# Patient Record
Sex: FEMALE | Race: WHITE | NOT HISPANIC OR LATINO | Employment: OTHER
[De-identification: names, ages, dates, MRNs, and addresses within clinical notes are randomized per-mention and may not be internally consistent; named-entity substitution may affect disease eponyms.]

---

## 2017-04-26 ENCOUNTER — HISTORIC RESULTS (OUTPATIENT)
Dept: ADMINISTRATIVE | Age: 62
End: 2017-04-26

## 2017-05-11 ENCOUNTER — HISTORIC RESULTS (OUTPATIENT)
Dept: ADMINISTRATIVE | Age: 62
End: 2017-05-11

## 2017-07-17 ENCOUNTER — HISTORIC RESULTS (OUTPATIENT)
Dept: ADMINISTRATIVE | Age: 62
End: 2017-07-17

## 2017-08-01 ENCOUNTER — COMMUNICATION - GICH (OUTPATIENT)
Dept: SURGERY | Facility: OTHER | Age: 62
End: 2017-08-01

## 2017-08-01 LAB — HEMOCCULT STL QL IA: NORMAL

## 2018-01-16 PROBLEM — L30.1 DYSHIDROTIC ECZEMA: Status: ACTIVE | Noted: 2018-01-16

## 2018-01-16 PROBLEM — I47.10 PAROXYSMAL SUPRAVENTRICULAR TACHYCARDIA (H): Status: ACTIVE | Noted: 2018-01-16

## 2018-01-16 PROBLEM — N85.00 ENDOMETRIAL HYPERPLASIA: Status: ACTIVE | Noted: 2018-01-16

## 2018-01-16 PROBLEM — R06.02 SHORTNESS OF BREATH: Status: ACTIVE | Noted: 2018-01-16

## 2018-01-16 PROBLEM — Z79.01 ANTICOAGULATION MONITORING, INR RANGE 2-3: Status: ACTIVE | Noted: 2017-12-19

## 2018-01-16 PROBLEM — E78.00 HYPERCHOLESTEROLEMIA: Status: ACTIVE | Noted: 2018-01-16

## 2018-01-16 PROBLEM — Z86.79 HISTORY OF CARDIOVASCULAR DISORDER: Status: ACTIVE | Noted: 2018-01-16

## 2018-01-16 PROBLEM — D12.6 BENIGN NEOPLASM OF COLON: Status: ACTIVE | Noted: 2018-01-16

## 2018-01-16 PROBLEM — I66.9 CEREBRAL THROMBOSIS: Status: ACTIVE | Noted: 2018-01-16

## 2018-01-16 PROBLEM — I82.409 ACUTE THROMBOEMBOLISM OF DEEP VEINS OF LOWER EXTREMITY (H): Status: ACTIVE | Noted: 2018-01-16

## 2018-01-16 PROBLEM — D68.2 FACTOR V DEFICIENCY (H): Status: ACTIVE | Noted: 2018-01-16

## 2018-01-16 PROBLEM — I10 ESSENTIAL HYPERTENSION: Status: ACTIVE | Noted: 2018-01-16

## 2018-01-16 PROBLEM — R07.9 CHEST PAIN: Status: ACTIVE | Noted: 2018-01-16

## 2018-01-16 RX ORDER — NITROGLYCERIN 80 MG/1
1 PATCH TRANSDERMAL
COMMUNITY
Start: 2017-04-18 | End: 2020-02-11

## 2018-01-16 RX ORDER — ZOLPIDEM TARTRATE 10 MG/1
TABLET ORAL
COMMUNITY
Start: 2017-12-13 | End: 2019-09-27

## 2018-01-16 RX ORDER — CARVEDILOL 3.12 MG/1
3.12 TABLET ORAL
COMMUNITY
Start: 2017-04-18 | End: 2019-09-27

## 2018-01-16 RX ORDER — NITROGLYCERIN 0.4 MG/1
0.4 TABLET SUBLINGUAL
COMMUNITY
Start: 2017-04-18 | End: 2020-02-11

## 2018-01-16 RX ORDER — ASPIRIN 81 MG/1
81 TABLET ORAL
COMMUNITY
Start: 2013-08-07 | End: 2021-04-09

## 2018-01-16 RX ORDER — WARFARIN SODIUM 6 MG/1
TABLET ORAL
COMMUNITY
Start: 2017-12-18 | End: 2019-09-28

## 2018-01-16 RX ORDER — LISINOPRIL 5 MG/1
5 TABLET ORAL
COMMUNITY
Start: 2017-04-18 | End: 2020-03-09

## 2018-01-16 RX ORDER — CLOPIDOGREL BISULFATE 75 MG/1
75 TABLET ORAL
COMMUNITY
Start: 2017-07-16 | End: 2019-09-27

## 2018-01-16 RX ORDER — WARFARIN SODIUM 2 MG/1
TABLET ORAL
COMMUNITY
Start: 2017-12-06 | End: 2019-09-28

## 2018-01-16 RX ORDER — ATORVASTATIN CALCIUM 80 MG/1
80 TABLET, FILM COATED ORAL
COMMUNITY
Start: 2018-01-04 | End: 2020-03-04

## 2018-03-01 ENCOUNTER — TRANSFERRED RECORDS (OUTPATIENT)
Dept: HEALTH INFORMATION MANAGEMENT | Facility: OTHER | Age: 63
End: 2018-03-01

## 2018-07-23 NOTE — PROGRESS NOTES
Patient Information     Patient Name  Lyudmila Fitzgerald MRN  [id number] Sex  Female   1955      Letter by Mena Green at      Author:  Mena Green Service:  (none) Author Type:  (none)    Filed:   Encounter Date:  2017 Status:  (Other)           Lyudmila Fitzgerald   [address]   [place] [address]          2017    Dear Ms. Fitzgerald:    It has come to our attention that you are due for a colonoscopy.  According to our records, your last colonoscopy was done on 8/15/2007.  It  is time for your repeat colonoscopy.  Please contact the Surgery department at [phone] and we will be happy to set up this colonoscopy for you.  If you have had a colonoscopy since your last one with us, please let us know so we can update our records.      Sincerely,       Mena Green  General Surgery      Reviewed and electronically signed by provider.

## 2018-08-30 ENCOUNTER — TRANSFERRED RECORDS (OUTPATIENT)
Dept: MULTI SPECIALTY CLINIC | Facility: CLINIC | Age: 63
End: 2018-08-30

## 2018-08-30 LAB — HEMOCCULT STL QL IA: NEGATIVE

## 2019-09-05 ENCOUNTER — TELEPHONE (OUTPATIENT)
Dept: FAMILY MEDICINE | Facility: OTHER | Age: 64
End: 2019-09-05

## 2019-09-05 DIAGNOSIS — I20.1 CORONARY ARTERY SPASM (H): Primary | ICD-10-CM

## 2019-09-05 DIAGNOSIS — I47.10 PAROXYSMAL SUPRAVENTRICULAR TACHYCARDIA (H): ICD-10-CM

## 2019-09-05 NOTE — TELEPHONE ENCOUNTER
Patient has moved back into the area after about 3 years and would like you to be her PCP and to schedule an appointment with you, she had been with you for several years prior and through her heart issues and strokes    Please call to advise/schedule    Thank you

## 2019-09-06 NOTE — TELEPHONE ENCOUNTER
Ok to schedule.  Please update medication list if needed.  Let her know that I think she should also schedule and establish with Dr Anaya/cardiology.  Erika Gupta MD

## 2019-09-06 NOTE — TELEPHONE ENCOUNTER
Patient called wanting to schedule with Dr. Islas.  Referral required.  Would PCJ please place.  Thank you!  Mary Brar on 9/6/2019 at 8:50 AM

## 2019-09-23 LAB — INR PPP: 3.8 (ref 0.9–1.1)

## 2019-09-27 ENCOUNTER — OFFICE VISIT (OUTPATIENT)
Dept: FAMILY MEDICINE | Facility: OTHER | Age: 64
End: 2019-09-27
Attending: FAMILY MEDICINE
Payer: MEDICARE

## 2019-09-27 VITALS
BODY MASS INDEX: 24.66 KG/M2 | TEMPERATURE: 98.1 F | WEIGHT: 148 LBS | OXYGEN SATURATION: 97 % | HEART RATE: 75 BPM | RESPIRATION RATE: 18 BRPM | HEIGHT: 65 IN | DIASTOLIC BLOOD PRESSURE: 84 MMHG | SYSTOLIC BLOOD PRESSURE: 120 MMHG

## 2019-09-27 DIAGNOSIS — Z95.0 CARDIAC PACEMAKER IN SITU: ICD-10-CM

## 2019-09-27 DIAGNOSIS — I20.1 CORONARY ARTERY SPASM (H): ICD-10-CM

## 2019-09-27 DIAGNOSIS — Z86.73 HISTORY OF TIA (TRANSIENT ISCHEMIC ATTACK) AND STROKE: ICD-10-CM

## 2019-09-27 DIAGNOSIS — I47.10 PAROXYSMAL SUPRAVENTRICULAR TACHYCARDIA (H): ICD-10-CM

## 2019-09-27 DIAGNOSIS — Z12.4 SCREENING FOR CERVICAL CANCER: ICD-10-CM

## 2019-09-27 DIAGNOSIS — Z00.00 PHYSICAL EXAM, ANNUAL: Primary | ICD-10-CM

## 2019-09-27 DIAGNOSIS — E11.9 TYPE 2 DIABETES, HBA1C GOAL < 8% (H): ICD-10-CM

## 2019-09-27 DIAGNOSIS — F51.04 CHRONIC INSOMNIA: ICD-10-CM

## 2019-09-27 LAB
ALBUMIN SERPL-MCNC: 4.6 G/DL (ref 3.5–5.7)
ALP SERPL-CCNC: 76 U/L (ref 34–104)
ALT SERPL W P-5'-P-CCNC: 22 U/L (ref 7–52)
ANION GAP SERPL CALCULATED.3IONS-SCNC: 11 MMOL/L (ref 3–14)
AST SERPL W P-5'-P-CCNC: 25 U/L (ref 13–39)
BILIRUB SERPL-MCNC: 0.6 MG/DL (ref 0.3–1)
BUN SERPL-MCNC: 12 MG/DL (ref 7–25)
CALCIUM SERPL-MCNC: 9.4 MG/DL (ref 8.6–10.3)
CHLORIDE SERPL-SCNC: 107 MMOL/L (ref 98–107)
CHOLEST SERPL-MCNC: 191 MG/DL
CO2 SERPL-SCNC: 21 MMOL/L (ref 21–31)
CREAT SERPL-MCNC: 0.77 MG/DL (ref 0.6–1.2)
ERYTHROCYTE [DISTWIDTH] IN BLOOD BY AUTOMATED COUNT: 12.7 % (ref 10–15)
GFR SERPL CREATININE-BSD FRML MDRD: 75 ML/MIN/{1.73_M2}
GLUCOSE SERPL-MCNC: 114 MG/DL (ref 70–105)
HBA1C MFR BLD: 6.1 % (ref 4–6)
HCT VFR BLD AUTO: 43.2 % (ref 35–47)
HDLC SERPL-MCNC: 69 MG/DL (ref 23–92)
HGB BLD-MCNC: 14.9 G/DL (ref 11.7–15.7)
LDLC SERPL CALC-MCNC: 97 MG/DL
MCH RBC QN AUTO: 31.8 PG (ref 26.5–33)
MCHC RBC AUTO-ENTMCNC: 34.5 G/DL (ref 31.5–36.5)
MCV RBC AUTO: 92 FL (ref 78–100)
NONHDLC SERPL-MCNC: 122 MG/DL
PLATELET # BLD AUTO: 250 10E9/L (ref 150–450)
POTASSIUM SERPL-SCNC: 4 MMOL/L (ref 3.5–5.1)
PROT SERPL-MCNC: 7.5 G/DL (ref 6.4–8.9)
RBC # BLD AUTO: 4.69 10E12/L (ref 3.8–5.2)
SODIUM SERPL-SCNC: 139 MMOL/L (ref 134–144)
TRIGL SERPL-MCNC: 125 MG/DL
TSH SERPL DL<=0.05 MIU/L-ACNC: 2.28 IU/ML (ref 0.34–5.6)
WBC # BLD AUTO: 8.3 10E9/L (ref 4–11)

## 2019-09-27 PROCEDURE — 90471 IMMUNIZATION ADMIN: CPT

## 2019-09-27 PROCEDURE — 90715 TDAP VACCINE 7 YRS/> IM: CPT

## 2019-09-27 PROCEDURE — 87624 HPV HI-RISK TYP POOLED RSLT: CPT | Mod: ZL | Performed by: FAMILY MEDICINE

## 2019-09-27 PROCEDURE — G0463 HOSPITAL OUTPT CLINIC VISIT: HCPCS | Mod: 25

## 2019-09-27 PROCEDURE — 90472 IMMUNIZATION ADMIN EACH ADD: CPT

## 2019-09-27 PROCEDURE — 36415 COLL VENOUS BLD VENIPUNCTURE: CPT | Mod: ZL | Performed by: FAMILY MEDICINE

## 2019-09-27 PROCEDURE — 80053 COMPREHEN METABOLIC PANEL: CPT | Mod: ZL | Performed by: FAMILY MEDICINE

## 2019-09-27 PROCEDURE — 84443 ASSAY THYROID STIM HORMONE: CPT | Mod: ZL | Performed by: FAMILY MEDICINE

## 2019-09-27 PROCEDURE — G0009 ADMIN PNEUMOCOCCAL VACCINE: HCPCS

## 2019-09-27 PROCEDURE — 40001026 ZZHCL STATISTICAL PAP TEST QC: Performed by: FAMILY MEDICINE

## 2019-09-27 PROCEDURE — G0008 ADMIN INFLUENZA VIRUS VAC: HCPCS

## 2019-09-27 PROCEDURE — 99213 OFFICE O/P EST LOW 20 MIN: CPT | Mod: 25 | Performed by: FAMILY MEDICINE

## 2019-09-27 PROCEDURE — 99396 PREV VISIT EST AGE 40-64: CPT | Mod: GY | Performed by: FAMILY MEDICINE

## 2019-09-27 PROCEDURE — 90686 IIV4 VACC NO PRSV 0.5 ML IM: CPT

## 2019-09-27 PROCEDURE — G0463 HOSPITAL OUTPT CLINIC VISIT: HCPCS

## 2019-09-27 PROCEDURE — 80061 LIPID PANEL: CPT | Mod: ZL | Performed by: FAMILY MEDICINE

## 2019-09-27 PROCEDURE — 83036 HEMOGLOBIN GLYCOSYLATED A1C: CPT | Mod: ZL | Performed by: FAMILY MEDICINE

## 2019-09-27 PROCEDURE — 85027 COMPLETE CBC AUTOMATED: CPT | Mod: ZL | Performed by: FAMILY MEDICINE

## 2019-09-27 PROCEDURE — G0123 SCREEN CERV/VAG THIN LAYER: HCPCS | Performed by: FAMILY MEDICINE

## 2019-09-27 RX ORDER — ZOLPIDEM TARTRATE 5 MG/1
5 TABLET ORAL
Qty: 30 TABLET | Refills: 5 | Status: SHIPPED | OUTPATIENT
Start: 2019-09-27 | End: 2020-01-22

## 2019-09-27 RX ORDER — NITROGLYCERIN 0.4 MG/1
TABLET SUBLINGUAL
Qty: 25 TABLET | Refills: 3 | Status: SHIPPED | OUTPATIENT
Start: 2019-09-27 | End: 2020-03-09

## 2019-09-27 RX ORDER — AMLODIPINE BESYLATE 2.5 MG/1
2.5 TABLET ORAL DAILY
COMMUNITY
Start: 2019-01-17 | End: 2020-03-09

## 2019-09-27 RX ORDER — DILTIAZEM HYDROCHLORIDE 180 MG/1
180 CAPSULE, EXTENDED RELEASE ORAL DAILY
Qty: 90 CAPSULE | Refills: 3 | COMMUNITY
Start: 2019-09-27 | End: 2020-03-09

## 2019-09-27 RX ORDER — ISOSORBIDE MONONITRATE 60 MG/1
60 TABLET, EXTENDED RELEASE ORAL DAILY
COMMUNITY
Start: 2019-09-27 | End: 2020-03-09

## 2019-09-27 RX ORDER — NITROGLYCERIN 0.4 MG/1
0.4 TABLET SUBLINGUAL
Status: CANCELLED | OUTPATIENT
Start: 2019-09-27

## 2019-09-27 ASSESSMENT — MIFFLIN-ST. JEOR: SCORE: 1222.2

## 2019-09-27 ASSESSMENT — PAIN SCALES - GENERAL: PAINLEVEL: MILD PAIN (3)

## 2019-09-27 NOTE — PROGRESS NOTES
SUBJECTIVE:  Nursing Notes:   Britta Bunn LPN  9/27/2019  2:27 PM  Signed  Patient presents to the clinic today for a px. Med rec complete.     Britta Oni TEE.................. 9/27/2019 2:15 PM      Lyudmila Fitzgerald is a 64 year old female who presents for her annual exam.    HPI: Lyudmila Fitzgerald is a 64 year old female presents for her annual exam.    She recent moved back to [place] after having moved to [place], then [facility].    She has a history of complex CV disease including vasospasm disease, a fib, SVT, systolic HF, coronary disease.  She has had several stents, EP studies, ablation, pacemaker placement, and bypass surgery.  In 2016 she underwent single vessel CABG.  She had severe systolic heart failure, EF down to 15%. Post-procedure, this improved to 45%.  She sees cardiology every 3-6 months for follow up.  She has had difficulty with chronic, recurrent angina.      Type 2 diabetes - has not required medication for this.      History of CVA.    History of DVT/VTE.  Is on long term anticoagulation.      Insomnia - chronic.  Has taken ambien long term for sleep -  confirms past prescriptions.    Last pap: 2013  Immunizations:  Flu and tetanus updates are due.  Mammogram due in the spring  Colon cancer screening FIT testing done 2-3 years ago    No results found for: CHOL  Lab Results   Component Value Date    HDL 96 11/06/2013     Lab Results   Component Value Date    LDL 90 11/06/2013     Lab Results   Component Value Date    TRIG 87 11/06/2013         PROBLEM LIST:  Patient Active Problem List   Diagnosis     ACP (advance care planning)     Acute thromboembolism of deep veins of lower extremity (H)     Anticoagulation monitoring, INR range 2-3     Cardiac pacemaker in situ     Cerebral thrombosis     Chest pain     Benign neoplasm of colon     Constipation, chronic     Hemiplegia, late effect of cerebrovascular disease (H)     Coronary artery spasm (H)     Diabetes mellitus, type  II (H)     Dyshidrotic eczema     Endometrial hyperplasia     Factor V deficiency (H)     Focal neurological deficit     Foot drop, left     Hypercholesterolemia     Left-sided weakness     Disorder of bone and cartilage     Paroxysmal supraventricular tachycardia (H)     Shortness of breath     History of cardiovascular disorder     Essential hypertension     Urinary incontinence, mixed     PAST MEDICAL HISTORY:  Past Medical History:   Diagnosis Date     Acute thromboembolism of deep veins of lower extremity (H)     2006,Hx of multiple episodes of DVT: 3 lower extremity; 5 upper extremity (right arm)     Atrial fibrillation (H)     No Comments Provided     Cardiac pacemaker in situ     Dual chamber     Cerebral thrombosis     2006,'95 w/ no residual     Coronary atherosclerosis     2006     Deep phlebothrombosis, antepartum, with delivery (H)     No Comments Provided     Endometrial hyperplasia     2006,s/p endometrial ablation      Essential hypertension     No Comments Provided     History of other genital system and obstetric disorders      8, Para 3-0-5-2     Other and unspecified angina pectoris     2006     Other and unspecified hyperlipidemia     No Comments Provided     Other postprocedural states      and ,Followed by Dr. Usman Duran, [facility],.     Paroxysmal supraventricular tachycardia (H)     2006,s/p multiple ablations w last resulting in PPM     Personal history of disease     2009,Hospitalized CVA with worsening left hemiplegia, post hospitalization ARU stay.     Personal history of transient ischemic attack (TIA) and cerebral infarction without residual deficit     with left hemiplegia     Primary hypercoagulable state (H)     No Comments Provided     SURGICAL HISTORY:  Past Surgical History:   Procedure Laterality Date     ARTHROSCOPY KNEE      ,Arthroscopy for chondromalacia patella     AS CABG, ARTERY-VEIN,  "SINGLE  11/15/2016    Single vessel; done in [place]     ATTEMPTED ARTHROSCOPY      02/04,Right arthroscopy     BIOPSY BREAST      03/08,Breast cyst aspiration     COLONOSCOPY      8/2007,follow up recommended in 10 years.     ENDOSCOPIC SINUS SURGERY      03/04,Sinus node ablation.     EP STUDY  12/1994    EP STUDY /ABLATION,AV re-entry tachycardia, tessie ablation     HEART CATH, ANGIOPLASTY      01/06,Stenting placed LAD after ergonovine provocation confirmed     HEART CATH, ANGIOPLASTY      03/06,90% lesion, normal left ventricular function     IMPLANT PACEMAKER      03/03,Guidant pacemaker placement, AV tessie ablation     LAPAROSCOPIC ABLATION ENDOMETRIOSIS      06/06     OTHER SURGICAL HISTORY      3/24/06,770749,([place]) HC STENT ANGIO     OTHER SURGICAL HISTORY      08/02,747474,EP STUDY /ABLATION     OTHER SURGICAL HISTORY      11/04,94531.0,CT CORONARY ANGIOGRAM ([place]),Coronary angiogram, failed ablation     OTHER SURGICAL HISTORY      12/04,316326,[facility] RELOCATION OF SKIN POCKET PACEMAKER,[facility] 5/24/05 reconfiguration of pacemaker \"pocket\"     OTHER SURGICAL HISTORY      207882,IP CONSULT TO ELECTROPHYSIOLOGY,study and lead change     OTHER SURGICAL HISTORY      12/06,727076,NEUROSTIMULATOR,Nerve stimulator implanted for chest pain control     OTHER SURGICAL HISTORY      12/2008,51115.0,CT CORONARY ANGIOGRAM ([place]),with increased left-sided weakness and vertigo, negative CT angiogram.     REPLACE PACEMAKER GENERATOR      12/29/04,Replacement of left ventricular pacemaker lead.     STENT  02/2017    LAD        SOCIAL HISTORY:  Social History     Socioeconomic History     Marital status:      Spouse name: Antonio     Number of children: 2     Years of education: 16+     Highest education level: Not on file   Occupational History     Not on file   Social Needs     Financial resource strain: Not hard at all     Food insecurity:     Worry: Never true     Inability: Never true     Transportation needs: "     Medical: No     Non-medical: No   Tobacco Use     Smoking status: Never Smoker     Smokeless tobacco: Never Used     Tobacco comment: Quit smoking: spouse does not smoke in the house. Exposed in cars.   Substance and Sexual Activity     Alcohol use: Yes     Comment: Alcoholic Drinks/day: Alcoholic Drinks/day: 1-2 drinks twice a week     Drug use: Never     Comment: Drug use: No     Sexual activity: Not Currently     Partners: Male   Lifestyle     Physical activity:     Days per week: 0 days     Minutes per session: 0 min     Stress: To some extent   Relationships     Social connections:     Talks on phone: Not on file     Gets together: Not on file     Attends Orthodoxy service: Not on file     Active member of club or organization: Not on file     Attends meetings of clubs or organizations: Not on file     Relationship status: Not on file     Intimate partner violence:     Fear of current or ex partner: Not on file     Emotionally abused: Not on file     Physically abused: Not on file     Forced sexual activity: Not on file   Other Topics Concern     Not on file   Social History Narrative     2nd Marriage x 20 years to  Anthony they live in [place], [place]       in [place]. Completed her Master's Degree 2003. Taught Special Education.  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      Total 5 children blended family-3 dgts. And 2 sons    Daughter:  Philip    Son:  Anthony - lives in [facility]    6 Grand-children 5 boys and 1 girl    Leigh parents' and siblings live in [facility].     FAMILY HISTORY:  Family History   Problem Relation Age of Onset     Dementia Mother      Skin Cancer Father         squamous     Dementia Father      Deep Vein Thrombosis Daughter         FVL +     Family History Negative Son         Good Health,lives in [facility] [place] [facility]     Breast Cancer Paternal Grandmother         Cancer-breast     Breast Cancer Paternal Aunt         Cancer-breast     CURRENT MEDICATIONS:   Current  "Outpatient Medications   Medication Sig Dispense Refill     amLODIPine (NORVASC) 2.5 MG tablet Take 2.5 mg by mouth daily       aspirin EC 81 MG EC tablet Take 81 mg by mouth       atorvastatin (LIPITOR) 80 MG tablet Take 80 mg by mouth       calcium carb 1250 mg, 500 mg [place],/vitamin D 200 unit (CALCIUM 500/D) 500-200 MG-UNIT per tablet Take 1 tablet by mouth       diltiazem ER (DILT-XR) 180 MG 24 hr capsule Take 1 capsule (180 mg) by mouth daily 90 capsule 3     isosorbide mononitrate (IMDUR) 60 MG 24 hr tablet Take 1 tablet (60 mg) by mouth daily       lisinopril (PRINIVIL/ZESTRIL) 5 MG tablet Take 5 mg by mouth       nitroGLYcerin (NITRODUR) 0.4 MG/HR 24 hr patch 1 patch       nitroGLYcerin (NITROSTAT) 0.4 MG sublingual tablet For chest pain place 1 tablet under the tongue every 5 minutes for 3 doses. If symptoms persist 5 minutes after 1st dose call 911. 25 tablet 3     zolpidem (AMBIEN) 5 MG tablet Take 1 tablet (5 mg) by mouth nightly as needed for sleep 30 tablet 5     nitroGLYcerin (NITROSTAT) 0.4 MG sublingual tablet Place 0.4 mg under the tongue       ALLERGIES:  Morphine; Prednisone; and Sotalol     REVIEW OF SYSTEMS:  General: denies any general problems.  Eyes: eye injury - over a year ago  Ears/Nose/Throat: last dentist visit  2017  Cardiovascular - see above   Respiratory: denies problems  Gastrointestinal: denies problems; colon cancer screening is due  Genitourinary: overactive bladder   Musculoskeletal: chronic shoulder pain  Skin: denies problems  Neurologic: denies problems  Psychiatric: chronic insomnia  Endocrine: past diagnosis of type 2 diabetes   Heme/Lymphatic: hypercoagulability   Allergic/Immunologic: denies problems    PHQ-2 Score:     PHQ-2 ( 1999 Pfizer) 9/27/2019   Q1: Little interest or pleasure in doing things 0   Q2: Feeling down, depressed or hopeless 0   PHQ-2 Score 0       OBJECTIVE:  /84   Pulse 75   Temp 98.1  F (36.7  C) (Tympanic)   Resp 18   Ht 1.651 m (5' 5\")   " Wt 67.1 kg (148 lb)   SpO2 97%   Breastfeeding? No   BMI 24.63 kg/m     EXAM:   General Appearance: Pleasant, alert, appropriate appearance for age. No acute distress  Head Exam: Normal. Normocephalic, atraumatic.  Eye Exam:  Normal external eye, conjunctiva,lids, cornea. ALESSIO.  Ear Exam: Normal TM's bilaterally. Normal auditory canals and external ears. Non-tender.  Nose Exam: Normal external nose, mucus membranes, and septum.  OroPharynx Exam:  Dental hygieneadequate. Normal buccal mucose. Normal pharynx.  Neck Exam:  Supple, no masses or nodes.  Thyroid Exam: No nodules or enlargement.  Chest/Respiratory Exam: Normal chest wall and respirations. Clear to auscultation.  Breast Exam: No dimpling, nipple retraction or discharge. No masses or nodes.  Cardiovascular Exam: Regular rate and rhythm. Left chest wall pacemaker  Gastrointestinal Exam: Soft, non-tender, nomasses or organomegaly.  Genitourinary Exam Female: External genitalia, vulva and vagina appear dry; marked vaginal dryness. Pap smear obtained.  Lymphatic Exam: Non-palpable nodes in neck, clavicular, axillary, or inguinal regions.  Musculoskeletal Exam: back - scoliosis with asymmetry of scapula, decreased shoulder ROM.  Foot Exam: Left and right foot: good pedal pulses  Skin: many SKs   Neurologic Exam: Nonfocal, symmetric DTRs, normal gross motor, tone coordination and no tremor.  Psychiatric Exam: Alert and oriented - appropriate affect.    ASSESSMENT/PLAN    ICD-10-CM    1. Physical exam, annual Z00.00 Pap Screen Thin Prep     HPV High Risk Types DNA Cervical   2. Coronary artery spasm (H) I20.1 Lipid Panel     Comprehensive Metabolic Panel     CBC W PLT No Diff     nitroGLYcerin (NITROSTAT) 0.4 MG sublingual tablet     Lipid Panel     Comprehensive Metabolic Panel     CBC W PLT No Diff   3. Paroxysmal supraventricular tachycardia (H) I47.1    4. Cardiac pacemaker in situ Z95.0    5. History of TIA (transient ischemic attack) and stroke Z86.73     6. Chronic insomnia F51.04 zolpidem (AMBIEN) 5 MG tablet   7. Type 2 diabetes, HbA1C goal < 8% (H) E11.9 Hemoglobin A1c     TSH     Hemoglobin A1c     TSH   8. Screening for cervical cancer Z12.4 Pap Screen Thin Prep     HPV High Risk Types DNA Cervical       PLAN:  1.  Health history of 3+ years updated.  2.  Pap smear and HPV obtained  3.  Colon cancer screening discussed - will proceed with Cologard  4.  Labs due today:  A1C, comp, cbc, lipids, TSH  5.  Discussed local cardiology services and recommendation to establish care  6.  Flu and boostrix vaccines updated  7.  Discussed risks of long term use of sleep aids vs CBT and sleep retraining.    Follow up every 6 months with PCP.  MARYURI SANDOVAL MD on 9/27/2019 at 2:29 PM

## 2019-09-27 NOTE — LETTER
September 27, 2019      Lyudmila Fitzgerald  [address] [address]        Dear Lyudmila,     Here is a copy of your recent labs:    Results for orders placed or performed in visit on 09/27/19   Lipid Panel   Result Value Ref Range    Cholesterol 191 <200 mg/dL    Triglycerides 125 <150 mg/dL    HDL Cholesterol 69 23 - 92 mg/dL    LDL Cholesterol Calculated 97 <100 mg/dL    Non HDL Cholesterol 122 <130 mg/dL   Hemoglobin A1c   Result Value Ref Range    Hemoglobin A1C 6.1 (H) 4.0 - 6.0 %   Comprehensive Metabolic Panel   Result Value Ref Range    Sodium 139 134 - 144 mmol/L    Potassium 4.0 3.5 - 5.1 mmol/L    Chloride 107 98 - 107 mmol/L    Carbon Dioxide 21 21 - 31 mmol/L    Anion Gap 11 3 - 14 mmol/L    Glucose 114 (H) 70 - 105 mg/dL    Urea Nitrogen 12 7 - 25 mg/dL    Creatinine 0.77 0.60 - 1.20 mg/dL    GFR Estimate 75 >60 mL/min/[1.73_m2]    GFR Estimate If Black >90 >60 mL/min/[1.73_m2]    Calcium 9.4 8.6 - 10.3 mg/dL    Bilirubin Total 0.6 0.3 - 1.0 mg/dL    Albumin 4.6 3.5 - 5.7 g/dL    Protein Total 7.5 6.4 - 8.9 g/dL    Alkaline Phosphatase 76 34 - 104 U/L    ALT 22 7 - 52 U/L    AST 25 13 - 39 U/L   TSH   Result Value Ref Range    Thyrotropin 2.28 0.34 - 5.60 IU/mL   CBC W PLT No Diff   Result Value Ref Range    WBC 8.3 4.0 - 11.0 10e9/L    RBC Count 4.69 3.8 - 5.2 10e12/L    Hemoglobin 14.9 11.7 - 15.7 g/dL    Hematocrit 43.2 35.0 - 47.0 %    MCV 92 78 - 100 fl    MCH 31.8 26.5 - 33.0 pg    MCHC 34.5 31.5 - 36.5 g/dL    RDW 12.7 10.0 - 15.0 %    Platelet Count 250 150 - 450 10e9/L         Your hemoglobin A1c, check of how your blood sugars have been for the past 3 months, is within good range being less than 7%.    Your hemoglobin and white count are both normal.    Your thyroid level is normal.    Your kidney function, liver function, and blood electrolytes are stable.    Your cholesterol levels are at goal.      Sincerely,    MARYURI SANDOVAL MD

## 2019-09-27 NOTE — NURSING NOTE
Patient presents to the clinic today for a px. Med rec complete.     Britta Bunn LPN.................. 9/27/2019 2:15 PM

## 2019-09-27 NOTE — LETTER
October 8, 2019      Lyudmila Fitzgerald  [address]  [facility] [address]        Dear Lyudmila,     Your pap smear and HPV results are normal.  Your next pap smear is due in 5years.        Sincerely,        MARYURI SANDOVAL MD

## 2019-09-28 SDOH — HEALTH STABILITY: PHYSICAL HEALTH: ON AVERAGE, HOW MANY MINUTES DO YOU ENGAGE IN EXERCISE AT THIS LEVEL?: 0 MIN

## 2019-09-28 SDOH — HEALTH STABILITY: PHYSICAL HEALTH: ON AVERAGE, HOW MANY DAYS PER WEEK DO YOU ENGAGE IN MODERATE TO STRENUOUS EXERCISE (LIKE A BRISK WALK)?: 0 DAYS

## 2019-09-28 SDOH — ECONOMIC STABILITY: FOOD INSECURITY: WITHIN THE PAST 12 MONTHS, YOU WORRIED THAT YOUR FOOD WOULD RUN OUT BEFORE YOU GOT MONEY TO BUY MORE.: NEVER TRUE

## 2019-09-28 SDOH — ECONOMIC STABILITY: TRANSPORTATION INSECURITY
IN THE PAST 12 MONTHS, HAS THE LACK OF TRANSPORTATION KEPT YOU FROM MEDICAL APPOINTMENTS OR FROM GETTING MEDICATIONS?: NO

## 2019-09-28 SDOH — ECONOMIC STABILITY: INCOME INSECURITY: HOW HARD IS IT FOR YOU TO PAY FOR THE VERY BASICS LIKE FOOD, HOUSING, MEDICAL CARE, AND HEATING?: NOT HARD AT ALL

## 2019-09-28 SDOH — ECONOMIC STABILITY: TRANSPORTATION INSECURITY
IN THE PAST 12 MONTHS, HAS LACK OF TRANSPORTATION KEPT YOU FROM MEETINGS, WORK, OR FROM GETTING THINGS NEEDED FOR DAILY LIVING?: NO

## 2019-09-28 SDOH — ECONOMIC STABILITY: FOOD INSECURITY: WITHIN THE PAST 12 MONTHS, THE FOOD YOU BOUGHT JUST DIDN'T LAST AND YOU DIDN'T HAVE MONEY TO GET MORE.: NEVER TRUE

## 2019-09-28 SDOH — HEALTH STABILITY: MENTAL HEALTH
STRESS IS WHEN SOMEONE FEELS TENSE, NERVOUS, ANXIOUS, OR CAN'T SLEEP AT NIGHT BECAUSE THEIR MIND IS TROUBLED. HOW STRESSED ARE YOU?: TO SOME EXTENT

## 2019-09-30 LAB — INR PPP: 2.4 (ref 0.9–1.1)

## 2019-10-07 LAB
COPATH REPORT: NORMAL
PAP: NORMAL

## 2019-10-08 ENCOUNTER — OFFICE VISIT (OUTPATIENT)
Dept: CARDIOLOGY | Facility: OTHER | Age: 64
End: 2019-10-08
Attending: FAMILY MEDICINE
Payer: MEDICARE

## 2019-10-08 VITALS
WEIGHT: 149 LBS | TEMPERATURE: 97.4 F | OXYGEN SATURATION: 97 % | HEART RATE: 72 BPM | DIASTOLIC BLOOD PRESSURE: 64 MMHG | BODY MASS INDEX: 24.83 KG/M2 | RESPIRATION RATE: 20 BRPM | HEIGHT: 65 IN | SYSTOLIC BLOOD PRESSURE: 138 MMHG

## 2019-10-08 DIAGNOSIS — I47.10 PAROXYSMAL SUPRAVENTRICULAR TACHYCARDIA (H): ICD-10-CM

## 2019-10-08 DIAGNOSIS — Z86.79 PERSONAL HISTORY OF SUPRAVENTRICULAR TACHYCARDIA: ICD-10-CM

## 2019-10-08 DIAGNOSIS — Z79.01 ANTICOAGULATED ON COUMADIN: ICD-10-CM

## 2019-10-08 DIAGNOSIS — I45.6 LOWN GANONG LEVINE SYNDROME: ICD-10-CM

## 2019-10-08 DIAGNOSIS — I20.1 CORONARY ARTERY SPASM (H): Primary | ICD-10-CM

## 2019-10-08 DIAGNOSIS — Z98.890 HISTORY OF CARDIAC RADIOFREQUENCY ABLATION: ICD-10-CM

## 2019-10-08 DIAGNOSIS — Z95.0 PACEMAKER: ICD-10-CM

## 2019-10-08 DIAGNOSIS — E11.9 TYPE 2 DIABETES MELLITUS WITHOUT COMPLICATION, WITHOUT LONG-TERM CURRENT USE OF INSULIN (H): ICD-10-CM

## 2019-10-08 DIAGNOSIS — Z86.79 HISTORY OF CORONARY VASOSPASM: ICD-10-CM

## 2019-10-08 DIAGNOSIS — Z86.73 HISTORY OF STROKE: ICD-10-CM

## 2019-10-08 DIAGNOSIS — Z95.5 HISTORY OF CORONARY ARTERY STENT PLACEMENT: ICD-10-CM

## 2019-10-08 DIAGNOSIS — I10 ESSENTIAL HYPERTENSION: ICD-10-CM

## 2019-10-08 DIAGNOSIS — Z95.0 PACEMAKER-DEPENDENT DUE TO NATIVE CARDIAC RHYTHM INSUFFICIENT TO SUPPORT LIFE: ICD-10-CM

## 2019-10-08 DIAGNOSIS — Z95.1 HISTORY OF CORONARY ARTERY BYPASS GRAFT X 1: ICD-10-CM

## 2019-10-08 DIAGNOSIS — D68.2 FACTOR V DEFICIENCY (H): ICD-10-CM

## 2019-10-08 DIAGNOSIS — Q24.5 MYOCARDIAL BRIDGE: ICD-10-CM

## 2019-10-08 DIAGNOSIS — Z86.79 HISTORY OF HEART FAILURE: ICD-10-CM

## 2019-10-08 DIAGNOSIS — Z86.718 HISTORY OF DVT (DEEP VEIN THROMBOSIS): ICD-10-CM

## 2019-10-08 DIAGNOSIS — Z79.01 ANTICOAGULATION MONITORING, INR RANGE 2-3: ICD-10-CM

## 2019-10-08 DIAGNOSIS — Z98.890 S/P AV NODAL ABLATION: ICD-10-CM

## 2019-10-08 DIAGNOSIS — E78.2 MIXED HYPERLIPIDEMIA: ICD-10-CM

## 2019-10-08 DIAGNOSIS — M21.372 LEFT FOOT DROP: ICD-10-CM

## 2019-10-08 DIAGNOSIS — I49.8 PACEMAKER-DEPENDENT DUE TO NATIVE CARDIAC RHYTHM INSUFFICIENT TO SUPPORT LIFE: ICD-10-CM

## 2019-10-08 DIAGNOSIS — Z95.0 CARDIAC PACEMAKER IN SITU: ICD-10-CM

## 2019-10-08 PROBLEM — I82.409 ACUTE THROMBOEMBOLISM OF DEEP VEINS OF LOWER EXTREMITY (H): Status: RESOLVED | Noted: 2018-01-16 | Resolved: 2019-10-08

## 2019-10-08 LAB
FINAL DIAGNOSIS: NORMAL
HPV HR 12 DNA CVX QL NAA+PROBE: NEGATIVE
HPV16 DNA SPEC QL NAA+PROBE: NEGATIVE
HPV18 DNA SPEC QL NAA+PROBE: NEGATIVE
SPECIMEN DESCRIPTION: NORMAL
SPECIMEN SOURCE CVX/VAG CYTO: NORMAL

## 2019-10-08 PROCEDURE — 93005 ELECTROCARDIOGRAM TRACING: CPT | Performed by: INTERNAL MEDICINE

## 2019-10-08 PROCEDURE — G0463 HOSPITAL OUTPT CLINIC VISIT: HCPCS

## 2019-10-08 PROCEDURE — 99203 OFFICE O/P NEW LOW 30 MIN: CPT | Performed by: INTERNAL MEDICINE

## 2019-10-08 PROCEDURE — 93010 ELECTROCARDIOGRAM REPORT: CPT | Performed by: INTERNAL MEDICINE

## 2019-10-08 PROCEDURE — G0463 HOSPITAL OUTPT CLINIC VISIT: HCPCS | Mod: 25

## 2019-10-08 RX ORDER — WARFARIN SODIUM 6 MG/1
TABLET ORAL
COMMUNITY
Start: 2019-09-10 | End: 2019-12-04

## 2019-10-08 ASSESSMENT — MIFFLIN-ST. JEOR: SCORE: 1226.12

## 2019-10-08 ASSESSMENT — PAIN SCALES - GENERAL: PAINLEVEL: MILD PAIN (2)

## 2019-10-08 NOTE — PROGRESS NOTES
[facility] HEART CARE   CARDIOLOGY CONSULT     Lyudmila Fitzgerald   1955  [id number]    Erika Carrasco     Chief Complaint   Patient presents with     Consult For     coronary artery spasm, paroxsymal SVT          HPI:   Mrs. Fitzgerald is a 64-year-old female who is being seen by cardiology for concerns for coronary spasm and PSVT.  She has a very complicated past medical history.    Originally, she was diagnosed with a LAD ostial coronary artery spasm at [facility].  She had a positive methergine spasm study in 2005 at [facility] in the mid LAD, treated with drug-eluting stent to the mid-LAD.  She has a history of LAD myocardial bridging, CABG with LIMA to LAD on 11/15/2016 in [place], [place], history of heart failure with reported EF of 15% now recovered, stenting to the LAD x3, history of radiofrequency ablation for SVT x8, ultimately resulting in AV tessie ablation with pacemaker placement with pacemaker dependence, H/O stroke x7 with left hemiparesis essentially resolved with the exception of intermittent left foot drop, hyperlipidemia, on Coumadin with a history of factor V Leiden deficiency and numerous DVTs involving both the upper and lower extremities, hypertension, DM-2, on anticoagulated on Coumadin.    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  She has had care through [facility] as well as in [place], [place].  She was evaluated at [place] and underwent angiography revealing separate origins of the LAD and circumflex.  She has a history of catheter induced coronary artery spasm originally diagnosed at [place].    Dr. Bill Abreu through [facility] evaluated her in 2005. In spite of pre-treatment with nitroglycerine and calcium channel blockers, she had fairly vigorous mid-LAD spasm with intracoronary methergine. The ostial LAD did not spasm. Dr. Abreu treated this area with a 2.5 x 28 mm Cypher drug-eluting stent. The following year she had  "instent restenosis treated with a second 8 mm x 2.5 mm Cypher drug-eluting stent.     She did fairly well until 2016, when she had evaluation at [facility] in [place]. She was treated with trans-myocardial revascularization with 25 channels in the inferior, lateral and anterior LV walls, and underwent an LIMA-LAD. Her EF was 35% pre-op, and approximately 50-55% post-operatively.     She had recurrent angina, and on 2/8/17 she underwent repeat angiography which showed mild instent restenosis of the mid-LAD stents, followed by 99% stenosis of the mid-LAD at the LIMA-LAD anastomosis, presumably due to surgical clips/manipulation. The TERRENCE was atretic. Thus, the interventionalist placed a 2.25 x 8 mm Promus Premiere drug-eluting stent into the mid-LAD with a good result.     Lately, she has been having an increasingly more frequent and severe anginal chest discomfort, and feels like she is back to her severe coronary \"spasm\" again.     He has ongoing concerns for angina felt to be vasospastic in nature.  She has had a total of x4 cardiac catheterizations on 6/17/2008, 11/21/2012, 1/17/2019, and I believe one in [place] do not have this date.      She has done pretty well since. Her EF was greater than 60% on a couple occasions since.  Most recently, on 5/16/2019 her EF was 60% to 65%.  There was no significant valvular disease detected.  Her echo was essentially unchanged from 4/18/2017.  Diastolic dysfunction was normal.      IMAGING RESULTS:   1. Coronary artery disease.  A. Predominant initial pathophysiology was coronary spasm, documented to be provoked by ergonovine for which she eventually received a stent in the mid LAD which, over the long run, stayed stable and only mildly diseased.   B. Continued frequent episodes of angina, responsive to nitroglycerin that apparently accelerated while in [place] 11/2016.   C. 11/2016: Patient underwent a follow up angiogram for exertional dyspnea which " demonstrated a moderate to severe stenosis at the LIMA-LAD insertion which was stented approximately 5 months ago, i.e. February 2016.   D. At the time of bypass grafting x1, she underwent laser revascularization with multiple penetrating lesions established.   E. Symptoms have been 80-90% improved still.   F. Negative pharmacologic myocardial perfusion study on 5/11/2017 for ischemia and a gated EF of 69%.   2. Factor V Leiden, remains on Coumadin.   3. Status post CVA with minimal residual symptoms, manifests as occasional left foot drop.  4. Normal left ventricular systolic function by echo with an EF of 60-65%.   5. History of PSVTs for which she has undergone AV tessie ablation, pacemaker implantation.   A. Pacemaker function on follow up has remained normal.   6. Postop atrial fibrillation following her bypass surgery, requiring a brief course of amiodarone.  7. Treated hypertension.   8. Exertional dyspnea with minimally abnormal PFTs obtained on 4/26/2017.  A. Suspicious for mild obstructive lung disease - no bronchodilator trial.   B. Significant heart rate acceleration with minimal activity.   C. Consider exercise provoked diastolic dysfunction.   9. Multiple pulmonary nodules by CT which were noted in 2009.       ALLERGIES:   Allergies   Allergen Reactions     Morphine Nausea and Vomiting     OK in small doses     Prednisone Nausea and Vomiting     Per IV     Sotalol Nausea and Vomiting        PAST MEDICAL HISTORY:   Past Medical History:   Diagnosis Date     Acute thromboembolism of deep veins of lower extremity (H)     8/31/2006,Hx of multiple episodes of DVT: 3 lower extremity; 5 upper extremity (right arm)     Acute thromboembolism of deep veins of lower extremity (H) 1/16/2018    Overview:  Hx of multiple episodes of DVT: 3 lower extremity; 5 upper extremity (right arm)     Atrial fibrillation (H)     No Comments Provided     Cardiac pacemaker in situ 2003    Dual chamber     Cerebral thrombosis      2006,'95 w/ no residual     Coronary atherosclerosis     2006     Deep phlebothrombosis, antepartum, with delivery (H)     No Comments Provided     Endometrial hyperplasia     2006,s/p endometrial ablation      Essential hypertension     No Comments Provided     History of other genital system and obstetric disorders      8, Para 3-0-5-2     Other and unspecified angina pectoris     2006     Other and unspecified hyperlipidemia     No Comments Provided     Other postprocedural states      and ,Followed by Dr. Usman Duran, [facility],.     Paroxysmal supraventricular tachycardia (H)     2006,s/p multiple ablations w last resulting in PPM     Personal history of disease     2009,Hospitalized CVA with worsening left hemiplegia, post hospitalization ARU stay.     Personal history of transient ischemic attack (TIA) and cerebral infarction without residual deficit     with left hemiplegia     Primary hypercoagulable state (H)     No Comments Provided        PAST SURGICAL HISTORY:   Past Surgical History:   Procedure Laterality Date     ARTHROSCOPY KNEE      ,Arthroscopy for chondromalacia patella     AS CABG, ARTERY-VEIN, SINGLE  11/15/2016    Single vessel; done in [place]     ATTEMPTED ARTHROSCOPY      ,Right arthroscopy     BIOPSY BREAST      ,Breast cyst aspiration     COLONOSCOPY      2007,follow up recommended in 10 years.     ENDOSCOPIC SINUS SURGERY      ,Sinus node ablation.     EP STUDY  1994    EP STUDY /ABLATION,AV re-entry tachycardia, tessie ablation     HEART CATH, ANGIOPLASTY      ,Stenting placed LAD after ergonovine provocation confirmed     HEART CATH, ANGIOPLASTY      ,90% lesion, normal left ventricular function     IMPLANT PACEMAKER      ,Guidant pacemaker placement, AV tessie ablation     LAPAROSCOPIC ABLATION ENDOMETRIOSIS           OTHER SURGICAL HISTORY      3/24/06,812758,([place]) HG  "STENT ANGIO     OTHER SURGICAL HISTORY      08/02,910750,EP STUDY /ABLATION     OTHER SURGICAL HISTORY      11/04,92466.0,CT CORONARY ANGIOGRAM ([place]),Coronary angiogram, failed ablation     OTHER SURGICAL HISTORY      12/04,611034,[facility] RELOCATION OF SKIN POCKET PACEMAKER,[facility] 5/24/05 reconfiguration of pacemaker \"pocket\"     OTHER SURGICAL HISTORY      394150,IP CONSULT TO ELECTROPHYSIOLOGY,study and lead change     OTHER SURGICAL HISTORY      12/06,729866,NEUROSTIMULATOR,Nerve stimulator implanted for chest pain control     OTHER SURGICAL HISTORY      12/2008,98213.0,CT CORONARY ANGIOGRAM ([place]),with increased left-sided weakness and vertigo, negative CT angiogram.     REPLACE PACEMAKER GENERATOR      12/29/04,Replacement of left ventricular pacemaker lead.     STENT  02/2017    LAD         FAMILY HISTORY:   Family History   Problem Relation Age of Onset     Dementia Mother      Skin Cancer Father         squamous     Dementia Father      Deep Vein Thrombosis Daughter         FVL +     Family History Negative Son         Good Health,lives in [facility]     Breast Cancer Paternal Grandmother         Cancer-breast     Breast Cancer Paternal Aunt         Cancer-breast        SOCIAL HISTORY:   Social History     Socioeconomic History     Marital status:      Spouse name: Antonio     Number of children: 2     Years of education: 16+     Highest education level: Master's degree (e.g., MA, MS, [employer], MEd, MSW, DARIUS)   Occupational History     Not on file   Social Needs     Financial resource strain: Not hard at all     Food insecurity:     Worry: Never true     Inability: Never true     Transportation needs:     Medical: No     Non-medical: No   Tobacco Use     Smoking status: Never Smoker     Smokeless tobacco: Never Used     Tobacco comment: Quit smoking: spouse does not smoke in the house. Exposed in cars.   Substance and Sexual Activity     Alcohol use: Yes     Comment: Alcoholic Drinks/day: Alcoholic Drinks/day: " 1-2 drinks twice a week     Drug use: Never     Comment: Drug use: No     Sexual activity: Not Currently     Partners: Male   Lifestyle     Physical activity:     Days per week: 0 days     Minutes per session: 0 min     Stress: To some extent   Relationships     Social connections:     Talks on phone: Not on file     Gets together: Not on file     Attends Roman [employer] service: Not on file     Active member of club or organization: Not on file     Attends meetings of clubs or organizations: Not on file     Relationship status: Not on file     Intimate partner violence:     Fear of current or ex partner: Not on file     Emotionally abused: Not on file     Physically abused: Not on file     Forced sexual activity: Not on file   Other Topics Concern     Not on file   Social History Narrative     2nd Marriage x 20 years to  Anthony they live in [place], [place]       in [place]. Completed her Master's Degree 2003. Taught Special Education.  Early snf due to health 2007.      Total 5 children blended family-3 dgts. And 2 sons    Daughter:  Philip    Son:  Anthony - [place]    6 Grand-children 5 boys and 1 girl    Leigh parents' and siblings live in [facility].         CURRENT MEDICATIONS:   Prior to Admission medications    Medication Sig Start Date End Date Taking? Authorizing Provider   amLODIPine (NORVASC) 2.5 MG tablet Take 2.5 mg by mouth daily 1/17/19   Reported, Patient   aspirin EC 81 MG EC tablet Take 81 mg by mouth 8/7/13   Reported, Patient   atorvastatin (LIPITOR) 80 MG tablet Take 80 mg by mouth 1/4/18   Reported, Patient   calcium carb 1250 mg, 500 mg [place],/vitamin D 200 unit (CALCIUM 500/D) 500-200 MG-UNIT per tablet Take 1 tablet by mouth 8/7/13   Reported, Patient   diltiazem ER (DILT-XR) 180 MG 24 hr capsule Take 1 capsule (180 mg) by mouth daily 9/27/19   Erika Carrasco MD   isosorbide mononitrate (IMDUR) 60 MG 24 hr tablet Take 1 tablet (60 mg)  by mouth daily 9/27/19   Erika Carrasco MD   lisinopril (PRINIVIL/ZESTRIL) 5 MG tablet Take 5 mg by mouth 4/18/17   Reported, Patient   nitroGLYcerin (NITRODUR) 0.4 MG/HR 24 hr patch 1 patch 4/18/17   Reported, Patient   nitroGLYcerin (NITROSTAT) 0.4 MG sublingual tablet For chest pain place 1 tablet under the tongue every 5 minutes for 3 doses. If symptoms persist 5 minutes after 1st dose call 911. 9/27/19   Erika Carrasco MD   nitroGLYcerin (NITROSTAT) 0.4 MG sublingual tablet Place 0.4 mg under the tongue 4/18/17   Reported, Patient   zolpidem (AMBIEN) 5 MG tablet Take 1 tablet (5 mg) by mouth nightly as needed for sleep 9/27/19   Erika Carrasco MD          ROS:   CONSTITUTIONAL: No weight loss, fever, chills, weakness or fatigue.   HEENT: Eyes: No visual changes. Ears, Nose, Throat: No hearing loss, congestion or difficulty swallowing.   CARDIOVASCULAR: No chest pain, chest pressure or chest discomfort. No palpitations or lower extremity edema.   RESPIRATORY: No shortness of breath, dyspnea upon exertion, cough or sputum production.   GASTROINTESTINAL: No abdominal pain. No anorexia, nausea, vomiting or diarrhea.   NEUROLOGICAL: No headache, lightheadedness, dizziness, syncope, ataxia or weakness.   HEMATOLOGIC: No anemia, bleeding or bruising.   PSYCHIATRIC: No history of depression or anxiety.   ENDOCRINOLOGIC: No reports of sweating, cold or heat intolerance. No polyuria or polydipsia.   SKIN: No abnormal rashes or itching.       PHYSICAL EXAM:   GENERAL: The patient is a well-developed, well-nourished, in no apparent distress. Alert and oriented x3.   HEENT: Head is normocephalic and atraumatic. Eyes are symmetrical with normal visual tracking.  HEART: Regular rate and rhythm, S1S2 present without murmur, rub or gallop.   LUNGS: Respirations regular and unlabored. Clear to auscultation.   GI: Abdomen is soft and nondistended.   EXTREMITIES: No peripheral edema present.    MUSCULOSKELETAL: No joint swelling.   NEUROLOGIC: Alert and oriented X3.    SKIN: No jaundice. No rashes or visible skin lesions present.         LAB RESULTS:   Office Visit on 09/27/2019   Component Date Value Ref Range Status     PAP 09/27/2019 NIL   Final     Copath Report 09/27/2019    Final                    Value:  Patient Name: ADRIÁN TRUJILLO  MR#: [id number]  Specimen #: [id number]  Collected: 9/27/2019  Received: 9/30/2019  Reported: 10/7/2019 09:35  Ordering Phy(s): MARYURI SANDOVAL    For improved result formatting, select 'View Enhanced Report Format' under   Linked Documents section.    SPECIMEN/STAIN PROCESS:  Thin Prep Pap Screen - GICH (ThinPrep)       Pap Stain (GICH) x 1, HPV ordered x 1    SOURCE: Cervical  ----------------------------------------------------------------   Thin Prep Pap Screen - GICH (ThinPrep)  SPECIMEN ADEQUACY:  Satisfactory for evaluation.  -Transitional zone component could not be determined due to atrophy.    CYTOLOGIC INTERPRETATION:    Negative for intraepithelial lesion or malignancy    Electronically signed out by:  FRAN Carlos (ASCP)    Processed and screened at [facility]   [facility]    CLINICAL HISTORY:  LMP: N/A  Post Menopausal, Previous normal pap  Date of Last Pap: 2013,    Papanicolao                          u Test Limitations:  Cervical cytology is a screening test with   limited sensitivity; regular  screening is critical for cancer prevention; Pap tests are primarily   effective for the diagnosis/prevention of  squamous cell carcinoma, not adenocarcinomas or other cancers.    TESTING LAB LOCATION:  [facility]  [address] [place] [address] [phone]    COLLECTION SITE:  Client:  [facility]  Location: [facility] ([facility])         Cholesterol 09/27/2019 191  <200 mg/dL Final     Triglycerides 09/27/2019 125  <150 mg/dL Final     HDL Cholesterol  09/27/2019 69  23 - 92 mg/dL Final     LDL Cholesterol Calculated 09/27/2019 97  <100 mg/dL Final     Non HDL Cholesterol 09/27/2019 122  <130 mg/dL Final     Hemoglobin A1C 09/27/2019 6.1* 4.0 - 6.0 % Final     Sodium 09/27/2019 139  134 - 144 mmol/L Final     Potassium 09/27/2019 4.0  3.5 - 5.1 mmol/L Final     Chloride 09/27/2019 107  98 - 107 mmol/L Final     Carbon Dioxide 09/27/2019 21  21 - 31 mmol/L Final     Anion Gap 09/27/2019 11  3 - 14 mmol/L Final     Glucose 09/27/2019 114* 70 - 105 mg/dL Final     Urea Nitrogen 09/27/2019 12  7 - 25 mg/dL Final     Creatinine 09/27/2019 0.77  0.60 - 1.20 mg/dL Final     GFR Estimate 09/27/2019 75  >60 mL/min/[1.73_m2] Final     GFR Estimate If Black 09/27/2019 >90  >60 mL/min/[1.73_m2] Final     Calcium 09/27/2019 9.4  8.6 - 10.3 mg/dL Final     Bilirubin Total 09/27/2019 0.6  0.3 - 1.0 mg/dL Final     Albumin 09/27/2019 4.6  3.5 - 5.7 g/dL Final     Protein Total 09/27/2019 7.5  6.4 - 8.9 g/dL Final     Alkaline Phosphatase 09/27/2019 76  34 - 104 U/L Final     ALT 09/27/2019 22  7 - 52 U/L Final     AST 09/27/2019 25  13 - 39 U/L Final     Thyrotropin 09/27/2019 2.28  0.34 - 5.60 IU/mL Final     WBC 09/27/2019 8.3  4.0 - 11.0 10e9/L Final     RBC Count 09/27/2019 4.69  3.8 - 5.2 10e12/L Final     Hemoglobin 09/27/2019 14.9  11.7 - 15.7 g/dL Final     Hematocrit 09/27/2019 43.2  35.0 - 47.0 % Final     MCV 09/27/2019 92  78 - 100 fl Final     MCH 09/27/2019 31.8  26.5 - 33.0 pg Final     MCHC 09/27/2019 34.5  31.5 - 36.5 g/dL Final     RDW 09/27/2019 12.7  10.0 - 15.0 % Final     Platelet Count 09/27/2019 250  150 - 450 10e9/L Final            ASSESSMENT:       ICD-10-CM    1. Coronary artery spasm (H) I20.1 EKG 12-lead, tracing only (Same Day)   2. Myocardial bridge-LAD Q24.5    3. Paroxysmal supraventricular tachycardia (H) I47.1    4. History of coronary artery bypass graft x 1 with LIMA to LAD on 11/15/2016 Z95.1    5. History of heart failure with a reported EF  of 15% now recovered Z86.79    6. History of coronary artery stent placement to the LAD x3 Z95.5    7. History of cardiac radiofrequency ablation for SVT x8 Z98.890    8. History of stroke x7 with left hemiparesis-resolved Z86.73    9. Left foot drop M21.372    10. S/P AV tessie ablation secondary to SVT Z98.890 Cardiac Device Check - In Clinic   11. Pacemaker-dependent due to native cardiac rhythm insufficient to support life I49.8 EKG 12-lead, tracing only (Same Day)    Z95.0 INTERR DEVICE EVAL IN PERSON, PACER     Cardiac Device Check - In Clinic   12. Pacemaker Z95.0 EKG 12-lead, tracing only (Same Day)     INTERR DEVICE EVAL IN PERSON, PACER     Cardiac Device Check - In Clinic   13. History of coronary vasospasm Z86.79    14. Mixed hyperlipidemia E78.2    15. Anticoagulation monitoring, INR range 2-3 Z79.01    16. Factor V deficiency (H) D68.2 INR/ANTICOAG REFERRAL   17. History of DVT to both upper and lower extremity Z86.718    18. Personal history of supraventricular tachycardia status post ablation Z86.79    19. Cardiac pacemaker in situ Z95.0 INTERR DEVICE EVAL IN PERSON, PACER   20. Essential hypertension I10 EKG 12-lead, tracing only (Same Day)   21. Diabetes mellitus, type II  E11.9    22. Anticoagulated on Coumadin Z79.01 INR/ANTICOAG REFERRAL   23. 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  She is currently on Coumadin will be referred to [facility] Coumadin clinic.  2.  She will be scheduled for pacemaker evaluation.  3.  No additional changes.  Continue current medications which includes amlodipine 25 5 mg daily, aspirin 81 mg daily, Lipitor 80 mg daily, diltiazem 180 mg daily, Imdur 60 daily, lisinopril 5 mg daily, Coumadin, and sublingual nitro as needed for chest pain.  4.  She will be seen in 6 months follow-up or sooner if problems.      Thank you for allowing me to participate in the care of your patient. Please do not hesitate to contact me if you have any questions.     Moises  CIARA Islas, DO

## 2019-10-08 NOTE — PATIENT INSTRUCTIONS
You were seen by  Moises Islas, DO      1. Referral to [facility].      2. Schedule an appointment for pacemaker check at [facility].     3. No other changes. Continue on current medications as ordered.       You will follow up with [facility] in 6 months, sooner if needed.       Please call the cardiology office with problems, questions, or concerns at [phone].    If you experience chest pain, chest pressure, chest tightness, shortness of breath, fainting, lightheadedness, nausea, vomiting, or other concerning symptoms, please report to the Emergency Department or call 911. These symptoms may be emergent, and best treated in the Emergency Department.     Cardiology Nurses  LILIBETH Murphy, RN  Mirella MCCLAIN, NAY MEDELLIN LPN  [facility] (Unit 3C)  [phone]

## 2019-10-08 NOTE — NURSING NOTE
"Patient comes in for coronary artery spasm and paroxysmal SVT.  Mirella Royal LPN ....................10/8/2019   3:50 PM  Chief Complaint   Patient presents with     Consult For     coronary artery spasm, paroxsymal SVT       Initial /64 (BP Location: Right arm, Patient Position: Sitting, Cuff Size: Adult Regular)   Pulse 72   Temp 97.4  F (36.3  C) (Tympanic)   Resp 20   Ht 1.65 m (5' 4.96\")   Wt 67.6 kg (149 lb)   SpO2 97%   BMI 24.82 kg/m   Estimated body mass index is 24.82 kg/m  as calculated from the following:    Height as of this encounter: 1.65 m (5' 4.96\").    Weight as of this encounter: 67.6 kg (149 lb).  Meds Reconciled: complete  Pt is on Aspirin  Pt is on a Statin  PHQ and/or JUAN CARLOS reviewed. Pt referred to PCP/MH Provider as appropriate.    Mirella Royal LPN      "

## 2019-10-10 ENCOUNTER — ANTICOAGULATION THERAPY VISIT (OUTPATIENT)
Dept: ANTICOAGULATION | Facility: OTHER | Age: 64
End: 2019-10-10
Attending: INTERNAL MEDICINE
Payer: MEDICARE

## 2019-10-10 DIAGNOSIS — Z79.01 ANTICOAGULATION MONITORING, INR RANGE 2-3: ICD-10-CM

## 2019-10-10 LAB — INR POINT OF CARE: 2.3 (ref 0.86–1.14)

## 2019-10-10 PROCEDURE — 85610 PROTHROMBIN TIME: CPT | Mod: QW,ZL

## 2019-10-10 NOTE — PROGRESS NOTES
ANTICOAGULATION FOLLOW-UP CLINIC VISIT    Patient Name:  Lyudmila Fitzgerald  Date:  10/10/2019  Contact Type:  Face to Face    SUBJECTIVE:  Patient Findings         Clinical Outcomes     Negatives:   Major bleeding event, Thromboembolic event, Anticoagulation-related hospital admission, Anticoagulation-related ED visit, Anticoagulation-related fatality           OBJECTIVE    INR Protime   Date Value Ref Range Status   10/10/2019 2.3 (A) 0.86 - 1.14 Final     Chromogenic Factor 10   Date Value Ref Range Status   2013 51 (L) %      Comment:                                             Therapeutic Range 20-40   2013 47 (L) %      Comment:                                             Therapeutic Range 20-40       ASSESSMENT / PLAN  INR assessment THER    Recheck INR In: 2 WEEKS    INR Location Clinic      Anticoagulation Summary  As of 10/10/2019    INR goal:   2.0-3.0   TTR:   --   INR used for dosin.3 (10/10/2019)   Warfarin maintenance plan:   6 mg (6 mg x 1) every day   Full warfarin instructions:   6 mg every day   Weekly warfarin total:   42 mg   Plan last modified:   Nyla Walker RN (10/10/2019)   Next INR check:   10/24/2019   Priority:   INR   Target end date:   Indefinite    Indications    Acute thromboembolism of deep veins of lower extremity (H) (Resolved) [I82.409]  Anticoagulation monitoring  INR range 2-3 [Z79.01]             Anticoagulation Episode Summary     INR check location:       Preferred lab:       Send INR reminders to:    INR    Comments:         Anticoagulation Care Providers     Provider Role Specialty Phone number    Erika Carrasco MD [facility] [phone]            See the Encounter Report to view Anticoagulation Flowsheet and Dosing Calendar (Go to Encounters tab in chart review, and find the Anticoagulation Therapy Visit)    Patient has her own home monitor and currently reports to [employer] and dosing through [facility]. We will switch her  through [facility] to out facility for dosing management.     Nyla Walker RN

## 2019-10-15 ENCOUNTER — HOSPITAL ENCOUNTER (OUTPATIENT)
Dept: CARDIOLOGY | Facility: OTHER | Age: 64
Discharge: HOME | End: 2019-10-15
Attending: INTERNAL MEDICINE | Admitting: INTERNAL MEDICINE
Payer: MEDICARE

## 2019-10-15 DIAGNOSIS — Z98.890 S/P AV NODAL ABLATION: Primary | ICD-10-CM

## 2019-10-15 DIAGNOSIS — Z95.0 PACEMAKER: ICD-10-CM

## 2019-10-15 DIAGNOSIS — I49.8 PACEMAKER-DEPENDENT DUE TO NATIVE CARDIAC RHYTHM INSUFFICIENT TO SUPPORT LIFE: ICD-10-CM

## 2019-10-15 DIAGNOSIS — Z98.890 S/P AV NODAL ABLATION: ICD-10-CM

## 2019-10-15 DIAGNOSIS — Z95.0 PACEMAKER-DEPENDENT DUE TO NATIVE CARDIAC RHYTHM INSUFFICIENT TO SUPPORT LIFE: ICD-10-CM

## 2019-10-15 PROCEDURE — 93280 PM DEVICE PROGR EVAL DUAL: CPT | Performed by: INTERNAL MEDICINE

## 2019-10-15 NOTE — PATIENT INSTRUCTIONS
It was a pleasure to see you in clinic today.  Please do not hesitate to call with any questions or concerns.    We will scheduled you for an automatic remote transmission in approximately 90 days.  We look forward to seeing you in clinic at your next device check in 6 months.    Lynsey Boykin RN, BSN  Electrophysiology Nurse Clinician  [facility]    During Business Hours Please Call:  [phone]  After Hours Please Call:  [phone] - select option #[phone] and ask for job code [phone]

## 2019-10-17 PROBLEM — Z79.01 ANTICOAGULATED ON COUMADIN: Status: ACTIVE | Noted: 2019-10-17

## 2019-10-17 PROBLEM — Z86.79 PERSONAL HISTORY OF SUPRAVENTRICULAR TACHYCARDIA: Status: ACTIVE | Noted: 2019-10-17

## 2019-10-17 PROBLEM — I45.6 LOWN GANONG LEVINE SYNDROME: Status: ACTIVE | Noted: 2019-10-17

## 2019-10-22 LAB
MDC_IDC_MSMT_BATTERY_STATUS: NORMAL
MDC_IDC_MSMT_LEADCHNL_RA_IMPEDANCE_VALUE: 645 OHM
MDC_IDC_MSMT_LEADCHNL_RA_PACING_THRESHOLD_AMPLITUDE: 1 V
MDC_IDC_MSMT_LEADCHNL_RA_PACING_THRESHOLD_PULSEWIDTH: 1 MS
MDC_IDC_MSMT_LEADCHNL_RA_SENSING_INTR_AMPL: 0.7 MV
MDC_IDC_MSMT_LEADCHNL_RV_IMPEDANCE_VALUE: 496 OHM
MDC_IDC_MSMT_LEADCHNL_RV_PACING_THRESHOLD_AMPLITUDE: 0.7 V
MDC_IDC_MSMT_LEADCHNL_RV_PACING_THRESHOLD_PULSEWIDTH: 0.4 MS
MDC_IDC_MSMT_LEADCHNL_RV_SENSING_INTR_AMPL: 10.3 MV
MDC_IDC_PG_IMPLANT_DTM: NORMAL
MDC_IDC_PG_MFG: NORMAL
MDC_IDC_PG_MODEL: NORMAL
MDC_IDC_PG_SERIAL: NORMAL
MDC_IDC_PG_TYPE: NORMAL
MDC_IDC_SESS_CLINIC_NAME: NORMAL
MDC_IDC_SESS_DTM: NORMAL
MDC_IDC_SESS_TYPE: NORMAL
MDC_IDC_SET_BRADY_AT_MODE_SWITCH_MODE: NORMAL
MDC_IDC_SET_BRADY_AT_MODE_SWITCH_RATE: 140 {BEATS}/MIN
MDC_IDC_SET_BRADY_LOWRATE: 60 {BEATS}/MIN
MDC_IDC_SET_BRADY_MAX_SENSOR_RATE: 120 {BEATS}/MIN
MDC_IDC_SET_BRADY_MAX_TRACKING_RATE: 100 {BEATS}/MIN
MDC_IDC_SET_BRADY_MODE: NORMAL
MDC_IDC_SET_BRADY_PAV_DELAY_HIGH: 80 MS
MDC_IDC_SET_BRADY_PAV_DELAY_LOW: 130 MS
MDC_IDC_SET_BRADY_SAV_DELAY_HIGH: 80 MS
MDC_IDC_SET_BRADY_SAV_DELAY_LOW: 130 MS
MDC_IDC_SET_LEADCHNL_RA_PACING_AMPLITUDE: 2.6 V
MDC_IDC_SET_LEADCHNL_RA_PACING_ANODE_ELECTRODE_1: NORMAL
MDC_IDC_SET_LEADCHNL_RA_PACING_ANODE_LOCATION_1: NORMAL
MDC_IDC_SET_LEADCHNL_RA_PACING_CAPTURE_MODE: NORMAL
MDC_IDC_SET_LEADCHNL_RA_PACING_CATHODE_ELECTRODE_1: NORMAL
MDC_IDC_SET_LEADCHNL_RA_PACING_CATHODE_LOCATION_1: NORMAL
MDC_IDC_SET_LEADCHNL_RA_PACING_POLARITY: NORMAL
MDC_IDC_SET_LEADCHNL_RA_PACING_PULSEWIDTH: 1 MS
MDC_IDC_SET_LEADCHNL_RA_SENSING_ADAPTATION_MODE: NORMAL
MDC_IDC_SET_LEADCHNL_RA_SENSING_ANODE_ELECTRODE_1: NORMAL
MDC_IDC_SET_LEADCHNL_RA_SENSING_ANODE_LOCATION_1: NORMAL
MDC_IDC_SET_LEADCHNL_RA_SENSING_CATHODE_ELECTRODE_1: NORMAL
MDC_IDC_SET_LEADCHNL_RA_SENSING_CATHODE_LOCATION_1: NORMAL
MDC_IDC_SET_LEADCHNL_RA_SENSING_POLARITY: NORMAL
MDC_IDC_SET_LEADCHNL_RA_SENSING_SENSITIVITY: 0.15 MV
MDC_IDC_SET_LEADCHNL_RV_PACING_AMPLITUDE: 1.4 V
MDC_IDC_SET_LEADCHNL_RV_PACING_ANODE_ELECTRODE_1: NORMAL
MDC_IDC_SET_LEADCHNL_RV_PACING_ANODE_LOCATION_1: NORMAL
MDC_IDC_SET_LEADCHNL_RV_PACING_CAPTURE_MODE: NORMAL
MDC_IDC_SET_LEADCHNL_RV_PACING_CATHODE_ELECTRODE_1: NORMAL
MDC_IDC_SET_LEADCHNL_RV_PACING_CATHODE_LOCATION_1: NORMAL
MDC_IDC_SET_LEADCHNL_RV_PACING_POLARITY: NORMAL
MDC_IDC_SET_LEADCHNL_RV_PACING_PULSEWIDTH: 0.4 MS
MDC_IDC_SET_LEADCHNL_RV_SENSING_ADAPTATION_MODE: NORMAL
MDC_IDC_SET_LEADCHNL_RV_SENSING_ANODE_ELECTRODE_1: NORMAL
MDC_IDC_SET_LEADCHNL_RV_SENSING_ANODE_LOCATION_1: NORMAL
MDC_IDC_SET_LEADCHNL_RV_SENSING_CATHODE_ELECTRODE_1: NORMAL
MDC_IDC_SET_LEADCHNL_RV_SENSING_CATHODE_LOCATION_1: NORMAL
MDC_IDC_SET_LEADCHNL_RV_SENSING_POLARITY: NORMAL
MDC_IDC_SET_LEADCHNL_RV_SENSING_SENSITIVITY: 2.5 MV
MDC_IDC_SET_ZONE_DETECTION_INTERVAL: 429 MS
MDC_IDC_SET_ZONE_TYPE: NORMAL
MDC_IDC_SET_ZONE_VENDOR_TYPE: NORMAL
MDC_IDC_STAT_BRADY_RA_PERCENT_PACED: 88 %
MDC_IDC_STAT_BRADY_RV_PERCENT_PACED: 100 %
MDC_IDC_STAT_EPISODE_RECENT_COUNT: 1
MDC_IDC_STAT_EPISODE_RECENT_COUNT: 28
MDC_IDC_STAT_EPISODE_RECENT_COUNT_DTM_END: NORMAL
MDC_IDC_STAT_EPISODE_RECENT_COUNT_DTM_END: NORMAL
MDC_IDC_STAT_EPISODE_RECENT_COUNT_DTM_START: NORMAL
MDC_IDC_STAT_EPISODE_RECENT_COUNT_DTM_START: NORMAL
MDC_IDC_STAT_EPISODE_TOTAL_COUNT: 38
MDC_IDC_STAT_EPISODE_TOTAL_COUNT_DTM_END: NORMAL
MDC_IDC_STAT_EPISODE_TYPE: NORMAL
MDC_IDC_STAT_EPISODE_VENDOR_TYPE: NORMAL

## 2019-10-24 ENCOUNTER — ANTICOAGULATION THERAPY VISIT (OUTPATIENT)
Dept: ANTICOAGULATION | Facility: OTHER | Age: 64
End: 2019-10-24
Payer: MEDICARE

## 2019-10-24 DIAGNOSIS — Z79.01 ANTICOAGULATION MONITORING, INR RANGE 2-3: ICD-10-CM

## 2019-10-24 LAB — INR PPP: 2.6

## 2019-10-24 NOTE — PROGRESS NOTES
ANTICOAGULATION FOLLOW-UP CLINIC VISIT    Patient Name:  Lyudmila Fitzgerald  Date:  10/24/2019  Contact Type:  Telephone/ spoke with patient reviewed dosing    SUBJECTIVE:  Patient Findings         Clinical Outcomes     Negatives:   Major bleeding event, Thromboembolic event, Anticoagulation-related hospital admission, Anticoagulation-related ED visit, Anticoagulation-related fatality           OBJECTIVE    INR   Date Value Ref Range Status   10/24/2019 2.6  Final     Chromogenic Factor 10   Date Value Ref Range Status   2013 51 (L) %      Comment:                                             Therapeutic Range 20-40   2013 47 (L) %      Comment:                                             Therapeutic Range 20-40       ASSESSMENT / PLAN  INR assessment THER    Recheck INR In: 2 WEEKS    INR Location Home INR      Anticoagulation Summary  As of 10/24/2019    INR goal:   2.0-3.0   TTR:   100.0 % (4 d)   INR used for dosin.6 (10/24/2019)   Warfarin maintenance plan:   6 mg (6 mg x 1) every day   Full warfarin instructions:   6 mg every day   Weekly warfarin total:   42 mg   No change documented:   Nyla Walker RN   Plan last modified:   Nyla Walker RN (10/10/2019)   Next INR check:   2019   Priority:   INR   Target end date:   Indefinite    Indications    Acute thromboembolism of deep veins of lower extremity (H) (Resolved) [I82.409]  Anticoagulation monitoring  INR range 2-3 [Z79.01]             Anticoagulation Episode Summary     INR check location:       Preferred lab:       Send INR reminders to:    INR    Comments:         Anticoagulation Care Providers     Provider Role Specialty Phone number    Erika Carrasco MD [facility] [phone]            See the Encounter Report to view Anticoagulation Flowsheet and Dosing Calendar (Go to Encounters tab in chart review, and find the Anticoagulation Therapy Visit)    Still waiting for paperwork to be faxed  back to [employer] changing fax and phone information over to [place] Neli Walker RN

## 2019-11-07 ENCOUNTER — TRANSFERRED RECORDS (OUTPATIENT)
Dept: HEALTH INFORMATION MANAGEMENT | Facility: OTHER | Age: 64
End: 2019-11-07

## 2019-11-07 ENCOUNTER — ANTICOAGULATION THERAPY VISIT (OUTPATIENT)
Dept: ANTICOAGULATION | Facility: OTHER | Age: 64
End: 2019-11-07
Payer: MEDICARE

## 2019-11-07 DIAGNOSIS — Z79.01 ANTICOAGULATION MONITORING, INR RANGE 2-3: ICD-10-CM

## 2019-11-07 LAB — INR PPP: 2.6 (ref 0.9–1.1)

## 2019-11-07 NOTE — PROGRESS NOTES
ANTICOAGULATION FOLLOW-UP CLINIC VISIT    Patient Name:  Lyudmila Fitzgerald  Date:  2019  Contact Type:  Telephone/ called patient to let her know we received paperwork from [employer] today    SUBJECTIVE:  Patient Findings         Clinical Outcomes     Negatives:   Major bleeding event, Thromboembolic event, Anticoagulation-related hospital admission, Anticoagulation-related ED visit, Anticoagulation-related fatality           OBJECTIVE    INR   Date Value Ref Range Status   2019 2.6  Final     Chromogenic Factor 10   Date Value Ref Range Status   2013 51 (L) %      Comment:                                             Therapeutic Range 20-40   2013 47 (L) %      Comment:                                             Therapeutic Range 20-40       ASSESSMENT / PLAN  INR assessment THER    Recheck INR In: 3 WEEKS    INR Location Home INR      Anticoagulation Summary  As of 2019    INR goal:   2.0-3.0   TTR:   100.0 % (2.6 wk)   INR used for dosin.6 (2019)   Warfarin maintenance plan:   6 mg (6 mg x 1) every day   Full warfarin instructions:   6 mg every day   Weekly warfarin total:   42 mg   No change documented:   Nyla Walker RN   Plan last modified:   Nyla Walker RN (10/10/2019)   Next INR check:   2019   Priority:   INR   Target end date:   Indefinite    Indications    Acute thromboembolism of deep veins of lower extremity (H) (Resolved) [I82.409]  Anticoagulation monitoring  INR range 2-3 [Z79.01]             Anticoagulation Episode Summary     INR check location:       Preferred lab:       Send INR reminders to:    INR    Comments:   home monitor [employer]      Anticoagulation Care Providers     Provider Role Specialty Phone number    Erika Carrasco MD [facility] [phone]            See the Encounter Report to view Anticoagulation Flowsheet and Dosing Calendar (Go to Encounters tab in chart review, and find the Anticoagulation  Therapy Visit)        Nyla Walker RN

## 2019-12-04 ENCOUNTER — TELEPHONE (OUTPATIENT)
Dept: FAMILY MEDICINE | Facility: OTHER | Age: 64
End: 2019-12-04

## 2019-12-04 DIAGNOSIS — I66.9 CEREBRAL THROMBOSIS: Primary | ICD-10-CM

## 2019-12-04 DIAGNOSIS — Z79.01 ANTICOAGULATED ON COUMADIN: ICD-10-CM

## 2019-12-04 RX ORDER — WARFARIN SODIUM 6 MG/1
6 TABLET ORAL DAILY
Qty: 90 TABLET | Refills: 1 | Status: SHIPPED | OUTPATIENT
Start: 2019-12-04 | End: 2020-02-17

## 2019-12-04 NOTE — TELEPHONE ENCOUNTER
Reason for call: Medication or medication refill    Name of medication requested: Warin    Are you out of the medication? Yes - ran out last night     What pharmacy do you use? [facility]     Preferred method for responding to this message: Telephone Call    Phone number patient can be reached at: Home number on file [phone] (home)    If we cannot reach you directly, may we leave a detailed response at the number you provided? Yes    Daniela Mantilla on 12/4/2019 at 8:58 AM

## 2019-12-05 ENCOUNTER — ANTICOAGULATION THERAPY VISIT (OUTPATIENT)
Dept: ANTICOAGULATION | Facility: OTHER | Age: 64
End: 2019-12-05
Attending: FAMILY MEDICINE
Payer: MEDICARE

## 2019-12-05 DIAGNOSIS — Z79.01 ANTICOAGULATION MONITORING, INR RANGE 2-3: ICD-10-CM

## 2019-12-05 LAB — INR POINT OF CARE: 1.8 (ref 0.86–1.14)

## 2019-12-05 PROCEDURE — 85610 PROTHROMBIN TIME: CPT | Mod: QW,ZL

## 2019-12-05 NOTE — PROGRESS NOTES
ANTICOAGULATION FOLLOW-UP CLINIC VISIT    Patient Name:  Lyudmila Fitzgerald  Date:  2019  Contact Type:  Face to Face    SUBJECTIVE:  Patient Findings         Clinical Outcomes     Negatives:   Major bleeding event, Thromboembolic event, Anticoagulation-related hospital admission, Anticoagulation-related ED visit, Anticoagulation-related fatality           OBJECTIVE    INR Protime   Date Value Ref Range Status   2019 1.8 (A) 0.86 - 1.14 Final     Chromogenic Factor 10   Date Value Ref Range Status   2013 51 (L) %      Comment:                                             Therapeutic Range 20-40   2013 47 (L) %      Comment:                                             Therapeutic Range 20-40       ASSESSMENT / PLAN  INR assessment THER    Recheck INR In: 2 WEEKS    INR Location Clinic      Anticoagulation Summary  As of 2019    INR goal:   2.0-3.0   TTR:   84.8 % (1.5 mo)   INR used for dosin.8! (2019)   Warfarin maintenance plan:   6 mg (6 mg x 1) every day   Full warfarin instructions:   6 mg every day   Weekly warfarin total:   42 mg   No change documented:   Nyla Walker RN   Plan last modified:   Nyla Walker RN (10/10/2019)   Next INR check:   2019   Priority:   Maintenance   Target end date:   Indefinite    Indications    Acute thromboembolism of deep veins of lower extremity (H) (Resolved) [I82.409]  Anticoagulation monitoring  INR range 2-3 [Z79.01]             Anticoagulation Episode Summary     INR check location:       Preferred lab:       Send INR reminders to:    INR    Comments:   home monitor [employer]      Anticoagulation Care Providers     Provider Role Specialty Phone number    Erika Carrasco MD [facility] [phone]            See the Encounter Report to view Anticoagulation Flowsheet and Dosing Calendar (Go to Encounters tab in chart review, and find the Anticoagulation Therapy Visit)        Nyla Walker  RN

## 2019-12-19 ENCOUNTER — TRANSFERRED RECORDS (OUTPATIENT)
Dept: HEALTH INFORMATION MANAGEMENT | Facility: OTHER | Age: 64
End: 2019-12-19

## 2019-12-19 ENCOUNTER — ANTICOAGULATION THERAPY VISIT (OUTPATIENT)
Dept: FAMILY MEDICINE | Facility: OTHER | Age: 64
End: 2019-12-19
Payer: MEDICARE

## 2019-12-19 DIAGNOSIS — Z79.01 ANTICOAGULATION MONITORING, INR RANGE 2-3: ICD-10-CM

## 2019-12-19 LAB — INR PPP: 2.3 (ref 0.9–1.1)

## 2019-12-19 NOTE — PROGRESS NOTES
ANTICOAGULATION FOLLOW-UP CLINIC VISIT    Patient Name:  Lyudmila Fitzgerald  Date:  2019  Contact Type:  fax from [employer]    SUBJECTIVE:  Patient Findings     Comments:   No phone call due to INR being in range.  Patient has been instructed to call with new medications or changes.          Clinical Outcomes     Comments:   No phone call due to INR being in range.  Patient has been instructed to call with new medications or changes.             OBJECTIVE    INR   Date Value Ref Range Status   2019 2.3 (A) 0.90 - 1.10 Final     Chromogenic Factor 10   Date Value Ref Range Status   2013 51 (L) %      Comment:                                             Therapeutic Range 20-40   2013 47 (L) %      Comment:                                             Therapeutic Range 20-40       ASSESSMENT / PLAN  INR assessment THER    Recheck INR In: 2 WEEKS    INR Location Home INR      Anticoagulation Summary  As of 2019    INR goal:   2.0-3.0   TTR:   79.0 % (2 mo)   INR used for dosin.3 (2019)   Warfarin maintenance plan:   6 mg (6 mg x 1) every day   Full warfarin instructions:   6 mg every day   Weekly warfarin total:   42 mg   No change documented:   Arleth Sheth RN   Plan last modified:   Nyla Walker RN (10/10/2019)   Next INR check:   2020   Priority:   Maintenance   Target end date:   Indefinite    Indications    Acute thromboembolism of deep veins of lower extremity (H) (Resolved) [I82.409]  Anticoagulation monitoring  INR range 2-3 [Z79.01]             Anticoagulation Episode Summary     INR check location:       Preferred lab:       Send INR reminders to:    INR    Comments:   home monitor [employer]      Anticoagulation Care Providers     Provider Role Specialty Phone number    Erika Carrasco MD [facility] [phone]            See the Encounter Report to view Anticoagulation Flowsheet and Dosing Calendar (Go to  Encounters tab in chart review, and find the Anticoagulation Therapy Visit)    No encounter, INR received via fax.  No phone call due to INR being in range.  Patient has been instructed to call with new medications or changes.  .      Arleth Sheth RN

## 2020-01-02 ENCOUNTER — TRANSFERRED RECORDS (OUTPATIENT)
Dept: HEALTH INFORMATION MANAGEMENT | Facility: OTHER | Age: 65
End: 2020-01-02

## 2020-01-02 ENCOUNTER — ANTICOAGULATION THERAPY VISIT (OUTPATIENT)
Dept: ANTICOAGULATION | Facility: OTHER | Age: 65
End: 2020-01-02
Payer: MEDICARE

## 2020-01-02 DIAGNOSIS — Z79.01 ANTICOAGULATION MONITORING, INR RANGE 2-3: ICD-10-CM

## 2020-01-02 LAB — INR PPP: 2.8 (ref 0.9–1.1)

## 2020-01-02 NOTE — PROGRESS NOTES
ANTICOAGULATION FOLLOW-UP CLINIC VISIT    Patient Name:  Lyudmila Fitzgerald  Date:  2020  Contact Type:  No call needed, pt to continue same dose and recheck in 4 weeks    SUBJECTIVE:  Patient Findings         Clinical Outcomes     Negatives:   Major bleeding event, Thromboembolic event, Anticoagulation-related hospital admission, Anticoagulation-related ED visit, Anticoagulation-related fatality           OBJECTIVE    INR   Date Value Ref Range Status   2020 2.8 (A) 0.90 - 1.10 Final     Chromogenic Factor 10   Date Value Ref Range Status   2013 51 (L) %      Comment:                                             Therapeutic Range 20-40   2013 47 (L) %      Comment:                                             Therapeutic Range 20-40       ASSESSMENT / PLAN  INR assessment THER    Recheck INR In: 4 WEEKS    INR Location Home INR      Anticoagulation Summary  As of 2020    INR goal:   2.0-3.0   TTR:   83.0 % (2.5 mo)   INR used for dosin.8 (2020)   Warfarin maintenance plan:   6 mg (6 mg x 1) every day   Full warfarin instructions:   6 mg every day   Weekly warfarin total:   42 mg   No change documented:   Jason Weller, RN   Plan last modified:   Nyla Walker RN (10/10/2019)   Next INR check:   2020   Priority:   Maintenance   Target end date:   Indefinite    Indications    Acute thromboembolism of deep veins of lower extremity (H) (Resolved) [I82.409]  Anticoagulation monitoring  INR range 2-3 [Z79.01]             Anticoagulation Episode Summary     INR check location:       Preferred lab:       Send INR reminders to:    INR    Comments:   home monitor [employer]      Anticoagulation Care Providers     Provider Role Specialty Phone number    Erika Carrasco MD [facility] [phone]            See the Encounter Report to view Anticoagulation Flowsheet and Dosing Calendar (Go to Encounters tab in chart review, and find the Anticoagulation  Therapy Visit)      Jason Weller RN

## 2020-01-16 ENCOUNTER — ANTICOAGULATION THERAPY VISIT (OUTPATIENT)
Dept: FAMILY MEDICINE | Facility: OTHER | Age: 65
End: 2020-01-16
Payer: MEDICARE

## 2020-01-16 ENCOUNTER — TRANSFERRED RECORDS (OUTPATIENT)
Dept: HEALTH INFORMATION MANAGEMENT | Facility: OTHER | Age: 65
End: 2020-01-16

## 2020-01-16 DIAGNOSIS — Z79.01 ANTICOAGULATION MONITORING, INR RANGE 2-3: ICD-10-CM

## 2020-01-16 LAB — INR PPP: 2.3 (ref 0.9–1.1)

## 2020-01-16 NOTE — PROGRESS NOTES
ANTICOAGULATION FOLLOW-UP CLINIC VISIT    Patient Name:  Lyudmila Fitzgerald  Date:  2020  Contact Type:  fax from [employer]    SUBJECTIVE:  Patient Findings     Comments:   No phone call due to INR being in range.  Patient has been instructed to call with new medications or changes.          Clinical Outcomes     Negatives:   Major bleeding event, Thromboembolic event, Anticoagulation-related hospital admission, Anticoagulation-related ED visit, Anticoagulation-related fatality    Comments:   No phone call due to INR being in range.  Patient has been instructed to call with new medications or changes.             OBJECTIVE    INR   Date Value Ref Range Status   2020 2.3 (A) 0.90 - 1.10 Final     Chromogenic Factor 10   Date Value Ref Range Status   2013 51 (L) %      Comment:                                             Therapeutic Range 20-40   2013 47 (L) %      Comment:                                             Therapeutic Range 20-40       ASSESSMENT / PLAN  INR assessment THER    Recheck INR In: 4 WEEKS    INR Location Clinic      Anticoagulation Summary  As of 2020    INR goal:   2.0-3.0   TTR:   85.7 % (2.9 mo)   INR used for dosin.3 (2020)   Warfarin maintenance plan:   6 mg (6 mg x 1) every day   Full warfarin instructions:   6 mg every day   Weekly warfarin total:   42 mg   Plan last modified:   Nyla Walker RN (10/10/2019)   Next INR check:   2020   Priority:   Maintenance   Target end date:   Indefinite    Indications    Acute thromboembolism of deep veins of lower extremity (H) (Resolved) [I82.409]  Anticoagulation monitoring  INR range 2-3 [Z79.01]             Anticoagulation Episode Summary     INR check location:       Preferred lab:       Send INR reminders to:    INR    Comments:   home monitor [employer]      Anticoagulation Care Providers     Provider Role Specialty Phone number    Erika Carrasco MD [facility] [phone]             See the Encounter Report to view Anticoagulation Flowsheet and Dosing Calendar (Go to Encounters tab in chart review, and find the Anticoagulation Therapy Visit)    Testing performed at home by patient  No phone call due to INR being in range.  Patient has been instructed to call with new medications or changes.      Arleth Sheth RN

## 2020-01-21 DIAGNOSIS — F51.04 CHRONIC INSOMNIA: ICD-10-CM

## 2020-01-21 NOTE — TELEPHONE ENCOUNTER
Call [facility] - doesn't patient have one refill left?  6 months of refills given in September.  She should be due for new prescription and follow up in March.  Erika Gupta MD

## 2020-01-21 NOTE — TELEPHONE ENCOUNTER
Reason for call: Medication or medication refill    Name of medication requested: Zolpidem     Are you out of the medication? Yes     What pharmacy do you use? [facility] [place]     Preferred method for responding to this message: Telephone Call    Phone number patient can be reached at: Home number on file [phone] (home)    If we cannot reach you directly, may we leave a detailed response at the number you provided? Yes

## 2020-01-22 RX ORDER — ZOLPIDEM TARTRATE 5 MG/1
5 TABLET ORAL
Qty: 30 TABLET | Refills: 1 | Status: SHIPPED | OUTPATIENT
Start: 2020-01-22 | End: 2020-03-09

## 2020-01-22 NOTE — TELEPHONE ENCOUNTER
Spoke with patient. Her insurance will no longer cover prescription's at [facility], and they are not able to transfer any refills on Ambien. Patient is wondering if she could get it sent to [facility].    Per [facility]'s, patient is due for refill on 1/24/20 and has 30 x1 on file. Primary is out, can Ronda White MD fill?    Britta Bunn LPN.................. 1/22/2020 10:35 AM

## 2020-02-01 ENCOUNTER — TRANSFERRED RECORDS (OUTPATIENT)
Dept: HEALTH INFORMATION MANAGEMENT | Facility: OTHER | Age: 65
End: 2020-02-01

## 2020-02-01 LAB — INR PPP: 2 (ref 0.9–1.1)

## 2020-02-03 ENCOUNTER — ANTICOAGULATION THERAPY VISIT (OUTPATIENT)
Dept: ANTICOAGULATION | Facility: OTHER | Age: 65
End: 2020-02-03
Payer: MEDICARE

## 2020-02-03 DIAGNOSIS — Z79.01 ANTICOAGULATION MONITORING, INR RANGE 2-3: ICD-10-CM

## 2020-02-03 NOTE — PROGRESS NOTES
ANTICOAGULATION FOLLOW-UP CLINIC VISIT    Patient Name:  Lyudmila Fitzgerald  Date:  2/3/2020  Contact Type:  no call needed patient to continue same dose    SUBJECTIVE:  Patient Findings         Clinical Outcomes     Negatives:   Major bleeding event, Thromboembolic event, Anticoagulation-related hospital admission, Anticoagulation-related ED visit, Anticoagulation-related fatality           OBJECTIVE    INR   Date Value Ref Range Status   2020 2.0 (A) 0.90 - 1.10 Final     Chromogenic Factor 10   Date Value Ref Range Status   2013 51 (L) %      Comment:                                             Therapeutic Range 20-40   2013 47 (L) %      Comment:                                             Therapeutic Range 20-40       ASSESSMENT / PLAN  INR assessment THER    Recheck INR In: 2 WEEKS    INR Location Home INR      Anticoagulation Summary  As of 2/3/2020    INR goal:   2.0-3.0   TTR:   87.9 % (3.5 mo)   INR used for dosin.0 (2020)   Warfarin maintenance plan:   6 mg (6 mg x 1) every day   Full warfarin instructions:   6 mg every day   Weekly warfarin total:   42 mg   No change documented:   Nyla Walker RN   Plan last modified:   Nyla Walker RN (10/10/2019)   Next INR check:   2020   Priority:   Maintenance   Target end date:   Indefinite    Indications    Acute thromboembolism of deep veins of lower extremity (H) (Resolved) [I82.409]  Anticoagulation monitoring  INR range 2-3 [Z79.01]             Anticoagulation Episode Summary     INR check location:       Preferred lab:       Send INR reminders to:    INR    Comments:   home monitor [employer]      Anticoagulation Care Providers     Provider Role Specialty Phone number    Erika Carrasco MD [facility] [phone]            See the Encounter Report to view Anticoagulation Flowsheet and Dosing Calendar (Go to Encounters tab in chart review, and find the Anticoagulation Therapy  Visit)        Nyla Walker, RN

## 2020-02-11 ENCOUNTER — ANCILLARY PROCEDURE (OUTPATIENT)
Dept: GENERAL RADIOLOGY | Facility: OTHER | Age: 65
End: 2020-02-11
Attending: INTERNAL MEDICINE
Payer: MEDICARE

## 2020-02-11 ENCOUNTER — OFFICE VISIT (OUTPATIENT)
Dept: CARDIOLOGY | Facility: OTHER | Age: 65
End: 2020-02-11
Attending: INTERNAL MEDICINE
Payer: MEDICARE

## 2020-02-11 VITALS
HEART RATE: 63 BPM | SYSTOLIC BLOOD PRESSURE: 135 MMHG | RESPIRATION RATE: 16 BRPM | DIASTOLIC BLOOD PRESSURE: 77 MMHG | HEIGHT: 65 IN | TEMPERATURE: 98.3 F | WEIGHT: 152 LBS | BODY MASS INDEX: 25.33 KG/M2 | OXYGEN SATURATION: 98 %

## 2020-02-11 DIAGNOSIS — I47.10 PAROXYSMAL SUPRAVENTRICULAR TACHYCARDIA (H): ICD-10-CM

## 2020-02-11 DIAGNOSIS — Z98.890 S/P AV NODAL ABLATION: ICD-10-CM

## 2020-02-11 DIAGNOSIS — Z45.010 PACEMAKER BATTERY DEPLETION: Primary | ICD-10-CM

## 2020-02-11 DIAGNOSIS — Z95.0 CARDIAC PACEMAKER IN SITU: ICD-10-CM

## 2020-02-11 DIAGNOSIS — Z95.0 PACEMAKER-DEPENDENT DUE TO NATIVE CARDIAC RHYTHM INSUFFICIENT TO SUPPORT LIFE: ICD-10-CM

## 2020-02-11 DIAGNOSIS — I49.8 PACEMAKER-DEPENDENT DUE TO NATIVE CARDIAC RHYTHM INSUFFICIENT TO SUPPORT LIFE: ICD-10-CM

## 2020-02-11 DIAGNOSIS — Z45.010 PACEMAKER BATTERY DEPLETION: ICD-10-CM

## 2020-02-11 PROCEDURE — 71046 X-RAY EXAM CHEST 2 VIEWS: CPT | Mod: TC

## 2020-02-11 PROCEDURE — 93280 PM DEVICE PROGR EVAL DUAL: CPT

## 2020-02-11 PROCEDURE — 99213 OFFICE O/P EST LOW 20 MIN: CPT | Mod: 25 | Performed by: INTERNAL MEDICINE

## 2020-02-11 PROCEDURE — G0463 HOSPITAL OUTPT CLINIC VISIT: HCPCS | Mod: 25

## 2020-02-11 PROCEDURE — 93280 PM DEVICE PROGR EVAL DUAL: CPT | Mod: 26 | Performed by: INTERNAL MEDICINE

## 2020-02-11 ASSESSMENT — PAIN SCALES - GENERAL: PAINLEVEL: NO PAIN (0)

## 2020-02-11 ASSESSMENT — MIFFLIN-ST. JEOR: SCORE: 1240.35

## 2020-02-11 NOTE — PATIENT INSTRUCTIONS
It was a pleasure to see you in clinic today.  Please do not hesitate to call with any questions or concerns.  You are scheduled for a remote transmission on 3/24/2020.      Amisha Hobbs, RN, MS, CCRN  Electrophysiology Nurse Clinician  [facility]    During Business Hours Please Call:  [phone]  After Hours Please Call:  [phone] - select option #4 and ask for job code [id number]

## 2020-02-11 NOTE — PROGRESS NOTES
"      Clinical Cardiac Electrophysiology    Chief Complaint: s/p AV node ablation, pacer    HPI: Patient seen in Electrophysiology consultation for the above. She has a very complicated cardiac history. This was nicely summarized by Dr. Islas in his Oct 8, 2019 note. Please see that note for details.    With regard to her pacemaker, she reports multiple supraventricular tachycardia ablation and eventually AV node ablation with pacemaker placement.     She reports no problems with her pacemaker. She is not having palpitations. She does recall there being a problem with one of the \"wires\" and a discussion regarding replacing it when it is time to replace the generator.     Current Outpatient Medications   Medication Sig Dispense Refill     amLODIPine (NORVASC) 2.5 MG tablet Take 2.5 mg by mouth daily       aspirin EC 81 MG EC tablet Take 81 mg by mouth       atorvastatin (LIPITOR) 80 MG tablet Take 80 mg by mouth       calcium carb 1250 mg, 500 mg [place],/vitamin D 200 unit (CALCIUM 500/D) 500-200 MG-UNIT per tablet Take 1 tablet by mouth       diltiazem ER (DILT-XR) 180 MG 24 hr capsule Take 1 capsule (180 mg) by mouth daily 90 capsule 3     isosorbide mononitrate (IMDUR) 60 MG 24 hr tablet Take 1 tablet (60 mg) by mouth daily       lisinopril (PRINIVIL/ZESTRIL) 5 MG tablet Take 5 mg by mouth       nitroGLYcerin (NITROSTAT) 0.4 MG sublingual tablet For chest pain place 1 tablet under the tongue every 5 minutes for 3 doses. If symptoms persist 5 minutes after 1st dose call 911. 25 tablet 3     warfarin ANTICOAGULANT (COUMADIN) 6 MG tablet Take 1 tablet (6 mg) by mouth daily 90 tablet 1     zolpidem (AMBIEN) 5 MG tablet Take 1 tablet (5 mg) by mouth nightly as needed for sleep 30 tablet 1       Past Medical History:   Diagnosis Date     Acute thromboembolism of deep veins of lower extremity (H)     8/31/2006,Hx of multiple episodes of DVT: 3 lower extremity; 5 upper extremity (right arm)     Acute thromboembolism of deep " veins of lower extremity (H) 2018    Overview:  Hx of multiple episodes of DVT: 3 lower extremity; 5 upper extremity (right arm)     Atrial fibrillation (H)     No Comments Provided     Cardiac pacemaker in situ     Dual chamber     Cerebral thrombosis     2006,'95 w/ no residual     Coronary atherosclerosis     2006     Deep phlebothrombosis, antepartum, with delivery (H)     No Comments Provided     Endometrial hyperplasia     2006,s/p endometrial ablation      Essential hypertension     No Comments Provided     History of other genital system and obstetric disorders      8, Para 3-0-5-2     Other and unspecified angina pectoris     2006     Other and unspecified hyperlipidemia     No Comments Provided     Other postprocedural states      and ,Followed by Dr. Usman Duran, [facility],.     Paroxysmal supraventricular tachycardia (H)     2006,s/p multiple ablations w last resulting in PPM     Personal history of disease     2009,Hospitalized CVA with worsening left hemiplegia, post hospitalization ARU stay.     Personal history of transient ischemic attack (TIA) and cerebral infarction without residual deficit     with left hemiplegia     Primary hypercoagulable state (H)     No Comments Provided       Past Surgical History:   Procedure Laterality Date     ARTHROSCOPY KNEE      ,Arthroscopy for chondromalacia patella     AS CABG, ARTERY-VEIN, SINGLE  11/15/2016    Single vessel; done in [place]     ATTEMPTED ARTHROSCOPY      ,Right arthroscopy     BIOPSY BREAST      ,Breast cyst aspiration     COLONOSCOPY      2007,follow up recommended in 10 years.     ENDOSCOPIC SINUS SURGERY      ,Sinus node ablation.     EP STUDY  1994    EP STUDY /ABLATION,AV re-entry tachycardia, tessie ablation     HEART CATH, ANGIOPLASTY      ,Stenting placed LAD after ergonovine provocation confirmed     HEART CATH, ANGIOPLASTY       "03/06,90% lesion, normal left ventricular function     IMPLANT PACEMAKER      03/03,Guidant pacemaker placement, AV tessie ablation     LAPAROSCOPIC ABLATION ENDOMETRIOSIS      06/06     OTHER SURGICAL HISTORY      3/24/06,279841,([place]) [facility] STENT ANGIO     OTHER SURGICAL HISTORY      08/02,087527,EP STUDY /ABLATION     OTHER SURGICAL HISTORY      11/04,75506.0,CT CORONARY ANGIOGRAM ([place]),Coronary angiogram, failed ablation     OTHER SURGICAL HISTORY      12/04,226720,[facility] RELOCATION OF SKIN POCKET PACEMAKER,[facility] 5/24/05 reconfiguration of pacemaker \"pocket\"     OTHER SURGICAL HISTORY      207882,IP CONSULT TO ELECTROPHYSIOLOGY,study and lead change     OTHER SURGICAL HISTORY      12/06,432841,NEUROSTIMULATOR,Nerve stimulator implanted for chest pain control     OTHER SURGICAL HISTORY      12/2008,45120.0,CT CORONARY ANGIOGRAM ([place]),with increased left-sided weakness and vertigo, negative CT angiogram.     REPLACE PACEMAKER GENERATOR      12/29/04,Replacement of left ventricular pacemaker lead.     STENT  02/2017    LAD        Family History   Problem Relation Age of Onset     Dementia Mother      Skin Cancer Father         squamous     Dementia Father      Deep Vein Thrombosis Daughter         FVL +     Family History Negative Son         Good Health,lives in [facility]     Breast Cancer Paternal Grandmother         Cancer-breast     Breast Cancer Paternal Aunt         Cancer-breast       Social History     Tobacco Use     Smoking status: Never Smoker     Smokeless tobacco: Never Used     Tobacco comment: Quit smoking: spouse does not smoke in the house. Exposed in cars.   Substance Use Topics     Alcohol use: Yes     Comment: Alcoholic Drinks/day: Alcoholic Drinks/day: 1-2 drinks twice a week       Allergies   Allergen Reactions     Morphine Nausea and Vomiting     OK in small doses     Prednisone Nausea and Vomiting     Per IV     Sotalol Nausea and Vomiting         ROS- the ten system review is negative except as " "noted in the HPI.      Physical Examination:  Vitals: /77   Pulse 63   Temp 98.3  F (36.8  C) (Tympanic)   Resp 16   Ht 1.651 m (5' 5\")   Wt 68.9 kg (152 lb)   SpO2 98%   BMI 25.29 kg/m    BMI= Body mass index is 25.29 kg/m .    GENERAL APPEARANCE: healthy, alert and no distress  HEENT: no icterus, no xanthelasmas, mucosa moist, no cyanosis.  NECK: no adenopathy, no asymmetry, masses, or scars, carotid upstrokes are normal bilaterally, no cervical bruits, JVP is not visible  CHEST: lungs clear to auscultation, respirations are unlabored  CARDIOVASCULAR: regular rhythm, normal S1 S2, no murmur or gallop, normal pacemaker site  ABDOMEN: soft, non tender, bowel sounds normal, no abdominal bruits  EXTREMITIES: no clubbing, cyanosis or edema  NEURO: alert and oriented to person/place/time, normal speech, gait and affect  VASC: warm, well-perfused  SKIN: no ecchymoses, no rashes    Laboratory and diagnostic data reviewed February 21, 2020:    2/11/20  7:02 PM 2/11/20  1:30 PM [id number] [facility]    Narrative & Impression     Patient seen in clinic for evaluation and iterative programming of her [employer] dual lead pacemaker per MD orders. Patient is scheduled to see Dr. Johnson today. Normal pacemaker function. 1 NSVT episode recorded - 7 beats, 150 bpm. recorded. 54 AT/AF episodes recorded. Stored EGMs reveal oversensing of farfiled R waves and not a true atrial arrhythmia. Patient is taking Warfarin. Intrinsic rhythm = NSR with ventricular rate ~ 36 bpm. AP = 86%.  = 100%. Estimated battery longevity to NEHA = 3 months. Patient reports that she is feeling well today. Patient reports that she has had 4 dizzy spells in the past 6 months lasting up to 2 minutes in duration. Patient reports 2 of the episodes occurred while seated and the other 2 episodes occurred with standing. Plan for patient to send a remote transmission in 6 weeks to monitor battery status. Dr. Johnson " notified of interrogation results. JASON Hobbs RN     Pacing threshold on the atrial lead is slightly elevated, 1.0V @ 1.0 msec            Assessment and recommendations:    1) S/P AV node ablation  2) S/P permanent pacemaker   3) Elevated atrial pacing threshold  4) Pacemaker battery depletion     I reviewed with her that although her pacing threshold of the atrial lead is somewhat elevated it is still within acceptable safety margins.  It is true it does increase the drain on the battery somewhat.    However, adding a new lead would not be possible. She already has 4 leads in the left subclavian. Lead extraction would be possible but given the number of leads and the age of the leads, extraction would carry some risk of major complications.     I will ask our lead extraction team to take a look at her CXR. The simplest option, assuming no further increase in pacing threshold, might be to simply replace the generator and utilize the chronic lead.     Portions of this note were dictated using speech recognition software. The note has been proofread but errors in the text may have been overlooked. Please contact me if there are any concerns regarding the accuracy of the dictation.     I appreciate the chance to help with Ms. Fitzgerald's care. Please let me know if you have any questions or concerns.

## 2020-02-11 NOTE — PATIENT INSTRUCTIONS
You were seen by Dr. Johnson, 02/11/20.     1.  Think about what you would like to do with your pacemaker.     2. Dr. Johnson will talk to Dr. Moore about your case and get back to you after they consult.     3. You will have a chest xray today. We will notify you of the results.     You will follow up with Dr. Islas in [place].   And Dr. Johnson in 6 months.       Please call the cardiology office with problems, questions, or concerns at [phone].    If you experience chest pain, chest pressure, chest tightness, shortness of breath, fainting, lightheadedness, nausea, vomiting, or other concerning symptoms, please report to the Emergency Department or call 911. These symptoms may be emergent, and best treated in the Emergency Department.       Alice BOYCE RN  Cardiology   [facility]  [phone]

## 2020-02-11 NOTE — NURSING NOTE
"Chief Complaint   Patient presents with     Heart Problem     Follow Up       Initial /77   Pulse 63   Temp 98.3  F (36.8  C) (Tympanic)   Resp 16   Ht 1.651 m (5' 5\")   Wt 68.9 kg (152 lb)   SpO2 98%   BMI 25.29 kg/m   Estimated body mass index is 25.29 kg/m  as calculated from the following:    Height as of this encounter: 1.651 m (5' 5\").    Weight as of this encounter: 68.9 kg (152 lb).  Medication Reconciliation: complete  Selma Fair LPN    "

## 2020-02-13 LAB
MDC_IDC_EPISODE_DTM: NORMAL
MDC_IDC_EPISODE_ID: NORMAL
MDC_IDC_EPISODE_TYPE: NORMAL
MDC_IDC_LEAD_IMPLANT_DT: NORMAL
MDC_IDC_LEAD_IMPLANT_DT: NORMAL
MDC_IDC_LEAD_LOCATION: NORMAL
MDC_IDC_LEAD_LOCATION: NORMAL
MDC_IDC_LEAD_LOCATION_DETAIL_1: NORMAL
MDC_IDC_LEAD_LOCATION_DETAIL_1: NORMAL
MDC_IDC_LEAD_MFG: NORMAL
MDC_IDC_LEAD_MFG: NORMAL
MDC_IDC_LEAD_MODEL: NORMAL
MDC_IDC_LEAD_MODEL: NORMAL
MDC_IDC_LEAD_POLARITY_TYPE: NORMAL
MDC_IDC_LEAD_POLARITY_TYPE: NORMAL
MDC_IDC_LEAD_SERIAL: NORMAL
MDC_IDC_LEAD_SERIAL: NORMAL
MDC_IDC_LEAD_SPECIAL_FUNCTION: NORMAL
MDC_IDC_LEAD_SPECIAL_FUNCTION: NORMAL
MDC_IDC_MSMT_BATTERY_REMAINING_LONGEVITY: 3 MO
MDC_IDC_MSMT_BATTERY_STATUS: NORMAL
MDC_IDC_MSMT_LEADCHNL_RA_IMPEDANCE_VALUE: 622 OHM
MDC_IDC_MSMT_LEADCHNL_RA_PACING_THRESHOLD_AMPLITUDE: 1 V
MDC_IDC_MSMT_LEADCHNL_RA_PACING_THRESHOLD_PULSEWIDTH: 1 MS
MDC_IDC_MSMT_LEADCHNL_RA_SENSING_INTR_AMPL: 0.7 MV
MDC_IDC_MSMT_LEADCHNL_RV_IMPEDANCE_VALUE: 471 OHM
MDC_IDC_MSMT_LEADCHNL_RV_PACING_THRESHOLD_AMPLITUDE: 0.7 V
MDC_IDC_MSMT_LEADCHNL_RV_PACING_THRESHOLD_PULSEWIDTH: 0.4 MS
MDC_IDC_MSMT_LEADCHNL_RV_SENSING_INTR_AMPL: 8.5 MV
MDC_IDC_PG_IMPLANT_DTM: NORMAL
MDC_IDC_PG_MFG: NORMAL
MDC_IDC_PG_MODEL: NORMAL
MDC_IDC_PG_SERIAL: NORMAL
MDC_IDC_PG_TYPE: NORMAL
MDC_IDC_SESS_CLINIC_NAME: NORMAL
MDC_IDC_SESS_DTM: NORMAL
MDC_IDC_SESS_TYPE: NORMAL
MDC_IDC_SET_BRADY_AT_MODE_SWITCH_MODE: NORMAL
MDC_IDC_SET_BRADY_AT_MODE_SWITCH_RATE: 140 {BEATS}/MIN
MDC_IDC_SET_BRADY_LOWRATE: 60 {BEATS}/MIN
MDC_IDC_SET_BRADY_MAX_SENSOR_RATE: 120 {BEATS}/MIN
MDC_IDC_SET_BRADY_MAX_TRACKING_RATE: 100 {BEATS}/MIN
MDC_IDC_SET_BRADY_MODE: NORMAL
MDC_IDC_SET_BRADY_PAV_DELAY_HIGH: 80 MS
MDC_IDC_SET_BRADY_PAV_DELAY_LOW: 130 MS
MDC_IDC_SET_BRADY_SAV_DELAY_HIGH: 80 MS
MDC_IDC_SET_BRADY_SAV_DELAY_LOW: 130 MS
MDC_IDC_SET_LEADCHNL_RA_PACING_AMPLITUDE: 2.6 V
MDC_IDC_SET_LEADCHNL_RA_PACING_ANODE_ELECTRODE_1: NORMAL
MDC_IDC_SET_LEADCHNL_RA_PACING_ANODE_LOCATION_1: NORMAL
MDC_IDC_SET_LEADCHNL_RA_PACING_CAPTURE_MODE: NORMAL
MDC_IDC_SET_LEADCHNL_RA_PACING_CATHODE_ELECTRODE_1: NORMAL
MDC_IDC_SET_LEADCHNL_RA_PACING_CATHODE_LOCATION_1: NORMAL
MDC_IDC_SET_LEADCHNL_RA_PACING_POLARITY: NORMAL
MDC_IDC_SET_LEADCHNL_RA_PACING_PULSEWIDTH: 1 MS
MDC_IDC_SET_LEADCHNL_RA_SENSING_ADAPTATION_MODE: NORMAL
MDC_IDC_SET_LEADCHNL_RA_SENSING_ANODE_ELECTRODE_1: NORMAL
MDC_IDC_SET_LEADCHNL_RA_SENSING_ANODE_LOCATION_1: NORMAL
MDC_IDC_SET_LEADCHNL_RA_SENSING_CATHODE_ELECTRODE_1: NORMAL
MDC_IDC_SET_LEADCHNL_RA_SENSING_CATHODE_LOCATION_1: NORMAL
MDC_IDC_SET_LEADCHNL_RA_SENSING_POLARITY: NORMAL
MDC_IDC_SET_LEADCHNL_RA_SENSING_SENSITIVITY: 0.15 MV
MDC_IDC_SET_LEADCHNL_RV_PACING_AMPLITUDE: 1.5 V
MDC_IDC_SET_LEADCHNL_RV_PACING_ANODE_ELECTRODE_1: NORMAL
MDC_IDC_SET_LEADCHNL_RV_PACING_ANODE_LOCATION_1: NORMAL
MDC_IDC_SET_LEADCHNL_RV_PACING_CAPTURE_MODE: NORMAL
MDC_IDC_SET_LEADCHNL_RV_PACING_CATHODE_ELECTRODE_1: NORMAL
MDC_IDC_SET_LEADCHNL_RV_PACING_CATHODE_LOCATION_1: NORMAL
MDC_IDC_SET_LEADCHNL_RV_PACING_POLARITY: NORMAL
MDC_IDC_SET_LEADCHNL_RV_PACING_PULSEWIDTH: 0.4 MS
MDC_IDC_SET_LEADCHNL_RV_SENSING_ADAPTATION_MODE: NORMAL
MDC_IDC_SET_LEADCHNL_RV_SENSING_ANODE_ELECTRODE_1: NORMAL
MDC_IDC_SET_LEADCHNL_RV_SENSING_ANODE_LOCATION_1: NORMAL
MDC_IDC_SET_LEADCHNL_RV_SENSING_CATHODE_ELECTRODE_1: NORMAL
MDC_IDC_SET_LEADCHNL_RV_SENSING_CATHODE_LOCATION_1: NORMAL
MDC_IDC_SET_LEADCHNL_RV_SENSING_POLARITY: NORMAL
MDC_IDC_SET_LEADCHNL_RV_SENSING_SENSITIVITY: 2.5 MV
MDC_IDC_SET_ZONE_DETECTION_INTERVAL: 429 MS
MDC_IDC_SET_ZONE_TYPE: NORMAL
MDC_IDC_SET_ZONE_VENDOR_TYPE: NORMAL
MDC_IDC_STAT_AT_BURDEN_PERCENT: 1 %
MDC_IDC_STAT_BRADY_RA_PERCENT_PACED: 86 %
MDC_IDC_STAT_BRADY_RV_PERCENT_PACED: 100 %
MDC_IDC_STAT_EPISODE_RECENT_COUNT: 1
MDC_IDC_STAT_EPISODE_RECENT_COUNT: 54
MDC_IDC_STAT_EPISODE_RECENT_COUNT_DTM_END: NORMAL
MDC_IDC_STAT_EPISODE_RECENT_COUNT_DTM_END: NORMAL
MDC_IDC_STAT_EPISODE_RECENT_COUNT_DTM_START: NORMAL
MDC_IDC_STAT_EPISODE_RECENT_COUNT_DTM_START: NORMAL
MDC_IDC_STAT_EPISODE_TOTAL_COUNT: 39
MDC_IDC_STAT_EPISODE_TOTAL_COUNT_DTM_END: NORMAL
MDC_IDC_STAT_EPISODE_TYPE: NORMAL
MDC_IDC_STAT_EPISODE_VENDOR_TYPE: NORMAL

## 2020-02-15 ENCOUNTER — TRANSFERRED RECORDS (OUTPATIENT)
Dept: HEALTH INFORMATION MANAGEMENT | Facility: OTHER | Age: 65
End: 2020-02-15

## 2020-02-15 LAB — INR PPP: 2 (ref 0.9–1.1)

## 2020-02-17 ENCOUNTER — ANTICOAGULATION THERAPY VISIT (OUTPATIENT)
Dept: FAMILY MEDICINE | Facility: OTHER | Age: 65
End: 2020-02-17
Payer: MEDICARE

## 2020-02-17 DIAGNOSIS — I66.9 CEREBRAL THROMBOSIS: ICD-10-CM

## 2020-02-17 DIAGNOSIS — Z79.01 ANTICOAGULATION MONITORING, INR RANGE 2-3: ICD-10-CM

## 2020-02-17 RX ORDER — WARFARIN SODIUM 6 MG/1
TABLET ORAL
Qty: 90 TABLET | Refills: 1 | COMMUNITY
Start: 2020-02-17 | End: 2020-09-02

## 2020-02-17 NOTE — PROGRESS NOTES
ANTICOAGULATION FOLLOW-UP CLINIC VISIT    Patient Name:  Lyudmila Fitzgerald  Date:  2020  Contact Type:  fax from [employer]/phone call with patient    SUBJECTIVE:  Patient Findings     Comments:   Per phone call with patient.        Clinical Outcomes     Negatives:   Major bleeding event, Thromboembolic event, Anticoagulation-related hospital admission, Anticoagulation-related ED visit, Anticoagulation-related fatality    Comments:   Per phone call with patient.           OBJECTIVE    INR   Date Value Ref Range Status   02/15/2020 2.0 (A) 0.90 - 1.10 Final     Chromogenic Factor 10   Date Value Ref Range Status   2013 51 (L) %      Comment:                                             Therapeutic Range 20-40   2013 47 (L) %      Comment:                                             Therapeutic Range 20-40       ASSESSMENT / PLAN  INR assessment THER    Recheck INR In: 2 WEEKS    INR Location Home INR      Anticoagulation Summary  As of 2020    INR goal:   2.0-3.0   TTR:   89.3 % (3.9 mo)   INR used for dosin.0 (2/15/2020)   Warfarin maintenance plan:   9 mg (6 mg x 1.5) every Mon; 6 mg (6 mg x 1) all other days   Full warfarin instructions:   9 mg every Mon; 6 mg all other days   Weekly warfarin total:   45 mg   Plan last modified:   Arleth Sheth RN (2020)   Next INR check:   3/2/2020   Priority:   Maintenance   Target end date:   Indefinite    Indications    Acute thromboembolism of deep veins of lower extremity (H) (Resolved) [I82.409]  Anticoagulation monitoring  INR range 2-3 [Z79.01]             Anticoagulation Episode Summary     INR check location:       Preferred lab:       Send INR reminders to:    INR    Comments:   home monitor [employer]      Anticoagulation Care Providers     Provider Role Specialty Phone number    Erika Carrasco MD [facility] [phone]            See the Encounter Report to view Anticoagulation Flowsheet and Dosing  Calendar (Go to Encounters tab in chart review, and find the Anticoagulation Therapy Visit)    No encounter, INR received via fax from [employer].  Testing performed at home by patient.  Phone call to patient for assessment and dose adjustment.  Patient verbalized understanding.      Arleth Sheth RN

## 2020-02-28 ENCOUNTER — TRANSFERRED RECORDS (OUTPATIENT)
Dept: HEALTH INFORMATION MANAGEMENT | Facility: OTHER | Age: 65
End: 2020-02-28

## 2020-02-28 ENCOUNTER — ANTICOAGULATION THERAPY VISIT (OUTPATIENT)
Dept: ANTICOAGULATION | Facility: OTHER | Age: 65
End: 2020-02-28
Payer: MEDICARE

## 2020-02-28 DIAGNOSIS — Z79.01 ANTICOAGULATION MONITORING, INR RANGE 2-3: ICD-10-CM

## 2020-02-28 LAB — INR PPP: 2.5 (ref 0.9–1.1)

## 2020-02-28 NOTE — PROGRESS NOTES
ANTICOAGULATION FOLLOW-UP CLINIC VISIT    Patient Name:  Lyudmila Fitzgerald  Date:  2020  Contact Type:  fax from [employer] home monitoring service    SUBJECTIVE:  Patient Findings     Comments:   No phone call due to INR being in range.  Patient has been instructed to call with new medications or changes.          Clinical Outcomes     Negatives:   Major bleeding event, Thromboembolic event, Anticoagulation-related hospital admission, Anticoagulation-related ED visit, Anticoagulation-related fatality    Comments:   No phone call due to INR being in range.  Patient has been instructed to call with new medications or changes.             OBJECTIVE    INR   Date Value Ref Range Status   2020 2.5 (A) 0.90 - 1.10 Final     Chromogenic Factor 10   Date Value Ref Range Status   2013 51 (L) %      Comment:                                             Therapeutic Range 20-40   2013 47 (L) %      Comment:                                             Therapeutic Range 20-40       ASSESSMENT / PLAN  INR assessment THER    Recheck INR In: 2 WEEKS    INR Location Clinic      Anticoagulation Summary  As of 2020    INR goal:   2.0-3.0   TTR:   90.4 % (4.4 mo)   INR used for dosin.5 (2020)   Warfarin maintenance plan:   9 mg (6 mg x 1.5) every Mon; 6 mg (6 mg x 1) all other days   Full warfarin instructions:   9 mg every Mon; 6 mg all other days   Weekly warfarin total:   45 mg   No change documented:   Arleth Sheth RN   Plan last modified:   Arleth Sheth RN (2020)   Next INR check:   3/13/2020   Priority:   Maintenance   Target end date:   Indefinite    Indications    Acute thromboembolism of deep veins of lower extremity (H) (Resolved) [I82.409]  Anticoagulation monitoring  INR range 2-3 [Z79.01]             Anticoagulation Episode Summary     INR check location:       Preferred lab:       Send INR reminders to:    INR    Comments:   home monitor [employer]      Anticoagulation  Care Providers     Provider Role Specialty Phone number    Erika Carrasco MD Responsible Family Practice [phone]            See the Encounter Report to view Anticoagulation Flowsheet and Dosing Calendar (Go to Encounters tab in chart review, and find the Anticoagulation Therapy Visit)    Testing performed at home by patient ([employer] Home Monitoring Service)  No encounter, INR received via fax.  INR in range so no telephone call.  Patient is to call INR clinic if any changes affecting INR.       Arleth Sheth RN

## 2020-03-02 ENCOUNTER — MYC MEDICAL ADVICE (OUTPATIENT)
Dept: FAMILY MEDICINE | Facility: OTHER | Age: 65
End: 2020-03-02

## 2020-03-04 DIAGNOSIS — E78.2 MIXED HYPERLIPIDEMIA: Primary | ICD-10-CM

## 2020-03-06 RX ORDER — ATORVASTATIN CALCIUM 80 MG/1
80 TABLET, FILM COATED ORAL DAILY
Qty: 90 TABLET | Refills: 4 | Status: SHIPPED | OUTPATIENT
Start: 2020-03-06 | End: 2020-04-07

## 2020-03-06 NOTE — PROGRESS NOTES
"Nursing Notes:   Geovanna Cardona LPN  3/9/2020 11:12 AM  Signed  Patient presents to the clinic for multiple concerns.    Previous A1C is at goal of <8  Lab Results   Component Value Date    A1C 6.1 09/27/2019     Urine microalbumin:creatine: 6.1  Foot exam unknown-declines today  Eye exam over a year ago  Tobacco User no  Patient is on a daily aspirin  Patient is on a Statin.  Blood pressure today of:     BP Readings from Last 1 Encounters:   02/11/20 135/77      is at the goal of <139/89 for diabetics.    Chief Complaint   Patient presents with     Clinic Care Coordination - Follow-up       Initial /80 (BP Location: Right arm, Patient Position: Sitting, Cuff Size: Adult Regular)   Pulse 64   Temp 96.3  F (35.7  C) (Tympanic)   Resp 16   Wt 67.2 kg (148 lb 2 oz)   BMI 24.65 kg/m   Estimated body mass index is 24.65 kg/m  as calculated from the following:    Height as of 2/11/20: 1.651 m (5' 5\").    Weight as of this encounter: 67.2 kg (148 lb 2 oz).  Medication Reconciliation: complete    Geovanna Cardona LPN             SUBJECTIVE:   CC:  Lyudmila Fitzgerald is a 64 year old female who presents to clinic today for the following health issues:  Multiple concerns.    HPI  Lyudmila Fitzgerald is a 64 year old female who presents for evaluation of multiple concerns.    The patient has a very long, complicated cardiac history.  She has had coronary artery spasms, coronary artery disease, cardiac dysrhythmias.  She has had numerous angiograms and stenting done.  She has been followed by cardiology for many years.  She also has a history of recurrent TIAs, primarily affecting her left side.  She is anticoagulated, on Coumadin.  She will continue to have times when she is fatigued, of having some left-sided foot drop.  Pacemaker:  Has recheck end of this month.    Has cardiology appointment in a month.  She's had episodes of feeling lightheaded.  She is having more episodes of angina.  Has been using more " nitroglycerin during this time.      Type 2 diabetes:  About every 1-2 months will have symptoms of hypoglycemia.  She is not on diabetes medication.  Last eye exam 2019.      Abnormal chest xray: She had a chest x-ray done in February.  Findings of left sided atelectasis vs scar tissue.  Patient thinks was noted before.  She had PFTs done at that time, about 3 years ago.    She is a non-smoker.    Chronic insomnia: She has taken Ambien 5 mg at bedtime, almost each night, consistently for years.  Sometimes she will try taking only a half dose.  She is aware from previous conversations that at age 65, in particular does recommend that she be off of this.       Allergies   Allergen Reactions     Morphine Nausea and Vomiting     OK in small doses     Prednisone Nausea and Vomiting     Per IV     Sotalol Nausea and Vomiting     Current Outpatient Medications   Medication     amLODIPine (NORVASC) 2.5 MG tablet     diltiazem ER (DILT-XR) 180 MG 24 hr capsule     isosorbide mononitrate (IMDUR) 60 MG 24 hr tablet     lisinopril (ZESTRIL) 5 MG tablet     nitroGLYcerin (NITROSTAT) 0.4 MG sublingual tablet     zolpidem (AMBIEN) 5 MG tablet     aspirin EC 81 MG EC tablet     atorvastatin (LIPITOR) 80 MG tablet     calcium carb 1250 mg, 500 mg [place],/vitamin D 200 unit (CALCIUM 500/D) 500-200 MG-UNIT per tablet     carvedilol (COREG) 3.125 MG tablet     warfarin ANTICOAGULANT (COUMADIN) 6 MG tablet     No current facility-administered medications for this visit.       Past Medical History:   Diagnosis Date     Acute thromboembolism of deep veins of lower extremity (H)     8/31/2006,Hx of multiple episodes of DVT: 3 lower extremity; 5 upper extremity (right arm)     Acute thromboembolism of deep veins of lower extremity (H) 1/16/2018    Overview:  Hx of multiple episodes of DVT: 3 lower extremity; 5 upper extremity (right arm)     Atrial fibrillation (H)     No Comments Provided     Cardiac pacemaker in situ 2003    Dual chamber      Cerebral thrombosis     2006,'95 w/ no residual     Coronary atherosclerosis     2006     Deep phlebothrombosis, antepartum, with delivery (H)     No Comments Provided     Endometrial hyperplasia     2006,s/p endometrial ablation      Essential hypertension     No Comments Provided     History of other genital system and obstetric disorders      8, Para 3-0-5-2     Other and unspecified angina pectoris     2006     Other and unspecified hyperlipidemia     No Comments Provided     Other postprocedural states      and ,Followed by Dr. Usman Duran, [facility],.     Paroxysmal supraventricular tachycardia (H)     2006,s/p multiple ablations w last resulting in PPM     Personal history of disease     2009,Hospitalized CVA with worsening left hemiplegia, post hospitalization ARU stay.     Personal history of transient ischemic attack (TIA) and cerebral infarction without residual deficit     with left hemiplegia     Primary hypercoagulable state (H)     No Comments Provided      Past Surgical History:   Procedure Laterality Date     ARTHROSCOPY KNEE      ,Arthroscopy for chondromalacia patella     AS CABG, ARTERY-VEIN, SINGLE  11/15/2016    Single vessel; done in [place]     ATTEMPTED ARTHROSCOPY      ,Right arthroscopy     BIOPSY BREAST      ,Breast cyst aspiration     COLONOSCOPY      2007,follow up recommended in 10 years.     ENDOSCOPIC SINUS SURGERY      ,Sinus node ablation.     EP STUDY  1994    EP STUDY /ABLATION,AV re-entry tachycardia, tessie ablation     HEART CATH, ANGIOPLASTY      ,Stenting placed LAD after ergonovine provocation confirmed     HEART CATH, ANGIOPLASTY      ,90% lesion, normal left ventricular function     IMPLANT PACEMAKER      ,Guidant pacemaker placement, AV tessie ablation     LAPAROSCOPIC ABLATION ENDOMETRIOSIS           OTHER SURGICAL HISTORY      3/24/06,718562,([place]) HG  "STENT ANGIO     OTHER SURGICAL HISTORY      08/02,739171,EP STUDY /ABLATION     OTHER SURGICAL HISTORY      11/04,03520.0,CT CORONARY ANGIOGRAM ([place]),Coronary angiogram, failed ablation     OTHER SURGICAL HISTORY      12/04,503167,[facility] RELOCATION OF SKIN POCKET PACEMAKER,[facility] 5/24/05 reconfiguration of pacemaker \"pocket\"     OTHER SURGICAL HISTORY      222728,IP CONSULT TO ELECTROPHYSIOLOGY,study and lead change     OTHER SURGICAL HISTORY      12/06,292989,NEUROSTIMULATOR,Nerve stimulator implanted for chest pain control     OTHER SURGICAL HISTORY      12/2008,63548.0,CT CORONARY ANGIOGRAM ([place]),with increased left-sided weakness and vertigo, negative CT angiogram.     REPLACE PACEMAKER GENERATOR      12/29/04,Replacement of left ventricular pacemaker lead.     STENT  02/2017    LAD      Family History   Problem Relation Age of Onset     Dementia Mother      Skin Cancer Father         squamous     Dementia Father      Cerebrovascular Disease Father      No Known Problems Sister      Deep Vein Thrombosis Daughter         FVL +     Family History Negative Son         Good Health,lives in [facility]     Breast Cancer Paternal Grandmother         Cancer-breast     Breast Cancer Paternal Aunt         Cancer-breast     Hyperlipidemia Brother        Review of Systems   Constitutional: Positive for fatigue.   HENT: Negative.    Respiratory: Positive for shortness of breath.    Cardiovascular: Positive for chest pain.   Gastrointestinal: Negative.    Genitourinary: Positive for urgency.   Neurological: Positive for dizziness.   Psychiatric/Behavioral: Positive for sleep disturbance.        PHQ-2 Score:     PHQ-2 ( 1999 [employer]) 10/8/2019 9/27/2019   Q1: Little interest or pleasure in doing things 0 0   Q2: Feeling down, depressed or hopeless 0 0   PHQ-2 Score 0 0         PHQ-9 SCORE 3/9/2020   PHQ-9 Total Score 0         OBJECTIVE:     /80 (BP Location: Right arm, Patient Position: Sitting, Cuff Size: Adult Regular)   " Pulse 64   Temp 96.3  F (35.7  C) (Tympanic)   Resp 16   Wt 67.2 kg (148 lb 2 oz)   SpO2 (P) 98%   BMI 24.65 kg/m    Body mass index is 24.65 kg/m .  Physical Exam  Vitals signs and nursing note reviewed.   Constitutional:       General: She is not in acute distress.     Appearance: Normal appearance. She is normal weight.   HENT:      Head: Normocephalic and atraumatic.   Cardiovascular:      Rate and Rhythm: Normal rate and regular rhythm.      Comments: Split second sound.  pacemaker  Pulmonary:      Effort: Pulmonary effort is normal.      Breath sounds: Normal breath sounds. No wheezing or rhonchi.   Abdominal:      General: Abdomen is flat.      Palpations: Abdomen is soft.   Skin:     Comments: Across her back patient has multiple seborrheic keratoses.  Inferior to her right scapula, there is a 6 mm slightly raised black macule however.  Consent obtained from patient for removal.  Shave procedure: Area cleansed with ChloraPrep.  Anesthetized with 2% lidocaine with epinephrine.  Shave removal performed.  Hemostasis with silver nitrate.  Patient tolerated procedure well.  Specimen sent for pathology.   Neurological:      Mental Status: She is alert.      Comments: Decreased sensation left first toe   Psychiatric:         Mood and Affect: Mood normal.         Behavior: Behavior normal.            Results for orders placed or performed in visit on 03/09/20   HEMOGLOBIN A1C     Status: None   Result Value Ref Range    Hemoglobin A1C 5.8 4.0 - 6.0 %   Lipid Panel     Status: Abnormal   Result Value Ref Range    Cholesterol 288 (H) <200 mg/dL    Triglycerides 158 (H) <150 mg/dL    HDL Cholesterol 78 23 - 92 mg/dL    LDL Cholesterol Calculated 178 (H) <100 mg/dL    Non HDL Cholesterol 210 (H) <130 mg/dL   CBC W PLT No Diff     Status: Abnormal   Result Value Ref Range    WBC 6.3 4.0 - 11.0 10e9/L    RBC Count 4.95 3.8 - 5.2 10e12/L    Hemoglobin 15.8 (H) 11.7 - 15.7 g/dL    Hematocrit 46.3 35.0 - 47.0 %    MCV  94 78 - 100 fl    MCH 31.9 26.5 - 33.0 pg    MCHC 34.1 31.5 - 36.5 g/dL    RDW 13.1 10.0 - 15.0 %    Platelet Count 242 150 - 450 10e9/L   Comprehensive Metabolic Panel     Status: None   Result Value Ref Range    Sodium 138 134 - 144 mmol/L    Potassium 4.3 3.5 - 5.1 mmol/L    Chloride 106 98 - 107 mmol/L    Carbon Dioxide 21 21 - 31 mmol/L    Anion Gap 11 3 - 14 mmol/L    Glucose 99 70 - 105 mg/dL    Urea Nitrogen 17 7 - 25 mg/dL    Creatinine 0.79 0.60 - 1.20 mg/dL    GFR Estimate 73 >60 mL/min/[1.73_m2]    GFR Estimate If Black 89 >60 mL/min/[1.73_m2]    Calcium 9.5 8.6 - 10.3 mg/dL    Bilirubin Total 0.5 0.3 - 1.0 mg/dL    Albumin 4.4 3.5 - 5.7 g/dL    Protein Total 7.3 6.4 - 8.9 g/dL    Alkaline Phosphatase 73 34 - 104 U/L    ALT 19 7 - 52 U/L    AST 22 13 - 39 U/L         ASSESSMENT/PLAN:       ICD-10-CM    1. Diabetes mellitus, type II   E11.9 Albumin Random Urine Quantitative with Creat Ratio     HEMOGLOBIN A1C     Comprehensive Metabolic Panel     CBC W PLT No Diff     Lipid Panel     Hemoglobin A1c     Lipid Panel     CBC W PLT No Diff     Comprehensive Metabolic Panel   2. Chronic insomnia  F51.04 zolpidem (AMBIEN) 5 MG tablet   3. Coronary artery spasm (H)  I20.1 nitroGLYcerin (NITROSTAT) 0.4 MG sublingual tablet     amLODIPine (NORVASC) 2.5 MG tablet     diltiazem ER (DILT-XR) 180 MG 24 hr capsule     lisinopril (ZESTRIL) 5 MG tablet     isosorbide mononitrate (IMDUR) 60 MG 24 hr tablet     Comprehensive Metabolic Panel     CBC W PLT No Diff     Lipid Panel     Hemoglobin A1c     Lipid Panel     CBC W PLT No Diff     Comprehensive Metabolic Panel   4. Special screening for malignant neoplasms, colon  Z12.11 ABSTRACT COLOGUARD-NO CHARGE   5. Abnormal chest x-ray  R93.89 Pulmonary Function Test ()   6. Encounter for screening mammogram for breast cancer  Z12.31 MA SCREENING DIGITAL BILAT - Future  (s+30)   7. Atypical mole  D22.9 Surgical pathology exam     SHAV SKIN LESION TRUNK/ARM/LEG 0.6-1.0 CM    8. Mixed hyperlipidemia  E78.2    9. History of TIA (transient ischemic attack) and stroke  Z86.73             PLAN:  1.  I have personally reviewed the labs listed above.  2.  Message sent to patient regarding elevated lipids to ask about compliance with medication.  3.  With her abnormal chest x-ray and symptoms, have recommended pulmonary function testing.  4.  Patient is due for mammogram, this is ordered.  5.  Patient is due for colon cancer screening.  In discussing options, she wishes to proceed with Cologuard testing.  Order for this is placed.  6.  Follow-up with cardiology as scheduled.  7.  Post mole removal skin care instructions discussed.  She will be contacted with pathology results when they are available.      MARYURI SANDOVAL MD  [facility]    This note was created using voice recognition software and was screened for errors in transcription.

## 2020-03-06 NOTE — TELEPHONE ENCOUNTER
"Requested Prescriptions   Pending Prescriptions Disp Refills     atorvastatin (LIPITOR) 80 MG tablet       Sig: Take 1 tablet (80 mg) by mouth daily       Statins Protocol Passed - 3/4/2020  9:01 AM        Passed - LDL on file in past 12 months     Recent Labs   Lab Test 09/27/19  1530   LDL 97             Passed - No abnormal creatine kinase in past 12 months     No lab results found.             Passed - Recent (12 mo) or future (30 days) visit within the authorizing provider's specialty     Patient has had an office visit with the authorizing provider or a provider within the authorizing providers department within the previous 12 mos or has a future within next 30 days. See \"Patient Info\" tab in inbasket, or \"Choose Columns\" in Meds & Orders section of the refill encounter.              Passed - Medication is active on med list        Passed - Patient is age 18 or older        Passed - No active pregnancy on record        Passed - No positive pregnancy test in past 12 months      This medication was placed as a historical medication on 01/16/2018.  I do not see where this has been filled through GI.  Unable to complete prescription refill per RN medication refill policy. Will route to provider for review and consideration.  Stephanie Ledesma RN on 3/6/2020 at 9:07 AM        "

## 2020-03-09 ENCOUNTER — MYC MEDICAL ADVICE (OUTPATIENT)
Dept: FAMILY MEDICINE | Facility: OTHER | Age: 65
End: 2020-03-09

## 2020-03-09 ENCOUNTER — TELEPHONE (OUTPATIENT)
Dept: FAMILY MEDICINE | Facility: OTHER | Age: 65
End: 2020-03-09

## 2020-03-09 ENCOUNTER — OFFICE VISIT (OUTPATIENT)
Dept: FAMILY MEDICINE | Facility: OTHER | Age: 65
End: 2020-03-09
Attending: FAMILY MEDICINE
Payer: MEDICARE

## 2020-03-09 VITALS
RESPIRATION RATE: 16 BRPM | TEMPERATURE: 96.3 F | HEART RATE: 64 BPM | WEIGHT: 148.13 LBS | SYSTOLIC BLOOD PRESSURE: 134 MMHG | DIASTOLIC BLOOD PRESSURE: 80 MMHG | BODY MASS INDEX: 24.65 KG/M2

## 2020-03-09 DIAGNOSIS — F51.04 CHRONIC INSOMNIA: ICD-10-CM

## 2020-03-09 DIAGNOSIS — I20.1 CORONARY ARTERY SPASM (H): ICD-10-CM

## 2020-03-09 DIAGNOSIS — R93.89 ABNORMAL CHEST X-RAY: ICD-10-CM

## 2020-03-09 DIAGNOSIS — Z12.11 SPECIAL SCREENING FOR MALIGNANT NEOPLASMS, COLON: ICD-10-CM

## 2020-03-09 DIAGNOSIS — D22.9 ATYPICAL MOLE: ICD-10-CM

## 2020-03-09 DIAGNOSIS — E11.9 TYPE 2 DIABETES MELLITUS WITHOUT COMPLICATION, WITHOUT LONG-TERM CURRENT USE OF INSULIN (H): Primary | ICD-10-CM

## 2020-03-09 DIAGNOSIS — Z12.31 ENCOUNTER FOR SCREENING MAMMOGRAM FOR BREAST CANCER: ICD-10-CM

## 2020-03-09 DIAGNOSIS — E78.2 MIXED HYPERLIPIDEMIA: ICD-10-CM

## 2020-03-09 DIAGNOSIS — Z86.73 HISTORY OF TIA (TRANSIENT ISCHEMIC ATTACK) AND STROKE: ICD-10-CM

## 2020-03-09 LAB
ALBUMIN SERPL-MCNC: 4.4 G/DL (ref 3.5–5.7)
ALP SERPL-CCNC: 73 U/L (ref 34–104)
ALT SERPL W P-5'-P-CCNC: 19 U/L (ref 7–52)
ANION GAP SERPL CALCULATED.3IONS-SCNC: 11 MMOL/L (ref 3–14)
AST SERPL W P-5'-P-CCNC: 22 U/L (ref 13–39)
BILIRUB SERPL-MCNC: 0.5 MG/DL (ref 0.3–1)
BUN SERPL-MCNC: 17 MG/DL (ref 7–25)
CALCIUM SERPL-MCNC: 9.5 MG/DL (ref 8.6–10.3)
CHLORIDE SERPL-SCNC: 106 MMOL/L (ref 98–107)
CHOLEST SERPL-MCNC: 288 MG/DL
CO2 SERPL-SCNC: 21 MMOL/L (ref 21–31)
CREAT SERPL-MCNC: 0.79 MG/DL (ref 0.6–1.2)
CREAT UR-MCNC: 86 MG/DL
ERYTHROCYTE [DISTWIDTH] IN BLOOD BY AUTOMATED COUNT: 13.1 % (ref 10–15)
GFR SERPL CREATININE-BSD FRML MDRD: 73 ML/MIN/{1.73_M2}
GLUCOSE SERPL-MCNC: 99 MG/DL (ref 70–105)
HBA1C MFR BLD: 5.8 % (ref 4–6)
HCT VFR BLD AUTO: 46.3 % (ref 35–47)
HDLC SERPL-MCNC: 78 MG/DL (ref 23–92)
HGB BLD-MCNC: 15.8 G/DL (ref 11.7–15.7)
LDLC SERPL CALC-MCNC: 178 MG/DL
MCH RBC QN AUTO: 31.9 PG (ref 26.5–33)
MCHC RBC AUTO-ENTMCNC: 34.1 G/DL (ref 31.5–36.5)
MCV RBC AUTO: 94 FL (ref 78–100)
MICROALBUMIN UR-MCNC: 14 MG/L
MICROALBUMIN/CREAT UR: 16.63 MG/G CR (ref 0–25)
NONHDLC SERPL-MCNC: 210 MG/DL
PLATELET # BLD AUTO: 242 10E9/L (ref 150–450)
POTASSIUM SERPL-SCNC: 4.3 MMOL/L (ref 3.5–5.1)
PROT SERPL-MCNC: 7.3 G/DL (ref 6.4–8.9)
RBC # BLD AUTO: 4.95 10E12/L (ref 3.8–5.2)
SODIUM SERPL-SCNC: 138 MMOL/L (ref 134–144)
TRIGL SERPL-MCNC: 158 MG/DL
WBC # BLD AUTO: 6.3 10E9/L (ref 4–11)

## 2020-03-09 PROCEDURE — 99214 OFFICE O/P EST MOD 30 MIN: CPT | Mod: 25 | Performed by: FAMILY MEDICINE

## 2020-03-09 PROCEDURE — 82043 UR ALBUMIN QUANTITATIVE: CPT | Mod: ZL | Performed by: FAMILY MEDICINE

## 2020-03-09 PROCEDURE — 83036 HEMOGLOBIN GLYCOSYLATED A1C: CPT | Mod: ZL | Performed by: FAMILY MEDICINE

## 2020-03-09 PROCEDURE — 80061 LIPID PANEL: CPT | Mod: ZL | Performed by: FAMILY MEDICINE

## 2020-03-09 PROCEDURE — 80053 COMPREHEN METABOLIC PANEL: CPT | Mod: ZL | Performed by: FAMILY MEDICINE

## 2020-03-09 PROCEDURE — 88305 TISSUE EXAM BY PATHOLOGIST: CPT

## 2020-03-09 PROCEDURE — 85027 COMPLETE CBC AUTOMATED: CPT | Mod: ZL | Performed by: FAMILY MEDICINE

## 2020-03-09 PROCEDURE — 11301 SHAVE SKIN LESION 0.6-1.0 CM: CPT | Performed by: FAMILY MEDICINE

## 2020-03-09 PROCEDURE — 36415 COLL VENOUS BLD VENIPUNCTURE: CPT | Mod: ZL | Performed by: FAMILY MEDICINE

## 2020-03-09 PROCEDURE — G0463 HOSPITAL OUTPT CLINIC VISIT: HCPCS | Mod: 25

## 2020-03-09 RX ORDER — ZOLPIDEM TARTRATE 5 MG/1
5 TABLET ORAL
Qty: 30 TABLET | Refills: 5 | Status: SHIPPED | OUTPATIENT
Start: 2020-03-09 | End: 2020-08-25

## 2020-03-09 RX ORDER — DILTIAZEM HYDROCHLORIDE 180 MG/1
180 CAPSULE, EXTENDED RELEASE ORAL DAILY
Qty: 90 CAPSULE | Refills: 3 | Status: SHIPPED | OUTPATIENT
Start: 2020-03-09 | End: 2020-04-07

## 2020-03-09 RX ORDER — AMLODIPINE BESYLATE 2.5 MG/1
2.5 TABLET ORAL DAILY
Qty: 90 TABLET | Refills: 3 | Status: SHIPPED | OUTPATIENT
Start: 2020-03-09 | End: 2020-04-07

## 2020-03-09 RX ORDER — ISOSORBIDE MONONITRATE 60 MG/1
60 TABLET, EXTENDED RELEASE ORAL DAILY
Qty: 90 TABLET | Refills: 3 | Status: SHIPPED | OUTPATIENT
Start: 2020-03-09 | End: 2020-04-07

## 2020-03-09 RX ORDER — CARVEDILOL 3.12 MG/1
3.12 TABLET ORAL DAILY
COMMUNITY
Start: 2020-01-06 | End: 2020-04-07

## 2020-03-09 RX ORDER — NITROGLYCERIN 0.4 MG/1
TABLET SUBLINGUAL
Qty: 25 TABLET | Refills: 3 | Status: SHIPPED | OUTPATIENT
Start: 2020-03-09 | End: 2020-04-07

## 2020-03-09 RX ORDER — LISINOPRIL 5 MG/1
5 TABLET ORAL DAILY
Qty: 90 TABLET | Refills: 3 | Status: SHIPPED | OUTPATIENT
Start: 2020-03-09 | End: 2020-04-07

## 2020-03-09 ASSESSMENT — PATIENT HEALTH QUESTIONNAIRE - PHQ9
5. POOR APPETITE OR OVEREATING: NOT AT ALL
SUM OF ALL RESPONSES TO PHQ QUESTIONS 1-9: 0

## 2020-03-09 ASSESSMENT — ANXIETY QUESTIONNAIRES
1. FEELING NERVOUS, ANXIOUS, OR ON EDGE: NOT AT ALL
6. BECOMING EASILY ANNOYED OR IRRITABLE: NOT AT ALL
7. FEELING AFRAID AS IF SOMETHING AWFUL MIGHT HAPPEN: NOT AT ALL
3. WORRYING TOO MUCH ABOUT DIFFERENT THINGS: NOT AT ALL
5. BEING SO RESTLESS THAT IT IS HARD TO SIT STILL: NOT AT ALL
IF YOU CHECKED OFF ANY PROBLEMS ON THIS QUESTIONNAIRE, HOW DIFFICULT HAVE THESE PROBLEMS MADE IT FOR YOU TO DO YOUR WORK, TAKE CARE OF THINGS AT HOME, OR GET ALONG WITH OTHER PEOPLE: NOT DIFFICULT AT ALL
GAD7 TOTAL SCORE: 0
2. NOT BEING ABLE TO STOP OR CONTROL WORRYING: NOT AT ALL

## 2020-03-09 ASSESSMENT — ENCOUNTER SYMPTOMS
SLEEP DISTURBANCE: 1
SHORTNESS OF BREATH: 1
GASTROINTESTINAL NEGATIVE: 1
FATIGUE: 1
DIZZINESS: 1

## 2020-03-09 ASSESSMENT — PAIN SCALES - GENERAL: PAINLEVEL: MILD PAIN (3)

## 2020-03-09 NOTE — NURSING NOTE
"Patient presents to the clinic for multiple concerns.    Previous A1C is at goal of <8  Lab Results   Component Value Date    A1C 6.1 09/27/2019     Urine microalbumin:creatine: 6.1  Foot exam unknown-declines today  Eye exam over a year ago  Tobacco User no  Patient is on a daily aspirin  Patient is on a Statin.  Blood pressure today of:     BP Readings from Last 1 Encounters:   02/11/20 135/77      is at the goal of <139/89 for diabetics.    Chief Complaint   Patient presents with     Clinic Care Coordination - Follow-up       Initial /80 (BP Location: Right arm, Patient Position: Sitting, Cuff Size: Adult Regular)   Pulse 64   Temp 96.3  F (35.7  C) (Tympanic)   Resp 16   Wt 67.2 kg (148 lb 2 oz)   BMI 24.65 kg/m   Estimated body mass index is 24.65 kg/m  as calculated from the following:    Height as of 2/11/20: 1.651 m (5' 5\").    Weight as of this encounter: 67.2 kg (148 lb 2 oz).  Medication Reconciliation: complete    Geovanna Cardona LPN          "

## 2020-03-09 NOTE — NURSING NOTE
Prior to the start of the procedure and with procedural staff participation, I verbally confirmed the patient s identity using two indicators, relevant allergies, that the procedure was appropriate and matched the consent or emergent situation, and that the correct equipment/implants were available. Immediately prior to starting the procedure I conducted the Time Out with the procedural staff and re-confirmed the patient s name, procedure, and site/side. (The Joint Commission universal protocol was followed.)  Yes    Sedation (Moderate or Deep): None    Geovanna Cardona LPN 3/9/2020 1:08 PM

## 2020-03-09 NOTE — TELEPHONE ENCOUNTER
Incoming fax from [facility] indicating a drug interaction between Diltiazem and Atorvastatin.  Increased risk of elevated Atorvastatin levels.      Geovanna Cardona LPN 3/9/2020 10:45 AM

## 2020-03-10 ENCOUNTER — OFFICE VISIT (OUTPATIENT)
Dept: FAMILY MEDICINE | Facility: OTHER | Age: 65
End: 2020-03-10
Attending: NURSE PRACTITIONER
Payer: MEDICARE

## 2020-03-10 VITALS
RESPIRATION RATE: 16 BRPM | DIASTOLIC BLOOD PRESSURE: 78 MMHG | WEIGHT: 150.7 LBS | BODY MASS INDEX: 25.11 KG/M2 | TEMPERATURE: 96.8 F | HEIGHT: 65 IN | SYSTOLIC BLOOD PRESSURE: 132 MMHG | OXYGEN SATURATION: 96 % | HEART RATE: 64 BPM

## 2020-03-10 DIAGNOSIS — R39.89 URINARY PROBLEM: Primary | ICD-10-CM

## 2020-03-10 DIAGNOSIS — N30.00 ACUTE CYSTITIS WITHOUT HEMATURIA: ICD-10-CM

## 2020-03-10 LAB
ALBUMIN UR-MCNC: 30 MG/DL
APPEARANCE UR: ABNORMAL
BACTERIA #/AREA URNS HPF: ABNORMAL /HPF
BILIRUB UR QL STRIP: NEGATIVE
COLOR UR AUTO: YELLOW
GLUCOSE UR STRIP-MCNC: NEGATIVE MG/DL
HGB UR QL STRIP: ABNORMAL
KETONES UR STRIP-MCNC: NEGATIVE MG/DL
LEUKOCYTE ESTERASE UR QL STRIP: ABNORMAL
NITRATE UR QL: NEGATIVE
PH UR STRIP: 5.5 PH (ref 5–7)
RBC #/AREA URNS AUTO: 105 /HPF (ref 0–2)
SOURCE: ABNORMAL
SP GR UR STRIP: 1.01 (ref 1–1.03)
UROBILINOGEN UR STRIP-MCNC: NORMAL MG/DL (ref 0–2)
WBC #/AREA URNS AUTO: >182 /HPF (ref 0–5)
WBC CLUMPS #/AREA URNS HPF: PRESENT /HPF

## 2020-03-10 PROCEDURE — 81001 URINALYSIS AUTO W/SCOPE: CPT | Mod: ZL | Performed by: NURSE PRACTITIONER

## 2020-03-10 PROCEDURE — 99214 OFFICE O/P EST MOD 30 MIN: CPT | Performed by: NURSE PRACTITIONER

## 2020-03-10 PROCEDURE — G0463 HOSPITAL OUTPT CLINIC VISIT: HCPCS

## 2020-03-10 RX ORDER — PHENAZOPYRIDINE HYDROCHLORIDE 200 MG/1
200 TABLET, FILM COATED ORAL 3 TIMES DAILY PRN
Qty: 6 TABLET | Refills: 0 | Status: SHIPPED | OUTPATIENT
Start: 2020-03-10 | End: 2020-04-07

## 2020-03-10 RX ORDER — NITROFURANTOIN 25; 75 MG/1; MG/1
100 CAPSULE ORAL 2 TIMES DAILY
Qty: 10 CAPSULE | Refills: 0 | Status: SHIPPED | OUTPATIENT
Start: 2020-03-10 | End: 2020-04-07

## 2020-03-10 ASSESSMENT — PAIN SCALES - GENERAL: PAINLEVEL: EXTREME PAIN (9)

## 2020-03-10 ASSESSMENT — ANXIETY QUESTIONNAIRES: GAD7 TOTAL SCORE: 0

## 2020-03-10 ASSESSMENT — MIFFLIN-ST. JEOR: SCORE: 1234.45

## 2020-03-10 NOTE — PROGRESS NOTES
Subjective     Lyudmila Fitzgerald is a 64 year old female who presents to clinic today for the following health issues:    HPI   URINARY TRACT SYMPTOMS  Onset: yesterday    Description:   Painful urination (Dysuria): YES           Frequency: YES  Blood in urine (Hematuria): no   Delay in urine (Hesitency): YES     Intensity: severe    Progression of Symptoms:  worsening and constant    Accompanying Signs & Symptoms:  Fever/chills: YES- chills now  Flank pain YES- bilateral  Nausea and vomiting: YES- nauseated  Any vaginal symptoms: vaginal odor  Abdominal/Pelvic Pain: no     History:   History of frequent UTI's: no   History of kidney stones: no   Sexually Active: no   Possibility of pregnancy: No    Precipitating factors:   nothing    Therapies Tried and outcome: motrin    Back Pain       Duration: 1 day        Specific cause: none    Description:   Location of pain: middle of back bilateral  Character of pain: sharp, constant and waxing and waning  Pain radiation:across her back  New numbness or weakness in legs, not attributed to pain:  no     Intensity: severe    History:   Pain interferes with job: Not applicable  History of back problems: no prior back problems  Any previous MRI or X-rays: None  Sees a specialist for back pain:  No  Therapies tried without relief: NSAIDs    Alleviating factors:   Improved by: none      Precipitating factors:  Worsened by: Nothing      Accompanying Signs & Symptoms:  Risk of Fracture:  None  Risk of Cauda Equina:  None  Risk of Infection:  None  Risk of Cancer:  None  Risk of Ankylosing Spondylitis:  Onset at age <35, male, AND morning back stiffness. no     Patient Active Problem List   Diagnosis     ACP (advance care planning)     Anticoagulation monitoring, INR range 2-3     Cardiac pacemaker in situ     Cerebral thrombosis     Chest pain     Benign neoplasm of colon     Constipation, chronic     Hemiplegia, late effect of cerebrovascular disease (H)     Coronary artery spasm  (H)     Diabetes mellitus, type II      Dyshidrotic eczema     Endometrial hyperplasia     Factor V deficiency (H)     Focal neurological deficit     Left foot drop     Left-sided weakness     Disorder of bone and cartilage     Paroxysmal supraventricular tachycardia (H)     Shortness of breath     Essential hypertension     Urinary incontinence, mixed     Myocardial bridge-LAD     History of coronary artery bypass graft x 1 with LIMA to LAD on 11/15/2016     History of heart failure with a reported EF of 15% now recovered     History of coronary artery stent placement to the LAD x3     History of stroke x7 with left hemiparesis-resolved     History of cardiac radiofrequency ablation for SVT x8     History of DVT to both upper and lower extremity     Mixed hyperlipidemia     History of coronary vasospasm     Pacemaker     Pacemaker-dependent due to native cardiac rhythm insufficient to support life     S/P AV tessie ablation secondary to SVT     Anticoagulated on Coumadin     Personal history of supraventricular tachycardia status post ablation     Lown Ganong Brandt syndrome     Past Surgical History:   Procedure Laterality Date     ARTHROSCOPY KNEE      01/90,Arthroscopy for chondromalacia patella     AS CABG, ARTERY-VEIN, SINGLE  11/15/2016    Single vessel; done in [place]     ATTEMPTED ARTHROSCOPY      02/04,Right arthroscopy     BIOPSY BREAST      03/08,Breast cyst aspiration     COLONOSCOPY      8/2007,follow up recommended in 10 years.     ENDOSCOPIC SINUS SURGERY      03/04,Sinus node ablation.     EP STUDY  12/1994    EP STUDY /ABLATION,AV re-entry tachycardia, tessie ablation     HEART CATH, ANGIOPLASTY      01/06,Stenting placed LAD after ergonovine provocation confirmed     HEART CATH, ANGIOPLASTY      03/06,90% lesion, normal left ventricular function     IMPLANT PACEMAKER      03/03,Guidant pacemaker placement, AV tessie ablation     LAPAROSCOPIC ABLATION ENDOMETRIOSIS      06/06     OTHER SURGICAL  "HISTORY      3/24/06,931988,([place]) [facility] STENT ANGIO     OTHER SURGICAL HISTORY      08/02,877466,EP STUDY /ABLATION     OTHER SURGICAL HISTORY      11/04,94456.0,CT CORONARY ANGIOGRAM ([place]),Coronary angiogram, failed ablation     OTHER SURGICAL HISTORY      12/04,227464,[facility] RELOCATION OF SKIN POCKET PACEMAKER,[facility] 5/24/05 reconfiguration of pacemaker \"pocket\"     OTHER SURGICAL HISTORY      207882,IP CONSULT TO ELECTROPHYSIOLOGY,study and lead change     OTHER SURGICAL HISTORY      12/06,133223,NEUROSTIMULATOR,Nerve stimulator implanted for chest pain control     OTHER SURGICAL HISTORY      12/2008,71939.0,CT CORONARY ANGIOGRAM ([place]),with increased left-sided weakness and vertigo, negative CT angiogram.     REPLACE PACEMAKER GENERATOR      12/29/04,Replacement of left ventricular pacemaker lead.     STENT  02/2017    LAD        Social History     Tobacco Use     Smoking status: Never Smoker     Smokeless tobacco: Never Used     Tobacco comment: Quit smoking: spouse does not smoke in the house. Exposed in cars.   Substance Use Topics     Alcohol use: Yes     Comment: Alcoholic Drinks/day: Alcoholic Drinks/day: 1-2 drinks twice a week     Family History   Problem Relation Age of Onset     Dementia Mother      Skin Cancer Father         squamous     Dementia Father      Cerebrovascular Disease Father      No Known Problems Sister      Deep Vein Thrombosis Daughter         FVL +     Family History Negative Son         Good Health,lives in [facility]     Breast Cancer Paternal Grandmother         Cancer-breast     Breast Cancer Paternal Aunt         Cancer-breast     Hyperlipidemia Brother          Current Outpatient Medications   Medication Sig Dispense Refill     amLODIPine (NORVASC) 2.5 MG tablet Take 1 tablet (2.5 mg) by mouth daily 90 tablet 3     aspirin EC 81 MG EC tablet Take 81 mg by mouth       atorvastatin (LIPITOR) 80 MG tablet Take 1 tablet (80 mg) by mouth daily 90 tablet 4     calcium carb 1250 mg, 500 mg " "[place],/vitamin D 200 unit (CALCIUM 500/D) 500-200 MG-UNIT per tablet Take 1 tablet by mouth       carvedilol (COREG) 3.125 MG tablet Take 3.125 mg by mouth daily       diltiazem ER (DILT-XR) 180 MG 24 hr capsule Take 1 capsule (180 mg) by mouth daily 90 capsule 3     isosorbide mononitrate (IMDUR) 60 MG 24 hr tablet Take 1 tablet (60 mg) by mouth daily 90 tablet 3     lisinopril (ZESTRIL) 5 MG tablet Take 1 tablet (5 mg) by mouth daily 90 tablet 3     nitroGLYcerin (NITROSTAT) 0.4 MG sublingual tablet For chest pain place 1 tablet under the tongue every 5 minutes for 3 doses. If symptoms persist 5 minutes after 1st dose call 911. 25 tablet 3     warfarin ANTICOAGULANT (COUMADIN) 6 MG tablet Take 9 mg x one day/week (Mondays) and 6 mg x six days/week.  Or as directed by the [facility] clinic. 90 tablet 1     zolpidem (AMBIEN) 5 MG tablet Take 1 tablet (5 mg) by mouth nightly as needed for sleep 30 tablet 5     Allergies   Allergen Reactions     Morphine Nausea and Vomiting     OK in small doses     Prednisone Nausea and Vomiting     Per IV     Sotalol Nausea and Vomiting       Reviewed and updated as needed this visit by Provider  Tobacco  Allergies  Meds  Problems  Med Hx  Surg Hx  Fam Hx  Soc Hx          Review of Systems   ROS COMP: As above      Objective    /78   Pulse 64   Temp 96.8  F (36  C) (Tympanic)   Resp 16   Ht 1.651 m (5' 5\")   Wt 68.4 kg (150 lb 11.2 oz)   SpO2 96%   BMI 25.08 kg/m    Body mass index is 25.08 kg/m .  Physical Exam   GENERAL: healthy, alert and no distress  RESP: lungs clear to auscultation - no rales, rhonchi or wheezes  CV: regular rate and rhythm, normal S1 S2, no S3 or S4, no murmur, click or rub, no peripheral edema and peripheral pulses strong  ABDOMEN: soft, nontender, no hepatosplenomegaly, no masses and bowel sounds normal  Comprehensive back pain exam:  No tenderness, Range of motion not limited by pain, Lower extremity strength functional and equal on both " sides, Lower extremity reflexes within normal limits bilaterally, Lower extremity sensation normal and equal on both sides and Straight leg raise negative bilaterally, no CVA tenderness  PSYCH: mentation appears normal, affect normal/bright    Diagnostic Test Results:  Labs reviewed in Epic  Results for orders placed or performed in visit on 03/10/20   UA reflex to Microscopic     Status: Abnormal   Result Value Ref Range    Color Urine Yellow     Appearance Urine Slightly Cloudy     Glucose Urine Negative NEG^Negative mg/dL    Bilirubin Urine Negative NEG^Negative    Ketones Urine Negative NEG^Negative mg/dL    Specific Gravity Urine 1.008 1.003 - 1.035    Blood Urine Moderate (A) NEG^Negative    pH Urine 5.5 5.0 - 7.0 pH    Protein Albumin Urine 30 (A) NEG^Negative mg/dL    Urobilinogen mg/dL Normal 0.0 - 2.0 mg/dL    Nitrite Urine Negative NEG^Negative    Leukocyte Esterase Urine Large (A) NEG^Negative    Source Midstream Urine     RBC Urine 105 (H) 0 - 2 /HPF    WBC Urine >182 (H) 0 - 5 /HPF    WBC Clumps Present (A) NEG^Negative /HPF    Bacteria Urine Moderate (A) NEG^Negative /HPF           Assessment & Plan       ICD-10-CM    1. Urinary problem  R39.89 UA reflex to Microscopic     nitroFURantoin macrocrystal-monohydrate (MACROBID) 100 MG capsule     phenazopyridine (PYRIDIUM) 200 MG tablet   2. Acute cystitis without hematuria  N30.00      Here with urinary symptoms and back pain, UA as above. No CVA tenderness, not concerned for pyelonephritis at this time. Will treat with macrobid and pyridium for symptoms, increase fluids. Follow up with PCP in 2 weeks due to hematuria. Check in with INR clinic for ideal time to test, check by the end of this week.      Meli Villasenor NP  [facility]

## 2020-03-10 NOTE — NURSING NOTE
"Chief Complaint   Patient presents with     Urinary Problem     Patient is here for for pain in her mid back, burning upon urination, frequency, and urgency that started yesterday.     Initial /78   Pulse 64   Temp 96.8  F (36  C) (Tympanic)   Resp 16   Ht 1.651 m (5' 5\")   Wt 68.4 kg (150 lb 11.2 oz)   SpO2 96%   BMI 25.08 kg/m   Estimated body mass index is 25.08 kg/m  as calculated from the following:    Height as of this encounter: 1.651 m (5' 5\").    Weight as of this encounter: 68.4 kg (150 lb 11.2 oz).  Medication Reconciliation: complete    Lynsey Russell LPN  "

## 2020-03-12 ENCOUNTER — ANTICOAGULATION THERAPY VISIT (OUTPATIENT)
Dept: ANTICOAGULATION | Facility: OTHER | Age: 65
End: 2020-03-12
Payer: MEDICARE

## 2020-03-12 ENCOUNTER — TRANSFERRED RECORDS (OUTPATIENT)
Dept: HEALTH INFORMATION MANAGEMENT | Facility: OTHER | Age: 65
End: 2020-03-12

## 2020-03-12 DIAGNOSIS — Z79.01 ANTICOAGULATION MONITORING, INR RANGE 2-3: ICD-10-CM

## 2020-03-12 LAB — INR PPP: 2.3 (ref 0.9–1.1)

## 2020-03-12 NOTE — PROGRESS NOTES
ANTICOAGULATION FOLLOW-UP CLINIC VISIT    Patient Name:  Lyudmila Fitzgerald  Date:  3/12/2020  Contact Type:  no call needed patient to continue same dose    SUBJECTIVE:  Patient Findings     Positives:   Change in medications (taking macrobid for UTI)        Clinical Outcomes     Negatives:   Major bleeding event, Thromboembolic event, Anticoagulation-related hospital admission, Anticoagulation-related ED visit, Anticoagulation-related fatality           OBJECTIVE    INR   Date Value Ref Range Status   2020 2.3 (A) 0.90 - 1.10 Final     Chromogenic Factor 10   Date Value Ref Range Status   2013 51 (L) %      Comment:                                             Therapeutic Range 20-40   2013 47 (L) %      Comment:                                             Therapeutic Range 20-40       ASSESSMENT / PLAN  INR assessment THER    Recheck INR In: 2 WEEKS    INR Location Home INR      Anticoagulation Summary  As of 3/12/2020    INR goal:   2.0-3.0   TTR:   91.3 % (4.8 mo)   INR used for dosin.3 (3/12/2020)   Warfarin maintenance plan:   9 mg (6 mg x 1.5) every Mon; 6 mg (6 mg x 1) all other days   Full warfarin instructions:   9 mg every Mon; 6 mg all other days   Weekly warfarin total:   45 mg   No change documented:   Nyla Walker RN   Plan last modified:   Arleth Sheth RN (2020)   Next INR check:   3/26/2020   Priority:   Maintenance   Target end date:   Indefinite    Indications    Acute thromboembolism of deep veins of lower extremity (H) (Resolved) [I82.409]  Anticoagulation monitoring  INR range 2-3 [Z79.01]             Anticoagulation Episode Summary     INR check location:       Preferred lab:       Send INR reminders to:    INR    Comments:   home monitor [employer]      Anticoagulation Care Providers     Provider Role Specialty Phone number    Erika Carrasco MD [facility] [phone]            See the Encounter Report to view  Anticoagulation Flowsheet and Dosing Calendar (Go to Encounters tab in chart review, and find the Anticoagulation Therapy Visit)        Nyla Walker RN

## 2020-03-13 LAB — COLOGUARD-ABSTRACT: NEGATIVE

## 2020-03-24 ENCOUNTER — ANCILLARY PROCEDURE (OUTPATIENT)
Dept: CARDIOLOGY | Facility: CLINIC | Age: 65
End: 2020-03-24
Attending: INTERNAL MEDICINE
Payer: MEDICARE

## 2020-03-24 DIAGNOSIS — Z98.890 S/P AV NODAL ABLATION: ICD-10-CM

## 2020-03-24 PROCEDURE — 93294 REM INTERROG EVL PM/LDLS PM: CPT | Mod: ZP | Performed by: INTERNAL MEDICINE

## 2020-03-24 PROCEDURE — 93296 REM INTERROG EVL PM/IDS: CPT | Mod: ZF

## 2020-03-26 ENCOUNTER — TRANSFERRED RECORDS (OUTPATIENT)
Dept: HEALTH INFORMATION MANAGEMENT | Facility: OTHER | Age: 65
End: 2020-03-26

## 2020-03-26 LAB — INR PPP: 1.7 (ref 0.9–1.1)

## 2020-03-27 ENCOUNTER — ANTICOAGULATION THERAPY VISIT (OUTPATIENT)
Dept: ANTICOAGULATION | Facility: OTHER | Age: 65
End: 2020-03-27
Payer: MEDICARE

## 2020-03-27 DIAGNOSIS — Z79.01 ANTICOAGULATION MONITORING, INR RANGE 2-3: ICD-10-CM

## 2020-03-27 NOTE — PROGRESS NOTES
ANTICOAGULATION FOLLOW-UP CLINIC VISIT    Patient Name:  Lyudmila Fitzgerald  Date:  3/27/2020  Contact Type:  Telephone/ spoke with patient reviewed dosing    SUBJECTIVE:  Patient Findings     Positives:   Change in medications (finished macrobid last week)             OBJECTIVE    INR   Date Value Ref Range Status   2020 1.7 (A) 0.90 - 1.10 Final     Chromogenic Factor 10   Date Value Ref Range Status   2013 51 (L) %      Comment:                                             Therapeutic Range 20-40   2013 47 (L) %      Comment:                                             Therapeutic Range 20-40       ASSESSMENT / PLAN  INR assessment SUB    Recheck INR In: 2 WEEKS    INR Location Home INR      Anticoagulation Summary  As of 3/27/2020    INR goal:   2.0-3.0   TTR:   87.6 % (5.3 mo)   INR used for dosin.7! (3/26/2020)   Warfarin maintenance plan:   9 mg (6 mg x 1.5) every Mon; 6 mg (6 mg x 1) all other days   Full warfarin instructions:   9 mg every Mon; 6 mg all other days   Weekly warfarin total:   45 mg   Plan last modified:   Arleth Sheth RN (2020)   Next INR check:   4/10/2020   Priority:   Maintenance   Target end date:   Indefinite    Indications    Acute thromboembolism of deep veins of lower extremity (H) (Resolved) [I82.409]  Anticoagulation monitoring  INR range 2-3 [Z79.01]             Anticoagulation Episode Summary     INR check location:       Preferred lab:       Send INR reminders to:    INR    Comments:   home monitor [employer]      Anticoagulation Care Providers     Provider Role Specialty Phone number    Erika Carrasco MD [facility] [phone]            See the Encounter Report to view Anticoagulation Flowsheet and Dosing Calendar (Go to Encounters tab in chart review, and find the Anticoagulation Therapy Visit)        Nyla Walkre, RN

## 2020-03-30 LAB
MDC_IDC_EPISODE_DTM: NORMAL
MDC_IDC_EPISODE_DURATION: 3 S
MDC_IDC_EPISODE_DURATION: 4 S
MDC_IDC_EPISODE_DURATION: 5 S
MDC_IDC_EPISODE_DURATION: 8 S
MDC_IDC_EPISODE_ID: NORMAL
MDC_IDC_EPISODE_TYPE: NORMAL
MDC_IDC_LEAD_IMPLANT_DT: NORMAL
MDC_IDC_LEAD_IMPLANT_DT: NORMAL
MDC_IDC_LEAD_LOCATION: NORMAL
MDC_IDC_LEAD_LOCATION: NORMAL
MDC_IDC_LEAD_LOCATION_DETAIL_1: NORMAL
MDC_IDC_LEAD_LOCATION_DETAIL_1: NORMAL
MDC_IDC_LEAD_MFG: NORMAL
MDC_IDC_LEAD_MFG: NORMAL
MDC_IDC_LEAD_MODEL: NORMAL
MDC_IDC_LEAD_MODEL: NORMAL
MDC_IDC_LEAD_POLARITY_TYPE: NORMAL
MDC_IDC_LEAD_POLARITY_TYPE: NORMAL
MDC_IDC_LEAD_SERIAL: NORMAL
MDC_IDC_LEAD_SERIAL: NORMAL
MDC_IDC_LEAD_SPECIAL_FUNCTION: NORMAL
MDC_IDC_LEAD_SPECIAL_FUNCTION: NORMAL
MDC_IDC_MSMT_BATTERY_DTM: NORMAL
MDC_IDC_MSMT_BATTERY_REMAINING_LONGEVITY: 3 MO
MDC_IDC_MSMT_BATTERY_REMAINING_PERCENTAGE: 2 %
MDC_IDC_MSMT_BATTERY_STATUS: NORMAL
MDC_IDC_MSMT_LEADCHNL_RA_IMPEDANCE_VALUE: 645 OHM
MDC_IDC_MSMT_LEADCHNL_RV_IMPEDANCE_VALUE: 468 OHM
MDC_IDC_MSMT_LEADCHNL_RV_PACING_THRESHOLD_AMPLITUDE: 0.9 V
MDC_IDC_MSMT_LEADCHNL_RV_PACING_THRESHOLD_PULSEWIDTH: 0.4 MS
MDC_IDC_PG_IMPLANT_DTM: NORMAL
MDC_IDC_PG_MFG: NORMAL
MDC_IDC_PG_MODEL: NORMAL
MDC_IDC_PG_SERIAL: NORMAL
MDC_IDC_PG_TYPE: NORMAL
MDC_IDC_SESS_CLINIC_NAME: NORMAL
MDC_IDC_SESS_DTM: NORMAL
MDC_IDC_SESS_TYPE: NORMAL
MDC_IDC_SET_BRADY_AT_MODE_SWITCH_MODE: NORMAL
MDC_IDC_SET_BRADY_AT_MODE_SWITCH_RATE: 140 {BEATS}/MIN
MDC_IDC_SET_BRADY_LOWRATE: 60 {BEATS}/MIN
MDC_IDC_SET_BRADY_MAX_SENSOR_RATE: 120 {BEATS}/MIN
MDC_IDC_SET_BRADY_MAX_TRACKING_RATE: 100 {BEATS}/MIN
MDC_IDC_SET_BRADY_MODE: NORMAL
MDC_IDC_SET_BRADY_PAV_DELAY_HIGH: 80 MS
MDC_IDC_SET_BRADY_PAV_DELAY_LOW: 130 MS
MDC_IDC_SET_BRADY_SAV_DELAY_HIGH: 80 MS
MDC_IDC_SET_BRADY_SAV_DELAY_LOW: 130 MS
MDC_IDC_SET_LEADCHNL_RA_PACING_AMPLITUDE: 2.6 V
MDC_IDC_SET_LEADCHNL_RA_PACING_POLARITY: NORMAL
MDC_IDC_SET_LEADCHNL_RA_PACING_PULSEWIDTH: 1 MS
MDC_IDC_SET_LEADCHNL_RA_SENSING_ADAPTATION_MODE: NORMAL
MDC_IDC_SET_LEADCHNL_RA_SENSING_POLARITY: NORMAL
MDC_IDC_SET_LEADCHNL_RA_SENSING_SENSITIVITY: 0.15 MV
MDC_IDC_SET_LEADCHNL_RV_PACING_AMPLITUDE: 1.4 V
MDC_IDC_SET_LEADCHNL_RV_PACING_CAPTURE_MODE: NORMAL
MDC_IDC_SET_LEADCHNL_RV_PACING_POLARITY: NORMAL
MDC_IDC_SET_LEADCHNL_RV_PACING_PULSEWIDTH: 0.4 MS
MDC_IDC_SET_LEADCHNL_RV_SENSING_ADAPTATION_MODE: NORMAL
MDC_IDC_SET_LEADCHNL_RV_SENSING_POLARITY: NORMAL
MDC_IDC_SET_LEADCHNL_RV_SENSING_SENSITIVITY: 2.5 MV
MDC_IDC_SET_ZONE_DETECTION_INTERVAL: 429 MS
MDC_IDC_SET_ZONE_TYPE: NORMAL
MDC_IDC_SET_ZONE_VENDOR_TYPE: NORMAL
MDC_IDC_STAT_AT_BURDEN_PERCENT: 1 %
MDC_IDC_STAT_AT_DTM_END: NORMAL
MDC_IDC_STAT_AT_DTM_START: NORMAL
MDC_IDC_STAT_BRADY_DTM_END: NORMAL
MDC_IDC_STAT_BRADY_DTM_START: NORMAL
MDC_IDC_STAT_BRADY_RA_PERCENT_PACED: 91 %
MDC_IDC_STAT_BRADY_RV_PERCENT_PACED: 100 %
MDC_IDC_STAT_EPISODE_RECENT_COUNT: 0
MDC_IDC_STAT_EPISODE_RECENT_COUNT: 0
MDC_IDC_STAT_EPISODE_RECENT_COUNT: 2
MDC_IDC_STAT_EPISODE_RECENT_COUNT: 41
MDC_IDC_STAT_EPISODE_RECENT_COUNT_DTM_END: NORMAL
MDC_IDC_STAT_EPISODE_RECENT_COUNT_DTM_START: NORMAL
MDC_IDC_STAT_EPISODE_TYPE: NORMAL
MDC_IDC_STAT_EPISODE_VENDOR_TYPE: NORMAL

## 2020-04-07 ENCOUNTER — VIRTUAL VISIT (OUTPATIENT)
Dept: CARDIOLOGY | Facility: OTHER | Age: 65
End: 2020-04-07
Attending: INTERNAL MEDICINE
Payer: MEDICARE

## 2020-04-07 VITALS
SYSTOLIC BLOOD PRESSURE: 133 MMHG | DIASTOLIC BLOOD PRESSURE: 97 MMHG | HEART RATE: 60 BPM | BODY MASS INDEX: 24.28 KG/M2 | HEIGHT: 65 IN | WEIGHT: 145.7 LBS

## 2020-04-07 DIAGNOSIS — Z98.890 S/P AV NODAL ABLATION: ICD-10-CM

## 2020-04-07 DIAGNOSIS — Z86.79 PERSONAL HISTORY OF SUPRAVENTRICULAR TACHYCARDIA: ICD-10-CM

## 2020-04-07 DIAGNOSIS — Z95.5 HISTORY OF CORONARY ARTERY STENT PLACEMENT: ICD-10-CM

## 2020-04-07 DIAGNOSIS — E78.2 MIXED HYPERLIPIDEMIA: ICD-10-CM

## 2020-04-07 DIAGNOSIS — I10 ESSENTIAL HYPERTENSION: ICD-10-CM

## 2020-04-07 DIAGNOSIS — I47.10 PAROXYSMAL SUPRAVENTRICULAR TACHYCARDIA (H): ICD-10-CM

## 2020-04-07 DIAGNOSIS — Z86.79 HISTORY OF HEART FAILURE: ICD-10-CM

## 2020-04-07 DIAGNOSIS — Z86.73 HISTORY OF STROKE: ICD-10-CM

## 2020-04-07 DIAGNOSIS — Z95.0 PACEMAKER: ICD-10-CM

## 2020-04-07 DIAGNOSIS — I45.6 LOWN GANONG LEVINE SYNDROME: ICD-10-CM

## 2020-04-07 DIAGNOSIS — Z86.718 HISTORY OF DVT (DEEP VEIN THROMBOSIS): ICD-10-CM

## 2020-04-07 DIAGNOSIS — Z95.0 PACEMAKER-DEPENDENT DUE TO NATIVE CARDIAC RHYTHM INSUFFICIENT TO SUPPORT LIFE: ICD-10-CM

## 2020-04-07 DIAGNOSIS — Z86.79 HISTORY OF CORONARY VASOSPASM: ICD-10-CM

## 2020-04-07 DIAGNOSIS — I49.8 PACEMAKER-DEPENDENT DUE TO NATIVE CARDIAC RHYTHM INSUFFICIENT TO SUPPORT LIFE: ICD-10-CM

## 2020-04-07 DIAGNOSIS — R06.02 SHORTNESS OF BREATH: ICD-10-CM

## 2020-04-07 DIAGNOSIS — Q24.5 MYOCARDIAL BRIDGE: Primary | ICD-10-CM

## 2020-04-07 DIAGNOSIS — I20.1 CORONARY ARTERY SPASM (H): ICD-10-CM

## 2020-04-07 DIAGNOSIS — Z95.0 CARDIAC PACEMAKER IN SITU: ICD-10-CM

## 2020-04-07 DIAGNOSIS — E11.9 TYPE 2 DIABETES MELLITUS WITHOUT COMPLICATION, WITHOUT LONG-TERM CURRENT USE OF INSULIN (H): ICD-10-CM

## 2020-04-07 DIAGNOSIS — Z95.1 HISTORY OF CORONARY ARTERY BYPASS GRAFT X 1: ICD-10-CM

## 2020-04-07 DIAGNOSIS — R07.89 OTHER CHEST PAIN: ICD-10-CM

## 2020-04-07 DIAGNOSIS — Z98.890 HISTORY OF CARDIAC RADIOFREQUENCY ABLATION: ICD-10-CM

## 2020-04-07 DIAGNOSIS — Z79.01 ANTICOAGULATED ON COUMADIN: ICD-10-CM

## 2020-04-07 DIAGNOSIS — Z79.01 ANTICOAGULATION MONITORING, INR RANGE 2-3: ICD-10-CM

## 2020-04-07 DIAGNOSIS — D68.2 FACTOR V DEFICIENCY (H): ICD-10-CM

## 2020-04-07 PROCEDURE — 99442 ZZC PHYSICIAN TELEPHONE EVALUATION 11-20 MIN: CPT | Performed by: INTERNAL MEDICINE

## 2020-04-07 RX ORDER — AMLODIPINE BESYLATE 5 MG/1
5 TABLET ORAL DAILY
Qty: 90 TABLET | Refills: 3 | Status: SHIPPED | OUTPATIENT
Start: 2020-04-07 | End: 2020-11-24 | Stop reason: ALTCHOICE

## 2020-04-07 RX ORDER — ISOSORBIDE MONONITRATE 30 MG/1
30 TABLET, EXTENDED RELEASE ORAL DAILY
Qty: 90 TABLET | Refills: 3 | Status: SHIPPED | OUTPATIENT
Start: 2020-04-07 | End: 2020-11-24

## 2020-04-07 RX ORDER — ATORVASTATIN CALCIUM 80 MG/1
80 TABLET, FILM COATED ORAL DAILY
Qty: 90 TABLET | Refills: 3 | Status: SHIPPED | OUTPATIENT
Start: 2020-04-07 | End: 2021-06-15

## 2020-04-07 RX ORDER — NITROGLYCERIN 0.4 MG/1
TABLET SUBLINGUAL
Qty: 25 TABLET | Refills: 3 | Status: SHIPPED | OUTPATIENT
Start: 2020-04-07 | End: 2021-06-22

## 2020-04-07 RX ORDER — LISINOPRIL 5 MG/1
5 TABLET ORAL DAILY
Qty: 90 TABLET | Refills: 3 | Status: SHIPPED | OUTPATIENT
Start: 2020-04-07 | End: 2021-04-08 | Stop reason: ALTCHOICE

## 2020-04-07 RX ORDER — DILTIAZEM HYDROCHLORIDE 240 MG/1
240 CAPSULE, EXTENDED RELEASE ORAL DAILY
Qty: 90 CAPSULE | Refills: 3 | Status: SHIPPED | OUTPATIENT
Start: 2020-04-07 | End: 2020-11-23 | Stop reason: DRUGHIGH

## 2020-04-07 ASSESSMENT — PAIN SCALES - GENERAL: PAINLEVEL: MILD PAIN (2)

## 2020-04-07 ASSESSMENT — MIFFLIN-ST. JEOR: SCORE: 1211.77

## 2020-04-07 NOTE — PROGRESS NOTES
"Lyudmila Fitzgerald is a 64 year old female who is being evaluated via a billable telephone visit.      The patient has been notified of following:     \"This telephone visit will be conducted via a call between you and your physician/provider. We have found that certain health care needs can be provided without the need for a physical exam.  This service lets us provide the care you need with a short phone conversation.  If a prescription is necessary we can send it directly to your pharmacy.  If lab work is needed we can place an order for that and you can then stop by our lab to have the test done at a later time.    If during the course of the call the physician/provider feels a telephone visit is not appropriate, you will not be charged for this service.\"     Patient has given verbal consent for Telephone visit?  Yes    Lyudmila Fitzgerald complains of    Chief Complaint   Patient presents with     Follow Up       I have reviewed and updated the patient's Past Medical History, Social History, Family History and Medication List.    ALLERGIES  Morphine; Prednisone; and Sotalol    Additional provider notes:     Impression:  1.  Coronary artery spasm involving LAD.  2.  Myocardial bridging involving LAD.  3.  H/O SVT.  4.  CABG x1 with LIMA to LAD on 11/15/2016 in [place].  5.  H/O colic heart failure with an EF of 15% now recovered at 60 to 65% on 5/16/2019 at [facility].  6.  H/O coronary spasm with stent placement to LAD x3.  7.  H/O radiofrequency ablation for SVT x8.  8.  H/O stroke x7 with left hemiparesis.  9.  Left foot drop.  10.  S/P AV tessie lesion secondary to SVT.  11.  Pacemaker dependent.  12.  Hyperlipidemia.  13.  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  14.  H/O DVT to bilateral lower extremities.  15.  Cardiac pacemaker.  16.  Hypertension.  17.  DM-2.  18.  Lown Ganong Brandt syndrome.    Plan:  1.  We will discontinue Coreg 3.125 mg twice a day.  She had been on this previously secondary " to severe heart failure.  Her EF is now 60 to 65% on 5/16/2019.  This may increase her spasms.  2.  Will increase Norvasc 2.5 mg to 5 mg secondary to elevated blood pressures and coronary spasm.  3.  Increase diltiazem 180 mg at 240 mg daily secondary to coronary spasm.  4.  Related to coronary spasm will decrease Imdur from 60 mg daily to 30 mg daily.  5.  Refill lisinopril 5 mg daily, sublingual nitro as needed for chest pain, Ambien 5 mg daily, and Lipitor 80 mg daily.  6.  Follow-up in 6 months or sooner with issues.        Phone call duration: 18 minutes    Moises Islas,

## 2020-04-09 ENCOUNTER — ANTICOAGULATION THERAPY VISIT (OUTPATIENT)
Dept: ANTICOAGULATION | Facility: OTHER | Age: 65
End: 2020-04-09
Attending: FAMILY MEDICINE
Payer: MEDICARE

## 2020-04-09 ENCOUNTER — TRANSFERRED RECORDS (OUTPATIENT)
Dept: HEALTH INFORMATION MANAGEMENT | Facility: OTHER | Age: 65
End: 2020-04-09

## 2020-04-09 DIAGNOSIS — Z79.01 ANTICOAGULATION MONITORING, INR RANGE 2-3: ICD-10-CM

## 2020-04-09 LAB — INR PPP: 3.2 (ref 0.9–1.1)

## 2020-04-09 NOTE — PROGRESS NOTES
ANTICOAGULATION FOLLOW-UP CLINIC VISIT    Patient Name:  Lyudmila Fitzgerald  Date:  4/9/2020  Contact Type:  Telephone/ spoke with patient regarding dosing    SUBJECTIVE:  Patient Findings         Clinical Outcomes     Negatives:   Major bleeding event, Thromboembolic event, Anticoagulation-related hospital admission, Anticoagulation-related ED visit, Anticoagulation-related fatality           OBJECTIVE    INR   Date Value Ref Range Status   04/09/2020 3.2 (A) 0.90 - 1.10 Final     Chromogenic Factor 10   Date Value Ref Range Status   08/12/2013 51 (L) %      Comment:                                             Therapeutic Range 20-40   08/12/2013 47 (L) %      Comment:                                             Therapeutic Range 20-40       ASSESSMENT / PLAN  INR assessment SUPRA    Recheck INR In: 2 WEEKS    INR Location Home INR      Anticoagulation Summary  As of 4/9/2020    INR goal:   2.0-3.0   TTR:   85.9 % (5.7 mo)   INR used for dosing:   3.2! (4/9/2020)   Warfarin maintenance plan:   6 mg (6 mg x 1) every day   Full warfarin instructions:   6 mg every day   Weekly warfarin total:   42 mg   Plan last modified:   Nyla Walker RN (4/9/2020)   Next INR check:   4/23/2020   Priority:   Maintenance   Target end date:   Indefinite    Indications    Acute thromboembolism of deep veins of lower extremity (H) (Resolved) [I82.409]  Anticoagulation monitoring  INR range 2-3 [Z79.01]             Anticoagulation Episode Summary     INR check location:       Preferred lab:       Send INR reminders to:    INR    Comments:   home monitor [employer]      Anticoagulation Care Providers     Provider Role Specialty Phone number    Erika Carrasco MD [facility] [phone]            See the Encounter Report to view Anticoagulation Flowsheet and Dosing Calendar (Go to Encounters tab in chart review, and find the Anticoagulation Therapy Visit)        Nyla Walker, RN

## 2020-04-17 ENCOUNTER — NURSE TRIAGE (OUTPATIENT)
Dept: FAMILY MEDICINE | Facility: OTHER | Age: 65
End: 2020-04-17

## 2020-04-17 ENCOUNTER — OFFICE VISIT (OUTPATIENT)
Dept: FAMILY MEDICINE | Facility: OTHER | Age: 65
End: 2020-04-17
Attending: PHYSICIAN ASSISTANT
Payer: MEDICARE

## 2020-04-17 VITALS
RESPIRATION RATE: 18 BRPM | HEART RATE: 80 BPM | OXYGEN SATURATION: 96 % | HEIGHT: 65 IN | SYSTOLIC BLOOD PRESSURE: 132 MMHG | DIASTOLIC BLOOD PRESSURE: 80 MMHG | BODY MASS INDEX: 24.86 KG/M2 | TEMPERATURE: 98 F | WEIGHT: 149.2 LBS

## 2020-04-17 DIAGNOSIS — J02.9 VIRAL PHARYNGITIS: Primary | ICD-10-CM

## 2020-04-17 DIAGNOSIS — J02.9 SORE THROAT: ICD-10-CM

## 2020-04-17 LAB
SPECIMEN SOURCE: NORMAL
STREP GROUP A PCR: NOT DETECTED

## 2020-04-17 PROCEDURE — 87651 STREP A DNA AMP PROBE: CPT | Mod: ZL | Performed by: PHYSICIAN ASSISTANT

## 2020-04-17 PROCEDURE — 99213 OFFICE O/P EST LOW 20 MIN: CPT | Performed by: PHYSICIAN ASSISTANT

## 2020-04-17 PROCEDURE — G0463 HOSPITAL OUTPT CLINIC VISIT: HCPCS

## 2020-04-17 ASSESSMENT — PAIN SCALES - GENERAL: PAINLEVEL: NO PAIN (0)

## 2020-04-17 ASSESSMENT — MIFFLIN-ST. JEOR: SCORE: 1227.65

## 2020-04-17 NOTE — NURSING NOTE
Patient presents today for sore throat x1 week. Patient complains she woke up today with fever, headache and upset stomach. Patients father recently tested positive for Covid 19, but it has been over 2 weeks since she has seen him.     Medication Reconciliation Complete    Lynn Alberto LPN  4/17/2020 2:31 PM

## 2020-04-17 NOTE — TELEPHONE ENCOUNTER
"S-(situation): patient calling and states that she has had a sore throat for the last 1.5 week. States woke up this morning and head is stuffy and has headache, upset stomach and temp 100.2 tympanic    B-(background): states has been watching grand sons who had strep about one month ago  Patient has had tonsilectomy    A-(assessment): sore throat rates 6-7/10, headache, stuffy head, upset stomach, temp. 100.2    Denies white spots on back of throat, rash , difficulty breathing.    R-(recommendations): will route to Erika Carrasco for consideration of telephone visit or if should come in.    Kellie Ureña RN on 4/17/2020 at 1:24 PM      Additional Information    Negative: Severe difficulty breathing (e.g., struggling for each breath, speaks in single words)    Negative: Sounds like a life-threatening emergency to the triager    Negative: Throat culture results, call about    Negative: Productive cough is the main symptom    Negative: Runny nose is the main symptom    Negative: Drooling or spitting out saliva (because can't swallow)    Negative: Unable to open mouth completely    Negative: Drinking very little and has signs of dehydration (e.g., no urine > 12 hours, very dry mouth, very lightheaded)    Negative: Patient sounds very sick or weak to the triager    Negative: Difficulty breathing (per caller) but not severe    Negative: Fever > 103 F (39.4 C)    Negative: Refuses to drink anything for > 12 hours    Negative: SEVERE sore throat pain    Negative: Pus on tonsils (back of throat) and swollen neck lymph nodes ('glands')    Negative: Earache also present    Negative: Widespread rash (especially chest and abdomen)    Diabetes mellitus or weak immune system (e.g., HIV positive, cancer chemo, splenectomy, organ transplant, chronic steroids)    Answer Assessment - Initial Assessment Questions  1. ONSET: \"When did the throat start hurting?\" (Hours or days ago)       1 1/2 week ago  2. SEVERITY: \"How " "bad is the sore throat?\" (Scale 1-10; mild, moderate or severe)    - MILD (1-3):  doesn't interfere with eating or normal activities    - MODERATE (4-7): interferes with eating some solids and normal activities    - SEVERE (8-10):  excruciating pain, interferes with most normal activities    - SEVERE DYSPHAGIA: can't swallow liquids, drooling      6-7/10  3. STREP EXPOSURE: \"Has there been any exposure to strep within the past week?\" If so, ask: \"What type of contact occurred?\"       Yes grandson  4.  VIRAL SYMPTOMS: \"Are there any symptoms of a cold, such as a runny nose, cough, hoarse voice or red eyes?\"       Little cough, little hoarse voice, a little red eyes  5. FEVER: \"Do you have a fever?\" If so, ask: \"What is your temperature, how was it measured, and when did it start?\"      100.2 ear  6. PUS ON THE TONSILS: \"Is there pus on the tonsils in the back of your throat?\"      denies  7. OTHER SYMPTOMS: \"Do you have any other symptoms?\" (e.g., difficulty breathing, headache, rash)      Headache,   8. PREGNANCY: \"Is there any chance you are pregnant?\" \"When was your last menstrual period?\"      n/a    Protocols used: SORE THROAT-A-OH      "

## 2020-04-17 NOTE — PATIENT INSTRUCTIONS
Tested for strep throat.     May use symptomatic care with tylenol or ibuprofen. Sudafed or mucinex work well for congestion. May use cough syrup or cough drops.  Using a humidifier works well to break up the congestion. You can also sleep propped up on a couple pillows to decrease symptoms at night.    Please take tylenol as needed up to 4 times daily.  Treat symptomatically with warm salt water gargles.  Lozenges, Tylenol, Advil or Aleve as needed. Frequent swallows of cool liquid.  Oatmeal coats the throat and some patients find it soothes the pain. Encouraged warm teas or fluids with honey.     If you have sinus congestion -  Use a Neti Pot/sinus flush (Franc Med Sinus Rinse) 3 times daily to irrigate sinuses/mucosal tissue.     Monitor for any fevers or chills. Return in 7-10 days if not feeling better. Please call clinic with any questions or concerns. Return to clinic with change/worsening of symptoms.   Encouraged fluids and rest.    Call 9-1-1 or go to the emergency room if you:  Have trouble breathing   Are drooling because you cannot swallow your saliva   Have swelling of the neck or tongue   Cannot move your neck or have trouble opening your mouth        Instructions for Patients ([facility] Specific)  Your symptoms could be due to COVID-19, but it also could be due to a number of other respiratory illnesses.  We are not doing testing at this time for COVID-19 unless symptoms are severe enough to require hospitalization.  It is recommended that you stay home to prevent the spread of your illness.  To do this follow these instructions:    Regardless of if you have been tested or not:  Patient who have symptoms (cough, fever, or shortness of breath), need to isolate for 7 days from when symptoms started AND 72 hours after fever resolves (without fever reducing medications) AND improvement of respiratory symptoms (whichever is longer).      Isolate yourself at home (in own room/own bathroom if possible)    Do Not  allow any visitors    Do Not go to work or school    Do Not go to [employer],  centers, shopping, or other public places.    Do Not shake hands.    Avoid close and intimate contact with others (hugging, kissing).    Follow CDC recommendations for household cleaning of frequently touched services.     After the initial 7 days, continue to isolate yourself from household members as much as possible. To continue decrease the risk of community spread and exposure, you and any members of your household should limit activities in public for 14 days after starting home isolation.     You can reference the following CDC link for helpful home isolation/care tips:  https://www.cdc.gov/coronavirus/2019-ncov/downloads/10Things.pdf    Protect Others:    Cover your mouth and nose with a mask, disposable tissue or wash cloth to avoid spreading germs to others.    Wash your hands and face frequently with soap and water.    Fever Medicines:    For fever relief, take acetaminophen or ibuprofen.    Treat fevers above 101  F (38.3  C) to lower fevers and make you more comfortable.     Acetaminophen (e.g., Tylenol): Take 650 mg (two 325 mg pills) by mouth every 4-6 hours as needed of regular strength Tylenol or 1,000 mg (two 500 mg pills) every 8 hours as needed of Extra Strength Tylenol.     Ibuprofen (e.g., Motrin, Advil): Take 400 mg (two 200 mg pills) by mouth every 6 hours as needed.     Acetaminophen is thought to be safer than ibuprofen or naproxen for people over 65 years old. Acetaminophen is in many OTC and prescription medicines. It might be in more than one medicine that you are taking. You need to be careful and not take an overdose. Before taking any medicine, read all the instructions on the package.    Caution - NSAIDs (e.g., ibuprofen, naproxen): Do not take nonsteroidal anti-inflammatory drugs (NSAIDs) if you have stomach problems, kidney disease, heart failure, or other contraindications to using this type of  medicine. Do not take NSAID medicines for over 7 days without consulting your PCP. Do not take NSAID medicines if you are pregnant. Do not take NSAID medicines if you are also taking blood thinners.     Call Back If: Breathing difficulty develops or you become worse.    Thank you for limiting contact with others, wearing a simple mask to cover your cough, practice good hand hygiene habits and accessing our virtual services where possible to limit the spread of this virus.    For more information about COVID19 and options for caring for yourself at home, please visit the CDC website at https://www.cdc.gov/coronavirus/2019-ncov/about/steps-when-sick.html  For more options for care at [facility], please visit our website at [email or web address]    For more information, please use the [place] Department of Health COVID-19 Website: [email or web address]  [place] Department of Health ([facility]) COVID-19 Hotlines (Interpreters available):      Health questions: Phone Number: [phone] or [phone] and Hours: 7 a.m. to 7 p.m.    Schools and  questions: Phone Number: [phone] or [phone] and Hours 7 a.m. to 7 p.m.

## 2020-04-17 NOTE — PROGRESS NOTES
Nursing Notes:   Lynn Alberto LPN  4/17/2020  2:43 PM  Signed  Patient presents today for sore throat x1 week. Patient complains she woke up today with fever, headache and upset stomach. Patients father recently tested positive for Covid 19, but it has been over 2 weeks since she has seen him.     Medication Reconciliation Complete    Lynn Alberto LPN  4/17/2020 2:31 PM    HPI:    Lyudmila Fitzgerald is a 64 year old female who presents for sore throat x1 week. Patient complains she woke up today with fever, headache and upset stomach. Patients father recently tested positive for Covid 19, but it has been over 2 weeks since she has seen him.  They last got together was on 3/29/2020.  She has had several contacts with family members that have had strep throat over the last few weeks.  Fever up to 102 today.  She has had a sore throat for approximately 1 week.  A little cough that started today.  No shortness of breath, wheezing or rattling.  Wondering if she has allergies.  Typically uses Chlortab as needed for allergy symptoms.  Has a headache.  Upset stomach and nausea.  No vomiting or diarrhea.  No belly pain.  Keeping food and fluids down.  Not lightheaded or dizzy.      Past Medical History:   Diagnosis Date     Acute thromboembolism of deep veins of lower extremity (H)     8/31/2006,Hx of multiple episodes of DVT: 3 lower extremity; 5 upper extremity (right arm)     Acute thromboembolism of deep veins of lower extremity (H) 1/16/2018    Overview:  Hx of multiple episodes of DVT: 3 lower extremity; 5 upper extremity (right arm)     Atrial fibrillation (H)     No Comments Provided     Cardiac pacemaker in situ 2003    Dual chamber     Cerebral thrombosis     8/31/2006,'95 w/ no residual     Coronary atherosclerosis     2/6/2006     Deep phlebothrombosis, antepartum, with delivery (H)     No Comments Provided     Endometrial hyperplasia     8/31/2006,s/p endometrial ablation 6/06     Essential  hypertension     No Comments Provided     History of other genital system and obstetric disorders      8, Para 3-0-5-2     Other and unspecified angina pectoris     2006     Other and unspecified hyperlipidemia     No Comments Provided     Other postprocedural states      and ,Followed by Dr. Usman Duran, [facility],.     Paroxysmal supraventricular tachycardia (H)     2006,s/p multiple ablations w last resulting in PPM     Personal history of disease     2009,Hospitalized CVA with worsening left hemiplegia, post hospitalization ARU stay.     Personal history of transient ischemic attack (TIA) and cerebral infarction without residual deficit     with left hemiplegia     Primary hypercoagulable state (H)     No Comments Provided       Past Surgical History:   Procedure Laterality Date     ARTHROSCOPY KNEE      ,Arthroscopy for chondromalacia patella     AS CABG, ARTERY-VEIN, SINGLE  11/15/2016    Single vessel; done in [place]     ATTEMPTED ARTHROSCOPY      ,Right arthroscopy     BIOPSY BREAST      ,Breast cyst aspiration     COLONOSCOPY      2007,follow up recommended in 10 years.     ENDOSCOPIC SINUS SURGERY      ,Sinus node ablation.     EP STUDY  1994    EP STUDY /ABLATION,AV re-entry tachycardia, tessie ablation     HEART CATH, ANGIOPLASTY      ,Stenting placed LAD after ergonovine provocation confirmed     HEART CATH, ANGIOPLASTY      ,90% lesion, normal left ventricular function     IMPLANT PACEMAKER      ,Guidant pacemaker placement, AV tessie ablation     LAPAROSCOPIC ABLATION ENDOMETRIOSIS           OTHER SURGICAL HISTORY      3/24/06,980031,([place]) [facility] STENT ANGIO     OTHER SURGICAL HISTORY      ,896394,EP STUDY /ABLATION     OTHER SURGICAL HISTORY      ,52932.0,CT CORONARY ANGIOGRAM ([place]),Coronary angiogram, failed ablation     OTHER SURGICAL HISTORY      ,038855,[facility] RELOCATION OF SKIN POCKET  "PACEMAKER,[facility] reconfiguration of pacemaker \"pocket\"     OTHER SURGICAL HISTORY      591547,IP CONSULT TO ELECTROPHYSIOLOGY,study and lead change     OTHER SURGICAL HISTORY      12/06,624319,NEUROSTIMULATOR,Nerve stimulator implanted for chest pain control     OTHER SURGICAL HISTORY      12/2008,25238.0,CT CORONARY ANGIOGRAM ([place]),with increased left-sided weakness and vertigo, negative CT angiogram.     REPLACE PACEMAKER GENERATOR      12/29/04,Replacement of left ventricular pacemaker lead.     STENT  02/2017    LAD        Family History   Problem Relation Age of Onset     Dementia Mother      Skin Cancer Father         squamous     Dementia Father      Cerebrovascular Disease Father      No Known Problems Sister      Deep Vein Thrombosis Daughter         FVL +     Family History Negative Son         Good Health,lives in Hospitals in [place]     Breast Cancer Paternal Grandmother         Cancer-breast     Breast Cancer Paternal Aunt         Cancer-breast     Hyperlipidemia Brother        Social History     Tobacco Use     Smoking status: Never Smoker     Smokeless tobacco: Never Used     Tobacco comment: Quit smoking: spouse does not smoke in the house. Exposed in cars.   Substance Use Topics     Alcohol use: Yes     Comment: Alcoholic Drinks/day: Alcoholic Drinks/day: 1-2 drinks twice a week       Current Outpatient Medications   Medication Sig Dispense Refill     amLODIPine (NORVASC) 5 MG tablet Take 1 tablet (5 mg) by mouth daily 90 tablet 3     aspirin EC 81 MG EC tablet Take 81 mg by mouth       atorvastatin (LIPITOR) 80 MG tablet Take 1 tablet (80 mg) by mouth daily 90 tablet 3     calcium carb 1250 mg, 500 mg [place],/vitamin D 200 unit (CALCIUM 500/D) 500-200 MG-UNIT per tablet Take 1 tablet by mouth       diltiazem ER (DILT-XR) 240 MG 24 hr ER beaded capsule Take 1 capsule (240 mg) by mouth daily 90 capsule 3     isosorbide mononitrate (IMDUR) 30 MG 24 hr tablet Take 1 tablet (30 mg) by mouth daily 90 tablet 3 " "    lisinopril (ZESTRIL) 5 MG tablet Take 1 tablet (5 mg) by mouth daily 90 tablet 3     nitroGLYcerin (NITROSTAT) 0.4 MG sublingual tablet For chest pain place 1 tablet under the tongue every 5 minutes for 3 doses. If symptoms persist 5 minutes after 1st dose call 911. 25 tablet 3     warfarin ANTICOAGULANT (COUMADIN) 6 MG tablet Take 6 mg daily.  Or as directed by the [facility] clinic. 90 tablet 1     zolpidem (AMBIEN) 5 MG tablet Take 1 tablet (5 mg) by mouth nightly as needed for sleep 30 tablet 5       Allergies   Allergen Reactions     Morphine Nausea and Vomiting     OK in small doses     Prednisone Nausea and Vomiting     Per IV     Sotalol Nausea and Vomiting       REVIEW OF SYSTEMS:  Refer to HPI.    EXAM:   Vitals:    /80   Pulse 80   Temp 98  F (36.7  C)   Resp 18   Ht 1.651 m (5' 5\")   Wt 67.7 kg (149 lb 3.2 oz)   SpO2 96%   BMI 24.83 kg/m      General Appearance: Pleasant, alert, appropriate appearance for age. No acute distress  Ear Exam: Normal TM's bilaterally, grey, translucent, bony landmarks appreciated.   Left/Right TM: Effusion is not present. TM is not bulging. There is no pus appreciated.    Normal auditory canals and external ears. Non-tender.  OroPharynx Exam: Minimally erythematous posterior pharynx with no exudates. No sinus pain upon palpation of frontal, ethmoid, and maxillary sinuses.  Chest/Respiratory Exam: Normal chest wall and respirations. Clear to auscultation.  No wheezing or crackling appreciated.  No retractions appreciated.  Cardiovascular Exam: Regular rate and rhythm. S1, S2, no murmur, click, gallop, or rubs.  Lymphatic Exam: ACLN.  Skin: no rash or abnormalities  Psychiatric Exam: Alert and oriented - appropriate affect.    PHQ Depression Screen  PHQ-9 SCORE 3/9/2020   PHQ-9 Total Score 0       LABS:    Results for orders placed or performed in visit on 04/17/20   Group A Streptococcus PCR Throat Swab     Status: None    Specimen: Throat   Result Value Ref " Range    Specimen Description Throat     Strep Group A PCR Not Detected NDET^Not Detected         ASSESSMENT AND PLAN:      ICD-10-CM    1. Viral pharyngitis  J02.9    2. Sore throat  J02.9 Group A Streptococcus PCR Throat Swab         Patient was tested for strep throat.  Patient is negative for strep throat.  No antibiotics are warranted at this time.  Gave patient COVID-19 information due to recent exposure.  Gave warning signs and symptoms.  Encourage fluids and rest.  Call or return if symptoms are changing or worsening.      May use symptomatic care with tylenol or ibuprofen. Sudafed or mucinex work well for congestion. May use cough syrup or cough drops.  Using a humidifier works well to break up the congestion. You can also sleep propped up on a couple pillows to decrease symptoms at night.    Please take tylenol as needed up to 4 times daily.  Treat symptomatically with warm salt water gargles.  Lozenges, Tylenol, Advil or Aleve as needed. Frequent swallows of cool liquid.  Oatmeal coats the throat and some patients find it soothes the pain. Encouraged warm teas or fluids with honey.     If you have sinus congestion -  Use a Neti Pot/sinus flush (Franc Med Sinus Rinse) 3 times daily to irrigate sinuses/mucosal tissue.     Monitor for any fevers or chills. Return in 7-10 days if not feeling better. Please call clinic with any questions or concerns. Return to clinic with change/worsening of symptoms.   Encouraged fluids and rest.    Call 9-1-1 or go to the emergency room if you:  Have trouble breathing   Are drooling because you cannot swallow your saliva   Have swelling of the neck or tongue   Cannot move your neck or have trouble opening your mouth        Instructions for Patients ([facility] Specific)  Your symptoms could be due to COVID-19, but it also could be due to a number of other respiratory illnesses.  We are not doing testing at this time for COVID-19 unless symptoms are severe enough to require  hospitalization.  It is recommended that you stay home to prevent the spread of your illness.  To do this follow these instructions:    Regardless of if you have been tested or not:  Patient who have symptoms (cough, fever, or shortness of breath), need to isolate for 7 days from when symptoms started AND 72 hours after fever resolves (without fever reducing medications) AND improvement of respiratory symptoms (whichever is longer).      Isolate yourself at home (in own room/own bathroom if possible)    Do Not allow any visitors    Do Not go to work or school    Do Not go to Religion,  centers, shopping, or other public places.    Do Not shake hands.    Avoid close and intimate contact with others (hugging, kissing).    Follow CDC recommendations for household cleaning of frequently touched services.     After the initial 7 days, continue to isolate yourself from household members as much as possible. To continue decrease the risk of community spread and exposure, you and any members of your household should limit activities in public for 14 days after starting home isolation.     You can reference the following CDC link for helpful home isolation/care tips:  https://www.cdc.gov/coronavirus/2019-ncov/downloads/10Things.pdf    Protect Others:    Cover your mouth and nose with a mask, disposable tissue or wash cloth to avoid spreading germs to others.    Wash your hands and face frequently with soap and water.    Fever Medicines:    For fever relief, take acetaminophen or ibuprofen.    Treat fevers above 101  F (38.3  C) to lower fevers and make you more comfortable.     Acetaminophen (e.g., Tylenol): Take 650 mg (two 325 mg pills) by mouth every 4-6 hours as needed of regular strength Tylenol or 1,000 mg (two 500 mg pills) every 8 hours as needed of Extra Strength Tylenol.     Ibuprofen (e.g., Motrin, Advil): Take 400 mg (two 200 mg pills) by mouth every 6 hours as needed.     Acetaminophen is thought to be  safer than ibuprofen or naproxen for people over 65 years old. Acetaminophen is in many OTC and prescription medicines. It might be in more than one medicine that you are taking. You need to be careful and not take an overdose. Before taking any medicine, read all the instructions on the package.    Caution - NSAIDs (e.g., ibuprofen, naproxen): Do not take nonsteroidal anti-inflammatory drugs (NSAIDs) if you have stomach problems, kidney disease, heart failure, or other contraindications to using this type of medicine. Do not take NSAID medicines for over 7 days without consulting your PCP. Do not take NSAID medicines if you are pregnant. Do not take NSAID medicines if you are also taking blood thinners.     Call Back If: Breathing difficulty develops or you become worse.    Thank you for limiting contact with others, wearing a simple mask to cover your cough, practice good hand hygiene habits and accessing our virtual services where possible to limit the spread of this virus.    For more information about COVID19 and options for caring for yourself at home, please visit the CDC website at https://www.cdc.gov/coronavirus/2019-ncov/about/steps-when-sick.html  For more options for care at [facility], please visit our website at [email or web address]    For more information, please use the [place] Department of Health COVID-19 Website: https://www.health.[facility].[place]/diseases/coronavirus/index.html  [place] Department of Health ([facility]) COVID-19 Hotlines (Interpreters available):      Health questions: Phone Number: [phone] or [phone] and Hours: 7 a.m. to 7 p.m.    Schools and  questions: Phone Number: [phone] or [phone] and Hours 7 a.m. to 7 p.m.         Patient Instructions   Tested for strep throat.     May use symptomatic care with tylenol or ibuprofen. Sudafed or mucinex work well for congestion. May use cough syrup or cough drops.  Using a  humidifier works well to break up the congestion. You can also sleep propped up on a couple pillows to decrease symptoms at night.    Please take tylenol as needed up to 4 times daily.  Treat symptomatically with warm salt water gargles.  Lozenges, Tylenol, Advil or Aleve as needed. Frequent swallows of cool liquid.  Oatmeal coats the throat and some patients find it soothes the pain. Encouraged warm teas or fluids with honey.     If you have sinus congestion -  Use a Neti Pot/sinus flush (Franc Med Sinus Rinse) 3 times daily to irrigate sinuses/mucosal tissue.     Monitor for any fevers or chills. Return in 7-10 days if not feeling better. Please call clinic with any questions or concerns. Return to clinic with change/worsening of symptoms.   Encouraged fluids and rest.    Call 9-1-1 or go to the emergency room if you:  Have trouble breathing   Are drooling because you cannot swallow your saliva   Have swelling of the neck or tongue   Cannot move your neck or have trouble opening your mouth        Instructions for Patients ([facility] Specific)  Your symptoms could be due to COVID-19, but it also could be due to a number of other respiratory illnesses.  We are not doing testing at this time for COVID-19 unless symptoms are severe enough to require hospitalization.  It is recommended that you stay home to prevent the spread of your illness.  To do this follow these instructions:    Regardless of if you have been tested or not:  Patient who have symptoms (cough, fever, or shortness of breath), need to isolate for 7 days from when symptoms started AND 72 hours after fever resolves (without fever reducing medications) AND improvement of respiratory symptoms (whichever is longer).      Isolate yourself at home (in own room/own bathroom if possible)    Do Not allow any visitors    Do Not go to work or school    Do Not go to [employer],  centers, shopping, or other public places.    Do Not shake hands.    Avoid close and  intimate contact with others (hugging, kissing).    Follow CDC recommendations for household cleaning of frequently touched services.     After the initial 7 days, continue to isolate yourself from household members as much as possible. To continue decrease the risk of community spread and exposure, you and any members of your household should limit activities in public for 14 days after starting home isolation.     You can reference the following CDC link for helpful home isolation/care tips:  https://www.cdc.gov/coronavirus/2019-ncov/downloads/10Things.pdf    Protect Others:    Cover your mouth and nose with a mask, disposable tissue or wash cloth to avoid spreading germs to others.    Wash your hands and face frequently with soap and water.    Fever Medicines:    For fever relief, take acetaminophen or ibuprofen.    Treat fevers above 101  F (38.3  C) to lower fevers and make you more comfortable.     Acetaminophen (e.g., Tylenol): Take 650 mg (two 325 mg pills) by mouth every 4-6 hours as needed of regular strength Tylenol or 1,000 mg (two 500 mg pills) every 8 hours as needed of Extra Strength Tylenol.     Ibuprofen (e.g., Motrin, Advil): Take 400 mg (two 200 mg pills) by mouth every 6 hours as needed.     Acetaminophen is thought to be safer than ibuprofen or naproxen for people over 65 years old. Acetaminophen is in many OTC and prescription medicines. It might be in more than one medicine that you are taking. You need to be careful and not take an overdose. Before taking any medicine, read all the instructions on the package.    Caution - NSAIDs (e.g., ibuprofen, naproxen): Do not take nonsteroidal anti-inflammatory drugs (NSAIDs) if you have stomach problems, kidney disease, heart failure, or other contraindications to using this type of medicine. Do not take NSAID medicines for over 7 days without consulting your PCP. Do not take NSAID medicines if you are pregnant. Do not take NSAID medicines if you are  also taking blood thinners.     Call Back If: Breathing difficulty develops or you become worse.    Thank you for limiting contact with others, wearing a simple mask to cover your cough, practice good hand hygiene habits and accessing our virtual services where possible to limit the spread of this virus.    For more information about COVID19 and options for caring for yourself at home, please visit the CDC website at https://www.cdc.gov/coronavirus/2019-ncov/about/steps-when-sick.html  For more options for care at [facility] [place], please visit our website at [email or web address]    For more information, please use the [place] Department of Health COVID-19 Website: https://www.health.[email or web address].[place]./diseases/coronavirus/index.html  [place] Department of [place] ([facility]) COVID-19 Hotlines (Interpreters available):      Health questions: Phone Number: [phone] or 1-565.675.5703 and Hours: 7 a.m. to 7 p.m.    Schools and  questions: Phone Number: [phone] or [phone] and Hours 7 a.m. to 7 p.m.         Selma Damon PA-C PA-C..................4/17/2020 2:34 PM

## 2020-04-20 ENCOUNTER — ANCILLARY PROCEDURE (OUTPATIENT)
Dept: CARDIOLOGY | Facility: CLINIC | Age: 65
End: 2020-04-20
Attending: INTERNAL MEDICINE
Payer: MEDICARE

## 2020-04-20 DIAGNOSIS — Z98.890 S/P AV NODAL ABLATION: ICD-10-CM

## 2020-04-20 PROCEDURE — 93296 REM INTERROG EVL PM/IDS: CPT | Mod: ZF

## 2020-04-20 PROCEDURE — 99207 CARDIAC DEVICE CHECK - REMOTE: CPT | Mod: ZP | Performed by: INTERNAL MEDICINE

## 2020-04-22 LAB
MDC_IDC_EPISODE_DTM: NORMAL
MDC_IDC_EPISODE_DURATION: 3 S
MDC_IDC_EPISODE_DURATION: 4 S
MDC_IDC_EPISODE_DURATION: 4 S
MDC_IDC_EPISODE_DURATION: 5 S
MDC_IDC_EPISODE_DURATION: 6 S
MDC_IDC_EPISODE_ID: NORMAL
MDC_IDC_EPISODE_TYPE: NORMAL
MDC_IDC_LEAD_IMPLANT_DT: NORMAL
MDC_IDC_LEAD_IMPLANT_DT: NORMAL
MDC_IDC_LEAD_LOCATION: NORMAL
MDC_IDC_LEAD_LOCATION: NORMAL
MDC_IDC_LEAD_LOCATION_DETAIL_1: NORMAL
MDC_IDC_LEAD_LOCATION_DETAIL_1: NORMAL
MDC_IDC_LEAD_MFG: NORMAL
MDC_IDC_LEAD_MFG: NORMAL
MDC_IDC_LEAD_MODEL: NORMAL
MDC_IDC_LEAD_MODEL: NORMAL
MDC_IDC_LEAD_POLARITY_TYPE: NORMAL
MDC_IDC_LEAD_POLARITY_TYPE: NORMAL
MDC_IDC_LEAD_SERIAL: NORMAL
MDC_IDC_LEAD_SERIAL: NORMAL
MDC_IDC_LEAD_SPECIAL_FUNCTION: NORMAL
MDC_IDC_LEAD_SPECIAL_FUNCTION: NORMAL
MDC_IDC_MSMT_BATTERY_DTM: NORMAL
MDC_IDC_MSMT_BATTERY_REMAINING_LONGEVITY: 3 MO
MDC_IDC_MSMT_BATTERY_REMAINING_PERCENTAGE: 2 %
MDC_IDC_MSMT_BATTERY_STATUS: NORMAL
MDC_IDC_MSMT_LEADCHNL_RA_IMPEDANCE_VALUE: 686 OHM
MDC_IDC_MSMT_LEADCHNL_RV_IMPEDANCE_VALUE: 497 OHM
MDC_IDC_MSMT_LEADCHNL_RV_PACING_THRESHOLD_AMPLITUDE: 0.8 V
MDC_IDC_MSMT_LEADCHNL_RV_PACING_THRESHOLD_PULSEWIDTH: 0.4 MS
MDC_IDC_PG_IMPLANT_DTM: NORMAL
MDC_IDC_PG_MFG: NORMAL
MDC_IDC_PG_MODEL: NORMAL
MDC_IDC_PG_SERIAL: NORMAL
MDC_IDC_PG_TYPE: NORMAL
MDC_IDC_SESS_CLINIC_NAME: NORMAL
MDC_IDC_SESS_DTM: NORMAL
MDC_IDC_SESS_TYPE: NORMAL
MDC_IDC_SET_BRADY_AT_MODE_SWITCH_MODE: NORMAL
MDC_IDC_SET_BRADY_AT_MODE_SWITCH_RATE: 140 {BEATS}/MIN
MDC_IDC_SET_BRADY_LOWRATE: 60 {BEATS}/MIN
MDC_IDC_SET_BRADY_MAX_SENSOR_RATE: 120 {BEATS}/MIN
MDC_IDC_SET_BRADY_MAX_TRACKING_RATE: 100 {BEATS}/MIN
MDC_IDC_SET_BRADY_MODE: NORMAL
MDC_IDC_SET_BRADY_PAV_DELAY_HIGH: 80 MS
MDC_IDC_SET_BRADY_PAV_DELAY_LOW: 130 MS
MDC_IDC_SET_BRADY_SAV_DELAY_HIGH: 80 MS
MDC_IDC_SET_BRADY_SAV_DELAY_LOW: 130 MS
MDC_IDC_SET_LEADCHNL_RA_PACING_AMPLITUDE: 2.6 V
MDC_IDC_SET_LEADCHNL_RA_PACING_POLARITY: NORMAL
MDC_IDC_SET_LEADCHNL_RA_PACING_PULSEWIDTH: 1 MS
MDC_IDC_SET_LEADCHNL_RA_SENSING_ADAPTATION_MODE: NORMAL
MDC_IDC_SET_LEADCHNL_RA_SENSING_POLARITY: NORMAL
MDC_IDC_SET_LEADCHNL_RA_SENSING_SENSITIVITY: 0.15 MV
MDC_IDC_SET_LEADCHNL_RV_PACING_AMPLITUDE: 1.5 V
MDC_IDC_SET_LEADCHNL_RV_PACING_CAPTURE_MODE: NORMAL
MDC_IDC_SET_LEADCHNL_RV_PACING_POLARITY: NORMAL
MDC_IDC_SET_LEADCHNL_RV_PACING_PULSEWIDTH: 0.4 MS
MDC_IDC_SET_LEADCHNL_RV_SENSING_ADAPTATION_MODE: NORMAL
MDC_IDC_SET_LEADCHNL_RV_SENSING_POLARITY: NORMAL
MDC_IDC_SET_LEADCHNL_RV_SENSING_SENSITIVITY: 2.5 MV
MDC_IDC_SET_ZONE_DETECTION_INTERVAL: 429 MS
MDC_IDC_SET_ZONE_TYPE: NORMAL
MDC_IDC_SET_ZONE_VENDOR_TYPE: NORMAL
MDC_IDC_STAT_AT_BURDEN_PERCENT: 1 %
MDC_IDC_STAT_AT_DTM_END: NORMAL
MDC_IDC_STAT_AT_DTM_START: NORMAL
MDC_IDC_STAT_BRADY_DTM_END: NORMAL
MDC_IDC_STAT_BRADY_DTM_START: NORMAL
MDC_IDC_STAT_BRADY_RA_PERCENT_PACED: 91 %
MDC_IDC_STAT_BRADY_RV_PERCENT_PACED: 100 %
MDC_IDC_STAT_EPISODE_RECENT_COUNT: 0
MDC_IDC_STAT_EPISODE_RECENT_COUNT: 0
MDC_IDC_STAT_EPISODE_RECENT_COUNT: 2
MDC_IDC_STAT_EPISODE_RECENT_COUNT: 66
MDC_IDC_STAT_EPISODE_RECENT_COUNT_DTM_END: NORMAL
MDC_IDC_STAT_EPISODE_RECENT_COUNT_DTM_START: NORMAL
MDC_IDC_STAT_EPISODE_TYPE: NORMAL
MDC_IDC_STAT_EPISODE_VENDOR_TYPE: NORMAL

## 2020-04-24 ENCOUNTER — ANTICOAGULATION THERAPY VISIT (OUTPATIENT)
Dept: ANTICOAGULATION | Facility: OTHER | Age: 65
End: 2020-04-24
Attending: FAMILY MEDICINE
Payer: MEDICARE

## 2020-04-24 ENCOUNTER — TRANSFERRED RECORDS (OUTPATIENT)
Dept: HEALTH INFORMATION MANAGEMENT | Facility: OTHER | Age: 65
End: 2020-04-24

## 2020-04-24 DIAGNOSIS — Z79.01 ANTICOAGULATION MONITORING, INR RANGE 2-3: ICD-10-CM

## 2020-04-24 LAB — INR PPP: 2.7 (ref 0.9–1.1)

## 2020-04-24 NOTE — PROGRESS NOTES
ANTICOAGULATION FOLLOW-UP CLINIC VISIT    Patient Name:  Lyudmila Fitzgerald  Date:  2020  Contact Type:  no call needed patient to continue same dose    SUBJECTIVE:  Patient Findings         Clinical Outcomes     Negatives:   Major bleeding event, Thromboembolic event, Anticoagulation-related hospital admission, Anticoagulation-related ED visit, Anticoagulation-related fatality           OBJECTIVE    INR   Date Value Ref Range Status   2020 2.7 (A) 0.90 - 1.10 Final     Chromogenic Factor 10   Date Value Ref Range Status   2013 51 (L) %      Comment:                                             Therapeutic Range 20-40   2013 47 (L) %      Comment:                                             Therapeutic Range 20-40       ASSESSMENT / PLAN  INR assessment THER    Recheck INR In: 2 WEEKS    INR Location Home INR      Anticoagulation Summary  As of 2020    INR goal:   2.0-3.0   TTR:   83.8 % (6.2 mo)   INR used for dosin.7 (2020)   Warfarin maintenance plan:   6 mg (6 mg x 1) every day   Full warfarin instructions:   6 mg every day   Weekly warfarin total:   42 mg   No change documented:   Nyla Walker RN   Plan last modified:   Nyla Walker RN (2020)   Next INR check:   2020   Priority:   Maintenance   Target end date:   Indefinite    Indications    Acute thromboembolism of deep veins of lower extremity (H) (Resolved) [I82.409]  Anticoagulation monitoring  INR range 2-3 [Z79.01]             Anticoagulation Episode Summary     INR check location:       Preferred lab:       Send INR reminders to:    INR    Comments:   home monitor [employer]      Anticoagulation Care Providers     Provider Role Specialty Phone number    Erika Carrasco MD [facility] [phone]            See the Encounter Report to view Anticoagulation Flowsheet and Dosing Calendar (Go to Encounters tab in chart review, and find the Anticoagulation Therapy  Visit)        Nyla Walker, RN

## 2020-04-29 ENCOUNTER — ANCILLARY PROCEDURE (OUTPATIENT)
Dept: CARDIOLOGY | Facility: CLINIC | Age: 65
End: 2020-04-29
Attending: INTERNAL MEDICINE
Payer: MEDICARE

## 2020-04-29 ENCOUNTER — TELEPHONE (OUTPATIENT)
Dept: CARDIOLOGY | Facility: CLINIC | Age: 65
End: 2020-04-29

## 2020-04-29 DIAGNOSIS — Z98.890 S/P AV NODAL ABLATION: ICD-10-CM

## 2020-04-29 PROCEDURE — 99207 CARDIAC DEVICE CHECK - REMOTE: CPT | Mod: ZP | Performed by: INTERNAL MEDICINE

## 2020-04-29 PROCEDURE — 93296 REM INTERROG EVL PM/IDS: CPT | Mod: ZF

## 2020-04-29 NOTE — TELEPHONE ENCOUNTER
[facility] Call Center    Phone Message    May a detailed message be left on voicemail: yes     Reason for Call: Other: PtLeigh called to state the Battery is in Warning now as of 4/29/2020.  Please get back to Leigh on how to proceed with the lead extraction/battery change.      Comments: Leigh@ [phone]  Priority- since warning on battery    Action Taken: Message routed to:  [facility] ([facility] Cardiology    Travel Screening: Not Applicable

## 2020-04-29 NOTE — TELEPHONE ENCOUNTER
"Spoke with Leigh by phone.  She wanted to let us know that her device was alerting for \"low battery.\"  Discussed with her that we also got a remote transmission from her pacemaker indicating that her device had reached RRT.  Discussed with her that I would forward the remote to Dr. Johnson.  Per his last clinic note, he was going to discuss possible lead extraction with his colleague and determine plan.  The procedure scheduling team will contact her once plan has been determined.  Patient states understanding and agrees to call with any further questions or concerns.  See device remote transmission for further documentation.  "

## 2020-04-30 LAB
MDC_IDC_EPISODE_DTM: NORMAL
MDC_IDC_EPISODE_DURATION: 4 S
MDC_IDC_EPISODE_ID: NORMAL
MDC_IDC_EPISODE_TYPE: NORMAL
MDC_IDC_LEAD_IMPLANT_DT: NORMAL
MDC_IDC_LEAD_IMPLANT_DT: NORMAL
MDC_IDC_LEAD_LOCATION: NORMAL
MDC_IDC_LEAD_LOCATION: NORMAL
MDC_IDC_LEAD_LOCATION_DETAIL_1: NORMAL
MDC_IDC_LEAD_LOCATION_DETAIL_1: NORMAL
MDC_IDC_LEAD_MFG: NORMAL
MDC_IDC_LEAD_MFG: NORMAL
MDC_IDC_LEAD_MODEL: NORMAL
MDC_IDC_LEAD_MODEL: NORMAL
MDC_IDC_LEAD_POLARITY_TYPE: NORMAL
MDC_IDC_LEAD_POLARITY_TYPE: NORMAL
MDC_IDC_LEAD_SERIAL: NORMAL
MDC_IDC_LEAD_SERIAL: NORMAL
MDC_IDC_LEAD_SPECIAL_FUNCTION: NORMAL
MDC_IDC_LEAD_SPECIAL_FUNCTION: NORMAL
MDC_IDC_MSMT_BATTERY_DTM: NORMAL
MDC_IDC_MSMT_BATTERY_STATUS: NORMAL
MDC_IDC_MSMT_LEADCHNL_RA_IMPEDANCE_VALUE: 622 OHM
MDC_IDC_MSMT_LEADCHNL_RV_IMPEDANCE_VALUE: 448 OHM
MDC_IDC_MSMT_LEADCHNL_RV_PACING_THRESHOLD_AMPLITUDE: 0.9 V
MDC_IDC_MSMT_LEADCHNL_RV_PACING_THRESHOLD_PULSEWIDTH: 0.4 MS
MDC_IDC_PG_IMPLANT_DTM: NORMAL
MDC_IDC_PG_MFG: NORMAL
MDC_IDC_PG_MODEL: NORMAL
MDC_IDC_PG_SERIAL: NORMAL
MDC_IDC_PG_TYPE: NORMAL
MDC_IDC_SESS_CLINIC_NAME: NORMAL
MDC_IDC_SESS_DTM: NORMAL
MDC_IDC_SESS_TYPE: NORMAL
MDC_IDC_SET_BRADY_AT_MODE_SWITCH_MODE: NORMAL
MDC_IDC_SET_BRADY_AT_MODE_SWITCH_RATE: 140 {BEATS}/MIN
MDC_IDC_SET_BRADY_LOWRATE: 60 {BEATS}/MIN
MDC_IDC_SET_BRADY_MAX_SENSOR_RATE: 120 {BEATS}/MIN
MDC_IDC_SET_BRADY_MAX_TRACKING_RATE: 100 {BEATS}/MIN
MDC_IDC_SET_BRADY_MODE: NORMAL
MDC_IDC_SET_BRADY_PAV_DELAY_HIGH: 80 MS
MDC_IDC_SET_BRADY_PAV_DELAY_LOW: 130 MS
MDC_IDC_SET_BRADY_SAV_DELAY_HIGH: 80 MS
MDC_IDC_SET_BRADY_SAV_DELAY_LOW: 130 MS
MDC_IDC_SET_LEADCHNL_RA_PACING_AMPLITUDE: 2.6 V
MDC_IDC_SET_LEADCHNL_RA_PACING_POLARITY: NORMAL
MDC_IDC_SET_LEADCHNL_RA_PACING_PULSEWIDTH: 1 MS
MDC_IDC_SET_LEADCHNL_RA_SENSING_ADAPTATION_MODE: NORMAL
MDC_IDC_SET_LEADCHNL_RA_SENSING_POLARITY: NORMAL
MDC_IDC_SET_LEADCHNL_RA_SENSING_SENSITIVITY: 0.15 MV
MDC_IDC_SET_LEADCHNL_RV_PACING_AMPLITUDE: 1.5 V
MDC_IDC_SET_LEADCHNL_RV_PACING_CAPTURE_MODE: NORMAL
MDC_IDC_SET_LEADCHNL_RV_PACING_POLARITY: NORMAL
MDC_IDC_SET_LEADCHNL_RV_PACING_PULSEWIDTH: 0.4 MS
MDC_IDC_SET_LEADCHNL_RV_SENSING_ADAPTATION_MODE: NORMAL
MDC_IDC_SET_LEADCHNL_RV_SENSING_POLARITY: NORMAL
MDC_IDC_SET_LEADCHNL_RV_SENSING_SENSITIVITY: 2.5 MV
MDC_IDC_SET_ZONE_DETECTION_INTERVAL: 429 MS
MDC_IDC_SET_ZONE_TYPE: NORMAL
MDC_IDC_SET_ZONE_VENDOR_TYPE: NORMAL
MDC_IDC_STAT_AT_BURDEN_PERCENT: 1 %
MDC_IDC_STAT_AT_DTM_END: NORMAL
MDC_IDC_STAT_AT_DTM_START: NORMAL
MDC_IDC_STAT_BRADY_DTM_END: NORMAL
MDC_IDC_STAT_BRADY_DTM_START: NORMAL
MDC_IDC_STAT_BRADY_RA_PERCENT_PACED: 90 %
MDC_IDC_STAT_BRADY_RV_PERCENT_PACED: 100 %
MDC_IDC_STAT_EPISODE_RECENT_COUNT: 0
MDC_IDC_STAT_EPISODE_RECENT_COUNT: 0
MDC_IDC_STAT_EPISODE_RECENT_COUNT: 2
MDC_IDC_STAT_EPISODE_RECENT_COUNT: 75
MDC_IDC_STAT_EPISODE_RECENT_COUNT_DTM_END: NORMAL
MDC_IDC_STAT_EPISODE_RECENT_COUNT_DTM_START: NORMAL
MDC_IDC_STAT_EPISODE_TYPE: NORMAL
MDC_IDC_STAT_EPISODE_VENDOR_TYPE: NORMAL

## 2020-05-01 DIAGNOSIS — Z95.0 PACEMAKER: ICD-10-CM

## 2020-05-01 DIAGNOSIS — Z98.890 S/P AV NODAL ABLATION: Primary | ICD-10-CM

## 2020-05-04 ENCOUNTER — TELEPHONE (OUTPATIENT)
Dept: CARDIOLOGY | Facility: OTHER | Age: 65
End: 2020-05-04

## 2020-05-04 NOTE — TELEPHONE ENCOUNTER
Patient called requesting a letter be written so she can get out of just duty.  Her father just  due to COVID-19 and she is going to be scheduled at the end of May early  for lead replacement.  She has a lot going on and would prefer not to attend jury duty.  Would Dr. Islas be willing to write a letter?  She needs to have it and [facility] within 10 days.  Mary Brar on 2020 at 11:32 AM

## 2020-05-04 NOTE — TELEPHONE ENCOUNTER
Moises Islas, DO  Stephanie Ledesma, RN    Caller: Unspecified (Today, 11:30 AM)               Yes, that would be fine.  I could write her a letter tomorrow when I am back in [place].     Dr. Islas      Patient was notified.  Patient is currently in [facility] she will back in town sometime this week.  Stephanie Ledesma, RN on 5/4/2020 at 1:36 PM

## 2020-05-05 NOTE — TELEPHONE ENCOUNTER
Moises Islas, Stephanie Barahona RN    Caller: Unspecified (Yesterday, 11:30 AM)               Letter has been completed.  Thanks!     Dr. Islas      Patient notified letter is ready to be picked up, she states she is in the cities she will have her   letter.  Stephanie Ledesma, RN on 5/5/2020 at 3:07 PM

## 2020-05-06 ENCOUNTER — VIRTUAL VISIT (OUTPATIENT)
Dept: CARDIOLOGY | Facility: CLINIC | Age: 65
End: 2020-05-06
Attending: INTERNAL MEDICINE
Payer: MEDICARE

## 2020-05-06 VITALS
BODY MASS INDEX: 24.32 KG/M2 | HEIGHT: 65 IN | WEIGHT: 146 LBS | SYSTOLIC BLOOD PRESSURE: 136 MMHG | TEMPERATURE: 98.3 F | DIASTOLIC BLOOD PRESSURE: 79 MMHG | HEART RATE: 69 BPM

## 2020-05-06 DIAGNOSIS — Z95.0 PACEMAKER: ICD-10-CM

## 2020-05-06 DIAGNOSIS — Z98.890 S/P AV NODAL ABLATION: ICD-10-CM

## 2020-05-06 PROCEDURE — 99214 OFFICE O/P EST MOD 30 MIN: CPT | Mod: 95 | Performed by: INTERNAL MEDICINE

## 2020-05-06 RX ORDER — SODIUM CHLORIDE 9 MG/ML
INJECTION, SOLUTION INTRAVENOUS CONTINUOUS
Status: CANCELLED | OUTPATIENT
Start: 2020-05-06

## 2020-05-06 RX ORDER — LIDOCAINE 40 MG/G
CREAM TOPICAL
Status: CANCELLED | OUTPATIENT
Start: 2020-05-06

## 2020-05-06 ASSESSMENT — MIFFLIN-ST. JEOR: SCORE: 1213.13

## 2020-05-06 ASSESSMENT — PAIN SCALES - GENERAL: PAINLEVEL: NO PAIN (0)

## 2020-05-06 NOTE — PROGRESS NOTES
"Lyudmila Fitzgerald is a 64 year old female who is being evaluated via a billable video visit.      The patient has been notified of following:     \"This video visit will be conducted via a call between you and your physician/provider. We have found that certain health care needs can be provided without the need for an in-person physical exam.  This service lets us provide the care you need with a video conversation.  If a prescription is necessary we can send it directly to your pharmacy.  If lab work is needed we can place an order for that and you can then stop by our lab to have the test done at a later time.    Video visits are billed at different rates depending on your insurance coverage.  Please reach out to your insurance provider with any questions.    If during the course of the call the physician/provider feels a video visit is not appropriate, you will not be charged for this service.\"    Patient has given verbal consent for Video visit? Yes    How would you like to obtain your AVS? Mail a copy    Patient would like the video invitation sent by: Text to cell phone: [phone]    Will anyone else be joining your video visit? Yes: Mother of patient. How would they like to receive their invitation? Text to cell phone: [phone]       Vitals were given by the patient and medications were reconciled.    Maite Ortez CMA    12:41 PM        "

## 2020-05-06 NOTE — PATIENT INSTRUCTIONS
Plan:    We will call you to arrange a venogram     We will discuss results of venogram with you and then plan further need for new lead vs. Extraction.     Cardiovascular Clinic:   [address] [place] [address]. [place] [address]  Your Care Team:  EP Cardiology   Telephone Number     Lynsey Moore [phone]   Xiao Davis RN [phone]     For scheduling appts or procedures:    Shruthi Webster   [phone]   For the Device Clinic (Pacemakers and ICD's)   RN's :   Char Lion  During business hours: [phone]     After business hours:   [phone]- select option 4 and ask for job code [id number].       As always, Thank you for trusting us with your health care needs!

## 2020-05-06 NOTE — PROGRESS NOTES
"Electrophysiology Clinic Video Virtual Visit    Lyudmila Fitzgerald is a 64 year old female who is being evaluated via a billable video visit.      The patient has been notified of following:     \"This video visit will be conducted via a call between you and your physician/provider. We have found that certain health care needs can be provided without the need for an in-person physical exam.  This service lets us provide the care you need with a video conversation.  If a prescription is necessary we can send it directly to your pharmacy.  If lab work is needed we can place an order for that and you can then stop by our lab to have the test done at a later time.    If during the course of the call the physician/provider feels a video visit is not appropriate, you will not be charged for this service.\"     Physician/provider has received verbal consent for a Video Visit from the patient? Yes      HPI:   Ms. 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    She has a rather complicated past medical history.  She has had care through [facility], [facility], and in [place], [place]. She had an evaluation at the [facility] that showed separate ostia of the LAD and dominant left circumflex from the left coronary cusp and with catheter induced spasm during the procedure. She was seen by Dr. Bill Browne in 2005 despite pre treatment with nitroglycerin and calcium channel blockers she has fairly vigorous mid LAD spasm with intracoronary methergine and she underwent stenting with a 2.5 x " 28 mm Cypher drug eluting stent. She then had instent restenosis and required a second 8 x 2.5 mm Cypher drug eluting stent in the LAD. She continued to have angina despite various combinations of calcium channel blockers, beta blockers and nitrates. She also underwent EZP for refractory angina under the guidance of Dr. Wan Kelly. In 2016 she had evaluation at [facility] in [place] and underwent transmyocardial revascularization with 25 tunnels in the inferolateral and anterior LV walls and she also underwent LIMA to LAD bypass graft. She was told she had an ejectipn fraction of 35% preop that improved to 50-55% post operatively. She then had recurrent angina in February of 2017 and underwent repeat angiography that showed mild instent restenosis of the mid LAD stents with 99% stenosis of the mid LAD at the LIMA to LAD anastomosis. She underwent with a 2.25 x 8 mm Promus Premier drug eluting stent in the mid LAD with a good result. She was last seen in January by Dr. Parson when she was found to have increasing chest discomfort. That is why she underwent a repeat coronary angiography so the patent LAD stents and no other significant stenosis and actually the LIMA to LAD graft was patent. She was treated medically.  Most recently, on 5/16/2019 her EF was 60% to 65%.  There was no significant valvular disease detected.  Her echo was essentially unchanged from 4/18/2017.  Diastolic dysfunction was normal.   In addition to her coronary artery spasm issues, she has also had a long history of SVT. She had numerous ablations for this and then ultimately underwent an AVN ablation with PPM implant in 2004. She developed some lead issues and had 2 new leads placed in 2005 with old leads capped. Recent device interrogation shows she reach NEHA on 4/28/29. She has had some elevated thresholds on active RA lead and was referred for possible extraction.     She reports feeling well. She denies any chest  pain/pressures, dizziness, lightheadedness, worsening shortness of breath, leg/ankle swelling, PND, orthopnea, palpitations, or syncopal symptoms. Current cardiac medications include: Norvasc, Lipitor, Diltiazem, Imdur, Lisinopril, ASA, and Warfarin.     Device lead info:  2005 Chronic Right Atrium Right Appendage [employer] CapSureFix Novus 4076-52 [id number]   2004 Chronic Right Ventricle Septum [employer] CapSureFix Novus 4076-58 [id number]   2005 Capped Right Atrium Right Appendage Guidant ([employer]) FINELINE II Sterox [id number]   2004 Capped Right Ventricle Septum Guidant ([employer]) FINELINE II Sterox E [id number]         PAST MEDICAL HISTORY:  Past Medical History:   Diagnosis Date     Acute thromboembolism of deep veins of lower extremity (H)     2006,Hx of multiple episodes of DVT: 3 lower extremity; 5 upper extremity (right arm)     Acute thromboembolism of deep veins of lower extremity (H) 2018    Overview:  Hx of multiple episodes of DVT: 3 lower extremity; 5 upper extremity (right arm)     Atrial fibrillation (H)     No Comments Provided     Cardiac pacemaker in situ     Dual chamber     Cerebral thrombosis     2006,'95 w/ no residual     Coronary atherosclerosis     2006     Deep phlebothrombosis, antepartum, with delivery (H)     No Comments Provided     Endometrial hyperplasia     2006,s/p endometrial ablation      Essential hypertension     No Comments Provided     History of other genital system and obstetric disorders      8, Para 3-0-5-2     Other and unspecified angina pectoris     2006     Other and unspecified hyperlipidemia     No Comments Provided     Other postprocedural states      and ,Followed by Dr. Usman Duran, [facility],.     Paroxysmal supraventricular tachycardia (H)     2006,s/p multiple ablations w last resulting in PPM     Personal history of  disease     04/2009,Hospitalized CVA with worsening left hemiplegia, post hospitalization ARU stay.     Personal history of transient ischemic attack (TIA) and cerebral infarction without residual deficit     with left hemiplegia     Primary hypercoagulable state (H)     No Comments Provided       CURRENT MEDICATIONS:  Current Outpatient Medications   Medication Sig Dispense Refill     amLODIPine (NORVASC) 5 MG tablet Take 1 tablet (5 mg) by mouth daily 90 tablet 3     aspirin EC 81 MG EC tablet Take 81 mg by mouth       atorvastatin (LIPITOR) 80 MG tablet Take 1 tablet (80 mg) by mouth daily 90 tablet 3     calcium carb 1250 mg, 500 mg [place],/vitamin D 200 unit (CALCIUM 500/D) 500-200 MG-UNIT per tablet Take 1 tablet by mouth       diltiazem ER (DILT-XR) 240 MG 24 hr ER beaded capsule Take 1 capsule (240 mg) by mouth daily 90 capsule 3     isosorbide mononitrate (IMDUR) 30 MG 24 hr tablet Take 1 tablet (30 mg) by mouth daily 90 tablet 3     lisinopril (ZESTRIL) 5 MG tablet Take 1 tablet (5 mg) by mouth daily 90 tablet 3     nitroGLYcerin (NITROSTAT) 0.4 MG sublingual tablet For chest pain place 1 tablet under the tongue every 5 minutes for 3 doses. If symptoms persist 5 minutes after 1st dose call 911. 25 tablet 3     warfarin ANTICOAGULANT (COUMADIN) 6 MG tablet Take 6 mg daily.  Or as directed by the protime clinic. 90 tablet 1     zolpidem (AMBIEN) 5 MG tablet Take 1 tablet (5 mg) by mouth nightly as needed for sleep 30 tablet 5       PAST SURGICAL HISTORY:  Past Surgical History:   Procedure Laterality Date     ARTHROSCOPY KNEE      01/90,Arthroscopy for chondromalacia patella     AS CABG, ARTERY-VEIN, SINGLE  11/15/2016    Single vessel; done in [place]     ATTEMPTED ARTHROSCOPY      02/04,Right arthroscopy     BIOPSY BREAST      03/08,Breast cyst aspiration     COLONOSCOPY      8/2007,follow up recommended in 10 years.     ENDOSCOPIC SINUS SURGERY      03/04,Sinus node ablation.     EP STUDY  12/1994    EP  "STUDY /ABLATION,AV re-entry tachycardia, tessie ablation     HEART CATH, ANGIOPLASTY      01/06,Stenting placed LAD after ergonovine provocation confirmed     HEART CATH, ANGIOPLASTY      03/06,90% lesion, normal left ventricular function     IMPLANT PACEMAKER      03/03,Guidant pacemaker placement, AV tessie ablation     LAPAROSCOPIC ABLATION ENDOMETRIOSIS      06/06     OTHER SURGICAL HISTORY      3/24/06,569102,([place]) HC STENT ANGIO     OTHER SURGICAL HISTORY      08/02,158643,EP STUDY /ABLATION     OTHER SURGICAL HISTORY      11/04,75795.0,CT CORONARY ANGIOGRAM ([place]),Coronary angiogram, failed ablation     OTHER SURGICAL HISTORY      12/04,153777,[facility] RELOCATION OF SKIN POCKET PACEMAKER,[facility] 5/24/05 reconfiguration of pacemaker \"pocket\"     OTHER SURGICAL HISTORY      207882,IP CONSULT TO ELECTROPHYSIOLOGY,study and lead change     OTHER SURGICAL HISTORY      12/06,142814,NEUROSTIMULATOR,Nerve stimulator implanted for chest pain control     OTHER SURGICAL HISTORY      12/2008,04973.0,CT CORONARY ANGIOGRAM ([place]),with increased left-sided weakness and vertigo, negative CT angiogram.     REPLACE PACEMAKER GENERATOR      12/29/04,Replacement of left ventricular pacemaker lead.     STENT  02/2017    LAD        ALLERGIES:     Allergies   Allergen Reactions     Morphine Nausea and Vomiting     OK in small doses     Prednisone Nausea and Vomiting     Per IV     Sotalol Nausea and Vomiting       FAMILY HISTORY:  Family History   Problem Relation Age of Onset     Dementia Mother      Skin Cancer Father         squamous     Dementia Father      Cerebrovascular Disease Father      No Known Problems Sister      Deep Vein Thrombosis Daughter         FVL +     Family History Negative Son         Good Health,lives in [place] [facility]     Breast Cancer Paternal Grandmother         Cancer-breast     Breast Cancer Paternal Aunt         Cancer-breast     Hyperlipidemia Brother        SOCIAL HISTORY:  Social History     Tobacco Use     " Smoking status: Never Smoker     Smokeless tobacco: Never Used     Tobacco comment: Quit smoking: spouse does not smoke in the house. Exposed in cars.   Substance Use Topics     Alcohol use: Yes     Comment: Alcoholic Drinks/day: Alcoholic Drinks/day: 1-2 drinks twice a week     Drug use: Never       ROS:  10 point ROS neg other than the symptoms noted above in the HPI.    Exam:  The rest of a comprehensive physical examination is deferred due to public health emergency video visit restrictions.  CONSITUTIONAL: no acute distress  HEENT: no icterus, no redness or discharge, neck supple  CV: no visible edema of visualized extremities. No JVD.   RESPIRATORY: respirations nonlabored, no cough  NEURO: AA&Ox3, speech fluent/appropriate, motor grossly nonfocal  PSYCH: cooperative, affect appropriate  DERM: no rashes on visualized face/neck/upper extremities\    Labs:  Reviewed.     Testing/Procedures:  1/17/2019 CORONARY ANGIOGRAM ([facility] REPORT):  Summary/Conclusions  PRESENTATION / INDICATIONS  * Chest Pain  * Hx of coronary vasospasm  * Hx of multiple prior PCI's and LIMA to LAD  VASCULAR ACCESS  * Using ultrasound guidance and a percutaneous technique, the right common femoral artery was accessed. Ultrasound was used to confirm vessel patency, localizing needle into the lumen of the vessel. An image was saved for the medical record.  SPECIAL PROCEDURES  * Right femoral arteriotomy  was successfully closed utilizing a closure device (Angioseal)  DIAGNOSTIC SUMMARY   The LMCA is not present per se, there are separate ostia for the LAD and LCx.   The LAD has patent stent(s) from a previous procedure and is free of significant disease.   The Circumflex is dominant and is free of significant disease.  There is a collateral from the PDA to a septal branch which appears to have been occluded at the site of  the distal LAD stent.   The RCA is non-dominant and is free of significant disease.   The LIMA graft from the LIMA to the  Distal LAD is patent with competetive retrograde flow from the LAD.  There is a possible narrowimg at the distal anastamosis which was not further evaluated.  RECOMMENDATIONS & PLAN  * No significant obstructive CAD.  LAD stent sites are patent and LIMA remains patent (althought possibly narrowed at the anastamosis).  * Optimize rx for coronary vasospasm     5/16/19 ECHOCARDIOGRAM ([facility] REPORT):   Final Impressions:   1. Normal LV size, normal wall thickness, normal function with an estimated EF of 60 - 65%.   2. Right ventricular cavity size is normal, global systolic RV function is normal.   3. No significant valve disease detected.      Assessment and Plan:   Ms. 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 She has a rather complicated past medical history with coronary spasms and bridging with multiple interventions involving LAD (PCI and CABG) as detailed above. Most recently, on 5/16/2019 her EF was 60% to 65%.  There was no significant valvular disease detected. In addition to her coronary artery spasm issues, she has also had a long history of SVT. She had numerous ablations for this and then ultimately underwent an AVN ablation with PPM implant in 2004. She developed some lead issues and had 2 new leads placed in 2005 with old leads capped. Recent device interrogation shows she reach NEHA on 4/28/29. She has had some elevated thresholds on active RA lead and was referred for possible extraction.     SVT s/p AVN ablation and PPM with RA lead with stable but elevated threshold:    1. First PPM implanted in 2004 with lead issues (unclear specifics need further records) requiring placement of 2 new leads in 2005 (and old leads were capped).   2. She will require upcoming generator change. Her RA lead has had slightly elevated thresholds at 1@0.7. This appears to be stable over the years.   3. We discussed device and lead management in detail with her including options of generator change only, generator change with lead addition, or generator change with lead extraction and replacement. Given she already has 4 leads in her left subclavian there might be limited space to place further leads. We would want to start with a venogram, if patent and space for an additional lead, then placement of new lead at time of generator change would be option with less risk. If venogram shows occlusion or significant narrowing, then we would have to consider extraction. We discussed extraction in detail including indications, risks, and benefits. We also discussed that given lead trends have been stable for awhile and has not consumed too much battery life, we could also consider generator change alone. We will start with pursing venogram and then discuss results of this with her and make a plan for next steps.     Follow up to be determined based on device/lead management decision.     Video-Visit Details    Type of service:  Video Visit    Video Visit Duration: 25 minutes.     Originating Location (pt. Location): Home    Distant Location (provider location):  [facility]     Mode of Communication:  Video Conference via [place]    I have reviewed the note as documented above.  This accurately captures the substance of my virtual visit with the patient. The patient states understanding and is agreeable with the plan.     Hakeem Moore MD [facility]  Cardiology - Electrophysiology          CC  SIRENA BENITEZ

## 2020-05-07 ENCOUNTER — ANCILLARY PROCEDURE (OUTPATIENT)
Dept: CARDIOLOGY | Facility: CLINIC | Age: 65
End: 2020-05-07
Attending: NURSE PRACTITIONER
Payer: MEDICARE

## 2020-05-07 ENCOUNTER — HOSPITAL ENCOUNTER (OUTPATIENT)
Facility: CLINIC | Age: 65
Discharge: HOME | End: 2020-05-07
Attending: INTERNAL MEDICINE | Admitting: INTERNAL MEDICINE
Payer: MEDICARE

## 2020-05-07 ENCOUNTER — APPOINTMENT (OUTPATIENT)
Dept: MEDSURG UNIT | Facility: CLINIC | Age: 65
End: 2020-05-07
Attending: INTERNAL MEDICINE
Payer: MEDICARE

## 2020-05-07 VITALS
DIASTOLIC BLOOD PRESSURE: 77 MMHG | RESPIRATION RATE: 18 BRPM | TEMPERATURE: 98 F | SYSTOLIC BLOOD PRESSURE: 131 MMHG | OXYGEN SATURATION: 98 %

## 2020-05-07 DIAGNOSIS — Z95.0 PACEMAKER: ICD-10-CM

## 2020-05-07 PROCEDURE — 93280 PM DEVICE PROGR EVAL DUAL: CPT | Mod: 26 | Performed by: INTERNAL MEDICINE

## 2020-05-07 PROCEDURE — 36010 PLACE CATHETER IN VEIN: CPT | Performed by: INTERNAL MEDICINE

## 2020-05-07 PROCEDURE — 93280 PM DEVICE PROGR EVAL DUAL: CPT

## 2020-05-07 PROCEDURE — 40000166 ZZH STATISTIC PP CARE STAGE 1

## 2020-05-07 PROCEDURE — 25000128 H RX IP 250 OP 636: Performed by: INTERNAL MEDICINE

## 2020-05-07 PROCEDURE — 25800030 ZZH RX IP 258 OP 636: Performed by: NURSE PRACTITIONER

## 2020-05-07 PROCEDURE — 40000141 ZZH STATISTIC PERIPHERAL IV START W/O US GUIDANCE

## 2020-05-07 PROCEDURE — 75820 VEIN X-RAY ARM/LEG: CPT

## 2020-05-07 RX ORDER — LIDOCAINE 40 MG/G
CREAM TOPICAL
Status: DISCONTINUED | OUTPATIENT
Start: 2020-05-07 | End: 2020-05-07 | Stop reason: HOSPADM

## 2020-05-07 RX ORDER — SODIUM CHLORIDE 9 MG/ML
INJECTION, SOLUTION INTRAVENOUS CONTINUOUS
Status: DISCONTINUED | OUTPATIENT
Start: 2020-05-07 | End: 2020-05-07 | Stop reason: HOSPADM

## 2020-05-07 RX ORDER — IOPAMIDOL 755 MG/ML
INJECTION, SOLUTION INTRAVASCULAR
Status: DISCONTINUED | OUTPATIENT
Start: 2020-05-07 | End: 2020-05-07 | Stop reason: HOSPADM

## 2020-05-07 RX ADMIN — SODIUM CHLORIDE: 9 INJECTION, SOLUTION INTRAVENOUS at 13:38

## 2020-05-07 NOTE — PROGRESS NOTES
Pt ready for consent.  Waiting for vascular to come put IV in. Pt's son will pick her up post procedure.  1335-PIV placed by vascular access.

## 2020-05-07 NOTE — IP AVS SNAPSHOT
[facility], [facility],  Heart Cath Lab  [address] [address]  Phone:  [phone]                                    After Visit Summary   5/7/2020    Lyudmila Fitzgerald    MRN: [id number]           After Visit Summary Signature Page    I have received my discharge instructions, and my questions have been answered. I have discussed any challenges I see with this plan with the nurse or doctor.    ..........................................................................................................................................  Patient/Patient Representative Signature      ..........................................................................................................................................  Patient Representative Print Name and Relationship to Patient    ..................................................               ................................................  Date                                   Time    ..........................................................................................................................................  Reviewed by Signature/Title    ...................................................              ..............................................  Date                                               Time          22EPIC Rev 08/18

## 2020-05-07 NOTE — PROGRESS NOTES
150  Pt returned to 2A from [facility] per cart accompanied by RN.  S/P Venogram.  IV site on left lower arm has infiltration noted that is 4cm by 5 cm.  States that the site is tender to touch.  IV fluids DC'd & Lynsey Forman RN notified.  She will be here to check on patient.  CTRN  1535  OK to discharge.  Ice to IV site for 15 min.  1550  Site appears less  Swollen now.  Leigh states the IV site is less uncomfortable now,  Discharge instructions reviewed with pt.  Verbally demonstrates understanding of instructions.  1610  No IV sedation was given for this procedure.  Discharged to care of son per ambulation accompanied by 2A staff RN.  CTRN

## 2020-05-07 NOTE — IP AVS SNAPSHOT
MRN:[id number]                      After Visit Summary   5/7/2020    Lyudmila Fitzgerald    MRN: [id number]           Visit Information        Department      5/7/2020 11:59 AM [facility], [place],  Heart Cath Lab          Review of your medicines      UNREVIEWED medicines. Ask your doctor about these medicines       Dose / Directions   amLODIPine 5 MG tablet  Commonly known as:  NORVASC  Used for:  Coronary artery spasm (H), Myocardial bridge LAD, History of coronary artery bypass graft x 1 with LIMA to LAD on 11/15/2016, History of coronary artery stent placement to the LAD x3, History of coronary vasospasm, Essential hypertension      Dose:  5 mg  Take 1 tablet (5 mg) by mouth daily  Quantity:  90 tablet  Refills:  3     aspirin 81 MG EC tablet      Dose:  81 mg  Take 81 mg by mouth  Refills:  0     atorvastatin 80 MG tablet  Commonly known as:  LIPITOR  Used for:  Mixed hyperlipidemia      Dose:  80 mg  Take 1 tablet (80 mg) by mouth daily  Quantity:  90 tablet  Refills:  3     calcium 500/D 500-200 MG-UNIT tablet  Generic drug:  calcium carbonate-vitamin D      Dose:  1 tablet  Take 1 tablet by mouth  Refills:  0     diltiazem  MG 24 hr ER beaded capsule  Commonly known as:  DILT-XR  Used for:  Coronary artery spasm (H), Myocardial bridge LAD, History of coronary artery bypass graft x 1 with LIMA to LAD on 11/15/2016, History of coronary artery stent placement to the LAD x3, History of coronary vasospasm, Personal history of supraventricular tachycardia status post ablation, Paroxysmal supraventricular tachycardia (H), Essential hypertension      Dose:  240 mg  Take 1 capsule (240 mg) by mouth daily  Quantity:  90 capsule  Refills:  3     isosorbide mononitrate 30 MG 24 hr tablet  Commonly known as:  IMDUR  Used for:  Coronary artery spasm (H), Myocardial bridge LAD, History of coronary artery bypass graft x 1 with LIMA to LAD on 11/15/2016, History of coronary artery stent placement to the  LAD x3, History of coronary vasospasm, Essential hypertension      Dose:  30 mg  Take 1 tablet (30 mg) by mouth daily  Quantity:  90 tablet  Refills:  3     lisinopril 5 MG tablet  Commonly known as:  ZESTRIL  Used for:  Coronary artery spasm (H), Essential hypertension, History of heart failure with a reported EF of 15% now recovered      Dose:  5 mg  Take 1 tablet (5 mg) by mouth daily  Quantity:  90 tablet  Refills:  3     nitroGLYcerin 0.4 MG sublingual tablet  Commonly known as:  NITROSTAT  Used for:  Coronary artery spasm (H), Myocardial bridge LAD, History of coronary artery bypass graft x 1 with LIMA to LAD on 11/15/2016, History of coronary artery stent placement to the LAD x3, History of coronary vasospasm      For chest pain place 1 tablet under the tongue every 5 minutes for 3 doses. If symptoms persist 5 minutes after 1st dose call 911.  Quantity:  25 tablet  Refills:  3     warfarin ANTICOAGULANT 6 MG tablet  Commonly known as:  COUMADIN  Used for:  Cerebral thrombosis      Take as directed. If you are unsure how to take this medication, talk to your nurse or doctor.  Original instructions:  Take 6 mg daily.  Or as directed by the [facility] clinic.  Quantity:  90 tablet  Refills:  1     zolpidem 5 MG tablet  Commonly known as:  AMBIEN  Used for:  Chronic insomnia      Dose:  5 mg  Take 1 tablet (5 mg) by mouth nightly as needed for sleep  Quantity:  30 tablet  Refills:  5              Protect others around you: Learn how to safely use, store and throw away your medicines at www.disposemymeds.org.       Follow-ups after your visit       Your next 10 appointments already scheduled    May 18, 2020 12:00 AM CDT  CARDIAC DEVICE CHECK - REMOTE with LUANA ICD REMOTE  [facility] ([facility]) [address]  [address]  [facility] [place] [address]  [phone]      Jul 09, 2020  9:45 AM CDT  (Arrive by 9:30 AM)  MA SCREENING DIGITAL BILATERAL with [address] and  "[facility]) [address]  [place] [address] [phone]   How do I prepare for my exam? (Food and drink instructions)  No Food and Drink Restrictions.    How do I prepare for my exam? (Other instructions)  Do not use any powder, lotion or deodorant under your arms or on your breast. If you do, we will ask you to remove it before your exam.    What should I wear: Wear comfortable, two-piece clothing.    How long does the exam take: Most scans will take 15 minutes.    What should I bring: Bring any previous mammograms from other facilities or have them mailed to the breast center.    Do I need a :  No  is needed.    What do I need to tell my doctor: If you have any allergies, tell your care team.    What should I do after the exam: No restrictions, you may resume normal activities.    What is this test: This test is an x-ray of the breast to look for breast disease. The breast is pressed between two plates to flatten and spread the tissue. An X-ray is taken of the breast from different angles.    Who should I call with questions: If you have any questions, please call the Imaging Department where you will have your exam. Directions, parking instructions, and other information are available on our website, [email or web address].    Other information about my exam  Three-Dimensional (3D) mammograms are available at all [facility] locations except [place].   3D is covered by all insurance companies, 3D may be subject to copay and deductible.    Benefits of 3D mammograms include:  * Improved rate of cancer detection  * Decreases your chance of having to go back for more tests, which means fewer:  * \"False-positive\" results (This means that there is an abnormal area but it isn't cancer.)  * Invasive testing procedures, such as a biopsy or surgery  * Can provide clearer images of the breast if you have dense breast tissue.    *3D mammography is an " optional exam that anyone can have with a 2D mammogram. It doesn't replace or take the place of a 2D mammogram. 2D mammograms remain an effective screening test for all women.  Not all insurance companies cover the cost of a 3D mammogram. Check with your insurance.        Care Instructions       Further instructions from your care team       [facility]                        Interventional Cardiology  Discharge Instructions   Post Venogram for Pacemaker Lead Check      ADDITIONAL INSTRUCTIONS: Medications: You are to resume all home medications including anticoagulation therapy unless otherwise advised by your primary cardiologist or nurse coordinator.    Follow Up: Per your primary cardiology team    [facility] after hours number at [phone].  Dr. Moore & Dr. Johnson cards given to patient.    Apply ice to IV site on Left lower arm for 15-20 min every 2-3 hours.  Monitor condition of site & call MD if any problems arise.                                                         Telephone Numbers [phone] Monday-Friday 8:00 am to 4:30 pm    [phone] After 4:30 pm Monday-Friday, Weekends & Holidays  Ask for Interventional Cardiologist on call. Someone is on call 24 hours/day   [facility] toll free number [phone] Monday-Friday 8:00 am to 4:30 pm   [facility] Emergency Dept [phone]                            Additional Information About Your Visit       [facility] Information    [facility] gives you secure access to your electronic health record. If you see a primary care provider, you can also send messages to your care team and make appointments. If you have questions, please call your primary care clinic.  If you do not have a primary care provider, please call [phone] and they will assist you.       Care EveryWhere ID    This is your Care EveryWhere ID. This could be used by other organizations to access your [place] medical records  [id number]       Your  Vitals Were  Most recent update: 5/7/2020  3:25 PM    Blood Pressure   131/77 (BP Location: Right arm)    Temperature   98  F (36.7  C) (Oral)    Respirations   18    Pulse Oximetry   98%           Primary Care Provider Office Phone # Fax #    Erika GOMEZ Kirill Plascencia [phone]      Equal Access to Services    [facility]: Hadii aad ku hadasho Soomaali, waaxda luqadaha, qaybta kaalmada adeegyada, waxcarolyn valdez nardafabiola murphykatya garzon lahernandezfabiola . So [facility] [phone].    ATENCIÓN: Si habla español, tiene a washburn disposición servicios gratuitos de asistencia lingüística. Ismaelame al [phone].    We comply with applicable federal and state civil rights laws, including the [place] Human Rights Act. We do not discriminate on the basis of race, color, creed, Congregation, national origin, marital status, age, disability, sex, sexual orientation, or gender identity.       Thank you!    Thank you for choosing [facility] for your care. Our goal is always to provide you with excellent care. Hearing back from our patients is one way we can continue to improve our services. Please take a few minutes to complete the written survey that you may receive in the mail after you visit with us. Thank you!            Medication List      ASK your doctor about these medications          Morning Afternoon Evening Bedtime As Needed    amLODIPine 5 MG tablet  Also known as:  NORVASC  INSTRUCTIONS:  Take 1 tablet (5 mg) by mouth daily                     aspirin 81 MG EC tablet  INSTRUCTIONS:  Take 81 mg by mouth                     atorvastatin 80 MG tablet  Also known as:  LIPITOR  INSTRUCTIONS:  Take 1 tablet (80 mg) by mouth daily                     calcium 500/D 500-200 MG-UNIT tablet  INSTRUCTIONS:  Take 1 tablet by mouth  Generic drug:  calcium carbonate-vitamin D                     diltiazem  MG 24 hr ER beaded capsule  Also known as:  DILT-XR  INSTRUCTIONS:  Take 1 capsule (240 mg) by mouth daily                      isosorbide mononitrate 30 MG 24 hr tablet  Also known as:  IMDUR  INSTRUCTIONS:  Take 1 tablet (30 mg) by mouth daily                     lisinopril 5 MG tablet  Also known as:  ZESTRIL  INSTRUCTIONS:  Take 1 tablet (5 mg) by mouth daily                     nitroGLYcerin 0.4 MG sublingual tablet  Also known as:  NITROSTAT  INSTRUCTIONS:  For chest pain place 1 tablet under the tongue every 5 minutes for 3 doses. If symptoms persist 5 minutes after 1st dose call 911.                     warfarin ANTICOAGULANT 6 MG tablet  Also known as:  COUMADIN  Take as directed. If you are unsure how to take this medication, talk to your nurse or doctor.  Original instructions:  Take 6 mg daily.  Or as directed by the [facility] clinic.                     zolpidem 5 MG tablet  Also known as:  AMBIEN  INSTRUCTIONS:  Take 1 tablet (5 mg) by mouth nightly as needed for sleep

## 2020-05-07 NOTE — DISCHARGE INSTRUCTIONS
[facility]                        Interventional Cardiology  Discharge Instructions   Post Venogram for Pacemaker Lead Check      ADDITIONAL INSTRUCTIONS: Medications: You are to resume all home medications including anticoagulation therapy unless otherwise advised by your primary cardiologist or nurse coordinator.    Follow Up: Per your primary cardiology team    [facility] after hours number at [phone].  Dr. Moore & Dr. Johnson cards given to patient.    Apply ice to IV site on Left lower arm for 15-20 min every 2-3 hours.  Monitor condition of site & call MD if any problems arise.                                                         Telephone Numbers [phone] Monday-Friday 8:00 am to 4:30 pm    [phone] After 4:30 pm Monday-Friday, Weekends & Holidays  Ask for Interventional Cardiologist on call. Someone is on call 24 hours/day   [facility] toll free number [phone] Monday-Friday 8:00 am to 4:30 pm   [facility] Emergency Dept [phone]

## 2020-05-08 ENCOUNTER — TRANSFERRED RECORDS (OUTPATIENT)
Dept: HEALTH INFORMATION MANAGEMENT | Facility: OTHER | Age: 65
End: 2020-05-08

## 2020-05-08 ENCOUNTER — ANTICOAGULATION THERAPY VISIT (OUTPATIENT)
Dept: ANTICOAGULATION | Facility: OTHER | Age: 65
End: 2020-05-08
Attending: FAMILY MEDICINE
Payer: MEDICARE

## 2020-05-08 DIAGNOSIS — Z79.01 ANTICOAGULATION MONITORING, INR RANGE 2-3: ICD-10-CM

## 2020-05-08 LAB — INR PPP: 2.7 (ref 0.9–1.1)

## 2020-05-08 NOTE — PROGRESS NOTES
ANTICOAGULATION FOLLOW-UP CLINIC VISIT    Patient Name:  Lyudmila Fitzgerald  Date:  2020  Contact Type:  no call needed patient to continue same dose    SUBJECTIVE:  Patient Findings         Clinical Outcomes     Negatives:   Major bleeding event, Thromboembolic event, Anticoagulation-related hospital admission, Anticoagulation-related ED visit, Anticoagulation-related fatality           OBJECTIVE    INR   Date Value Ref Range Status   2020 2.7 (A) 0.90 - 1.10 Final     Chromogenic Factor 10   Date Value Ref Range Status   2013 51 (L) %      Comment:                                             Therapeutic Range 20-40   2013 47 (L) %      Comment:                                             Therapeutic Range 20-40       ASSESSMENT / PLAN  INR assessment THER    Recheck INR In: 3 WEEKS    INR Location Home INR      Anticoagulation Summary  As of 2020    INR goal:   2.0-3.0   TTR:   84.9 % (6.7 mo)   INR used for dosin.7 (2020)   Warfarin maintenance plan:   6 mg (6 mg x 1) every day   Full warfarin instructions:   6 mg every day   Weekly warfarin total:   42 mg   No change documented:   Nyla Walker RN   Plan last modified:   Nyla aWlker RN (2020)   Next INR check:   2020   Priority:   Maintenance   Target end date:   Indefinite    Indications    Acute thromboembolism of deep veins of lower extremity (H) (Resolved) [I82.409]  Anticoagulation monitoring  INR range 2-3 [Z79.01]             Anticoagulation Episode Summary     INR check location:       Preferred lab:       Send INR reminders to:   [facility]    Comments:   home monitor [employer]      Anticoagulation Care Providers     Provider Role Specialty Phone number    Erika Carrasco MD [facility] [phone]            See the Encounter Report to view Anticoagulation Flowsheet and Dosing Calendar (Go to Encounters tab in chart review, and find the Anticoagulation Therapy  Visit)        Nyla Walker, RN

## 2020-05-11 ENCOUNTER — TELEPHONE (OUTPATIENT)
Dept: CARDIOLOGY | Facility: CLINIC | Age: 65
End: 2020-05-11

## 2020-05-11 NOTE — TELEPHONE ENCOUNTER
[facility] Call Center    Phone Message    May a detailed message be left on voicemail: yes     Reason for Call: pt asking for a call back from Dr. Moore/care team regarding pacemaker leads decision. Pt would prefer the extraction done at the age she is at now.  Please call this pt to have this conversation. Pt just wants to get is done and over with. Pt reported the leads are 15, 16, 17 years old .Thank you.    Action Taken: Message sent to  Cardiology    Travel Screening: Not Applicable

## 2020-05-12 DIAGNOSIS — Z45.010 PACEMAKER AT END OF BATTERY LIFE: Primary | ICD-10-CM

## 2020-05-12 DIAGNOSIS — Z45.010 PACEMAKER BATTERY DEPLETION: ICD-10-CM

## 2020-05-12 RX ORDER — LIDOCAINE 40 MG/G
CREAM TOPICAL
Status: CANCELLED | OUTPATIENT
Start: 2020-05-12

## 2020-05-12 RX ORDER — SODIUM CHLORIDE 9 MG/ML
INJECTION, SOLUTION INTRAVENOUS CONTINUOUS
Status: CANCELLED | OUTPATIENT
Start: 2020-05-12

## 2020-05-12 RX ORDER — CEFAZOLIN SODIUM 2 G/50ML
2 SOLUTION INTRAVENOUS
Status: CANCELLED | OUTPATIENT
Start: 2020-05-12

## 2020-05-12 NOTE — TELEPHONE ENCOUNTER
Date: 5/12/2020    Time of Call: 4:16 PM     Diagnosis:  PPM at [facility]     [ TORB ] Ordering provider: Tima Johnson MD  Order:   No lead extraction at this time.  Set up for generator change.     Order received by: Charis Candelario RN     Follow-up/additional notes:     Called patient and discussed that per Dr. Moore and Dr. Johnson, lead extraction not recommended at this time. Considering at [facility], can schedule for generator change. Offered patient to speak with Alex prior - she says she does not feel this is needed and will like to schedule the generator change.     Reviewed pre-procedure material w/ patient. No medications to hold. Case request placed. Forwarded to EP .    LILIBETH Lorenzo, RN, PHN  Electrophysiology Nurse Coordinator

## 2020-05-14 DIAGNOSIS — Z11.59 ENCOUNTER FOR SCREENING FOR OTHER VIRAL DISEASES: Primary | ICD-10-CM

## 2020-05-15 LAB
MDC_IDC_EPISODE_DTM: NORMAL
MDC_IDC_EPISODE_ID: NORMAL
MDC_IDC_EPISODE_TYPE: NORMAL
MDC_IDC_LEAD_IMPLANT_DT: NORMAL
MDC_IDC_LEAD_IMPLANT_DT: NORMAL
MDC_IDC_LEAD_LOCATION: NORMAL
MDC_IDC_LEAD_LOCATION: NORMAL
MDC_IDC_LEAD_LOCATION_DETAIL_1: NORMAL
MDC_IDC_LEAD_LOCATION_DETAIL_1: NORMAL
MDC_IDC_LEAD_MFG: NORMAL
MDC_IDC_LEAD_MFG: NORMAL
MDC_IDC_LEAD_MODEL: NORMAL
MDC_IDC_LEAD_MODEL: NORMAL
MDC_IDC_LEAD_POLARITY_TYPE: NORMAL
MDC_IDC_LEAD_POLARITY_TYPE: NORMAL
MDC_IDC_LEAD_SERIAL: NORMAL
MDC_IDC_LEAD_SERIAL: NORMAL
MDC_IDC_LEAD_SPECIAL_FUNCTION: NORMAL
MDC_IDC_LEAD_SPECIAL_FUNCTION: NORMAL
MDC_IDC_MSMT_BATTERY_DTM: NORMAL
MDC_IDC_MSMT_BATTERY_REMAINING_LONGEVITY: NORMAL
MDC_IDC_MSMT_BATTERY_STATUS: NORMAL
MDC_IDC_MSMT_LEADCHNL_RA_IMPEDANCE_VALUE: 644 OHM
MDC_IDC_MSMT_LEADCHNL_RA_PACING_THRESHOLD_AMPLITUDE: 1 V
MDC_IDC_MSMT_LEADCHNL_RA_PACING_THRESHOLD_PULSEWIDTH: 1 MS
MDC_IDC_MSMT_LEADCHNL_RA_SENSING_INTR_AMPL: 0.9 MV
MDC_IDC_MSMT_LEADCHNL_RV_IMPEDANCE_VALUE: 470 OHM
MDC_IDC_MSMT_LEADCHNL_RV_PACING_THRESHOLD_AMPLITUDE: 0.7 V
MDC_IDC_MSMT_LEADCHNL_RV_PACING_THRESHOLD_PULSEWIDTH: 0.4 MS
MDC_IDC_MSMT_LEADCHNL_RV_SENSING_INTR_AMPL: 8.9 MV
MDC_IDC_PG_IMPLANT_DTM: NORMAL
MDC_IDC_PG_MFG: NORMAL
MDC_IDC_PG_MODEL: NORMAL
MDC_IDC_PG_SERIAL: NORMAL
MDC_IDC_PG_TYPE: NORMAL
MDC_IDC_SESS_CLINIC_NAME: NORMAL
MDC_IDC_SESS_DTM: NORMAL
MDC_IDC_SESS_TYPE: NORMAL
MDC_IDC_SET_BRADY_AT_MODE_SWITCH_MODE: NORMAL
MDC_IDC_SET_BRADY_AT_MODE_SWITCH_RATE: 140 {BEATS}/MIN
MDC_IDC_SET_BRADY_LOWRATE: 60 {BEATS}/MIN
MDC_IDC_SET_BRADY_MAX_SENSOR_RATE: 120 {BEATS}/MIN
MDC_IDC_SET_BRADY_MAX_TRACKING_RATE: 100 {BEATS}/MIN
MDC_IDC_SET_BRADY_MODE: NORMAL
MDC_IDC_SET_BRADY_PAV_DELAY_HIGH: 80 MS
MDC_IDC_SET_BRADY_PAV_DELAY_LOW: 130 MS
MDC_IDC_SET_BRADY_SAV_DELAY_HIGH: 80 MS
MDC_IDC_SET_BRADY_SAV_DELAY_LOW: 130 MS
MDC_IDC_SET_LEADCHNL_RA_PACING_AMPLITUDE: 2 V
MDC_IDC_SET_LEADCHNL_RA_PACING_CAPTURE_MODE: NORMAL
MDC_IDC_SET_LEADCHNL_RA_PACING_POLARITY: NORMAL
MDC_IDC_SET_LEADCHNL_RA_PACING_PULSEWIDTH: 1 MS
MDC_IDC_SET_LEADCHNL_RA_SENSING_ADAPTATION_MODE: NORMAL
MDC_IDC_SET_LEADCHNL_RA_SENSING_POLARITY: NORMAL
MDC_IDC_SET_LEADCHNL_RA_SENSING_SENSITIVITY: 0.15 MV
MDC_IDC_SET_LEADCHNL_RV_PACING_AMPLITUDE: 1.3 V
MDC_IDC_SET_LEADCHNL_RV_PACING_CAPTURE_MODE: NORMAL
MDC_IDC_SET_LEADCHNL_RV_PACING_POLARITY: NORMAL
MDC_IDC_SET_LEADCHNL_RV_PACING_PULSEWIDTH: 0.4 MS
MDC_IDC_SET_LEADCHNL_RV_SENSING_ADAPTATION_MODE: NORMAL
MDC_IDC_SET_LEADCHNL_RV_SENSING_POLARITY: NORMAL
MDC_IDC_SET_LEADCHNL_RV_SENSING_SENSITIVITY: 2.5 MV
MDC_IDC_SET_ZONE_DETECTION_INTERVAL: 429 MS
MDC_IDC_SET_ZONE_TYPE: NORMAL
MDC_IDC_SET_ZONE_VENDOR_TYPE: NORMAL
MDC_IDC_STAT_BRADY_DTM_END: NORMAL
MDC_IDC_STAT_BRADY_DTM_START: NORMAL
MDC_IDC_STAT_BRADY_RA_PERCENT_PACED: 88 %
MDC_IDC_STAT_BRADY_RV_PERCENT_PACED: 100 %
MDC_IDC_STAT_EPISODE_RECENT_COUNT: 2
MDC_IDC_STAT_EPISODE_RECENT_COUNT_DTM_END: NORMAL
MDC_IDC_STAT_EPISODE_RECENT_COUNT_DTM_START: NORMAL
MDC_IDC_STAT_EPISODE_TOTAL_COUNT: 41
MDC_IDC_STAT_EPISODE_TOTAL_COUNT_DTM_END: NORMAL
MDC_IDC_STAT_EPISODE_TYPE: NORMAL
MDC_IDC_STAT_EPISODE_TYPE: NORMAL
MDC_IDC_STAT_EPISODE_VENDOR_TYPE: NORMAL
MDC_IDC_STAT_EPISODE_VENDOR_TYPE: NORMAL

## 2020-05-18 ENCOUNTER — OFFICE VISIT (OUTPATIENT)
Dept: FAMILY MEDICINE | Facility: CLINIC | Age: 65
End: 2020-05-18
Attending: INTERNAL MEDICINE
Payer: MEDICARE

## 2020-05-18 DIAGNOSIS — Z11.59 ENCOUNTER FOR SCREENING FOR OTHER VIRAL DISEASES: ICD-10-CM

## 2020-05-18 PROCEDURE — 99207 ZZC NO CHARGE NURSE ONLY: CPT

## 2020-05-18 NOTE — PROGRESS NOTES
Patient is here for a Pre-op COVID Swab. Swab completed with no concerns, and specimen walked down to lab.     Deirdre Love CMA (AAMA) 5/18/2020 11:14 AM

## 2020-05-19 ENCOUNTER — TELEPHONE (OUTPATIENT)
Dept: CARDIOLOGY | Facility: CLINIC | Age: 65
End: 2020-05-19

## 2020-05-19 DIAGNOSIS — Z95.0 PACEMAKER: ICD-10-CM

## 2020-05-19 DIAGNOSIS — Z98.890 S/P AV NODAL ABLATION: Primary | ICD-10-CM

## 2020-05-19 LAB
SARS-COV-2 RNA SPEC QL NAA+PROBE: NOT DETECTED
SPECIMEN SOURCE: NORMAL

## 2020-05-20 ENCOUNTER — HOSPITAL ENCOUNTER (OUTPATIENT)
Facility: CLINIC | Age: 65
Discharge: HOME | End: 2020-05-20
Attending: INTERNAL MEDICINE | Admitting: INTERNAL MEDICINE
Payer: MEDICARE

## 2020-05-20 ENCOUNTER — APPOINTMENT (OUTPATIENT)
Dept: MEDSURG UNIT | Facility: CLINIC | Age: 65
End: 2020-05-20
Attending: INTERNAL MEDICINE
Payer: MEDICARE

## 2020-05-20 ENCOUNTER — APPOINTMENT (OUTPATIENT)
Dept: LAB | Facility: CLINIC | Age: 65
End: 2020-05-20
Attending: INTERNAL MEDICINE
Payer: MEDICARE

## 2020-05-20 VITALS
HEIGHT: 65 IN | SYSTOLIC BLOOD PRESSURE: 111 MMHG | TEMPERATURE: 97.8 F | DIASTOLIC BLOOD PRESSURE: 61 MMHG | RESPIRATION RATE: 36 BRPM | HEART RATE: 60 BPM | BODY MASS INDEX: 24.32 KG/M2 | OXYGEN SATURATION: 98 % | WEIGHT: 146 LBS

## 2020-05-20 DIAGNOSIS — Z45.010 PACEMAKER BATTERY DEPLETION: ICD-10-CM

## 2020-05-20 DIAGNOSIS — Z45.010 PACEMAKER AT END OF BATTERY LIFE: ICD-10-CM

## 2020-05-20 LAB
ANION GAP SERPL CALCULATED.3IONS-SCNC: 7 MMOL/L (ref 3–14)
BUN SERPL-MCNC: 18 MG/DL (ref 7–30)
CALCIUM SERPL-MCNC: 9.2 MG/DL (ref 8.5–10.1)
CHLORIDE SERPL-SCNC: 108 MMOL/L (ref 94–109)
CO2 SERPL-SCNC: 23 MMOL/L (ref 20–32)
CREAT SERPL-MCNC: 0.77 MG/DL (ref 0.52–1.04)
ERYTHROCYTE [DISTWIDTH] IN BLOOD BY AUTOMATED COUNT: 13.3 % (ref 10–15)
GFR SERPL CREATININE-BSD FRML MDRD: 82 ML/MIN/{1.73_M2}
GLUCOSE SERPL-MCNC: 89 MG/DL (ref 70–99)
HCT VFR BLD AUTO: 46.5 % (ref 35–47)
HGB BLD-MCNC: 15.3 G/DL (ref 11.7–15.7)
INR PPP: 2.18 (ref 0.86–1.14)
INTERPRETATION ECG - MUSE: NORMAL
MCH RBC QN AUTO: 31.1 PG (ref 26.5–33)
MCHC RBC AUTO-ENTMCNC: 32.9 G/DL (ref 31.5–36.5)
MCV RBC AUTO: 95 FL (ref 78–100)
PLATELET # BLD AUTO: 252 10E9/L (ref 150–450)
POTASSIUM SERPL-SCNC: 3.9 MMOL/L (ref 3.4–5.3)
RBC # BLD AUTO: 4.92 10E12/L (ref 3.8–5.2)
SODIUM SERPL-SCNC: 138 MMOL/L (ref 133–144)
WBC # BLD AUTO: 6.1 10E9/L (ref 4–11)

## 2020-05-20 PROCEDURE — 93005 ELECTROCARDIOGRAM TRACING: CPT

## 2020-05-20 PROCEDURE — 93010 ELECTROCARDIOGRAM REPORT: CPT | Mod: 59 | Performed by: INTERNAL MEDICINE

## 2020-05-20 PROCEDURE — 27210794 ZZH OR GENERAL SUPPLY STERILE: Performed by: INTERNAL MEDICINE

## 2020-05-20 PROCEDURE — 25000125 ZZHC RX 250: Performed by: INTERNAL MEDICINE

## 2020-05-20 PROCEDURE — 33228 REMV&REPLC PM GEN DUAL LEAD: CPT | Mod: GC | Performed by: INTERNAL MEDICINE

## 2020-05-20 PROCEDURE — 36415 COLL VENOUS BLD VENIPUNCTURE: CPT | Performed by: INTERNAL MEDICINE

## 2020-05-20 PROCEDURE — 40000172 ZZH STATISTIC PROCEDURE PREP ONLY

## 2020-05-20 PROCEDURE — 40000141 ZZH STATISTIC PERIPHERAL IV START W/O US GUIDANCE

## 2020-05-20 PROCEDURE — 25000128 H RX IP 250 OP 636: Performed by: INTERNAL MEDICINE

## 2020-05-20 PROCEDURE — 85027 COMPLETE CBC AUTOMATED: CPT | Performed by: INTERNAL MEDICINE

## 2020-05-20 PROCEDURE — 40000065 ZZH STATISTIC EKG NON-CHARGEABLE

## 2020-05-20 PROCEDURE — 80048 BASIC METABOLIC PNL TOTAL CA: CPT | Performed by: INTERNAL MEDICINE

## 2020-05-20 PROCEDURE — C1785 PMKR, DUAL, RATE-RESP: HCPCS | Performed by: INTERNAL MEDICINE

## 2020-05-20 PROCEDURE — 33228 REMV&REPLC PM GEN DUAL LEAD: CPT | Performed by: INTERNAL MEDICINE

## 2020-05-20 PROCEDURE — 99152 MOD SED SAME PHYS/QHP 5/>YRS: CPT | Performed by: INTERNAL MEDICINE

## 2020-05-20 PROCEDURE — 99153 MOD SED SAME PHYS/QHP EA: CPT | Performed by: INTERNAL MEDICINE

## 2020-05-20 PROCEDURE — 85610 PROTHROMBIN TIME: CPT | Performed by: INTERNAL MEDICINE

## 2020-05-20 PROCEDURE — 93010 ELECTROCARDIOGRAM REPORT: CPT | Mod: 76 | Performed by: INTERNAL MEDICINE

## 2020-05-20 PROCEDURE — 99152 MOD SED SAME PHYS/QHP 5/>YRS: CPT | Mod: 59 | Performed by: INTERNAL MEDICINE

## 2020-05-20 DEVICE — IMPLANTABLE DEVICE: Type: IMPLANTABLE DEVICE | Status: FUNCTIONAL

## 2020-05-20 RX ORDER — CEFAZOLIN SODIUM 2 G/100ML
2 INJECTION, SOLUTION INTRAVENOUS
Status: COMPLETED | OUTPATIENT
Start: 2020-05-20 | End: 2020-05-20

## 2020-05-20 RX ORDER — LIDOCAINE HYDROCHLORIDE 20 MG/ML
INJECTION, SOLUTION INFILTRATION; PERINEURAL
Status: DISCONTINUED | OUTPATIENT
Start: 2020-05-20 | End: 2020-05-20 | Stop reason: HOSPADM

## 2020-05-20 RX ORDER — LIDOCAINE 40 MG/G
CREAM TOPICAL
Status: DISCONTINUED | OUTPATIENT
Start: 2020-05-20 | End: 2020-05-20 | Stop reason: HOSPADM

## 2020-05-20 RX ORDER — FENTANYL CITRATE 50 UG/ML
INJECTION, SOLUTION INTRAMUSCULAR; INTRAVENOUS
Status: DISCONTINUED | OUTPATIENT
Start: 2020-05-20 | End: 2020-05-20 | Stop reason: HOSPADM

## 2020-05-20 RX ORDER — SODIUM CHLORIDE 9 MG/ML
INJECTION, SOLUTION INTRAVENOUS CONTINUOUS
Status: DISCONTINUED | OUTPATIENT
Start: 2020-05-20 | End: 2020-05-20 | Stop reason: HOSPADM

## 2020-05-20 RX ORDER — NALOXONE HYDROCHLORIDE 0.4 MG/ML
.1-.4 INJECTION, SOLUTION INTRAMUSCULAR; INTRAVENOUS; SUBCUTANEOUS
Status: DISCONTINUED | OUTPATIENT
Start: 2020-05-20 | End: 2020-05-20 | Stop reason: HOSPADM

## 2020-05-20 RX ORDER — CEPHALEXIN 500 MG/1
500 TABLET ORAL 3 TIMES DAILY
Qty: 15 TABLET | Refills: 0 | Status: SHIPPED | OUTPATIENT
Start: 2020-05-20 | End: 2020-07-14

## 2020-05-20 RX ORDER — ONDANSETRON 2 MG/ML
INJECTION INTRAMUSCULAR; INTRAVENOUS
Status: DISCONTINUED | OUTPATIENT
Start: 2020-05-20 | End: 2020-05-20 | Stop reason: HOSPADM

## 2020-05-20 ASSESSMENT — MIFFLIN-ST. JEOR: SCORE: 1213.13

## 2020-05-20 NOTE — PRE-PROCEDURE
GENERAL PRE-PROCEDURE:   Procedure:  Permanent pacemaker pulse generator replacement  Date/Time:  5/20/2020 9:22 AM    Verbal consent obtained?: Yes    Written consent obtained?: Yes    Risks and benefits: Risks, benefits and alternatives were discussed    DC Plan: Appropriate discharge home plan in place for patients who are going home after procedure   Consent given by:  Patient  Patient states understanding of procedure being performed: Yes    Patient's understanding of procedure matches consent: Yes    Procedure consent matches procedure scheduled: Yes    Expected level of sedation:  Moderate  Appropriately NPO:  Yes  ASA Class:  Class 2- mild systemic disease, no acute problems, no functional limitations  Mallampati  :  Grade 2- soft palate, base of uvula, tonsillar pillars, and portion of posterior pharyngeal wall visible  History & Physical reviewed:  History and physical reviewed and no updates needed  Statement of review:  I have reviewed the lab findings, diagnostic data, medications, and the plan for sedation

## 2020-05-20 NOTE — PROGRESS NOTES
Pt to unit 2a for generator change. Vascular consult placed, EKG at this time, awaiting consent for procedure.

## 2020-05-20 NOTE — PROGRESS NOTES
Discharge instructions reviewed, understood, and signed by patient. VSS, PIV removed, new medications reviewed and understood, patient has all belongings. Patient left unit via wheelchair, family waiting infront of the hospital.

## 2020-05-20 NOTE — PROGRESS NOTES
Discharge patient after   1-patient is tolerating liquids and foods-yes  2- ambulating-yes  3- urinating-yes  4- puncture sites are stable ( no bleeding and no hematoma)-yes  5- and patient has a -yes  6-IF Vital Signs are within the patient's normal limits or systolic blood pressure greater than 90 mmHg and less than 160 mmHg-yes  7-Patient is alert and oriented-yes  8-If diabetic, blood glucose is in patient's usual normal range-NA

## 2020-05-20 NOTE — PROGRESS NOTES
"H&P from 5/6 by Dr. Moore was reviewed and is up to date.    She reports no recent fevers/chills/tenderness or discomfort at the pocket.  Last device interrogation on 5/7 showed her to be 88% AP, 100% VT with rare R wves at VVI 30, essentially dependent.    BP (!) 147/79 (BP Location: Right arm)   Temp 97.7  F (36.5  C) (Oral)   Resp 16   Ht 1.651 m (5' 5\")   Wt 66.2 kg (146 lb)   SpO2 97%   BMI 24.30 kg/m    General - comfortable appearing female reclining in bed with unlabored breathing at rest  Neuro - alert & fully oriented, fluent speech  Psych - pleasant & cooperative, normal affect  Neck - JVP is not elevated  Chest - left upper chest subcutaneous device pocket is not tender and has no fluctuance.  No overlying erythema or lesions.  CV - RRR, warm extremities, no peripheral edema.  Skin - not jaundiced    EKG today shows an A paced, V paced rhythm.    Recent Labs   Lab Test 05/20/20  0706  09/25/12  0528   POTASSIUM 3.9   < > 4.3   MAG  --   --  2.0      < > 139   CR 0.77   < > 0.68   BENJAMIN 9.2   < > 8.0*   INR 2.18*   < >  --       < >  --    HGB 15.3   < >  --    WBC 6.1   < >  --     < > = values in this interval not displayed.     Assessment:  Dual chamber permanent pacemaker reached RRT 4/28/20  Plan:  Proceed with generator replacement as scheduled.  No lead revision/addition necessary at this time.  "

## 2020-05-21 ENCOUNTER — TRANSFERRED RECORDS (OUTPATIENT)
Dept: HEALTH INFORMATION MANAGEMENT | Facility: OTHER | Age: 65
End: 2020-05-21

## 2020-05-21 LAB — INR PPP: 2.1 (ref 0.9–1.1)

## 2020-05-22 ENCOUNTER — ANTICOAGULATION THERAPY VISIT (OUTPATIENT)
Dept: ANTICOAGULATION | Facility: OTHER | Age: 65
End: 2020-05-22
Attending: FAMILY MEDICINE
Payer: MEDICARE

## 2020-05-22 DIAGNOSIS — Z79.01 ANTICOAGULATION MONITORING, INR RANGE 2-3: ICD-10-CM

## 2020-05-22 LAB — INTERPRETATION ECG - MUSE: NORMAL

## 2020-05-22 NOTE — PROGRESS NOTES
ANTICOAGULATION FOLLOW-UP CLINIC VISIT    Patient Name:  Lyudmila Fitzgerald  Date:  2020  Contact Type:  no call needed patient to continue same dose    SUBJECTIVE:  Patient Findings     Positives:   Other complaints (battery change in pacemaker earlie in the week)             OBJECTIVE    Recent labs: (last 7 days)     20   INR 2.1*       ASSESSMENT / PLAN  INR assessment THER    Recheck INR In: 2 WEEKS    INR Location Home INR      Anticoagulation Summary  As of 2020    INR goal:   2.0-3.0   TTR:   85.9 % (7.1 mo)   INR used for dosin.1 (2020)   Warfarin maintenance plan:   6 mg (6 mg x 1) every day   Full warfarin instructions:   6 mg every day   Weekly warfarin total:   42 mg   Plan last modified:   Nyla Walker RN (2020)   Next INR check:   2020   Priority:   Maintenance   Target end date:   Indefinite    Indications    Acute thromboembolism of deep veins of lower extremity (H) (Resolved) [I82.409]  Anticoagulation monitoring  INR range 2-3 [Z79.01]             Anticoagulation Episode Summary     INR check location:       Preferred lab:       Send INR reminders to:   [facility]    Comments:   home monitor [employer]      Anticoagulation Care Providers     Provider Role Specialty Phone number    Erika Carrasco MD [facility] [phone]            See the Encounter Report to view Anticoagulation Flowsheet and Dosing Calendar (Go to Encounters tab in chart review, and find the Anticoagulation Therapy Visit)        Nyla Walker, RN

## 2020-05-27 ENCOUNTER — ANCILLARY PROCEDURE (OUTPATIENT)
Dept: CARDIOLOGY | Facility: CLINIC | Age: 65
End: 2020-05-27
Attending: INTERNAL MEDICINE
Payer: MEDICARE

## 2020-05-27 DIAGNOSIS — Z98.890 S/P AV NODAL ABLATION: ICD-10-CM

## 2020-05-27 PROCEDURE — 93296 REM INTERROG EVL PM/IDS: CPT | Mod: ZF

## 2020-05-27 PROCEDURE — 99207 CARDIAC DEVICE CHECK - REMOTE: CPT | Mod: ZP | Performed by: INTERNAL MEDICINE

## 2020-05-28 LAB
MDC_IDC_EPISODE_DTM: NORMAL
MDC_IDC_EPISODE_DTM: NORMAL
MDC_IDC_EPISODE_DURATION: 5 S
MDC_IDC_EPISODE_ID: NORMAL
MDC_IDC_EPISODE_ID: NORMAL
MDC_IDC_EPISODE_TYPE: NORMAL
MDC_IDC_EPISODE_TYPE: NORMAL
MDC_IDC_LEAD_IMPLANT_DT: NORMAL
MDC_IDC_LEAD_IMPLANT_DT: NORMAL
MDC_IDC_LEAD_LOCATION: NORMAL
MDC_IDC_LEAD_LOCATION: NORMAL
MDC_IDC_LEAD_LOCATION_DETAIL_1: NORMAL
MDC_IDC_LEAD_LOCATION_DETAIL_1: NORMAL
MDC_IDC_LEAD_MFG: NORMAL
MDC_IDC_LEAD_MFG: NORMAL
MDC_IDC_LEAD_MODEL: NORMAL
MDC_IDC_LEAD_MODEL: NORMAL
MDC_IDC_LEAD_POLARITY_TYPE: NORMAL
MDC_IDC_LEAD_POLARITY_TYPE: NORMAL
MDC_IDC_LEAD_SERIAL: NORMAL
MDC_IDC_LEAD_SERIAL: NORMAL
MDC_IDC_LEAD_SPECIAL_FUNCTION: NORMAL
MDC_IDC_LEAD_SPECIAL_FUNCTION: NORMAL
MDC_IDC_MSMT_BATTERY_DTM: NORMAL
MDC_IDC_MSMT_BATTERY_REMAINING_LONGEVITY: 132 MO
MDC_IDC_MSMT_BATTERY_REMAINING_PERCENTAGE: 100 %
MDC_IDC_MSMT_BATTERY_STATUS: NORMAL
MDC_IDC_MSMT_LEADCHNL_RA_IMPEDANCE_VALUE: 634 OHM
MDC_IDC_MSMT_LEADCHNL_RV_IMPEDANCE_VALUE: 454 OHM
MDC_IDC_MSMT_LEADCHNL_RV_PACING_THRESHOLD_AMPLITUDE: 1 V
MDC_IDC_MSMT_LEADCHNL_RV_PACING_THRESHOLD_PULSEWIDTH: 0.4 MS
MDC_IDC_PG_IMPLANT_DTM: NORMAL
MDC_IDC_PG_MFG: NORMAL
MDC_IDC_PG_MODEL: NORMAL
MDC_IDC_PG_SERIAL: NORMAL
MDC_IDC_PG_TYPE: NORMAL
MDC_IDC_SESS_CLINIC_NAME: NORMAL
MDC_IDC_SESS_DTM: NORMAL
MDC_IDC_SESS_TYPE: NORMAL
MDC_IDC_SET_BRADY_AT_MODE_SWITCH_MODE: NORMAL
MDC_IDC_SET_BRADY_AT_MODE_SWITCH_RATE: 140 {BEATS}/MIN
MDC_IDC_SET_BRADY_LOWRATE: 60 {BEATS}/MIN
MDC_IDC_SET_BRADY_MAX_SENSOR_RATE: 130 {BEATS}/MIN
MDC_IDC_SET_BRADY_MAX_TRACKING_RATE: 100 {BEATS}/MIN
MDC_IDC_SET_BRADY_MODE: NORMAL
MDC_IDC_SET_BRADY_PAV_DELAY_HIGH: 80 MS
MDC_IDC_SET_BRADY_PAV_DELAY_LOW: 130 MS
MDC_IDC_SET_BRADY_SAV_DELAY_HIGH: 80 MS
MDC_IDC_SET_BRADY_SAV_DELAY_LOW: 130 MS
MDC_IDC_SET_LEADCHNL_RA_PACING_AMPLITUDE: 2 V
MDC_IDC_SET_LEADCHNL_RA_PACING_CAPTURE_MODE: NORMAL
MDC_IDC_SET_LEADCHNL_RA_PACING_POLARITY: NORMAL
MDC_IDC_SET_LEADCHNL_RA_PACING_PULSEWIDTH: 1 MS
MDC_IDC_SET_LEADCHNL_RA_SENSING_ADAPTATION_MODE: NORMAL
MDC_IDC_SET_LEADCHNL_RA_SENSING_POLARITY: NORMAL
MDC_IDC_SET_LEADCHNL_RA_SENSING_SENSITIVITY: 0.25 MV
MDC_IDC_SET_LEADCHNL_RV_PACING_AMPLITUDE: 1.5 V
MDC_IDC_SET_LEADCHNL_RV_PACING_CAPTURE_MODE: NORMAL
MDC_IDC_SET_LEADCHNL_RV_PACING_POLARITY: NORMAL
MDC_IDC_SET_LEADCHNL_RV_PACING_PULSEWIDTH: 0.4 MS
MDC_IDC_SET_LEADCHNL_RV_SENSING_ADAPTATION_MODE: NORMAL
MDC_IDC_SET_LEADCHNL_RV_SENSING_POLARITY: NORMAL
MDC_IDC_SET_LEADCHNL_RV_SENSING_SENSITIVITY: 1.5 MV
MDC_IDC_SET_ZONE_DETECTION_INTERVAL: 375 MS
MDC_IDC_SET_ZONE_TYPE: NORMAL
MDC_IDC_SET_ZONE_VENDOR_TYPE: NORMAL
MDC_IDC_STAT_AT_BURDEN_PERCENT: 1 %
MDC_IDC_STAT_AT_DTM_END: NORMAL
MDC_IDC_STAT_AT_DTM_START: NORMAL
MDC_IDC_STAT_BRADY_DTM_END: NORMAL
MDC_IDC_STAT_BRADY_DTM_START: NORMAL
MDC_IDC_STAT_BRADY_RA_PERCENT_PACED: 48 %
MDC_IDC_STAT_BRADY_RV_PERCENT_PACED: 100 %
MDC_IDC_STAT_EPISODE_RECENT_COUNT: 0
MDC_IDC_STAT_EPISODE_RECENT_COUNT: 1
MDC_IDC_STAT_EPISODE_RECENT_COUNT_DTM_END: NORMAL
MDC_IDC_STAT_EPISODE_RECENT_COUNT_DTM_START: NORMAL
MDC_IDC_STAT_EPISODE_TYPE: NORMAL
MDC_IDC_STAT_EPISODE_VENDOR_TYPE: NORMAL

## 2020-05-29 ENCOUNTER — ANCILLARY PROCEDURE (OUTPATIENT)
Dept: CARDIOLOGY | Facility: CLINIC | Age: 65
End: 2020-05-29
Attending: INTERNAL MEDICINE
Payer: MEDICARE

## 2020-05-29 DIAGNOSIS — Z45.010 PACEMAKER AT END OF BATTERY LIFE: ICD-10-CM

## 2020-05-29 PROCEDURE — 93296 REM INTERROG EVL PM/IDS: CPT | Mod: ZF

## 2020-05-29 PROCEDURE — 93294 REM INTERROG EVL PM/LDLS PM: CPT | Mod: ZP | Performed by: INTERNAL MEDICINE

## 2020-06-06 ENCOUNTER — TRANSFERRED RECORDS (OUTPATIENT)
Dept: HEALTH INFORMATION MANAGEMENT | Facility: OTHER | Age: 65
End: 2020-06-06

## 2020-06-06 LAB — INR PPP: 2.4 (ref 0.9–1.1)

## 2020-06-08 ENCOUNTER — ANTICOAGULATION THERAPY VISIT (OUTPATIENT)
Dept: ANTICOAGULATION | Facility: OTHER | Age: 65
End: 2020-06-08
Attending: FAMILY MEDICINE
Payer: MEDICARE

## 2020-06-08 DIAGNOSIS — Z79.01 ANTICOAGULATION MONITORING, INR RANGE 2-3: ICD-10-CM

## 2020-06-08 NOTE — PROGRESS NOTES
ANTICOAGULATION FOLLOW-UP CLINIC VISIT    Patient Name:  Lyudmila Fitzgerald  Date:  2020  Contact Type:  no call needed patient to continue same dose    SUBJECTIVE:  Patient Findings         Clinical Outcomes     Negatives:   Major bleeding event, Thromboembolic event, Anticoagulation-related hospital admission, Anticoagulation-related ED visit, Anticoagulation-related fatality           OBJECTIVE    Recent labs: (last 7 days)     20   INR 2.4*       ASSESSMENT / PLAN  INR assessment THER    Recheck INR In: 4 WEEKS    INR Location Home INR      Anticoagulation Summary  As of 2020    INR goal:   2.0-3.0   TTR:   87.0 % (7.7 mo)   INR used for dosin.4 (2020)   Warfarin maintenance plan:   6 mg (6 mg x 1) every day   Full warfarin instructions:   6 mg every day   Weekly warfarin total:   42 mg   No change documented:   Nyla Walker RN   Plan last modified:   Nyla Walker RN (2020)   Next INR check:   2020   Priority:   Maintenance   Target end date:   Indefinite    Indications    Acute thromboembolism of deep veins of lower extremity (H) (Resolved) [I82.409]  Anticoagulation monitoring  INR range 2-3 [Z79.01]             Anticoagulation Episode Summary     INR check location:       Preferred lab:       Send INR reminders to:   [facility]    Comments:   home monitor [employer]      Anticoagulation Care Providers     Provider Role Specialty Phone number    Erika Carrasco MD [facility] [phone]            See the Encounter Report to view Anticoagulation Flowsheet and Dosing Calendar (Go to Encounters tab in chart review, and find the Anticoagulation Therapy Visit)        Nyla Walker RN

## 2020-06-09 LAB
MDC_IDC_EPISODE_DTM: NORMAL
MDC_IDC_EPISODE_DURATION: 10 S
MDC_IDC_EPISODE_DURATION: 11 S
MDC_IDC_EPISODE_DURATION: 20 S
MDC_IDC_EPISODE_DURATION: 26 S
MDC_IDC_EPISODE_DURATION: 3 S
MDC_IDC_EPISODE_DURATION: 4 S
MDC_IDC_EPISODE_DURATION: 4 S
MDC_IDC_EPISODE_DURATION: 6 S
MDC_IDC_EPISODE_DURATION: 8 S
MDC_IDC_EPISODE_DURATION: 9 S
MDC_IDC_EPISODE_ID: NORMAL
MDC_IDC_EPISODE_TYPE: NORMAL
MDC_IDC_LEAD_IMPLANT_DT: NORMAL
MDC_IDC_LEAD_IMPLANT_DT: NORMAL
MDC_IDC_LEAD_LOCATION: NORMAL
MDC_IDC_LEAD_LOCATION: NORMAL
MDC_IDC_LEAD_LOCATION_DETAIL_1: NORMAL
MDC_IDC_LEAD_LOCATION_DETAIL_1: NORMAL
MDC_IDC_LEAD_MFG: NORMAL
MDC_IDC_LEAD_MFG: NORMAL
MDC_IDC_LEAD_MODEL: NORMAL
MDC_IDC_LEAD_MODEL: NORMAL
MDC_IDC_LEAD_POLARITY_TYPE: NORMAL
MDC_IDC_LEAD_POLARITY_TYPE: NORMAL
MDC_IDC_LEAD_SERIAL: NORMAL
MDC_IDC_LEAD_SERIAL: NORMAL
MDC_IDC_LEAD_SPECIAL_FUNCTION: NORMAL
MDC_IDC_LEAD_SPECIAL_FUNCTION: NORMAL
MDC_IDC_MSMT_BATTERY_DTM: NORMAL
MDC_IDC_MSMT_BATTERY_REMAINING_LONGEVITY: 156 MO
MDC_IDC_MSMT_BATTERY_REMAINING_PERCENTAGE: 100 %
MDC_IDC_MSMT_BATTERY_STATUS: NORMAL
MDC_IDC_MSMT_LEADCHNL_RA_IMPEDANCE_VALUE: 643 OHM
MDC_IDC_MSMT_LEADCHNL_RV_IMPEDANCE_VALUE: 471 OHM
MDC_IDC_MSMT_LEADCHNL_RV_PACING_THRESHOLD_AMPLITUDE: 0.9 V
MDC_IDC_MSMT_LEADCHNL_RV_PACING_THRESHOLD_PULSEWIDTH: 0.4 MS
MDC_IDC_PG_IMPLANT_DTM: NORMAL
MDC_IDC_PG_MFG: NORMAL
MDC_IDC_PG_MODEL: NORMAL
MDC_IDC_PG_SERIAL: NORMAL
MDC_IDC_PG_TYPE: NORMAL
MDC_IDC_SESS_CLINIC_NAME: NORMAL
MDC_IDC_SESS_DTM: NORMAL
MDC_IDC_SESS_TYPE: NORMAL
MDC_IDC_SET_BRADY_AT_MODE_SWITCH_MODE: NORMAL
MDC_IDC_SET_BRADY_AT_MODE_SWITCH_RATE: 140 {BEATS}/MIN
MDC_IDC_SET_BRADY_LOWRATE: 60 {BEATS}/MIN
MDC_IDC_SET_BRADY_MAX_SENSOR_RATE: 130 {BEATS}/MIN
MDC_IDC_SET_BRADY_MAX_TRACKING_RATE: 100 {BEATS}/MIN
MDC_IDC_SET_BRADY_MODE: NORMAL
MDC_IDC_SET_BRADY_PAV_DELAY_HIGH: 80 MS
MDC_IDC_SET_BRADY_PAV_DELAY_LOW: 130 MS
MDC_IDC_SET_BRADY_SAV_DELAY_HIGH: 80 MS
MDC_IDC_SET_BRADY_SAV_DELAY_LOW: 130 MS
MDC_IDC_SET_LEADCHNL_RA_PACING_AMPLITUDE: 2 V
MDC_IDC_SET_LEADCHNL_RA_PACING_CAPTURE_MODE: NORMAL
MDC_IDC_SET_LEADCHNL_RA_PACING_POLARITY: NORMAL
MDC_IDC_SET_LEADCHNL_RA_PACING_PULSEWIDTH: 1 MS
MDC_IDC_SET_LEADCHNL_RA_SENSING_ADAPTATION_MODE: NORMAL
MDC_IDC_SET_LEADCHNL_RA_SENSING_POLARITY: NORMAL
MDC_IDC_SET_LEADCHNL_RA_SENSING_SENSITIVITY: 0.25 MV
MDC_IDC_SET_LEADCHNL_RV_PACING_AMPLITUDE: 1.4 V
MDC_IDC_SET_LEADCHNL_RV_PACING_CAPTURE_MODE: NORMAL
MDC_IDC_SET_LEADCHNL_RV_PACING_POLARITY: NORMAL
MDC_IDC_SET_LEADCHNL_RV_PACING_PULSEWIDTH: 0.4 MS
MDC_IDC_SET_LEADCHNL_RV_SENSING_ADAPTATION_MODE: NORMAL
MDC_IDC_SET_LEADCHNL_RV_SENSING_POLARITY: NORMAL
MDC_IDC_SET_LEADCHNL_RV_SENSING_SENSITIVITY: 1.5 MV
MDC_IDC_SET_ZONE_DETECTION_INTERVAL: 375 MS
MDC_IDC_SET_ZONE_TYPE: NORMAL
MDC_IDC_SET_ZONE_VENDOR_TYPE: NORMAL
MDC_IDC_STAT_AT_BURDEN_PERCENT: 1 %
MDC_IDC_STAT_AT_DTM_END: NORMAL
MDC_IDC_STAT_AT_DTM_START: NORMAL
MDC_IDC_STAT_BRADY_DTM_END: NORMAL
MDC_IDC_STAT_BRADY_DTM_START: NORMAL
MDC_IDC_STAT_BRADY_RA_PERCENT_PACED: 46 %
MDC_IDC_STAT_BRADY_RV_PERCENT_PACED: 100 %
MDC_IDC_STAT_EPISODE_RECENT_COUNT: 0
MDC_IDC_STAT_EPISODE_RECENT_COUNT: 38
MDC_IDC_STAT_EPISODE_RECENT_COUNT_DTM_END: NORMAL
MDC_IDC_STAT_EPISODE_RECENT_COUNT_DTM_START: NORMAL
MDC_IDC_STAT_EPISODE_TYPE: NORMAL
MDC_IDC_STAT_EPISODE_VENDOR_TYPE: NORMAL

## 2020-06-19 ENCOUNTER — ANTICOAGULATION THERAPY VISIT (OUTPATIENT)
Dept: ANTICOAGULATION | Facility: OTHER | Age: 65
End: 2020-06-19
Attending: FAMILY MEDICINE
Payer: MEDICARE

## 2020-06-19 ENCOUNTER — TRANSFERRED RECORDS (OUTPATIENT)
Dept: HEALTH INFORMATION MANAGEMENT | Facility: OTHER | Age: 65
End: 2020-06-19

## 2020-06-19 DIAGNOSIS — Z79.01 ANTICOAGULATION MONITORING, INR RANGE 2-3: ICD-10-CM

## 2020-06-19 LAB — INR PPP: 2.6 (ref 0.9–1.1)

## 2020-06-19 NOTE — PROGRESS NOTES
ANTICOAGULATION FOLLOW-UP CLINIC VISIT    Patient Name:  Lyudmila Fitzgerald  Date:  2020  Contact Type:  no call needed patient to continue same dose    SUBJECTIVE:  Patient Findings         Clinical Outcomes     Negatives:   Major bleeding event, Thromboembolic event, Anticoagulation-related hospital admission, Anticoagulation-related ED visit, Anticoagulation-related fatality           OBJECTIVE    Recent labs: (last 7 days)     20   INR 2.6*       ASSESSMENT / PLAN  INR assessment THER    Recheck INR In: 2 WEEKS    INR Location Home INR      Anticoagulation Summary  As of 2020    INR goal:   2.0-3.0   TTR:   87.5 % (8.1 mo)   INR used for dosin.6 (2020)   Warfarin maintenance plan:   6 mg (6 mg x 1) every day   Full warfarin instructions:   6 mg every day   Weekly warfarin total:   42 mg   No change documented:   Nyla Walker RN   Plan last modified:   Nyla Walker RN (2020)   Next INR check:   7/10/2020   Priority:   Maintenance   Target end date:   Indefinite    Indications    Acute thromboembolism of deep veins of lower extremity (H) (Resolved) [I82.409]  Anticoagulation monitoring  INR range 2-3 [Z79.01]             Anticoagulation Episode Summary     INR check location:       Preferred lab:       Send INR reminders to:   [facility]    Comments:   home monitor [employer]      Anticoagulation Care Providers     Provider Role Specialty Phone number    Erika Carrasco MD [facility] [phone]            See the Encounter Report to view Anticoagulation Flowsheet and Dosing Calendar (Go to Encounters tab in chart review, and find the Anticoagulation Therapy Visit)        Nyla Walker RN

## 2020-07-08 ENCOUNTER — HOSPITAL ENCOUNTER (OUTPATIENT)
Dept: MAMMOGRAPHY | Facility: OTHER | Age: 65
Discharge: HOME | End: 2020-07-08
Attending: FAMILY MEDICINE | Admitting: FAMILY MEDICINE
Payer: MEDICARE

## 2020-07-08 ENCOUNTER — TRANSFERRED RECORDS (OUTPATIENT)
Dept: HEALTH INFORMATION MANAGEMENT | Facility: OTHER | Age: 65
End: 2020-07-08

## 2020-07-08 DIAGNOSIS — Z12.31 ENCOUNTER FOR SCREENING MAMMOGRAM FOR BREAST CANCER: ICD-10-CM

## 2020-07-08 LAB — INR PPP: 2.1 (ref 0.9–1.1)

## 2020-07-08 PROCEDURE — 77067 SCR MAMMO BI INCL CAD: CPT

## 2020-07-09 ENCOUNTER — ANTICOAGULATION THERAPY VISIT (OUTPATIENT)
Dept: ANTICOAGULATION | Facility: OTHER | Age: 65
End: 2020-07-09
Attending: FAMILY MEDICINE
Payer: MEDICARE

## 2020-07-09 DIAGNOSIS — Z79.01 ANTICOAGULATION MONITORING, INR RANGE 2-3: ICD-10-CM

## 2020-07-14 ENCOUNTER — VIRTUAL VISIT (OUTPATIENT)
Dept: FAMILY MEDICINE | Facility: OTHER | Age: 65
End: 2020-07-14
Attending: FAMILY MEDICINE
Payer: MEDICARE

## 2020-07-14 VITALS
HEIGHT: 65 IN | HEART RATE: 64 BPM | SYSTOLIC BLOOD PRESSURE: 114 MMHG | DIASTOLIC BLOOD PRESSURE: 78 MMHG | WEIGHT: 145.4 LBS | TEMPERATURE: 96.4 F | BODY MASS INDEX: 24.22 KG/M2

## 2020-07-14 DIAGNOSIS — I82.409 RECURRENT DEEP VEIN THROMBOSIS (DVT) OF LOWER EXTREMITY, UNSPECIFIED LATERALITY (H): ICD-10-CM

## 2020-07-14 DIAGNOSIS — R92.8 ABNORMAL MAMMOGRAM: ICD-10-CM

## 2020-07-14 DIAGNOSIS — Z51.81 ENCOUNTER FOR MONITORING BRIDGING ANTICOAGULATION THERAPY: Primary | ICD-10-CM

## 2020-07-14 DIAGNOSIS — Z79.01 ENCOUNTER FOR MONITORING BRIDGING ANTICOAGULATION THERAPY: Primary | ICD-10-CM

## 2020-07-14 DIAGNOSIS — Z86.73 HISTORY OF STROKE: ICD-10-CM

## 2020-07-14 PROCEDURE — 99214 OFFICE O/P EST MOD 30 MIN: CPT | Mod: 95 | Performed by: FAMILY MEDICINE

## 2020-07-14 ASSESSMENT — PAIN SCALES - GENERAL: PAINLEVEL: NO PAIN (0)

## 2020-07-14 ASSESSMENT — MIFFLIN-ST. JEOR: SCORE: 1210.41

## 2020-07-14 NOTE — NURSING NOTE
Patient presents to the clinic today to discuss stopping her coumadin, prior to a breast biopsy.   Med rec complete.  Britta Bunn LPN.................. 7/14/2020 12:48 PM

## 2020-07-14 NOTE — PATIENT INSTRUCTIONS
Last dose of coumadin on the 18th - take no anticoagulation on the 19th.    On Sunday, stop your aspirin too.      Plan will be that on July 19, she will take no medication.  Starting on the 20th, she will take Lovenox twice a day at 1 mg/kg dose.  Starting on the 22nd, she will check her INR at home, she has a machine.  Most likely, hold anticoagulation that day and hopefully without any need for vitamin K.  Her procedure is on the 23rd.  After her procedure, she should restart Lovenox and Coumadin, restart Coumadin at her previous dose.  She should then have pro time clinic follow-up on the 27th or 28th.

## 2020-07-14 NOTE — PROGRESS NOTES
"Lyudmila Fitzgerald is a 64 year old female who is being evaluated via a billable video visit.      The patient has been notified of following:     \"This video visit will be conducted via a call between you and your physician/provider. We have found that certain health care needs can be provided without the need for an in-person physical exam.  This service lets us provide the care you need with a video conversation.  If a prescription is necessary we can send it directly to your pharmacy.  If lab work is needed we can place an order for that and you can then stop by our lab to have the test done at a later time.    Video visits are billed at different rates depending on your insurance coverage.  Please reach out to your insurance provider with any questions.    If during the course of the call the physician/provider feels a video visit is not appropriate, you will not be charged for this service.\"    Patient has given verbal consent for Video visit? Yes  How would you like to obtain your AVS? Jason  Patient would like the video invitation sent by: Text to cell phone: [phone]  Will anyone else be joining your video visit? No    Subjective     Lyudmila Fitzgerald is a 64 year old female who presents today via video visit for the following health issues: anticoagulation    HPI  Lyudmila Fitzgerald is a 64 year old female is assessed via video visit to discuss anticoagulation.  She is on coumadin due to her history of recurrent VTE.  These have been unprovoked.  She is a MTHFR carrier - 8/31/2006,Hx of multiple episodes of DVT: 3 lower extremity; 5 upper extremity (right arm).  She's also had SVT and PAF.    She will be undergoing breast biopsy due to abnormal mammogram.      INR   Date Value Ref Range Status   07/08/2020 2.1 (A) 0.90 - 1.10 Final        Video Start Time: 3:54 PM    Past Medical History:   Diagnosis Date     Acute thromboembolism of deep veins of lower extremity (H)     8/31/2006,Hx of multiple " episodes of DVT: 3 lower extremity; 5 upper extremity (right arm)     Acute thromboembolism of deep veins of lower extremity (H) 2018    Overview:  Hx of multiple episodes of DVT: 3 lower extremity; 5 upper extremity (right arm)     Atrial fibrillation (H)     No Comments Provided     Cardiac pacemaker in situ     Dual chamber     Cerebral thrombosis     2006,'95 w/ no residual     Coronary atherosclerosis     2006     Deep phlebothrombosis, antepartum, with delivery (H)     No Comments Provided     Endometrial hyperplasia     2006,s/p endometrial ablation      Essential hypertension     No Comments Provided     History of other genital system and obstetric disorders      8, Para 3-0-5-2     Other and unspecified angina pectoris     2006     Other and unspecified hyperlipidemia     No Comments Provided     Other postprocedural states      and ,Followed by Dr. Usman Duran, [facility],.     Paroxysmal supraventricular tachycardia (H)     2006,s/p multiple ablations w last resulting in PPM     Personal history of disease     2009,Hospitalized CVA with worsening left hemiplegia, post hospitalization ARU stay.     Personal history of transient ischemic attack (TIA) and cerebral infarction without residual deficit     with left hemiplegia     Primary hypercoagulable state (H)     No Comments Provided       Reviewed and updated as needed this visit by Provider         Review of Systems   Chest pain and respiratory symptoms stable.       Objective    Vitals - Patient Reported  Pain Score: No Pain (0)        Physical Exam    Wt Readings from Last 1 Encounters:   20 66 kg (145 lb 6.4 oz)         GENERAL: Healthy, alert and no distress  EYES: Eyes grossly normal to inspection.  No discharge or erythema, or obvious scleral/conjunctival abnormalities.  RESP: No audible wheeze, cough, or visible cyanosis.  No visible retractions or increased work  of breathing.    SKIN: Visible skin clear. No significant rash, abnormal pigmentation or lesions.  NEURO: Cranial nerves grossly intact.  Mentation and speech appropriate for age.  PSYCH: Mentation appears normal, affect normal/bright, judgement and insight intact, normal speech and appearance well-groomed.      INR   Date Value Ref Range Status   07/08/2020 2.1 (A) 0.90 - 1.10 Final                ICD-10-CM    1. Encounter for monitoring bridging anticoagulation therapy  Z51.81 enoxaparin ANTICOAGULANT (LOVENOX) 60 MG/0.6ML syringe    Z79.01    2. Recurrent deep vein thrombosis (DVT) of lower extremity, unspecified laterality (H)  I82.409 enoxaparin ANTICOAGULANT (LOVENOX) 60 MG/0.6ML syringe   3. History of stroke  Z86.73    4. Abnormal mammogram  R92.8        1.  Discussed indications for bridging.  Because of her history of recurrent DVTs, stroke, I do feel that the benefit of bridging outweighs the bleeding risk.  Plan will be that on July 19, she will take no medication//anticoagulant.  She should stop her aspirin at that point in time as well..  Starting on the 20th, she will take Lovenox twice a day at 1 mg/kg dose.  Starting on the 22nd, she will check her INR at home, she has a machine.  Most likely, hold anticoagulation that day and hopefully without any need for vitamin K.  Her procedure is on the 23rd.  After her procedure, she should restart Lovenox and Coumadin, restart Coumadin at her previous dose.  She should then have pro time clinic follow-up on the 27th or 28th.    Patient is able to repeat this information back to me.    Video-Visit Details    Type of service:  Video Visit    Video End Time:4:19 PM    Originating Location (pt. Location): Home    Distant Location (provider location):  [facility]     Platform used for Video Visit: Percy SANDOVAL MD

## 2020-07-21 ENCOUNTER — ANTICOAGULATION THERAPY VISIT (OUTPATIENT)
Dept: ANTICOAGULATION | Facility: OTHER | Age: 65
End: 2020-07-21
Attending: FAMILY MEDICINE
Payer: MEDICARE

## 2020-07-21 ENCOUNTER — TRANSFERRED RECORDS (OUTPATIENT)
Dept: HEALTH INFORMATION MANAGEMENT | Facility: OTHER | Age: 65
End: 2020-07-21

## 2020-07-21 DIAGNOSIS — Z79.01 ANTICOAGULATION MONITORING, INR RANGE 2-3: ICD-10-CM

## 2020-07-21 LAB — INR PPP: 1.4 (ref 0.9–1.1)

## 2020-07-21 NOTE — PROGRESS NOTES
ANTICOAGULATION FOLLOW-UP CLINIC VISIT    Patient Name:  Lyudmila Fitzgerald  Date:  2020  Contact Type:  Telephone/ spoke with patient reviewed dosing and Lovenox bridging    SUBJECTIVE:  Patient Findings     Positives:   Upcoming invasive procedure (having biopsy on 20), Missed doses (holding for procedure), Change in medications (using Lovenox for bridging)             OBJECTIVE    Recent labs: (last 7 days)     20   INR 1.4*       ASSESSMENT / PLAN  INR assessment SUB    Recheck INR In: 5 DAYS    INR Location Home INR      Anticoagulation Summary  As of 2020    INR goal:   2.0-3.0   TTR:   84.9 % (9.2 mo)   INR used for dosin.4! (2020)   Warfarin maintenance plan:   6 mg (6 mg x 1) every day   Full warfarin instructions:   : Hold; : Hold; Otherwise 6 mg every day   Weekly warfarin total:   42 mg   Plan last modified:   Nyla Walker RN (2020)   Next INR check:   2020   Priority:   Maintenance   Target end date:   Indefinite    Indications    Acute thromboembolism of deep veins of lower extremity (H) (Resolved) [I82.409]  Anticoagulation monitoring  INR range 2-3 [Z79.01]             Anticoagulation Episode Summary     INR check location:       Preferred lab:       Send INR reminders to:   [facility]    Comments:   home monitor [employer]      Anticoagulation Care Providers     Provider Role Specialty Phone number    Erika Carrasco MD [facility] [phone]            See the Encounter Report to view Anticoagulation Flowsheet and Dosing Calendar (Go to Encounters tab in chart review, and find the Anticoagulation Therapy Visit)        Nyla Walker, RN

## 2020-07-23 ENCOUNTER — HOSPITAL ENCOUNTER (OUTPATIENT)
Dept: MAMMOGRAPHY | Facility: OTHER | Age: 65
End: 2020-07-23
Attending: FAMILY MEDICINE
Payer: MEDICARE

## 2020-07-23 ENCOUNTER — ANTICOAGULATION THERAPY VISIT (OUTPATIENT)
Dept: ANTICOAGULATION | Facility: OTHER | Age: 65
End: 2020-07-23
Attending: FAMILY MEDICINE
Payer: MEDICARE

## 2020-07-23 VITALS — RESPIRATION RATE: 14 BRPM | DIASTOLIC BLOOD PRESSURE: 85 MMHG | HEART RATE: 70 BPM | SYSTOLIC BLOOD PRESSURE: 140 MMHG

## 2020-07-23 DIAGNOSIS — R92.1 CALCIFICATION OF LEFT BREAST: ICD-10-CM

## 2020-07-23 DIAGNOSIS — Z79.01 ANTICOAGULATION MONITORING, INR RANGE 2-3: ICD-10-CM

## 2020-07-23 LAB — INR POINT OF CARE: 1 (ref 0.86–1.14)

## 2020-07-23 PROCEDURE — G0279 TOMOSYNTHESIS, MAMMO: HCPCS

## 2020-07-23 PROCEDURE — 19081 BX BREAST 1ST LESION STRTCTC: CPT | Mod: LT

## 2020-07-23 PROCEDURE — 40000986 MA POST PROCEDURE LEFT

## 2020-07-23 PROCEDURE — 88305 TISSUE EXAM BY PATHOLOGIST: CPT

## 2020-07-23 PROCEDURE — 25000125 ZZHC RX 250: Performed by: RADIOLOGY

## 2020-07-23 PROCEDURE — 36416 COLLJ CAPILLARY BLOOD SPEC: CPT | Mod: ZL

## 2020-07-23 RX ORDER — LIDOCAINE HYDROCHLORIDE AND EPINEPHRINE 10; 10 MG/ML; UG/ML
20 INJECTION, SOLUTION INFILTRATION; PERINEURAL ONCE
Status: COMPLETED | OUTPATIENT
Start: 2020-07-23 | End: 2020-07-23

## 2020-07-23 RX ORDER — LIDOCAINE HYDROCHLORIDE 10 MG/ML
20 INJECTION, SOLUTION EPIDURAL; INFILTRATION; INTRACAUDAL; PERINEURAL ONCE
Status: COMPLETED | OUTPATIENT
Start: 2020-07-23 | End: 2020-07-23

## 2020-07-23 RX ADMIN — LIDOCAINE HYDROCHLORIDE 7 ML: 10 INJECTION, SOLUTION INFILTRATION; PERINEURAL at 08:43

## 2020-07-23 RX ADMIN — LIDOCAINE HYDROCHLORIDE,EPINEPHRINE BITARTRATE 2 ML: 10; .01 INJECTION, SOLUTION INFILTRATION; PERINEURAL at 08:46

## 2020-07-23 NOTE — PROGRESS NOTES
ANTICOAGULATION FOLLOW-UP CLINIC VISIT    Patient Name:  Lyudmila Fitzgerald  Date:  2020  Contact Type:  Face to Face    SUBJECTIVE:  Patient Findings     Positives:   Upcoming invasive procedure (having biospy today), Missed doses (holding for procedure taking Lovenox for bridging)             OBJECTIVE    Recent labs: (last 7 days)     20   INR 1.0       ASSESSMENT / PLAN  INR assessment SUB    Recheck INR In: 1 WEEK    INR Location Clinic      Anticoagulation Summary  As of 2020    INR goal:   2.0-3.0   TTR:   84.3 % (9.2 mo)   INR used for dosin.0! (2020)   Warfarin maintenance plan:   6 mg (6 mg x 1) every day   Full warfarin instructions:   6 mg every day   Weekly warfarin total:   42 mg   No change documented:   Nyla Walker RN   Plan last modified:   Nyla Walker RN (2020)   Next INR check:   2020   Priority:   Maintenance   Target end date:   Indefinite    Indications    Acute thromboembolism of deep veins of lower extremity (H) (Resolved) [I82.409]  Anticoagulation monitoring  INR range 2-3 [Z79.01]             Anticoagulation Episode Summary     INR check location:       Preferred lab:       Send INR reminders to:   [facility]    Comments:   home monitor [employer]      Anticoagulation Care Providers     Provider Role Specialty Phone number    Erika Carrasco MD [facility] [phone]            See the Encounter Report to view Anticoagulation Flowsheet and Dosing Calendar (Go to Encounters tab in chart review, and find the Anticoagulation Therapy Visit)        Nyla Walker RN

## 2020-07-23 NOTE — PROGRESS NOTES
Patient here for stereotactic guided biopsy of left breast.  Procedure reviewed with patient by writer and radiologist, questions answered.  Time out performed prior to biopsy.  Biopsy completed by radiologist, clip placed.  Pressure held to biopsy site for 10 minutes.  Medipore dressing and steristrips applied.   Post clip mammogram completed.  Discharge instructions reviewed with patient, patient verbalizes understanding of instructions.  Discharged to home in stable condition with no evidence of bleeding from biopsy site.   Karina Sams RN.

## 2020-07-23 NOTE — DISCHARGE INSTRUCTIONS
"NEEDLE BIOPSY BREAST    Activity: Rest the remainder of the day. You may resume normal activity after the next day. Avoid any vigorous/strenuous physical activity for 24 hours.    Comfort: If you have discomfort or tenderness at the site you may take your usual or recommended pain medication. Do not take aspirin the day of the procedure or for 48 hours following the biopsy.    Diet: You may resume your usual diet.    Care of site: Leave ice pack in place for 4 hours, or until it is no longer cold. The ice pack is reusable and may be refrozen.  Keep your bra and the dressing on for 24 hours. Then you may remove the bandage and shower. If there are steri-strips you may remove them in 3 to 5 days.  You may have some discomfort and a small amount of bruising where the biopsy was performed. This is normal. For several days or even a couple of weeks, you may have tenderness or \"twinges\" and a tiny bump where the needle went into the skin. This can be bothersome, but is not abnormal. You can use warm moist washcloths, as this may help. Do Not Use A Heating Pad.    RETURN TO THE EMERGENCY ROOM FOR:   Shortness of breath   Rapid heart rate   If pain becomes worse    Call Your Doctor For:    A fever over 101 degrees   Increased redness, increased swelling, and/or persistent drainage/discomfort  around the site    Other: At the end of your breast biopsy, a tiny titanium clip will be inserted through the biopsy needle and placed at the biopsy site within your breast. The marker provides a landmark of the biopsy for further mammograms or surgical procedures. This marker is MRI compatible and poses no known health risks.    You will be receiving a letter in the mail from [facility] Mammography Department with your biopsy results.  In 6 months a mammogram will be needed to establish a new baseline and to recheck the area where the biopsy occurred. Our radiology department will call you to schedule an appointment.    For " "questions, problems or concerns, contact the Radiology Department at [phone].    NEEDLE BIOPSY BREAST    Activity: Rest the remainder of the day. You may resume normal activity after the next day. Avoid any vigorous/strenuous physical activity for 24 hours.    Comfort: If you have discomfort or tenderness at the site you may take your usual or recommended pain medication. Do not take aspirin the day of the procedure or for 48 hours following the biopsy.    Diet: You may resume your usual diet.    Care of site: Leave ice pack in place for 4 hours, or until it is no longer cold. The ice pack is reusable and may be refrozen.  Keep your bra and the dressing on for 24 hours. Then you may remove the bandage and shower. If there are steri-strips you may remove them in 3 to 5 days.  You may have some discomfort and a small amount of bruising where the biopsy was performed. This is normal. For several days or even a couple of weeks, you may have tenderness or \"twinges\" and a tiny bump where the needle went into the skin. This can be bothersome, but is not abnormal. You can use warm moist washcloths, as this may help. Do Not Use A Heating Pad.    RETURN TO THE EMERGENCY ROOM FOR:   Shortness of breath   Rapid heart rate   If pain becomes worse    Call Your Doctor For:    A fever over 101 degrees   Increased redness, increased swelling, and/or persistent drainage/discomfort  around the site    Other: At the end of your breast biopsy, a tiny titanium clip will be inserted through the biopsy needle and placed at the biopsy site within your breast. The marker provides a landmark of the biopsy for further mammograms or surgical procedures. This marker is MRI compatible and poses no known health risks.    You will be receiving a letter in the mail from [facility]ammography Department with your biopsy results.  In 6 months a mammogram will be needed to establish a new baseline and to recheck the area where the biopsy occurred. " Our radiology department will call you to schedule an appointment.    For questions, problems or concerns, contact the Radiology Department at [phone].

## 2020-07-24 ENCOUNTER — TELEPHONE (OUTPATIENT)
Dept: FAMILY MEDICINE | Facility: OTHER | Age: 65
End: 2020-07-24

## 2020-07-24 NOTE — TELEPHONE ENCOUNTER
Called patient to check on status post stereotactic breast biopsy.  Patient reports pain 0/10. Patient reports no bleeding.  Patient verbalizes understanding of importance of attending results appointment with Dr. Fang on 7/28.  Karina Sams RN.

## 2020-07-25 ENCOUNTER — TRANSFERRED RECORDS (OUTPATIENT)
Dept: HEALTH INFORMATION MANAGEMENT | Facility: OTHER | Age: 65
End: 2020-07-25

## 2020-07-25 LAB — INR PPP: 1.1 (ref 0.9–1.1)

## 2020-07-27 ENCOUNTER — TRANSFERRED RECORDS (OUTPATIENT)
Dept: HEALTH INFORMATION MANAGEMENT | Facility: OTHER | Age: 65
End: 2020-07-27

## 2020-07-27 ENCOUNTER — ANTICOAGULATION THERAPY VISIT (OUTPATIENT)
Dept: ANTICOAGULATION | Facility: OTHER | Age: 65
End: 2020-07-27
Attending: FAMILY MEDICINE
Payer: MEDICARE

## 2020-07-27 DIAGNOSIS — Z79.01 ANTICOAGULATION MONITORING, INR RANGE 2-3: ICD-10-CM

## 2020-07-27 LAB — INR PPP: 1.2 (ref 0.9–1.1)

## 2020-07-27 NOTE — PROGRESS NOTES
ANTICOAGULATION FOLLOW-UP CLINIC VISIT    Patient Name:  Lyudmila Fitzgerald  Date:  2020  Contact Type:  fax from [facility] home INR monitoring service/phone call with patient.    SUBJECTIVE:  Patient Findings     Comments:   Continues with Lovenox        Clinical Outcomes     Negatives:   Major bleeding event, Thromboembolic event, Anticoagulation-related hospital admission, Anticoagulation-related ED visit, Anticoagulation-related fatality    Comments:   Continues with Lovenox           OBJECTIVE    Recent labs: (last 7 days)     20   INR 1.2*       ASSESSMENT / PLAN  INR assessment SUB held doses   Recheck INR In: 2 DAYS    INR Location Home INR      Anticoagulation Summary  As of 2020    INR goal:   2.0-3.0   TTR:   83.1 % (9.4 mo)   INR used for dosin.1! (2020)   Warfarin maintenance plan:   6 mg (6 mg x 1) every day   Full warfarin instructions:   : 12 mg; : 9 mg; Otherwise 6 mg every day   Weekly warfarin total:   42 mg   Plan last modified:   Nyla Walker RN (2020)   Next INR check:   2020   Priority:   Maintenance   Target end date:   Indefinite    Indications    Acute thromboembolism of deep veins of lower extremity (H) (Resolved) [I82.409]  Anticoagulation monitoring  INR range 2-3 [Z79.01]             Anticoagulation Episode Summary     INR check location:       Preferred lab:       Send INR reminders to:   [facility]    Comments:   home monitor [employer]      Anticoagulation Care Providers     Provider Role Specialty Phone number    Erika Carrasco MD [facility] [phone]            See the Encounter Report to view Anticoagulation Flowsheet and Dosing Calendar (Go to Encounters tab in chart review, and find the Anticoagulation Therapy Visit)    No encounter, INR  received via fax.  Assessment and istructions via phone call with patient. Patient verbalized understanding.      Arleth Sheth, IMELDA

## 2020-07-28 ENCOUNTER — OFFICE VISIT (OUTPATIENT)
Dept: SURGERY | Facility: OTHER | Age: 65
End: 2020-07-28
Attending: SURGERY
Payer: MEDICARE

## 2020-07-28 VITALS
BODY MASS INDEX: 24.63 KG/M2 | RESPIRATION RATE: 16 BRPM | WEIGHT: 148 LBS | DIASTOLIC BLOOD PRESSURE: 84 MMHG | SYSTOLIC BLOOD PRESSURE: 138 MMHG | TEMPERATURE: 97.5 F | HEART RATE: 84 BPM

## 2020-07-28 DIAGNOSIS — R92.8 ABNORMAL MAMMOGRAM OF LEFT BREAST: Primary | ICD-10-CM

## 2020-07-28 DIAGNOSIS — D24.2 FIBROADENOMA OF BREAST, LEFT: ICD-10-CM

## 2020-07-28 PROCEDURE — 99203 OFFICE O/P NEW LOW 30 MIN: CPT | Performed by: SURGERY

## 2020-07-28 PROCEDURE — G0463 HOSPITAL OUTPT CLINIC VISIT: HCPCS

## 2020-07-28 ASSESSMENT — PAIN SCALES - GENERAL: PAINLEVEL: NO PAIN (0)

## 2020-07-28 NOTE — NURSING NOTE
"Chief Complaint   Patient presents with     Consult     left breast       Initial /84 (BP Location: Right arm, Patient Position: Sitting, Cuff Size: Adult Regular)   Pulse 84   Temp 97.5  F (36.4  C) (Temporal)   Resp 16   Wt 67.1 kg (148 lb)   BMI 24.63 kg/m   Estimated body mass index is 24.63 kg/m  as calculated from the following:    Height as of 7/14/20: 1.651 m (5' 5\").    Weight as of this encounter: 67.1 kg (148 lb).  Medication Reconciliation: complete    Geovanna Kelly LPN    "

## 2020-07-28 NOTE — PROGRESS NOTES
OFFICECONSULTATION NOTE  Patient Name: Lyudmila Fitzgerald  Address: [address] [address]  Age:64 year old  Sex: female     Primary Care Physician: MARYURI SANDOVAL MD    I was requested to see this patient in consultation by MARYURI SANDOVAL MD for evaluation of left  breast nodule. A copy of this note will be sent to MARYURI SANDOVAL MD.    HPI:   The patient is 64 year old female with increasing calcifications noted in the left breast on recent mammogram. The patient hasn't noted any skin, nipple or breast changes. No previous breast cancer. No previous breast biopsy. No family history of breast cancer. The patient had biopsy performed that showed fibroadenoma with calcifications. No atypia or malignancy was identified.     CONSULTATION ASSESSMENT AND PLAN/RECOMMENDATIONS: Fibroadenoma left breast  I discussed with the patient the pathophysiology of breast nodules and breast disease. We specifically discussed that most abnormalities seen on mammogram and US are not breast cancer. I explained that fibroadenomas are not a precancerous condition and that they don't turn into breast cancer. I recommended a 6 month left breast mammogram to follow up breast changes after the recent biopsy. The patient expressed understanding and denies further questions. The patient will call with questions or concerns.     REVIEW OF SYSTEMS  GENERAL: No fevers or chills. Denies fatigue, recent weight loss.  HEENT: No sinus drainage. No changes with vision or hearing. No difficulty swallowing.   LYMPHATICS:  No swollen nodes in axilla, neck or groin.  CARDIOVASCULAR: Denies chest pain, palpitations and dyspnea on exertion.  PULMONARY: No shortness of breath or cough. No increase in sputum production.  GI:Denies melena, bright red blood in stools. No hematemesis. No constipation or diarrhea.  : No dysuria or hematuria.  SKIN: No recent rashes or ulcers.   HEMATOLOGY:  has easy  bruising/bleeding.  ENDOCRINE:  No history of diabetes or thyroid problems.  NEUROLOGY:  No history of seizures or headaches. No motor or sensory changes.  BREAST:  Denies breast pain or changes.    Past Medical History:   Diagnosis Date     Acute thromboembolism of deep veins of lower extremity (H)     Overview:  Hx of multiple episodes of DVT: 3 lower extremity; 5 upper extremity (right arm)     Atrial fibrillation (H)     No Comments Provided     Cardiac pacemaker in situ     Dual chamber     Cerebral thrombosis     2006,'95 w/ no residual     Coronary atherosclerosis     2006     Endometrial hyperplasia     s/p endometrial ablation      Essential hypertension     No Comments Provided     History of other genital system and obstetric disorders      8, Para 3-0-5-2     Other and unspecified angina pectoris     2006     Other and unspecified hyperlipidemia     No Comments Provided     Other postprocedural states      and ,Followed by Dr. Usman Duran, [facility],.     Paroxysmal supraventricular tachycardia (H)     2006,s/p multiple ablations w last resulting in PPM     Personal history of transient ischemic attack (TIA) and cerebral infarction without residual deficit 2009    with left hemiplegia     Primary hypercoagulable state (H)     No Comments Provided     Past Surgical History:   Procedure Laterality Date     ARTHROSCOPY KNEE      ,Arthroscopy for chondromalacia patella     AS CABG, ARTERY-VEIN, SINGLE  11/15/2016    Single vessel; done in [place]     ATTEMPTED ARTHROSCOPY      ,Right arthroscopy     BIOPSY BREAST      ,Breast cyst aspiration     COLONOSCOPY      2007,follow up recommended in 10 years.     ENDOSCOPIC SINUS SURGERY      ,Sinus node ablation.     EP ABLATION ATRIAL FLUTTER N/A 2020    Procedure: EP VENOGRAM SVC;  Surgeon: Hakeem Moore MD;  Location:  [facility]     EP  "PACEMAKER N/A 5/20/2020    Procedure: EP PACEMAKER;  Surgeon: Tima Johnson MD;  Location:  [facility]     EP STUDY  12/1994    EP STUDY /ABLATION,AV re-entry tachycardia, tessie ablation     HEART CATH, ANGIOPLASTY      01/06,Stenting placed LAD after ergonovine provocation confirmed     HEART CATH, ANGIOPLASTY      03/06,90% lesion, normal left ventricular function     IMPLANT PACEMAKER      03/03,Guidant pacemaker placement, AV tessie ablation     LAPAROSCOPIC ABLATION ENDOMETRIOSIS      06/06     OTHER SURGICAL HISTORY      3/24/06,892916,([place]) [facility] STENT ANGIO     OTHER SURGICAL HISTORY      08/02,530026,EP STUDY /ABLATION     OTHER SURGICAL HISTORY      11/04,68429.0,CT CORONARY ANGIOGRAM ([place]),Coronary angiogram, failed ablation     OTHER SURGICAL HISTORY      12/04,443666,[facility] RELOCATION OF SKIN POCKET PACEMAKER,[facility] 5/24/05 reconfiguration of pacemaker \"pocket\"     OTHER SURGICAL HISTORY      871898,IP CONSULT TO ELECTROPHYSIOLOGY,study and lead change     OTHER SURGICAL HISTORY      12/06,105570,NEUROSTIMULATOR,Nerve stimulator implanted for chest pain control     OTHER SURGICAL HISTORY      12/2008,62313.0,CT CORONARY ANGIOGRAM ([place]),with increased left-sided weakness and vertigo, negative CT angiogram.     REPLACE PACEMAKER GENERATOR      12/29/04,Replacement of left ventricular pacemaker lead.     STENT  02/2017    LAD      Current Outpatient Medications   Medication     amLODIPine (NORVASC) 5 MG tablet     aspirin EC 81 MG EC tablet     atorvastatin (LIPITOR) 80 MG tablet     calcium carb 1250 mg, 500 mg [place],/vitamin D 200 unit (CALCIUM 500/D) 500-200 MG-UNIT per tablet     diltiazem ER (DILT-XR) 240 MG 24 hr ER beaded capsule     isosorbide mononitrate (IMDUR) 30 MG 24 hr tablet     lisinopril (ZESTRIL) 5 MG tablet     nitroGLYcerin (NITROSTAT) 0.4 MG sublingual tablet     warfarin ANTICOAGULANT (COUMADIN) 6 MG tablet     zolpidem (AMBIEN) 5 MG tablet     No current " facility-administered medications for this visit.      Allergies   Allergen Reactions     Morphine Nausea and Vomiting     OK in small doses     Prednisone Nausea and Vomiting     Per IV     Sotalol Nausea and Vomiting     Family History   Problem Relation Age of Onset     Dementia Mother      Skin Cancer Father         squamous     Dementia Father      Cerebrovascular Disease Father      No Known Problems Sister      Deep Vein Thrombosis Daughter         FVL +     Family History Negative Son         Good Health,lives in [facility]     Breast Cancer Paternal Grandmother         Cancer-breast     Breast Cancer Paternal Aunt         Cancer-breast     Hyperlipidemia Brother      Social History     Socioeconomic History     Marital status:      Spouse name: Antonio     Number of children: 2     Years of education: 16+     Highest education level: Master's degree (e.g., MA, MS, [employer], MEd, MSW, [employer])   Occupational History     None   Social Needs     Financial resource strain: Not hard at all     Food insecurity     Worry: Never true     Inability: Never true     Transportation needs     Medical: No     Non-medical: No   Tobacco Use     Smoking status: Never Smoker     Smokeless tobacco: Never Used     Tobacco comment: Quit smoking: spouse does not smoke in the house. Exposed in cars.   Substance and Sexual Activity     Alcohol use: Yes     Comment: Alcoholic Drinks/day: Alcoholic Drinks/day: 1-2 drinks twice a week     Drug use: Never     Sexual activity: Not Currently     Partners: Male   Lifestyle     Physical activity     Days per week: 0 days     Minutes per session: 0 min     Stress: To some extent   Relationships     Social connections     Talks on phone: None     Gets together: None     Attends [employer] service: None     Active member of club or organization: None     Attends meetings of clubs or organizations: None     Relationship status: None     Intimate partner violence     Fear of current or ex partner:  None     Emotionally abused: None     Physically abused: None     Forced sexual activity: None   Other Topics Concern     None   Social History Narrative     2nd Marriage x 20 years to  Anthony they live in [place], [place]       in [place]. Completed her Master's Degree 2003. Taught Special Education.  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      Total 5 children blended family-3 dgts. And 2 sons    Daughter:  Philip    Son:  Anthony - lives in [facility]    6 Grand-children 5 boys and 1 girl    Leigh parents' and siblings live in [facility].     The above history was reviewed today, 7/28/2020    PHYSICAL EXAM  Vitals: /84 (BP Location: Right arm, Patient Position: Sitting, Cuff Size: Adult Regular)   Pulse 84   Temp 97.5  F (36.4  C) (Temporal)   Resp 16   Wt 67.1 kg (148 lb)   BMI 24.63 kg/m    GENERAL: Healthy appearing patient in no acute distress. Pleasant and cooperative with exam and interview.   HEENT: Head-normocephalic. Eyes-no scleral icterus. Nose-no nasal drainage. No lesions. Mouth-oral mucosa pink and moist, no lesions.  NECK: Supple. No thyroid nodules. Trachea midline.  LYMPHATICS:  No cervical, axillary or supraclavicular adenopathy.  CV: Regular rate and rhythm, no murmurs. No peripheral edema.  LUNGS:  No respiratory distress. Clear bilaterally to auscultation.  ABDOMEN: Non distended. Bowel sounds active. Soft, non-tender, no hepatosplenomegaly or hernias. No peritoneal signs.  SKIN: Pink, warm and dry. No jaundice. No rash.  NEURO:  Cranial nerves II-XII grossly intact. Alert and oriented.  PSYCH: Appropriate mood and affect.  BREAST: Breasts were examined in theseated and supine position. No mass noted bilaterally. No nipple changes or discharge bilaterally. Post biopsy changes noted left breast. Resolving ecchymosis with no sign of infection. Appropriate tenderness noted.    IMAGING/LAB  I personally reviewed patient's recent mammogram and biopsy images  and report and pathology report.

## 2020-07-30 ENCOUNTER — TRANSFERRED RECORDS (OUTPATIENT)
Dept: HEALTH INFORMATION MANAGEMENT | Facility: OTHER | Age: 65
End: 2020-07-30

## 2020-07-30 ENCOUNTER — ANTICOAGULATION THERAPY VISIT (OUTPATIENT)
Dept: ANTICOAGULATION | Facility: OTHER | Age: 65
End: 2020-07-30
Attending: FAMILY MEDICINE
Payer: MEDICARE

## 2020-07-30 DIAGNOSIS — Z79.01 ANTICOAGULATION MONITORING, INR RANGE 2-3: ICD-10-CM

## 2020-07-30 LAB — INR PPP: 3.8 (ref 0.9–1.1)

## 2020-07-30 NOTE — PROGRESS NOTES
ANTICOAGULATION FOLLOW-UP CLINIC VISIT    Patient Name:  Lyudmila Fitzgerald  Date:  7/30/2020  Contact Type:  Telephone/fax from [employer]    SUBJECTIVE:  Patient Findings     Comments:   Per phone call with patient.          Clinical Outcomes     Negatives:   Major bleeding event, Thromboembolic event, Anticoagulation-related hospital admission, Anticoagulation-related ED visit, Anticoagulation-related fatality    Comments:   Per phone call with patient.             OBJECTIVE    Recent labs: (last 7 days)     07/30/20   INR 3.8*       ASSESSMENT / PLAN  INR assessment SUPRA    Recheck INR In: 1 WEEK    INR Location Home INR      Anticoagulation Summary  As of 7/30/2020    INR goal:   2.0-3.0   TTR:   82.7 % (9.5 mo)   INR used for dosing:   3.8! (7/30/2020)   Warfarin maintenance plan:   6 mg (6 mg x 1) every day   Full warfarin instructions:   7/30: 3 mg; Otherwise 6 mg every day   Weekly warfarin total:   42 mg   Plan last modified:   Nyla Walker RN (4/9/2020)   Next INR check:   8/6/2020   Priority:   Maintenance   Target end date:   Indefinite    Indications    Acute thromboembolism of deep veins of lower extremity (H) (Resolved) [I82.409]  Anticoagulation monitoring  INR range 2-3 [Z79.01]             Anticoagulation Episode Summary     INR check location:       Preferred lab:       Send INR reminders to:   [facility]    Comments:   home monitor [employer]      Anticoagulation Care Providers     Provider Role Specialty Phone number    Erika Carrasco MD [facility] [phone]            See the Encounter Report to view Anticoagulation Flowsheet and Dosing Calendar (Go to Encounters tab in chart review, and find the Anticoagulation Therapy Visit)    No encounter, INR  received via fax.  Assessment and istructions via phone call with patient. Patient verbalized understanding.      Arleth Sheth, RN

## 2020-08-03 ENCOUNTER — ALLIED HEALTH/NURSE VISIT (OUTPATIENT)
Dept: FAMILY MEDICINE | Facility: OTHER | Age: 65
End: 2020-08-03
Payer: MEDICARE

## 2020-08-03 DIAGNOSIS — Z29.9 PROPHYLACTIC MEASURE: Primary | ICD-10-CM

## 2020-08-03 PROCEDURE — C9803 HOPD COVID-19 SPEC COLLECT: HCPCS

## 2020-08-03 PROCEDURE — U0003 INFECTIOUS AGENT DETECTION BY NUCLEIC ACID (DNA OR RNA); SEVERE ACUTE RESPIRATORY SYNDROME CORONAVIRUS 2 (SARS-COV-2) (CORONAVIRUS DISEASE [COVID-19]), AMPLIFIED PROBE TECHNIQUE, MAKING USE OF HIGH THROUGHPUT TECHNOLOGIES AS DESCRIBED BY CMS-2020-01-R: HCPCS | Mod: ZL

## 2020-08-03 PROCEDURE — 99207 ZZC NO CHARGE NURSE ONLY: CPT

## 2020-08-04 DIAGNOSIS — Z95.0 PACEMAKER-DEPENDENT DUE TO NATIVE CARDIAC RHYTHM INSUFFICIENT TO SUPPORT LIFE: ICD-10-CM

## 2020-08-04 DIAGNOSIS — Z98.890 S/P AV NODAL ABLATION: ICD-10-CM

## 2020-08-04 DIAGNOSIS — I49.8 PACEMAKER-DEPENDENT DUE TO NATIVE CARDIAC RHYTHM INSUFFICIENT TO SUPPORT LIFE: ICD-10-CM

## 2020-08-04 DIAGNOSIS — I47.10 PAROXYSMAL SUPRAVENTRICULAR TACHYCARDIA (H): Primary | ICD-10-CM

## 2020-08-04 LAB
SARS-COV-2 RNA SPEC QL NAA+PROBE: NOT DETECTED
SPECIMEN SOURCE: NORMAL

## 2020-08-05 ENCOUNTER — ANTICOAGULATION THERAPY VISIT (OUTPATIENT)
Dept: ANTICOAGULATION | Facility: OTHER | Age: 65
End: 2020-08-05
Attending: FAMILY MEDICINE
Payer: MEDICARE

## 2020-08-05 ENCOUNTER — TRANSFERRED RECORDS (OUTPATIENT)
Dept: HEALTH INFORMATION MANAGEMENT | Facility: OTHER | Age: 65
End: 2020-08-05

## 2020-08-05 DIAGNOSIS — Z79.01 ANTICOAGULATION MONITORING, INR RANGE 2-3: ICD-10-CM

## 2020-08-05 LAB — INR PPP: 2.1 (ref 0.9–1.1)

## 2020-08-05 NOTE — PROGRESS NOTES
ANTICOAGULATION FOLLOW-UP CLINIC VISIT    Patient Name:  Lyudmila Fitzgerald  Date:  2020  Contact Type:  Telephone/fax from [employer]        SUBJECTIVE:  Patient Findings     Comments:   No encounter, INR  received via fax.  Assessment and istructions via phone call with patient. Patient verbalized understanding.          Clinical Outcomes     Comments:   No encounter, INR  received via fax.  Assessment and istructions via phone call with patient. Patient verbalized understanding.             OBJECTIVE    Recent labs: (last 7 days)     20   INR 2.1*       ASSESSMENT / PLAN  INR assessment THER    Recheck INR In: 1 WEEK    INR Location Home INR      Anticoagulation Summary  As of 2020    INR goal:   2.0-3.0   TTR:   82.0 % (9.7 mo)   INR used for dosin.1 (2020)   Warfarin maintenance plan:   6 mg (6 mg x 1) every day   Full warfarin instructions:   6 mg every day   Weekly warfarin total:   42 mg   No change documented:   Selma Ramirez RN   Plan last modified:   Nyla Walker RN (2020)   Next INR check:   2020   Priority:   Maintenance   Target end date:   Indefinite    Indications    Acute thromboembolism of deep veins of lower extremity (H) (Resolved) [I82.409]  Anticoagulation monitoring  INR range 2-3 [Z79.01]             Anticoagulation Episode Summary     INR check location:       Preferred lab:       Send INR reminders to:   [facility]    Comments:   home monitor [employer]      Anticoagulation Care Providers     Provider Role Specialty Phone number    Erika Carrasco MD [facility] [phone]            See the Encounter Report to view Anticoagulation Flowsheet and Dosing Calendar (Go to Encounters tab in chart review, and find the Anticoagulation Therapy Visit)    No encounter, INR  received via fax.  Assessment and istructions via phone call with patient. Patient verbalized understanding.      Selma Ramirez RN

## 2020-08-06 ENCOUNTER — HOSPITAL ENCOUNTER (OUTPATIENT)
Dept: RESPIRATORY THERAPY | Facility: OTHER | Age: 65
Discharge: HOME | End: 2020-08-06
Attending: FAMILY MEDICINE | Admitting: FAMILY MEDICINE
Payer: MEDICARE

## 2020-08-06 DIAGNOSIS — R93.89 ABNORMAL CHEST X-RAY: ICD-10-CM

## 2020-08-06 PROCEDURE — 94729 DIFFUSING CAPACITY: CPT | Mod: 26 | Performed by: INTERNAL MEDICINE

## 2020-08-06 PROCEDURE — 94010 BREATHING CAPACITY TEST: CPT

## 2020-08-06 PROCEDURE — 94010 BREATHING CAPACITY TEST: CPT | Mod: 26 | Performed by: INTERNAL MEDICINE

## 2020-08-06 PROCEDURE — 94726 PLETHYSMOGRAPHY LUNG VOLUMES: CPT | Mod: 26 | Performed by: INTERNAL MEDICINE

## 2020-08-06 PROCEDURE — 94729 DIFFUSING CAPACITY: CPT

## 2020-08-06 PROCEDURE — 94726 PLETHYSMOGRAPHY LUNG VOLUMES: CPT

## 2020-08-06 PROCEDURE — 40000275 ZZH STATISTIC RCP TIME EA 10 MIN

## 2020-08-14 ENCOUNTER — MYC MEDICAL ADVICE (OUTPATIENT)
Dept: FAMILY MEDICINE | Facility: OTHER | Age: 65
End: 2020-08-14

## 2020-08-14 DIAGNOSIS — F51.04 CHRONIC INSOMNIA: ICD-10-CM

## 2020-08-14 DIAGNOSIS — G47.19 EXCESSIVE DAYTIME SLEEPINESS: Primary | ICD-10-CM

## 2020-08-14 DIAGNOSIS — I47.10 PAROXYSMAL SUPRAVENTRICULAR TACHYCARDIA (H): ICD-10-CM

## 2020-08-14 NOTE — TELEPHONE ENCOUNTER
Patient states that she had a sleep study in 2004/2005 and was prescribed a cpap then for sleep disturbances.  She states that [facility] also told her she should request a rx for a CPAP to oxygenate her heart more at night.  Do you want her to repeat the sleep study?  Zully Chi LPN........................8/14/2020  3:11 PM

## 2020-08-14 NOTE — TELEPHONE ENCOUNTER
Yes - if she needs a new machine she needs a new study or at minimum consult with Dr Middleton.  Erika Gupta MD

## 2020-08-19 ENCOUNTER — ANTICOAGULATION THERAPY VISIT (OUTPATIENT)
Dept: ANTICOAGULATION | Facility: OTHER | Age: 65
End: 2020-08-19
Attending: FAMILY MEDICINE
Payer: MEDICARE

## 2020-08-19 ENCOUNTER — APPOINTMENT (OUTPATIENT)
Dept: GENERAL RADIOLOGY | Facility: OTHER | Age: 65
End: 2020-08-19
Attending: PHYSICIAN ASSISTANT
Payer: MEDICARE

## 2020-08-19 ENCOUNTER — TRANSFERRED RECORDS (OUTPATIENT)
Dept: HEALTH INFORMATION MANAGEMENT | Facility: OTHER | Age: 65
End: 2020-08-19

## 2020-08-19 ENCOUNTER — MYC MEDICAL ADVICE (OUTPATIENT)
Dept: CARDIOLOGY | Facility: OTHER | Age: 65
End: 2020-08-19

## 2020-08-19 ENCOUNTER — HOSPITAL ENCOUNTER (EMERGENCY)
Facility: OTHER | Age: 65
Discharge: ANOTHER HOSPITAL | End: 2020-08-19
Attending: FAMILY MEDICINE | Admitting: FAMILY MEDICINE
Payer: MEDICARE

## 2020-08-19 VITALS
TEMPERATURE: 97.6 F | SYSTOLIC BLOOD PRESSURE: 120 MMHG | DIASTOLIC BLOOD PRESSURE: 79 MMHG | HEART RATE: 60 BPM | OXYGEN SATURATION: 96 % | RESPIRATION RATE: 16 BRPM

## 2020-08-19 DIAGNOSIS — R07.9 CHEST PAIN WITH HIGH RISK FOR CARDIAC ETIOLOGY: ICD-10-CM

## 2020-08-19 DIAGNOSIS — Z79.01 ANTICOAGULATION MONITORING, INR RANGE 2-3: ICD-10-CM

## 2020-08-19 LAB
ALBUMIN SERPL-MCNC: 4.4 G/DL (ref 3.5–5.7)
ALP SERPL-CCNC: 91 U/L (ref 34–104)
ALT SERPL W P-5'-P-CCNC: 26 U/L (ref 7–52)
ANION GAP SERPL CALCULATED.3IONS-SCNC: 10 MMOL/L (ref 3–14)
APTT PPP: 38 SEC (ref 22–37)
AST SERPL W P-5'-P-CCNC: 31 U/L (ref 13–39)
BASOPHILS # BLD AUTO: 0.1 10E9/L (ref 0–0.2)
BASOPHILS NFR BLD AUTO: 1.4 %
BILIRUB SERPL-MCNC: 0.5 MG/DL (ref 0.3–1)
BUN SERPL-MCNC: 16 MG/DL (ref 7–25)
CALCIUM SERPL-MCNC: 9.8 MG/DL (ref 8.6–10.3)
CHLORIDE SERPL-SCNC: 106 MMOL/L (ref 98–107)
CO2 SERPL-SCNC: 25 MMOL/L (ref 21–31)
CREAT SERPL-MCNC: 0.82 MG/DL (ref 0.6–1.2)
DIFFERENTIAL METHOD BLD: NORMAL
EOSINOPHIL # BLD AUTO: 0.2 10E9/L (ref 0–0.7)
EOSINOPHIL NFR BLD AUTO: 3.1 %
ERYTHROCYTE [DISTWIDTH] IN BLOOD BY AUTOMATED COUNT: 12.7 % (ref 10–15)
GFR SERPL CREATININE-BSD FRML MDRD: 70 ML/MIN/{1.73_M2}
GLUCOSE SERPL-MCNC: 116 MG/DL (ref 70–105)
HCT VFR BLD AUTO: 43.8 % (ref 35–47)
HGB BLD-MCNC: 14.8 G/DL (ref 11.7–15.7)
IMM GRANULOCYTES # BLD: 0 10E9/L (ref 0–0.4)
IMM GRANULOCYTES NFR BLD: 0.3 %
INR PPP: 2.3 (ref 0.9–1.1)
INR PPP: 2.39 (ref 0–1.3)
LYMPHOCYTES # BLD AUTO: 1.9 10E9/L (ref 0.8–5.3)
LYMPHOCYTES NFR BLD AUTO: 27.1 %
MAGNESIUM SERPL-MCNC: 2.1 MG/DL (ref 1.9–2.7)
MCH RBC QN AUTO: 31.8 PG (ref 26.5–33)
MCHC RBC AUTO-ENTMCNC: 33.8 G/DL (ref 31.5–36.5)
MCV RBC AUTO: 94 FL (ref 78–100)
MONOCYTES # BLD AUTO: 0.6 10E9/L (ref 0–1.3)
MONOCYTES NFR BLD AUTO: 8.9 %
NEUTROPHILS # BLD AUTO: 4.1 10E9/L (ref 1.6–8.3)
NEUTROPHILS NFR BLD AUTO: 59.2 %
NT-PROBNP SERPL-MCNC: 106 PG/ML (ref 0–100)
PLATELET # BLD AUTO: 260 10E9/L (ref 150–450)
POTASSIUM SERPL-SCNC: 4.2 MMOL/L (ref 3.5–5.1)
PROT SERPL-MCNC: 7.1 G/DL (ref 6.4–8.9)
RBC # BLD AUTO: 4.65 10E12/L (ref 3.8–5.2)
SODIUM SERPL-SCNC: 141 MMOL/L (ref 134–144)
TROPONIN I SERPL-MCNC: 8.4 PG/ML
WBC # BLD AUTO: 7 10E9/L (ref 4–11)

## 2020-08-19 PROCEDURE — 85025 COMPLETE CBC W/AUTO DIFF WBC: CPT | Performed by: PHYSICIAN ASSISTANT

## 2020-08-19 PROCEDURE — 85730 THROMBOPLASTIN TIME PARTIAL: CPT | Performed by: PHYSICIAN ASSISTANT

## 2020-08-19 PROCEDURE — 99285 EMERGENCY DEPT VISIT HI MDM: CPT | Mod: Z6 | Performed by: PHYSICIAN ASSISTANT

## 2020-08-19 PROCEDURE — 93005 ELECTROCARDIOGRAM TRACING: CPT | Mod: 76 | Performed by: PHYSICIAN ASSISTANT

## 2020-08-19 PROCEDURE — 83880 ASSAY OF NATRIURETIC PEPTIDE: CPT | Performed by: PHYSICIAN ASSISTANT

## 2020-08-19 PROCEDURE — 96374 THER/PROPH/DIAG INJ IV PUSH: CPT | Performed by: PHYSICIAN ASSISTANT

## 2020-08-19 PROCEDURE — 93005 ELECTROCARDIOGRAM TRACING: CPT | Performed by: PHYSICIAN ASSISTANT

## 2020-08-19 PROCEDURE — 93010 ELECTROCARDIOGRAM REPORT: CPT | Performed by: INTERNAL MEDICINE

## 2020-08-19 PROCEDURE — 36415 COLL VENOUS BLD VENIPUNCTURE: CPT | Performed by: PHYSICIAN ASSISTANT

## 2020-08-19 PROCEDURE — 80053 COMPREHEN METABOLIC PANEL: CPT | Performed by: PHYSICIAN ASSISTANT

## 2020-08-19 PROCEDURE — 83735 ASSAY OF MAGNESIUM: CPT | Performed by: PHYSICIAN ASSISTANT

## 2020-08-19 PROCEDURE — 25000125 ZZHC RX 250: Performed by: PHYSICIAN ASSISTANT

## 2020-08-19 PROCEDURE — 25000128 H RX IP 250 OP 636: Performed by: PHYSICIAN ASSISTANT

## 2020-08-19 PROCEDURE — 96375 TX/PRO/DX INJ NEW DRUG ADDON: CPT | Performed by: PHYSICIAN ASSISTANT

## 2020-08-19 PROCEDURE — 71045 X-RAY EXAM CHEST 1 VIEW: CPT

## 2020-08-19 PROCEDURE — 84484 ASSAY OF TROPONIN QUANT: CPT | Performed by: PHYSICIAN ASSISTANT

## 2020-08-19 PROCEDURE — 99285 EMERGENCY DEPT VISIT HI MDM: CPT | Mod: 25 | Performed by: PHYSICIAN ASSISTANT

## 2020-08-19 PROCEDURE — 85610 PROTHROMBIN TIME: CPT | Performed by: PHYSICIAN ASSISTANT

## 2020-08-19 RX ORDER — ONDANSETRON 2 MG/ML
4 INJECTION INTRAMUSCULAR; INTRAVENOUS EVERY 30 MIN PRN
Status: DISCONTINUED | OUTPATIENT
Start: 2020-08-19 | End: 2020-08-19 | Stop reason: HOSPADM

## 2020-08-19 RX ORDER — MORPHINE SULFATE 2 MG/ML
2 INJECTION, SOLUTION INTRAMUSCULAR; INTRAVENOUS ONCE
Status: COMPLETED | OUTPATIENT
Start: 2020-08-19 | End: 2020-08-19

## 2020-08-19 RX ADMIN — ONDANSETRON 4 MG: 2 INJECTION INTRAMUSCULAR; INTRAVENOUS at 12:29

## 2020-08-19 RX ADMIN — MORPHINE SULFATE 2 MG: 2 INJECTION, SOLUTION INTRAMUSCULAR; INTRAVENOUS at 13:15

## 2020-08-19 NOTE — ED TRIAGE NOTES
Pt states pain started during the night on L side of chest, took 3 nitroglycerin, pain relieved. On waking this morning it felt like heartburn, she has toke 3 nitroglycerin in a row with some relief. The pain came back an dshe too another nitroglycerin around 1135 still rates pain 8/10. Pt states that she has hx of heart problems and that the pain usually goes away with nitroglycerin.

## 2020-08-19 NOTE — TELEPHONE ENCOUNTER
Called patient scheduled her to see Moises Islas DO on 08/25/2020 at 1245 patient aware of date and time also she is aware if ongoing chest pain she needs to call 911 and go to ER patient Verbalized understanding.  Stephanie Ledesma RN on 8/19/2020 at 10:25 AM    Yes, patient has long history of spasm with significant work-up/treatment.  She has not had coronary artery disease.     Dr. Islas       Is next week soon enough for this patient.  Thanks Stephanie       Yes, lets try to schedule her sooner than October.     Dr. Islas

## 2020-08-19 NOTE — ED AVS SNAPSHOT
[facility]: [phone]            Patient Demographics     Patient Name  Lyudmila Fitzgerald Sex  Female          Age  1955 (65 year old) SSN  [id number] Address  [address] [facility] [address] Phone  [phone] (Home)  [phone] (Mobile) *Preferred*      PCP and Center    Primary Care Provider  Phone Center     Erika Carrasco [phone] [address], [facility] [address]        Emergency Contact(s)     Name Relation Home Work Mobile    Anthony Fitzgerald Spouse   [phone]    Per PT, Declined Declined         Admission Information     Current Information     Attending Provider Admitting Provider Admission Type Admission Status    Pollo Luna PA  Emergency Admission (Confirmed)            Admission Date/Time Discharge Date Hospital Service Auth/Cert Status    20  11:53 AM  Emergency Medicine Incomplete            Hospital Area Unit Room/Bed       [facility] EMERGENCY DEPT ED08/ED08                      Admission     Complaint    None      Hospital Account     Name Acct ID Class Status Primary Coverage    Lyudmila Fitzgerald [id number] Emergency Open MEDICARE - MEDICARE            Guarantor Account (for Hospital Account #[id number])     Name Relation to Pt Service Area Active? Acct Type    Lyudmila Fitzgerald Self FCS Yes Personal/Family    Address Phone          [address] [place] [address] [phone](H)              Coverage Information (for Hospital Account #[id number])     1. MEDICARE/MEDICARE     F/O Payor/Plan Precert #    MEDICARE/MEDICARE     Subscriber Subscriber #    Lyudmila Fitzgerald [id number]    Address Phone    ATTN CLAIMS  [address]  [facility] [place] [address] [phone]          2. MEDICA/MEDICA SELECT SOLUTION     F/O Payor/Plan Precert #    MEDICA/MEDICA SELECT SOLUTION     Subscriber Subscriber #    Lyudmila Fitzgerald ALO [id number]    Address Phone    [id number]  [place] [address] [phone]                                                      INTERAGENCY TRANSFER FORM - PHYSICIAN ORDERS   8/19/2020                      [facility]: [phone]             Attending Provider:  Pollo Luna PA    Allergies:  Morphine, Prednisone, Sotalol    Infection:  None   Service:  EMERGENCY ME    Ht:  1.651 m (5' 5\")   Wt:  67.1 kg (148 lb)   Admission Wt:  --    BMI:  24.63 kg/m 2   BSA:  1.75 m 2            ED Clinical Impression     Diagnosis Description Comment Added By Time Added    Chest pain with high risk for cardiac etiology [R07.9] Chest pain with high risk for cardiac etiology [R07.9]  Pollo Luna PA 8/19/2020  2:06 PM      Hospital Problem List as of 8/19/2020    None      Non-hospital Problem List as of 8/19/2020             Priority Class Noted    Disorder of bone and cartilage Medium  7/1/2007    Hemiplegia, late effect of cerebrovascular disease (H) Medium  6/1/2008    Constipation, chronic Medium  6/16/2009    Urinary incontinence, mixed Medium  6/16/2009    Diabetes mellitus, type II  Medium  7/12/2010    Focal neurological deficit Medium  9/24/2012    Cardiac pacemaker in situ Medium  7/30/2013    Left foot drop Medium  8/18/2013    Left-sided weakness Medium  4/27/2014    ACP (advance care planning) Medium  5/6/2014    Coronary artery spasm (H) Medium  10/14/2015    Anticoagulation monitoring, INR range 2-3 Medium  12/19/2017    Cerebral thrombosis Medium  1/16/2018    Other chest pain Medium  1/16/2018    Benign neoplasm of colon Medium  1/16/2018    Dyshidrotic eczema Medium  1/16/2018    Endometrial hyperplasia Medium  1/16/2018    Factor V deficiency (H) Medium  1/16/2018    Paroxysmal supraventricular tachycardia (H) Medium  1/16/2018    Shortness of breath Medium  1/16/2018    Essential hypertension Medium  1/16/2018    Myocardial bridge LAD Medium  10/8/2019    History of coronary artery bypass graft x 1 " with ROGERS to LAD on 11/15/2016 Medium  10/8/2019    History of heart failure with a reported EF of 15% now recovered Medium  10/8/2019    History of coronary artery stent placement to the LAD x3 Medium  10/8/2019    History of stroke x7 with left hemiparesis resolved Medium  10/8/2019    History of cardiac radiofrequency ablation for SVT x8 Medium  10/8/2019    History of DVT to both upper and lower extremity Medium  10/8/2019    Mixed hyperlipidemia Medium  10/8/2019    History of coronary vasospasm Medium  10/8/2019    Pacemaker Medium  10/8/2019    Pacemaker-dependent due to native cardiac rhythm insufficient to support life Medium  10/8/2019    S/P AV tessie ablation secondary to SVT Medium  10/8/2019    Anticoagulated on Coumadin Medium  10/17/2019    Personal history of supraventricular tachycardia status post ablation Medium  10/17/2019    Lown Ganong Brandt syndrome Medium  10/17/2019    Pacemaker battery depletion Medium  5/12/2020    Pacemaker at end of battery life Medium  5/12/2020      Current Code Status     Date Active Code Status Order ID Comments User Context       Not on file         Medication Review      UNREVIEWED medications. Ask your doctor about these medications       Dose / Directions Comments   amLODIPine 5 MG tablet  Commonly known as:  NORVASC  Used for:  Coronary artery spasm (H), Myocardial bridge LAD, History of coronary artery bypass graft x 1 with LIMA to LAD on 11/15/2016, History of coronary artery stent placement to the LAD x3, History of coronary vasospasm, Essential hypertension      Dose:  5 mg  Take 1 tablet (5 mg) by mouth daily  Quantity:  90 tablet  Refills:  3      aspirin 81 MG EC tablet      Dose:  81 mg  Take 81 mg by mouth  Refills:  0      atorvastatin 80 MG tablet  Commonly known as:  LIPITOR  Used for:  Mixed hyperlipidemia      Dose:  80 mg  Take 1 tablet (80 mg) by mouth daily  Quantity:  90 tablet  Refills:  3      calcium 500/D 500-200 MG-UNIT tablet  Generic  drug:  calcium carbonate-vitamin D      Dose:  1 tablet  Take 1 tablet by mouth  Refills:  0      diltiazem  MG 24 hr ER beaded capsule  Commonly known as:  DILT-XR  Used for:  Coronary artery spasm (H), Myocardial bridge LAD, History of coronary artery bypass graft x 1 with LIMA to LAD on 11/15/2016, History of coronary artery stent placement to the LAD x3, History of coronary vasospasm, Personal history of supraventricular tachycardia status post ablation, Paroxysmal supraventricular tachycardia (H), Essential hypertension      Dose:  240 mg  Take 1 capsule (240 mg) by mouth daily  Quantity:  90 capsule  Refills:  3      isosorbide mononitrate 30 MG 24 hr tablet  Commonly known as:  IMDUR  Used for:  Coronary artery spasm (H), Myocardial bridge LAD, History of coronary artery bypass graft x 1 with LIMA to LAD on 11/15/2016, History of coronary artery stent placement to the LAD x3, History of coronary vasospasm, Essential hypertension      Dose:  30 mg  Take 1 tablet (30 mg) by mouth daily  Quantity:  90 tablet  Refills:  3      lisinopril 5 MG tablet  Commonly known as:  ZESTRIL  Used for:  Coronary artery spasm (H), Essential hypertension, History of heart failure with a reported EF of 15% now recovered      Dose:  5 mg  Take 1 tablet (5 mg) by mouth daily  Quantity:  90 tablet  Refills:  3      nitroGLYcerin 0.4 MG sublingual tablet  Commonly known as:  NITROSTAT  Used for:  Coronary artery spasm (H), Myocardial bridge LAD, History of coronary artery bypass graft x 1 with LIMA to LAD on 11/15/2016, History of coronary artery stent placement to the LAD x3, History of coronary vasospasm      For chest pain place 1 tablet under the tongue every 5 minutes for 3 doses. If symptoms persist 5 minutes after 1st dose call 911.  Quantity:  25 tablet  Refills:  3      warfarin ANTICOAGULANT 6 MG tablet  Commonly known as:  COUMADIN  Used for:  Cerebral thrombosis      Take as directed. If you are unsure how to take  "this medication, talk to your nurse or doctor.  Original instructions:  Take 6 mg daily.  Or as directed by the protime clinic.  Quantity:  90 tablet  Refills:  1      zolpidem 5 MG tablet  Commonly known as:  AMBIEN  Used for:  Chronic insomnia      Dose:  5 mg  Take 1 tablet (5 mg) by mouth nightly as needed for sleep  Quantity:  30 tablet  Refills:  5               Your next 10 appointments already scheduled    Aug 19, 2020  3:00 PM CDT  Anticoagulation Visit with  ANTI COAG 1  [facility] [address] [facility] [address]  [phone]      Aug 25, 2020 12:45 PM CDT  Return Visit with Moises Islas [facility] ([facility]) [address] [facility] [address]  [phone]      Sep 01, 2020 12:00 AM CDT  CARDIAC DEVICE CHECK - REMOTE with  ICD REMOTE  [facility] ([facility] [address] [place] [address]  [address] [place] [address]  [phone]      Oct 06, 2020  1:00 PM CDT  CARDIAC DEVICE CHECK - IN CLINIC with  PACEMAKER  [facility] ([facility]) [address] [facility] [address]  [phone]      Oct 06, 2020  1:45 PM CDT  Return Visit with Moises Islas DO  [facility] ([facility]) [address] [facility] [address]  [phone]                                             INTERAGENCY TRANSFER FORM - NURSING   8/19/2020                      [facility]: [phone]             Attending Provider:  Pollo Luna PA    Allergies:  Morphine, Prednisone, Sotalol    Infection:  None   Service:  EMERGENCY ME    Ht:  1.651 m (5' 5\")   Wt:  67.1 kg (148 lb)   Admission Wt:  --    BMI:  24.63 kg/m 2   BSA:  1.75 m 2            Advance Directives        Scanned docmt in ACP Activity?            Yes, " scanned ACP docmt          Immunizations     Name Date      DTaP, Unspecified 09/27/19     FLU 6-35 months 09/28/09     Flu, Unspecified 11/11/11     Flu, Unspecified 11/19/10     Flu, Unspecified 11/01/06     HepB, Unspecified 03/09/98     HepB, Unspecified 10/06/97     HepB, Unspecified 09/08/97     INFLUENZA VACCINE IM > 6 MONTHS VALENT IIV4 09/27/19     INFLUENZA VACCINE IM > 6 MONTHS VALENT IIV4 01/22/19     INFLUENZA VACCINE IM > 6 MONTHS VALENT IIV4 12/13/17     Influenza (IIV3) PF 11/06/13     Influenza (IIV3) PF 09/07/12     Influenza (IIV3) PF 11/11/11     Influenza (IIV3) PF 10/24/08     Influenza (IIV3) PF 10/19/07     Influenza (IIV3) PF 11/10/06     Influenza, Whole Virus 11/19/10     Pneumococcal 23 valent 11/24/12     TDAP Vaccine (Boostrix) 09/27/19     TDAP Vaccine (Boostrix) 11/06/13     Tetanus 10/01/99       ASSESSMENT     Discharge Profile Flowsheet     COMMUNICATION ASSESSMENT     Patient's communication style  spoken language (English or Bilingual) 08/19/20 1157            Vitals     Vital Signs Flowsheet     VITAL SIGNS     SpO2  98 % 08/19/20 1330    Temp  97.6  F (36.4  C) 08/19/20 1159   PAIN/COMFORT      Temp src  Tympanic 08/19/20 1159   0-10 Pain Scale  4 08/19/20 1401    Resp  18 08/19/20 1330   MADDIE COMA SCALE      Pulse  60 08/19/20 1330   Best Eye Response  4-->(E4) spontaneous 08/19/20 1159    Pulse Rate Source  Monitor 08/19/20 1159   Best Motor Response  6-->(M6) obeys commands 08/19/20 1159    BP  131/86 08/19/20 1330   Best Verbal Response  5-->(V5) oriented 08/19/20 1159    OXYGEN THERAPY     Maddie Coma Scale Score  15 08/19/20 1159            Patient Lines/Drains/Airways Status    Active LINES/DRAINS/AIRWAYS     Name: Placement date: Placement time: Site: Days: Last dressing change:    Peripheral IV 08/19/20 Right Lower forearm  08/19/20   1226   Lower forearm  less than 1             Patient Lines/Drains/Airways Status    Active PICC/CVC     None             Intake/Output Detail Report     None      Case Management/Discharge Planning     Case Management/Discharge Planning Flowsheet                   [facility]: [phone]            Medication Administration Report for Leigh Fitzgerald as of 20 1433   Legend:    Given Hold Not Given Due Canceled Entry Other Actions    Time Time (Time) Time  Time-Action       Inactive    Active    Linked        Medications 08/13/20 08/14/20 08/15/20 08/16/20 08/17/20 08/18/20 08/19/20    ondansetron (ZOFRAN) injection 4 mg  Dose: 4 mg  Freq: EVERY 30 MIN PRN Route: IV  PRN Reasons: nausea,vomiting  Start: 20 1157   Admin Instructions: May repeat in 30 minutes as needed, up to 3 doses.  Irritant. For ordered IV doses 0.1-4 mg, give IV Push undiluted over 2-5 minutes.    Ordered Admin Amount: 4 mg = 2 mL  Concentration: 4 mg/2 mL  Last Admin: 20 1229 (Given)  Dispense Loc: EMERGENCY DEPARTMENT  Duration: 2-5 Minutes  Administrations Remainin  Expected Dispense Volume: 2 mL   Linked Line: Peripheral IV 20 Right Lower forearm          1229 (4 mg)-Given          Completed Medications  Medications 08/13/20 08/14/20 08/15/20 08/16/20 08/17/20 08/18/20 08/19/20       morphine (PF) injection 2 mg  Dose: 2 mg  Freq: ONCE Route: IV  Start: 20 1310   End: 20 1319   Admin Instructions: For ordered IV doses 0.1-15 mg give IV Push undiluted over 4-5 minutes.    Ordered Admin Amount: 2 mg  Last Admin: 20 1315 (Given)  Dispense Loc: EMERGENCY DEPARTMENT  Duration: 4-5 Minutes  Administrations Remainin   Linked Line: Peripheral IV 20 Right Lower forearm          1315 (2 mg)-Given                       INTERAGENCY TRANSFER FORM - NOTES (H&P, Discharge Summary, Consults, Procedures, Therapies)   2020                      [facility]: [phone]            History & Physicals    No notes of this type exist for this encounter.     Discharge Summaries     No notes of this type exist for this encounter.     Consult Notes    No notes of this type exist for this encounter.     Progress Notes - Physician (Notes for yesterday and today)    No notes of this type exist for this encounter.     Procedure Notes    No notes of this type exist for this encounter.     Progress Notes - Therapies (Notes from 08/16/20 through 08/19/20)    No notes of this type exist for this encounter.                                         INTERAGENCY TRANSFER FORM - LAB / IMAGING / EKG / EMG RESULTS   8/19/2020                      [facility]: [phone]            Unresulted Labs (24h ago, onward)    None         Lab Results - 3 Days      Nt probnp inpatient (BNP) [[id number]] (Abnormal)  Resulted: 08/19/20 1336, Result status: Final result   Ordering provider:  Pollo Luna PA  08/19/20 6007 Resulting lab:  [facility]    Specimen Information    Type Source Collected On   Blood   08/19/20 1220          Components    Component Value Reference Range Flag Lab   N-Terminal Pro BNP Inpatient 106 0 - 100 pg/mL [facility]   Comment:            Reference range shown and results flagged as abnormal are suggested inpatient   cut points for confirming diagnosis if CHF in an acute setting. Establishing a   baseline value for each individual patient is useful for follow-up. An   inpatient or emergency department NT-proPBNP <300 pg/mL effectively rules out   acute CHF, with 99% negative predictive value.  The outpatient non-acute reference range for ruling out CHF is:   0-125 pg/mL (age 18 to less than 75)   0-450 pg/mL (age 75 yrs and older)              Comprehensive metabolic panel [[id number]] (Abnormal)  Resulted: 08/19/20 1302, Result status: Final result   Ordering provider:  Pollo Luna PA  08/19/20 1159 Resulting lab:  [facility]    Specimen Information    Type Source  Collected On   Blood   08/19/20 1220          Components    Component Value Reference Range Flag Lab   Sodium 141 134 - 144 mmol/L   [facility]   Potassium 4.2 3.5 - 5.1 mmol/L   [facility] Lab   Chloride 106 98 - 107 mmol/L   [facility]   Carbon Dioxide 25 21 - 31 mmol/L   [facility]   Anion Gap 10 3 - 14 mmol/L   [facility]   Glucose 116 70 - 105 mg/dL [facility]   Urea Nitrogen 16 7 - 25 mg/dL   [facility]   Creatinine 0.82 0.60 - 1.20 mg/dL   [facility]   GFR Estimate 70 >60 mL/min/{1.73_m2}   [facility]   GFR Estimate If Black 85 >60 mL/min/{1.73_m2}   [facility]   Calcium 9.8 8.6 - 10.3 mg/dL   [facility]   Bilirubin Total 0.5 0.3 - 1.0 mg/dL   [facility]   Albumin 4.4 3.5 - 5.7 g/dL   [facility]   Protein Total 7.1 6.4 - 8.9 g/dL   [facility]   Alkaline Phosphatase 91 34 - 104 U/L   [facility]   ALT 26 7 - 52 U/L   [facility]   AST 31 13 - 39 U/L   [facility]            Magnesium [[id number]]  Resulted: 08/19/20 1302, Result status: Final result   Ordering provider:  Pollo Luna PA  08/19/20 1158 Resulting lab:  [facility]    Specimen Information    Type Source Collected On   Blood   08/19/20 1220          Components    Component Value Reference Range Flag Lab   Magnesium 2.1 1.9 -  2.7 mg/dL   [facility] Lab            Troponin GH [[id number]]  Resulted: 08/19/20 1254, Result status: Final result   Ordering provider:  Pollo Luna PA  08/19/20 1158 Resulting lab:  [facility]    Specimen Information    Type Source Collected On   Blood   08/19/20 1220          Components    Component Value Reference Range Flag Lab   Troponin 8.4 <34.0 pg/mL   [facility]            Partial thromboplastin time [[id number]] (Abnormal)  Resulted: 08/19/20 1243, Result status: Final result   Ordering provider:  Pollo Luna PA  08/19/20 1158 Resulting lab:  [facility]    Specimen Information    Type Source Collected On   Blood   08/19/20 1220          Components    Component Value Reference Range Flag Lab   PTT 38 22 - 37 sec H [facility] Lab            INR [[id number]] (Abnormal)  Resulted: 08/19/20 1243, Result status: Final result   Ordering provider:  Pollo Luna PA  08/19/20 1158 Resulting lab:  [facility]    Specimen Information    Type Source Collected On   Blood   08/19/20 1220          Components    Component Value Reference Range Flag Lab   INR 2.39 0 - 1.3 H [facility]            CBC with platelets differential [[id number]]  Resulted: 08/19/20 1234, Result status: Final result   Ordering provider:  Pollo Luna PA  08/19/20 1158 Resulting lab:  [facility]    Specimen Information    Type Source Collected On   Blood   08/19/20 1220          Components    Component Value Reference Range Flag Lab   WBC 7.0 4.0 - 11.0 10e9/L   [facility]   RBC Count 4.65 3.8 - 5.2 10e12/L   [facility]   Hemoglobin 14.8 11.7 - 15.7 g/dL   [facility]   Hematocrit 43.8 35.0 - 47.0 %   [facility]   MCV 94 78 - 100 fl   [facility]   MCH 31.8 26.5 - 33.0 pg   [facility]   MCHC 33.8 31.5 - 36.5 g/dL   [facility]   RDW 12.7 10.0 - 15.0 %   [facility]   Platelet Count 260 150 - 450 10e9/L   [facility]   Diff Method Automated Method     [facility]   % Neutrophils 59.2 %   [facility]   % Lymphocytes 27.1 %   [facility]   % Monocytes 8.9 %   [facility]   % Eosinophils 3.1 %   [facility]   % Basophils 1.4 %   [facility]   % Immature Granulocytes 0.3 %   [facility]   Absolute Neutrophil 4.1 1.6 - 8.3 10e9/L   [facility]   Absolute Lymphocytes 1.9 0.8 - 5.3 10e9/L   [facility]   Absolute Monocytes 0.6 0.0 - 1.3 10e9/L   [facility]   Absolute Eosinophils 0.2 0.0 - 0.7 10e9/L   [facility]   Absolute Basophils 0.1 0.0 - 0.2 10e9/L   [facility]   Abs Immature Granulocytes 0.0 0 - 0.4 10e9/L   [facility] Lab            Testing Performed By     Lab - Abbreviation Name Director Address Valid Date Range    1743 - [facility] Unknown [address] [facility] [address] 12/09/19 0801 - Present                Imaging Results - 3 Days      XR Chest Port 1 View [[id number]]  Resulted: 08/19/20 1256, Result status: Final result   Ordering provider:  Pollo Luna PA  08/19/20 1158 Resulted by:  Familia Barton MD   Performed:  08/19/20 1235 - 08/19/20 1252 Accession number:  [id number]   Resulting lab:  RADIOLOGY RESULTS   Narrative:  PROCEDURE:  XR CHEST PORT 1 VW    HISTORY: chest pain. .    COMPARISON:  2/11/20    FINDINGS:  Spinal and cardiac leads are seen.  The cardiomediastinal contours are stable.  No focal consolidation, effusion or pneumothorax. Linear scarring in  the left lung is chronic.     Impression:  IMPRESSION:  Stable portable chest.      FAMILIA BARTON MD      Testing Performed By     Lab - Abbreviation Name Director Address Valid Date Range    104 - Rad Rslts RADIOLOGY RESULTS Unknown Unknown 02/16/05 1553 - Present            Encounter-Level Documents:    There are no encounter-level documents.     Order-Level Documents:    There are no order-level documents.

## 2020-08-19 NOTE — PROGRESS NOTES
ANTICOAGULATION FOLLOW-UP CLINIC VISIT    Patient Name:  Lyudmila Fitzgerald  Date:  2020  Contact Type:  no call needed patient to continue same dose    SUBJECTIVE:  Patient Findings     Positives:   Hospital admission (patient presented to ED this afternoon with chest pain transferred)        Clinical Outcomes     Negatives:   Major bleeding event, Thromboembolic event, Anticoagulation-related hospital admission, Anticoagulation-related ED visit, Anticoagulation-related fatality           OBJECTIVE    Recent labs: (last 7 days)     20  1220   INR 2.39*       ASSESSMENT / PLAN  INR assessment THER    Recheck INR In: 2 WEEKS    INR Location Home INR      Anticoagulation Summary  As of 2020    INR goal:   2.0-3.0   TTR:   82.9 % (10.1 mo)   INR used for dosin.3 (2020)   Warfarin maintenance plan:   6 mg (6 mg x 1) every day   Full warfarin instructions:   6 mg every day   Weekly warfarin total:   42 mg   No change documented:   Nyla Walker RN   Plan last modified:   Nyla Walker RN (2020)   Next INR check:   2020   Priority:   Maintenance   Target end date:   Indefinite    Indications    Acute thromboembolism of deep veins of lower extremity (H) (Resolved) [I82.409]  Anticoagulation monitoring  INR range 2-3 [Z79.01]             Anticoagulation Episode Summary     INR check location:       Preferred lab:       Send INR reminders to:   [facility]    Comments:   home monitor [employer]      Anticoagulation Care Providers     Provider Role Specialty Phone number    Erika Carrasco MD [facility] [phone]            See the Encounter Report to view Anticoagulation Flowsheet and Dosing Calendar (Go to Encounters tab in chart review, and find the Anticoagulation Therapy Visit)        Nyla Walker RN

## 2020-08-20 LAB — EJECTION FRACTION: NORMAL %

## 2020-08-20 ASSESSMENT — ENCOUNTER SYMPTOMS
WOUND: 0
BACK PAIN: 0
NAUSEA: 1
CONFUSION: 0
ABDOMINAL PAIN: 0
CHILLS: 0
CHEST TIGHTNESS: 0
FEVER: 0
SHORTNESS OF BREATH: 0
HEMATURIA: 0
BRUISES/BLEEDS EASILY: 0
ADENOPATHY: 0

## 2020-08-20 NOTE — ED PROVIDER NOTES
History     Chief Complaint   Patient presents with     Chest Pain     HPI  Lyudmila Fitzgerald is a 65 year old female who presents the emergency department for an evaluation of chest pain.  She has a long history including coronary artery spasm, CABG, hypertension, hyperlipidemia, type II diabetic.  She reports that around 1230 this morning she began to have chest pain.  She took 3 nitros and improved.  She then woke around 8:00 this morning began to have more chest pain and took up to 4 nitros but her pain is continued, rated 8 out of 10 and located anteriorly with some associated nausea.  She denies any fevers, coughs, shortness of breath, abdominal pain.    Allergies:  Allergies   Allergen Reactions     Morphine Nausea and Vomiting     OK in small doses     Prednisone Nausea and Vomiting     Per IV     Sotalol Nausea and Vomiting       Problem List:    Patient Active Problem List    Diagnosis Date Noted     Pacemaker battery depletion 05/12/2020     Priority: Medium     Added automatically from request for surgery [id number]       Pacemaker at end of battery life 05/12/2020     Priority: Medium     Added automatically from request for surgery [id number]       Anticoagulated on Coumadin 10/17/2019     Priority: Medium     Personal history of supraventricular tachycardia status post ablation 10/17/2019     Priority: Medium     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     Priority: Medium     Myocardial bridge-LAD 10/08/2019     Priority: Medium     History of coronary artery bypass graft x 1 with LIMA to LAD on 11/15/2016 10/08/2019     Priority: Medium     History of heart failure with a reported EF of 15% now recovered 10/08/2019     Priority: Medium     History of coronary artery stent placement to the LAD x3 10/08/2019     Priority: Medium     History of stroke x7 with left hemiparesis-resolved 10/08/2019     Priority: Medium     History of cardiac radiofrequency ablation for SVT x8 10/08/2019     Priority: Medium      History of DVT to both upper and lower extremity 10/08/2019     Priority: Medium     Mixed hyperlipidemia 10/08/2019     Priority: Medium     History of coronary vasospasm 10/08/2019     Priority: Medium     Pacemaker 10/08/2019     Priority: Medium     Pacemaker-dependent due to native cardiac rhythm insufficient to support life 10/08/2019     Priority: Medium     S/P AV tessie ablation secondary to SVT 10/08/2019     Priority: Medium     Cerebral thrombosis 01/16/2018     Priority: Medium     Overview:   '95 w/ no residual       Other chest pain 01/16/2018     Priority: Medium     Benign neoplasm of colon 01/16/2018     Priority: Medium     Overview:   x 1. 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/2018     Priority: Medium     Overview:   Leiden deficiency-heterozygous       Paroxysmal supraventricular tachycardia (H) 01/16/2018     Priority: Medium     Overview:   s/p multiple ablations w last resulting in PPM       Shortness of breath 01/16/2018     Priority: Medium     Essential hypertension 01/16/2018     Priority: Medium     Anticoagulation monitoring, INR range 2-3 12/19/2017     Priority: Medium     Coronary artery spasm (H) 10/14/2015     Priority: Medium     ACP (advance care planning) 05/06/2014     Priority: Medium     Left-sided weakness 04/27/2014     Priority: Medium     Left foot drop 08/18/2013     Priority: Medium     Cardiac pacemaker in situ 07/30/2013     Priority: Medium     Overview:      -7/29/2013: generator change and pocket revision with Dr. Sweeney initially placed for a history of complete heart block, a dual chamber pacemaker with previous lead additions at [facility] and so she has four leads -- two that are capped and two that are functional. 7/29: atrial lead revision was deferred given the favorable  testing and avoid the risk of laser extraction at this time.       Focal neurological deficit 09/24/2012     Priority: Medium     Diabetes mellitus, type II  07/12/2010     Priority: Medium     Constipation, chronic 06/16/2009     Priority: Medium     Urinary incontinence, mixed 06/16/2009     Priority: Medium     Hemiplegia, late effect of cerebrovascular disease (H) 06/01/2008     Priority: Medium     Disorder of bone and cartilage 07/01/2007     Priority: Medium     Overview:   lumbar spine. Next Dexa due 2011          Past Medical History:    Past Medical History:   Diagnosis Date     Acute thromboembolism of deep veins of lower extremity (H) 2006     Atrial fibrillation (H)      Cardiac pacemaker in situ 2003     Cerebral thrombosis      Coronary atherosclerosis      Endometrial hyperplasia 2006     Essential hypertension      History of other genital system and obstetric disorders      Other and unspecified angina pectoris      Other and unspecified hyperlipidemia      Other postprocedural states      Paroxysmal supraventricular tachycardia (H)      Personal history of transient ischemic attack (TIA) and cerebral infarction without residual deficit 04/2009     Primary hypercoagulable state (H)        Past Surgical History:    Past Surgical History:   Procedure Laterality Date     ARTHROSCOPY KNEE      01/90,Arthroscopy for chondromalacia patella     AS CABG, ARTERY-VEIN, SINGLE  11/15/2016    Single vessel; done in [place]     ATTEMPTED ARTHROSCOPY      02/04,Right arthroscopy     BIOPSY BREAST      03/08,Breast cyst aspiration     COLONOSCOPY      8/2007,follow up recommended in 10 years.     ENDOSCOPIC SINUS SURGERY      03/04,Sinus node ablation.     EP ABLATION ATRIAL FLUTTER N/A 5/7/2020    Procedure: EP VENOGRAM SVC;  Surgeon: Hakeem Moore MD;  Location: [facility] CARDIAC CATH LAB     EP PACEMAKER N/A 5/20/2020    Procedure: EP PACEMAKER;  Surgeon: Tima Johnson MD;  Location: [facility] CARDIAC  "CATH LAB     EP STUDY  12/1994    EP STUDY /ABLATION,AV re-entry tachycardia, tessie ablation     HEART CATH, ANGIOPLASTY      01/06,Stenting placed LAD after ergonovine provocation confirmed     HEART CATH, ANGIOPLASTY      03/06,90% lesion, normal left ventricular function     IMPLANT PACEMAKER      03/03,Guidant pacemaker placement, AV tessie ablation     LAPAROSCOPIC ABLATION ENDOMETRIOSIS      06/06     OTHER SURGICAL HISTORY      3/24/06,113516,([place]) [facility] STENT ANGIO     OTHER SURGICAL HISTORY      08/02,108012,EP STUDY /ABLATION     OTHER SURGICAL HISTORY      11/04,57059.0,CT CORONARY ANGIOGRAM ([place]),Coronary angiogram, failed ablation     OTHER SURGICAL HISTORY      12/04,734799,[facility] RELOCATION OF SKIN POCKET PACEMAKER,[facility] 5/24/05 reconfiguration of pacemaker \"pocket\"     OTHER SURGICAL HISTORY      207882,IP CONSULT TO ELECTROPHYSIOLOGY,study and lead change     OTHER SURGICAL HISTORY      12/06,996804,NEUROSTIMULATOR,Nerve stimulator implanted for chest pain control     OTHER SURGICAL HISTORY      12/2008,86529.0,CT CORONARY ANGIOGRAM ([place]),with increased left-sided weakness and vertigo, negative CT angiogram.     REPLACE PACEMAKER GENERATOR      12/29/04,Replacement of left ventricular pacemaker lead.     STENT  02/2017    LAD        Family History:    Family History   Problem Relation Age of Onset     Dementia Mother      Skin Cancer Father         squamous     Dementia Father      Cerebrovascular Disease Father      No Known Problems Sister      Deep Vein Thrombosis Daughter         FVL +     Family History Negative Son         Good Health,lives in [facility]     Breast Cancer Paternal Grandmother         Cancer-breast     Breast Cancer Paternal Aunt         Cancer-breast     Hyperlipidemia Brother        Social History:  Marital Status:   [2]  Social History     Tobacco Use     Smoking status: Never Smoker     Smokeless tobacco: Never Used     Tobacco comment: Quit smoking: spouse does not smoke " in the house. Exposed in cars.   Substance Use Topics     Alcohol use: Yes     Comment: Alcoholic Drinks/day: Alcoholic Drinks/day: 1-2 drinks twice a week     Drug use: Never        Medications:    amLODIPine (NORVASC) 5 MG tablet  aspirin EC 81 MG EC tablet  atorvastatin (LIPITOR) 80 MG tablet  diltiazem ER (DILT-XR) 240 MG 24 hr ER beaded capsule  isosorbide mononitrate (IMDUR) 30 MG 24 hr tablet  lisinopril (ZESTRIL) 5 MG tablet  nitroGLYcerin (NITROSTAT) 0.4 MG sublingual tablet  warfarin ANTICOAGULANT (COUMADIN) 6 MG tablet  zolpidem (AMBIEN) 5 MG tablet  calcium carb 1250 mg, 500 mg [place],/vitamin D 200 unit (CALCIUM 500/D) 500-200 MG-UNIT per tablet          Review of Systems   Constitutional: Negative for chills and fever.   HENT: Negative for congestion.    Eyes: Negative for visual disturbance.   Respiratory: Negative for chest tightness and shortness of breath.    Cardiovascular: Positive for chest pain.   Gastrointestinal: Positive for nausea. Negative for abdominal pain.   Genitourinary: Negative for hematuria.   Musculoskeletal: Negative for back pain.   Skin: Negative for rash and wound.   Neurological: Negative for syncope.   Hematological: Negative for adenopathy. Does not bruise/bleed easily.   Psychiatric/Behavioral: Negative for confusion.       Physical Exam   BP: (!) 156/95  Pulse: 78  Temp: 97.6  F (36.4  C)  Resp: 18  SpO2: 99 %      Physical Exam  Constitutional:       General: She is not in acute distress.     Appearance: She is well-developed. She is not diaphoretic.   HENT:      Head: Normocephalic and atraumatic.   Eyes:      General: No scleral icterus.     Conjunctiva/sclera: Conjunctivae normal.   Neck:      Musculoskeletal: Neck supple.   Cardiovascular:      Rate and Rhythm: Normal rate and regular rhythm.   Pulmonary:      Effort: Pulmonary effort is normal.      Breath sounds: Normal breath sounds.   Abdominal:      Palpations: Abdomen is soft.      Tenderness: There is no  abdominal tenderness.   Musculoskeletal:         General: No deformity.   Lymphadenopathy:      Cervical: No cervical adenopathy.   Skin:     General: Skin is warm and dry.      Findings: No rash.   Neurological:      Mental Status: She is alert and oriented to person, place, and time. Mental status is at baseline.   Psychiatric:         Mood and Affect: Mood normal.         Behavior: Behavior normal.         ED Course        Procedures          EKG read at 1202.  Heart rate 66, atrial sensed ventricular paced rhythm, no new significant ST changes.     Critical Care time:  none               Results for orders placed or performed during the hospital encounter of 08/19/20 (from the past 24 hour(s))   CBC with platelets differential   Result Value Ref Range    WBC 7.0 4.0 - 11.0 10e9/L    RBC Count 4.65 3.8 - 5.2 10e12/L    Hemoglobin 14.8 11.7 - 15.7 g/dL    Hematocrit 43.8 35.0 - 47.0 %    MCV 94 78 - 100 fl    MCH 31.8 26.5 - 33.0 pg    MCHC 33.8 31.5 - 36.5 g/dL    RDW 12.7 10.0 - 15.0 %    Platelet Count 260 150 - 450 10e9/L    Diff Method Automated Method     % Neutrophils 59.2 %    % Lymphocytes 27.1 %    % Monocytes 8.9 %    % Eosinophils 3.1 %    % Basophils 1.4 %    % Immature Granulocytes 0.3 %    Absolute Neutrophil 4.1 1.6 - 8.3 10e9/L    Absolute Lymphocytes 1.9 0.8 - 5.3 10e9/L    Absolute Monocytes 0.6 0.0 - 1.3 10e9/L    Absolute Eosinophils 0.2 0.0 - 0.7 10e9/L    Absolute Basophils 0.1 0.0 - 0.2 10e9/L    Abs Immature Granulocytes 0.0 0 - 0.4 10e9/L   Troponin GH   Result Value Ref Range    Troponin 8.4 <34.0 pg/mL   INR   Result Value Ref Range    INR 2.39 (H) 0 - 1.3   Partial thromboplastin time   Result Value Ref Range    PTT 38 (H) 22 - 37 sec   Comprehensive metabolic panel   Result Value Ref Range    Sodium 141 134 - 144 mmol/L    Potassium 4.2 3.5 - 5.1 mmol/L    Chloride 106 98 - 107 mmol/L    Carbon Dioxide 25 21 - 31 mmol/L    Anion Gap 10 3 - 14 mmol/L    Glucose 116 (H) 70 - 105 mg/dL     Urea Nitrogen 16 7 - 25 mg/dL    Creatinine 0.82 0.60 - 1.20 mg/dL    GFR Estimate 70 >60 mL/min/[1.73_m2]    GFR Estimate If Black 85 >60 mL/min/[1.73_m2]    Calcium 9.8 8.6 - 10.3 mg/dL    Bilirubin Total 0.5 0.3 - 1.0 mg/dL    Albumin 4.4 3.5 - 5.7 g/dL    Protein Total 7.1 6.4 - 8.9 g/dL    Alkaline Phosphatase 91 34 - 104 U/L    ALT 26 7 - 52 U/L    AST 31 13 - 39 U/L   Magnesium   Result Value Ref Range    Magnesium 2.1 1.9 - 2.7 mg/dL   Nt probnp inpatient (BNP)   Result Value Ref Range    N-Terminal Pro BNP Inpatient 106 (H) 0 - 100 pg/mL   XR Chest Port 1 View    Narrative    PROCEDURE:  XR CHEST PORT 1 VW    HISTORY: chest pain. .    COMPARISON:  2/11/20    FINDINGS:  Spinal and cardiac leads are seen.  The cardiomediastinal contours are stable.  No focal consolidation, effusion or pneumothorax. Linear scarring in  the left lung is chronic.      Impression    IMPRESSION:  Stable portable chest.      FAMILIA BARTON MD       Medications   morphine (PF) injection 2 mg (2 mg Intravenous Given 8/19/20 1315)       Assessments & Plan (with Medical Decision Making)   Pt nontoxic in NAD. Heart, lung, bowel sounds normal, abd soft, nontender to palpation, nondistended. VSS, afebrile    She reports anterior chest pain, 8 out of 10 despite 7 doses of nitroglycerin.    Patient is well-appearing lab work with a negative troponin.    However, the patient is clearly at high risk patient for major adverse cardiac event in the near future.  I discussed with her cardiologist, Dr. Islas who recommended Transfer.  She was accepted for admission by Dr. Muhammad at [facility] in [place].  I did give her a dose of morphine and this helped and improve her pain more at this time given her therapeutic INR we will not start any heparin or nitroglycerin infusion.  She went stable and was transferred.    Pollo Luna PA-C    I have reviewed the nursing notes.    I have reviewed the findings, diagnosis, plan and need  for follow up with the patient.       Discharge Medication List as of 8/19/2020  2:33 PM          Final diagnoses:   Chest pain with high risk for cardiac etiology       8/19/2020   [facility]     Pollo Luna PA  08/20/20 0036

## 2020-08-21 LAB — INR PPP: 1.2 (ref 0.9–1.1)

## 2020-08-23 LAB
INTERPRETATION ECG - MUSE: NORMAL
INTERPRETATION ECG - MUSE: NORMAL

## 2020-08-24 ENCOUNTER — TRANSFERRED RECORDS (OUTPATIENT)
Dept: HEALTH INFORMATION MANAGEMENT | Facility: OTHER | Age: 65
End: 2020-08-24

## 2020-08-24 ENCOUNTER — ANTICOAGULATION THERAPY VISIT (OUTPATIENT)
Dept: ANTICOAGULATION | Facility: OTHER | Age: 65
End: 2020-08-24
Attending: FAMILY MEDICINE
Payer: MEDICARE

## 2020-08-24 DIAGNOSIS — D68.2 FACTOR V DEFICIENCY (H): Primary | ICD-10-CM

## 2020-08-24 DIAGNOSIS — Z79.01 ANTICOAGULATION MONITORING, INR RANGE 2-3: ICD-10-CM

## 2020-08-24 LAB — INR PPP: 1.3 (ref 0.9–1.1)

## 2020-08-24 NOTE — PROGRESS NOTES
ANTICOAGULATION FOLLOW-UP CLINIC VISIT    Patient Name:  Lyudmila Fitzgerald  Date:  2020  Contact Type:  fax from [employer] [facility] INR monitoring service/phone call with patient    SUBJECTIVE:  Patient Findings     Positives:   Missed doses (held x two days for angiogram), Change in medications (bridging with Lovenox, was holding Warfarin and ASA, Vitamin K prior to procedure), Hospital admission ([facility]  to 2020 angiogram)        Clinical Outcomes     Negatives:   Major bleeding event, Thromboembolic event, Anticoagulation-related hospital admission, Anticoagulation-related ED visit, Anticoagulation-related fatality           OBJECTIVE    Recent labs: (last 7 days)     20   INR 1.3*       ASSESSMENT / PLAN  INR assessment SUB held for procedure   Recheck INR In: 2 DAYS    INR Location Home INR      Anticoagulation Summary  As of 2020    INR goal:   2.0-3.0   TTR:   82.0 % (10.3 mo)   INR used for dosin.3! (2020)   Warfarin maintenance plan:   6 mg (6 mg x 1) every day   Full warfarin instructions:   : 9 mg; : 9 mg; Otherwise 6 mg every day   Weekly warfarin total:   42 mg   Plan last modified:   Nyla Walker RN (2020)   Next INR check:   2020   Priority:   Maintenance   Target end date:   Indefinite    Indications    Acute thromboembolism of deep veins of lower extremity (H) (Resolved) [I82.409]  Anticoagulation monitoring  INR range 2-3 [Z79.01]  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]             Anticoagulation Episode Summary     INR check location:       Preferred lab:       Send INR reminders to:   [facility]    Comments:   home monitor [employer]      Anticoagulation Care Providers     Provider Role Specialty Phone number    Erika Carrasco MD Referring Family Practice [phone]            See the Encounter Report to view Anticoagulation Flowsheet and Dosing Calendar (Go to Encounters tab in chart review, and find the  Anticoagulation Therapy Visit)    No encounter, INR  received via fax.  Assessment and istructions via phone call with patient. Patient verbalized understanding.      Arleth Sheth RN

## 2020-08-25 ENCOUNTER — OFFICE VISIT (OUTPATIENT)
Dept: CARDIOLOGY | Facility: OTHER | Age: 65
End: 2020-08-25
Attending: INTERNAL MEDICINE
Payer: MEDICARE

## 2020-08-25 VITALS
BODY MASS INDEX: 24.66 KG/M2 | OXYGEN SATURATION: 97 % | WEIGHT: 148 LBS | TEMPERATURE: 97.3 F | HEIGHT: 65 IN | RESPIRATION RATE: 18 BRPM | SYSTOLIC BLOOD PRESSURE: 120 MMHG | DIASTOLIC BLOOD PRESSURE: 80 MMHG | HEART RATE: 76 BPM

## 2020-08-25 DIAGNOSIS — Z86.79 HISTORY OF HEART FAILURE: ICD-10-CM

## 2020-08-25 DIAGNOSIS — I47.10 PAROXYSMAL SUPRAVENTRICULAR TACHYCARDIA (H): ICD-10-CM

## 2020-08-25 DIAGNOSIS — Z95.0 CARDIAC PACEMAKER IN SITU: ICD-10-CM

## 2020-08-25 DIAGNOSIS — Q24.5 MYOCARDIAL BRIDGE: ICD-10-CM

## 2020-08-25 DIAGNOSIS — Z95.1 HISTORY OF CORONARY ARTERY BYPASS GRAFT X 1: ICD-10-CM

## 2020-08-25 DIAGNOSIS — Z79.01 ANTICOAGULATION MONITORING, INR RANGE 2-3: ICD-10-CM

## 2020-08-25 DIAGNOSIS — Z86.718 HISTORY OF DVT (DEEP VEIN THROMBOSIS): ICD-10-CM

## 2020-08-25 DIAGNOSIS — Z95.5 HISTORY OF CORONARY ARTERY STENT PLACEMENT: ICD-10-CM

## 2020-08-25 DIAGNOSIS — F32.A DEPRESSION, UNSPECIFIED DEPRESSION TYPE: ICD-10-CM

## 2020-08-25 DIAGNOSIS — E78.2 MIXED HYPERLIPIDEMIA: ICD-10-CM

## 2020-08-25 DIAGNOSIS — Z95.0 PACEMAKER-DEPENDENT DUE TO NATIVE CARDIAC RHYTHM INSUFFICIENT TO SUPPORT LIFE: ICD-10-CM

## 2020-08-25 DIAGNOSIS — I10 ESSENTIAL HYPERTENSION: ICD-10-CM

## 2020-08-25 DIAGNOSIS — Z79.01 ANTICOAGULATED ON COUMADIN: ICD-10-CM

## 2020-08-25 DIAGNOSIS — E11.9 TYPE 2 DIABETES MELLITUS WITHOUT COMPLICATION, WITHOUT LONG-TERM CURRENT USE OF INSULIN (H): ICD-10-CM

## 2020-08-25 DIAGNOSIS — Z86.73 HISTORY OF STROKE: ICD-10-CM

## 2020-08-25 DIAGNOSIS — Z86.79 PERSONAL HISTORY OF SUPRAVENTRICULAR TACHYCARDIA: ICD-10-CM

## 2020-08-25 DIAGNOSIS — Z95.0 PACEMAKER: ICD-10-CM

## 2020-08-25 DIAGNOSIS — Z86.79 HISTORY OF CORONARY VASOSPASM: ICD-10-CM

## 2020-08-25 DIAGNOSIS — Z98.890 S/P AV NODAL ABLATION: ICD-10-CM

## 2020-08-25 DIAGNOSIS — Z98.890 HISTORY OF CARDIAC RADIOFREQUENCY ABLATION: ICD-10-CM

## 2020-08-25 DIAGNOSIS — F51.04 CHRONIC INSOMNIA: ICD-10-CM

## 2020-08-25 DIAGNOSIS — I49.8 PACEMAKER-DEPENDENT DUE TO NATIVE CARDIAC RHYTHM INSUFFICIENT TO SUPPORT LIFE: ICD-10-CM

## 2020-08-25 DIAGNOSIS — R07.89 OTHER CHEST PAIN: ICD-10-CM

## 2020-08-25 DIAGNOSIS — I20.1 CORONARY ARTERY SPASM (H): Primary | ICD-10-CM

## 2020-08-25 DIAGNOSIS — D68.2 FACTOR V DEFICIENCY (H): ICD-10-CM

## 2020-08-25 LAB — INTERPRETATION ECG - MUSE: NORMAL

## 2020-08-25 PROCEDURE — G0463 HOSPITAL OUTPT CLINIC VISIT: HCPCS

## 2020-08-25 PROCEDURE — 93010 ELECTROCARDIOGRAM REPORT: CPT | Performed by: INTERNAL MEDICINE

## 2020-08-25 PROCEDURE — 93005 ELECTROCARDIOGRAM TRACING: CPT

## 2020-08-25 PROCEDURE — 99214 OFFICE O/P EST MOD 30 MIN: CPT | Performed by: INTERNAL MEDICINE

## 2020-08-25 PROCEDURE — G0463 HOSPITAL OUTPT CLINIC VISIT: HCPCS | Mod: 25

## 2020-08-25 RX ORDER — RANOLAZINE 500 MG/1
500 TABLET, EXTENDED RELEASE ORAL 2 TIMES DAILY
Qty: 60 TABLET | Refills: 3 | Status: SHIPPED | OUTPATIENT
Start: 2020-08-25 | End: 2021-04-08

## 2020-08-25 RX ORDER — DILTIAZEM HYDROCHLORIDE 360 MG/1
360 CAPSULE, EXTENDED RELEASE ORAL DAILY
Qty: 90 CAPSULE | Refills: 3 | Status: SHIPPED | OUTPATIENT
Start: 2020-08-25 | End: 2021-10-04

## 2020-08-25 RX ORDER — SERTRALINE HYDROCHLORIDE 25 MG/1
25 TABLET, FILM COATED ORAL DAILY
Qty: 90 TABLET | Refills: 3 | Status: SHIPPED | OUTPATIENT
Start: 2020-08-25 | End: 2020-11-24

## 2020-08-25 RX ORDER — ZOLPIDEM TARTRATE 5 MG/1
5 TABLET ORAL
Qty: 30 TABLET | Refills: 3 | Status: SHIPPED | OUTPATIENT
Start: 2020-08-25 | End: 2020-11-24

## 2020-08-25 ASSESSMENT — PAIN SCALES - GENERAL: PAINLEVEL: NO PAIN (0)

## 2020-08-25 ASSESSMENT — MIFFLIN-ST. JEOR: SCORE: 1216.58

## 2020-08-25 NOTE — NURSING NOTE
"Patient comes in for follow up after chest pain.  Mirella Royal LPN ....................8/25/2020   12:53 PM  Chief Complaint   Patient presents with     Follow Up     chest pain       Initial /80 (BP Location: Right arm, Patient Position: Sitting, Cuff Size: Adult Regular)   Pulse 76   Temp 97.3  F (36.3  C) (Tympanic)   Resp 18   Ht 1.65 m (5' 4.96\")   Wt 67.1 kg (148 lb)   SpO2 97%   BMI 24.66 kg/m   Estimated body mass index is 24.66 kg/m  as calculated from the following:    Height as of this encounter: 1.65 m (5' 4.96\").    Weight as of this encounter: 67.1 kg (148 lb).  Meds Reconciled: complete  Pt is on Aspirin  Pt is on a Statin  PHQ and/or JUAN CARLOS reviewed. Pt referred to PCP/MH Provider as appropriate.    Mirella Royal LPN      "

## 2020-08-25 NOTE — PATIENT INSTRUCTIONS
You were seen by  Dr Islas     1. Increase diltiazem from 240 mg to 360 mg everyday.     2. Start zoloft 25 mg every day    3. Start ranexa 500 mg twice a day.    4. Start ambien 5 mg as needed for sleep        You will follow up with [facility] in 3 months, sooner if needed.       Please call the cardiology office with problems, questions, or concerns at [phone].    If you experience chest pain, chest pressure, chest tightness, shortness of breath, fainting, lightheadedness, nausea, vomiting, or other concerning symptoms, please report to the Emergency Department or call 911. These symptoms may be emergent, and best treated in the Emergency Department.     Cardiology Nurses  Stephanie Ledesma, IMELDA MCCLAIN, NAY MEDELLIN LPN  [facility] ([address])  [phone]

## 2020-08-26 ENCOUNTER — TRANSFERRED RECORDS (OUTPATIENT)
Dept: HEALTH INFORMATION MANAGEMENT | Facility: OTHER | Age: 65
End: 2020-08-26

## 2020-08-26 ENCOUNTER — ANTICOAGULATION THERAPY VISIT (OUTPATIENT)
Dept: ANTICOAGULATION | Facility: OTHER | Age: 65
End: 2020-08-26
Attending: FAMILY MEDICINE
Payer: MEDICARE

## 2020-08-26 DIAGNOSIS — Z79.01 ANTICOAGULATION MONITORING, INR RANGE 2-3: ICD-10-CM

## 2020-08-26 DIAGNOSIS — D68.2 FACTOR V DEFICIENCY (H): ICD-10-CM

## 2020-08-26 LAB — INR PPP: 1.5 (ref 0.9–1.1)

## 2020-08-26 NOTE — PROGRESS NOTES
ANTICOAGULATION FOLLOW-UP CLINIC VISIT    Patient Name:  Lyudmila Fitzgerlad  Date:  2020  Contact Type:  Telephone/ spoke with patient and gave dosing instructions.     SUBJECTIVE:  Patient Findings     Positives:   Change in medications (continue lovenox)        Clinical Outcomes     Negatives:   Major bleeding event, Thromboembolic event, Anticoagulation-related hospital admission, Anticoagulation-related ED visit, Anticoagulation-related fatality           OBJECTIVE    Recent labs: (last 7 days)     20   INR 1.5*       ASSESSMENT / PLAN  INR assessment SUB    Recheck INR In: 1 DAY    INR Location Home INR      Anticoagulation Summary  As of 2020    INR goal:   2.0-3.0   TTR:   81.5 % (10.4 mo)   INR used for dosin.5! (2020)   Warfarin maintenance plan:   6 mg (6 mg x 1) every day   Full warfarin instructions:   : 9 mg; Otherwise 6 mg every day   Weekly warfarin total:   42 mg   Plan last modified:   Nyla Walker RN (2020)   Next INR check:   2020   Priority:   Maintenance   Target end date:   Indefinite    Indications    Acute thromboembolism of deep veins of lower extremity (H) (Resolved) [I82.409]  Anticoagulation monitoring  INR range 2-3 [Z79.01]  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]             Anticoagulation Episode Summary     INR check location:       Preferred lab:       Send INR reminders to:   [facility]    Comments:   home monitor [employer]      Anticoagulation Care Providers     Provider Role Specialty Phone number    Erika Carrasco MD Referring [facility] [phone]            See the Encounter Report to view Anticoagulation Flowsheet and Dosing Calendar (Go to Encounters tab in chart review, and find the Anticoagulation Therapy Visit)        Radha Mantilla RN

## 2020-08-28 ENCOUNTER — TELEPHONE (OUTPATIENT)
Dept: FAMILY MEDICINE | Facility: OTHER | Age: 65
End: 2020-08-28

## 2020-08-28 ENCOUNTER — ANTICOAGULATION THERAPY VISIT (OUTPATIENT)
Dept: ANTICOAGULATION | Facility: OTHER | Age: 65
End: 2020-08-28
Attending: FAMILY MEDICINE
Payer: MEDICARE

## 2020-08-28 ENCOUNTER — TRANSFERRED RECORDS (OUTPATIENT)
Dept: HEALTH INFORMATION MANAGEMENT | Facility: OTHER | Age: 65
End: 2020-08-28

## 2020-08-28 DIAGNOSIS — D68.2 FACTOR V DEFICIENCY (H): ICD-10-CM

## 2020-08-28 DIAGNOSIS — Z79.01 ANTICOAGULATION MONITORING, INR RANGE 2-3: ICD-10-CM

## 2020-08-28 LAB — INR PPP: 2.1 (ref 0.9–1.1)

## 2020-08-28 NOTE — TELEPHONE ENCOUNTER
Left message on secure voicemail with below information.       Britta Bunn LPN.................. 8/28/2020 4:43 PM

## 2020-08-28 NOTE — PROGRESS NOTES
ANTICOAGULATION FOLLOW-UP CLINIC VISIT    Patient Name:  Lyudmila Fitzgerald  Date:  2020  Contact Type:  no call needed patient to continue same dose    SUBJECTIVE:  Patient Findings         Clinical Outcomes     Negatives:   Major bleeding event, Thromboembolic event, Anticoagulation-related hospital admission, Anticoagulation-related ED visit, Anticoagulation-related fatality           OBJECTIVE    Recent labs: (last 7 days)     20   INR 2.1*       ASSESSMENT / PLAN  INR assessment THER    Recheck INR In: 4 WEEKS    INR Location Home INR      Anticoagulation Summary  As of 2020    INR goal:   2.0-3.0   TTR:   88.5 % (8.8 mo)   INR used for dosin.1 (2020)   Warfarin maintenance plan:   6 mg (6 mg x 1) every day   Full warfarin instructions:   6 mg every day   Weekly warfarin total:   42 mg   No change documented:   Nyla Walker RN   Plan last modified:   Nyla Walker RN (2020)   Next INR check:   2020   Priority:   Maintenance   Target end date:   Indefinite    Indications    Acute thromboembolism of deep veins of lower extremity (H) (Resolved) [I82.409]  Anticoagulation monitoring  INR range 2-3 [Z79.01]             Anticoagulation Episode Summary     INR check location:       Preferred lab:       Send INR reminders to:   [facility]    Comments:   home monitor [employer]      Anticoagulation Care Providers     Provider Role Specialty Phone number    Erika Carrasco MD [facility] [phone]            See the Encounter Report to view Anticoagulation Flowsheet and Dosing Calendar (Go to Encounters tab in chart review, and find the Anticoagulation Therapy Visit)        Nyla Walker RN                  negative

## 2020-08-28 NOTE — PROGRESS NOTES
ANTICOAGULATION FOLLOW-UP CLINIC VISIT    Patient Name:  Lyudmila Fitzgerald  Date:  2020  Contact Type:  Telephone/ Spoke with patient and reviewed dosing    SUBJECTIVE:  Patient Findings     Positives:   Change in medications (OK to stop Lovenox as INR is 2.1)        Clinical Outcomes     Negatives:   Major bleeding event, Thromboembolic event, Anticoagulation-related hospital admission, Anticoagulation-related ED visit, Anticoagulation-related fatality           OBJECTIVE    Recent labs: (last 7 days)     20   INR 2.1*       ASSESSMENT / PLAN  INR assessment THER    Recheck INR In: 1 WEEK    INR Location Home INR      Anticoagulation Summary  As of 2020    INR goal:   2.0-3.0   TTR:   80.8 % (10.4 mo)   INR used for dosin.1 (2020)   Warfarin maintenance plan:   6 mg (6 mg x 1) every day   Full warfarin instructions:   6 mg every day   Weekly warfarin total:   42 mg   Plan last modified:   Nyla Walker RN (2020)   Next INR check:      Priority:   Maintenance   Target end date:   Indefinite    Indications    Acute thromboembolism of deep veins of lower extremity (H) (Resolved) [I82.409]  Anticoagulation monitoring  INR range 2-3 [Z79.01]  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]             Anticoagulation Episode Summary     INR check location:       Preferred lab:       Send INR reminders to:   [facility]    Comments:   home monitor [employer]      Anticoagulation Care Providers     Provider Role Specialty Phone number    Erika Carrasco MD Referring [facility] [phone]            See the Encounter Report to view Anticoagulation Flowsheet and Dosing Calendar (Go to Encounters tab in chart review, and find the Anticoagulation Therapy Visit)        Karen Lorenzo RN

## 2020-08-31 ENCOUNTER — TELEPHONE (OUTPATIENT)
Dept: FAMILY MEDICINE | Facility: OTHER | Age: 65
End: 2020-08-31

## 2020-08-31 NOTE — TELEPHONE ENCOUNTER
Calling back regarding orders for a sleep study. States that insurance won't cover it since it was all done over a phone note and not a face to face visit. Please call

## 2020-08-31 NOTE — TELEPHONE ENCOUNTER
Discussed with patient the need to have a face to face for sleep study. Appointment made.  Caron Almeida LPN ....................  8/31/2020   11:30 AM

## 2020-09-01 ENCOUNTER — ANCILLARY PROCEDURE (OUTPATIENT)
Dept: CARDIOLOGY | Facility: CLINIC | Age: 65
End: 2020-09-01
Attending: INTERNAL MEDICINE
Payer: MEDICARE

## 2020-09-01 DIAGNOSIS — Z45.010 PACEMAKER AT END OF BATTERY LIFE: ICD-10-CM

## 2020-09-01 PROCEDURE — 93294 REM INTERROG EVL PM/LDLS PM: CPT | Mod: ZP | Performed by: INTERNAL MEDICINE

## 2020-09-01 PROCEDURE — 93296 REM INTERROG EVL PM/IDS: CPT | Mod: ZF

## 2020-09-02 DIAGNOSIS — I66.9 CEREBRAL THROMBOSIS: ICD-10-CM

## 2020-09-02 RX ORDER — WARFARIN SODIUM 6 MG/1
TABLET ORAL
Qty: 90 TABLET | Refills: 1 | Status: SHIPPED | OUTPATIENT
Start: 2020-09-02 | End: 2020-10-19

## 2020-09-02 NOTE — TELEPHONE ENCOUNTER
"Requested Prescriptions   Pending Prescriptions Disp Refills     warfarin ANTICOAGULANT (COUMADIN) 6 MG tablet 90 tablet 1     Sig: Take 6 mg daily.  Or as directed by the protime clinic.       Vitamin K Antagonists Failed - 9/2/2020  2:46 PM        Failed - INR is within goal in the past 6 weeks     Confirm INR is within goal in the past 6 weeks.     Recent Labs   Lab Test 08/28/20   INR 2.1*                       Passed - Recent (12 mo) or future (30 days) visit within the authorizing provider's specialty     Patient has had an office visit with the authorizing provider or a provider within the authorizing providers department within the previous 12 mos or has a future within next 30 days. See \"Patient Info\" tab in inbasket, or \"Choose Columns\" in Meds & Orders section of the refill encounter.              Passed - Medication is active on med list        Passed - Patient is 18 years of age or older        Passed - Patient is not pregnant        Passed - No positive pregnancy on file in past 12 months           Prescription approved per [facility] Refill Protocol.    "

## 2020-09-04 ENCOUNTER — ANTICOAGULATION THERAPY VISIT (OUTPATIENT)
Dept: ANTICOAGULATION | Facility: OTHER | Age: 65
End: 2020-09-04
Attending: FAMILY MEDICINE
Payer: MEDICARE

## 2020-09-04 ENCOUNTER — TRANSFERRED RECORDS (OUTPATIENT)
Dept: HEALTH INFORMATION MANAGEMENT | Facility: OTHER | Age: 65
End: 2020-09-04

## 2020-09-04 DIAGNOSIS — D68.2 FACTOR V DEFICIENCY (H): ICD-10-CM

## 2020-09-04 DIAGNOSIS — Z79.01 ANTICOAGULATION MONITORING, INR RANGE 2-3: ICD-10-CM

## 2020-09-04 LAB — INR PPP: 2.2 (ref 0.9–1.1)

## 2020-09-04 NOTE — PROGRESS NOTES
ANTICOAGULATION FOLLOW-UP CLINIC VISIT    Patient Name:  Lyudmila Fitzgerald  Date:  2020  Contact Type:  Received fax from MD/INR self testing service.     SUBJECTIVE:  Patient Findings     Comments:   No phone call needed as patient is in therapeutic ranged. Has been instructed to call with changes.          Clinical Outcomes     Negatives:   Major bleeding event, Thromboembolic event, Anticoagulation-related hospital admission, Anticoagulation-related ED visit, Anticoagulation-related fatality    Comments:   No phone call needed as patient is in therapeutic ranged. Has been instructed to call with changes.             OBJECTIVE    Recent labs: (last 7 days)     20   INR 2.2*       ASSESSMENT / PLAN  INR assessment THER    Recheck INR In: 1 WEEK    INR Location Home INR      Anticoagulation Summary  As of 2020    INR goal:   2.0-3.0   TTR:   81.2 % (10.7 mo)   INR used for dosin.2 (2020)   Warfarin maintenance plan:   6 mg (6 mg x 1) every day   Full warfarin instructions:   6 mg every day   Weekly warfarin total:   42 mg   No change documented:   Karen Lorenzo RN   Plan last modified:   Nyla Walker RN (2020)   Next INR check:   2020   Priority:   Maintenance   Target end date:   Indefinite    Indications    Acute thromboembolism of deep veins of lower extremity (H) (Resolved) [I82.409]  Anticoagulation monitoring  INR range 2-3 [Z79.01]  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  INR in range so no telephone call.  Patient is to call INR clinic if any changes affecting INR.       Karen Lorenzo RN

## 2020-09-06 ENCOUNTER — ALLIED HEALTH/NURSE VISIT (OUTPATIENT)
Dept: FAMILY MEDICINE | Facility: OTHER | Age: 65
End: 2020-09-06
Attending: FAMILY MEDICINE
Payer: MEDICARE

## 2020-09-06 DIAGNOSIS — R05.9 COUGH: Primary | ICD-10-CM

## 2020-09-06 PROCEDURE — 99207 ZZC NO CHARGE NURSE ONLY: CPT

## 2020-09-06 PROCEDURE — C9803 HOPD COVID-19 SPEC COLLECT: HCPCS

## 2020-09-06 PROCEDURE — U0003 INFECTIOUS AGENT DETECTION BY NUCLEIC ACID (DNA OR RNA); SEVERE ACUTE RESPIRATORY SYNDROME CORONAVIRUS 2 (SARS-COV-2) (CORONAVIRUS DISEASE [COVID-19]), AMPLIFIED PROBE TECHNIQUE, MAKING USE OF HIGH THROUGHPUT TECHNOLOGIES AS DESCRIBED BY CMS-2020-01-R: HCPCS | Mod: ZL | Performed by: FAMILY MEDICINE

## 2020-09-07 LAB
SARS-COV-2 RNA SPEC QL NAA+PROBE: NOT DETECTED
SPECIMEN SOURCE: NORMAL

## 2020-09-08 LAB
MDC_IDC_EPISODE_DTM: NORMAL
MDC_IDC_EPISODE_DURATION: 14 S
MDC_IDC_EPISODE_DURATION: 2 S
MDC_IDC_EPISODE_DURATION: 3 S
MDC_IDC_EPISODE_DURATION: 4 S
MDC_IDC_EPISODE_DURATION: 5 S
MDC_IDC_EPISODE_DURATION: 5 S
MDC_IDC_EPISODE_DURATION: 8 S
MDC_IDC_EPISODE_ID: NORMAL
MDC_IDC_EPISODE_TYPE: NORMAL
MDC_IDC_LEAD_IMPLANT_DT: NORMAL
MDC_IDC_LEAD_IMPLANT_DT: NORMAL
MDC_IDC_LEAD_LOCATION: NORMAL
MDC_IDC_LEAD_LOCATION: NORMAL
MDC_IDC_LEAD_LOCATION_DETAIL_1: NORMAL
MDC_IDC_LEAD_LOCATION_DETAIL_1: NORMAL
MDC_IDC_LEAD_MFG: NORMAL
MDC_IDC_LEAD_MFG: NORMAL
MDC_IDC_LEAD_MODEL: NORMAL
MDC_IDC_LEAD_MODEL: NORMAL
MDC_IDC_LEAD_POLARITY_TYPE: NORMAL
MDC_IDC_LEAD_POLARITY_TYPE: NORMAL
MDC_IDC_LEAD_SERIAL: NORMAL
MDC_IDC_LEAD_SERIAL: NORMAL
MDC_IDC_LEAD_SPECIAL_FUNCTION: NORMAL
MDC_IDC_LEAD_SPECIAL_FUNCTION: NORMAL
MDC_IDC_MSMT_BATTERY_DTM: NORMAL
MDC_IDC_MSMT_BATTERY_REMAINING_LONGEVITY: 132 MO
MDC_IDC_MSMT_BATTERY_REMAINING_PERCENTAGE: 100 %
MDC_IDC_MSMT_BATTERY_STATUS: NORMAL
MDC_IDC_MSMT_LEADCHNL_RA_IMPEDANCE_VALUE: 706 OHM
MDC_IDC_MSMT_LEADCHNL_RV_IMPEDANCE_VALUE: 468 OHM
MDC_IDC_MSMT_LEADCHNL_RV_PACING_THRESHOLD_AMPLITUDE: 0.8 V
MDC_IDC_MSMT_LEADCHNL_RV_PACING_THRESHOLD_PULSEWIDTH: 0.4 MS
MDC_IDC_PG_IMPLANT_DTM: NORMAL
MDC_IDC_PG_MFG: NORMAL
MDC_IDC_PG_MODEL: NORMAL
MDC_IDC_PG_SERIAL: NORMAL
MDC_IDC_PG_TYPE: NORMAL
MDC_IDC_SESS_CLINIC_NAME: NORMAL
MDC_IDC_SESS_DTM: NORMAL
MDC_IDC_SESS_TYPE: NORMAL
MDC_IDC_SET_BRADY_AT_MODE_SWITCH_MODE: NORMAL
MDC_IDC_SET_BRADY_AT_MODE_SWITCH_RATE: 140 {BEATS}/MIN
MDC_IDC_SET_BRADY_LOWRATE: 60 {BEATS}/MIN
MDC_IDC_SET_BRADY_MAX_SENSOR_RATE: 130 {BEATS}/MIN
MDC_IDC_SET_BRADY_MAX_TRACKING_RATE: 100 {BEATS}/MIN
MDC_IDC_SET_BRADY_MODE: NORMAL
MDC_IDC_SET_BRADY_PAV_DELAY_HIGH: 80 MS
MDC_IDC_SET_BRADY_PAV_DELAY_LOW: 130 MS
MDC_IDC_SET_BRADY_SAV_DELAY_HIGH: 80 MS
MDC_IDC_SET_BRADY_SAV_DELAY_LOW: 130 MS
MDC_IDC_SET_LEADCHNL_RA_PACING_AMPLITUDE: 2 V
MDC_IDC_SET_LEADCHNL_RA_PACING_CAPTURE_MODE: NORMAL
MDC_IDC_SET_LEADCHNL_RA_PACING_POLARITY: NORMAL
MDC_IDC_SET_LEADCHNL_RA_PACING_PULSEWIDTH: 1 MS
MDC_IDC_SET_LEADCHNL_RA_SENSING_ADAPTATION_MODE: NORMAL
MDC_IDC_SET_LEADCHNL_RA_SENSING_POLARITY: NORMAL
MDC_IDC_SET_LEADCHNL_RA_SENSING_SENSITIVITY: 0.6 MV
MDC_IDC_SET_LEADCHNL_RV_PACING_AMPLITUDE: 1.4 V
MDC_IDC_SET_LEADCHNL_RV_PACING_CAPTURE_MODE: NORMAL
MDC_IDC_SET_LEADCHNL_RV_PACING_POLARITY: NORMAL
MDC_IDC_SET_LEADCHNL_RV_PACING_PULSEWIDTH: 0.4 MS
MDC_IDC_SET_LEADCHNL_RV_SENSING_ADAPTATION_MODE: NORMAL
MDC_IDC_SET_LEADCHNL_RV_SENSING_POLARITY: NORMAL
MDC_IDC_SET_LEADCHNL_RV_SENSING_SENSITIVITY: 1.5 MV
MDC_IDC_SET_ZONE_DETECTION_INTERVAL: 375 MS
MDC_IDC_SET_ZONE_TYPE: NORMAL
MDC_IDC_SET_ZONE_VENDOR_TYPE: NORMAL
MDC_IDC_STAT_AT_BURDEN_PERCENT: 1 %
MDC_IDC_STAT_AT_DTM_END: NORMAL
MDC_IDC_STAT_AT_DTM_START: NORMAL
MDC_IDC_STAT_BRADY_DTM_END: NORMAL
MDC_IDC_STAT_BRADY_DTM_START: NORMAL
MDC_IDC_STAT_BRADY_RA_PERCENT_PACED: 77 %
MDC_IDC_STAT_BRADY_RV_PERCENT_PACED: 100 %
MDC_IDC_STAT_EPISODE_RECENT_COUNT: 0
MDC_IDC_STAT_EPISODE_RECENT_COUNT: 28
MDC_IDC_STAT_EPISODE_RECENT_COUNT_DTM_END: NORMAL
MDC_IDC_STAT_EPISODE_RECENT_COUNT_DTM_START: NORMAL
MDC_IDC_STAT_EPISODE_TYPE: NORMAL
MDC_IDC_STAT_EPISODE_VENDOR_TYPE: NORMAL

## 2020-09-09 ENCOUNTER — TELEPHONE (OUTPATIENT)
Dept: FAMILY MEDICINE | Facility: OTHER | Age: 65
End: 2020-09-09

## 2020-09-09 ENCOUNTER — VIRTUAL VISIT (OUTPATIENT)
Dept: FAMILY MEDICINE | Facility: OTHER | Age: 65
End: 2020-09-09
Attending: FAMILY MEDICINE
Payer: MEDICARE

## 2020-09-09 DIAGNOSIS — Z79.01 ANTICOAGULATED ON COUMADIN: ICD-10-CM

## 2020-09-09 DIAGNOSIS — J22 LRTI (LOWER RESPIRATORY TRACT INFECTION): Primary | ICD-10-CM

## 2020-09-09 PROCEDURE — 99213 OFFICE O/P EST LOW 20 MIN: CPT | Mod: 95 | Performed by: FAMILY MEDICINE

## 2020-09-09 RX ORDER — AZITHROMYCIN 250 MG/1
TABLET, FILM COATED ORAL
Qty: 6 TABLET | Refills: 0 | Status: SHIPPED | OUTPATIENT
Start: 2020-09-09 | End: 2020-09-14

## 2020-09-09 NOTE — TELEPHONE ENCOUNTER
The interaction means that she needs additional protime monitoring.  She has a machine at home and was instructed to check her INR on Friday.  Erika Gupta MD

## 2020-09-09 NOTE — PROGRESS NOTES
"Lyudmila Fitzgerald is a 65 year old female who is being evaluated via a billable video visit.      The patient has been notified of following:     \"This video visit will be conducted via a call between you and your physician/provider. We have found that certain health care needs can be provided without the need for an in-person physical exam.  This service lets us provide the care you need with a video conversation.  If a prescription is necessary we can send it directly to your pharmacy.  If lab work is needed we can place an order for that and you can then stop by our lab to have the test done at a later time.    Video visits are billed at different rates depending on your insurance coverage.  Please reach out to your insurance provider with any questions.    If during the course of the call the physician/provider feels a video visit is not appropriate, you will not be charged for this service.\"    Patient has given verbal consent for Video visit? Yes  How would you like to obtain your AVS? MyChart  If you are dropped from the video visit, the video invite should be resent to: Send to e-mail at: [email or web address]  Will anyone else be joining your video visit? No        Subjective     Lyudmila Fitzgerald is a 65 year old female who presents today via video visit for the following health issues: cough    HPI Lyudmila Fitzgerald is a 65 year old female is assessed via video visit for cough.  She has had evaluation done for chronic cough.  She has some chronic atelectasis noted on chest xray. She is on an ace inhibitor, lisinopril.  Negative COVID testing on 9/6/2020.      Onset of symptoms one week ago.  She's had a temp of 100.  No change in smell or taste.  Lost her voice yesterday.  Cough is productive, white mucousy glob. She will cough and choke on the mucous.  She has noticed wheezing and shortness of breath.    Eating has been ok.  Her stomach has felt off with her symptoms.  She's taken robitussinDM, Tylenol, " ibuprofen, vitawater and emergen-C.  None of these have really helped her symptoms.    Patient's  isn't sick.      PFTs done about a month ago - were normal.  Procedure: Complete spirometry     Findings: FEV1 is 75% predicted, FVC is 74% predicted. FEF 25-75 is 76% predicted. Diffusion capacity, corrected, is at 66% predicted.     Impression: No significant obstructive airway disease.  Mild diffusion defect.     Maribel Weber, DO   Internal Medicine       Current Outpatient Medications   Medication     amLODIPine (NORVASC) 5 MG tablet     aspirin EC 81 MG EC tablet     atorvastatin (LIPITOR) 80 MG tablet     azithromycin (ZITHROMAX) 250 MG tablet     calcium carb 1250 mg, 500 mg Chinik,/vitamin D 200 unit (CALCIUM 500/D) 500-200 MG-UNIT per tablet     diltiazem ER (DILT-XR) 240 MG 24 hr ER beaded capsule     diltiazem ER (TIAZAC) 360 MG 24 hr ER beaded capsule     isosorbide mononitrate (IMDUR) 30 MG 24 hr tablet     lisinopril (ZESTRIL) 5 MG tablet     nitroGLYcerin (NITROSTAT) 0.4 MG sublingual tablet     ranolazine (RANEXA) 500 MG 12 hr tablet     sertraline (ZOLOFT) 25 MG tablet     warfarin ANTICOAGULANT (COUMADIN) 6 MG tablet     zolpidem (AMBIEN) 5 MG tablet     No current facility-administered medications for this visit.         Allergies   Allergen Reactions     Morphine Nausea and Vomiting     OK in small doses     Prednisone Nausea and Vomiting     Per IV     Sotalol Nausea and Vomiting     Past Medical History:   Diagnosis Date     Acute thromboembolism of deep veins of lower extremity (H) 2006    Overview:  Hx of multiple episodes of DVT: 3 lower extremity; 5 upper extremity (right arm)     Atrial fibrillation (H)     No Comments Provided     Cardiac pacemaker in situ 2003    Dual chamber     Cerebral thrombosis     8/31/2006,'95 w/ no residual     Coronary atherosclerosis     2/6/2006     Endometrial hyperplasia 2006    s/p endometrial ablation 6/06     Essential hypertension     No Comments  Provided     History of other genital system and obstetric disorders      8, Para 3-0-5-2     Other and unspecified angina pectoris     2006     Other and unspecified hyperlipidemia     No Comments Provided     Other postprocedural states      and ,Followed by Dr. Usman Duran, [facility],.     Paroxysmal supraventricular tachycardia (H)     2006,s/p multiple ablations w last resulting in PPM     Personal history of transient ischemic attack (TIA) and cerebral infarction without residual deficit 2009    with left hemiplegia     Primary hypercoagulable state (H)     No Comments Provided         Video Start Time: 3:30 PM - changed to phone visit due to problems with connection on patient's end.          Review of Systems   In the hospital last month with chest pain      Objective           Vitals:  No vitals were obtained today due to virtual visit.    Physical Exam     GENERAL: hoarse voice.  RESP: intermittent cough                 ICD-10-CM    1. LRTI (lower respiratory tract infection)  J22 azithromycin (ZITHROMAX) 250 MG tablet   2. Anticoagulated on Coumadin  Z79.01      Patient likely has LRTI.  Recent negative COVID testing.  Will treat with zithromax.  Discussed other at home treatments.  Discussed signs and symptoms of worsening illness and indications to be seen in [facility] or ER.  She has home protime machine - she is to check in INR on the  - discussed possible need for dose adjustment.        Video-Visit Details    Type of service:  Video Visit    Video End Time:3:42 PM    Originating Location (pt. Location): Home    Distant Location (provider location):  [facility]     Platform used for Video Visit: Percy

## 2020-09-09 NOTE — NURSING NOTE
Patient having a video visit for on going cough with recent negative Covid test. Her current symptoms are fever for week, cough- productive as of last night, congestion.   Rosalie Deras LPN ..........9/9/2020 2:58 PM

## 2020-09-11 ENCOUNTER — TRANSFERRED RECORDS (OUTPATIENT)
Dept: HEALTH INFORMATION MANAGEMENT | Facility: OTHER | Age: 65
End: 2020-09-11

## 2020-09-11 ENCOUNTER — ANTICOAGULATION THERAPY VISIT (OUTPATIENT)
Dept: ANTICOAGULATION | Facility: OTHER | Age: 65
End: 2020-09-11
Attending: FAMILY MEDICINE
Payer: MEDICARE

## 2020-09-11 DIAGNOSIS — D68.2 FACTOR V DEFICIENCY (H): ICD-10-CM

## 2020-09-11 DIAGNOSIS — Z79.01 ANTICOAGULATION MONITORING, INR RANGE 2-3: ICD-10-CM

## 2020-09-11 LAB — INR PPP: 1.4 (ref 0.9–1.1)

## 2020-09-11 NOTE — PROGRESS NOTES
ANTICOAGULATION FOLLOW-UP CLINIC VISIT    Patient Name:  Lyudmila Fitzgerald  Date:  2020  Contact Type:  Telephone/ spoke with patient reviewed dosing    SUBJECTIVE:  Patient Findings     Positives:   Change in medications (taking azithromycin x 5 days started on 20)        Clinical Outcomes     Negatives:   Major bleeding event, Thromboembolic event, Anticoagulation-related hospital admission, Anticoagulation-related ED visit, Anticoagulation-related fatality           OBJECTIVE    Recent labs: (last 7 days)     20   INR 1.4*       ASSESSMENT / PLAN  INR assessment SUB    Recheck INR In: 2 WEEKS    INR Location Home INR      Anticoagulation Summary  As of 2020    INR goal:   2.0-3.0   TTR:   80.0 % (10.9 mo)   INR used for dosin.4! 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

## 2020-09-25 ENCOUNTER — ANTICOAGULATION THERAPY VISIT (OUTPATIENT)
Dept: ANTICOAGULATION | Facility: OTHER | Age: 65
End: 2020-09-25
Attending: FAMILY MEDICINE
Payer: MEDICARE

## 2020-09-25 ENCOUNTER — TRANSFERRED RECORDS (OUTPATIENT)
Dept: HEALTH INFORMATION MANAGEMENT | Facility: OTHER | Age: 65
End: 2020-09-25

## 2020-09-25 DIAGNOSIS — Z79.01 ANTICOAGULATION MONITORING, INR RANGE 2-3: ICD-10-CM

## 2020-09-25 DIAGNOSIS — D68.2 FACTOR V DEFICIENCY (H): ICD-10-CM

## 2020-09-25 LAB — INR PPP: 3.3 (ref 0.9–1.1)

## 2020-09-25 NOTE — PROGRESS NOTES
ANTICOAGULATION FOLLOW-UP CLINIC VISIT    Patient Name:  Lyudmila Fitzgerald  Date:  9/25/2020  Contact Type:  Telephone/ spoke with patient reviewed dosing    SUBJECTIVE:  Patient Findings         Clinical Outcomes     Negatives:   Major bleeding event, Thromboembolic event, Anticoagulation-related hospital admission, Anticoagulation-related ED visit, Anticoagulation-related fatality           OBJECTIVE    Recent labs: (last 7 days)     09/25/20   INR 3.3*       ASSESSMENT / PLAN  INR assessment SUPRA    Recheck INR In: 2 WEEKS    INR Location Home INR      Anticoagulation Summary  As of 9/25/2020    INR goal:   2.0-3.0   TTR:   78.9 % (11.4 mo)   INR used for dosing:   3.3! 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

## 2020-10-06 ENCOUNTER — HOSPITAL ENCOUNTER (OUTPATIENT)
Dept: CARDIOLOGY | Facility: OTHER | Age: 65
Discharge: HOME | End: 2020-10-06
Attending: INTERNAL MEDICINE | Admitting: INTERNAL MEDICINE
Payer: MEDICARE

## 2020-10-06 DIAGNOSIS — I49.8 PACEMAKER-DEPENDENT DUE TO NATIVE CARDIAC RHYTHM INSUFFICIENT TO SUPPORT LIFE: ICD-10-CM

## 2020-10-06 DIAGNOSIS — Z98.890 S/P AV NODAL ABLATION: ICD-10-CM

## 2020-10-06 DIAGNOSIS — I47.10 PAROXYSMAL SUPRAVENTRICULAR TACHYCARDIA (H): ICD-10-CM

## 2020-10-06 DIAGNOSIS — Z95.0 PACEMAKER-DEPENDENT DUE TO NATIVE CARDIAC RHYTHM INSUFFICIENT TO SUPPORT LIFE: ICD-10-CM

## 2020-10-06 LAB
MDC_IDC_LEAD_IMPLANT_DT: NORMAL
MDC_IDC_LEAD_IMPLANT_DT: NORMAL
MDC_IDC_LEAD_LOCATION: NORMAL
MDC_IDC_LEAD_LOCATION: NORMAL
MDC_IDC_LEAD_LOCATION_DETAIL_1: NORMAL
MDC_IDC_LEAD_LOCATION_DETAIL_1: NORMAL
MDC_IDC_LEAD_MFG: NORMAL
MDC_IDC_LEAD_MFG: NORMAL
MDC_IDC_LEAD_MODEL: NORMAL
MDC_IDC_LEAD_MODEL: NORMAL
MDC_IDC_LEAD_POLARITY_TYPE: NORMAL
MDC_IDC_LEAD_POLARITY_TYPE: NORMAL
MDC_IDC_LEAD_SERIAL: NORMAL
MDC_IDC_LEAD_SERIAL: NORMAL
MDC_IDC_LEAD_SPECIAL_FUNCTION: NORMAL
MDC_IDC_LEAD_SPECIAL_FUNCTION: NORMAL
MDC_IDC_MSMT_BATTERY_DTM: NORMAL
MDC_IDC_MSMT_BATTERY_REMAINING_LONGEVITY: 138 MO
MDC_IDC_MSMT_BATTERY_STATUS: NORMAL
MDC_IDC_MSMT_LEADCHNL_RA_IMPEDANCE_VALUE: 716 OHM
MDC_IDC_MSMT_LEADCHNL_RA_PACING_THRESHOLD_AMPLITUDE: 0.9 V
MDC_IDC_MSMT_LEADCHNL_RA_PACING_THRESHOLD_PULSEWIDTH: 1 MS
MDC_IDC_MSMT_LEADCHNL_RA_SENSING_INTR_AMPL: 2.5 MV
MDC_IDC_MSMT_LEADCHNL_RV_IMPEDANCE_VALUE: 469 OHM
MDC_IDC_MSMT_LEADCHNL_RV_PACING_THRESHOLD_AMPLITUDE: 0.8 V
MDC_IDC_MSMT_LEADCHNL_RV_PACING_THRESHOLD_PULSEWIDTH: 0.4 MS
MDC_IDC_MSMT_LEADCHNL_RV_SENSING_INTR_AMPL: 9.1 MV
MDC_IDC_PG_IMPLANT_DTM: NORMAL
MDC_IDC_PG_MFG: NORMAL
MDC_IDC_PG_MODEL: NORMAL
MDC_IDC_PG_SERIAL: NORMAL
MDC_IDC_PG_TYPE: NORMAL
MDC_IDC_SESS_CLINIC_NAME: NORMAL
MDC_IDC_SESS_DTM: NORMAL
MDC_IDC_SESS_TYPE: NORMAL
MDC_IDC_SET_BRADY_AT_MODE_SWITCH_MODE: NORMAL
MDC_IDC_SET_BRADY_AT_MODE_SWITCH_RATE: 140 {BEATS}/MIN
MDC_IDC_SET_BRADY_LOWRATE: 60 {BEATS}/MIN
MDC_IDC_SET_BRADY_MAX_SENSOR_RATE: 130 {BEATS}/MIN
MDC_IDC_SET_BRADY_MAX_TRACKING_RATE: 100 {BEATS}/MIN
MDC_IDC_SET_BRADY_MODE: NORMAL
MDC_IDC_SET_BRADY_PAV_DELAY_HIGH: 80 MS
MDC_IDC_SET_BRADY_PAV_DELAY_LOW: 130 MS
MDC_IDC_SET_BRADY_SAV_DELAY_HIGH: 80 MS
MDC_IDC_SET_BRADY_SAV_DELAY_LOW: 130 MS
MDC_IDC_SET_LEADCHNL_RA_PACING_AMPLITUDE: 2 V
MDC_IDC_SET_LEADCHNL_RA_PACING_CAPTURE_MODE: NORMAL
MDC_IDC_SET_LEADCHNL_RA_PACING_POLARITY: NORMAL
MDC_IDC_SET_LEADCHNL_RA_PACING_PULSEWIDTH: 1 MS
MDC_IDC_SET_LEADCHNL_RA_SENSING_ADAPTATION_MODE: NORMAL
MDC_IDC_SET_LEADCHNL_RA_SENSING_POLARITY: NORMAL
MDC_IDC_SET_LEADCHNL_RA_SENSING_SENSITIVITY: 0.6 MV
MDC_IDC_SET_LEADCHNL_RV_PACING_AMPLITUDE: 1.4 V
MDC_IDC_SET_LEADCHNL_RV_PACING_CAPTURE_MODE: NORMAL
MDC_IDC_SET_LEADCHNL_RV_PACING_POLARITY: NORMAL
MDC_IDC_SET_LEADCHNL_RV_PACING_PULSEWIDTH: 0.4 MS
MDC_IDC_SET_LEADCHNL_RV_SENSING_ADAPTATION_MODE: NORMAL
MDC_IDC_SET_LEADCHNL_RV_SENSING_POLARITY: NORMAL
MDC_IDC_SET_LEADCHNL_RV_SENSING_SENSITIVITY: 1.5 MV
MDC_IDC_SET_ZONE_DETECTION_INTERVAL: 375 MS
MDC_IDC_SET_ZONE_TYPE: NORMAL
MDC_IDC_SET_ZONE_VENDOR_TYPE: NORMAL
MDC_IDC_STAT_BRADY_DTM_END: NORMAL
MDC_IDC_STAT_BRADY_DTM_START: NORMAL
MDC_IDC_STAT_BRADY_RA_PERCENT_PACED: 67 %
MDC_IDC_STAT_BRADY_RV_PERCENT_PACED: 100 %
MDC_IDC_STAT_EPISODE_RECENT_COUNT: 0
MDC_IDC_STAT_EPISODE_RECENT_COUNT_DTM_END: NORMAL
MDC_IDC_STAT_EPISODE_RECENT_COUNT_DTM_START: NORMAL
MDC_IDC_STAT_EPISODE_TOTAL_COUNT: 0
MDC_IDC_STAT_EPISODE_TOTAL_COUNT_DTM_END: NORMAL
MDC_IDC_STAT_EPISODE_TYPE: NORMAL
MDC_IDC_STAT_EPISODE_TYPE: NORMAL
MDC_IDC_STAT_EPISODE_VENDOR_TYPE: NORMAL
MDC_IDC_STAT_EPISODE_VENDOR_TYPE: NORMAL

## 2020-10-06 PROCEDURE — 93280 PM DEVICE PROGR EVAL DUAL: CPT | Performed by: INTERNAL MEDICINE

## 2020-10-06 PROCEDURE — 93280 PM DEVICE PROGR EVAL DUAL: CPT | Mod: 26 | Performed by: INTERNAL MEDICINE

## 2020-10-06 NOTE — PATIENT INSTRUCTIONS
It was a pleasure to see you in clinic today.  Please do not hesitate to call with any questions or concerns.  You are scheduled for a remote transmission on 1/6/21.  We look forward to seeing you in clinic at your next device check in 6 months.    Virgilio Gooden, RN  Electrophysiology Nurse Clinician  [facility]    During Business Hours Please Call:  [phone]  After Hours Please Call:  [phone] - select option #4 and ask for job code [phone]

## 2020-10-09 ENCOUNTER — TRANSFERRED RECORDS (OUTPATIENT)
Dept: HEALTH INFORMATION MANAGEMENT | Facility: OTHER | Age: 65
End: 2020-10-09

## 2020-10-09 LAB — INR PPP: 4.5 (ref 0.9–1.1)

## 2020-10-12 ENCOUNTER — ANTICOAGULATION THERAPY VISIT (OUTPATIENT)
Dept: ANTICOAGULATION | Facility: OTHER | Age: 65
End: 2020-10-12
Attending: FAMILY MEDICINE
Payer: MEDICARE

## 2020-10-12 DIAGNOSIS — D68.2 FACTOR V DEFICIENCY (H): ICD-10-CM

## 2020-10-12 DIAGNOSIS — Z79.01 ANTICOAGULATION MONITORING, INR RANGE 2-3: ICD-10-CM

## 2020-10-12 NOTE — PROGRESS NOTES
ANTICOAGULATION FOLLOW-UP CLINIC VISIT    Patient Name:  Lyudmila Fitzgerald  Date:  10/12/2020  Contact Type:  Telephone/ spoke with patient regarding dosing    SUBJECTIVE:  Patient Findings     Positives:  Other complaints (says  has had pnuemonia and she has been feeling stuffy also so she has had some antihistamines)        Clinical Outcomes     Negatives:  Major bleeding event, Thromboembolic event, Anticoagulation-related hospital admission, Anticoagulation-related ED visit, Anticoagulation-related fatality           OBJECTIVE    Recent labs: (last 7 days)     10/09/20   INR 4.5*       ASSESSMENT / PLAN  INR assessment SUPRA    Recheck INR In: 1 WEEK    INR Location Home INR      Anticoagulation Summary  As of 10/12/2020    INR goal:  2.0-3.0   TTR:  75.8 % (11.8 mo)   INR used for dosin.5 (10/9/2020)   Warfarin maintenance plan:  6 mg (6 mg x 1) every day   Full warfarin instructions:  6 mg every day   Weekly warfarin total:  42 mg   No change documented:  Nyla Walker RN   Plan last modified:  Nyla Walker RN (2020)   Next INR check:  10/16/2020   Priority:  High   Target end date:  Indefinite    Indications    Acute thromboembolism of deep veins of lower extremity (H) (Resolved) [I82.409]  Anticoagulation monitoring  INR range 2-3 [Z79.01]  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 [phone]            See the Encounter Report to view Anticoagulation Flowsheet and Dosing Calendar (Go to Encounters tab in chart review, and find the Anticoagulation Therapy Visit)        Nyla Walker RN

## 2020-10-16 ENCOUNTER — TRANSFERRED RECORDS (OUTPATIENT)
Dept: HEALTH INFORMATION MANAGEMENT | Facility: OTHER | Age: 65
End: 2020-10-16

## 2020-10-16 ENCOUNTER — ANTICOAGULATION THERAPY VISIT (OUTPATIENT)
Dept: ANTICOAGULATION | Facility: OTHER | Age: 65
End: 2020-10-16
Attending: FAMILY MEDICINE
Payer: MEDICARE

## 2020-10-16 DIAGNOSIS — D68.2 FACTOR V DEFICIENCY (H): ICD-10-CM

## 2020-10-16 DIAGNOSIS — Z79.01 ANTICOAGULATION MONITORING, INR RANGE 2-3: ICD-10-CM

## 2020-10-16 LAB — INR PPP: 1.8 (ref 0.9–1.1)

## 2020-10-16 NOTE — PROGRESS NOTES
ANTICOAGULATION FOLLOW-UP CLINIC VISIT    Patient Name:  Lyudmila Fitzgerald  Date:  10/16/2020  Contact Type:  fax from [employer] INR monitoring service/phone call with patient    SUBJECTIVE:  Patient Findings     Comments:  Per phone call with patient.        Clinical Outcomes     Negatives:  Major bleeding event, Thromboembolic event, Anticoagulation-related hospital admission, Anticoagulation-related ED visit, Anticoagulation-related fatality    Comments:  Per phone call with patient.           OBJECTIVE    Recent labs: (last 7 days)     10/15/20   INR 1.8*       ASSESSMENT / PLAN  INR assessment SUB    Recheck INR In: 3 DAYS    INR Location Home INR      Anticoagulation Summary  As of 10/16/2020    INR goal:  2.0-3.0   TTR:  75.1 % (1 y)   INR used for dosin.8 (10/15/2020)   Warfarin maintenance plan:  6 mg (6 mg x 1) every day   Full warfarin instructions:  10/16: 9 mg; Otherwise 6 mg every day   Weekly warfarin total:  42 mg   Plan last modified:  Nyla Walker RN (2020)   Next INR check:  10/19/2020   Priority:  High   Target end date:  Indefinite    Indications    Acute thromboembolism of deep veins of lower extremity (H) (Resolved) [I82.409]  Anticoagulation monitoring  INR range 2-3 [Z79.01]  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  Assessment and istructions via phone call with patient. Patient verbalized understanding.      Arleth Sheth, IMELDA

## 2020-10-19 ENCOUNTER — TRANSFERRED RECORDS (OUTPATIENT)
Dept: HEALTH INFORMATION MANAGEMENT | Facility: OTHER | Age: 65
End: 2020-10-19

## 2020-10-19 ENCOUNTER — ANTICOAGULATION THERAPY VISIT (OUTPATIENT)
Dept: ANTICOAGULATION | Facility: OTHER | Age: 65
End: 2020-10-19
Attending: FAMILY MEDICINE
Payer: MEDICARE

## 2020-10-19 DIAGNOSIS — D68.2 FACTOR V DEFICIENCY (H): ICD-10-CM

## 2020-10-19 DIAGNOSIS — Z79.01 ANTICOAGULATION MONITORING, INR RANGE 2-3: ICD-10-CM

## 2020-10-19 DIAGNOSIS — I66.9 CEREBRAL THROMBOSIS: ICD-10-CM

## 2020-10-19 LAB — INR PPP: 2.7 (ref 0.9–1.1)

## 2020-10-19 RX ORDER — WARFARIN SODIUM 6 MG/1
TABLET ORAL
Qty: 90 TABLET | Refills: 1 | COMMUNITY
Start: 2020-10-19 | End: 2021-03-22

## 2020-10-19 NOTE — PROGRESS NOTES
ANTICOAGULATION FOLLOW-UP CLINIC VISIT    Patient Name:  Lyudmila Fitzgerald  Date:  10/19/2020  Contact Type:  fax from [email or web address] home INR monitoring service/phone call with patient    SUBJECTIVE:  Patient Findings     Comments:  Per phone call with patient          Clinical Outcomes     Negatives:  Major bleeding event, Thromboembolic event, Anticoagulation-related hospital admission, Anticoagulation-related ED visit, Anticoagulation-related fatality    Comments:  Per phone call with patient             OBJECTIVE    Recent labs: (last 7 days)     10/19/20   INR 2.7*       ASSESSMENT / PLAN  INR assessment THER    Recheck INR In: 1 WEEK    INR Location Clinic      Anticoagulation Summary  As of 10/19/2020    INR goal:  2.0-3.0   TTR:  75.1 % (1 y)   INR used for dosin.7 (10/19/2020)   Warfarin maintenance plan:  9 mg (6 mg x 1.5) every Fri; 6 mg (6 mg x 1) all other days   Full warfarin instructions:  9 mg every Fri; 6 mg all other days   Weekly warfarin total:  45 mg   Plan last modified:  Arleth Sheth RN (10/19/2020)   Next INR check:  10/26/2020   Priority:  High   Target end date:  Indefinite    Indications    Acute thromboembolism of deep veins of lower extremity (H) (Resolved) [I82.409]  Anticoagulation monitoring  INR range 2-3 [Z79.01]  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 [phone]            See the Encounter Report to view Anticoagulation Flowsheet and Dosing Calendar (Go to Encounters tab in chart review, and find the Anticoagulation Therapy Visit)    No encounter, INR  received via fax.  Assessment and istructions via phone call with patient. Patient verbalized understanding.      Arleth SAMS  IMELDA Sheth

## 2020-10-26 ENCOUNTER — TRANSFERRED RECORDS (OUTPATIENT)
Dept: HEALTH INFORMATION MANAGEMENT | Facility: OTHER | Age: 65
End: 2020-10-26

## 2020-10-26 ENCOUNTER — ANTICOAGULATION THERAPY VISIT (OUTPATIENT)
Dept: ANTICOAGULATION | Facility: OTHER | Age: 65
End: 2020-10-26
Attending: FAMILY MEDICINE
Payer: MEDICARE

## 2020-10-26 DIAGNOSIS — Z79.01 ANTICOAGULATION MONITORING, INR RANGE 2-3: ICD-10-CM

## 2020-10-26 DIAGNOSIS — D68.2 FACTOR V DEFICIENCY (H): ICD-10-CM

## 2020-10-26 LAB — INR PPP: 2.9 (ref 0.9–1.1)

## 2020-10-26 NOTE — PROGRESS NOTES
ANTICOAGULATION FOLLOW-UP CLINIC VISIT    Patient Name:  Lyudmila Fitzgerald  Date:  10/26/2020  Contact Type:  Fax    SUBJECTIVE:  Patient Findings         Clinical Outcomes     Negatives:  Major bleeding event, Thromboembolic event, Anticoagulation-related hospital admission, Anticoagulation-related ED visit, Anticoagulation-related fatality           OBJECTIVE    Recent labs: (last 7 days)     10/26/20   INR 2.9*       ASSESSMENT / PLAN  INR assessment THER    Recheck INR In: 1 WEEK    INR Location Home INR      Anticoagulation Summary  As of 10/26/2020    INR goal:  2.0-3.0   TTR:  75.0 % (1 y)   INR used for dosin.9 (10/26/2020)   Warfarin maintenance plan:  9 mg (6 mg x 1.5) every Fri; 6 mg (6 mg x 1) all other days   Full warfarin instructions:  9 mg every Fri; 6 mg all other days   Weekly warfarin total:  45 mg   No change documented:  Jason Weller RN   Plan last modified:  Arleth Sheth RN (10/19/2020)   Next INR check:  2020   Priority:  Maintenance   Target end date:  Indefinite    Indications    Acute thromboembolism of deep veins of lower extremity (H) (Resolved) [I82.409]  Anticoagulation monitoring  INR range 2-3 [Z79.01]  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

## 2020-11-09 ENCOUNTER — ANTICOAGULATION THERAPY VISIT (OUTPATIENT)
Dept: ANTICOAGULATION | Facility: OTHER | Age: 65
End: 2020-11-09
Attending: FAMILY MEDICINE
Payer: MEDICARE

## 2020-11-09 ENCOUNTER — TRANSFERRED RECORDS (OUTPATIENT)
Dept: HEALTH INFORMATION MANAGEMENT | Facility: OTHER | Age: 65
End: 2020-11-09

## 2020-11-09 DIAGNOSIS — Z79.01 ANTICOAGULATION MONITORING, INR RANGE 2-3: ICD-10-CM

## 2020-11-09 DIAGNOSIS — D68.2 FACTOR V DEFICIENCY (H): ICD-10-CM

## 2020-11-09 LAB — INR PPP: 3.7 (ref 0.9–1.1)

## 2020-11-09 NOTE — PROGRESS NOTES
ANTICOAGULATION FOLLOW-UP CLINIC VISIT    Patient Name:  Lyudmila Fitzgerald  Date:  11/9/2020  Contact Type:  Telephone/ spoke with patient reviewed dosing    SUBJECTIVE:  Patient Findings     Comments:  Patient denies any identifiable changes that caused the supratherapeutic INR.           Clinical Outcomes     Negatives:  Major bleeding event, Thromboembolic event, Anticoagulation-related hospital admission, Anticoagulation-related ED visit, Anticoagulation-related fatality    Comments:  Patient denies any identifiable changes that caused the supratherapeutic INR.              OBJECTIVE    Recent labs: (last 7 days)     11/09/20   INR 3.7*       ASSESSMENT / PLAN  INR assessment SUPRA    Recheck INR In: 2 WEEKS    INR Location Home INR      Anticoagulation Summary  As of 11/9/2020    INR goal:  2.0-3.0   TTR:  71.7 % (1 y)   INR used for dosing:  3.7 (11/9/2020)   Warfarin maintenance plan:  6 mg (6 mg x 1) every day   Full warfarin instructions:  11/9: 3 mg; Otherwise 6 mg every day   Weekly warfarin total:  42 mg   Plan last modified:  Nyla Walker RN (11/9/2020)   Next INR check:  11/23/2020   Priority:  Maintenance   Target end date:  Indefinite    Indications    Acute thromboembolism of deep veins of lower extremity (H) (Resolved) [I82.409]  Anticoagulation monitoring  INR range 2-3 [Z79.01]  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

## 2020-11-23 ENCOUNTER — TRANSFERRED RECORDS (OUTPATIENT)
Dept: HEALTH INFORMATION MANAGEMENT | Facility: OTHER | Age: 65
End: 2020-11-23

## 2020-11-23 ENCOUNTER — ANTICOAGULATION THERAPY VISIT (OUTPATIENT)
Dept: ANTICOAGULATION | Facility: OTHER | Age: 65
End: 2020-11-23
Attending: FAMILY MEDICINE
Payer: MEDICARE

## 2020-11-23 DIAGNOSIS — D68.2 FACTOR V DEFICIENCY (H): ICD-10-CM

## 2020-11-23 DIAGNOSIS — Z79.01 ANTICOAGULATION MONITORING, INR RANGE 2-3: ICD-10-CM

## 2020-11-23 LAB — INR PPP: 2.6 (ref 0.9–1.1)

## 2020-11-23 NOTE — PROGRESS NOTES
ANTICOAGULATION FOLLOW-UP CLINIC VISIT    Patient Name:  Lyudmila Fitzgerald  Date:  2020  Contact Type:  No call needed, patient to continue same dosing.    SUBJECTIVE:  Patient Findings         Clinical Outcomes     Negatives:  Major bleeding event, Thromboembolic event, Anticoagulation-related hospital admission, Anticoagulation-related ED visit, Anticoagulation-related fatality           OBJECTIVE    Recent labs: (last 7 days)     20   INR 2.6*       ASSESSMENT / PLAN  INR assessment THER    Recheck INR In: 2 WEEKS    INR Location Home INR      Anticoagulation Summary  As of 2020    INR goal:  2.0-3.0   TTR:  69.2 % (1 y)   INR used for dosin.6 (2020)   Warfarin maintenance plan:  6 mg (6 mg x 1) every day   Full warfarin instructions:  6 mg every day   Weekly warfarin total:  42 mg   No change documented:  Lynn Bro RN   Plan last modified:  Nyla Walker RN (2020)   Next INR check:  2020   Priority:  Maintenance   Target end date:  Indefinite    Indications    Acute thromboembolism of deep veins of lower extremity (H) (Resolved) [I82.409]  Anticoagulation monitoring  INR range 2-3 [Z79.01]  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

## 2020-11-24 ENCOUNTER — OFFICE VISIT (OUTPATIENT)
Dept: CARDIOLOGY | Facility: OTHER | Age: 65
End: 2020-11-24
Attending: INTERNAL MEDICINE
Payer: MEDICARE

## 2020-11-24 VITALS
RESPIRATION RATE: 18 BRPM | DIASTOLIC BLOOD PRESSURE: 80 MMHG | OXYGEN SATURATION: 96 % | BODY MASS INDEX: 24.99 KG/M2 | SYSTOLIC BLOOD PRESSURE: 136 MMHG | TEMPERATURE: 97 F | WEIGHT: 150 LBS | HEIGHT: 65 IN | HEART RATE: 72 BPM

## 2020-11-24 DIAGNOSIS — Z45.010 PACEMAKER AT END OF BATTERY LIFE: ICD-10-CM

## 2020-11-24 DIAGNOSIS — F32.A DEPRESSION, UNSPECIFIED DEPRESSION TYPE: ICD-10-CM

## 2020-11-24 DIAGNOSIS — Z86.718 HISTORY OF DVT (DEEP VEIN THROMBOSIS): ICD-10-CM

## 2020-11-24 DIAGNOSIS — Z86.79 HISTORY OF HEART FAILURE: ICD-10-CM

## 2020-11-24 DIAGNOSIS — Z98.890 S/P AV NODAL ABLATION: ICD-10-CM

## 2020-11-24 DIAGNOSIS — Z95.0 PACEMAKER: ICD-10-CM

## 2020-11-24 DIAGNOSIS — E78.2 MIXED HYPERLIPIDEMIA: ICD-10-CM

## 2020-11-24 DIAGNOSIS — R07.89 OTHER CHEST PAIN: Primary | ICD-10-CM

## 2020-11-24 DIAGNOSIS — F51.04 CHRONIC INSOMNIA: ICD-10-CM

## 2020-11-24 DIAGNOSIS — Z45.010 PACEMAKER BATTERY DEPLETION: ICD-10-CM

## 2020-11-24 DIAGNOSIS — Z98.890 HISTORY OF CARDIAC RADIOFREQUENCY ABLATION: ICD-10-CM

## 2020-11-24 DIAGNOSIS — Z95.1 HISTORY OF CORONARY ARTERY BYPASS GRAFT X 1: ICD-10-CM

## 2020-11-24 DIAGNOSIS — I49.8 PACEMAKER-DEPENDENT DUE TO NATIVE CARDIAC RHYTHM INSUFFICIENT TO SUPPORT LIFE: ICD-10-CM

## 2020-11-24 DIAGNOSIS — Z95.0 CARDIAC PACEMAKER IN SITU: ICD-10-CM

## 2020-11-24 DIAGNOSIS — D68.2 FACTOR V DEFICIENCY (H): ICD-10-CM

## 2020-11-24 DIAGNOSIS — Z79.01 ANTICOAGULATION MONITORING, INR RANGE 2-3: ICD-10-CM

## 2020-11-24 DIAGNOSIS — Z86.79 PERSONAL HISTORY OF SUPRAVENTRICULAR TACHYCARDIA: ICD-10-CM

## 2020-11-24 DIAGNOSIS — Q24.5 MYOCARDIAL BRIDGE: ICD-10-CM

## 2020-11-24 DIAGNOSIS — I45.6 LOWN GANONG LEVINE SYNDROME: ICD-10-CM

## 2020-11-24 DIAGNOSIS — R06.02 SHORTNESS OF BREATH: ICD-10-CM

## 2020-11-24 DIAGNOSIS — Z95.0 PACEMAKER-DEPENDENT DUE TO NATIVE CARDIAC RHYTHM INSUFFICIENT TO SUPPORT LIFE: ICD-10-CM

## 2020-11-24 DIAGNOSIS — Z86.79 HISTORY OF CORONARY VASOSPASM: ICD-10-CM

## 2020-11-24 DIAGNOSIS — I10 ESSENTIAL HYPERTENSION: ICD-10-CM

## 2020-11-24 DIAGNOSIS — E11.9 TYPE 2 DIABETES MELLITUS WITHOUT COMPLICATION, WITHOUT LONG-TERM CURRENT USE OF INSULIN (H): ICD-10-CM

## 2020-11-24 DIAGNOSIS — I47.10 PAROXYSMAL SUPRAVENTRICULAR TACHYCARDIA (H): ICD-10-CM

## 2020-11-24 DIAGNOSIS — I20.1 CORONARY ARTERY SPASM (H): ICD-10-CM

## 2020-11-24 DIAGNOSIS — Z79.01 ANTICOAGULATED ON COUMADIN: ICD-10-CM

## 2020-11-24 DIAGNOSIS — Z95.5 HISTORY OF CORONARY ARTERY STENT PLACEMENT: ICD-10-CM

## 2020-11-24 DIAGNOSIS — Z86.73 HISTORY OF STROKE: ICD-10-CM

## 2020-11-24 PROCEDURE — G0008 ADMIN INFLUENZA VIRUS VAC: HCPCS

## 2020-11-24 PROCEDURE — 99215 OFFICE O/P EST HI 40 MIN: CPT | Performed by: INTERNAL MEDICINE

## 2020-11-24 PROCEDURE — G0463 HOSPITAL OUTPT CLINIC VISIT: HCPCS | Mod: 25

## 2020-11-24 RX ORDER — NIFEDIPINE 30 MG
30 TABLET, EXTENDED RELEASE ORAL DAILY
Qty: 90 TABLET | Refills: 3 | Status: SHIPPED | OUTPATIENT
Start: 2020-11-24 | End: 2021-02-25 | Stop reason: ALTCHOICE

## 2020-11-24 RX ORDER — ISOSORBIDE MONONITRATE 60 MG/1
60 TABLET, EXTENDED RELEASE ORAL DAILY
Qty: 90 TABLET | Refills: 3 | Status: SHIPPED | OUTPATIENT
Start: 2020-11-24 | End: 2021-04-08

## 2020-11-24 RX ORDER — ZOLPIDEM TARTRATE 5 MG/1
5 TABLET ORAL
Qty: 30 TABLET | Refills: 3 | Status: SHIPPED | OUTPATIENT
Start: 2020-11-24 | End: 2021-03-16

## 2020-11-24 ASSESSMENT — MIFFLIN-ST. JEOR: SCORE: 1225.66

## 2020-11-24 ASSESSMENT — PAIN SCALES - GENERAL: PAINLEVEL: NO PAIN (1)

## 2020-11-24 NOTE — NURSING NOTE
"Patient comes in for 3 month follow up.  Mirella Royal LPN ....................11/24/2020   10:51 AM  Chief Complaint   Patient presents with     Follow Up     3 month       Initial /80 (BP Location: Right arm, Patient Position: Sitting, Cuff Size: Adult Regular)   Pulse 72   Temp 97  F (36.1  C) (Tympanic)   Resp 18   Ht 1.65 m (5' 4.96\")   Wt 68 kg (150 lb)   SpO2 96%   BMI 24.99 kg/m   Estimated body mass index is 24.99 kg/m  as calculated from the following:    Height as of this encounter: 1.65 m (5' 4.96\").    Weight as of this encounter: 68 kg (150 lb).  Meds Reconciled: complete  Pt is on Aspirin  Pt is on a Statin  PHQ and/or JUAN CARLOS reviewed. Pt referred to PCP/MH Provider as appropriate.    Mirella Royal LPN      "

## 2020-11-24 NOTE — PROGRESS NOTES
[facility] HEART CARE   CARDIOLOGY PROGRESS NOTE     Chief Complaint   Patient presents with     Follow Up     3 month          Diagnosis:  1.  Coronary artery spasm involving the LAD.  2.  Myocardial bridging involving LAD.  3.  H/O SVT.  4.  CABG x1 with LIMA to LAD on 11/15/2016 in [place].  5.  H/O chronic heart failure with an EF of 15% now recovered at 60 to 65% on 5/16/2019 at [facility].  6.  H/O coronary spasm with stent placement to LAD x3.  7.  H/O radiofrequency ablation for SVT x8.  8.  H/O stroke x7 with left hemiparesis.  9.  Left foot drop.  10.  S/P AV tessie lesion secondary to SVT.  11.  Pacemaker dependent.  12.  Hyperlipidemia-uncontrolled.  13.  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  14.  H/O DVT to bilateral lower extremities.  15.  Cardiac pacemaker.  16.  Hypertension-variable control.  17.  DM-2.  18.  Lown Ganong Brandt syndrome.    Assessment/Plan:    1.  Discontinue amlodipine 5 mg daily.  2.  Start on nicardipine 30 mg daily as she has done better on this in the past when taken in conjunction with diltiazem 360 mg daily.  3.  Increase Imdur from 30 mg to 60 mg daily.  4.  Increase Zoloft from 25 mg to 50 mg daily.  5.  Refill Ambien 5 mg #30 with 3 refills to be taken before bed as needed for sleep.  6.  In future, could increase nicardipine, increase of Norvasc from 5 mg to 10 mg daily, increase Imdur from 60 mg daily to 90 mg daily, and possibly add clonidine.  She states she was previously on diltiazem 120 edition of nicardipine 60 mg with relatively good results.  6.  Follow-up in the future in 3 months or sooner with issues.      Interval history:  Leigh has had increasing discomfort to her chest.  Her discomfort has been waking her up at night.  She has been having chest pain regularly, multiple times a week.  More recently, she took x15 nitro on 11/21/2020, x4 nitro on 11/22/2020, x3 on 11/23/2020, and ongoing discomfort today when seen.  She has been having regular  chest discomfort.  She feels the battery/stimulator which was placed in her back through [facility] for her heart stent has .  She has tried charging it without success.  It was suggested she follow-up with [facility] or the  who is in [place].  We will make changes to her medications as outlined above.  Obviously, this is a very difficult situation as many things have been done without relief.    HPI:    Originally, she was diagnosed with a LAD ostial coronary artery spasm at [facility].  She had a positive methergine spasm study in  at [facility] in the mid LAD, treated with drug-eluting stent to the mid-LAD.  She has a history of LAD myocardial bridging, CABG with LIMA to LAD on 11/15/2016 in [place], [place], history of heart failure with reported EF of 15% now recovered, stenting to the LAD x3, history of radiofrequency ablation for SVT x8, ultimately resulting in AV tessie ablation with pacemaker placement with pacemaker dependence, H/O stroke x7 with left hemiparesis essentially resolved with the exception of intermittent left foot drop, hyperlipidemia, on Coumadin with a history of factor V Leiden deficiency and numerous DVT's involving both the upper and lower extremities, hypertension, DM-2, on anticoagulated on Coumadin.     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  She has had care through [facility] as well as in [place], [place].  She was evaluated at [facility] and underwent angiography revealing separate origins of the LAD and circumflex.  She has a history of catheter induced coronary artery spasm originally diagnosed at [facility].     Dr. Bill Abreu through [facility] evaluated her in . In spite of pre-treatment with nitroglycerine and calcium channel blockers, she had fairly vigorous mid-LAD spasm with intracoronary methergine. The ostial LAD did not spasm. Dr. Abreu treated this area with a 2.5 x 28 mm Cypher drug-eluting stent. The  "following year she had instent restenosis treated with a second 8 mm x 2.5 mm Cypher drug-eluting stent.      She did fairly well until 2016, when she had evaluation at [facility] in [place]. She was treated with trans-myocardial revascularization with 25 channels in the inferior, lateral and anterior LV walls, and underwent an LIMA-LAD. Her EF was 35% pre-op, and approximately 50-55% post-operatively.      She had recurrent angina, and on 2/8/17 she underwent repeat angiography which showed mild instent restenosis of the mid-LAD stents, followed by 99% stenosis of the mid-LAD at the LIMA-LAD anastomosis, presumably due to surgical clips/manipulation. The TERRENCE was atretic. Thus, the interventionalist placed a 2.25 x 8 mm Promus Premiere drug-eluting stent into the mid-LAD with a good result.      Lately, she has been having an increasingly more frequent and severe anginal chest discomfort, and feels like she is back to her severe coronary \"spasm\" again.      He has ongoing concerns for angina felt to be vasospastic in nature.  She has had a total of x4 cardiac catheterizations on 6/17/2008, 11/21/2012, 1/17/2019, and I believe one in [place] do not have this date.       She has done pretty well since. Her EF was greater than 60% on a couple occasions since.  Most recently, on 5/16/2019 her EF was 60% to 65%.  There was no significant valvular disease detected.  Her echo was essentially unchanged from 4/18/2017.  Diastolic dysfunction was normal.      Relevant testing:        Past Medical History:   Diagnosis Date     Acute thromboembolism of deep veins of lower extremity (H) 2006    Overview:  Hx of multiple episodes of DVT: 3 lower extremity; 5 upper extremity (right arm)     Atrial fibrillation (H)     No Comments Provided     Cardiac pacemaker in situ 2003    Dual chamber     Cerebral thrombosis     8/31/2006,'95 w/ no residual     Coronary atherosclerosis     2/6/2006     Endometrial hyperplasia "     s/p endometrial ablation      Essential hypertension     No Comments Provided     History of other genital system and obstetric disorders      8, Para 3-0-5-2     Other and unspecified angina pectoris     2006     Other and unspecified hyperlipidemia     No Comments Provided     Other postprocedural states      and ,Followed by Dr. Usman Duran, [facility],.     Paroxysmal supraventricular tachycardia (H)     2006,s/p multiple ablations w last resulting in PPM     Personal history of transient ischemic attack (TIA) and cerebral infarction without residual deficit 2009    with left hemiplegia     Primary hypercoagulable state (H)     No Comments Provided       Past Surgical History:   Procedure Laterality Date     ARTHROSCOPY KNEE      ,Arthroscopy for chondromalacia patella     AS CABG, ARTERY-VEIN, SINGLE  11/15/2016    Single vessel; done in [place]     ATTEMPTED ARTHROSCOPY      ,Right arthroscopy     BIOPSY BREAST      ,Breast cyst aspiration     COLONOSCOPY      2007,follow up recommended in 10 years.     ENDOSCOPIC SINUS SURGERY      ,Sinus node ablation.     EP ABLATION ATRIAL FLUTTER N/A 2020    Procedure: EP VENOGRAM SVC;  Surgeon: Hakeem Moore MD;  Location:  HEART CARDIAC CATH LAB     EP PACEMAKER N/A 2020    Procedure: EP PACEMAKER;  Surgeon: Tima Johnson MD;  Location:  HEART CARDIAC CATH LAB     EP STUDY  1994    EP STUDY /ABLATION,AV re-entry tachycardia, tessie ablation     HEART CATH, ANGIOPLASTY      ,Stenting placed LAD after ergonovine provocation confirmed     HEART CATH, ANGIOPLASTY      ,90% lesion, normal left ventricular function     IMPLANT PACEMAKER      ,Guidant pacemaker placement, AV tessie ablation     LAPAROSCOPIC ABLATION ENDOMETRIOSIS           OTHER SURGICAL HISTORY      3/24/06,289497,([place]) [facility] STENT ANGIO     OTHER SURGICAL HISTORY       "08/02,676541,EP STUDY /ABLATION     OTHER SURGICAL HISTORY      11/04,77290.0,CT CORONARY ANGIOGRAM ([place]),Coronary angiogram, failed ablation     OTHER SURGICAL HISTORY      12/04,343646,[facility] RELOCATION OF SKIN POCKET PACEMAKER,[facility] 5/24/05 reconfiguration of pacemaker \"pocket\"     OTHER SURGICAL HISTORY      119421,IP CONSULT TO ELECTROPHYSIOLOGY,study and lead change     OTHER SURGICAL HISTORY      12/06,292504,NEUROSTIMULATOR,Nerve stimulator implanted for chest pain control     OTHER SURGICAL HISTORY      12/2008,01063.0,CT CORONARY ANGIOGRAM ([place]),with increased left-sided weakness and vertigo, negative CT angiogram.     REPLACE PACEMAKER GENERATOR      12/29/04,Replacement of left ventricular pacemaker lead.     STENT  02/2017    LAD        Allergies   Allergen Reactions     Morphine Nausea and Vomiting     OK in small doses     Prednisone Nausea and Vomiting     Per IV     Sotalol Nausea and Vomiting       Current Outpatient Medications   Medication Sig Dispense Refill     aspirin EC 81 MG EC tablet Take 81 mg by mouth       atorvastatin (LIPITOR) 80 MG tablet Take 1 tablet (80 mg) by mouth daily 90 tablet 3     calcium carb 1250 mg, 500 mg [place],/vitamin D 200 unit (CALCIUM 500/D) 500-200 MG-UNIT per tablet Take 1 tablet by mouth       diltiazem ER (TIAZAC) 360 MG 24 hr ER beaded capsule Take 1 capsule (360 mg) by mouth daily 90 capsule 3     isosorbide mononitrate (IMDUR) 60 MG 24 hr tablet Take 1 tablet (60 mg) by mouth daily 90 tablet 3     lisinopril (ZESTRIL) 5 MG tablet Take 1 tablet (5 mg) by mouth daily 90 tablet 3     NIFEdipine ER (ADALAT CC) 30 MG 24 hr tablet Take 1 tablet (30 mg) by mouth daily 90 tablet 3     nitroGLYcerin (NITROSTAT) 0.4 MG sublingual tablet For chest pain place 1 tablet under the tongue every 5 minutes for 3 doses. If symptoms persist 5 minutes after 1st dose call 911. 25 tablet 3     ranolazine (RANEXA) 500 MG 12 hr tablet Take 1 tablet (500 mg) by mouth 2 times daily 60 " tablet 3     sertraline (ZOLOFT) 50 MG tablet Take 1 tablet (50 mg) by mouth daily 90 tablet 3     warfarin ANTICOAGULANT (COUMADIN) 6 MG tablet Take 6 mg daily Or as directed by the protime clinic. 90 tablet 1     zolpidem (AMBIEN) 5 MG tablet Take 1 tablet (5 mg) by mouth nightly as needed for sleep 30 tablet 3       Social History     Socioeconomic History     Marital status:      Spouse name: Antonio     Number of children: 2     Years of education: 16+     Highest education level: Master's degree (e.g., MA, MS, [employer], MEd, MSW, [employer])   Occupational History     Not on file   Social Needs     Financial resource strain: Not hard at all     Food insecurity     Worry: Never true     Inability: Never true     Transportation needs     Medical: No     Non-medical: No   Tobacco Use     Smoking status: Never Smoker     Smokeless tobacco: Never Used     Tobacco comment: Quit smoking: spouse does not smoke in the house. Exposed in cars.   Substance and Sexual Activity     Alcohol use: Yes     Comment: Alcoholic Drinks/day: Alcoholic Drinks/day: 1-2 drinks twice a week     Drug use: Never     Sexual activity: Not Currently     Partners: Male   Lifestyle     Physical activity     Days per week: 0 days     Minutes per session: 0 min     Stress: To some extent   Relationships     Social connections     Talks on phone: Not on file     Gets together: Not on file     Attends Jehovah's witness service: Not on file     Active member of club or organization: Not on file     Attends meetings of clubs or organizations: Not on file     Relationship status: Not on file     Intimate partner violence     Fear of current or ex partner: Not on file     Emotionally abused: Not on file     Physically abused: Not on file     Forced sexual activity: Not on file   Other Topics Concern     Not on file   Social History Narrative     2nd Marriage x 20 years to  Anthony they live in [place], [place]       in [place].  "Completed her Master's Degree 2003. Taught Special Education.  Early half-way due to health 2007.      Total 5 children blended family-3 dgts. And 2 sons    Daughter:  Philip    Son:  Anthony - lives in [facility]    6 Grand-children 5 boys and 1 girl    Leigh parents' and siblings live in [facility].       LAB RESULTS:   Anticoagulation Therapy Visit on 08/24/2020   Component Date Value Ref Range Status     INR 08/24/2020 1.3* 0.90 - 1.10 Corrected   Anticoagulation Therapy Visit on 08/19/2020   Component Date Value Ref Range Status     INR 08/19/2020 2.3* 0.90 - 1.10 Final   Anticoagulation Therapy Visit on 08/05/2020   Component Date Value Ref Range Status     INR 08/05/2020 2.1* 0.90 - 1.10 Final   Allied Health/Nurse Visit on 08/03/2020   Component Date Value Ref Range Status     COVID-19 Virus PCR to [facility] - So* 08/03/2020 Nasopharyngeal   Final     COVID-19 Virus PCR to [facility] - Re* 08/03/2020 Not Detected   Final   Anticoagulation Therapy Visit on 07/30/2020   Component Date Value Ref Range Status     INR 07/30/2020 3.8* 0.90 - 1.10 Final   Anticoagulation Therapy Visit on 07/27/2020   Component Date Value Ref Range Status     INR 07/25/2020 1.1  0.90 - 1.10 Final     INR 07/27/2020 1.2* 0.90 - 1.10 Final   Anticoagulation Therapy Visit on 07/23/2020   Component Date Value Ref Range Status     INR Protime 07/23/2020 1.0  0.86 - 1.14 Final   Anticoagulation Therapy Visit on 07/21/2020   Component Date Value Ref Range Status     INR 07/21/2020 1.4* 0.90 - 1.10 Final   Anticoagulation Therapy Visit on 07/09/2020   Component Date Value Ref Range Status     INR 07/08/2020 2.1* 0.90 - 1.10 Final        Review of systems: Negative except that which was noted in the HPI.    Physical examination:  /80 (BP Location: Right arm, Patient Position: Sitting, Cuff Size: Adult Regular)   Pulse 72   Temp 97  F (36.1  C) (Tympanic)   Resp 18   Ht 1.65 m (5' 4.96\")   Wt 68 kg (150 lb)   SpO2 96%   BMI 24.99 kg/m  "     GENERAL APPEARANCE: healthy, alert and no distress  HEENT: no icterus, no xanthelasmas, normal pupil size and reaction, no cyanosis.  NECK: no adenopathy, no asymmetry, masses, or scars.  CHEST: lungs clear to auscultation - no rales, rhonchi or wheezes, no use of accessory muscles, no retractions, respirations are unlabored, normal respiratory rate  CARDIOVASCULAR: regular rhythm, normal S1 with physiologic split S2, no S3 or S4 and no murmur, click or rub  ABDOMEN: soft, non tender, bowel sounds normal  EXTREMITIES: no clubbing, cyanosis or edema  NEURO: alert and oriented normal speech, and affect  VASC: No vascular bruits heard.  SKIN: no ecchymoses, no rashes        Thank you for allowing me to participate in the care of your patient. Please do not hesitate to contact me if you have any questions.     Moises Islas, DO

## 2020-12-07 ENCOUNTER — ANTICOAGULATION THERAPY VISIT (OUTPATIENT)
Dept: ANTICOAGULATION | Facility: OTHER | Age: 65
End: 2020-12-07
Attending: FAMILY MEDICINE
Payer: MEDICARE

## 2020-12-07 ENCOUNTER — TRANSFERRED RECORDS (OUTPATIENT)
Dept: HEALTH INFORMATION MANAGEMENT | Facility: OTHER | Age: 65
End: 2020-12-07

## 2020-12-07 DIAGNOSIS — Z79.01 ANTICOAGULATION MONITORING, INR RANGE 2-3: ICD-10-CM

## 2020-12-07 DIAGNOSIS — D68.2 FACTOR V DEFICIENCY (H): ICD-10-CM

## 2020-12-07 LAB — INR PPP: 3.4 (ref 0.9–1.1)

## 2020-12-07 NOTE — PROGRESS NOTES
ANTICOAGULATION FOLLOW-UP CLINIC VISIT    Patient Name:  Lyudmila Fitzgerald  Date:  12/7/2020  Contact Type:  Telephone/ spoke with patient reviewed dosing    SUBJECTIVE:  Patient Findings     Positives:  Change in medications (increase in sertraline and imdur. )             OBJECTIVE    Recent labs: (last 7 days)     12/07/20   INR 3.4*       ASSESSMENT / PLAN  INR assessment SUPRA    Recheck INR In: 2 WEEKS    INR Location Home INR      Anticoagulation Summary  As of 12/7/2020    INR goal:  2.0-3.0   TTR:  70.1 % (1 y)   INR used for dosing:  3.4 (12/7/2020)   Warfarin maintenance plan:  3 mg (6 mg x 0.5) every Mon; 6 mg (6 mg x 1) all other days   Full warfarin instructions:  3 mg every Mon; 6 mg all other days   Weekly warfarin total:  39 mg   Plan last modified:  Nyla Walker, RN (12/7/2020)   Next INR check:  12/21/2020   Priority:  Maintenance   Target end date:  Indefinite    Indications    Acute thromboembolism of deep veins of lower extremity (H) (Resolved) [I82.409]  Anticoagulation monitoring  INR range 2-3 [Z79.01]  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

## 2020-12-22 ENCOUNTER — ANTICOAGULATION THERAPY VISIT (OUTPATIENT)
Dept: ANTICOAGULATION | Facility: OTHER | Age: 65
End: 2020-12-22
Attending: FAMILY MEDICINE
Payer: MEDICARE

## 2020-12-22 ENCOUNTER — TRANSFERRED RECORDS (OUTPATIENT)
Dept: HEALTH INFORMATION MANAGEMENT | Facility: OTHER | Age: 65
End: 2020-12-22

## 2020-12-22 DIAGNOSIS — D68.2 FACTOR V DEFICIENCY (H): ICD-10-CM

## 2020-12-22 DIAGNOSIS — Z79.01 ANTICOAGULATION MONITORING, INR RANGE 2-3: ICD-10-CM

## 2020-12-22 LAB — INR PPP: 2.5 (ref 0.9–1.1)

## 2020-12-22 NOTE — PROGRESS NOTES
ANTICOAGULATION FOLLOW-UP CLINIC VISIT    Patient Name:  Lyudmila Fitzgerald  Date:  2020  Contact Type:  no call needed patient to continue same dose    SUBJECTIVE:  Patient Findings         Clinical Outcomes     Negatives:  Major bleeding event, Thromboembolic event, Anticoagulation-related hospital admission, Anticoagulation-related ED visit, Anticoagulation-related fatality           OBJECTIVE    Recent labs: (last 7 days)     20   INR 2.5*       ASSESSMENT / PLAN  INR assessment THER    Recheck INR In: 2 WEEKS    INR Location Home INR      Anticoagulation Summary  As of 2020    INR goal:  2.0-3.0   TTR:  68.9 % (1 y)   INR used for dosin.5 (2020)   Warfarin maintenance plan:  3 mg (6 mg x 0.5) every Mon; 6 mg (6 mg x 1) all other days   Full warfarin instructions:  3 mg every Mon; 6 mg all other days   Weekly warfarin total:  39 mg   No change documented:  Nyla Walker RN   Plan last modified:  Nyla Walker RN (2020)   Next INR check:  2021   Priority:  Maintenance   Target end date:  Indefinite    Indications    Acute thromboembolism of deep veins of lower extremity (H) (Resolved) [I82.409]  Anticoagulation monitoring  INR range 2-3 [Z79.01]  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

## 2020-12-23 ENCOUNTER — ALLIED HEALTH/NURSE VISIT (OUTPATIENT)
Dept: FAMILY MEDICINE | Facility: OTHER | Age: 65
End: 2020-12-23
Attending: FAMILY MEDICINE
Payer: MEDICARE

## 2020-12-23 DIAGNOSIS — R51.9 HEADACHE: Primary | ICD-10-CM

## 2020-12-23 PROCEDURE — C9803 HOPD COVID-19 SPEC COLLECT: HCPCS

## 2020-12-23 PROCEDURE — U0003 INFECTIOUS AGENT DETECTION BY NUCLEIC ACID (DNA OR RNA); SEVERE ACUTE RESPIRATORY SYNDROME CORONAVIRUS 2 (SARS-COV-2) (CORONAVIRUS DISEASE [COVID-19]), AMPLIFIED PROBE TECHNIQUE, MAKING USE OF HIGH THROUGHPUT TECHNOLOGIES AS DESCRIBED BY CMS-2020-01-R: HCPCS | Mod: ZL | Performed by: FAMILY MEDICINE

## 2020-12-24 LAB
SARS-COV-2 RNA SPEC QL NAA+PROBE: NOT DETECTED
SPECIMEN SOURCE: NORMAL

## 2020-12-27 ENCOUNTER — HEALTH MAINTENANCE LETTER (OUTPATIENT)
Age: 65
End: 2020-12-27

## 2021-01-04 ENCOUNTER — ANTICOAGULATION THERAPY VISIT (OUTPATIENT)
Dept: ANTICOAGULATION | Facility: OTHER | Age: 66
End: 2021-01-04
Attending: FAMILY MEDICINE
Payer: MEDICARE

## 2021-01-04 ENCOUNTER — TRANSFERRED RECORDS (OUTPATIENT)
Dept: HEALTH INFORMATION MANAGEMENT | Facility: OTHER | Age: 66
End: 2021-01-04

## 2021-01-04 DIAGNOSIS — D68.2 FACTOR V DEFICIENCY (H): ICD-10-CM

## 2021-01-04 DIAGNOSIS — Z79.01 ANTICOAGULATION MONITORING, INR RANGE 2-3: ICD-10-CM

## 2021-01-04 LAB — INR PPP: 3.3 (ref 0.9–1.1)

## 2021-01-04 NOTE — PROGRESS NOTES
ANTICOAGULATION FOLLOW-UP CLINIC VISIT    Patient Name:  Lyudmila Fitzgerald  Date:  1/4/2021  Contact Type:  Telephone - spoke with patient and discussed dosing instructions    SUBJECTIVE:  Patient Findings         Clinical Outcomes     Negatives:  Major bleeding event, Thromboembolic event, Anticoagulation-related hospital admission, Anticoagulation-related ED visit, Anticoagulation-related fatality           OBJECTIVE    Recent labs: (last 7 days)     01/04/21   INR 3.3*       ASSESSMENT / PLAN  INR assessment SUPRA    Recheck INR In: 2 WEEKS    INR Location Home INR      Anticoagulation Summary  As of 1/4/2021    INR goal:  2.0-3.0   TTR:  67.6 % (1 y)   INR used for dosing:  3.3 (1/4/2021)   Warfarin maintenance plan:  3 mg (6 mg x 0.5) every Mon; 6 mg (6 mg x 1) all other days   Full warfarin instructions:  1/4: Hold; Otherwise 3 mg every Mon; 6 mg all other days   Weekly warfarin total:  39 mg   Plan last modified:  Nyla Walker RN (12/7/2020)   Next INR check:  1/18/2021   Priority:  Maintenance   Target end date:  Indefinite    Indications    Acute thromboembolism of deep veins of lower extremity (H) (Resolved) [I82.409]  Anticoagulation monitoring  INR range 2-3 [Z79.01]  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

## 2021-01-06 ENCOUNTER — ANCILLARY PROCEDURE (OUTPATIENT)
Dept: CARDIOLOGY | Facility: CLINIC | Age: 66
End: 2021-01-06
Attending: INTERNAL MEDICINE
Payer: MEDICARE

## 2021-01-06 DIAGNOSIS — Z98.890 S/P AV NODAL ABLATION: ICD-10-CM

## 2021-01-06 PROCEDURE — 93296 REM INTERROG EVL PM/IDS: CPT

## 2021-01-06 PROCEDURE — 93294 REM INTERROG EVL PM/LDLS PM: CPT | Performed by: INTERNAL MEDICINE

## 2021-01-19 ENCOUNTER — TRANSFERRED RECORDS (OUTPATIENT)
Dept: HEALTH INFORMATION MANAGEMENT | Facility: OTHER | Age: 66
End: 2021-01-19

## 2021-01-19 ENCOUNTER — HOSPITAL ENCOUNTER (OUTPATIENT)
Dept: MAMMOGRAPHY | Facility: OTHER | Age: 66
End: 2021-01-19
Attending: FAMILY MEDICINE
Payer: MEDICARE

## 2021-01-19 ENCOUNTER — ANTICOAGULATION THERAPY VISIT (OUTPATIENT)
Dept: ANTICOAGULATION | Facility: OTHER | Age: 66
End: 2021-01-19
Attending: FAMILY MEDICINE
Payer: MEDICARE

## 2021-01-19 DIAGNOSIS — R92.8 ABNORMAL FINDING ON BREAST IMAGING: ICD-10-CM

## 2021-01-19 DIAGNOSIS — D68.2 FACTOR V DEFICIENCY (H): ICD-10-CM

## 2021-01-19 DIAGNOSIS — Z79.01 ANTICOAGULATION MONITORING, INR RANGE 2-3: ICD-10-CM

## 2021-01-19 DIAGNOSIS — Z09 FOLLOW-UP EXAM, 3-6 MONTHS SINCE PREVIOUS EXAM: ICD-10-CM

## 2021-01-19 LAB — INR PPP: 2.8 (ref 0.9–1.1)

## 2021-01-19 PROCEDURE — G0279 TOMOSYNTHESIS, MAMMO: HCPCS

## 2021-01-19 NOTE — PROGRESS NOTES
ANTICOAGULATION FOLLOW-UP CLINIC VISIT    Patient Name:  Lyudmila Fitzgerald  Date:  2021  Contact Type:  fax from [employer] [facility] INR monitoring service    SUBJECTIVE:  Patient Findings     Comments:  No phone call due to INR being in range.  Patient has been instructed to call with new medications or changes.          Clinical Outcomes     Negatives:  Major bleeding event, Thromboembolic event, Anticoagulation-related hospital admission, Anticoagulation-related ED visit, Anticoagulation-related fatality    Comments:  No phone call due to INR being in range.  Patient has been instructed to call with new medications or changes.             OBJECTIVE    Recent labs: (last 7 days)     21   INR 2.8*       ASSESSMENT / PLAN  INR assessment THER    Recheck INR In: 2 WEEKS    INR Location Home INR      Anticoagulation Summary  As of 2021    INR goal:  2.0-3.0   TTR:  65.2 % (1 y)   INR used for dosin.8 (2021)   Warfarin maintenance plan:  3 mg (6 mg x 0.5) every Mon; 6 mg (6 mg x 1) all other days   Full warfarin instructions:  3 mg every Mon; 6 mg all other days   Weekly warfarin total:  39 mg   No change documented:  Arleth Sheth RN   Plan last modified:  Nyla Walker RN (2020)   Next INR check:  2021   Priority:  Maintenance   Target end date:  Indefinite    Indications    Acute thromboembolism of deep veins of lower extremity (H) (Resolved) [I82.409]  Anticoagulation monitoring  INR range 2-3 [Z79.01]  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  INR in range so no telephone call.  Patient is to call INR clinic if any changes affecting INR.       Arleth Sheth RN

## 2021-02-01 ENCOUNTER — TRANSFERRED RECORDS (OUTPATIENT)
Dept: HEALTH INFORMATION MANAGEMENT | Facility: OTHER | Age: 66
End: 2021-02-01

## 2021-02-01 ENCOUNTER — ANTICOAGULATION THERAPY VISIT (OUTPATIENT)
Dept: ANTICOAGULATION | Facility: OTHER | Age: 66
End: 2021-02-01
Attending: FAMILY MEDICINE
Payer: MEDICARE

## 2021-02-01 DIAGNOSIS — Z79.01 ANTICOAGULATION MONITORING, INR RANGE 2-3: ICD-10-CM

## 2021-02-01 DIAGNOSIS — D68.2 FACTOR V DEFICIENCY (H): ICD-10-CM

## 2021-02-01 LAB — INR PPP: 3.3 (ref 0.9–1.1)

## 2021-02-01 NOTE — PROGRESS NOTES
ANTICOAGULATION FOLLOW-UP CLINIC VISIT    Patient Name:  Lyudmila Fitzgerald  Date:  2/1/2021  Contact Type:  fax from [employer] [facility] INR monitoring service/phone call with patient    SUBJECTIVE:  Patient Findings     Comments:  Per phone call with patient.        Clinical Outcomes     Negatives:  Major bleeding event, Thromboembolic event, Anticoagulation-related hospital admission, Anticoagulation-related ED visit, Anticoagulation-related fatality    Comments:  Per phone call with patient.           OBJECTIVE    Recent labs: (last 7 days)     02/01/21   INR 3.3*       ASSESSMENT / PLAN  INR assessment SUPRA    Recheck INR In: 2 WEEKS    INR Location Home INR      Anticoagulation Summary  As of 2/1/2021    INR goal:  2.0-3.0   TTR:  63.0 % (1 y)   INR used for dosing:  3.3 (2/1/2021)   Warfarin maintenance plan:  3 mg (6 mg x 0.5) every Mon; 6 mg (6 mg x 1) all other days   Full warfarin instructions:  3 mg every Mon; 6 mg all other days   Weekly warfarin total:  39 mg   No change documented:  Arleth Sheth RN   Plan last modified:  Nyla Walker RN (12/7/2020)   Next INR check:  2/15/2021   Priority:  Maintenance   Target end date:  Indefinite    Indications    Acute thromboembolism of deep veins of lower extremity (H) (Resolved) [I82.409]  Anticoagulation monitoring  INR range 2-3 [Z79.01]  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  Assessment and istructions via phone call with patient.  Patient verbalized understanding.      Arleth Sheth RN

## 2021-02-09 ENCOUNTER — ANTICOAGULATION THERAPY VISIT (OUTPATIENT)
Dept: ANTICOAGULATION | Facility: OTHER | Age: 66
End: 2021-02-09
Attending: FAMILY MEDICINE
Payer: MEDICARE

## 2021-02-09 ENCOUNTER — TRANSFERRED RECORDS (OUTPATIENT)
Dept: HEALTH INFORMATION MANAGEMENT | Facility: OTHER | Age: 66
End: 2021-02-09

## 2021-02-09 DIAGNOSIS — D68.2 FACTOR V DEFICIENCY (H): ICD-10-CM

## 2021-02-09 DIAGNOSIS — Z79.01 ANTICOAGULATION MONITORING, INR RANGE 2-3: ICD-10-CM

## 2021-02-09 LAB — INR PPP: 2.7 (ref 0.9–1.1)

## 2021-02-09 NOTE — PROGRESS NOTES
ANTICOAGULATION FOLLOW-UP CLINIC VISIT    Patient Name:  Lyudmila Fitzgerald  Date:  2021  Contact Type:  no call needed patient to continue same dose    SUBJECTIVE:  Patient Findings         Clinical Outcomes     Negatives:  Major bleeding event, Thromboembolic event, Anticoagulation-related hospital admission, Anticoagulation-related ED visit, Anticoagulation-related fatality           OBJECTIVE    Recent labs: (last 7 days)     21   INR 2.7*       ASSESSMENT / PLAN  INR assessment THER    Recheck INR In: 2 WEEKS    INR Location Home INR      Anticoagulation Summary  As of 2021    INR goal:  2.0-3.0   TTR:  61.9 % (1 y)   INR used for dosin.7 (2021)   Warfarin maintenance plan:  3 mg (6 mg x 0.5) every Mon; 6 mg (6 mg x 1) all other days   Full warfarin instructions:  3 mg every Mon; 6 mg all other days   Weekly warfarin total:  39 mg   No change documented:  Nyla Walker RN   Plan last modified:  Nyla Walker RN (2020)   Next INR check:  2021   Priority:  Maintenance   Target end date:  Indefinite    Indications    Acute thromboembolism of deep veins of lower extremity (H) (Resolved) [I82.409]  Anticoagulation monitoring  INR range 2-3 [Z79.01]  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                 
patient, resident

## 2021-02-22 ENCOUNTER — TRANSFERRED RECORDS (OUTPATIENT)
Dept: HEALTH INFORMATION MANAGEMENT | Facility: OTHER | Age: 66
End: 2021-02-22

## 2021-02-22 ENCOUNTER — ANTICOAGULATION THERAPY VISIT (OUTPATIENT)
Dept: ANTICOAGULATION | Facility: OTHER | Age: 66
End: 2021-02-22
Attending: FAMILY MEDICINE
Payer: MEDICARE

## 2021-02-22 DIAGNOSIS — Z79.01 ANTICOAGULATION MONITORING, INR RANGE 2-3: ICD-10-CM

## 2021-02-22 DIAGNOSIS — D68.2 FACTOR V DEFICIENCY (H): ICD-10-CM

## 2021-02-22 LAB — INR PPP: 3 (ref 0.9–1.1)

## 2021-02-22 NOTE — PROGRESS NOTES
ANTICOAGULATION FOLLOW-UP CLINIC VISIT    Patient Name:  Lyudmila Fitzgerald  Date:  2/22/2021  Contact Type:  fax from [employer] home INR monitoring service    SUBJECTIVE:  Patient Findings     Comments:  No phone call due to INR being in range.  Patient has been instructed to call with new medications or changes.          Clinical Outcomes     Negatives:  Major bleeding event, Thromboembolic event, Anticoagulation-related hospital admission, Anticoagulation-related ED visit, Anticoagulation-related fatality    Comments:  No phone call due to INR being in range.  Patient has been instructed to call with new medications or changes.             OBJECTIVE    Recent labs: (last 7 days)     02/22/21   INR 3.0*       ASSESSMENT / PLAN  INR assessment THER    Recheck INR In: 2 WEEKS    INR Location Home INR      Anticoagulation Summary  As of 2/22/2021    INR goal:  2.0-3.0   TTR:  61.9 % (1 y)   INR used for dosing:  3.0 (2/22/2021)   Warfarin maintenance plan:  3 mg (6 mg x 0.5) every Mon; 6 mg (6 mg x 1) all other days   Full warfarin instructions:  3 mg every Mon; 6 mg all other days   Weekly warfarin total:  39 mg   No change documented:  Arleth Sheth RN   Plan last modified:  Nyla Walker RN (12/7/2020)   Next INR check:  3/8/2021   Priority:  Maintenance   Target end date:  Indefinite    Indications    Acute thromboembolism of deep veins of lower extremity (H) (Resolved) [I82.409]  Anticoagulation monitoring  INR range 2-3 [Z79.01]  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  INR in range so no telephone call.  Patient is to call INR clinic if any changes affecting INR.       Arleth Sheth RN

## 2021-02-25 ENCOUNTER — OFFICE VISIT (OUTPATIENT)
Dept: CARDIOLOGY | Facility: OTHER | Age: 66
End: 2021-02-25
Attending: INTERNAL MEDICINE
Payer: MEDICARE

## 2021-02-25 VITALS
DIASTOLIC BLOOD PRESSURE: 88 MMHG | HEART RATE: 68 BPM | OXYGEN SATURATION: 97 % | HEIGHT: 65 IN | RESPIRATION RATE: 18 BRPM | SYSTOLIC BLOOD PRESSURE: 138 MMHG | TEMPERATURE: 97.4 F | WEIGHT: 155 LBS | BODY MASS INDEX: 25.83 KG/M2

## 2021-02-25 DIAGNOSIS — Z86.79 PERSONAL HISTORY OF SUPRAVENTRICULAR TACHYCARDIA: ICD-10-CM

## 2021-02-25 DIAGNOSIS — Z86.73 HISTORY OF STROKE: ICD-10-CM

## 2021-02-25 DIAGNOSIS — Z98.890 HISTORY OF CARDIAC RADIOFREQUENCY ABLATION: ICD-10-CM

## 2021-02-25 DIAGNOSIS — Z95.5 HISTORY OF CORONARY ARTERY STENT PLACEMENT: ICD-10-CM

## 2021-02-25 DIAGNOSIS — Z95.0 PACEMAKER: ICD-10-CM

## 2021-02-25 DIAGNOSIS — I47.10 PAROXYSMAL SUPRAVENTRICULAR TACHYCARDIA (H): ICD-10-CM

## 2021-02-25 DIAGNOSIS — Z86.718 HISTORY OF DVT (DEEP VEIN THROMBOSIS): ICD-10-CM

## 2021-02-25 DIAGNOSIS — Z79.01 ANTICOAGULATED ON COUMADIN: ICD-10-CM

## 2021-02-25 DIAGNOSIS — Z98.890 S/P AV NODAL ABLATION: ICD-10-CM

## 2021-02-25 DIAGNOSIS — I10 ESSENTIAL HYPERTENSION: ICD-10-CM

## 2021-02-25 DIAGNOSIS — Z45.010 PACEMAKER BATTERY DEPLETION: ICD-10-CM

## 2021-02-25 DIAGNOSIS — Z95.0 PACEMAKER-DEPENDENT DUE TO NATIVE CARDIAC RHYTHM INSUFFICIENT TO SUPPORT LIFE: ICD-10-CM

## 2021-02-25 DIAGNOSIS — Z79.01 ANTICOAGULATION MONITORING, INR RANGE 2-3: ICD-10-CM

## 2021-02-25 DIAGNOSIS — D68.2 FACTOR V DEFICIENCY (H): ICD-10-CM

## 2021-02-25 DIAGNOSIS — I20.1 CORONARY ARTERY SPASM (H): Primary | ICD-10-CM

## 2021-02-25 DIAGNOSIS — F51.04 CHRONIC INSOMNIA: ICD-10-CM

## 2021-02-25 DIAGNOSIS — Z71.89 ACP (ADVANCE CARE PLANNING): ICD-10-CM

## 2021-02-25 DIAGNOSIS — R07.89 OTHER CHEST PAIN: ICD-10-CM

## 2021-02-25 DIAGNOSIS — Z45.010 PACEMAKER AT END OF BATTERY LIFE: ICD-10-CM

## 2021-02-25 DIAGNOSIS — E11.9 TYPE 2 DIABETES MELLITUS WITHOUT COMPLICATION, WITHOUT LONG-TERM CURRENT USE OF INSULIN (H): ICD-10-CM

## 2021-02-25 DIAGNOSIS — Q24.5 MYOCARDIAL BRIDGE: ICD-10-CM

## 2021-02-25 DIAGNOSIS — E78.2 MIXED HYPERLIPIDEMIA: ICD-10-CM

## 2021-02-25 DIAGNOSIS — I49.8 PACEMAKER-DEPENDENT DUE TO NATIVE CARDIAC RHYTHM INSUFFICIENT TO SUPPORT LIFE: ICD-10-CM

## 2021-02-25 DIAGNOSIS — I45.6 LOWN GANONG LEVINE SYNDROME: ICD-10-CM

## 2021-02-25 DIAGNOSIS — Z95.0 CARDIAC PACEMAKER IN SITU: ICD-10-CM

## 2021-02-25 DIAGNOSIS — R06.02 SHORTNESS OF BREATH: ICD-10-CM

## 2021-02-25 DIAGNOSIS — Z95.1 HISTORY OF CORONARY ARTERY BYPASS GRAFT X 1: ICD-10-CM

## 2021-02-25 DIAGNOSIS — Z86.79 HISTORY OF HEART FAILURE: ICD-10-CM

## 2021-02-25 DIAGNOSIS — Z86.79 HISTORY OF CORONARY VASOSPASM: ICD-10-CM

## 2021-02-25 DIAGNOSIS — F32.A DEPRESSION, UNSPECIFIED DEPRESSION TYPE: ICD-10-CM

## 2021-02-25 PROCEDURE — G0463 HOSPITAL OUTPT CLINIC VISIT: HCPCS | Performed by: INTERNAL MEDICINE

## 2021-02-25 PROCEDURE — 99214 OFFICE O/P EST MOD 30 MIN: CPT | Performed by: INTERNAL MEDICINE

## 2021-02-25 RX ORDER — NICARDIPINE HYDROCHLORIDE 30 MG/1
30 CAPSULE ORAL 3 TIMES DAILY
Qty: 270 CAPSULE | Refills: 3 | Status: SHIPPED | OUTPATIENT
Start: 2021-02-25 | End: 2021-03-08 | Stop reason: ALTCHOICE

## 2021-02-25 ASSESSMENT — MIFFLIN-ST. JEOR: SCORE: 1248.34

## 2021-02-25 ASSESSMENT — PAIN SCALES - GENERAL: PAINLEVEL: NO PAIN (0)

## 2021-02-25 NOTE — PROGRESS NOTES
[facility] HEART CARE   CARDIOLOGY PROGRESS NOTE     Chief Complaint   Patient presents with     Follow Up     3 month          Diagnosis:  1.  Coronary artery spasm involving the LAD.  2.  Myocardial bridging involving LAD.  3.  H/O SVT.  4.  CABG x1 with LIMA to LAD on 11/15/2016 in [place].  5.  H/O chronic heart failure with an EF of 15% now recovered at 60 to 65% on 5/16/2019 at [facility].  6.  H/O coronary spasm with stent placement to LAD x3.  7.  H/O radiofrequency ablation for SVT x8.  8.  H/O stroke x7 with left hemiparesis.  9.  Left foot drop.  10.  S/P AV tessie ablation secondary to SVT.  11.  Pacemaker dependent.  12.  Hyperlipidemia-uncontrolled.  13.  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  14.  H/O DVT to bilateral lower extremities.  15.  Cardiac pacemaker.  16.  Hypertension-variable control.  17.  DM-2.  18.  Lown Ganong Brandt syndrome.    Assessment/Plan:    1.  Discontinue nifedipine 30 mg daily. Patient states she was having a lot of side effects related to the medication.  2.  Start on nicardipine 30 mg 3 times a day.  3.  Consider increasing Imdur 60 mg daily in the future if having having ongoing issues.  4.  We will discussed her case with cardiology at the [facility].  5.  Consider oral morphine for pain control as this has worked in the past previously.  6.  In future, could increase nicardipine, consider amlodipine again, increase Imdur from 60 mg daily to 90 mg daily, and/or possibly add clonidine.  She states she was previously on diltiazem 120 edition of nicardipine 60 mg with relatively good results.  6.  Follow-up in the future in 1 month or sooner with issues.      Interval history:  Leigh has had continuing discomfort to her chest. Her discomfort has been waking her up at night.  She has been having chest pain regularly, multiple times a week.  More recently, she takes x15 SL nitro daily.   She has been having regular chest discomfort.  Have tried  nifedipine and will try nicardipine today.  We will try to contact her with a tertiary center such as [facility] or the  who is in [place].  We will make changes to her medications as outlined above.  She is wondering about liquid morphine as this has worked previously.  Obviously, this is a very difficult situation as many things have been done without relief.    HPI:    Originally, she was diagnosed with a LAD ostial coronary artery spasm at [facility].  She had a positive methergine spasm study in 2005 at [facility] in the mid LAD, treated with drug-eluting stent to the mid-LAD.  She has a history of LAD myocardial bridging, CABG with LIMA to LAD on 11/15/2016 in [place], [place], history of heart failure with reported EF of 15% now recovered, stenting to the LAD x3, history of radiofrequency ablation for SVT x8, ultimately resulting in AV tessie ablation with pacemaker placement with pacemaker dependence, H/O stroke x7 with left hemiparesis essentially resolved with the exception of intermittent left foot drop, hyperlipidemia, on Coumadin with a history of factor V Leiden deficiency and numerous DVT's involving both the upper and lower extremities, hypertension, DM-2, on anticoagulated on Coumadin.     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  She has had care through [facility] as well as in [place], [place].  She was evaluated at [place] and underwent angiography revealing separate origins of the LAD and circumflex.  She has a history of catheter induced coronary artery spasm originally diagnosed at [place].     Dr. Bill Abreu through [facility] evaluated her in 2005. In spite of pre-treatment with nitroglycerine and calcium channel blockers, she had fairly vigorous mid-LAD spasm with intracoronary methergine. The ostial LAD did not spasm. Dr. Abreu treated this area with a 2.5 x 28 mm Cypher drug-eluting stent. The following year she had  "instent restenosis treated with a second 8 mm x 2.5 mm Cypher drug-eluting stent.      She did fairly well until 2016, when she had evaluation at [facility] in [place]. She was treated with trans-myocardial revascularization with 25 channels in the inferior, lateral and anterior LV walls, and underwent an LIMA-LAD. Her EF was 35% pre-op, and approximately 50-55% post-operatively.      She had recurrent angina, and on 2/8/17 she underwent repeat angiography which showed mild instent restenosis of the mid-LAD stents, followed by 99% stenosis of the mid-LAD at the LIMA-LAD anastomosis, presumably due to surgical clips/manipulation. The TERRENCE was atretic. Thus, the interventionalist placed a 2.25 x 8 mm Promus Premiere drug-eluting stent into the mid-LAD with a good result.      Lately, she has been having an increasingly more frequent and severe anginal chest discomfort, and feels like she is back to her severe coronary \"spasm\" again.      He has ongoing concerns for angina felt to be vasospastic in nature.  She has had a total of x4 cardiac catheterizations on 6/17/2008, 11/21/2012, 1/17/2019, and I believe one in [place] do not have this date.       She has done pretty well since. Her EF was greater than 60% on a couple occasions since.  Most recently, on 5/16/2019 her EF was 60% to 65%.  There was no significant valvular disease detected.  Her echo was essentially unchanged from 4/18/2017.  Diastolic dysfunction was normal.      Relevant testing:        Past Medical History:   Diagnosis Date     Acute thromboembolism of deep veins of lower extremity (H) 2006    Overview:  Hx of multiple episodes of DVT: 3 lower extremity; 5 upper extremity (right arm)     Atrial fibrillation (H)     No Comments Provided     Cardiac pacemaker in situ 2003    Dual chamber     Cerebral thrombosis     8/31/2006,'95 w/ no residual     Coronary atherosclerosis     2/6/2006     Endometrial hyperplasia 2006    s/p endometrial " ablation      Essential hypertension     No Comments Provided     History of other genital system and obstetric disorders      8, Para 3-0-5-2     Other and unspecified angina pectoris     2006     Other and unspecified hyperlipidemia     No Comments Provided     Other postprocedural states      and ,Followed by Dr. Usman Duran, [facility],.     Paroxysmal supraventricular tachycardia (H)     2006,s/p multiple ablations w last resulting in PPM     Personal history of transient ischemic attack (TIA) and cerebral infarction without residual deficit 2009    with left hemiplegia     Primary hypercoagulable state (H)     No Comments Provided       Past Surgical History:   Procedure Laterality Date     ARTHROSCOPY KNEE      ,Arthroscopy for chondromalacia patella     AS CABG, ARTERY-VEIN, SINGLE  11/15/2016    Single vessel; done in [place]     ATTEMPTED ARTHROSCOPY      ,Right arthroscopy     BIOPSY BREAST      ,Breast cyst aspiration     COLONOSCOPY      2007,follow up recommended in 10 years.     ENDOSCOPIC SINUS SURGERY      ,Sinus node ablation.     EP ABLATION ATRIAL FLUTTER N/A 2020    Procedure: EP VENOGRAM SVC;  Surgeon: Hakeem Moore MD;  Location:  HEART CARDIAC CATH LAB     EP PACEMAKER N/A 2020    Procedure: EP PACEMAKER;  Surgeon: Tima Johnson MD;  Location:  HEART CARDIAC CATH LAB     EP STUDY  1994    EP STUDY /ABLATION,AV re-entry tachycardia, tessie ablation     HEART CATH, ANGIOPLASTY      ,Stenting placed LAD after ergonovine provocation confirmed     HEART CATH, ANGIOPLASTY      ,90% lesion, normal left ventricular function     IMPLANT PACEMAKER      ,Guidant pacemaker placement, AV tessie ablation     LAPAROSCOPIC ABLATION ENDOMETRIOSIS           OTHER SURGICAL HISTORY      3/24/06,195386,([place]) [facility] STENT ANGIO     OTHER SURGICAL HISTORY      ,524729,EP STUDY /ABLATION      "OTHER SURGICAL HISTORY      11/04,24360.0,CT CORONARY ANGIOGRAM ([place]),Coronary angiogram, failed ablation     OTHER SURGICAL HISTORY      12/04,961793,[facility] RELOCATION OF SKIN POCKET PACEMAKER,[facility] 5/24/05 reconfiguration of pacemaker \"pocket\"     OTHER SURGICAL HISTORY      518733,IP CONSULT TO ELECTROPHYSIOLOGY,study and lead change     OTHER SURGICAL HISTORY      12/06,788344,NEUROSTIMULATOR,Nerve stimulator implanted for chest pain control     OTHER SURGICAL HISTORY      12/2008,46248.0,CT CORONARY ANGIOGRAM ([place]),with increased left-sided weakness and vertigo, negative CT angiogram.     REPLACE PACEMAKER GENERATOR      12/29/04,Replacement of left ventricular pacemaker lead.     STENT  02/2017    LAD        Allergies   Allergen Reactions     Morphine Nausea and Vomiting     OK in small doses     Prednisone Nausea and Vomiting     Per IV     Sotalol Nausea and Vomiting       Current Outpatient Medications   Medication Sig Dispense Refill     aspirin EC 81 MG EC tablet Take 81 mg by mouth       atorvastatin (LIPITOR) 80 MG tablet Take 1 tablet (80 mg) by mouth daily 90 tablet 3     calcium carb 1250 mg, 500 mg [place],/vitamin D 200 unit (CALCIUM 500/D) 500-200 MG-UNIT per tablet Take 1 tablet by mouth       diltiazem ER (TIAZAC) 360 MG 24 hr ER beaded capsule Take 1 capsule (360 mg) by mouth daily 90 capsule 3     isosorbide mononitrate (IMDUR) 60 MG 24 hr tablet Take 1 tablet (60 mg) by mouth daily 90 tablet 3     lisinopril (ZESTRIL) 5 MG tablet Take 1 tablet (5 mg) by mouth daily 90 tablet 3     niCARdipine (CARDENE) 30 MG capsule Take 1 capsule (30 mg) by mouth 3 times daily 270 capsule 3     nitroGLYcerin (NITROSTAT) 0.4 MG sublingual tablet For chest pain place 1 tablet under the tongue every 5 minutes for 3 doses. If symptoms persist 5 minutes after 1st dose call 911. 25 tablet 3     ranolazine (RANEXA) 500 MG 12 hr tablet Take 1 tablet (500 mg) by mouth 2 times daily 60 tablet 3     sertraline (ZOLOFT) " 50 MG tablet Take 1 tablet (50 mg) by mouth daily 90 tablet 3     warfarin ANTICOAGULANT (COUMADIN) 6 MG tablet Take 6 mg x 6 days and 3 mg x 1 day/week Or as directed by the protime clinic. 90 tablet 1     zolpidem (AMBIEN) 5 MG tablet Take 1 tablet (5 mg) by mouth nightly as needed for sleep 30 tablet 3       Social History     Socioeconomic History     Marital status:      Spouse name: Antonio     Number of children: 2     Years of education: 16+     Highest education level: Master's degree (e.g., MA, MS, [employer], MEd, MSW, [employer])   Occupational History     Not on file   Social Needs     Financial resource strain: Not hard at all     Food insecurity     Worry: Never true     Inability: Never true     Transportation needs     Medical: No     Non-medical: No   Tobacco Use     Smoking status: Never Smoker     Smokeless tobacco: Never Used     Tobacco comment: Quit smoking: spouse does not smoke in the house. Exposed in cars.   Substance and Sexual Activity     Alcohol use: Yes     Comment: Alcoholic Drinks/day: Alcoholic Drinks/day: 1-2 drinks twice a week     Drug use: Never     Sexual activity: Not Currently     Partners: Male   Lifestyle     Physical activity     Days per week: 0 days     Minutes per session: 0 min     Stress: To some extent   Relationships     Social connections     Talks on phone: Not on file     Gets together: Not on file     Attends [employer] service: Not on file     Active member of club or organization: Not on file     Attends meetings of clubs or organizations: Not on file     Relationship status: Not on file     Intimate partner violence     Fear of current or ex partner: Not on file     Emotionally abused: Not on file     Physically abused: Not on file     Forced sexual activity: Not on file   Other Topics Concern     Not on file   Social History Narrative     2nd Marriage x 20 years to  Anthony they live in [place], [place]       in [place]. Completed  "her Master's Degree 2003. Taught Special Education.  Early penitentiary due to health 2007.      Total 5 children blended family-3 dgts. And 2 sons    Daughter:  Philip    Son:  Anthony - lives in [place]    6 Grand-children 5 boys and 1 girl    Leigh parents' and siblings live in [place] [facility].       LAB RESULTS:   Anticoagulation Therapy Visit on 08/24/2020   Component Date Value Ref Range Status     INR 08/24/2020 1.3* 0.90 - 1.10 Corrected   Anticoagulation Therapy Visit on 08/19/2020   Component Date Value Ref Range Status     INR 08/19/2020 2.3* 0.90 - 1.10 Final   Anticoagulation Therapy Visit on 08/05/2020   Component Date Value Ref Range Status     INR 08/05/2020 2.1* 0.90 - 1.10 Final   Allied Health/Nurse Visit on 08/03/2020   Component Date Value Ref Range Status     COVID-19 Virus PCR to [facility] - So* 08/03/2020 Nasopharyngeal   Final     COVID-19 Virus PCR to [facility] - Re* 08/03/2020 Not Detected   Final   Anticoagulation Therapy Visit on 07/30/2020   Component Date Value Ref Range Status     INR 07/30/2020 3.8* 0.90 - 1.10 Final   Anticoagulation Therapy Visit on 07/27/2020   Component Date Value Ref Range Status     INR 07/25/2020 1.1  0.90 - 1.10 Final     INR 07/27/2020 1.2* 0.90 - 1.10 Final   Anticoagulation Therapy Visit on 07/23/2020   Component Date Value Ref Range Status     INR Protime 07/23/2020 1.0  0.86 - 1.14 Final   Anticoagulation Therapy Visit on 07/21/2020   Component Date Value Ref Range Status     INR 07/21/2020 1.4* 0.90 - 1.10 Final   Anticoagulation Therapy Visit on 07/09/2020   Component Date Value Ref Range Status     INR 07/08/2020 2.1* 0.90 - 1.10 Final        Review of systems: Negative except that which was noted in the HPI.    Physical examination:  /88 (BP Location: Right arm, Patient Position: Sitting, Cuff Size: Adult Regular)   Pulse 68   Temp 97.4  F (36.3  C) (Tympanic)   Resp 18   Ht 1.65 m (5' 4.96\")   Wt 70.3 kg (155 lb)   SpO2 97%   BMI 25.82 kg/m  "     GENERAL APPEARANCE: healthy, alert and no distress  HEENT: no icterus, no xanthelasmas, normal pupil size and reaction, no cyanosis.  NECK: no adenopathy, no asymmetry, masses, or scars.  CHEST: lungs clear to auscultation - no rales, rhonchi or wheezes, no use of accessory muscles, no retractions, respirations are unlabored, normal respiratory rate  CARDIOVASCULAR: regular rhythm, normal S1 with physiologic split S2, no S3 or S4 and no murmur, click or rub  ABDOMEN: soft, non tender, bowel sounds normal  EXTREMITIES: no clubbing, cyanosis or edema  NEURO: alert and oriented normal speech, and affect  VASC: No vascular bruits heard.  SKIN: no ecchymoses, no rashes        Thank you for allowing me to participate in the care of your patient. Please do not hesitate to contact me if you have any questions.     Moises Islas, DO

## 2021-02-25 NOTE — NURSING NOTE
"Patient comes in for a 3 month follow up on medications.  Mirella Royal LPN ....................2/25/2021   10:59 AM  Chief Complaint   Patient presents with     Follow Up     3 month       Initial /88 (BP Location: Right arm, Patient Position: Sitting, Cuff Size: Adult Regular)   Pulse 68   Temp 97.4  F (36.3  C) (Tympanic)   Resp 18   Ht 1.65 m (5' 4.96\")   Wt 70.3 kg (155 lb)   SpO2 97%   BMI 25.82 kg/m   Estimated body mass index is 25.82 kg/m  as calculated from the following:    Height as of this encounter: 1.65 m (5' 4.96\").    Weight as of this encounter: 70.3 kg (155 lb).  Meds Reconciled: complete  Pt is on Aspirin  Pt is on a Statin  PHQ and/or JUAN CARLOS reviewed. Pt referred to PCP/MH Provider as appropriate.    Mirella Royal LPN      "

## 2021-03-01 ENCOUNTER — TELEPHONE (OUTPATIENT)
Dept: CARDIOLOGY | Facility: OTHER | Age: 66
End: 2021-03-01

## 2021-03-01 NOTE — TELEPHONE ENCOUNTER
Called patient and talked with Katrin pharmacist at [facility].  Katrin will send a PA request to [facility] PA department to try getting PA for medication that was prescribed by Moises Islas, DO on 02/25/2021 for niCARdipine (CARDENE) 30 MG capsule Take 1 capsule (30 mg) by mouth 3 times daily.  Katrin will sent this high priority to PA department.  Patient is aware.  Stephanie Ledesma RN on 3/1/2021 at 10:05 AM        Patient got a call from her pharmacy on Saturday that her insurance won't cover the medication Dr. Islas just prescribed to her. She would like a call back regarding this.

## 2021-03-03 ENCOUNTER — MYC MEDICAL ADVICE (OUTPATIENT)
Dept: FAMILY MEDICINE | Facility: OTHER | Age: 66
End: 2021-03-03

## 2021-03-03 NOTE — TELEPHONE ENCOUNTER
Call patient - she has no contraindications to getting a COVID vaccine.  I would strongly encourage her to receive the vaccine when available to her.  Erika Gupta MD

## 2021-03-05 ENCOUNTER — TELEPHONE (OUTPATIENT)
Dept: FAMILY MEDICINE | Facility: OTHER | Age: 66
End: 2021-03-05

## 2021-03-07 LAB
MDC_IDC_EPISODE_DTM: NORMAL
MDC_IDC_EPISODE_DURATION: 7 S
MDC_IDC_EPISODE_DURATION: 9 S
MDC_IDC_EPISODE_ID: NORMAL
MDC_IDC_EPISODE_TYPE: NORMAL
MDC_IDC_LEAD_IMPLANT_DT: NORMAL
MDC_IDC_LEAD_IMPLANT_DT: NORMAL
MDC_IDC_LEAD_LOCATION: NORMAL
MDC_IDC_LEAD_LOCATION: NORMAL
MDC_IDC_LEAD_LOCATION_DETAIL_1: NORMAL
MDC_IDC_LEAD_LOCATION_DETAIL_1: NORMAL
MDC_IDC_LEAD_MFG: NORMAL
MDC_IDC_LEAD_MFG: NORMAL
MDC_IDC_LEAD_MODEL: NORMAL
MDC_IDC_LEAD_MODEL: NORMAL
MDC_IDC_LEAD_POLARITY_TYPE: NORMAL
MDC_IDC_LEAD_POLARITY_TYPE: NORMAL
MDC_IDC_LEAD_SERIAL: NORMAL
MDC_IDC_LEAD_SERIAL: NORMAL
MDC_IDC_LEAD_SPECIAL_FUNCTION: NORMAL
MDC_IDC_LEAD_SPECIAL_FUNCTION: NORMAL
MDC_IDC_MSMT_BATTERY_DTM: NORMAL
MDC_IDC_MSMT_BATTERY_REMAINING_LONGEVITY: 138 MO
MDC_IDC_MSMT_BATTERY_REMAINING_PERCENTAGE: 100 %
MDC_IDC_MSMT_BATTERY_STATUS: NORMAL
MDC_IDC_MSMT_LEADCHNL_RA_IMPEDANCE_VALUE: 718 OHM
MDC_IDC_MSMT_LEADCHNL_RV_IMPEDANCE_VALUE: 482 OHM
MDC_IDC_MSMT_LEADCHNL_RV_PACING_THRESHOLD_AMPLITUDE: 1 V
MDC_IDC_MSMT_LEADCHNL_RV_PACING_THRESHOLD_PULSEWIDTH: 0.4 MS
MDC_IDC_PG_IMPLANT_DTM: NORMAL
MDC_IDC_PG_MFG: NORMAL
MDC_IDC_PG_MODEL: NORMAL
MDC_IDC_PG_SERIAL: NORMAL
MDC_IDC_PG_TYPE: NORMAL
MDC_IDC_SESS_CLINIC_NAME: NORMAL
MDC_IDC_SESS_DTM: NORMAL
MDC_IDC_SESS_TYPE: NORMAL
MDC_IDC_SET_BRADY_AT_MODE_SWITCH_MODE: NORMAL
MDC_IDC_SET_BRADY_AT_MODE_SWITCH_RATE: 140 {BEATS}/MIN
MDC_IDC_SET_BRADY_LOWRATE: 60 {BEATS}/MIN
MDC_IDC_SET_BRADY_MAX_SENSOR_RATE: 130 {BEATS}/MIN
MDC_IDC_SET_BRADY_MAX_TRACKING_RATE: 100 {BEATS}/MIN
MDC_IDC_SET_BRADY_MODE: NORMAL
MDC_IDC_SET_BRADY_PAV_DELAY_HIGH: 80 MS
MDC_IDC_SET_BRADY_PAV_DELAY_LOW: 130 MS
MDC_IDC_SET_BRADY_SAV_DELAY_HIGH: 80 MS
MDC_IDC_SET_BRADY_SAV_DELAY_LOW: 130 MS
MDC_IDC_SET_LEADCHNL_RA_PACING_AMPLITUDE: 2 V
MDC_IDC_SET_LEADCHNL_RA_PACING_CAPTURE_MODE: NORMAL
MDC_IDC_SET_LEADCHNL_RA_PACING_POLARITY: NORMAL
MDC_IDC_SET_LEADCHNL_RA_PACING_PULSEWIDTH: 1 MS
MDC_IDC_SET_LEADCHNL_RA_SENSING_ADAPTATION_MODE: NORMAL
MDC_IDC_SET_LEADCHNL_RA_SENSING_POLARITY: NORMAL
MDC_IDC_SET_LEADCHNL_RA_SENSING_SENSITIVITY: 0.6 MV
MDC_IDC_SET_LEADCHNL_RV_PACING_AMPLITUDE: 1.5 V
MDC_IDC_SET_LEADCHNL_RV_PACING_CAPTURE_MODE: NORMAL
MDC_IDC_SET_LEADCHNL_RV_PACING_POLARITY: NORMAL
MDC_IDC_SET_LEADCHNL_RV_PACING_PULSEWIDTH: 0.4 MS
MDC_IDC_SET_LEADCHNL_RV_SENSING_ADAPTATION_MODE: NORMAL
MDC_IDC_SET_LEADCHNL_RV_SENSING_POLARITY: NORMAL
MDC_IDC_SET_LEADCHNL_RV_SENSING_SENSITIVITY: 1.5 MV
MDC_IDC_SET_ZONE_DETECTION_INTERVAL: 375 MS
MDC_IDC_SET_ZONE_TYPE: NORMAL
MDC_IDC_SET_ZONE_VENDOR_TYPE: NORMAL
MDC_IDC_STAT_AT_BURDEN_PERCENT: 1 %
MDC_IDC_STAT_AT_DTM_END: NORMAL
MDC_IDC_STAT_AT_DTM_START: NORMAL
MDC_IDC_STAT_BRADY_DTM_END: NORMAL
MDC_IDC_STAT_BRADY_DTM_START: NORMAL
MDC_IDC_STAT_BRADY_RA_PERCENT_PACED: 85 %
MDC_IDC_STAT_BRADY_RV_PERCENT_PACED: 100 %
MDC_IDC_STAT_EPISODE_RECENT_COUNT: 0
MDC_IDC_STAT_EPISODE_RECENT_COUNT: 2
MDC_IDC_STAT_EPISODE_RECENT_COUNT_DTM_END: NORMAL
MDC_IDC_STAT_EPISODE_RECENT_COUNT_DTM_START: NORMAL
MDC_IDC_STAT_EPISODE_TYPE: NORMAL
MDC_IDC_STAT_EPISODE_VENDOR_TYPE: NORMAL

## 2021-03-08 ENCOUNTER — ANTICOAGULATION THERAPY VISIT (OUTPATIENT)
Dept: ANTICOAGULATION | Facility: OTHER | Age: 66
End: 2021-03-08
Attending: FAMILY MEDICINE
Payer: MEDICARE

## 2021-03-08 ENCOUNTER — TRANSFERRED RECORDS (OUTPATIENT)
Dept: HEALTH INFORMATION MANAGEMENT | Facility: OTHER | Age: 66
End: 2021-03-08

## 2021-03-08 DIAGNOSIS — I10 ESSENTIAL HYPERTENSION: ICD-10-CM

## 2021-03-08 DIAGNOSIS — I20.1 CORONARY ARTERY SPASM (H): ICD-10-CM

## 2021-03-08 DIAGNOSIS — Z79.01 ANTICOAGULATION MONITORING, INR RANGE 2-3: ICD-10-CM

## 2021-03-08 DIAGNOSIS — R07.89 OTHER CHEST PAIN: Primary | ICD-10-CM

## 2021-03-08 DIAGNOSIS — Q24.5 MYOCARDIAL BRIDGE: ICD-10-CM

## 2021-03-08 DIAGNOSIS — Z86.79 HISTORY OF CORONARY VASOSPASM: ICD-10-CM

## 2021-03-08 DIAGNOSIS — D68.2 FACTOR V DEFICIENCY (H): ICD-10-CM

## 2021-03-08 LAB — INR PPP: 3.4 (ref 0.9–1.1)

## 2021-03-08 RX ORDER — FELODIPINE 5 MG/1
5 TABLET, EXTENDED RELEASE ORAL DAILY
Qty: 30 TABLET | Refills: 3 | Status: SHIPPED | OUTPATIENT
Start: 2021-03-08 | End: 2021-05-11

## 2021-03-08 NOTE — PROGRESS NOTES
ANTICOAGULATION FOLLOW-UP CLINIC VISIT    Patient Name:  Lyudmila Fitzgerald  Date:  3/8/2021  Contact Type:  fax from [employer] home INR monitoring service/phone call with patient    SUBJECTIVE:  Patient Findings     Comments:  Per phone call with patient.  She has not changed her Nifedipine yet.        Clinical Outcomes     Negatives:  Major bleeding event, Thromboembolic event, Anticoagulation-related hospital admission, Anticoagulation-related ED visit, Anticoagulation-related fatality    Comments:  Per phone call with patient.  She has not changed her Nifedipine yet.           OBJECTIVE    Recent labs: (last 7 days)     03/08/21   INR 3.4*       ASSESSMENT / PLAN  INR assessment SUPRA    Recheck INR In: 2 WEEKS    INR Location Home INR      Anticoagulation Summary  As of 3/8/2021    INR goal:  2.0-3.0   TTR:  58.1 % (1 y)   INR used for dosing:  3.4 (3/8/2021)   Warfarin maintenance plan:  3 mg (6 mg x 0.5) every Mon, Fri; 6 mg (6 mg x 1) all other days   Full warfarin instructions:  3 mg every Mon, Fri; 6 mg all other days   Weekly warfarin total:  36 mg   Plan last modified:  Arleth Sheth RN (3/8/2021)   Next INR check:  3/22/2021   Priority:  Maintenance   Target end date:  Indefinite    Indications    Acute thromboembolism of deep veins of lower extremity (H) (Resolved) [I82.409]  Anticoagulation monitoring  INR range 2-3 [Z79.01]  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  Assessment and istructions via phone call with patient. Patient verbalized understanding.      Arleth Sheth RN

## 2021-03-09 DIAGNOSIS — R07.89 OTHER CHEST PAIN: Primary | ICD-10-CM

## 2021-03-09 DIAGNOSIS — I20.1 CORONARY ARTERY SPASM (H): ICD-10-CM

## 2021-03-09 DIAGNOSIS — Q24.5 MYOCARDIAL BRIDGE: ICD-10-CM

## 2021-03-16 DIAGNOSIS — F51.04 CHRONIC INSOMNIA: ICD-10-CM

## 2021-03-16 RX ORDER — ZOLPIDEM TARTRATE 5 MG/1
TABLET ORAL
Qty: 30 TABLET | Refills: 3 | Status: SHIPPED | OUTPATIENT
Start: 2021-03-16 | End: 2021-07-02

## 2021-03-16 NOTE — TELEPHONE ENCOUNTER
Requested Prescriptions   Pending Prescriptions Disp Refills     zolpidem (AMBIEN) 5 MG tablet [Pharmacy Med Name: Zolpidem Tartrate 5 MG Oral Tablet] 30 tablet 0     Sig: TAKE 1 TABLET BY MOUTH AT BEDTIME AS NEEDED FOR  SLEEP.       There is no refill protocol information for this order            Last Written Prescription Date:  11/24/2020  Last Fill Quantity: 30,   # refills: 3  Last Office Visit: 02/25/2021 with Moises Islas DO    Future Office visit:    Next 5 appointments (look out 90 days)    Mar 30, 2021  9:45 AM  Return Visit with Moises Islas DO  [facility] ([facility] ) [address]  [place] [id number] [phone]      Unable to complete prescription refill per RN medication refill policy. Will route to provider for review and consideration.  Stephanie Ledesma RN on 3/16/2021 at 3:48 PM

## 2021-03-17 ENCOUNTER — TELEPHONE (OUTPATIENT)
Dept: CARDIOLOGY | Facility: OTHER | Age: 66
End: 2021-03-17

## 2021-03-17 NOTE — TELEPHONE ENCOUNTER
Patient needs today if possible a new refill for her zolpidem please send to [facility].    Patient was wondering on about a referral that was going to be sent down to [place].

## 2021-03-17 NOTE — TELEPHONE ENCOUNTER
Talked with patient prescription for Ambien was sent to pharmacy on 03/16/2021.  Patient also states that Mosies Islas DO placed a referral for the [place] for coronary artery spasm on 03/09/20201.  Stephanie Ledesma RN on 3/17/2021 at 9:56 AM

## 2021-03-19 DIAGNOSIS — I66.9 CEREBRAL THROMBOSIS: ICD-10-CM

## 2021-03-22 ENCOUNTER — MYC MEDICAL ADVICE (OUTPATIENT)
Dept: CARDIOLOGY | Facility: OTHER | Age: 66
End: 2021-03-22

## 2021-03-22 ENCOUNTER — ANTICOAGULATION THERAPY VISIT (OUTPATIENT)
Dept: ANTICOAGULATION | Facility: OTHER | Age: 66
End: 2021-03-22
Attending: FAMILY MEDICINE
Payer: MEDICARE

## 2021-03-22 ENCOUNTER — TRANSFERRED RECORDS (OUTPATIENT)
Dept: HEALTH INFORMATION MANAGEMENT | Facility: OTHER | Age: 66
End: 2021-03-22

## 2021-03-22 DIAGNOSIS — D68.2 FACTOR V DEFICIENCY (H): ICD-10-CM

## 2021-03-22 DIAGNOSIS — Z79.01 ANTICOAGULATION MONITORING, INR RANGE 2-3: ICD-10-CM

## 2021-03-22 LAB — INR PPP: 2.8 (ref 0.9–1.1)

## 2021-03-22 RX ORDER — WARFARIN SODIUM 6 MG/1
TABLET ORAL
Qty: 90 TABLET | Refills: 1 | Status: SHIPPED | OUTPATIENT
Start: 2021-03-22 | End: 2021-06-08

## 2021-03-22 NOTE — TELEPHONE ENCOUNTER
[id number] sent Rx request for the following:      Requested Prescriptions   Pending Prescriptions Disp Refills     warfarin ANTICOAGULANT (COUMADIN) 6 MG tablet [Pharmacy Med Name: Warfarin Sodium 6 MG Oral Tablet] 90 tablet 0     Sig: TAKE 1 TABLET BY MOUTH ONCE DAILY OR  AS  DIRECTED  BY  THE  PROTIME  CLINIC.       Vitamin K Antagonists Failed - 3/19/2021 10:42 AM        Failed - INR is within goal in the past 6 weeks     Confirm INR is within goal in the past 6 weeks.     Recent Labs   Lab Test 03/22/21   INR 2.8*                       Last Prescription Date:   10/19/20  Last Fill Qty/Refills:         90, R-1    Last Office Visit:              9/9/20 Virtual visit  Future Office visit:           None  Routing refill request to provider for review/approval because:    Unable to complete prescription refill per RN medication refill policy. Will route to provider for review and consideration.  Winter Pedraza RN on 3/22/2021 at 2:59 PM

## 2021-03-22 NOTE — PROGRESS NOTES
ANTICOAGULATION FOLLOW-UP CLINIC VISIT    Patient Name:  Lyudmila Fitzgerald  Date:  3/22/2021  Contact Type:  Fax received from [employer] [facility] Monitoring [facility]    SUBJECTIVE:  Patient Findings         Clinical Outcomes     Negatives:  Major bleeding event, Thromboembolic event, Anticoagulation-related hospital admission, Anticoagulation-related ED visit, Anticoagulation-related fatality           OBJECTIVE    Recent labs: (last 7 days)     21   INR 2.8*       ASSESSMENT / PLAN  INR assessment THER    Recheck INR In: 2 WEEKS    INR Location Home INR      Anticoagulation Summary  As of 3/22/2021    INR goal:  2.0-3.0   TTR:  56.3 % (1 y)   INR used for dosin.8 (3/22/2021)   Warfarin maintenance plan:  3 mg (6 mg x 0.5) every Mon, Fri; 6 mg (6 mg x 1) all other days   Full warfarin instructions:  3 mg every Mon, Fri; 6 mg all other days   Weekly warfarin total:  36 mg   No change documented:  Karen Lorenzo RN   Plan last modified:  Arleth Sheth RN (3/8/2021)   Next INR check:  2021   Priority:  Maintenance   Target end date:  Indefinite    Indications    Acute thromboembolism of deep veins of lower extremity (H) (Resolved) [I82.409]  Anticoagulation monitoring  INR range 2-3 [Z79.01]  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  INR in range so no telephone call.  Patient is to call INR clinic if any changes affecting INR.       Karen Lorenzo, IMELDA

## 2021-03-23 ENCOUNTER — HOSPITAL ENCOUNTER (OUTPATIENT)
Dept: CARDIOLOGY | Facility: OTHER | Age: 66
Discharge: HOME | End: 2021-03-23
Attending: INTERNAL MEDICINE | Admitting: INTERNAL MEDICINE
Payer: MEDICARE

## 2021-03-23 DIAGNOSIS — Z98.890 S/P AV NODAL ABLATION: ICD-10-CM

## 2021-03-23 PROCEDURE — 93280 PM DEVICE PROGR EVAL DUAL: CPT | Mod: 26 | Performed by: INTERNAL MEDICINE

## 2021-03-23 PROCEDURE — 93280 PM DEVICE PROGR EVAL DUAL: CPT | Performed by: INTERNAL MEDICINE

## 2021-03-23 NOTE — PATIENT INSTRUCTIONS
It was a pleasure to see you in clinic today.  Please do not hesitate to call with any questions or concerns.  You are scheduled for a remote transmission on 6/30/21.  We look forward to seeing you in clinic at your next device check in 6 months.    Virgilio Gooden, RN  Electrophysiology Nurse Clinician  [facility]    During Business Hours Please Call:  [phone]  After Hours Please Call:  [phone] - select option #[phone] and ask for job code [id number]

## 2021-03-25 LAB
MDC_IDC_EPISODE_DTM: NORMAL
MDC_IDC_EPISODE_ID: NORMAL
MDC_IDC_EPISODE_TYPE: NORMAL
MDC_IDC_LEAD_IMPLANT_DT: NORMAL
MDC_IDC_LEAD_IMPLANT_DT: NORMAL
MDC_IDC_LEAD_LOCATION: NORMAL
MDC_IDC_LEAD_LOCATION: NORMAL
MDC_IDC_LEAD_LOCATION_DETAIL_1: NORMAL
MDC_IDC_LEAD_LOCATION_DETAIL_1: NORMAL
MDC_IDC_LEAD_MFG: NORMAL
MDC_IDC_LEAD_MFG: NORMAL
MDC_IDC_LEAD_MODEL: NORMAL
MDC_IDC_LEAD_MODEL: NORMAL
MDC_IDC_LEAD_POLARITY_TYPE: NORMAL
MDC_IDC_LEAD_POLARITY_TYPE: NORMAL
MDC_IDC_LEAD_SERIAL: NORMAL
MDC_IDC_LEAD_SERIAL: NORMAL
MDC_IDC_LEAD_SPECIAL_FUNCTION: NORMAL
MDC_IDC_LEAD_SPECIAL_FUNCTION: NORMAL
MDC_IDC_MSMT_BATTERY_DTM: NORMAL
MDC_IDC_MSMT_BATTERY_REMAINING_LONGEVITY: 138 MO
MDC_IDC_MSMT_BATTERY_STATUS: NORMAL
MDC_IDC_MSMT_LEADCHNL_RA_IMPEDANCE_VALUE: 727 OHM
MDC_IDC_MSMT_LEADCHNL_RA_PACING_THRESHOLD_AMPLITUDE: 0.9 V
MDC_IDC_MSMT_LEADCHNL_RA_PACING_THRESHOLD_PULSEWIDTH: 1 MS
MDC_IDC_MSMT_LEADCHNL_RA_SENSING_INTR_AMPL: 2.4 MV
MDC_IDC_MSMT_LEADCHNL_RV_IMPEDANCE_VALUE: 491 OHM
MDC_IDC_MSMT_LEADCHNL_RV_PACING_THRESHOLD_AMPLITUDE: 0.8 V
MDC_IDC_MSMT_LEADCHNL_RV_PACING_THRESHOLD_PULSEWIDTH: 0.4 MS
MDC_IDC_MSMT_LEADCHNL_RV_SENSING_INTR_AMPL: 10.1 MV
MDC_IDC_PG_IMPLANT_DTM: NORMAL
MDC_IDC_PG_MFG: NORMAL
MDC_IDC_PG_MODEL: NORMAL
MDC_IDC_PG_SERIAL: NORMAL
MDC_IDC_PG_TYPE: NORMAL
MDC_IDC_SESS_CLINIC_NAME: NORMAL
MDC_IDC_SESS_DTM: NORMAL
MDC_IDC_SESS_TYPE: NORMAL
MDC_IDC_SET_BRADY_AT_MODE_SWITCH_MODE: NORMAL
MDC_IDC_SET_BRADY_AT_MODE_SWITCH_RATE: 140 {BEATS}/MIN
MDC_IDC_SET_BRADY_LOWRATE: 60 {BEATS}/MIN
MDC_IDC_SET_BRADY_MAX_SENSOR_RATE: 130 {BEATS}/MIN
MDC_IDC_SET_BRADY_MAX_TRACKING_RATE: 100 {BEATS}/MIN
MDC_IDC_SET_BRADY_MODE: NORMAL
MDC_IDC_SET_BRADY_PAV_DELAY_HIGH: 80 MS
MDC_IDC_SET_BRADY_PAV_DELAY_LOW: 130 MS
MDC_IDC_SET_BRADY_SAV_DELAY_HIGH: 80 MS
MDC_IDC_SET_BRADY_SAV_DELAY_LOW: 130 MS
MDC_IDC_SET_LEADCHNL_RA_PACING_AMPLITUDE: 2 V
MDC_IDC_SET_LEADCHNL_RA_PACING_CAPTURE_MODE: NORMAL
MDC_IDC_SET_LEADCHNL_RA_PACING_POLARITY: NORMAL
MDC_IDC_SET_LEADCHNL_RA_PACING_PULSEWIDTH: 1 MS
MDC_IDC_SET_LEADCHNL_RA_SENSING_ADAPTATION_MODE: NORMAL
MDC_IDC_SET_LEADCHNL_RA_SENSING_POLARITY: NORMAL
MDC_IDC_SET_LEADCHNL_RA_SENSING_SENSITIVITY: 0.6 MV
MDC_IDC_SET_LEADCHNL_RV_PACING_AMPLITUDE: 1.5 V
MDC_IDC_SET_LEADCHNL_RV_PACING_CAPTURE_MODE: NORMAL
MDC_IDC_SET_LEADCHNL_RV_PACING_POLARITY: NORMAL
MDC_IDC_SET_LEADCHNL_RV_PACING_PULSEWIDTH: 0.4 MS
MDC_IDC_SET_LEADCHNL_RV_SENSING_ADAPTATION_MODE: NORMAL
MDC_IDC_SET_LEADCHNL_RV_SENSING_POLARITY: NORMAL
MDC_IDC_SET_LEADCHNL_RV_SENSING_SENSITIVITY: 1.5 MV
MDC_IDC_SET_ZONE_DETECTION_INTERVAL: 375 MS
MDC_IDC_SET_ZONE_TYPE: NORMAL
MDC_IDC_SET_ZONE_VENDOR_TYPE: NORMAL
MDC_IDC_STAT_BRADY_DTM_END: NORMAL
MDC_IDC_STAT_BRADY_DTM_START: NORMAL
MDC_IDC_STAT_BRADY_RA_PERCENT_PACED: 85 %
MDC_IDC_STAT_BRADY_RV_PERCENT_PACED: 100 %
MDC_IDC_STAT_EPISODE_RECENT_COUNT: 0
MDC_IDC_STAT_EPISODE_RECENT_COUNT_DTM_END: NORMAL
MDC_IDC_STAT_EPISODE_RECENT_COUNT_DTM_START: NORMAL
MDC_IDC_STAT_EPISODE_TOTAL_COUNT: 0
MDC_IDC_STAT_EPISODE_TOTAL_COUNT_DTM_END: NORMAL
MDC_IDC_STAT_EPISODE_TYPE: NORMAL
MDC_IDC_STAT_EPISODE_TYPE: NORMAL
MDC_IDC_STAT_EPISODE_VENDOR_TYPE: NORMAL
MDC_IDC_STAT_EPISODE_VENDOR_TYPE: NORMAL

## 2021-04-07 ENCOUNTER — TRANSFERRED RECORDS (OUTPATIENT)
Dept: HEALTH INFORMATION MANAGEMENT | Facility: OTHER | Age: 66
End: 2021-04-07

## 2021-04-07 ENCOUNTER — ANTICOAGULATION THERAPY VISIT (OUTPATIENT)
Dept: ANTICOAGULATION | Facility: OTHER | Age: 66
End: 2021-04-07
Attending: FAMILY MEDICINE
Payer: MEDICARE

## 2021-04-07 DIAGNOSIS — D68.2 FACTOR V DEFICIENCY (H): ICD-10-CM

## 2021-04-07 DIAGNOSIS — Z79.01 ANTICOAGULATION MONITORING, INR RANGE 2-3: ICD-10-CM

## 2021-04-07 LAB — INR PPP: 2.8 (ref 0.9–1.1)

## 2021-04-07 NOTE — PROGRESS NOTES
[facility] HEART CARE   CARDIOLOGY PROGRESS NOTE     Chief Complaint   Patient presents with     Follow Up     tests          Diagnosis:  1.  Coronary artery spasm involving the LAD.  2.  Myocardial bridging involving LAD.  3.  H/O SVT.  4.  CABG x1 with LIMA to LAD on 11/15/2016 in [place].  5.  H/O chronic heart failure with an EF of 15% now recovered at 60 to 65% on 5/16/2019 at [facility].  6.  H/O coronary spasm with stent placement to LAD x3.  7.  H/O radiofrequency ablation for SVT.  8.  H/O stroke x7 with left hemiparesis.  9.  Left foot drop.  10.  S/P AV tessie ablation secondary to SVT.  11.  Pacemaker dependent.  12.  Hyperlipidemia-uncontrolled.  13.  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  14.  H/O DVT to bilateral lower extremities.  15.  Cardiac pacemaker.  16.  Hypertension-variable control.  17.  DM-2.  18.  Lown Ganong Brandt syndrome.    Assessment/Plan:    1.  Stop lisinopril 5 mg daily as she is having a cough.  2.  Increase Imdur from 60 mg to 90 mg secondary to angina.  3.  Increase Ranexa from 500 mg twice a day to 1000 mg twice a day.  4.  She is able to check her INR level at home.  She plans to check this.  She was told to stop Coumadin 7 days prior if INR is greater than 3.  If it is less than 3, she is to stop it 5 days before.  She will start on Lovenox when INR is less than 2.  5.  She was given a prescription for Lovenox and this was sent to her pharmacy.  6.  Plan for cardiac catheterization.  I am concerned that the stenting she has had completed in the past for myocardial bridging may now be stenosed.  In the past, her bypass graft has been described as atretic making me concerned that this vessel is small and supplying insufficient blood to this portion of the heart.  This is supported by a stress test completed at [place] on 4/1/2021 showing an exercise echocardiogram which was positive for septal myocardial ischemia.  She has been having increasing heartburn which is new  and frequent episodes of chest pain.  The images were compared to her cardiac catheterization in August through [facility] which showed some stenosis of the stent.  To complicate matters, she has not been taking aspirin as she has felt it is not beneficial as she is on lifelong Coumadin with history of clotting disorder.  6.  Follow-up after completion of a cardiac catheterization which is tentatively planned for 4/27/2021.      Interval history:  Leigh has had continuing discomfort to her chest.  She has been having multiple episodes of chest discomfort waking her up at night.  She takes multiple doses of nitro to improve her symptoms.  She has been having chest pain regularly, multiple times a week.  More recently, she takes many nitro daily.   She had a cardiac catheterization through [facility] on 8/21/2020 showing stable disease.  She has been having regular chest discomfort.  Have tried nifedipine but did not tolerate the medication.  Nicardipine is expensive.  We will increase her Imdur and Ranexa today.  She went to [facility] and is seen on 4/1/2021.  Her stress test showed septal myocardial ischemia.  Her echo was indeterminate for diastolic dysfunction and filling pressures.  She was started on chlorthalidone 12.5 mg to reduce in the fluid burden.  Cardiac catheterization at [facility] on 8/21/2020 was reviewed and felt to have stable disease by the interventionalists.  She is desperately seeking rest and relief.  To ensure that she is not having severe blockages to her LAD from an occluded stent, she was set up for cardiac catheterization at the [facility].    HPI:    Originally, she was diagnosed with a LAD ostial coronary artery spasm at [facility].  She had a positive methergine spasm study in 2005 at [facility] in the mid LAD, treated with drug-eluting stent to the mid-LAD.  She has a history of LAD myocardial bridging, CABG with LIMA to LAD on 11/15/2016 in [place], [place], history of  heart failure with reported EF of 15% now recovered, stenting to the LAD x3, history of radiofrequency ablation for SVT x8, ultimately resulting in AV tessie ablation with pacemaker placement with pacemaker dependence, H/O stroke x7 with left hemiparesis essentially resolved with the exception of intermittent left foot drop, hyperlipidemia, on Coumadin with a history of factor V Leiden deficiency and numerous DVT's involving both the upper and lower extremities, hypertension, DM-2, on anticoagulated on 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  She has had care through [facility] as well as in [place], [place].  She was evaluated at [place] and underwent angiography revealing separate origins of the LAD and circumflex.  She has a history of catheter induced coronary artery spasm originally diagnosed at [place].     Dr. Bill Abreu through [facility] evaluated her in 2005. In spite of pre-treatment with nitroglycerine and calcium channel blockers, she had fairly vigorous mid-LAD spasm with intracoronary methergine. The ostial LAD did not spasm. Dr. Abreu treated this area with a 2.5 x 28 mm Cypher drug-eluting stent. The following year she had instent restenosis treated with a second 8 mm x 2.5 mm Cypher drug-eluting stent.      She did fairly well until 2016, when she had evaluation at [facility] in [place]. She was treated with trans-myocardial revascularization with 25 channels in the inferior, lateral and anterior LV walls, and underwent an LIMA-LAD. Her EF was 35% pre-op, and approximately 50-55% post-operatively.      She had recurrent angina, and on 2/8/17 she underwent repeat angiography which showed mild instent restenosis of the mid-LAD stents, followed by 99% stenosis of the mid-LAD at the LIMA-LAD anastomosis, presumably due to surgical clips/manipulation. The TERRENCE was atretic. Thus, the interventionalists placed a 2.25 x 8 mm  "Promus Premiere drug-eluting stent into the mid-LAD with a good result.      Lately, she has been having an increasingly more frequent and severe anginal chest discomfort, and feels like she is back to her severe coronary \"spasm\" again.      He has ongoing concerns for angina felt to be vasospastic in nature.  She has had a total of x4 cardiac catheterizations on 6/17/2008, 11/21/2012, 1/17/2019, and I believe one in [place] do not have this date.       She has done pretty well since. Her EF was greater than 60% on a couple occasions since.  Most recently, on 5/16/2019 her EF was 60% to 65%.  There was no significant valvular disease detected.  Her echo was essentially unchanged from 4/18/2017.  Diastolic dysfunction was normal.      Relevant testing:  [place] stress test with 4/1/2021:  1. Exercise echocardiogram positive for septal myocardial ischemia.  2. The patient's exercise capacity was limited.  3. Ejection fraction response from 60% at rest to 65% at peak stress.  4. Left ventricular end-systolic volume decreased with stress.  5. OTHER ECHO FINDINGS:  6. Findings consistent with elevated left ventricular filling pressure at rest and with exercise.    Echo on 4/1/2021 at [place]:  1. Normal left ventricular chamber size, no regional wall motion abnormalities, calculated 2-D linear ejection fraction 63 %.  2. Indeterminate left ventricular diastolic function and filling pressure.  3. Normal right ventricular chamber size, mildly reduced systolic function (by visual inspection,), estimated right ventricular systolic pressure 26 mmHg assuming right atrial pressure of 5 mmHg.  4. Mild-moderate tricuspid valve regurgitation.  5. No pericardial effusion.    Review of cardiac cath from [facility] by me on 4/1/2021:  1.  Severe coronary artery atherosclerosis  2.  Coronary artery grafts  CORONARY DIAGNOSTIC SUMMARY  Coronary artery dominance is left. Normal right coronary. Normal left main coronary. Normal " circumflex coronary.   Side by side ostia of LAD and LCX.  The LAD is a diffusely diseased vessel, stented in its mid portion, and with a patent but somewhat atretic LIMA graft to its distal portion.,            Past Medical History:   Diagnosis Date     Acute thromboembolism of deep veins of lower extremity (H)     Overview:  Hx of multiple episodes of DVT: 3 lower extremity; 5 upper extremity (right arm)     Atrial fibrillation (H)     No Comments Provided     Cardiac pacemaker in situ     Dual chamber     Cerebral thrombosis     2006,'95 w/ no residual     Coronary atherosclerosis     2006     Endometrial hyperplasia     s/p endometrial ablation      Essential hypertension     No Comments Provided     History of other genital system and obstetric disorders      8, Para 3-0-5-2     Other and unspecified angina pectoris     2006     Other and unspecified hyperlipidemia     No Comments Provided     Other postprocedural states      and ,Followed by Dr. Usman Duran, [facility],.     Paroxysmal supraventricular tachycardia (H)     2006,s/p multiple ablations w last resulting in PPM     Personal history of transient ischemic attack (TIA) and cerebral infarction without residual deficit 2009    with left hemiplegia     Primary hypercoagulable state (H)     No Comments Provided       Past Surgical History:   Procedure Laterality Date     ARTHROSCOPY KNEE      ,Arthroscopy for chondromalacia patella     AS CABG, ARTERY-VEIN, SINGLE  11/15/2016    Single vessel; done in [place]     ATTEMPTED ARTHROSCOPY      ,Right arthroscopy     BIOPSY BREAST      ,Breast cyst aspiration     COLONOSCOPY      2007,follow up recommended in 10 years.     ENDOSCOPIC SINUS SURGERY      ,Sinus node ablation.     EP ABLATION ATRIAL FLUTTER N/A 2020    Procedure: EP VENOGRAM SVC;  Surgeon: Hakeem Moore MD;  Location:  HEART CARDIAC CATH LAB  "    EP PACEMAKER N/A 5/20/2020    Procedure: EP PACEMAKER;  Surgeon: Tima Johnson MD;  Location:  HEART CARDIAC CATH LAB     EP STUDY  12/1994    EP STUDY /ABLATION,AV re-entry tachycardia, tessie ablation     HEART CATH, ANGIOPLASTY      01/06,Stenting placed LAD after ergonovine provocation confirmed     HEART CATH, ANGIOPLASTY      03/06,90% lesion, normal left ventricular function     IMPLANT PACEMAKER      03/03,Guidant pacemaker placement, AV tessie ablation     LAPAROSCOPIC ABLATION ENDOMETRIOSIS      06/06     OTHER SURGICAL HISTORY      3/24/06,376928,([place]) [facility] STENT ANGIO     OTHER SURGICAL HISTORY      08/02,201267,EP STUDY /ABLATION     OTHER SURGICAL HISTORY      11/04,12730.0,CT CORONARY ANGIOGRAM ([place]),Coronary angiogram, failed ablation     OTHER SURGICAL HISTORY      12/04,900591,[facility] RELOCATION OF SKIN POCKET PACEMAKER,[facility] 5/24/05 reconfiguration of pacemaker \"pocket\"     OTHER SURGICAL HISTORY      207882,IP CONSULT TO ELECTROPHYSIOLOGY,study and lead change     OTHER SURGICAL HISTORY      12/06,144463,NEUROSTIMULATOR,Nerve stimulator implanted for chest pain control     OTHER SURGICAL HISTORY      12/2008,41643.0,CT CORONARY ANGIOGRAM ([place]),with increased left-sided weakness and vertigo, negative CT angiogram.     REPLACE PACEMAKER GENERATOR      12/29/04,Replacement of left ventricular pacemaker lead.     STENT  02/2017    LAD        Allergies   Allergen Reactions     Morphine Nausea and Vomiting     OK in small doses     Prednisone Nausea and Vomiting     Per IV     Sotalol Nausea and Vomiting       Current Outpatient Medications   Medication Sig Dispense Refill     atorvastatin (LIPITOR) 80 MG tablet Take 1 tablet (80 mg) by mouth daily 90 tablet 3     calcium carb 1250 mg, 500 mg [place],/vitamin D 200 unit (CALCIUM 500/D) 500-200 MG-UNIT per tablet Take 1 tablet by mouth       chlorthalidone (HYGROTON) 25 MG tablet Take 12.5 mg by mouth       diltiazem ER (TIAZAC) 360 MG 24 hr ER " beaded capsule Take 1 capsule (360 mg) by mouth daily 90 capsule 3     enoxaparin ANTICOAGULANT (LOVENOX ANTICOAGULANT) 80 MG/0.8ML syringe Inject 0.7 mLs (70 mg) Subcutaneous 2 times daily 11.2 mL 1     felodipine ER (PLENDIL) 5 MG 24 hr tablet Take 1 tablet (5 mg) by mouth daily 30 tablet 3     isosorbide mononitrate (IMDUR) 30 MG 24 hr tablet Take 3 tablets (90 mg) by mouth daily 90 tablet 3     nitroGLYcerin (NITROSTAT) 0.4 MG sublingual tablet For chest pain place 1 tablet under the tongue every 5 minutes for 3 doses. If symptoms persist 5 minutes after 1st dose call 911. 25 tablet 3     ranolazine 1000 MG TB12 Take 1,000 mg by mouth 2 times daily 180 tablet 3     sertraline (ZOLOFT) 50 MG tablet Take 1 tablet (50 mg) by mouth daily 90 tablet 3     warfarin ANTICOAGULANT (COUMADIN) 6 MG tablet TAKE 1 TABLET BY MOUTH ONCE DAILY OR  AS  DIRECTED  BY  THE  [facility]  CLINIC. 90 tablet 1     zolpidem (AMBIEN) 5 MG tablet TAKE 1 TABLET BY MOUTH AT BEDTIME AS NEEDED FOR  SLEEP. 30 tablet 3     aspirin EC 81 MG EC tablet Take 81 mg by mouth         Social History     Socioeconomic History     Marital status:      Spouse name: Antonio     Number of children: 2     Years of education: 16+     Highest education level: Master's degree (e.g., MA, MS, [place], MEd, MSW, [employer]ARI[employer])   Occupational History     Not on file   Social Needs     Financial resource strain: Not hard at all     Food insecurity     Worry: Never true     Inability: Never true     Transportation needs     Medical: No     Non-medical: No   Tobacco Use     Smoking status: Never Smoker     Smokeless tobacco: Never Used     Tobacco comment: Quit smoking: spouse does not smoke in the house. Exposed in cars.   Substance and Sexual Activity     Alcohol use: Yes     Comment: Alcoholic Drinks/day: Alcoholic Drinks/day: 1-2 drinks twice a week     Drug use: Never     Sexual activity: Not Currently     Partners: Male   Lifestyle     Physical activity     Days per week:  0 days     Minutes per session: 0 min     Stress: To some extent   Relationships     Social connections     Talks on phone: Not on file     Gets together: Not on file     Attends [employer]ical service: Not on file     Active member of club or organization: Not on file     Attends meetings of clubs or organizations: Not on file     Relationship status: Not on file     Intimate partner violence     Fear of current or ex partner: Not on file     Emotionally abused: Not on file     Physically abused: Not on file     Forced sexual activity: Not on file   Other Topics Concern     Not on file   Social History Narrative     2nd Marriage x 20 years to  Anthony they live in [place], [place]       in [place]. Completed her Master's Degree 2003. Taught Special Education.  Early FDC due to health 2007.      Total 5 children blended family-3 dgts. And 2 sons    Daughter:  Philip    Son:  Anthony - lives in [place]    6 Grand-children 5 boys and 1 girl    Leigh parents' and siblings live in [place].       LAB RESULTS:   Anticoagulation Therapy Visit on 08/24/2020   Component Date Value Ref Range Status     INR 08/24/2020 1.3* 0.90 - 1.10 Corrected   Anticoagulation Therapy Visit on 08/19/2020   Component Date Value Ref Range Status     INR 08/19/2020 2.3* 0.90 - 1.10 Final   Anticoagulation Therapy Visit on 08/05/2020   Component Date Value Ref Range Status     INR 08/05/2020 2.1* 0.90 - 1.10 Final   Allied Health/Nurse Visit on 08/03/2020   Component Date Value Ref Range Status     COVID-19 Virus PCR to [facility] - So* 08/03/2020 Nasopharyngeal   Final     COVID-19 Virus PCR to [facility] - Re* 08/03/2020 Not Detected   Final   Anticoagulation Therapy Visit on 07/30/2020   Component Date Value Ref Range Status     INR 07/30/2020 3.8* 0.90 - 1.10 Final   Anticoagulation Therapy Visit on 07/27/2020   Component Date Value Ref Range Status     INR 07/25/2020 1.1  0.90 - 1.10 Final     INR 07/27/2020  "1.2* 0.90 - 1.10 Final   Anticoagulation Therapy Visit on 07/23/2020   Component Date Value Ref Range Status     INR Protime 07/23/2020 1.0  0.86 - 1.14 Final   Anticoagulation Therapy Visit on 07/21/2020   Component Date Value Ref Range Status     INR 07/21/2020 1.4* 0.90 - 1.10 Final   Anticoagulation Therapy Visit on 07/09/2020   Component Date Value Ref Range Status     INR 07/08/2020 2.1* 0.90 - 1.10 Final        Review of systems: Negative except that which was noted in the HPI.    Physical examination:  /80 (BP Location: Right arm, Patient Position: Sitting, Cuff Size: Adult Regular)   Pulse 68   Temp 96.5  F (35.8  C) (Tympanic)   Resp 18   Ht 1.65 m (5' 4.96\")   Wt 68.9 kg (152 lb)   SpO2 97%   BMI 25.32 kg/m      GENERAL APPEARANCE: healthy, alert and no distress  HEENT: no icterus, no xanthelasmas, normal pupil size and reaction, no cyanosis.  NECK: no adenopathy, no asymmetry, masses, or scars.  CHEST: lungs clear to auscultation - no rales, rhonchi or wheezes, no use of accessory muscles, no retractions, respirations are unlabored, normal respiratory rate  CARDIOVASCULAR: regular rhythm, normal S1 with physiologic split S2, no S3 or S4 and no murmur, click or rub  ABDOMEN: soft, non tender, bowel sounds normal  EXTREMITIES: no clubbing, cyanosis or edema  NEURO: alert and oriented normal speech, and affect  VASC: No vascular bruits heard.  SKIN: no ecchymoses, no rashes        Thank you for allowing me to participate in the care of your patient. Please do not hesitate to contact me if you have any questions.     Moises Islas, DO          " Acitretin Pregnancy And Lactation Text: This medication is Pregnancy Category X and should not be given to women who are pregnant or may become pregnant in the future. This medication is excreted in breast milk.

## 2021-04-07 NOTE — PROGRESS NOTES
ANTICOAGULATION FOLLOW-UP CLINIC VISIT    Patient Name:  Lyudmila Fitzgerald  Date:  2021  Contact Type:  no call needed patient to continue same dose    SUBJECTIVE:  Patient Findings     Positives:  Change in medications (started chlorthalidone)        Clinical Outcomes     Negatives:  Major bleeding event, Thromboembolic event, Anticoagulation-related hospital admission, Anticoagulation-related ED visit, Anticoagulation-related fatality           OBJECTIVE    Recent labs: (last 7 days)     21   INR 2.8*       ASSESSMENT / PLAN  INR assessment THER    Recheck INR In: 2 WEEKS    INR Location Home INR      Anticoagulation Summary  As of 2021    INR goal:  2.0-3.0   TTR:  58.2 % (1 y)   INR used for dosin.8 (2021)   Warfarin maintenance plan:  3 mg (6 mg x 0.5) every Mon, Fri; 6 mg (6 mg x 1) all other days   Full warfarin instructions:  3 mg every Mon, Fri; 6 mg all other days   Weekly warfarin total:  36 mg   No change documented:  Nyla Walker RN   Plan last modified:  Arleth Sheth RN (3/8/2021)   Next INR check:  2021   Priority:  Maintenance   Target end date:  Indefinite    Indications    Acute thromboembolism of deep veins of lower extremity (H) (Resolved) [I82.409]  Anticoagulation monitoring  INR range 2-3 [Z79.01]  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]             Anticoagulation Episode Summary     INR check location:      Preferred lab:      Send INR reminders to:  [facility]    Comments:  home monitor [employer] check INR q 2 weeks      Anticoagulation Care Providers     Provider Role Specialty Phone number    Erika Carrasco MD Referring Family Medicine [phone]            See the Encounter Report to view Anticoagulation Flowsheet and Dosing Calendar (Go to Encounters tab in chart review, and find the Anticoagulation Therapy Visit)        Nyla Walker, RN

## 2021-04-08 ENCOUNTER — OFFICE VISIT (OUTPATIENT)
Dept: CARDIOLOGY | Facility: OTHER | Age: 66
End: 2021-04-08
Attending: INTERNAL MEDICINE
Payer: MEDICARE

## 2021-04-08 VITALS
DIASTOLIC BLOOD PRESSURE: 80 MMHG | RESPIRATION RATE: 18 BRPM | HEIGHT: 65 IN | TEMPERATURE: 96.5 F | OXYGEN SATURATION: 97 % | HEART RATE: 68 BPM | WEIGHT: 152 LBS | SYSTOLIC BLOOD PRESSURE: 122 MMHG | BODY MASS INDEX: 25.33 KG/M2

## 2021-04-08 DIAGNOSIS — Z86.718 HISTORY OF DVT (DEEP VEIN THROMBOSIS): ICD-10-CM

## 2021-04-08 DIAGNOSIS — R07.9 CHEST PAIN, UNSPECIFIED TYPE: ICD-10-CM

## 2021-04-08 DIAGNOSIS — Z86.73 HISTORY OF STROKE: ICD-10-CM

## 2021-04-08 DIAGNOSIS — Z86.79 PERSONAL HISTORY OF SUPRAVENTRICULAR TACHYCARDIA: ICD-10-CM

## 2021-04-08 DIAGNOSIS — Q24.5 MYOCARDIAL BRIDGE: ICD-10-CM

## 2021-04-08 DIAGNOSIS — Z79.01 ANTICOAGULATED ON COUMADIN: ICD-10-CM

## 2021-04-08 DIAGNOSIS — Z86.79 HISTORY OF CORONARY VASOSPASM: ICD-10-CM

## 2021-04-08 DIAGNOSIS — Z79.01 ANTICOAGULATION MONITORING, INR RANGE 2-3: ICD-10-CM

## 2021-04-08 DIAGNOSIS — Z98.890 S/P AV NODAL ABLATION: ICD-10-CM

## 2021-04-08 DIAGNOSIS — Z95.5 HISTORY OF CORONARY ARTERY STENT PLACEMENT: ICD-10-CM

## 2021-04-08 DIAGNOSIS — Z95.0 CARDIAC PACEMAKER IN SITU: ICD-10-CM

## 2021-04-08 DIAGNOSIS — Z95.0 PACEMAKER-DEPENDENT DUE TO NATIVE CARDIAC RHYTHM INSUFFICIENT TO SUPPORT LIFE: ICD-10-CM

## 2021-04-08 DIAGNOSIS — I10 ESSENTIAL HYPERTENSION: ICD-10-CM

## 2021-04-08 DIAGNOSIS — I47.10 PAROXYSMAL SUPRAVENTRICULAR TACHYCARDIA (H): ICD-10-CM

## 2021-04-08 DIAGNOSIS — Z86.79 HISTORY OF HEART FAILURE: Primary | ICD-10-CM

## 2021-04-08 DIAGNOSIS — Z95.1 HISTORY OF CORONARY ARTERY BYPASS GRAFT X 1: ICD-10-CM

## 2021-04-08 DIAGNOSIS — E78.2 MIXED HYPERLIPIDEMIA: ICD-10-CM

## 2021-04-08 DIAGNOSIS — D68.2 FACTOR V DEFICIENCY (H): ICD-10-CM

## 2021-04-08 DIAGNOSIS — I45.6 LOWN GANONG LEVINE SYNDROME: ICD-10-CM

## 2021-04-08 DIAGNOSIS — E11.9 TYPE 2 DIABETES MELLITUS WITHOUT COMPLICATION, WITHOUT LONG-TERM CURRENT USE OF INSULIN (H): ICD-10-CM

## 2021-04-08 DIAGNOSIS — I20.1 CORONARY ARTERY SPASM (H): ICD-10-CM

## 2021-04-08 DIAGNOSIS — R06.02 SHORTNESS OF BREATH: ICD-10-CM

## 2021-04-08 DIAGNOSIS — I49.8 PACEMAKER-DEPENDENT DUE TO NATIVE CARDIAC RHYTHM INSUFFICIENT TO SUPPORT LIFE: ICD-10-CM

## 2021-04-08 DIAGNOSIS — Z95.0 PACEMAKER: ICD-10-CM

## 2021-04-08 PROCEDURE — G0463 HOSPITAL OUTPT CLINIC VISIT: HCPCS

## 2021-04-08 PROCEDURE — 99214 OFFICE O/P EST MOD 30 MIN: CPT | Performed by: INTERNAL MEDICINE

## 2021-04-08 RX ORDER — ISOSORBIDE MONONITRATE 30 MG/1
90 TABLET, EXTENDED RELEASE ORAL DAILY
Qty: 90 TABLET | Refills: 3 | Status: SHIPPED | OUTPATIENT
Start: 2021-04-08 | End: 2022-05-09

## 2021-04-08 RX ORDER — LIDOCAINE 40 MG/G
CREAM TOPICAL
Status: CANCELLED | OUTPATIENT
Start: 2021-04-08

## 2021-04-08 RX ORDER — CHLORTHALIDONE 25 MG/1
12.5 TABLET ORAL DAILY
COMMUNITY
Start: 2021-04-01 | End: 2021-05-11 | Stop reason: ALTCHOICE

## 2021-04-08 RX ORDER — RANOLAZINE 1000 MG/1
1000 TABLET, EXTENDED RELEASE ORAL 2 TIMES DAILY
Qty: 180 TABLET | Refills: 3 | Status: SHIPPED | OUTPATIENT
Start: 2021-04-08 | End: 2022-10-25

## 2021-04-08 RX ORDER — SODIUM CHLORIDE 9 MG/ML
INJECTION, SOLUTION INTRAVENOUS CONTINUOUS
Status: CANCELLED | OUTPATIENT
Start: 2021-04-08

## 2021-04-08 ASSESSMENT — PAIN SCALES - GENERAL: PAINLEVEL: NO PAIN (0)

## 2021-04-08 ASSESSMENT — MIFFLIN-ST. JEOR: SCORE: 1234.73

## 2021-04-08 NOTE — NURSING NOTE
"Patient comes in for follow up after tests at [facility].  Mirella Royal LPN ....................4/8/2021   3:11 PM  Chief Complaint   Patient presents with     Follow Up     tests       Initial /80 (BP Location: Right arm, Patient Position: Sitting, Cuff Size: Adult Regular)   Pulse 68   Temp 96.5  F (35.8  C) (Tympanic)   Resp 18   Ht 1.65 m (5' 4.96\")   Wt 68.9 kg (152 lb)   SpO2 97%   BMI 25.32 kg/m   Estimated body mass index is 25.32 kg/m  as calculated from the following:    Height as of this encounter: 1.65 m (5' 4.96\").    Weight as of this encounter: 68.9 kg (152 lb).  Meds Reconciled: complete  Pt is not on Aspirin  Pt is on a Statin  PHQ and/or JUAN CARLOS reviewed. Pt referred to PCP/MH Provider as appropriate.    Mirella Royal LPN      "

## 2021-04-08 NOTE — PATIENT INSTRUCTIONS
You were seen by  Moises Islas, DO       1. Increase Imdur from 60mg daily to 90mg daily     2. Increase Ranexa from 500mg daily to 1000mg daily    3. Stop Lisinopril.    4. Medication sent to your pharmacy.    5.  Angiogram scheduled for 04/27/2021 arrival time 10:00am.    You will follow up with [facility] in 2 weeks after Angiogram, sooner if needed.       Please call the cardiology office with problems, questions, or concerns at [phone].    If you experience chest pain, chest pressure, chest tightness, shortness of breath, fainting, lightheadedness, nausea, vomiting, or other concerning symptoms, please report to the Emergency Department or call 911. These symptoms may be emergent, and best treated in the Emergency Department.     Cardiology Nurses  IMELDA Monte, NAY MEDELLIN LPN  [facility] (Unit 3C)  [phone]

## 2021-04-09 PROBLEM — Q24.5 MYOCARDIAL BRIDGE: Status: ACTIVE | Noted: 2019-10-08

## 2021-04-09 PROBLEM — Z86.79 HISTORY OF CORONARY VASOSPASM: Status: ACTIVE | Noted: 2019-10-08

## 2021-04-09 PROBLEM — Z86.79 HISTORY OF HEART FAILURE: Status: ACTIVE | Noted: 2019-10-08

## 2021-04-09 PROBLEM — Z95.1 HISTORY OF CORONARY ARTERY BYPASS GRAFT X 1: Status: ACTIVE | Noted: 2019-10-08

## 2021-04-09 PROBLEM — Z95.5 HISTORY OF CORONARY ARTERY STENT PLACEMENT: Status: ACTIVE | Noted: 2019-10-08

## 2021-04-09 RX ORDER — ASPIRIN 81 MG/1
81 TABLET ORAL DAILY
COMMUNITY
Start: 2021-04-09 | End: 2022-10-25

## 2021-04-09 NOTE — NURSING NOTE
Per Moises Islas DO, pt to have COR Angiogram at [facility]. Reviewed allergies, labs, and medications.     -Pt will restart on Aspirin 81 mg daily as of 04/09/2021 per Moises Islas DO, including day of procedure.    Coumadin and Lovenox directions If INR is greater then 3.0 hold Coumadin for 7 days, if INR is less than 3.0 hold Coumadin for 5 days.  Once INR is 2.0 or below start Lovenox injection  Hold Lovenox day before procedure then restart Coumadin and Lovenox next day until INR is within range then may stop the Lovenox and continue Coumadin there after per Moises Islas DO.        Packet given, instructions reviewed, questions answered. Pt verbalizes understanding. Now scheduled for 04/27/2021 for Angiogram. COVID testing 04/23/2021. Pt is agreeable to plan. Stephanie Ledesma RN on 4/9/2021 at 11:52 AM

## 2021-04-13 DIAGNOSIS — Z11.59 ENCOUNTER FOR SCREENING FOR OTHER VIRAL DISEASES: ICD-10-CM

## 2021-04-14 ENCOUNTER — TELEPHONE (OUTPATIENT)
Dept: CARDIOLOGY | Facility: OTHER | Age: 66
End: 2021-04-14

## 2021-04-14 NOTE — TELEPHONE ENCOUNTER
Patient needs to get a prior auth for her enoxaparin ANTICOAGULANT (LOVENOX ANTICOAGULANT) 80 MG/0.8ML syringe.  Any question please call patient.     Thank You   Peri Morrissey on 4/14/2021 at 12:21 PM

## 2021-04-19 ENCOUNTER — TELEPHONE (OUTPATIENT)
Dept: CARDIOLOGY | Facility: OTHER | Age: 66
End: 2021-04-19

## 2021-04-19 ENCOUNTER — TRANSFERRED RECORDS (OUTPATIENT)
Dept: HEALTH INFORMATION MANAGEMENT | Facility: OTHER | Age: 66
End: 2021-04-19

## 2021-04-19 ENCOUNTER — ANTICOAGULATION THERAPY VISIT (OUTPATIENT)
Dept: ANTICOAGULATION | Facility: OTHER | Age: 66
End: 2021-04-19
Attending: FAMILY MEDICINE
Payer: MEDICARE

## 2021-04-19 DIAGNOSIS — D68.2 FACTOR V DEFICIENCY (H): ICD-10-CM

## 2021-04-19 DIAGNOSIS — Z79.01 ANTICOAGULATION MONITORING, INR RANGE 2-3: ICD-10-CM

## 2021-04-19 LAB — INR PPP: 1.5 (ref 0.9–1.1)

## 2021-04-19 NOTE — TELEPHONE ENCOUNTER
After verification, patient said she spoke with [facility]  And they said Dr Islas ordered too high of a dose of Lovenox for twice a day so insurance won't cover it.   Mirella Royal LPN ....................4/19/2021   9:36 AM

## 2021-04-19 NOTE — TELEPHONE ENCOUNTER
Called and talked with [place] pharmacist at [facility] in regards to Lovenox insurance is wanting a PA for the Lovenox to be given twice daily.  Also called [facility] PA Department they will push through the PA to insurance company.  Will also let patient know this.  Stephanie Ledesma RN on 4/19/2021 at 9:45 AM

## 2021-04-19 NOTE — TELEPHONE ENCOUNTER
Patient needs a call back regarding Lovenox. She needs to speak with someone about this before she goes in for her procedure. Please call

## 2021-04-19 NOTE — PROGRESS NOTES
ANTICOAGULATION FOLLOW-UP CLINIC VISIT    Patient Name:  Lyudmila Fitzgerald  Date:  2021  Contact Type:  Telephone/ Gave patient dosing instructions    SUBJECTIVE:  Patient Findings     Positives:  Upcoming invasive procedure, Missed doses    Comments:  Having an angiogram next Tuesday. Took half a tablet one day to see if her INR would go down. Will be starting Lovenox injections on Thursday and will also start holding her Warfarin that day.         Clinical Outcomes     Negatives:  Major bleeding event, Thromboembolic event, Anticoagulation-related hospital admission, Anticoagulation-related ED visit, Anticoagulation-related fatality    Comments:  Having an angiogram next Tuesday. Took half a tablet one day to see if her INR would go down. Will be starting Lovenox injections on Thursday and will also start holding her Warfarin that day.            OBJECTIVE    Recent labs: (last 7 days)     21   INR 1.5*       ASSESSMENT / PLAN  INR assessment SUB    Recheck INR In: 3 DAYS    INR Location Home INR      Anticoagulation Summary  As of 2021    INR goal:  2.0-3.0   TTR:  59.1 % (1 y)   INR used for dosin.5 (2021)   Warfarin maintenance plan:  3 mg (6 mg x 0.5) every Mon, Fri; 6 mg (6 mg x 1) all other days   Full warfarin instructions:  : 6 mg; Otherwise 3 mg every Mon, Fri; 6 mg all other days   Weekly warfarin total:  36 mg   Plan last modified:  Arleth Sheth RN (3/8/2021)   Next INR check:  2021   Priority:  Maintenance   Target end date:  Indefinite    Indications    Acute thromboembolism of deep veins of lower extremity (H) (Resolved) [I82.409]  Anticoagulation monitoring  INR range 2-3 [Z79.01]  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

## 2021-04-22 ENCOUNTER — ANTICOAGULATION THERAPY VISIT (OUTPATIENT)
Dept: ANTICOAGULATION | Facility: OTHER | Age: 66
End: 2021-04-22
Attending: FAMILY MEDICINE
Payer: MEDICARE

## 2021-04-22 ENCOUNTER — TRANSFERRED RECORDS (OUTPATIENT)
Dept: HEALTH INFORMATION MANAGEMENT | Facility: OTHER | Age: 66
End: 2021-04-22

## 2021-04-22 DIAGNOSIS — D68.2 FACTOR V DEFICIENCY (H): ICD-10-CM

## 2021-04-22 DIAGNOSIS — Z79.01 ANTICOAGULATION MONITORING, INR RANGE 2-3: ICD-10-CM

## 2021-04-22 LAB — INR PPP: 2 (ref 0.9–1.1)

## 2021-04-22 NOTE — PROGRESS NOTES
ANTICOAGULATION FOLLOW-UP CLINIC VISIT    Patient Name:  Lyudmila Fitzgerald  Date:  2021  Contact Type:  Called patient to review/verify dosing.     SUBJECTIVE:  Patient Findings     Positives:  Upcoming invasive procedure (Angiogram scheduled for 2021. )    Comments:  Patient to start holding Warfarin today for upcoming procedure. Per protocol informed patient to resume warfarin evening of day of procedure 21if ok with provider doing procedure and resume the Lovonox on 21 and recheck INR on Friday. Patient verbalized understanding and intent to comply.         Clinical Outcomes     Negatives:  Major bleeding event, Thromboembolic event, Anticoagulation-related hospital admission, Anticoagulation-related ED visit, Anticoagulation-related fatality    Comments:  Patient to start holding Warfarin today for upcoming procedure. Per protocol informed patient to resume warfarin evening of day of procedure 21if ok with provider doing procedure and resume the Lovonox on 21 and recheck INR on Friday. Patient verbalized understanding and intent to comply.            OBJECTIVE    Recent labs: (last 7 days)     21   INR 2.0*       ASSESSMENT / PLAN  No question data found.  Anticoagulation Summary  As of 2021    INR goal:  2.0-3.0   TTR:  58.3 % (1 y)   INR used for dosin.0 (2021)   Warfarin maintenance plan:  3 mg (6 mg x 0.5) every Mon, Fri; 6 mg (6 mg x 1) all other days   Full warfarin instructions:  : Hold; : Hold; : Hold; : Hold; : Hold; Otherwise 3 mg every Mon, Fri; 6 mg all other days   Weekly warfarin total:  36 mg   Plan last modified:  Arleth Sheth RN (3/8/2021)   Next INR check:  2021   Priority:  Maintenance   Target end date:  Indefinite    Indications    Acute thromboembolism of deep veins of lower extremity (H) (Resolved) [I82.409]  Anticoagulation monitoring  INR range 2-3 [Z79.01]  Factor V deficiency (H)  [D68.2]             Anticoagulation Episode Summary     INR check location:      Preferred lab:      Send INR reminders to:  [facility]    Comments:  home monitor [employer] check INR q 2 weeks      Anticoagulation Care Providers     Provider Role Specialty Phone number    Erika Carrasco MD Referring Family Medicine [phone]            See the Encounter Report to view Anticoagulation Flowsheet and Dosing Calendar (Go to Encounters tab in chart review, and find the Anticoagulation Therapy Visit)        Karen Lorenzo RN

## 2021-04-23 ENCOUNTER — ALLIED HEALTH/NURSE VISIT (OUTPATIENT)
Dept: FAMILY MEDICINE | Facility: OTHER | Age: 66
End: 2021-04-23
Attending: INTERNAL MEDICINE
Payer: MEDICARE

## 2021-04-23 DIAGNOSIS — Z11.59 ENCOUNTER FOR SCREENING FOR OTHER VIRAL DISEASES: ICD-10-CM

## 2021-04-23 LAB
SARS-COV-2 RNA RESP QL NAA+PROBE: NORMAL
SPECIMEN SOURCE: NORMAL

## 2021-04-23 PROCEDURE — U0003 INFECTIOUS AGENT DETECTION BY NUCLEIC ACID (DNA OR RNA); SEVERE ACUTE RESPIRATORY SYNDROME CORONAVIRUS 2 (SARS-COV-2) (CORONAVIRUS DISEASE [COVID-19]), AMPLIFIED PROBE TECHNIQUE, MAKING USE OF HIGH THROUGHPUT TECHNOLOGIES AS DESCRIBED BY CMS-2020-01-R: HCPCS | Mod: ZL | Performed by: INTERNAL MEDICINE

## 2021-04-23 PROCEDURE — C9803 HOPD COVID-19 SPEC COLLECT: HCPCS

## 2021-04-23 PROCEDURE — U0005 INFEC AGEN DETEC AMPLI PROBE: HCPCS | Mod: ZL | Performed by: INTERNAL MEDICINE

## 2021-04-24 LAB
LABORATORY COMMENT REPORT: NORMAL
SARS-COV-2 RNA RESP QL NAA+PROBE: NEGATIVE
SPECIMEN SOURCE: NORMAL

## 2021-04-25 ENCOUNTER — HEALTH MAINTENANCE LETTER (OUTPATIENT)
Age: 66
End: 2021-04-25

## 2021-04-26 ENCOUNTER — TELEPHONE (OUTPATIENT)
Dept: CARDIOLOGY | Facility: CLINIC | Age: 66
End: 2021-04-26

## 2021-04-26 NOTE — TELEPHONE ENCOUNTER
Patient called back.  Pre-procedure instructions reviewed and all questions answered.  Patient is instructed to hold her morning dose of Lovenox.  Travel screen completed (negative) and patient updated on visitor policy.  Covid negative on 4/23/2021    Left voicemail for patient to complete Travel Screen for Cardiac Cath Lab appointment on 4/27/2021 and inform patient of updated Visitor Policy.

## 2021-04-27 ENCOUNTER — RESEARCH ENCOUNTER (OUTPATIENT)
Dept: CARDIOLOGY | Facility: CLINIC | Age: 66
End: 2021-04-27

## 2021-04-27 ENCOUNTER — APPOINTMENT (OUTPATIENT)
Dept: LAB | Facility: CLINIC | Age: 66
End: 2021-04-27
Attending: INTERNAL MEDICINE
Payer: MEDICARE

## 2021-04-27 ENCOUNTER — HOSPITAL ENCOUNTER (OUTPATIENT)
Facility: CLINIC | Age: 66
Discharge: HOME | End: 2021-04-27
Attending: INTERNAL MEDICINE | Admitting: INTERNAL MEDICINE
Payer: MEDICARE

## 2021-04-27 ENCOUNTER — APPOINTMENT (OUTPATIENT)
Dept: MEDSURG UNIT | Facility: CLINIC | Age: 66
End: 2021-04-27
Attending: INTERNAL MEDICINE
Payer: MEDICARE

## 2021-04-27 VITALS
BODY MASS INDEX: 24.66 KG/M2 | TEMPERATURE: 97.9 F | WEIGHT: 148 LBS | RESPIRATION RATE: 16 BRPM | DIASTOLIC BLOOD PRESSURE: 67 MMHG | HEART RATE: 61 BPM | OXYGEN SATURATION: 98 % | SYSTOLIC BLOOD PRESSURE: 96 MMHG

## 2021-04-27 DIAGNOSIS — Q24.5 MYOCARDIAL BRIDGE: ICD-10-CM

## 2021-04-27 DIAGNOSIS — R06.02 SHORTNESS OF BREATH: ICD-10-CM

## 2021-04-27 DIAGNOSIS — Z95.5 HISTORY OF CORONARY ARTERY STENT PLACEMENT: ICD-10-CM

## 2021-04-27 DIAGNOSIS — R07.9 CHEST PAIN, UNSPECIFIED TYPE: ICD-10-CM

## 2021-04-27 DIAGNOSIS — Z95.1 HISTORY OF CORONARY ARTERY BYPASS GRAFT X 1: ICD-10-CM

## 2021-04-27 DIAGNOSIS — Z86.79 HISTORY OF CORONARY VASOSPASM: ICD-10-CM

## 2021-04-27 DIAGNOSIS — I20.1 CORONARY ARTERY SPASM (H): ICD-10-CM

## 2021-04-27 DIAGNOSIS — Z86.79 HISTORY OF HEART FAILURE: ICD-10-CM

## 2021-04-27 PROBLEM — Z98.890 STATUS POST CORONARY ANGIOGRAM: Status: ACTIVE | Noted: 2021-04-27

## 2021-04-27 LAB
ANION GAP SERPL CALCULATED.3IONS-SCNC: 8 MMOL/L (ref 3–14)
BUN SERPL-MCNC: 15 MG/DL (ref 7–30)
CALCIUM SERPL-MCNC: 9.1 MG/DL (ref 8.5–10.1)
CHLORIDE SERPL-SCNC: 98 MMOL/L (ref 94–109)
CO2 SERPL-SCNC: 23 MMOL/L (ref 20–32)
CREAT SERPL-MCNC: 0.76 MG/DL (ref 0.52–1.04)
ERYTHROCYTE [DISTWIDTH] IN BLOOD BY AUTOMATED COUNT: 12.5 % (ref 10–15)
GFR SERPL CREATININE-BSD FRML MDRD: 81 ML/MIN/{1.73_M2}
GLUCOSE SERPL-MCNC: 98 MG/DL (ref 70–99)
HCT VFR BLD AUTO: 40.3 % (ref 35–47)
HGB BLD-MCNC: 14.2 G/DL (ref 11.7–15.7)
INR PPP: 1.05 (ref 0.86–1.14)
MCH RBC QN AUTO: 32.6 PG (ref 26.5–33)
MCHC RBC AUTO-ENTMCNC: 35.2 G/DL (ref 31.5–36.5)
MCV RBC AUTO: 92 FL (ref 78–100)
PLATELET # BLD AUTO: 257 10E9/L (ref 150–450)
POTASSIUM SERPL-SCNC: 3.4 MMOL/L (ref 3.4–5.3)
RBC # BLD AUTO: 4.36 10E12/L (ref 3.8–5.2)
SODIUM SERPL-SCNC: 129 MMOL/L (ref 133–144)
WBC # BLD AUTO: 7.1 10E9/L (ref 4–11)

## 2021-04-27 PROCEDURE — 99152 MOD SED SAME PHYS/QHP 5/>YRS: CPT | Performed by: INTERNAL MEDICINE

## 2021-04-27 PROCEDURE — 999N000127 HC STATISTIC PERIPHERAL IV START W US GUIDANCE

## 2021-04-27 PROCEDURE — 250N000011 HC RX IP 250 OP 636: Performed by: INTERNAL MEDICINE

## 2021-04-27 PROCEDURE — 272N000001 HC OR GENERAL SUPPLY STERILE: Performed by: INTERNAL MEDICINE

## 2021-04-27 PROCEDURE — 250N000013 HC RX MED GY IP 250 OP 250 PS 637: Performed by: NURSE PRACTITIONER

## 2021-04-27 PROCEDURE — 99153 MOD SED SAME PHYS/QHP EA: CPT | Performed by: INTERNAL MEDICINE

## 2021-04-27 PROCEDURE — 250N000013 HC RX MED GY IP 250 OP 250 PS 637: Performed by: INTERNAL MEDICINE

## 2021-04-27 PROCEDURE — 36415 COLL VENOUS BLD VENIPUNCTURE: CPT | Performed by: INTERNAL MEDICINE

## 2021-04-27 PROCEDURE — 258N000003 HC RX IP 258 OP 636: Performed by: INTERNAL MEDICINE

## 2021-04-27 PROCEDURE — 80048 BASIC METABOLIC PNL TOTAL CA: CPT | Performed by: INTERNAL MEDICINE

## 2021-04-27 PROCEDURE — 999N000054 HC STATISTIC EKG NON-CHARGEABLE

## 2021-04-27 PROCEDURE — 999N000142 HC STATISTIC PROCEDURE PREP ONLY

## 2021-04-27 PROCEDURE — 93010 ELECTROCARDIOGRAM REPORT: CPT | Performed by: INTERNAL MEDICINE

## 2021-04-27 PROCEDURE — C1894 INTRO/SHEATH, NON-LASER: HCPCS | Performed by: INTERNAL MEDICINE

## 2021-04-27 PROCEDURE — 85610 PROTHROMBIN TIME: CPT | Performed by: INTERNAL MEDICINE

## 2021-04-27 PROCEDURE — 85027 COMPLETE CBC AUTOMATED: CPT | Performed by: INTERNAL MEDICINE

## 2021-04-27 PROCEDURE — 93455 CORONARY ART/GRFT ANGIO S&I: CPT | Performed by: INTERNAL MEDICINE

## 2021-04-27 PROCEDURE — 250N000009 HC RX 250: Performed by: INTERNAL MEDICINE

## 2021-04-27 RX ORDER — HEPARIN SODIUM 1000 [USP'U]/ML
INJECTION, SOLUTION INTRAVENOUS; SUBCUTANEOUS
Status: DISCONTINUED | OUTPATIENT
Start: 2021-04-27 | End: 2021-04-27 | Stop reason: HOSPADM

## 2021-04-27 RX ORDER — DOPAMINE HYDROCHLORIDE 160 MG/100ML
2-20 INJECTION, SOLUTION INTRAVENOUS CONTINUOUS PRN
Status: DISCONTINUED | OUTPATIENT
Start: 2021-04-27 | End: 2021-04-27 | Stop reason: HOSPADM

## 2021-04-27 RX ORDER — NITROGLYCERIN 5 MG/ML
VIAL (ML) INTRAVENOUS
Status: DISCONTINUED | OUTPATIENT
Start: 2021-04-27 | End: 2021-04-27 | Stop reason: HOSPADM

## 2021-04-27 RX ORDER — NALOXONE HYDROCHLORIDE 0.4 MG/ML
0.4 INJECTION, SOLUTION INTRAMUSCULAR; INTRAVENOUS; SUBCUTANEOUS
Status: DISCONTINUED | OUTPATIENT
Start: 2021-04-27 | End: 2021-04-27 | Stop reason: HOSPADM

## 2021-04-27 RX ORDER — NALOXONE HYDROCHLORIDE 0.4 MG/ML
0.4 INJECTION, SOLUTION INTRAMUSCULAR; INTRAVENOUS; SUBCUTANEOUS
Status: DISCONTINUED | OUTPATIENT
Start: 2021-04-27 | End: 2021-04-27

## 2021-04-27 RX ORDER — NALOXONE HYDROCHLORIDE 0.4 MG/ML
0.2 INJECTION, SOLUTION INTRAMUSCULAR; INTRAVENOUS; SUBCUTANEOUS
Status: DISCONTINUED | OUTPATIENT
Start: 2021-04-27 | End: 2021-04-27 | Stop reason: HOSPADM

## 2021-04-27 RX ORDER — HEPARIN SODIUM 10000 [USP'U]/100ML
100-1000 INJECTION, SOLUTION INTRAVENOUS CONTINUOUS PRN
Status: DISCONTINUED | OUTPATIENT
Start: 2021-04-27 | End: 2021-04-27 | Stop reason: HOSPADM

## 2021-04-27 RX ORDER — ARGATROBAN 1 MG/ML
350 INJECTION, SOLUTION INTRAVENOUS
Status: DISCONTINUED | OUTPATIENT
Start: 2021-04-27 | End: 2021-04-27 | Stop reason: HOSPADM

## 2021-04-27 RX ORDER — IOPAMIDOL 755 MG/ML
INJECTION, SOLUTION INTRAVASCULAR
Status: DISCONTINUED | OUTPATIENT
Start: 2021-04-27 | End: 2021-04-27 | Stop reason: HOSPADM

## 2021-04-27 RX ORDER — EPTIFIBATIDE 2 MG/ML
2 INJECTION, SOLUTION INTRAVENOUS CONTINUOUS PRN
Status: DISCONTINUED | OUTPATIENT
Start: 2021-04-27 | End: 2021-04-27 | Stop reason: HOSPADM

## 2021-04-27 RX ORDER — EPTIFIBATIDE 2 MG/ML
180 INJECTION, SOLUTION INTRAVENOUS EVERY 10 MIN PRN
Status: DISCONTINUED | OUTPATIENT
Start: 2021-04-27 | End: 2021-04-27 | Stop reason: HOSPADM

## 2021-04-27 RX ORDER — DOBUTAMINE HYDROCHLORIDE 200 MG/100ML
2-20 INJECTION INTRAVENOUS CONTINUOUS PRN
Status: DISCONTINUED | OUTPATIENT
Start: 2021-04-27 | End: 2021-04-27 | Stop reason: HOSPADM

## 2021-04-27 RX ORDER — NALOXONE HYDROCHLORIDE 0.4 MG/ML
0.2 INJECTION, SOLUTION INTRAMUSCULAR; INTRAVENOUS; SUBCUTANEOUS
Status: DISCONTINUED | OUTPATIENT
Start: 2021-04-27 | End: 2021-04-27

## 2021-04-27 RX ORDER — ARGATROBAN 1 MG/ML
150 INJECTION, SOLUTION INTRAVENOUS
Status: DISCONTINUED | OUTPATIENT
Start: 2021-04-27 | End: 2021-04-27 | Stop reason: HOSPADM

## 2021-04-27 RX ORDER — NITROGLYCERIN 20 MG/100ML
10-200 INJECTION INTRAVENOUS CONTINUOUS PRN
Status: DISCONTINUED | OUTPATIENT
Start: 2021-04-27 | End: 2021-04-27 | Stop reason: HOSPADM

## 2021-04-27 RX ORDER — FLUMAZENIL 0.1 MG/ML
0.2 INJECTION, SOLUTION INTRAVENOUS
Status: DISCONTINUED | OUTPATIENT
Start: 2021-04-27 | End: 2021-04-27 | Stop reason: HOSPADM

## 2021-04-27 RX ORDER — FENTANYL CITRATE 50 UG/ML
25-50 INJECTION, SOLUTION INTRAMUSCULAR; INTRAVENOUS
Status: ACTIVE | OUTPATIENT
Start: 2021-04-27 | End: 2021-04-27

## 2021-04-27 RX ORDER — LIDOCAINE 40 MG/G
CREAM TOPICAL
Status: DISCONTINUED | OUTPATIENT
Start: 2021-04-27 | End: 2021-04-27 | Stop reason: HOSPADM

## 2021-04-27 RX ORDER — SODIUM CHLORIDE 9 MG/ML
INJECTION, SOLUTION INTRAVENOUS CONTINUOUS
Status: DISCONTINUED | OUTPATIENT
Start: 2021-04-27 | End: 2021-04-27 | Stop reason: HOSPADM

## 2021-04-27 RX ORDER — POTASSIUM CHLORIDE 750 MG/1
20 TABLET, EXTENDED RELEASE ORAL ONCE
Status: COMPLETED | OUTPATIENT
Start: 2021-04-27 | End: 2021-04-27

## 2021-04-27 RX ORDER — ATROPINE SULFATE 0.1 MG/ML
0.5 INJECTION INTRAVENOUS
Status: DISCONTINUED | OUTPATIENT
Start: 2021-04-27 | End: 2021-04-27 | Stop reason: HOSPADM

## 2021-04-27 RX ORDER — ACETAMINOPHEN 325 MG/1
650 TABLET ORAL EVERY 4 HOURS PRN
Status: DISCONTINUED | OUTPATIENT
Start: 2021-04-27 | End: 2021-04-27 | Stop reason: HOSPADM

## 2021-04-27 RX ORDER — FENTANYL CITRATE 50 UG/ML
INJECTION, SOLUTION INTRAMUSCULAR; INTRAVENOUS
Status: DISCONTINUED | OUTPATIENT
Start: 2021-04-27 | End: 2021-04-27 | Stop reason: HOSPADM

## 2021-04-27 RX ADMIN — POTASSIUM CHLORIDE 20 MEQ: 750 TABLET, EXTENDED RELEASE ORAL at 12:18

## 2021-04-27 RX ADMIN — ASPIRIN 81 MG: 325 TABLET, DELAYED RELEASE ORAL at 11:34

## 2021-04-27 RX ADMIN — SODIUM CHLORIDE: 9 INJECTION, SOLUTION INTRAVENOUS at 12:06

## 2021-04-27 NOTE — PROGRESS NOTES
D/I/A: Pt roomed on 3C in bay [address].  Arrived via litter and accompanied by RN On/Off: Off monitor.  VSSA.  Rhythm upon arrival SR/SB on monitor.  Denies pain or sob.  Reviewed activity restrictions and when to notify RN, ie-changes to breathing or increased chest pressure or chest pain.  CCL access:  L TR Band in place and inflated with 12cc air; CMS intact.  P: Continue to monitor status.  Discharge to home once meeting criteria.

## 2021-04-27 NOTE — PLAN OF CARE
From 1530-on, decreasing TR band and up to walk and void, ate and drank earlier. Bled from site so reinflated and waited to restart again. TR band is now off and site is flat and soft and dressing CDI, and no bruising at site. Not diabetic, form reviewed, no meds to . Has all her belongings.  RN to bring to front to help into car to go home

## 2021-04-27 NOTE — PROGRESS NOTES
Research Consent Note:     Study Title: Adenosine Contrast Correlations in Evaluating Revascularization ACCELERATION Study  IRB # [id number]    PI pager: Dr. Dru Bentley  # [phone]   pager: Alyssa Haynes RN  #  [phone]                       Estimated dates of participation: One year     Consent form was reviewed with the patient.  The purpose, risks, and benefits of study participation were discussed.  The study was reviewed with expected duration of participation, procedures, along with any foreseeable risks or discomforts. It was discussed that study participation is voluntary and that refusal to participate will involve no penalty or decrease benefits to which the subject is otherwise entitled, and the subject may discontinue participation at any time without penalty or loss of benefits. Patient questions were answered. Patient was able to state what study participation involved.  Patient signed the consent and HIPAA forms prior to study participation and was given signed copies of both.    The research participant received the  COVID-19 and Research Participant  information sheet about the risks of participating in a research study during the COVID-19 pandemic.   Receipt was confirmed by study personnel prior to the visit and participant verbalized full understanding of the content.

## 2021-04-27 NOTE — Clinical Note
dry, intact, no bleeding and no hematoma. TR band on left wrist with 12 ml air noted and site intact.  Arm board on arm

## 2021-04-27 NOTE — DISCHARGE INSTRUCTIONS
Going Home after an Angiogram (Cardiac)  ______________________________________________      After you go home:    Have an adult stay with you for 24 hours.    Drink plenty of fluids.    You may eat your normal diet, unless your doctor tells you otherwise.    For 24 hours:    Relax and take it easy.    Do NOT smoke.    Do NOT make any important or legal decisions.    Do NOT drive or operate machines at home or at work.    Do NOT drink alcohol.    Remove the Band-Aid after 24 hours. If there is minor oozing, apply another Band-aid and remove it after 12 hours.    For 2 days, do NOT have sex or do any heavy exercise.    Do NOT take a bath, or use a hot tub or pool for at least 3 days. You may shower.    Care of wrist or arm site  It is normal to have soreness at the puncture site and mild tingling in your hand for up to 3 days.    For 2 days, do not use your hand or arm to support your weight (such as rising from a chair) or bend your wrist (such as lifting a garage door).    For 2 days, do not lift more than 5 pounds or exercise your arm (tennis, golf or bowling).    If you start bleeding from the site in your arm:    Sit down and press firmly on the site with your fingers for 10 minutes. Call your doctor as soon as you can.    If the bleeding stops, sit still and keep your wrist straight for 2 hours.    Medicines    If you have started taking Plavix or Effient, do not stop taking it until you talk to your heart doctor (cardiologist).    If you are on metformin (Glucophage), do not restart it until you have blood tests (within 2 to 3 days after discharge). When your doctor tells you it is safe, you may restart the metformin.    If you have stopped any other medicines, check with your nurse or provider about when to restart them.    Call 911 right away if you have bleeding that is heavy or does not stop.    Call your doctor if:    You have a large or growing hard lump around the site.    The site is red, swollen,  hot or tender.    Blood or fluid is draining from the site.    You have chills or a fever greater than 101 F (38 C).    Your leg or arm feels numb or cool.    You have hives, a rash or unusual itching.      [facility] [place]:  [phone] (7 days a week)

## 2021-04-28 LAB — INTERPRETATION ECG - MUSE: NORMAL

## 2021-04-29 ENCOUNTER — TRANSFERRED RECORDS (OUTPATIENT)
Dept: HEALTH INFORMATION MANAGEMENT | Facility: OTHER | Age: 66
End: 2021-04-29

## 2021-04-29 ENCOUNTER — ANTICOAGULATION THERAPY VISIT (OUTPATIENT)
Dept: ANTICOAGULATION | Facility: OTHER | Age: 66
End: 2021-04-29
Attending: FAMILY MEDICINE
Payer: MEDICARE

## 2021-04-29 ENCOUNTER — TELEPHONE (OUTPATIENT)
Dept: CARDIOLOGY | Facility: OTHER | Age: 66
End: 2021-04-29

## 2021-04-29 DIAGNOSIS — Z79.01 ANTICOAGULATION MONITORING, INR RANGE 2-3: ICD-10-CM

## 2021-04-29 DIAGNOSIS — Z79.01 ANTICOAGULATED ON COUMADIN: ICD-10-CM

## 2021-04-29 DIAGNOSIS — D68.2 FACTOR V DEFICIENCY (H): ICD-10-CM

## 2021-04-29 DIAGNOSIS — D68.2 FACTOR V DEFICIENCY (H): Primary | ICD-10-CM

## 2021-04-29 LAB — INR PPP: 1.1 (ref 0.9–1.1)

## 2021-04-29 NOTE — TELEPHONE ENCOUNTER
Called and talked with patient she will be having Removal of [employer] and REVISION with [employer]  DORSAL COLUMN STIMULATOR on 05/18/2021 at the [facility].  Patient is wondering when to stop her Warfarin and when to start the Lovenox, also patient needs refill of Lovenox sent to her pharmacy.  Also she would like you to review her labs from the [facility] from 04/01/2021.  Will send to Moises Islas DO to review and further recommendations.  Stephanie Ledesma RN on 4/29/2021 at 11:50 AM

## 2021-04-29 NOTE — TELEPHONE ENCOUNTER
Called patient regarding INR, her warfarin dosing and to continue Lovenox for bridging. More Lovenox ordered for her to get through the weekend. She would also like to know lab findings and she is scheduled for procedure on 5/18/21 would like to know when she should stop warfarin for that procedure.

## 2021-04-29 NOTE — PROGRESS NOTES
ANTICOAGULATION FOLLOW-UP CLINIC VISIT    Patient Name:  Lyudmila Fitzgerald  Date:  2021  Contact Type:  Telephone/ spoke with patient regarding dosing and when to recheck her INR    SUBJECTIVE:  Patient Findings     Positives:  Missed doses (held warfarin for a procedure on 21), Change in medications (continues with Lovenox for bridging)             OBJECTIVE    Recent labs: (last 7 days)     21   INR 1.1       ASSESSMENT / PLAN  INR assessment SUB    Recheck INR In: 3 DAYS    INR Location Home INR      Anticoagulation Summary  As of 2021    INR goal:  2.0-3.0   TTR:  56.3 % (1 y)   INR used for dosin.1 (2021)   Warfarin maintenance plan:  3 mg (6 mg x 0.5) every Mon, Fri; 6 mg (6 mg x 1) all other days   Full warfarin instructions:  : 9 mg; : 9 mg; Otherwise 3 mg every Mon, Fri; 6 mg all other days   Weekly warfarin total:  36 mg   Plan last modified:  Arleth Sheth RN (3/8/2021)   Next INR check:  2021   Priority:  Maintenance   Target end date:  Indefinite    Indications    Acute thromboembolism of deep veins of lower extremity (H) (Resolved) [I82.409]  Anticoagulation monitoring  INR range 2-3 [Z79.01]  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

## 2021-04-29 NOTE — TELEPHONE ENCOUNTER
Calling regarding questions for her medication, states the [facility] needs some clarification on when to stop her Warfarin before an upcoming procedure.

## 2021-04-30 NOTE — TELEPHONE ENCOUNTER
Called patient with recommendations per Moises Islas DO see below patient was also given labs results readings from the [facility] per Moises Islas DO.  Stephanie Ledesma RN on 4/30/2021 at 10:35 AM       Left message on machine to call back.  Stephanie Ledesma RN on 4/30/2021 at 7:49 AM        Moises Islas DO Green, Glenda M, RN   Caller: Unspecified (Yesterday,  9:53 AM)             Have her discontinue Coumadin 7 days prior to the procedure.  She should start the Lovenox when her INR is less than 2.0.  I did send a prescription for Lovenox to her pharmacy.  As far as her labs, they checked her blood counts and her hemoglobin was mildly elevated.  We checked a hemoglobin A1c which is a 3-month average of the sugars.  This came back at 5.7% putting her in the prediabetic range.  They checked her cholesterol.  Her total cholesterol was elevated at 242 and LDL elevated at 136 which is the bad cholesterol . They also checked a troponin which is a heart enzyme and it was mildly elevated at 78.  We checked a BNP which is a marker for heart failure/fluid overload.  This was mildly elevated as 377.  Her kidney functions are weak.  Otherwise, her labs look pretty good.     Dr. Islas

## 2021-05-02 ENCOUNTER — TRANSFERRED RECORDS (OUTPATIENT)
Dept: HEALTH INFORMATION MANAGEMENT | Facility: OTHER | Age: 66
End: 2021-05-02

## 2021-05-03 ENCOUNTER — ANTICOAGULATION THERAPY VISIT (OUTPATIENT)
Dept: ANTICOAGULATION | Facility: OTHER | Age: 66
End: 2021-05-03
Attending: FAMILY MEDICINE
Payer: MEDICARE

## 2021-05-03 DIAGNOSIS — Z79.01 ANTICOAGULATION MONITORING, INR RANGE 2-3: ICD-10-CM

## 2021-05-03 DIAGNOSIS — D68.2 FACTOR V DEFICIENCY (H): ICD-10-CM

## 2021-05-03 LAB — INR PPP: 1.5 (ref 0.9–1.1)

## 2021-05-03 NOTE — PROGRESS NOTES
"ANTICOAGULATION FOLLOW-UP CLINIC VISIT    Patient Name:  Lyudmila Fitzgerald  Date:  5/3/2021  Contact Type:  Called patient and reviewed dosing and completed assessment.     SUBJECTIVE:  Patient Findings     Comments:  Please note patient states, \" I only have one dose of the Lovenox left for tonight. I am waiting for a prior authorization\". Writer recommended patient take 9 mg tonight and recheck tomorrow. Patient verbalized understanding and intent to comply.         Clinical Outcomes     Negatives:  Major bleeding event, Thromboembolic event, Anticoagulation-related hospital admission, Anticoagulation-related ED visit, Anticoagulation-related fatality    Comments:  Please note patient states, \" I only have one dose of the Lovenox left for tonight. I am waiting for a prior authorization\". Writer recommended patient take 9 mg tonight and recheck tomorrow. Patient verbalized understanding and intent to comply.            OBJECTIVE    Recent labs: (last 7 days)     21   INR 1.5*       ASSESSMENT / PLAN  INR assessment SUB    Recheck INR In: 1 DAY    INR Location Home INR      Anticoagulation Summary  As of 5/3/2021    INR goal:  2.0-3.0   TTR:  55.4 % (1 y)   INR used for dosin.5 (2021)   Warfarin maintenance plan:  3 mg (6 mg x 0.5) every Mon, Fri; 6 mg (6 mg x 1) all other days   Full warfarin instructions:  5/3: 9 mg; Otherwise 3 mg every Mon, Fri; 6 mg all other days   Weekly warfarin total:  36 mg   Plan last modified:  Arleth Sheth RN (3/8/2021)   Next INR check:  2021   Priority:  Maintenance   Target end date:  Indefinite    Indications    Acute thromboembolism of deep veins of lower extremity (H) (Resolved) [I82.409]  Anticoagulation monitoring  INR range 2-3 [Z79.01]  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

## 2021-05-04 ENCOUNTER — MYC MEDICAL ADVICE (OUTPATIENT)
Dept: CARDIOLOGY | Facility: OTHER | Age: 66
End: 2021-05-04

## 2021-05-05 ENCOUNTER — TRANSFERRED RECORDS (OUTPATIENT)
Dept: INTERNAL MEDICINE | Facility: OTHER | Age: 66
End: 2021-05-05

## 2021-05-05 ENCOUNTER — ANTICOAGULATION THERAPY VISIT (OUTPATIENT)
Dept: ANTICOAGULATION | Facility: OTHER | Age: 66
End: 2021-05-05
Attending: FAMILY MEDICINE
Payer: MEDICARE

## 2021-05-05 ENCOUNTER — TELEPHONE (OUTPATIENT)
Dept: FAMILY MEDICINE | Facility: OTHER | Age: 66
End: 2021-05-05

## 2021-05-05 DIAGNOSIS — Z79.01 ANTICOAGULATION MONITORING, INR RANGE 2-3: ICD-10-CM

## 2021-05-05 DIAGNOSIS — E11.9 TYPE 2 DIABETES MELLITUS WITHOUT COMPLICATION, WITHOUT LONG-TERM CURRENT USE OF INSULIN (H): ICD-10-CM

## 2021-05-05 DIAGNOSIS — R82.998 DARK BROWN URINE: Primary | ICD-10-CM

## 2021-05-05 DIAGNOSIS — N39.0 URINARY TRACT INFECTION WITHOUT HEMATURIA, SITE UNSPECIFIED: Primary | ICD-10-CM

## 2021-05-05 DIAGNOSIS — D68.2 FACTOR V DEFICIENCY (H): ICD-10-CM

## 2021-05-05 DIAGNOSIS — I20.1 CORONARY ARTERY SPASM (H): ICD-10-CM

## 2021-05-05 LAB
ALBUMIN UR-MCNC: NEGATIVE MG/DL
ALBUMIN UR-MCNC: NORMAL MG/DL
APPEARANCE UR: CLEAR
APPEARANCE UR: NORMAL
BILIRUB UR QL STRIP: NEGATIVE
BILIRUB UR QL STRIP: NORMAL
COLOR UR AUTO: ABNORMAL
COLOR UR AUTO: NORMAL
GLUCOSE UR STRIP-MCNC: NEGATIVE MG/DL
GLUCOSE UR STRIP-MCNC: NORMAL MG/DL
HGB UR QL STRIP: NEGATIVE
HGB UR QL STRIP: NORMAL
HYALINE CASTS #/AREA URNS LPF: 2 /LPF (ref 0–2)
INR PPP: 1.7 (ref 0.9–1.1)
KETONES UR STRIP-MCNC: NEGATIVE MG/DL
KETONES UR STRIP-MCNC: NORMAL MG/DL
LEUKOCYTE ESTERASE UR QL STRIP: ABNORMAL
LEUKOCYTE ESTERASE UR QL STRIP: NORMAL
MUCOUS THREADS #/AREA URNS LPF: PRESENT /LPF
NITRATE UR QL: NEGATIVE
NITRATE UR QL: NORMAL
PH UR STRIP: 6.5 PH (ref 5–7)
PH UR STRIP: NORMAL PH (ref 5–7)
RBC #/AREA URNS AUTO: 4 /HPF (ref 0–2)
SOURCE: ABNORMAL
SOURCE: NORMAL
SP GR UR STRIP: 1.01 (ref 1–1.03)
SP GR UR STRIP: NORMAL (ref 1–1.03)
UROBILINOGEN UR STRIP-MCNC: NORMAL MG/DL (ref 0–2)
UROBILINOGEN UR STRIP-MCNC: NORMAL MG/DL (ref 0–2)
WBC #/AREA URNS AUTO: 3 /HPF (ref 0–5)

## 2021-05-05 PROCEDURE — 81001 URINALYSIS AUTO W/SCOPE: CPT | Mod: ZL | Performed by: INTERNAL MEDICINE

## 2021-05-05 PROCEDURE — 87086 URINE CULTURE/COLONY COUNT: CPT | Mod: ZL | Performed by: INTERNAL MEDICINE

## 2021-05-05 RX ORDER — NITROFURANTOIN 25; 75 MG/1; MG/1
100 CAPSULE ORAL 2 TIMES DAILY
Qty: 10 CAPSULE | Refills: 0 | Status: SHIPPED | OUTPATIENT
Start: 2021-05-05 | End: 2021-07-21

## 2021-05-05 NOTE — PROGRESS NOTES
ANTICOAGULATION FOLLOW-UP CLINIC VISIT    Patient Name:  Lyudmila Fitzgerald  Date:  2021  Contact Type:  Telephone/ spoke with patient reviewed dosing    SUBJECTIVE:  Patient Findings     Positives:  Change in medications (will continue with Lovenox had some trouble with getting them from pharmacy was out a couple days)        Clinical Outcomes     Negatives:  Major bleeding event, Thromboembolic event, Anticoagulation-related hospital admission, Anticoagulation-related ED visit, Anticoagulation-related fatality           OBJECTIVE    Recent labs: (last 7 days)     21   INR 1.7*       ASSESSMENT / PLAN  INR assessment SUB    Recheck INR In: 2 DAYS    INR Location Home INR      Anticoagulation Summary  As of 2021    INR goal:  2.0-3.0   TTR:  54.7 % (1 y)   INR used for dosin.7 (2021)   Warfarin maintenance plan:  3 mg (6 mg x 0.5) every Mon, Fri; 6 mg (6 mg x 1) all other days   Full warfarin instructions:  : 9 mg; /: 9 mg; Otherwise 3 mg every Mon, Fri; 6 mg all other days   Weekly warfarin total:  36 mg   Plan last modified:  Arleth Sheth RN (3/8/2021)   Next INR check:  2021   Priority:  Maintenance   Target end date:  Indefinite    Indications    Acute thromboembolism of deep veins of lower extremity (H) (Resolved) [I82.409]  Anticoagulation monitoring  INR range 2-3 [Z79.01]  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

## 2021-05-06 LAB
BACTERIA SPEC CULT: NORMAL
SPECIMEN SOURCE: NORMAL

## 2021-05-07 ENCOUNTER — ANTICOAGULATION THERAPY VISIT (OUTPATIENT)
Dept: ANTICOAGULATION | Facility: OTHER | Age: 66
End: 2021-05-07
Attending: FAMILY MEDICINE
Payer: MEDICARE

## 2021-05-07 ENCOUNTER — TRANSFERRED RECORDS (OUTPATIENT)
Dept: HEALTH INFORMATION MANAGEMENT | Facility: OTHER | Age: 66
End: 2021-05-07

## 2021-05-07 DIAGNOSIS — D68.2 FACTOR V DEFICIENCY (H): ICD-10-CM

## 2021-05-07 DIAGNOSIS — Z79.01 ANTICOAGULATION MONITORING, INR RANGE 2-3: ICD-10-CM

## 2021-05-07 LAB — INR PPP: 2.1 (ref 0.9–1.1)

## 2021-05-07 NOTE — PROGRESS NOTES
ANTICOAGULATION FOLLOW-UP CLINIC VISIT    Patient Name:  Lyudmila Fitzgerald  Date:  2021  Contact Type:  Telephone/ spoke with patient reviewed dosing    SUBJECTIVE:  Patient Findings     Positives:  Upcoming invasive procedure (having procedure on 21 will start holding warfarin 21), Change in medications (will stop Lovenox)        Clinical Outcomes     Negatives:  Major bleeding event, Thromboembolic event, Anticoagulation-related hospital admission, Anticoagulation-related ED visit, Anticoagulation-related fatality           OBJECTIVE    Recent labs: (last 7 days)     21   INR 2.1*       ASSESSMENT / PLAN  INR assessment THER    Recheck INR In: 2 WEEKS    INR Location Home INR      Anticoagulation Summary  As of 2021    INR goal:  2.0-3.0   TTR:  54.3 % (1 y)   INR used for dosin.1 (2021)   Warfarin maintenance plan:  3 mg (6 mg x 0.5) every Mon, Fri; 6 mg (6 mg x 1) all other days   Full warfarin instructions:  : Hold; : Hold; : Hold; : Hold; 5/15: Hold; : Hold; : Hold; Otherwise 3 mg every Mon, Fri; 6 mg all other days   Weekly warfarin total:  36 mg   Plan last modified:  Arleth Sheth RN (3/8/2021)   Next INR check:  2021   Priority:  Maintenance   Target end date:  Indefinite    Indications    Acute thromboembolism of deep veins of lower extremity (H) (Resolved) [I82.409]  Anticoagulation monitoring  INR range 2-3 [Z79.01]  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

## 2021-05-11 ENCOUNTER — OFFICE VISIT (OUTPATIENT)
Dept: CARDIOLOGY | Facility: OTHER | Age: 66
End: 2021-05-11
Attending: INTERNAL MEDICINE
Payer: MEDICARE

## 2021-05-11 VITALS
BODY MASS INDEX: 24.99 KG/M2 | OXYGEN SATURATION: 98 % | WEIGHT: 150 LBS | DIASTOLIC BLOOD PRESSURE: 70 MMHG | HEIGHT: 65 IN | RESPIRATION RATE: 18 BRPM | TEMPERATURE: 97.4 F | SYSTOLIC BLOOD PRESSURE: 122 MMHG | HEART RATE: 76 BPM

## 2021-05-11 DIAGNOSIS — I49.8 PACEMAKER-DEPENDENT DUE TO NATIVE CARDIAC RHYTHM INSUFFICIENT TO SUPPORT LIFE: ICD-10-CM

## 2021-05-11 DIAGNOSIS — I10 ESSENTIAL HYPERTENSION: ICD-10-CM

## 2021-05-11 DIAGNOSIS — E11.9 TYPE 2 DIABETES MELLITUS WITHOUT COMPLICATION, WITHOUT LONG-TERM CURRENT USE OF INSULIN (H): ICD-10-CM

## 2021-05-11 DIAGNOSIS — Z86.718 HISTORY OF DVT (DEEP VEIN THROMBOSIS): ICD-10-CM

## 2021-05-11 DIAGNOSIS — Z95.0 PACEMAKER-DEPENDENT DUE TO NATIVE CARDIAC RHYTHM INSUFFICIENT TO SUPPORT LIFE: ICD-10-CM

## 2021-05-11 DIAGNOSIS — I20.1 CORONARY ARTERY SPASM (H): ICD-10-CM

## 2021-05-11 DIAGNOSIS — Z86.79 PERSONAL HISTORY OF SUPRAVENTRICULAR TACHYCARDIA: ICD-10-CM

## 2021-05-11 DIAGNOSIS — Z79.01 ANTICOAGULATED ON COUMADIN: ICD-10-CM

## 2021-05-11 DIAGNOSIS — Q24.5 MYOCARDIAL BRIDGE: ICD-10-CM

## 2021-05-11 DIAGNOSIS — R07.89 OTHER CHEST PAIN: ICD-10-CM

## 2021-05-11 DIAGNOSIS — Z95.5 HISTORY OF CORONARY ARTERY STENT PLACEMENT: ICD-10-CM

## 2021-05-11 DIAGNOSIS — Z86.73 HISTORY OF STROKE: ICD-10-CM

## 2021-05-11 DIAGNOSIS — E78.2 MIXED HYPERLIPIDEMIA: ICD-10-CM

## 2021-05-11 DIAGNOSIS — R07.9 CHEST PAIN, UNSPECIFIED TYPE: Primary | ICD-10-CM

## 2021-05-11 DIAGNOSIS — I45.6 LOWN GANONG LEVINE SYNDROME: ICD-10-CM

## 2021-05-11 DIAGNOSIS — Z95.0 PACEMAKER: ICD-10-CM

## 2021-05-11 DIAGNOSIS — Z98.890 S/P AV NODAL ABLATION: ICD-10-CM

## 2021-05-11 DIAGNOSIS — I47.10 PAROXYSMAL SUPRAVENTRICULAR TACHYCARDIA (H): ICD-10-CM

## 2021-05-11 DIAGNOSIS — Z98.890 HISTORY OF CARDIAC RADIOFREQUENCY ABLATION: ICD-10-CM

## 2021-05-11 DIAGNOSIS — Z79.01 ANTICOAGULATION MONITORING, INR RANGE 2-3: ICD-10-CM

## 2021-05-11 DIAGNOSIS — Z95.1 HISTORY OF CORONARY ARTERY BYPASS GRAFT X 1: ICD-10-CM

## 2021-05-11 DIAGNOSIS — Z95.0 CARDIAC PACEMAKER IN SITU: ICD-10-CM

## 2021-05-11 DIAGNOSIS — Z86.79 HISTORY OF HEART FAILURE: ICD-10-CM

## 2021-05-11 DIAGNOSIS — Z86.79 HISTORY OF CORONARY VASOSPASM: ICD-10-CM

## 2021-05-11 DIAGNOSIS — Z98.890 STATUS POST CORONARY ANGIOGRAM: ICD-10-CM

## 2021-05-11 DIAGNOSIS — D68.2 FACTOR V DEFICIENCY (H): ICD-10-CM

## 2021-05-11 PROCEDURE — 99214 OFFICE O/P EST MOD 30 MIN: CPT | Performed by: INTERNAL MEDICINE

## 2021-05-11 PROCEDURE — G0463 HOSPITAL OUTPT CLINIC VISIT: HCPCS

## 2021-05-11 RX ORDER — CILOSTAZOL 100 MG/1
100 TABLET ORAL 2 TIMES DAILY
Qty: 60 TABLET | Refills: 3 | Status: SHIPPED | OUTPATIENT
Start: 2021-05-11 | End: 2021-11-23 | Stop reason: ALTCHOICE

## 2021-05-11 RX ORDER — FELODIPINE 5 MG/1
5 TABLET, EXTENDED RELEASE ORAL 2 TIMES DAILY
Qty: 180 TABLET | Refills: 3 | Status: SHIPPED | OUTPATIENT
Start: 2021-05-11 | End: 2021-11-23 | Stop reason: ALTCHOICE

## 2021-05-11 ASSESSMENT — MIFFLIN-ST. JEOR: SCORE: 1225.66

## 2021-05-11 ASSESSMENT — PAIN SCALES - GENERAL: PAINLEVEL: NO PAIN (0)

## 2021-05-11 NOTE — NURSING NOTE
"Patient comes in for follow up after angiogram.  Mirella Royal LPN ....................5/11/2021   9:51 AM  Chief Complaint   Patient presents with     Follow Up     after angiogram       Initial /70 (BP Location: Right arm, Patient Position: Sitting, Cuff Size: Adult Large)   Pulse 76   Temp 97.4  F (36.3  C) (Tympanic)   Resp 18   Ht 1.65 m (5' 4.96\")   Wt 68 kg (150 lb)   SpO2 98%   BMI 24.99 kg/m   Estimated body mass index is 24.99 kg/m  as calculated from the following:    Height as of this encounter: 1.65 m (5' 4.96\").    Weight as of this encounter: 68 kg (150 lb).  Meds Reconciled: complete  Pt is on Aspirin  Pt is on a Statin  PHQ and/or JUAN CARLOS reviewed. Pt referred to PCP/MH Provider as appropriate.    Mirella Royal LPN      "

## 2021-05-11 NOTE — PROGRESS NOTES
[facility] HEART [facility]   CARDIOLOGY PROGRESS NOTE     Chief Complaint   Patient presents with     Follow Up     after angiogram          Diagnosis:  1.  Coronary artery spasm involving the LAD with cardiac cath at the [facility] on 4/27/2021.  2.  Myocardial bridging involving LAD.  3.  H/O SVT.  4.  CABG x1 with LIMA to LAD on 11/15/2016 in [place].  5.  H/O chronic heart failure with an EF of 15% now recovered at 60 to 65% on 5/16/2019 at [facility].  6.  H/O coronary spasm with stent placement to LAD x3.  7.  H/O radiofrequency ablation for SVT.  8.  H/O stroke x7 with left hemiparesis.  9.  Left foot drop.  10.  S/P AV tessie ablation secondary to SVT.  11.  Pacemaker dependent.  12.  Hyperlipidemia-uncontrolled.  13.  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  14.  H/O DVT to bilateral lower extremities.  15.  Cardiac pacemaker.  16.  Hypertension-variable control.  17.  DM-2.  18.  Lown Ganong Brandt syndrome.    Assessment/Plan:    1.  Reviewed her cardiac catheterization, cardiac catheterization with stable nonocclusive disease.  No stenting completed on 4/27/2021 at the [facility].  Mild in-stent restenosis of the LAD with atretic LIMA to LAD, no stent placed.  2.  Discontinue chlorthalidone 25 mg daily to allow for increase in vasospastic antianginal medications.  I believe her swelling is secondary to diltiazem and felodipine.  3.  Start Pletal 100 mg twice a day to see if this helps improve her anginal symptoms.  4.  Continue on aspirin 81 mg daily, Lipitor 80 mg daily, diltiazem 360 mg daily, Imdur 90 mg daily, Ranexa 1000 mg twice a day, Coumadin, and sublingual nitro as needed for chest pain.  5.  Consider increasing Imdur from 90 mg to 120 mg daily in the future if she has ongoing anginal symptoms.  6.  Follow-up in the next few months to see how she is responding to the changes in medications as described above or sooner with issues.      Interval history:  Leigh has had continuing discomfort to her  chest.  She has been having x2 episodes a week.  She had been taking multiple doses of nitro to improve her symptoms. She had a cardiac catheterization through [facility] on 8/21/2020 showing stable disease.  She has been having regular chest discomfort.  Have tried nifedipine but did not tolerate the medication.  Nicardipine is expensive. She went to [place] and was seen on 4/1/2021.  Her stress test showed septal myocardial ischemia.  Her echo was indeterminate for diastolic dysfunction and filling pressures.  She was started on chlorthalidone 12.5 mg to reduce in the fluid burden.  It is since been increased to 25 mg daily.  Blood pressures have been soft.  Her swelling is worse at night which I suspect is secondary to diltiazem and felodipine.  Cardiac catheterization at [facility] on 8/21/2020 was reviewed and felt to have stable disease by the internationalists.  She is desperately seeking rest and relief.  To ensure that she is not having severe blockages to her LAD from an occluded stent, she was set up for cardiac catheterization at [facility].  She was found to have mild in-stent restenosis with patent but atretic LIMA to LAD.  No intervention performed.  At this point, her symptoms are from spasm.  We are adjusting medications as tolerated to try to obtain relief.    HPI:    Originally, she was diagnosed with a LAD ostial coronary artery spasm at [facility].  She had a positive methergine spasm study in 2005 at [facility] in the mid LAD, treated with drug-eluting stent to the mid-LAD.  She has a history of LAD myocardial bridging, CABG with LIMA to LAD on 11/15/2016 in [place], [place], history of heart failure with reported EF of 15% now recovered, stenting to the LAD x3, history of radiofrequency ablation for SVT x8, ultimately resulting in AV tessie ablation with pacemaker placement with pacemaker dependence, H/O stroke x7 with left hemiparesis essentially resolved with the exception of  "intermittent left foot drop, hyperlipidemia, on Coumadin with a history of factor V Leiden deficiency and numerous DVT's involving both the upper and lower extremities, hypertension, DM-2, on anticoagulated on Coumadin.     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  She has had care through [facility] as well as in [place], [place].  She was evaluated at [facility] and underwent angiography revealing separate origins of the LAD and circumflex.  She has a history of catheter induced coronary artery spasm originally diagnosed at [facility].     Dr. Bill Abreu through [facility] evaluated her in 2005. In spite of pre-treatment with nitroglycerine and calcium channel blockers, she had fairly vigorous mid-LAD spasm with intracoronary methergine. The ostial LAD did not spasm. Dr. Abreu treated this area with a 2.5 x 28 mm Cypher drug-eluting stent. The following year she had instent restenosis treated with a second 8 mm x 2.5 mm Cypher drug-eluting stent.      She did fairly well until 2016, when she had evaluation at [facility] in [place]. She was treated with trans-myocardial revascularization with 25 channels in the inferior, lateral and anterior LV walls, and underwent an LIMA-LAD. Her EF was 35% pre-op, and approximately 50-55% post-operatively.      She had recurrent angina, and on 2/8/17 she underwent repeat angiography which showed mild instent restenosis of the mid-LAD stents, followed by 99% stenosis of the mid-LAD at the LIMA-LAD anastomosis, presumably due to surgical clips/manipulation. The TERRENCE was atretic. Thus, the internationalists placed a 2.25 x 8 mm Promus Premiere drug-eluting stent into the mid-LAD with a good result.      Lately, she has been having an increasingly more frequent and severe anginal chest discomfort, and feels like she is back to her severe coronary \"spasm\" again.      He has ongoing concerns for angina felt to be vasospastic " in nature.  She has had a total of x4 cardiac catheterizations on 6/17/2008, 11/21/2012, 1/17/2019, and I believe one in [place] do not have this date.       She has done pretty well since. Her EF was greater than 60% on a couple occasions since.  Most recently, on 5/16/2019 her EF was 60% to 65%.  There was no significant valvular disease detected.  Her echo was essentially unchanged from 4/18/2017.  Diastolic dysfunction was normal.      Relevant testing:  Cardiac cath on 4/27/2021 at the  of :      History of coronary spasm and myocardial bridging s/p EMILIE to LAD and CAB x1 (LIMA-LAD)    Mild instent restenosis of LAD    Patent but atretic LIMA-LAD    [place] stress test with 4/1/2021:  1. Exercise echocardiogram positive for septal myocardial ischemia.  2. The patient's exercise capacity was limited.  3. Ejection fraction response from 60% at rest to 65% at peak stress.  4. Left ventricular end-systolic volume decreased with stress.  5. OTHER ECHO FINDINGS:  6. Findings consistent with elevated left ventricular filling pressure at rest and with exercise.    Echo on 4/1/2021 at [place]:  1. Normal left ventricular chamber size, no regional wall motion abnormalities, calculated 2-D linear ejection fraction 63 %.  2. Indeterminate left ventricular diastolic function and filling pressure.  3. Normal right ventricular chamber size, mildly reduced systolic function (by visual inspection,), estimated right ventricular systolic pressure 26 mmHg assuming right atrial pressure of 5 mmHg.  4. Mild-moderate tricuspid valve regurgitation.  5. No pericardial effusion.    Review of cardiac cath from [facility] by me on 4/1/2021:  1.  Severe coronary artery atherosclerosis  2.  Coronary artery grafts  CORONARY DIAGNOSTIC SUMMARY  Coronary artery dominance is left. Normal right coronary. Normal left main coronary. Normal circumflex coronary.   Side by side ostia of LAD and LCX.  The LAD is a diffusely diseased vessel, stented  in its mid portion, and with a patent but somewhat atretic LIMA graft to its distal portion.,            Past Medical History:   Diagnosis Date     Acute thromboembolism of deep veins of lower extremity (H)     Overview:  Hx of multiple episodes of DVT: 3 lower extremity; 5 upper extremity (right arm)     Atrial fibrillation (H)     No Comments Provided     Cardiac pacemaker in situ     Dual chamber     Cerebral thrombosis     2006,'95 w/ no residual     Coronary atherosclerosis     2006     Endometrial hyperplasia     s/p endometrial ablation      Essential hypertension     No Comments Provided     History of other genital system and obstetric disorders      8, Para 3-0-5-2     Other and unspecified angina pectoris     2006     Other and unspecified hyperlipidemia     No Comments Provided     Other postprocedural states      and ,Followed by Dr. Usman Duran, [facility],.     Paroxysmal supraventricular tachycardia (H)     2006,s/p multiple ablations w last resulting in PPM     Personal history of transient ischemic attack (TIA) and cerebral infarction without residual deficit 2009    with left hemiplegia     Primary hypercoagulable state (H)     No Comments Provided       Past Surgical History:   Procedure Laterality Date     ARTHROSCOPY KNEE      ,Arthroscopy for chondromalacia patella     AS CABG, ARTERY-VEIN, SINGLE  11/15/2016    Single vessel; done in [place]     ATTEMPTED ARTHROSCOPY      ,Right arthroscopy     BIOPSY BREAST      ,Breast cyst aspiration     COLONOSCOPY      2007,follow up recommended in 10 years.     ENDOSCOPIC SINUS SURGERY      ,Sinus node ablation.     EP ABLATION ATRIAL FLUTTER N/A 2020    Procedure: EP VENOGRAM SVC;  Surgeon: Hakeem Moore MD;  Location:  HEART CARDIAC CATH LAB     EP PACEMAKER N/A 2020    Procedure: EP PACEMAKER;  Surgeon: Tima Johnson MD;  Location:  " HEART CARDIAC CATH LAB     EP STUDY  12/1994    EP STUDY /ABLATION,AV re-entry tachycardia, tessie ablation     HEART CATH, ANGIOPLASTY      01/06,Stenting placed LAD after ergonovine provocation confirmed     HEART CATH, ANGIOPLASTY      03/06,90% lesion, normal left ventricular function     IMPLANT PACEMAKER      03/03,Guidant pacemaker placement, AV tessie ablation     LAPAROSCOPIC ABLATION ENDOMETRIOSIS      06/06     OTHER SURGICAL HISTORY      3/24/06,906946,([place]) [facility] STENT ANGIO     OTHER SURGICAL HISTORY      08/02,092844,EP STUDY /ABLATION     OTHER SURGICAL HISTORY      11/04,83661.0,CT CORONARY ANGIOGRAM ([place]),Coronary angiogram, failed ablation     OTHER SURGICAL HISTORY      12/04,071900,[facility] RELOCATION OF SKIN POCKET PACEMAKER,[facility] 5/24/05 reconfiguration of pacemaker \"pocket\"     OTHER SURGICAL HISTORY      207882,IP CONSULT TO ELECTROPHYSIOLOGY,study and lead change     OTHER SURGICAL HISTORY      12/06,513056,NEUROSTIMULATOR,Nerve stimulator implanted for chest pain control     OTHER SURGICAL HISTORY      12/2008,10687.0,CT CORONARY ANGIOGRAM ([place]),with increased left-sided weakness and vertigo, negative CT angiogram.     REPLACE PACEMAKER GENERATOR      12/29/04,Replacement of left ventricular pacemaker lead.     STENT  02/2017    LAD        Allergies   Allergen Reactions     Morphine Nausea and Vomiting     OK in small doses     Prednisone Nausea and Vomiting     Per IV     Sotalol Nausea and Vomiting       Current Outpatient Medications   Medication Sig Dispense Refill     aspirin 81 MG EC tablet Take 1 tablet (81 mg) by mouth daily       atorvastatin (LIPITOR) 80 MG tablet Take 1 tablet (80 mg) by mouth daily 90 tablet 3     calcium carb 1250 mg, 500 mg [place],/vitamin D 200 unit (CALCIUM 500/D) 500-200 MG-UNIT per tablet Take 1 tablet by mouth       cilostazol (PLETAL) 100 MG tablet Take 1 tablet (100 mg) by mouth 2 times daily 60 tablet 3     diltiazem ER (TIAZAC) 360 MG 24 hr ER beaded " capsule Take 1 capsule (360 mg) by mouth daily 90 capsule 3     enoxaparin ANTICOAGULANT (LOVENOX ANTICOAGULANT) 80 MG/0.8ML syringe Inject 0.7 mLs (70 mg) Subcutaneous 2 times daily 11.2 mL 1     enoxaparin ANTICOAGULANT (LOVENOX ANTICOAGULANT) 80 MG/0.8ML syringe Inject 0.7 mLs (70 mg) Subcutaneous 2 times daily 22.4 mL 1     felodipine ER (PLENDIL) 5 MG 24 hr tablet Take 1 tablet (5 mg) by mouth 2 times daily 180 tablet 3     isosorbide mononitrate (IMDUR) 30 MG 24 hr tablet Take 3 tablets (90 mg) by mouth daily 90 tablet 3     nitroFURantoin macrocrystal-monohydrate (MACROBID) 100 MG capsule Take 1 capsule (100 mg) by mouth 2 times daily 10 capsule 0     nitroGLYcerin (NITROSTAT) 0.4 MG sublingual tablet For chest pain place 1 tablet under the tongue every 5 minutes for 3 doses. If symptoms persist 5 minutes after 1st dose call 911. 25 tablet 3     ranolazine 1000 MG TB12 Take 1,000 mg by mouth 2 times daily 180 tablet 3     sertraline (ZOLOFT) 50 MG tablet Take 1 tablet (50 mg) by mouth daily 90 tablet 3     warfarin ANTICOAGULANT (COUMADIN) 6 MG tablet TAKE 1 TABLET BY MOUTH ONCE DAILY OR  AS  DIRECTED  BY  THE  [facility]  CLINIC. 90 tablet 1     zolpidem (AMBIEN) 5 MG tablet TAKE 1 TABLET BY MOUTH AT BEDTIME AS NEEDED FOR  SLEEP. 30 tablet 3       Social History     Socioeconomic History     Marital status:      Spouse name: Antonio     Number of children: 2     Years of education: 16+     Highest education level: Master's degree (e.g., MA, MS, [employer], MEd, MSW, [employer]ARI[employer])   Occupational History     Not on file   Social Needs     Financial resource strain: Not hard at all     Food insecurity     Worry: Never true     Inability: Never true     Transportation needs     Medical: No     Non-medical: No   Tobacco Use     Smoking status: Never Smoker     Smokeless tobacco: Never Used     Tobacco comment: Quit smoking: spouse does not smoke in the house. Exposed in cars.   Substance and Sexual Activity     Alcohol use:  Not Currently     Comment: Alcoholic Drinks/day: Alcoholic Drinks/day: 1-2 drinks twice a week     Drug use: Never     Sexual activity: Not Currently     Partners: Male   Lifestyle     Physical activity     Days per week: 0 days     Minutes per session: 0 min     Stress: To some extent   Relationships     Social connections     Talks on phone: Not on file     Gets together: Not on file     Attends [employer] service: Not on file     Active member of club or organization: Not on file     Attends meetings of clubs or organizations: Not on file     Relationship status: Not on file     Intimate partner violence     Fear of current or ex partner: Not on file     Emotionally abused: Not on file     Physically abused: Not on file     Forced sexual activity: Not on file   Other Topics Concern     Parent/sibling w/ CABG, MI or angioplasty before 65F 55M? Not Asked   Social History Narrative     2nd Marriage x 20 years to  Anthony they live in [place], [place]       in [place]. Completed her Master's Degree 2003. Taught Special Education.  Early penitentiary due to health 2007.      Total 5 children blended family-3 dgts. And 2 sons    Daughter:  Philip    Son:  Anthony - lives in [facility]    6 Grand-children 5 boys and 1 girl    Leigh parents' and siblings live in [place] [place].       LAB RESULTS:   Anticoagulation Therapy Visit on 08/24/2020   Component Date Value Ref Range Status     INR 08/24/2020 1.3* 0.90 - 1.10 Corrected   Anticoagulation Therapy Visit on 08/19/2020   Component Date Value Ref Range Status     INR 08/19/2020 2.3* 0.90 - 1.10 Final   Anticoagulation Therapy Visit on 08/05/2020   Component Date Value Ref Range Status     INR 08/05/2020 2.1* 0.90 - 1.10 Final   Allied Health/Nurse Visit on 08/03/2020   Component Date Value Ref Range Status     COVID-19 Virus PCR to [facility] - So* 08/03/2020 Nasopharyngeal   Final     COVID-19 Virus PCR to [facility] [place] - Re* 08/03/2020 Not Detected   Final  "  Anticoagulation Therapy Visit on 07/30/2020   Component Date Value Ref Range Status     INR 07/30/2020 3.8* 0.90 - 1.10 Final   Anticoagulation Therapy Visit on 07/27/2020   Component Date Value Ref Range Status     INR 07/25/2020 1.1  0.90 - 1.10 Final     INR 07/27/2020 1.2* 0.90 - 1.10 Final   Anticoagulation Therapy Visit on 07/23/2020   Component Date Value Ref Range Status     INR Protime 07/23/2020 1.0  0.86 - 1.14 Final   Anticoagulation Therapy Visit on 07/21/2020   Component Date Value Ref Range Status     INR 07/21/2020 1.4* 0.90 - 1.10 Final   Anticoagulation Therapy Visit on 07/09/2020   Component Date Value Ref Range Status     INR 07/08/2020 2.1* 0.90 - 1.10 Final        Review of systems: Negative except that which was noted in the HPI.    Physical examination:  /70 (BP Location: Right arm, Patient Position: Sitting, Cuff Size: Adult Large)   Pulse 76   Temp 97.4  F (36.3  C) (Tympanic)   Resp 18   Ht 1.65 m (5' 4.96\")   Wt 68 kg (150 lb)   SpO2 98%   BMI 24.99 kg/m      GENERAL APPEARANCE: healthy, alert and no distress  HEENT: no icterus, no xanthelasmas, normal pupil size and reaction, no cyanosis.  NECK: no adenopathy, no asymmetry, masses, or scars.  CHEST: lungs clear to auscultation - no rales, rhonchi or wheezes, no use of accessory muscles, no retractions, respirations are unlabored, normal respiratory rate  CARDIOVASCULAR: regular rhythm, normal S1 with physiologic split S2, no S3 or S4 and no murmur, click or rub  ABDOMEN: soft, non tender, bowel sounds normal  EXTREMITIES: no clubbing, cyanosis with mild peripheral edema  NEURO: alert and oriented normal speech, and affect  VASC: No vascular bruits heard.  SKIN: no ecchymoses, no rashes        Thank you for allowing me to participate in the care of your patient. Please do not hesitate to contact me if you have any questions.     Moises Islas, DO          "

## 2021-05-14 ENCOUNTER — ANTICOAGULATION THERAPY VISIT (OUTPATIENT)
Dept: ANTICOAGULATION | Facility: OTHER | Age: 66
End: 2021-05-14
Attending: FAMILY MEDICINE
Payer: MEDICARE

## 2021-05-14 ENCOUNTER — TRANSFERRED RECORDS (OUTPATIENT)
Dept: HEALTH INFORMATION MANAGEMENT | Facility: OTHER | Age: 66
End: 2021-05-14

## 2021-05-14 DIAGNOSIS — Z79.01 ANTICOAGULATION MONITORING, INR RANGE 2-3: ICD-10-CM

## 2021-05-14 DIAGNOSIS — D68.2 FACTOR V DEFICIENCY (H): ICD-10-CM

## 2021-05-14 LAB — INR POINT OF CARE: 1.2 (ref 0.86–1.14)

## 2021-05-14 PROCEDURE — 36416 COLLJ CAPILLARY BLOOD SPEC: CPT | Mod: ZL

## 2021-05-14 NOTE — PROGRESS NOTES
ANTICOAGULATION FOLLOW-UP CLINIC VISIT    Patient Name:  Lyudmila Fitzgerald  Date:  2021  Contact Type:  Fax from [facility] [employer].    SUBJECTIVE:  Patient Findings     Positives:  Upcoming invasive procedure, Change in medications    Comments:   Having procedure on 21,  holding warfarin starting 21, Change in medications (will stop Lovenox)        Clinical Outcomes     Comments:   Having procedure on 21,  holding warfarin starting 21, Change in medications (will stop Lovenox)           OBJECTIVE    Recent labs: (last 7 days)     21   INR 1.2*       ASSESSMENT / PLAN  INR assessment SUB Desired due to upcoming procedure   Recheck INR In: 1 WEEK    INR Location [facility] INR      Anticoagulation Summary  As of 2021    INR goal:  2.0-3.0   TTR:  52.6 % (1 y)   INR used for dosin.2 (2021)   Warfarin maintenance plan:  3 mg (6 mg x 0.5) every Mon, Fri; 6 mg (6 mg x 1) all other days   Full warfarin instructions:  : Hold; 5/15: Hold; : Hold; : Hold; Otherwise 3 mg every Mon, Fri; 6 mg all other days   Weekly warfarin total:  36 mg   Plan last modified:  Arleth Sheth RN (3/8/2021)   Next INR check:     Priority:  Maintenance   Target end date:  Indefinite    Indications    Acute thromboembolism of deep veins of lower extremity (H) (Resolved) [I82.409]  Anticoagulation monitoring  INR range 2-3 [Z79.01]  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]             Anticoagulation Episode Summary     INR check location:      Preferred lab:      Send INR reminders to:  [facility]    Comments:  home monitor [employer] check INR q 2 weeks      Anticoagulation Care Providers     Provider Role Specialty Phone number    Erika Carrasco MD Referring Family Medicine [phone]            See the Encounter Report to view Anticoagulation Flowsheet and Dosing Calendar (Go to Encounters tab in chart review, and find the Anticoagulation Therapy Visit)        Winter Pedraza  RN

## 2021-05-24 ENCOUNTER — ANTICOAGULATION THERAPY VISIT (OUTPATIENT)
Dept: ANTICOAGULATION | Facility: OTHER | Age: 66
End: 2021-05-24
Attending: FAMILY MEDICINE
Payer: MEDICARE

## 2021-05-24 ENCOUNTER — TRANSFERRED RECORDS (OUTPATIENT)
Dept: HEALTH INFORMATION MANAGEMENT | Facility: OTHER | Age: 66
End: 2021-05-24

## 2021-05-24 DIAGNOSIS — D68.2 FACTOR V DEFICIENCY (H): ICD-10-CM

## 2021-05-24 DIAGNOSIS — Z79.01 ANTICOAGULATION MONITORING, INR RANGE 2-3: ICD-10-CM

## 2021-05-24 LAB — INR PPP: 1.3 (ref 0.9–1.1)

## 2021-05-24 NOTE — PROGRESS NOTES
ANTICOAGULATION FOLLOW-UP CLINIC VISIT    Patient Name:  Lyudmila Fitzgerald  Date:  2021  Contact Type:  Telephone/ spoke with patient reviewed dosing    SUBJECTIVE:  Patient Findings     Positives:  Missed doses (had a procedure last week she was holding the warfarin)        Clinical Outcomes     Negatives:  Major bleeding event, Thromboembolic event, Anticoagulation-related hospital admission, Anticoagulation-related ED visit, Anticoagulation-related fatality           OBJECTIVE    Recent labs: (last 7 days)     21   INR 1.3*       ASSESSMENT / PLAN  INR assessment SUB    Recheck INR In: 1 WEEK    INR Location Home INR      Anticoagulation Summary  As of 2021    INR goal:  2.0-3.0   TTR:  49.8 % (1 y)   INR used for dosin.3 (2021)   Warfarin maintenance plan:  3 mg (6 mg x 0.5) every Mon, Fri; 6 mg (6 mg x 1) all other days   Full warfarin instructions:  : 9 mg; Otherwise 3 mg every Mon, Fri; 6 mg all other days   Weekly warfarin total:  36 mg   Plan last modified:  Arleth Sheth RN (3/8/2021)   Next INR check:  2021   Priority:  High   Target end date:  Indefinite    Indications    Acute thromboembolism of deep veins of lower extremity (H) (Resolved) [I82.409]  Anticoagulation monitoring  INR range 2-3 [Z79.01]  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]             Anticoagulation Episode Summary     INR check location:      Preferred lab:      Send INR reminders to:  [facility]    Comments:  home monitor [employer] check INR q 2 weeks      Anticoagulation Care Providers     Provider Role Specialty Phone number    Erika Carrasco MD Referring Family Medicine [phone]            See the Encounter Report to view Anticoagulation Flowsheet and Dosing Calendar (Go to Encounters tab in chart review, and find the Anticoagulation Therapy Visit)        Nyla Walker, RN

## 2021-05-31 ENCOUNTER — TRANSFERRED RECORDS (OUTPATIENT)
Dept: HEALTH INFORMATION MANAGEMENT | Facility: OTHER | Age: 66
End: 2021-05-31

## 2021-05-31 LAB — INR PPP: 2.1 (ref 0.9–1.1)

## 2021-06-01 ENCOUNTER — ANTICOAGULATION THERAPY VISIT (OUTPATIENT)
Dept: ANTICOAGULATION | Facility: OTHER | Age: 66
End: 2021-06-01
Attending: FAMILY MEDICINE
Payer: MEDICARE

## 2021-06-01 DIAGNOSIS — Z79.01 ANTICOAGULATION MONITORING, INR RANGE 2-3: ICD-10-CM

## 2021-06-01 DIAGNOSIS — D68.2 FACTOR V DEFICIENCY (H): ICD-10-CM

## 2021-06-01 NOTE — PROGRESS NOTES
ANTICOAGULATION FOLLOW-UP CLINIC VISIT    Patient Name:  Lyudmila Fitzgerald  Date:  2021  Contact Type:  therapeutic, no changes. no call needed.    SUBJECTIVE:  Patient Findings     Positives:  Other complaints (2021 Fitting Adjustment Spinal Cord Neurostimulator  )        Clinical Outcomes     Negatives:  Major bleeding event, Thromboembolic event, Anticoagulation-related hospital admission, Anticoagulation-related ED visit, Anticoagulation-related fatality           OBJECTIVE    Recent labs: (last 7 days)     21   INR 2.1*       ASSESSMENT / PLAN  INR assessment THER    Recheck INR In: 1 WEEK    INR Location Home INR      Anticoagulation Summary  As of 2021    INR goal:  2.0-3.0   TTR:  48.0 % (1 y)   INR used for dosin.1 (2021)   Warfarin maintenance plan:  3 mg (6 mg x 0.5) every Mon, Fri; 6 mg (6 mg x 1) all other days   Full warfarin instructions:  3 mg every Mon, Fri; 6 mg all other days   Weekly warfarin total:  36 mg   No change documented:  Sayra Sheth RN   Plan last modified:  Arleth Sheth RN (3/8/2021)   Next INR check:  2021   Priority:  High   Target end date:  Indefinite    Indications    Acute thromboembolism of deep veins of lower extremity (H) (Resolved) [I82.409]  Anticoagulation monitoring  INR range 2-3 [Z79.01]  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]             Anticoagulation Episode Summary     INR check location:      Preferred lab:      Send INR reminders to:  [facility]    Comments:  home monitor [employer] check INR q 2 weeks      Anticoagulation Care Providers     Provider Role Specialty Phone number    Erika Carrasco MD Referring Family Medicine [phone]            See the Encounter Report to view Anticoagulation Flowsheet and Dosing Calendar (Go to Encounters tab in chart review, and find the Anticoagulation Therapy Visit)        Sayra Sheth RN

## 2021-06-08 ENCOUNTER — TRANSFERRED RECORDS (OUTPATIENT)
Dept: HEALTH INFORMATION MANAGEMENT | Facility: OTHER | Age: 66
End: 2021-06-08

## 2021-06-08 ENCOUNTER — ANTICOAGULATION THERAPY VISIT (OUTPATIENT)
Dept: ANTICOAGULATION | Facility: OTHER | Age: 66
End: 2021-06-08
Attending: FAMILY MEDICINE
Payer: MEDICARE

## 2021-06-08 DIAGNOSIS — D68.2 FACTOR V DEFICIENCY (H): ICD-10-CM

## 2021-06-08 DIAGNOSIS — Z79.01 ANTICOAGULATION MONITORING, INR RANGE 2-3: ICD-10-CM

## 2021-06-08 DIAGNOSIS — I66.9 CEREBRAL THROMBOSIS: ICD-10-CM

## 2021-06-08 LAB — INR PPP: 1.7 (ref 0.9–1.1)

## 2021-06-08 RX ORDER — WARFARIN SODIUM 6 MG/1
TABLET ORAL
Qty: 90 TABLET | Refills: 1 | COMMUNITY
Start: 2021-06-08 | End: 2021-06-30

## 2021-06-08 NOTE — PROGRESS NOTES
"ANTICOAGULATION FOLLOW-UP CLINIC VISIT    Patient Name:  Lyudmila Fitzgerald  Date:  2021  Contact Type:  Called patient and reviewed dosing    SUBJECTIVE:  Patient Findings     Comments:  Patient denies any identifiable changes that caused the subtherapeutic INR. \"No. Nothing has changed\". Verified current dose of 3 mg on Monday and Fridays and 6 mg all other days. Patient states, \" I haven't been taking it that way. I have been taking 6 mg everyday since I had the procedure\".  Patient to increase dose today only to 9 mg and then resume previous dose of 3 mg Monday and  and 6 mg all other days. Patient verbalized understanding and intent.         Clinical Outcomes     Negatives:  Major bleeding event, Thromboembolic event, Anticoagulation-related hospital admission, Anticoagulation-related ED visit, Anticoagulation-related fatality    Comments:  Patient denies any identifiable changes that caused the subtherapeutic INR. \"No. Nothing has changed\". Verified current dose of 3 mg on Monday and  and 6 mg all other days. Patient states, \" I haven't been taking it that way. I have been taking 6 mg everyday since I had the procedure\".  Patient to increase dose today only to 9 mg and then resume previous dose of 3 mg Monday and  and 6 mg all other days. Patient verbalized understanding and intent.            OBJECTIVE    Recent labs: (last 7 days)     21   INR 1.7*       ASSESSMENT / PLAN  INR assessment SUB    Recheck INR In: 1 WEEK    INR Location Home INR      Anticoagulation Summary  As of 2021    INR goal:  2.0-3.0   TTR:  46.5 % (1 y)   INR used for dosin.7 (2021)   Warfarin maintenance plan:  3 mg (6 mg x 0.5) every Mon, Fri; 6 mg (6 mg x 1) all other days   Full warfarin instructions:  : 9 mg; Otherwise 3 mg every Mon, Fri; 6 mg all other days   Weekly warfarin total:  36 mg   Plan last modified:  Karen Lorenzo RN (2021)   Next INR check:  6/15/2021   Priority:  " High   Target end date:  Indefinite    Indications    Acute thromboembolism of deep veins of lower extremity (H) (Resolved) [I82.409]  Anticoagulation monitoring  INR range 2-3 [Z79.01]  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

## 2021-06-14 ENCOUNTER — TRANSFERRED RECORDS (OUTPATIENT)
Dept: HEALTH INFORMATION MANAGEMENT | Facility: OTHER | Age: 66
End: 2021-06-14

## 2021-06-14 ENCOUNTER — ANTICOAGULATION THERAPY VISIT (OUTPATIENT)
Dept: ANTICOAGULATION | Facility: OTHER | Age: 66
End: 2021-06-14
Attending: FAMILY MEDICINE
Payer: MEDICARE

## 2021-06-14 DIAGNOSIS — Z79.01 ANTICOAGULATION MONITORING, INR RANGE 2-3: ICD-10-CM

## 2021-06-14 DIAGNOSIS — D68.2 FACTOR V DEFICIENCY (H): ICD-10-CM

## 2021-06-14 LAB — INR PPP: 2.7 (ref 0.9–1.1)

## 2021-06-14 NOTE — PROGRESS NOTES
ANTICOAGULATION FOLLOW-UP CLINIC VISIT    Patient Name:  Lyudmila Fitzgerald  Date:  2021  Contact Type:  Telephone/ Called patient and reviewed dosing. Patient to continue to take 3 mg every Monday and Friday and 6 mg all other days and recheck in a week. Patient verbalized understanding and intent to comply.     SUBJECTIVE:  Patient Findings         Clinical Outcomes     Negatives:  Major bleeding event, Thromboembolic event, Anticoagulation-related hospital admission, Anticoagulation-related ED visit, Anticoagulation-related fatality           OBJECTIVE    Recent labs: (last 7 days)     21   INR 2.7*       ASSESSMENT / PLAN  INR assessment THER    Recheck INR In: 1 WEEK    INR Location Home INR      Anticoagulation Summary  As of 2021    INR goal:  2.0-3.0   TTR:  46.0 % (1 y)   INR used for dosin.7 (2021)   Warfarin maintenance plan:  3 mg (6 mg x 0.5) every Mon, Fri; 6 mg (6 mg x 1) all other days   Full warfarin instructions:  3 mg every Mon, Fri; 6 mg all other days   Weekly warfarin total:  36 mg   No change documented:  Karen Lorenzo RN   Plan last modified:  Karen Lorenzo RN (2021)   Next INR check:  2021   Priority:  High   Target end date:  Indefinite    Indications    Acute thromboembolism of deep veins of lower extremity (H) (Resolved) [I82.409]  Anticoagulation monitoring  INR range 2-3 [Z79.01]  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  Patient is to call INR clinic if any changes affecting INR.       Karen Lorenzo RN

## 2021-06-15 DIAGNOSIS — Z95.1 HISTORY OF CORONARY ARTERY BYPASS GRAFT X 1: ICD-10-CM

## 2021-06-15 DIAGNOSIS — R07.9 CHEST PAIN, UNSPECIFIED TYPE: Primary | ICD-10-CM

## 2021-06-15 DIAGNOSIS — E78.2 MIXED HYPERLIPIDEMIA: ICD-10-CM

## 2021-06-15 DIAGNOSIS — Z98.890 STATUS POST CORONARY ANGIOGRAM: ICD-10-CM

## 2021-06-15 DIAGNOSIS — Z95.5 HISTORY OF CORONARY ARTERY STENT PLACEMENT: ICD-10-CM

## 2021-06-15 RX ORDER — ATORVASTATIN CALCIUM 80 MG/1
TABLET, FILM COATED ORAL
Qty: 90 TABLET | Refills: 3 | Status: SHIPPED | OUTPATIENT
Start: 2021-06-15 | End: 2022-06-29

## 2021-06-15 NOTE — TELEPHONE ENCOUNTER
"Pt.'s last office visit with Moises Islas DO was on 5/11/21 and note states:  \"Continue on aspirin 81 mg daily, Lipitor 80 mg daily, diltiazem 360 mg daily, Imdur 90 mg daily, Ranexa 1000 mg twice a day, Coumadin, and sublingual nitro as needed for chest pain.\"    Routing refill request to provider for review/approval because: No LDL on file in the last year.  Unable to complete prescription refill per RN Medication Refill Policy.   Kandace Morelos RN ....................  6/15/2021   10:43 AM    "

## 2021-06-18 ENCOUNTER — PATIENT OUTREACH (OUTPATIENT)
Dept: FAMILY MEDICINE | Facility: OTHER | Age: 66
End: 2021-06-18

## 2021-06-18 NOTE — TELEPHONE ENCOUNTER
Patient Quality Outreach      Summary:    Patient has the following on her problem list/HM:     Depression / Dysthymia review    6 Month Remission: 4-8 month window range:     12 Month Remission: 10-14 month window range:        PHQ-9 SCORE 3/9/2020   PHQ-9 Total Score 0       If PHQ-9 recheck is 5 or more, route to provider for next steps.    Diabetes    Last A1C:  Lab Results   Component Value Date    A1C 5.8 03/09/2020    A1C 6.1 09/27/2019       Last LDL:    Lab Results   Component Value Date     03/09/2020       Is the patient on a Statin? Yes          Is the patient on Aspirin? Yes    Medications     HMG CoA Reductase Inhibitors     atorvastatin (LIPITOR) 80 MG tablet       Salicylates     aspirin 81 MG EC tablet             Last three blood pressure readings:  BP Readings from Last 3 Encounters:   05/11/21 122/70   04/27/21 96/67   04/08/21 122/80            Tobacco Use      Smoking status: Never Smoker      Smokeless tobacco: Never Used      Tobacco comment: Quit smoking: spouse does not smoke in the house. Exposed in cars.          Patient is due/failing the following:   Eye Exam and Diabetic Follow-Up Visit, Depression follow-up visit and Annual wellness, date due: 9/27/2020    Type of outreach:    Sent [employer] message.    Questions for provider review:    None                                                                                                                                     Renetta Pan CMA on 6/18/2021 at 2:53 PM

## 2021-06-21 DIAGNOSIS — Z95.5 HISTORY OF CORONARY ARTERY STENT PLACEMENT: ICD-10-CM

## 2021-06-21 DIAGNOSIS — Q24.5 MYOCARDIAL BRIDGE: ICD-10-CM

## 2021-06-21 DIAGNOSIS — I20.1 CORONARY ARTERY SPASM (H): ICD-10-CM

## 2021-06-21 DIAGNOSIS — Z86.79 HISTORY OF CORONARY VASOSPASM: ICD-10-CM

## 2021-06-21 DIAGNOSIS — Z95.1 HISTORY OF CORONARY ARTERY BYPASS GRAFT X 1: ICD-10-CM

## 2021-06-22 ENCOUNTER — TELEPHONE (OUTPATIENT)
Dept: FAMILY MEDICINE | Facility: OTHER | Age: 66
End: 2021-06-22

## 2021-06-22 RX ORDER — NITROGLYCERIN 0.4 MG/1
TABLET SUBLINGUAL
Qty: 25 TABLET | Refills: 3 | Status: SHIPPED | OUTPATIENT
Start: 2021-06-22 | End: 2021-11-23

## 2021-06-22 NOTE — TELEPHONE ENCOUNTER
Duplicate request.  Refill done today.  Patient notified of this.  Kandace Morelos RN......June 22, 2021...3:08 PM

## 2021-06-29 ENCOUNTER — TRANSFERRED RECORDS (OUTPATIENT)
Dept: HEALTH INFORMATION MANAGEMENT | Facility: OTHER | Age: 66
End: 2021-06-29

## 2021-06-29 ENCOUNTER — ANTICOAGULATION THERAPY VISIT (OUTPATIENT)
Dept: ANTICOAGULATION | Facility: OTHER | Age: 66
End: 2021-06-29
Attending: FAMILY MEDICINE
Payer: MEDICARE

## 2021-06-29 DIAGNOSIS — D68.2 FACTOR V DEFICIENCY (H): ICD-10-CM

## 2021-06-29 DIAGNOSIS — Z79.01 ANTICOAGULATION MONITORING, INR RANGE 2-3: ICD-10-CM

## 2021-06-29 LAB — INR PPP: 2 (ref 0.9–1.1)

## 2021-06-29 NOTE — PROGRESS NOTES
ANTICOAGULATION FOLLOW-UP CLINIC VISIT    Patient Name:  Lyudmila Fitzgerald  Date:  2021  Contact Type:  No phone call needed. INR within range.    SUBJECTIVE:  Patient Findings         Clinical Outcomes     Negatives:  Major bleeding event, Thromboembolic event, Anticoagulation-related hospital admission, Anticoagulation-related ED visit, Anticoagulation-related fatality           OBJECTIVE    Recent labs: (last 7 days)     21   INR 2.0*       ASSESSMENT / PLAN  INR assessment THER    Recheck INR In: 1 WEEK    INR Location Home INR      Anticoagulation Summary  As of 2021    INR goal:  2.0-3.0   TTR:  46.0 % (1 y)   INR used for dosin.0 (2021)   Warfarin maintenance plan:  3 mg (6 mg x 0.5) every Mon, Fri; 6 mg (6 mg x 1) all other days   Full warfarin instructions:  3 mg every Mon, Fri; 6 mg all other days   Weekly warfarin total:  36 mg   No change documented:  Karen Lorenzo RN   Plan last modified:  Karen Lorenzo RN (2021)   Next INR check:  2021   Priority:  High   Target end date:  Indefinite    Indications    Acute thromboembolism of deep veins of lower extremity (H) (Resolved) [I82.409]  Anticoagulation monitoring  INR range 2-3 [Z79.01]  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  INR in range so no telephone call.  Patient is to call INR clinic if any changes affecting INR.       Karen Lorenzo RN

## 2021-06-30 ENCOUNTER — ANCILLARY PROCEDURE (OUTPATIENT)
Dept: CARDIOLOGY | Facility: CLINIC | Age: 66
End: 2021-06-30
Attending: INTERNAL MEDICINE
Payer: MEDICARE

## 2021-06-30 DIAGNOSIS — Z98.890 S/P AV NODAL ABLATION: ICD-10-CM

## 2021-06-30 DIAGNOSIS — I66.9 CEREBRAL THROMBOSIS: ICD-10-CM

## 2021-06-30 DIAGNOSIS — F51.04 CHRONIC INSOMNIA: ICD-10-CM

## 2021-06-30 PROCEDURE — 93296 REM INTERROG EVL PM/IDS: CPT

## 2021-06-30 PROCEDURE — 93294 REM INTERROG EVL PM/LDLS PM: CPT | Performed by: INTERNAL MEDICINE

## 2021-06-30 RX ORDER — ZOLPIDEM TARTRATE 5 MG/1
TABLET ORAL
Qty: 30 TABLET | Refills: 0 | OUTPATIENT
Start: 2021-06-30

## 2021-06-30 RX ORDER — WARFARIN SODIUM 6 MG/1
TABLET ORAL
Qty: 90 TABLET | Refills: 1 | Status: SHIPPED | OUTPATIENT
Start: 2021-06-30 | End: 2021-07-20

## 2021-06-30 NOTE — TELEPHONE ENCOUNTER
"Called [facility] and spoke with Pharmacist who reports warfarin was deactivated on 03/22/21. No reason why. Record review indicates sig was updated at that time and listed as historical and should not of been sent to pharmacy. Called patient and informed of miscommunication and verified dosing.     Requested Prescriptions   Pending Prescriptions Disp Refills     warfarin ANTICOAGULANT (COUMADIN) 6 MG tablet 90 tablet 1     Sig: TAKE 3 MG 2 DAYS A WEEK AND 6 MG ALL OTHER DAYS OR  AS  DIRECTED  BY  THE  [facility].       Vitamin K Antagonists Failed - 6/30/2021 11:01 AM        Failed - INR is within goal in the past 6 weeks     Confirm INR is within goal in the past 6 weeks.     Recent Labs   Lab Test 06/29/21   INR 2.0*                       Passed - Recent (12 mo) or future (30 days) visit within the authorizing provider's specialty     Patient has had an office visit with the authorizing provider or a provider within the authorizing providers department within the previous 12 mos or has a future within next 30 days. See \"Patient Info\" tab in inbasket, or \"Choose Columns\" in Meds & Orders section of the refill encounter.              Passed - Medication is active on med list        Passed - Patient is 18 years of age or older        Passed - Patient is not pregnant        Passed - No positive pregnancy on file in past 12 months           Prescription refilled per RN Medication RefillPolicy.................... Karen Lorenzo RN ....................  6/30/2021   11:02 AM          "

## 2021-06-30 NOTE — TELEPHONE ENCOUNTER
Requested Prescriptions   Pending Prescriptions Disp Refills     zolpidem (AMBIEN) 5 MG tablet [Pharmacy Med Name: Zolpidem Tartrate 5 MG Oral Tablet] 30 tablet 0     Sig: TAKE 1 TABLET BY MOUTH AT BEDTIME AS NEEDED FOR SLEEP       There is no refill protocol information for this order            Last Written Prescription Date:  03/16/2021  Last Fill Quantity: 30,   # refills: 3  Last Office Visit: 05/11/2021 with Moises Islas,   Future Office visit:    Next 5 appointments (look out 90 days)    Jul 21, 2021  2:00 PM  PHYSICAL with Erika Plascencia MD  [facility] ([facility] ) [address]  [place] [id number] [phone]      Unable to complete prescription refill per RN medication refill policy. Will route to provider for review and consideration.  Stephanie Ledesma RN on 6/30/2021 at 10:20 AM

## 2021-06-30 NOTE — TELEPHONE ENCOUNTER
Patient has a warfarin refill good until March-and [facility] said they can't fill it it's red flagged she has not clue what that means-PCP is gone until next Tues and she needs to get this--can you help.

## 2021-07-02 RX ORDER — ZOLPIDEM TARTRATE 5 MG/1
TABLET ORAL
Qty: 30 TABLET | Refills: 0 | Status: SHIPPED | OUTPATIENT
Start: 2021-07-02 | End: 2021-07-21

## 2021-07-05 DIAGNOSIS — F51.04 CHRONIC INSOMNIA: ICD-10-CM

## 2021-07-06 RX ORDER — ZOLPIDEM TARTRATE 5 MG/1
TABLET ORAL
Qty: 30 TABLET | Refills: 0 | OUTPATIENT
Start: 2021-07-06

## 2021-07-06 NOTE — TELEPHONE ENCOUNTER
Verified with [facility] that the zolpidem was prescribed on 7/2/21.  Refused refill due to it being a duplicate request.  Kandace Morelos RN......July 6, 2021...1:37 PM

## 2021-07-08 ENCOUNTER — MYC MEDICAL ADVICE (OUTPATIENT)
Dept: CARDIOLOGY | Facility: OTHER | Age: 66
End: 2021-07-08

## 2021-07-08 DIAGNOSIS — R07.9 CHEST PAIN, UNSPECIFIED TYPE: ICD-10-CM

## 2021-07-08 DIAGNOSIS — R60.0 LOWER EXTREMITY EDEMA: ICD-10-CM

## 2021-07-08 DIAGNOSIS — I50.9 CONGESTIVE HEART FAILURE, UNSPECIFIED HF CHRONICITY, UNSPECIFIED HEART FAILURE TYPE (H): ICD-10-CM

## 2021-07-08 DIAGNOSIS — R06.02 SHORTNESS OF BREATH: ICD-10-CM

## 2021-07-08 DIAGNOSIS — R07.89 OTHER CHEST PAIN: Primary | ICD-10-CM

## 2021-07-08 RX ORDER — FUROSEMIDE 20 MG
20 TABLET ORAL DAILY
Qty: 60 TABLET | Refills: 4 | Status: SHIPPED | OUTPATIENT
Start: 2021-07-08 | End: 2022-09-28

## 2021-07-09 LAB
MDC_IDC_EPISODE_DTM: NORMAL
MDC_IDC_EPISODE_DURATION: 4 S
MDC_IDC_EPISODE_DURATION: 4 S
MDC_IDC_EPISODE_DURATION: 5 S
MDC_IDC_EPISODE_DURATION: 6 S
MDC_IDC_EPISODE_DURATION: 6 S
MDC_IDC_EPISODE_DURATION: 8 S
MDC_IDC_EPISODE_DURATION: 9 S
MDC_IDC_EPISODE_ID: NORMAL
MDC_IDC_EPISODE_TYPE: NORMAL
MDC_IDC_LEAD_IMPLANT_DT: NORMAL
MDC_IDC_LEAD_IMPLANT_DT: NORMAL
MDC_IDC_LEAD_LOCATION: NORMAL
MDC_IDC_LEAD_LOCATION: NORMAL
MDC_IDC_LEAD_LOCATION_DETAIL_1: NORMAL
MDC_IDC_LEAD_LOCATION_DETAIL_1: NORMAL
MDC_IDC_LEAD_MFG: NORMAL
MDC_IDC_LEAD_MFG: NORMAL
MDC_IDC_LEAD_MODEL: NORMAL
MDC_IDC_LEAD_MODEL: NORMAL
MDC_IDC_LEAD_POLARITY_TYPE: NORMAL
MDC_IDC_LEAD_POLARITY_TYPE: NORMAL
MDC_IDC_LEAD_SERIAL: NORMAL
MDC_IDC_LEAD_SERIAL: NORMAL
MDC_IDC_LEAD_SPECIAL_FUNCTION: NORMAL
MDC_IDC_LEAD_SPECIAL_FUNCTION: NORMAL
MDC_IDC_MSMT_BATTERY_DTM: NORMAL
MDC_IDC_MSMT_BATTERY_REMAINING_LONGEVITY: 138 MO
MDC_IDC_MSMT_BATTERY_REMAINING_PERCENTAGE: 100 %
MDC_IDC_MSMT_BATTERY_STATUS: NORMAL
MDC_IDC_MSMT_LEADCHNL_RA_IMPEDANCE_VALUE: 682 OHM
MDC_IDC_MSMT_LEADCHNL_RV_IMPEDANCE_VALUE: 452 OHM
MDC_IDC_MSMT_LEADCHNL_RV_PACING_THRESHOLD_AMPLITUDE: 1 V
MDC_IDC_MSMT_LEADCHNL_RV_PACING_THRESHOLD_PULSEWIDTH: 0.4 MS
MDC_IDC_PG_IMPLANT_DTM: NORMAL
MDC_IDC_PG_MFG: NORMAL
MDC_IDC_PG_MODEL: NORMAL
MDC_IDC_PG_SERIAL: NORMAL
MDC_IDC_PG_TYPE: NORMAL
MDC_IDC_SESS_CLINIC_NAME: NORMAL
MDC_IDC_SESS_DTM: NORMAL
MDC_IDC_SESS_TYPE: NORMAL
MDC_IDC_SET_BRADY_AT_MODE_SWITCH_MODE: NORMAL
MDC_IDC_SET_BRADY_AT_MODE_SWITCH_RATE: 140 {BEATS}/MIN
MDC_IDC_SET_BRADY_LOWRATE: 60 {BEATS}/MIN
MDC_IDC_SET_BRADY_MAX_SENSOR_RATE: 130 {BEATS}/MIN
MDC_IDC_SET_BRADY_MAX_TRACKING_RATE: 100 {BEATS}/MIN
MDC_IDC_SET_BRADY_MODE: NORMAL
MDC_IDC_SET_BRADY_PAV_DELAY_HIGH: 80 MS
MDC_IDC_SET_BRADY_PAV_DELAY_LOW: 130 MS
MDC_IDC_SET_BRADY_SAV_DELAY_HIGH: 80 MS
MDC_IDC_SET_BRADY_SAV_DELAY_LOW: 130 MS
MDC_IDC_SET_LEADCHNL_RA_PACING_AMPLITUDE: 2 V
MDC_IDC_SET_LEADCHNL_RA_PACING_CAPTURE_MODE: NORMAL
MDC_IDC_SET_LEADCHNL_RA_PACING_POLARITY: NORMAL
MDC_IDC_SET_LEADCHNL_RA_PACING_PULSEWIDTH: 1 MS
MDC_IDC_SET_LEADCHNL_RA_SENSING_ADAPTATION_MODE: NORMAL
MDC_IDC_SET_LEADCHNL_RA_SENSING_POLARITY: NORMAL
MDC_IDC_SET_LEADCHNL_RA_SENSING_SENSITIVITY: 0.6 MV
MDC_IDC_SET_LEADCHNL_RV_PACING_AMPLITUDE: 1.6 V
MDC_IDC_SET_LEADCHNL_RV_PACING_CAPTURE_MODE: NORMAL
MDC_IDC_SET_LEADCHNL_RV_PACING_POLARITY: NORMAL
MDC_IDC_SET_LEADCHNL_RV_PACING_PULSEWIDTH: 0.4 MS
MDC_IDC_SET_LEADCHNL_RV_SENSING_ADAPTATION_MODE: NORMAL
MDC_IDC_SET_LEADCHNL_RV_SENSING_POLARITY: NORMAL
MDC_IDC_SET_LEADCHNL_RV_SENSING_SENSITIVITY: 1.5 MV
MDC_IDC_SET_ZONE_DETECTION_INTERVAL: 375 MS
MDC_IDC_SET_ZONE_TYPE: NORMAL
MDC_IDC_SET_ZONE_VENDOR_TYPE: NORMAL
MDC_IDC_STAT_AT_BURDEN_PERCENT: 1 %
MDC_IDC_STAT_AT_DTM_END: NORMAL
MDC_IDC_STAT_AT_DTM_START: NORMAL
MDC_IDC_STAT_BRADY_DTM_END: NORMAL
MDC_IDC_STAT_BRADY_DTM_START: NORMAL
MDC_IDC_STAT_BRADY_RA_PERCENT_PACED: 73 %
MDC_IDC_STAT_BRADY_RV_PERCENT_PACED: 100 %
MDC_IDC_STAT_EPISODE_RECENT_COUNT: 0
MDC_IDC_STAT_EPISODE_RECENT_COUNT: 30
MDC_IDC_STAT_EPISODE_RECENT_COUNT_DTM_END: NORMAL
MDC_IDC_STAT_EPISODE_RECENT_COUNT_DTM_START: NORMAL
MDC_IDC_STAT_EPISODE_TYPE: NORMAL
MDC_IDC_STAT_EPISODE_VENDOR_TYPE: NORMAL

## 2021-07-13 ENCOUNTER — ANTICOAGULATION THERAPY VISIT (OUTPATIENT)
Dept: ANTICOAGULATION | Facility: OTHER | Age: 66
End: 2021-07-13
Attending: FAMILY MEDICINE
Payer: MEDICARE

## 2021-07-13 ENCOUNTER — TRANSFERRED RECORDS (OUTPATIENT)
Dept: HEALTH INFORMATION MANAGEMENT | Facility: OTHER | Age: 66
End: 2021-07-13

## 2021-07-13 DIAGNOSIS — Z79.01 ANTICOAGULATION MONITORING, INR RANGE 2-3: ICD-10-CM

## 2021-07-13 DIAGNOSIS — D68.2 FACTOR V DEFICIENCY (H): Primary | ICD-10-CM

## 2021-07-13 LAB — INR PPP: 4

## 2021-07-13 NOTE — PROGRESS NOTES
ANTICOAGULATION FOLLOW-UP CLINIC VISIT    Patient Name:  Lyudmila Fitzgerald  Date:  2021  Contact Type:  Telephone/ spoke with patient reviewed dosing    SUBJECTIVE:  Patient Findings     Positives:  Change in medications (increased lasix dose), Other complaints (acute CHF with swollen legs, feet and abdomen)        Clinical Outcomes     Negatives:  Major bleeding event, Thromboembolic event, Anticoagulation-related hospital admission, Anticoagulation-related ED visit, Anticoagulation-related fatality           OBJECTIVE    Recent labs: (last 7 days)     21  0000   INR 4.0       ASSESSMENT / PLAN  INR assessment SUPRA    Recheck INR In: 1 WEEK    INR Location Home INR      Anticoagulation Summary  As of 2021    INR goal:  2.0-3.0   TTR:  45.0 % (1 y)   INR used for dosin.0 (2021)   Warfarin maintenance plan:  3 mg (6 mg x 0.5) every Mon, Fri; 6 mg (6 mg x 1) all other days   Full warfarin instructions:  : Hold; Otherwise 3 mg every Mon, Fri; 6 mg all other days   Weekly warfarin total:  36 mg   Plan last modified:  Karen Lorenzo RN (2021)   Next INR check:  2021   Priority:  High   Target end date:  Indefinite    Indications    Acute thromboembolism of deep veins of lower extremity (H) (Resolved) [I82.409]  Anticoagulation monitoring  INR range 2-3 [Z79.01]  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

## 2021-07-20 ENCOUNTER — ANTICOAGULATION THERAPY VISIT (OUTPATIENT)
Dept: ANTICOAGULATION | Facility: OTHER | Age: 66
End: 2021-07-20
Attending: FAMILY MEDICINE
Payer: MEDICARE

## 2021-07-20 ENCOUNTER — TRANSFERRED RECORDS (OUTPATIENT)
Dept: HEALTH INFORMATION MANAGEMENT | Facility: OTHER | Age: 66
End: 2021-07-20

## 2021-07-20 DIAGNOSIS — I66.9 CEREBRAL THROMBOSIS: ICD-10-CM

## 2021-07-20 DIAGNOSIS — Z79.01 ANTICOAGULATION MONITORING, INR RANGE 2-3: Primary | ICD-10-CM

## 2021-07-20 DIAGNOSIS — D68.2 FACTOR V DEFICIENCY (H): ICD-10-CM

## 2021-07-20 LAB — INR PPP: 1.7

## 2021-07-20 RX ORDER — WARFARIN SODIUM 6 MG/1
TABLET ORAL
Qty: 90 TABLET | Refills: 1
Start: 2021-07-20 | End: 2021-10-04

## 2021-07-20 NOTE — PROGRESS NOTES
ANTICOAGULATION FOLLOW-UP CLINIC VISIT    Patient Name:  Lyudmila Fitzgerald  Date:  2021  Contact Type:  Telephone/ spoke with patient reviewed dosing    SUBJECTIVE:  Patient Findings     Comments:  Patient denies any identifiable changes that caused the subtherapeutic INR.           Clinical Outcomes     Negatives:  Major bleeding event, Thromboembolic event, Anticoagulation-related hospital admission, Anticoagulation-related ED visit, Anticoagulation-related fatality    Comments:  Patient denies any identifiable changes that caused the subtherapeutic INR.              OBJECTIVE    Recent labs: (last 7 days)     21  0000   INR 1.7       ASSESSMENT / PLAN  INR assessment SUB    Recheck INR In: 2 WEEKS    INR Location Home INR      Anticoagulation Summary  As of 2021    INR goal:  2.0-3.0   TTR:  45.8 % (1 y)   INR used for dosin.7 (2021)   Warfarin maintenance plan:  3 mg (6 mg x 0.5) every Mon, Wed, Fri; 6 mg (6 mg x 1) all other days   Full warfarin instructions:  3 mg every Mon, Wed, Fri; 6 mg all other days   Weekly warfarin total:  33 mg   Plan last modified:  Nyla Walker, RN (2021)   Next INR check:  8/3/2021   Priority:  High   Target end date:  Indefinite    Indications    Acute thromboembolism of deep veins of lower extremity (H) (Resolved) [I82.409]  Anticoagulation monitoring  INR range 2-3 [Z79.01]  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

## 2021-07-21 ENCOUNTER — OFFICE VISIT (OUTPATIENT)
Dept: FAMILY MEDICINE | Facility: OTHER | Age: 66
End: 2021-07-21
Attending: FAMILY MEDICINE
Payer: MEDICARE

## 2021-07-21 VITALS
TEMPERATURE: 97.6 F | HEART RATE: 64 BPM | WEIGHT: 154 LBS | SYSTOLIC BLOOD PRESSURE: 136 MMHG | DIASTOLIC BLOOD PRESSURE: 80 MMHG | BODY MASS INDEX: 25.66 KG/M2 | OXYGEN SATURATION: 98 % | RESPIRATION RATE: 18 BRPM

## 2021-07-21 DIAGNOSIS — Z11.4 ENCOUNTER FOR SCREENING FOR HIV: ICD-10-CM

## 2021-07-21 DIAGNOSIS — Z23 NEED FOR VACCINATION FOR PNEUMOCOCCUS: ICD-10-CM

## 2021-07-21 DIAGNOSIS — Z78.0 ASYMPTOMATIC MENOPAUSE: ICD-10-CM

## 2021-07-21 DIAGNOSIS — F51.04 CHRONIC INSOMNIA: ICD-10-CM

## 2021-07-21 DIAGNOSIS — E11.9 TYPE 2 DIABETES MELLITUS WITHOUT COMPLICATION, WITHOUT LONG-TERM CURRENT USE OF INSULIN (H): ICD-10-CM

## 2021-07-21 DIAGNOSIS — I66.9 CEREBRAL THROMBOSIS: ICD-10-CM

## 2021-07-21 DIAGNOSIS — I20.1 CORONARY ARTERY SPASM (H): ICD-10-CM

## 2021-07-21 DIAGNOSIS — Z00.00 ENCOUNTER FOR MEDICARE ANNUAL WELLNESS EXAM: Primary | ICD-10-CM

## 2021-07-21 DIAGNOSIS — Z11.59 ENCOUNTER FOR HEPATITIS C SCREENING TEST FOR LOW RISK PATIENT: ICD-10-CM

## 2021-07-21 DIAGNOSIS — D58.2 ELEVATED HEMOGLOBIN (H): Primary | ICD-10-CM

## 2021-07-21 LAB
ALBUMIN SERPL-MCNC: 5.1 G/DL (ref 3.5–5.7)
ALP SERPL-CCNC: 81 U/L (ref 34–104)
ALT SERPL W P-5'-P-CCNC: 19 U/L (ref 7–52)
ANION GAP SERPL CALCULATED.3IONS-SCNC: 12 MMOL/L (ref 3–14)
AST SERPL W P-5'-P-CCNC: 24 U/L (ref 13–39)
BILIRUB SERPL-MCNC: 0.7 MG/DL (ref 0.3–1)
BUN SERPL-MCNC: 11 MG/DL (ref 7–25)
CALCIUM SERPL-MCNC: 10.2 MG/DL (ref 8.6–10.3)
CHLORIDE BLD-SCNC: 101 MMOL/L (ref 98–107)
CHOLEST SERPL-MCNC: 265 MG/DL
CO2 SERPL-SCNC: 26 MMOL/L (ref 21–31)
CREAT SERPL-MCNC: 0.97 MG/DL (ref 0.6–1.2)
ERYTHROCYTE [DISTWIDTH] IN BLOOD BY AUTOMATED COUNT: 13.6 % (ref 10–15)
FASTING STATUS PATIENT QL REPORTED: ABNORMAL
GFR SERPL CREATININE-BSD FRML MDRD: 61 ML/MIN/1.73M2
GLUCOSE BLD-MCNC: 102 MG/DL (ref 70–105)
HBA1C MFR BLD: 5.6 % (ref 4–6.2)
HCT VFR BLD AUTO: 47.8 % (ref 35–47)
HDLC SERPL-MCNC: 93 MG/DL (ref 23–92)
HGB BLD-MCNC: 16.2 G/DL (ref 11.7–15.7)
HIV 1+2 AB+HIV1P24 AG SERPLBLD IA.RAPID: NON REACTIVE
HIV 1+2 AB+HIV1P24 AG SERPLBLD IA.RAPID: NON REACTIVE
HIV INTERPRETATION: NORMAL
LDLC SERPL CALC-MCNC: 146 MG/DL
MCH RBC QN AUTO: 32.6 PG (ref 26.5–33)
MCHC RBC AUTO-ENTMCNC: 33.9 G/DL (ref 31.5–36.5)
MCV RBC AUTO: 96 FL (ref 78–100)
NONHDLC SERPL-MCNC: 172 MG/DL
PLATELET # BLD AUTO: 319 10E3/UL (ref 150–450)
POTASSIUM BLD-SCNC: 3.8 MMOL/L (ref 3.5–5.1)
PROT SERPL-MCNC: 8.4 G/DL (ref 6.4–8.9)
RBC # BLD AUTO: 4.97 10E6/UL (ref 3.8–5.2)
SODIUM SERPL-SCNC: 139 MMOL/L (ref 134–144)
TRIGL SERPL-MCNC: 128 MG/DL
TSH SERPL DL<=0.005 MIU/L-ACNC: 1.94 MU/L (ref 0.4–4)
WBC # BLD AUTO: 7.5 10E3/UL (ref 4–11)

## 2021-07-21 PROCEDURE — 83036 HEMOGLOBIN GLYCOSYLATED A1C: CPT | Mod: ZL | Performed by: FAMILY MEDICINE

## 2021-07-21 PROCEDURE — G0463 HOSPITAL OUTPT CLINIC VISIT: HCPCS

## 2021-07-21 PROCEDURE — 82040 ASSAY OF SERUM ALBUMIN: CPT | Mod: ZL | Performed by: FAMILY MEDICINE

## 2021-07-21 PROCEDURE — 85027 COMPLETE CBC AUTOMATED: CPT | Mod: ZL | Performed by: FAMILY MEDICINE

## 2021-07-21 PROCEDURE — G0009 ADMIN PNEUMOCOCCAL VACCINE: HCPCS

## 2021-07-21 PROCEDURE — 84443 ASSAY THYROID STIM HORMONE: CPT | Mod: ZL | Performed by: FAMILY MEDICINE

## 2021-07-21 PROCEDURE — 80061 LIPID PANEL: CPT | Mod: ZL | Performed by: FAMILY MEDICINE

## 2021-07-21 PROCEDURE — 82043 UR ALBUMIN QUANTITATIVE: CPT | Mod: ZL | Performed by: FAMILY MEDICINE

## 2021-07-21 PROCEDURE — 87806 HIV AG W/HIV1&2 ANTB W/OPTIC: CPT | Mod: ZL | Performed by: FAMILY MEDICINE

## 2021-07-21 PROCEDURE — 86803 HEPATITIS C AB TEST: CPT | Mod: ZL | Performed by: FAMILY MEDICINE

## 2021-07-21 PROCEDURE — 36415 COLL VENOUS BLD VENIPUNCTURE: CPT | Mod: ZL | Performed by: FAMILY MEDICINE

## 2021-07-21 PROCEDURE — G0439 PPPS, SUBSEQ VISIT: HCPCS | Performed by: FAMILY MEDICINE

## 2021-07-21 RX ORDER — ZOLPIDEM TARTRATE 5 MG/1
TABLET ORAL
Qty: 30 TABLET | Refills: 5 | Status: SHIPPED | OUTPATIENT
Start: 2021-07-21 | End: 2021-09-22

## 2021-07-21 ASSESSMENT — PATIENT HEALTH QUESTIONNAIRE - PHQ9: SUM OF ALL RESPONSES TO PHQ QUESTIONS 1-9: 6

## 2021-07-21 ASSESSMENT — PAIN SCALES - GENERAL: PAINLEVEL: NO PAIN (0)

## 2021-07-21 NOTE — PATIENT INSTRUCTIONS
Compression socks: over the counter running socks, look for between 10-25mmHg, knee highs      Eye exam    Second pneumonia vaccine in a year     ambien prescription is sent:      You are currently being prescribed a controlled substance.  You need to be aware of the risks of taking this medication.  By signing a medication contract, you accept these risks and side effects.      Any medical treatment is initially a trial, and that continued prescribing is based on evidence of benefit without an unacceptable risk. Understand that the goal of using this medication is to increase functional level. If your symptoms do not significantly decrease and/or function increase, the medication will be stopped.    Be aware that the use of such medicine has certain risks associated with it, including, but not limited to:  Sleepiness or drowsiness, lightheadedness, dizziness, confusion,allergic reaction, slowing of breathing rate, slowing of reflexes or reaction time, sexual dysfunction,physical dependence, tolerance to analgesia, addiction, withdrawal and the possibility that the medicine will not completely take care of your symptoms.  Usage is associate with a significantly increased risk of falls andother unintended injuries.    The overuse of this medication can result in serious health risks including respiratory depression or even death.  These risks are increased when the medication is combined with alcohol,narcotics, or other sedatives.    Having these medications prescribed for you also means that your accept the risk of possible intentional or accidental use by those in your home or others with whom you come in contact.  This includes their possible diversion of your medications, overdose or death.      This medication will be strictly monitored and all of your medications should be filled at the same pharmacy.  (Should the need arise to change pharmacies our office must be informed).    It is your responsibility to  share that you are on a controlled substance contract with your other health care providers, including your dentist.        Patient Education        Personalized Prevention Plan  You are due for the preventive services outlined below.  Your care team is available to assist you in scheduling these services.  If you have already completed any of these items, please share that information with your care team to update in your medical record.  Health Maintenance Due   Topic Date Due     Osteoporosis Screening  Never done     Heart Failure Action Plan  Never done     Zoster (Shingles) Vaccine (1 of 2) Never done     Eye Exam  05/09/2020     Kidney Microalbumin Urine Test  03/09/2021

## 2021-07-21 NOTE — LETTER
My Depression Action Plan  Name: Lyudmila Fitzgerald   Date of Birth 1955  Date: 7/21/2021    My doctor: Erika Carrasco   My clinic: [facility]  [address]  [place] [address]  [phone]          GREEN    ZONE   Good Control    What it looks like:     Things are going generally well. You have normal ups and downs. You may even feel depressed from time to time, but bad moods usually last less than a day.   What you need to do:  1. Continue to care for yourself (see self care plan)  2. Check your depression survival kit and update it as needed  3. Follow your physician s recommendations including any medication.  4. Do not stop taking medication unless you consult with your physician first.           YELLOW         ZONE Getting Worse    What it looks like:     Depression is starting to interfere with your life.     It may be hard to get out of bed; you may be starting to isolate yourself from others.    Symptoms of depression are starting to last most all day and this has happened for several days.     You may have suicidal thoughts but they are not constant.   What you need to do:     1. Call your care team. Your response to treatment will improve if you keep your care team informed of your progress. Yellow periods are signs an adjustment may need to be made.     2. Continue your self-care.  Just get dressed and ready for the day.  Don't give yourself time to talk yourself out of it.    3. Talk to someone in your support network.    4. Open up your Depression Self-Care Plan/Wellness Kit.           RED    ZONE Medical Alert - Get Help    What it looks like:     Depression is seriously interfering with your life.     You may experience these or other symptoms: You can t get out of bed most days, can t work or engage in other necessary activities, you have trouble taking care of basic hygiene, or basic responsibilities, thoughts of suicide or death  that will not go away, self-injurious behavior.     What you need to do:  1. Call your care team and request a same-day appointment. If they are not available (weekends or after hours) call your local crisis line, emergency room or 911.          Depression Self-Care Plan / Wellness Kit    Many people find that medication and therapy are helpful treatments for managing depression. In addition, making small changes to your everyday life can help to boost your mood and improve your wellbeing. Below are some tips for you to consider. Be sure to talk with your medical provider and/or behavioral health consultant if your symptoms are worsening or not improving.     Sleep   Sleep hygiene  means all of the habits that support good, restful sleep. It includes maintaining a consistent bedtime and wake time, using your bedroom only for sleeping or sex, and keeping the bedroom dark and free of distractions like a computer, smartphone, or television.     Develop a Healthy Routine  Maintain good hygiene. Get out of bed in the morning, make your bed, brush your teeth, take a shower, and get dressed. Don t spend too much time viewing media that makes you feel stressed. Find time to relax each day.    Exercise  Get some form of exercise every day. This will help reduce pain and release endorphins, the  feel good  chemicals in your brain. It can be as simple as just going for a walk or doing some gardening, anything that will get you moving.      Diet  Strive to eat healthy foods, including fruits and vegetables. Drink plenty of water. Avoid excessive sugar, caffeine, alcohol, and other mood-altering substances.     Stay Connected with Others  Stay in touch with friends and family members.    Manage Your Mood  Try deep breathing, massage therapy, biofeedback, or meditation. Take part in fun activities when you can. Try to find something to smile about each day.     Psychotherapy  Be open to working with a therapist if your provider  recommends it.     Medication  Be sure to take your medication as prescribed. Most anti-depressants need to be taken every day. It usually takes several weeks for medications to work. Not all medicines work for all people. It is important to follow-up with your provider to make sure you have a treatment plan that is working for you. Do not stop your medication abruptly without first discussing it with your provider.    Crisis Resources   These hotlines are for both adults and children. They and are open 24 hours a day, 7 days a week unless noted otherwise.      National Suicide Prevention Lifeline   9-014-104-CWRC (5866)      Crisis Text Line    www.crisistextline.org  Text HOME to 966878 from anywhere in the [place], anytime, about any type of crisis. A live, trained crisis counselor will receive the text and respond quickly.      [employer] Lifeline for LGBTQ Youth  A national crisis intervention and suicide lifeline for LGBTQ youth under 25. Provides a safe place to talk without judgement. Call 1-636.698.1932; text START to 677949 or visit www.thetrevorproject.org to talk to a trained counselor.      For [facility] crisis numbers, visit the [facility] website at:  https://[place].gov/dhs/people-we-serve/adults/health-care/mental-health/resources/crisis-contacts.jsp

## 2021-07-21 NOTE — NURSING NOTE
"Chief Complaint   Patient presents with     Medicare Visit     Patient is here for Medicare visit     Initial BP (!) 144/78 (BP Location: Right arm, Patient Position: Sitting, Cuff Size: Adult Regular)   Pulse 64   Temp 97.6  F (36.4  C) (Tympanic)   Resp 18   Wt 69.9 kg (154 lb)   SpO2 98%   Breastfeeding No   BMI 25.66 kg/m   Estimated body mass index is 25.66 kg/m  as calculated from the following:    Height as of 5/11/21: 1.65 m (5' 4.96\").    Weight as of this encounter: 69.9 kg (154 lb).  Medication Reconciliation: complete    Renetta Pan MA     FOOD SECURITY SCREENING QUESTIONS  Hunger Vital Signs:  Within the past 12 months we worried whether our food would run out before we got money to buy more. Never  Within the past 12 months the food we bought just didn't last and we didn't have money to get more. Never     Renetta Pan MA 7/21/2021 2:01 PM    Immunization Documentation    Prior to Immunization administration, verified patients identity using patient's name and date of birth. Please see IMMUNIZATIONS  and order for additional information.  Patient / Parent instructed to remain in clinic for 15 minutes and report any adverse reaction to staff immediately.    Was the entire amount of vaccines given used? Yes    Renetta Pan MA  7/21/2021   3:02 PM      "

## 2021-07-21 NOTE — PROGRESS NOTES
"SUBJECTIVE:   Lyudmila Fitzgerald is a 65 year old female who presents for Preventive Visit.      Patient has been advised of split billing requirements and indicates understanding: Yes  Are you in the first 12 months of your Medicare Part B coverage?  No    Physical Health:    In general, how would you rate your overall physical health? poor    Outside of work, how many days during the week do you exercise? none- per [facility], she cannot get her heart rate past 100    Outside of work, approximately how many minutes a day do you exercise?not applicable    If you drink alcohol do you typically have >3 drinks per day or >7 drinks per week? No    Do you usually eat at least 4 servings of fruit and vegetables a day, include whole grains & fiber and avoid regularly eating high fat or \"junk\" foods? NO    Do you have any problems taking medications regularly?  No    Do you have any side effects from medications? none    Needs assistance for the following daily activities: no assistance needed    Which of the following safety concerns are present in your home?  throw rugs in the hallway and lack of grab bars in the bathroom     Hearing impairment: No    In the past 6 months, have you been bothered by leaking of urine? no    Mental Health:    In general, how would you rate your overall mental or emotional health? good  PHQ-2 Score:      Do you feel safe in your environment? Yes    Have you ever done Advance Care Planning? (For example, a Health Directive, POLST, or a discussion with a medical provider or your loved ones about your wishes): Yes, advance care planning is on file.    Additional concerns to address?  Diastolic Congestive heart failure (diagnosed at [facility]); has seen Dr. Islas in regards to it.     Fall risk:  Fallen 2 or more times in the past year?: No  Any fall with injury in the past year?: No    Cognitive Screenin) Repeat 3 items (Leader, Season, Table)    2) Clock draw: NORMAL  3) 3 item recall: " Recalls 2 objects   Results: NORMAL clock, 1-2 items recalled: COGNITIVE IMPAIRMENT LESS LIKELY    Mini-CogTM Copyright S Harjeet. Licensed by the author for use in [facility]; reprinted with permission (remigio@.[facility]). All rights reserved.      Do you have sleep apnea, excessive snoring or daytime drowsiness?: yes- snoring. Has had a sleep study in the past. Used to have a CPAP machine but is doing without it.     1.  Coronary artery spasm, cardiac arrhythmia, diastolic heart failure, a fib, pacemaker: Followed by cardiology with Dr. Islas and at [place]  2.  Type 2 diabetes,   Lab Results   Component Value Date    A1C 5.8 03/09/2020    A1C 6.1 09/27/2019      3.  Nerve stimulator placement this spring for chronic chest pain, this has worked well for her.  When she does need to take ntg, 2 tabs will typically work now. Episodes are about 3 times a week.  4. Needs dexa scan done -not previously obtained  5.  Chronic insomnia.  Takes ambien for this. Works consistently well for her  6.  Anticoagulated  - long term coumadin  - FVL and history of stroke and VTE  7.  LE edema  -felt to be due to diastolic dysfunction. Recently had Lasix added to her medication. She states prior to this, she was told that she had kidney disease/kidney failure. She is uncertain what stage this was. She was not referred to a nephrologist.  8.  Zoloft -is that this is working well for her  9.  Breast biopsy done this past winter  -benign  10. She has received her first dose of Covid vaccine. The day following this, she states she had an episode of A. fib with RVR, rate in the 200s. Due to this, she is not gotten her second dose.        Reviewed and updated as needed this visit by clinical staff  Tobacco  Allergies  Meds     Fam Hx  Soc Hx        Reviewed and updated as needed this visit by Provider        Fam Hx         Social History     Tobacco Use     Smoking status: Never Smoker     Smokeless tobacco: Never Used     Tobacco  comment: Quit smoking: spouse does not smoke in the house. Exposed in cars.   Substance Use Topics     Alcohol use: Not Currently     Comment: Alcoholic Drinks/day: Alcoholic Drinks/day: 1-2 drinks a week                           Current providers sharing in care for this patient include:   Patient Care Team:  Erika Carrasco MD as PCP - General (Family Practice)  Erika Carrasco MD as Assigned PCP  Xiao Davis, RN as Specialty Care Coordinator (Cardiology)  Hakeem Moore MD as MD (Clinical Cardiac Electrophysiology)  Tima Johnson MD as MD (Clinical Cardiac Electrophysiology)  Moises Islas DO as Assigned Heart and Vascular Provider  Lynsey Fang MD as Assigned Surgical Provider    The following health maintenance items are reviewed in Epic and correct as of today:  Health Maintenance   Topic Date Due     DEXA  Never done     HF ACTION PLAN  Never done     HEPATITIS C SCREENING  Never done     ZOSTER IMMUNIZATION (1 of 2) Never done     EYE EXAM  05/09/2020     MICROALBUMIN  03/09/2021     INFLUENZA VACCINE (1) 09/01/2021     A1C  10/21/2021     BMP  01/21/2022     PHQ-9  01/21/2022     FALL RISK ASSESSMENT  05/11/2022     MEDICARE ANNUAL WELLNESS VISIT  07/21/2022     ALT  07/21/2022     LIPID  07/21/2022     DIABETIC FOOT EXAM  07/21/2022     CBC  07/21/2022     MAMMO SCREENING  01/19/2023     COLORECTAL CANCER SCREENING  03/13/2023     ADVANCE CARE PLANNING  07/21/2026     DTAP/TDAP/TD IMMUNIZATION (3 - Td or Tdap) 09/27/2029     TSH W/FREE T4 REFLEX  Completed     HIV SCREENING  Completed     Pneumococcal Vaccine: Pediatrics (0 to 5 Years) and At-Risk Patients (6 to 64 Years)  Completed     Pneumococcal Vaccine: 65+ Years  Completed     DEPRESSION ACTION PLAN  Addressed     IPV IMMUNIZATION  Aged Out     MENINGITIS IMMUNIZATION  Aged Out     COVID-19 Vaccine  Discontinued         ROS:  DDS visits every 6 months  Eye appointment is due   Feet feel cold  Decreased  "exercise/tolerance this past 3 to 4 months      OBJECTIVE:   /80 (BP Location: Right arm, Patient Position: Sitting, Cuff Size: Adult Regular)   Pulse 64   Temp 97.6  F (36.4  C) (Tympanic)   Resp 18   Wt 69.9 kg (154 lb)   SpO2 98%   Breastfeeding No   BMI 25.66 kg/m   Estimated body mass index is 25.66 kg/m  as calculated from the following:    Height as of 5/11/21: 1.65 m (5' 4.96\").    Weight as of this encounter: 69.9 kg (154 lb).  EXAM:   GENERAL: healthy, alert and no distress  NECK: no adenopathy, no asymmetry, masses, or scars and thyroid normal to palpation  RESP: lungs clear to auscultation - no rales, rhonchi or wheezes  CV: regular rate, very consistent rhythm.  Grade 3 systolic murmur; distal pulses are normal  Breast: No tenderness, no nipple retraction; pacemaker left upper chest wall  ABDOMEN: soft, nontender, no hepatosplenomegaly, no masses and bowel sounds normal  MS: On her right lower back extending caudally her nerve stimulator is palpable; 1+ lower extremity edema of feet and ankles.  Derm: Scattered seborrheic keratoses across her back        ASSESSMENT / PLAN:       ICD-10-CM    1. Encounter for Medicare annual wellness exam  Z00.00    2. Chronic insomnia  F51.04 zolpidem (AMBIEN) 5 MG tablet   3. Cerebral thrombosis  I66.9 CBC W PLT No Diff     CBC W PLT No Diff   4. Coronary artery spasm (H)  I20.1 Lipid Panel     Comprehensive Metabolic Panel     Comprehensive Metabolic Panel     Lipid Panel   5. Diabetes mellitus, type II   E11.9 Hemoglobin A1c     Lipid Panel     TSH     Comprehensive Metabolic Panel     Albumin Random Urine Quantitative with Creat Ratio     Comprehensive Metabolic Panel     TSH     Lipid Panel     Hemoglobin A1c     CANCELED: Albumin Random Urine Quantitative with Creat Ratio   6. Asymptomatic menopause  Z78.0 DX Hip/Pelvis/Spine   7. Encounter for screening for HIV  Z11.4 HIV Rapid Antibody Screen     HIV Rapid Antibody Screen   8. Encounter for " "hepatitis C screening test for low risk patient  Z11.59 Hepatitis C Screen Reflex to HCV RNA Quant and Genotype     Hepatitis C Screen Reflex to HCV RNA Quant and Genotype   9. Need for vaccination for pneumococcus  Z23          1.  Patient agrees to hepatitis C and HIV screening.  She is a low risk individual.  2.  Bone density screening, not previously obtained.  3.  Pneumococcal vaccine updated today.  Flu vaccine recommended this fall.  I have also recommended that she receive shingles vaccine.  4.  Eye exam is due.  5.  Labs due today include lipid panel, metabolic panel, A1c, TSH, urine microalbumin  6.  Reviewed follow-up specialty care.  7.  Patient had made comment above regarding renal function.  She is aware that renal function is being assessed today.  8.  Discussed chronic insomnia.  Discussed risks of short-term and long-term use of Ambien.  Discussed and reviewed quantity versus quality differences.  After  is reviewed, (last months of refills of Ambien were from cardiology/Dr. Islas), I have refilled Ambien x6 months.  Patient education material regarding this medication was provided for her today.  PDMP Review       Value Time User    State PDMP site checked  Yes 7/21/2021  6:16 PM Erika Carrasco MD      9.  Discussed use of compression stockings for lower extremity edema, especially given concerns for renal function.      Patient has been advised of split billing requirements and indicates understanding: Yes    COUNSELING:  Reviewed preventive health counseling, as reflected in patient instructions       Healthy diet/nutrition       Vision screening       Fall risk prevention       Osteoporosis prevention/bone health       Hepatitis C screening       Advanced Planning        HIV screening for low risk pt    Estimated body mass index is 25.66 kg/m  as calculated from the following:    Height as of 5/11/21: 1.65 m (5' 4.96\").    Weight as of this encounter: 69.9 kg (154 " lb).        She reports that she has never smoked. She has never used smokeless tobacco.    Appropriate preventive services were discussed with this patient, including applicable screening as appropriate for cardiovascular disease, diabetes, osteopenia/osteoporosis, and glaucoma.  As appropriate for age/gender, discussed screening for colorectal cancer, prostate cancer, breast cancer, and cervical cancer. Checklist reviewing preventive services available has been given to the patient.    Reviewed patients plan of care and provided an AVS. The Complex Care Plan (for patients with higher acuity and needing more deliberate coordination of services) for Lyudmila meets the Care Plan requirement. This Care Plan has been established and reviewed with the Patient.    Total time spent:  64 minutes.      Counseling Resources:  ATP IV Guidelines  Pooled Cohorts Equation Calculator  Breast Cancer Risk Calculator  BRCA-Related Cancer Risk Assessment: FHS-7 Tool  FRAX Risk Assessment  ICSI Preventive Guidelines  Dietary Guidelines for Americans, 2010  USDA's MyPlate  ASA Prophylaxis  Lung CA Screening    MARYURI SANDOVAL MD  [facility]

## 2021-07-22 LAB
CREAT UR-MCNC: 51 MG/DL
MICROALBUMIN UR-MCNC: 13 MG/DL
MICROALBUMIN/CREAT UR: 25.49 MG/G CR (ref 0–25)

## 2021-07-23 ENCOUNTER — MYC MEDICAL ADVICE (OUTPATIENT)
Dept: FAMILY MEDICINE | Facility: OTHER | Age: 66
End: 2021-07-23

## 2021-07-23 LAB — HCV AB SERPL QL IA: NONREACTIVE

## 2021-08-02 ENCOUNTER — ANTICOAGULATION THERAPY VISIT (OUTPATIENT)
Dept: ANTICOAGULATION | Facility: OTHER | Age: 66
End: 2021-08-02
Attending: FAMILY MEDICINE
Payer: MEDICARE

## 2021-08-02 ENCOUNTER — TRANSFERRED RECORDS (OUTPATIENT)
Dept: HEALTH INFORMATION MANAGEMENT | Facility: OTHER | Age: 66
End: 2021-08-02

## 2021-08-02 DIAGNOSIS — D68.2 FACTOR V DEFICIENCY (H): ICD-10-CM

## 2021-08-02 DIAGNOSIS — Z79.01 ANTICOAGULATION MONITORING, INR RANGE 2-3: Primary | ICD-10-CM

## 2021-08-02 LAB — INR (EXTERNAL): 3.4 (ref 2–3)

## 2021-08-02 NOTE — PROGRESS NOTES
"ANTICOAGULATION FOLLOW-UP CLINIC VISIT    Patient Name:  Lyudmila Fitzgerald  Date:  8/2/2021  Contact Type:  Telephone / Called patient and reviewed dosing.   SUBJECTIVE:  Patient Findings     Positives:  Extra doses    Comments:  Patient states, \" I am taking 3 mg on Monday and Fridays and 6 mg all other days'. Current order is for 3 mg M-W-F and 6 mg all other days. Recommended patient resume the 3 mg 3 days/wk and 6mg all others and recheck in a week. Patient verbalized understanding and intent to comply.         Clinical Outcomes     Negatives:  Major bleeding event, Thromboembolic event, Anticoagulation-related hospital admission, Anticoagulation-related ED visit, Anticoagulation-related fatality    Comments:  Patient states, \" I am taking 3 mg on Monday and Fridays and 6 mg all other days'. Current order is for 3 mg M-W-F and 6 mg all other days. Recommended patient resume the 3 mg 3 days/wk and 6mg all others and recheck in a week. Patient verbalized understanding and intent to comply.            OBJECTIVE    Recent labs: (last 7 days)     08/02/21  1106   INR 3.4*       ASSESSMENT / PLAN  INR assessment SUPRA    Recheck INR In: 1 WEEK    INR Location Home INR      Anticoagulation Summary  As of 8/2/2021    INR goal:  2.0-3.0   TTR:  47.6 % (1 y)   INR used for dosing:  3.4 (8/2/2021)   Warfarin maintenance plan:  3 mg (6 mg x 0.5) every Mon, Wed, Fri; 6 mg (6 mg x 1) all other days   Full warfarin instructions:  3 mg every Mon, Wed, Fri; 6 mg all other days   Weekly warfarin total:  33 mg   Plan last modified:  Nyla Walker, RN (7/20/2021)   Next INR check:  8/9/2021   Priority:  High   Target end date:  Indefinite    Indications    Acute thromboembolism of deep veins of lower extremity (H) (Resolved) [I82.409]  Anticoagulation monitoring  INR range 2-3 [Z79.01]  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]             Anticoagulation Episode Summary     INR check location:      Preferred lab:      Send INR reminders " to:  [facility]    Comments:  home monitor [employer] check INR q 2 weeks      Anticoagulation Care Providers     Provider Role Specialty Phone number    Erika Carrasco MD Referring Family Medicine [phone]            See the Encounter Report to view Anticoagulation Flowsheet and Dosing Calendar (Go to Encounters tab in chart review, and find the Anticoagulation Therapy Visit)    Reviewed signs and symptoms of bleeding, including extremecaution against falling and hitting head. Patient advised to seek medical attention if any bleeding or injury occurs. Follow-up per protocol.    Karen Lorenzo RN

## 2021-08-03 DIAGNOSIS — F51.04 CHRONIC INSOMNIA: ICD-10-CM

## 2021-08-05 ENCOUNTER — MYC MEDICAL ADVICE (OUTPATIENT)
Dept: CARDIOLOGY | Facility: OTHER | Age: 66
End: 2021-08-05

## 2021-08-05 ENCOUNTER — HOSPITAL ENCOUNTER (OUTPATIENT)
Dept: CARDIOLOGY | Facility: OTHER | Age: 66
Discharge: HOME | End: 2021-08-05
Attending: INTERNAL MEDICINE | Admitting: INTERNAL MEDICINE
Payer: MEDICARE

## 2021-08-05 DIAGNOSIS — R07.9 CHEST PAIN, UNSPECIFIED TYPE: ICD-10-CM

## 2021-08-05 DIAGNOSIS — R06.02 SHORTNESS OF BREATH: ICD-10-CM

## 2021-08-05 DIAGNOSIS — R60.0 LOWER EXTREMITY EDEMA: ICD-10-CM

## 2021-08-05 DIAGNOSIS — R07.89 OTHER CHEST PAIN: ICD-10-CM

## 2021-08-05 DIAGNOSIS — I50.9 CONGESTIVE HEART FAILURE, UNSPECIFIED HF CHRONICITY, UNSPECIFIED HEART FAILURE TYPE (H): ICD-10-CM

## 2021-08-05 LAB — LVEF ECHO: NORMAL

## 2021-08-05 PROCEDURE — 93306 TTE W/DOPPLER COMPLETE: CPT | Mod: 26 | Performed by: INTERNAL MEDICINE

## 2021-08-05 PROCEDURE — 93306 TTE W/DOPPLER COMPLETE: CPT

## 2021-08-05 RX ORDER — ZOLPIDEM TARTRATE 5 MG/1
TABLET ORAL
Qty: 30 TABLET | Refills: 0 | OUTPATIENT
Start: 2021-08-05

## 2021-08-05 NOTE — TELEPHONE ENCOUNTER
[id number] of [place] sent Rx request for the following:      Requested Prescriptions   Pending Prescriptions Disp Refills     zolpidem (AMBIEN) 5 MG tablet [Pharmacy Med Name: Zolpidem Tartrate 5 MG Oral Tablet] 30 tablet 0     Sig: TAKE 1 TABLET BY MOUTH AT BEDTIME AS NEEDED FOR SLEEP       There is no refill protocol information for this order          Last Prescription Date:   7/21/21  Last Fill Qty/Refills:         30, R-5   Last Office Visit:              7/21/21  Future Office visit:           None  Routing refill request to provider for review/approval because:  Drug not on the [facility], [facility] or [facility] refill protocol or controlled substance. Refill request is to soon. Refilled on 7/21/21.   Outpatient Medication Detail     Disp Refills Start End DARYL   zolpidem (AMBIEN) 5 MG tablet 30 tablet 5 7/21/2021  No   Sig: TAKE 1 TABLET BY MOUTH AT BEDTIME AS NEEDED FOR  SLEEP.   Sent to pharmacy as: Zolpidem Tartrate 5 MG Oral Tablet (AMBIEN)   Class: E-Prescribe   Order: [id number]   E-Prescribing Status: Receipt confirmed by pharmacy (7/21/2021  2:27 PM CDT)   Printout Tracking    External Result Report   Medication Administration Instructions    TAKE 1 TABLET BY MOUTH AT BEDTIME AS NEEDED FOR  SLEEP.   Pharmacy    [id number] [address]za RN on 8/5/2021 at 8:53 AM

## 2021-08-06 NOTE — TELEPHONE ENCOUNTER
Patient return call patient was given medications per Dr. Islas.  Patient will get in contact with Doctor from the [place] to ask the specific questions she had.  She was in agreement with calling and checking with the doctor from the [facility].  Stephanie Ledesma RN on 8/6/2021 at 3:17 PM    Left message on machine to call back.  Stephanie Ledesma RN on 8/6/2021 at 2:55 PM    Moises Islas, DO  Green, Stephanie M, RN  Caller: Unspecified (Yesterday,  7:15 PM)  Phone Number: [phone]   She is asking really good questions but I can't speak for him. If she is able, I would have her send him a [employer] message or try to contact the doctor she saw at [place]. I just don't want to miss speak for him. I am hoping that he can clarify.       Dr. Islas

## 2021-08-09 ENCOUNTER — TRANSFERRED RECORDS (OUTPATIENT)
Dept: HEALTH INFORMATION MANAGEMENT | Facility: OTHER | Age: 66
End: 2021-08-09

## 2021-08-09 ENCOUNTER — ANTICOAGULATION THERAPY VISIT (OUTPATIENT)
Dept: ANTICOAGULATION | Facility: OTHER | Age: 66
End: 2021-08-09
Attending: FAMILY MEDICINE
Payer: MEDICARE

## 2021-08-09 DIAGNOSIS — Z79.01 ANTICOAGULATION MONITORING, INR RANGE 2-3: Primary | ICD-10-CM

## 2021-08-09 DIAGNOSIS — D68.2 FACTOR V DEFICIENCY (H): ICD-10-CM

## 2021-08-09 LAB — INR (EXTERNAL): 3.1 (ref 0.9–1.1)

## 2021-08-09 NOTE — PROGRESS NOTES
ANTICOAGULATION FOLLOW-UP CLINIC VISIT    Patient Name:  Lyudmila Fitzgerald  Date:  8/9/2021  Contact Type:  Call to patient reviewed dosing    SUBJECTIVE:  Patient Findings         Clinical Outcomes     Negatives:  Major bleeding event, Thromboembolic event, Anticoagulation-related hospital admission, Anticoagulation-related ED visit, Anticoagulation-related fatality           OBJECTIVE    Recent labs: (last 7 days)     08/09/21  1456   INR 3.1*       ASSESSMENT / PLAN  INR assessment SUPRA    Recheck INR In: 1 WEEK    INR Location Home INR      Anticoagulation Summary  As of 8/9/2021    INR goal:  2.0-3.0   TTR:  45.6 % (1 y)   INR used for dosing:  3.1 (8/9/2021)   Warfarin maintenance plan:  3 mg (6 mg x 0.5) every Mon, Wed, Fri; 6 mg (6 mg x 1) all other days   Full warfarin instructions:  3 mg every Mon, Wed, Fri; 6 mg all other days   Weekly warfarin total:  33 mg   Plan last modified:  Nyla Walker RN (7/20/2021)   Next INR check:  8/16/2021   Priority:  High   Target end date:  Indefinite    Indications    Acute thromboembolism of deep veins of lower extremity (H) (Resolved) [I82.409]  Anticoagulation monitoring  INR range 2-3 [Z79.01]  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

## 2021-08-23 ENCOUNTER — TRANSFERRED RECORDS (OUTPATIENT)
Dept: HEALTH INFORMATION MANAGEMENT | Facility: OTHER | Age: 66
End: 2021-08-23

## 2021-08-23 LAB — INR (EXTERNAL): 2.8 (ref 0.9–1.1)

## 2021-08-24 ENCOUNTER — ANTICOAGULATION THERAPY VISIT (OUTPATIENT)
Dept: ANTICOAGULATION | Facility: OTHER | Age: 66
End: 2021-08-24
Attending: FAMILY MEDICINE
Payer: MEDICARE

## 2021-08-24 DIAGNOSIS — D68.2 FACTOR V DEFICIENCY (H): ICD-10-CM

## 2021-08-24 DIAGNOSIS — Z79.01 ANTICOAGULATION MONITORING, INR RANGE 2-3: Primary | ICD-10-CM

## 2021-08-24 NOTE — PROGRESS NOTES
ANTICOAGULATION FOLLOW-UP CLINIC VISIT    Patient Name:  Lyudmila Fitzgerald  Date:  2021  Contact Type:  No call needed. Pt to continue same dose     SUBJECTIVE:  Patient Findings         Clinical Outcomes     Negatives:  Major bleeding event, Thromboembolic event, Anticoagulation-related hospital admission, Anticoagulation-related ED visit, Anticoagulation-related fatality           OBJECTIVE    Recent labs: (last 7 days)     21  1730   INR 2.8*       ASSESSMENT / PLAN  INR assessment THER    Recheck INR In: 1 WEEK    INR Location Home INR      Anticoagulation Summary  As of 2021    INR goal:  2.0-3.0   TTR:  45.4 % (1 y)   INR used for dosin.8 (2021)   Warfarin maintenance plan:  3 mg (6 mg x 0.5) every Mon, Wed, Fri; 6 mg (6 mg x 1) all other days   Full warfarin instructions:  3 mg every Mon, Wed, Fri; 6 mg all other days   Weekly warfarin total:  33 mg   No change documented:  Amy Smith RN   Plan last modified:  Nyla Walker RN (2021)   Next INR check:  2021   Priority:  High   Target end date:  Indefinite    Indications    Acute thromboembolism of deep veins of lower extremity (H) (Resolved) [I82.409]  Anticoagulation monitoring  INR range 2-3 [Z79.01]  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

## 2021-09-01 ENCOUNTER — HOSPITAL ENCOUNTER (EMERGENCY)
Facility: OTHER | Age: 66
Discharge: HOME | End: 2021-09-01
Attending: PHYSICIAN ASSISTANT | Admitting: PHYSICIAN ASSISTANT
Payer: MEDICARE

## 2021-09-01 ENCOUNTER — APPOINTMENT (OUTPATIENT)
Dept: GENERAL RADIOLOGY | Facility: OTHER | Age: 66
End: 2021-09-01
Attending: PHYSICIAN ASSISTANT
Payer: MEDICARE

## 2021-09-01 VITALS
WEIGHT: 154 LBS | HEIGHT: 64 IN | DIASTOLIC BLOOD PRESSURE: 71 MMHG | OXYGEN SATURATION: 97 % | RESPIRATION RATE: 18 BRPM | SYSTOLIC BLOOD PRESSURE: 120 MMHG | BODY MASS INDEX: 26.29 KG/M2 | HEART RATE: 60 BPM | TEMPERATURE: 101.4 F

## 2021-09-01 DIAGNOSIS — R50.9 FEVER AND CHILLS: ICD-10-CM

## 2021-09-01 DIAGNOSIS — U07.1 CLINICAL DIAGNOSIS OF COVID-19: ICD-10-CM

## 2021-09-01 LAB
ALBUMIN SERPL-MCNC: 4 G/DL (ref 3.5–5.7)
ALBUMIN UR-MCNC: NEGATIVE MG/DL
ALP SERPL-CCNC: 75 U/L (ref 34–104)
ALT SERPL W P-5'-P-CCNC: 15 U/L (ref 7–52)
ANION GAP SERPL CALCULATED.3IONS-SCNC: 12 MMOL/L (ref 3–14)
APPEARANCE UR: CLEAR
AST SERPL W P-5'-P-CCNC: 21 U/L (ref 13–39)
BACTERIA #/AREA URNS HPF: ABNORMAL /HPF
BASOPHILS # BLD AUTO: 0.1 10E3/UL (ref 0–0.2)
BASOPHILS NFR BLD AUTO: 1 %
BILIRUB SERPL-MCNC: 0.4 MG/DL (ref 0.3–1)
BILIRUB UR QL STRIP: NEGATIVE
BUN SERPL-MCNC: 14 MG/DL (ref 7–25)
CALCIUM SERPL-MCNC: 9.2 MG/DL (ref 8.6–10.3)
CHLORIDE BLD-SCNC: 104 MMOL/L (ref 98–107)
CO2 SERPL-SCNC: 21 MMOL/L (ref 21–31)
COLOR UR AUTO: ABNORMAL
CREAT SERPL-MCNC: 0.95 MG/DL (ref 0.6–1.2)
CRP SERPL-MCNC: 9.8 MG/L
EOSINOPHIL # BLD AUTO: 0 10E3/UL (ref 0–0.7)
EOSINOPHIL NFR BLD AUTO: 0 %
ERYTHROCYTE [DISTWIDTH] IN BLOOD BY AUTOMATED COUNT: 13.2 % (ref 10–15)
FLUAV RNA SPEC QL NAA+PROBE: NEGATIVE
FLUBV RNA RESP QL NAA+PROBE: NEGATIVE
GFR SERPL CREATININE-BSD FRML MDRD: 63 ML/MIN/1.73M2
GLUCOSE BLD-MCNC: 86 MG/DL (ref 70–105)
GLUCOSE UR STRIP-MCNC: NEGATIVE MG/DL
HCT VFR BLD AUTO: 41.7 % (ref 35–47)
HGB BLD-MCNC: 14.2 G/DL (ref 11.7–15.7)
HGB UR QL STRIP: ABNORMAL
IMM GRANULOCYTES # BLD: 0 10E3/UL
IMM GRANULOCYTES NFR BLD: 0 %
INR PPP: 2.12 (ref 0.85–1.15)
KETONES UR STRIP-MCNC: NEGATIVE MG/DL
LACTATE SERPL-SCNC: 1.1 MMOL/L (ref 0.7–2)
LEUKOCYTE ESTERASE UR QL STRIP: NEGATIVE
LYMPHOCYTES # BLD AUTO: 0.7 10E3/UL (ref 0.8–5.3)
LYMPHOCYTES NFR BLD AUTO: 10 %
MCH RBC QN AUTO: 32 PG (ref 26.5–33)
MCHC RBC AUTO-ENTMCNC: 34.1 G/DL (ref 31.5–36.5)
MCV RBC AUTO: 94 FL (ref 78–100)
MONOCYTES # BLD AUTO: 0.8 10E3/UL (ref 0–1.3)
MONOCYTES NFR BLD AUTO: 11 %
MUCOUS THREADS #/AREA URNS LPF: PRESENT /LPF
NEUTROPHILS # BLD AUTO: 5.3 10E3/UL (ref 1.6–8.3)
NEUTROPHILS NFR BLD AUTO: 78 %
NITRATE UR QL: NEGATIVE
NRBC # BLD AUTO: 0 10E3/UL
NRBC BLD AUTO-RTO: 0 /100
PH UR STRIP: 6 [PH] (ref 5–9)
PLATELET # BLD AUTO: 220 10E3/UL (ref 150–450)
POTASSIUM BLD-SCNC: 3.7 MMOL/L (ref 3.5–5.1)
PROT SERPL-MCNC: 6.7 G/DL (ref 6.4–8.9)
RBC # BLD AUTO: 4.44 10E6/UL (ref 3.8–5.2)
RBC URINE: 14 /HPF
RSV RNA SPEC NAA+PROBE: NEGATIVE
SARS-COV-2 RNA RESP QL NAA+PROBE: POSITIVE
SODIUM SERPL-SCNC: 137 MMOL/L (ref 134–144)
SP GR UR STRIP: 1.01 (ref 1–1.03)
SQUAMOUS EPITHELIAL: 2 /HPF
TROPONIN I SERPL-MCNC: 12 PG/ML (ref 0–34)
TROPONIN I SERPL-MCNC: 12.5 PG/ML (ref 0–34)
UROBILINOGEN UR STRIP-MCNC: NORMAL MG/DL
WBC # BLD AUTO: 6.8 10E3/UL (ref 4–11)
WBC URINE: 1 /HPF

## 2021-09-01 PROCEDURE — 87631 RESP VIRUS 3-5 TARGETS: CPT | Performed by: PHYSICIAN ASSISTANT

## 2021-09-01 PROCEDURE — 96360 HYDRATION IV INFUSION INIT: CPT | Performed by: PHYSICIAN ASSISTANT

## 2021-09-01 PROCEDURE — 99285 EMERGENCY DEPT VISIT HI MDM: CPT | Mod: 25 | Performed by: PHYSICIAN ASSISTANT

## 2021-09-01 PROCEDURE — 93010 ELECTROCARDIOGRAM REPORT: CPT | Performed by: INTERNAL MEDICINE

## 2021-09-01 PROCEDURE — U0005 INFEC AGEN DETEC AMPLI PROBE: HCPCS | Performed by: PHYSICIAN ASSISTANT

## 2021-09-01 PROCEDURE — 96361 HYDRATE IV INFUSION ADD-ON: CPT | Performed by: PHYSICIAN ASSISTANT

## 2021-09-01 PROCEDURE — 85025 COMPLETE CBC W/AUTO DIFF WBC: CPT | Performed by: PHYSICIAN ASSISTANT

## 2021-09-01 PROCEDURE — 83605 ASSAY OF LACTIC ACID: CPT | Performed by: PHYSICIAN ASSISTANT

## 2021-09-01 PROCEDURE — 85610 PROTHROMBIN TIME: CPT | Performed by: PHYSICIAN ASSISTANT

## 2021-09-01 PROCEDURE — 99284 EMERGENCY DEPT VISIT MOD MDM: CPT | Performed by: PHYSICIAN ASSISTANT

## 2021-09-01 PROCEDURE — 86140 C-REACTIVE PROTEIN: CPT | Performed by: PHYSICIAN ASSISTANT

## 2021-09-01 PROCEDURE — C9803 HOPD COVID-19 SPEC COLLECT: HCPCS | Performed by: PHYSICIAN ASSISTANT

## 2021-09-01 PROCEDURE — 93005 ELECTROCARDIOGRAM TRACING: CPT | Performed by: PHYSICIAN ASSISTANT

## 2021-09-01 PROCEDURE — 71045 X-RAY EXAM CHEST 1 VIEW: CPT

## 2021-09-01 PROCEDURE — 250N000013 HC RX MED GY IP 250 OP 250 PS 637: Performed by: PHYSICIAN ASSISTANT

## 2021-09-01 PROCEDURE — 84484 ASSAY OF TROPONIN QUANT: CPT | Performed by: PHYSICIAN ASSISTANT

## 2021-09-01 PROCEDURE — 82040 ASSAY OF SERUM ALBUMIN: CPT | Performed by: PHYSICIAN ASSISTANT

## 2021-09-01 PROCEDURE — 87040 BLOOD CULTURE FOR BACTERIA: CPT | Performed by: PHYSICIAN ASSISTANT

## 2021-09-01 PROCEDURE — 36415 COLL VENOUS BLD VENIPUNCTURE: CPT | Performed by: PHYSICIAN ASSISTANT

## 2021-09-01 PROCEDURE — 258N000003 HC RX IP 258 OP 636: Performed by: PHYSICIAN ASSISTANT

## 2021-09-01 PROCEDURE — 81003 URINALYSIS AUTO W/O SCOPE: CPT | Performed by: PHYSICIAN ASSISTANT

## 2021-09-01 RX ORDER — CODEINE PHOSPHATE AND GUAIFENESIN 10; 100 MG/5ML; MG/5ML
1-2 SOLUTION ORAL EVERY 4 HOURS PRN
Qty: 120 ML | Refills: 1 | Status: SHIPPED | OUTPATIENT
Start: 2021-09-01 | End: 2021-11-23

## 2021-09-01 RX ORDER — ALBUTEROL SULFATE 90 UG/1
2 AEROSOL, METERED RESPIRATORY (INHALATION) EVERY 4 HOURS PRN
Qty: 8 G | Refills: 1 | Status: SHIPPED | OUTPATIENT
Start: 2021-09-01 | End: 2021-11-23

## 2021-09-01 RX ORDER — SODIUM CHLORIDE 9 MG/ML
INJECTION, SOLUTION INTRAVENOUS CONTINUOUS
Status: DISCONTINUED | OUTPATIENT
Start: 2021-09-01 | End: 2021-09-01 | Stop reason: HOSPADM

## 2021-09-01 RX ORDER — NITROGLYCERIN 0.4 MG/1
0.4 TABLET SUBLINGUAL EVERY 5 MIN PRN
Status: DISCONTINUED | OUTPATIENT
Start: 2021-09-01 | End: 2021-09-01 | Stop reason: HOSPADM

## 2021-09-01 RX ORDER — ACETAMINOPHEN 500 MG
1000 TABLET ORAL ONCE
Status: COMPLETED | OUTPATIENT
Start: 2021-09-01 | End: 2021-09-01

## 2021-09-01 RX ADMIN — SODIUM CHLORIDE: 9 INJECTION, SOLUTION INTRAVENOUS at 18:10

## 2021-09-01 RX ADMIN — SODIUM CHLORIDE 1000 ML: 9 INJECTION, SOLUTION INTRAVENOUS at 17:10

## 2021-09-01 RX ADMIN — ACETAMINOPHEN 1000 MG: 500 TABLET, FILM COATED ORAL at 17:06

## 2021-09-01 RX ADMIN — NITROGLYCERIN 0.4 MG: 0.4 TABLET SUBLINGUAL at 17:29

## 2021-09-01 ASSESSMENT — ENCOUNTER SYMPTOMS
BACK PAIN: 0
COUGH: 1
ACTIVITY CHANGE: 1
NECK PAIN: 0
SEIZURES: 0
SORE THROAT: 0
STRIDOR: 0
SHORTNESS OF BREATH: 1
FLANK PAIN: 0
AGITATION: 0
ABDOMINAL PAIN: 0
VOMITING: 1
FACIAL SWELLING: 0
FEVER: 1
TREMORS: 0
WHEEZING: 0
CHILLS: 1
NAUSEA: 1
EYE PAIN: 0
WEAKNESS: 1

## 2021-09-01 ASSESSMENT — MIFFLIN-ST. JEOR: SCORE: 1223.54

## 2021-09-01 NOTE — ED TRIAGE NOTES
"Pt here with family, pt reports developing flu like symptoms last night with a fever, chills nausea, cough and chest pain, pt took tylenol and nitroglycerin this afternoon, pt has a cardiac hx, pt received one set of covid vaccine but not second due to side effects, VSS, pt brought back into Er to be evaluated  ED Nursing Triage Note (General)   ________________________________    Lyudmila ALO Fitzgerald is a 66 year old Female that presents to triage private car  With history of flu like symptoms reported by patient   Significant symptoms had onset 1 day(s) ago.  BP (!) 154/72   Pulse 71   Temp (!) 103.1  F (39.5  C) (Tympanic)   Resp 20   Ht 1.626 m (5' 4\")   Wt 69.9 kg (154 lb)   SpO2 96%   BMI 26.43 kg/m  t  Patient appears alert  and oriented, in mild distress., and cooperative and pleasant behavior.    GCS Total = 15  Airway: intact  Breathing noted as Normal  Circulation Normal  Skin:  Normal  Action taken:  Triage to critical care immediately      PRE HOSPITAL PRIOR LIVING SITUATION Spouse    "

## 2021-09-01 NOTE — ED PROVIDER NOTES
History     Chief Complaint   Patient presents with     Flu Symptoms     HPI  Lyudmila Fitzgerald is a 66 year old female who had sudden onset of fever and chills last night.  She reports having nausea, cough and chest pain.  The symptoms persist this morning and she took Tylenol as well as nitroglycerin.  Currently rates her chest pain as may be a 8 on a 1-10 scale but describes it as being pleuritic in nature.  She reports having a Mederna vaccination for Covid in March however shortly afterwards she developed severe tachycardia in the 200 range.  Her provider feels that this is an adverse reaction to the Covid and she never received a second vaccination.  She is here for further evaluation at this time.    Allergies:  Allergies   Allergen Reactions     Morphine Nausea and Vomiting     OK in small doses     Prednisone Nausea and Vomiting     Per IV     Sotalol Nausea and Vomiting       Problem List:    Patient Active Problem List    Diagnosis Date Noted     Lower extremity edema 07/08/2021     Priority: Medium     CHF (congestive heart failure) (H) 07/08/2021     Priority: Medium     Status post coronary angiogram 04/27/2021     Priority: Medium     Chest pain, unspecified type 04/08/2021     Priority: Medium     Chronic insomnia 08/25/2020     Priority: Medium     Depression, unspecified depression type 08/25/2020     Priority: Medium     Pacemaker battery depletion 05/12/2020     Priority: Medium     Added automatically from request for surgery [id number]       Pacemaker at end of battery life 05/12/2020     Priority: Medium     Added automatically from request for surgery [id number]       Anticoagulated on Coumadin 10/17/2019     Priority: Medium     Personal history of supraventricular tachycardia status post ablation 10/17/2019     Priority: Medium     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     Priority: Medium     Myocardial bridge-LAD 10/08/2019     Priority: Medium     History of coronary artery bypass graft x 1  with ROGERS to LAD on 11/15/2016 10/08/2019     Priority: Medium     History of heart failure with a reported EF of 15% now recovered 10/08/2019     Priority: Medium     History of coronary artery stent placement to the LAD x3 10/08/2019     Priority: Medium     History of stroke x7 with left hemiparesis-resolved 10/08/2019     Priority: Medium     History of cardiac radiofrequency ablation for SVT x8 10/08/2019     Priority: Medium     History of DVT to both upper and lower extremity 10/08/2019     Priority: Medium     Mixed hyperlipidemia 10/08/2019     Priority: Medium     History of coronary vasospasm 10/08/2019     Priority: Medium     Pacemaker 10/08/2019     Priority: Medium     Pacemaker-dependent due to native cardiac rhythm insufficient to support life 10/08/2019     Priority: Medium     S/P AV tessie ablation secondary to SVT 10/08/2019     Priority: Medium     Cerebral thrombosis 01/16/2018     Priority: Medium     Overview:   '95 w/ no residual       Other chest pain 01/16/2018     Priority: Medium     Benign neoplasm of colon 01/16/2018     Priority: Medium     Overview:   x 1. 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/2018     Priority: Medium     Overview:   Leiden deficiency-heterozygous       Paroxysmal supraventricular tachycardia (H) 01/16/2018     Priority: Medium     Overview:   s/p multiple ablations w last resulting in PPM       Shortness of breath 01/16/2018     Priority: Medium     Essential hypertension 01/16/2018     Priority: Medium     Anticoagulation monitoring, INR range 2-3 12/19/2017     Priority: Medium     Coronary artery spasm (H) 10/14/2015     Priority: Medium     ACP (advance care planning) 05/06/2014     Priority: Medium     Left-sided weakness 04/27/2014     Priority: Medium     Left foot drop 08/18/2013      Priority: Medium     Cardiac pacemaker in situ 07/30/2013     Priority: Medium     Overview:      -7/29/2013: generator change and pocket revision with Dr. Sweeney initially placed for a history of complete heart block, a dual chamber pacemaker with previous lead additions at [facility] and so she has four leads -- two that are capped and two that are functional. 7/29: atrial lead revision was deferred given the favorable testing and avoid the risk of laser extraction at this time.       Focal neurological deficit 09/24/2012     Priority: Medium     Diabetes mellitus, type II  07/12/2010     Priority: Medium     Constipation, chronic 06/16/2009     Priority: Medium     Urinary incontinence, mixed 06/16/2009     Priority: Medium     Hemiplegia, late effect of cerebrovascular disease (H) 06/01/2008     Priority: Medium     Disorder of bone and cartilage 07/01/2007     Priority: Medium     Overview:   lumbar spine. Next Dexa due 2011          Past Medical History:    Past Medical History:   Diagnosis Date     Acute thromboembolism of deep veins of lower extremity (H) 2006     Atrial fibrillation (H)      Cardiac pacemaker in situ 2003     Cerebral thrombosis      Coronary atherosclerosis      Endometrial hyperplasia 2006     Essential hypertension      History of other genital system and obstetric disorders      Other and unspecified angina pectoris      Other and unspecified hyperlipidemia      Other postprocedural states      Paroxysmal supraventricular tachycardia (H)      Personal history of transient ischemic attack (TIA) and cerebral infarction without residual deficit 04/2009     Primary hypercoagulable state (H)        Past Surgical History:    Past Surgical History:   Procedure Laterality Date     ARTHROSCOPY KNEE      01/90,Arthroscopy for chondromalacia patella     AS CABG, ARTERY-VEIN, SINGLE  11/15/2016    Single vessel; done in [place]     ATTEMPTED ARTHROSCOPY      02/04,Right arthroscopy     BIOPSY  "BREAST      03/08,Breast cyst aspiration     COLONOSCOPY      8/2007,follow up recommended in 10 years.     CV CORONARY ANGIOGRAM N/A 4/27/2021    Procedure: CV CORONARY ANGIOGRAM;  Surgeon: Nathaniel Grier MD;  Location:  HEART CARDIAC CATH LAB     ENDOSCOPIC SINUS SURGERY      03/04,Sinus node ablation.     EP ABLATION ATRIAL FLUTTER N/A 5/7/2020    Procedure: EP VENOGRAM SVC;  Surgeon: Hakeem Moore MD;  Location:  HEART CARDIAC CATH LAB     EP PACEMAKER N/A 5/20/2020    Procedure: EP PACEMAKER;  Surgeon: Tima Johnson MD;  Location:  HEART CARDIAC CATH LAB     EP STUDY  12/1994    EP STUDY /ABLATION,AV re-entry tachycardia, tessie ablation     HEART CATH, ANGIOPLASTY      01/06,Stenting placed LAD after ergonovine provocation confirmed     HEART CATH, ANGIOPLASTY      03/06,90% lesion, normal left ventricular function     IMPLANT PACEMAKER      03/03,Guidant pacemaker placement, AV tessie ablation     LAPAROSCOPIC ABLATION ENDOMETRIOSIS      06/06     OTHER SURGICAL HISTORY      3/24/06,437052,([place]) HC STENT ANGIO     OTHER SURGICAL HISTORY      08/02,248827,EP STUDY /ABLATION     OTHER SURGICAL HISTORY      11/04,70658.0,CT CORONARY ANGIOGRAM ([place]),Coronary angiogram, failed ablation     OTHER SURGICAL HISTORY      12/04,992060,[facility] RELOCATION OF SKIN POCKET PACEMAKER,[facility] 5/24/05 reconfiguration of pacemaker \"pocket\"     OTHER SURGICAL HISTORY      207882,IP CONSULT TO ELECTROPHYSIOLOGY,study and lead change     OTHER SURGICAL HISTORY      12/06,205897,NEUROSTIMULATOR,Nerve stimulator implanted for chest pain control     OTHER SURGICAL HISTORY      12/2008,99187.0,CT CORONARY ANGIOGRAM ([place]),with increased left-sided weakness and vertigo, negative CT angiogram.     REPLACE PACEMAKER GENERATOR      12/29/04,Replacement of left ventricular pacemaker lead.     STENT  02/2017    LAD        Family History:    Family History   Problem Relation Age of Onset     Skin Cancer Father         squamous "     Dementia Father          d/t COVID     Cerebrovascular Disease Father         CHF     Dementia Mother      No Known Problems Sister      Hyperlipidemia Brother      Breast Cancer Paternal Grandmother         Cancer-breast     Deep Vein Thrombosis Daughter         FVL +     Family History Negative Son         Good Health,[facility]     Breast Cancer Paternal Aunt         Cancer-breast       Social History:  Marital Status:   [2]  Social History     Tobacco Use     Smoking status: Never Smoker     Smokeless tobacco: Never Used     Tobacco comment: Quit smoking: spouse does not smoke in the house. Exposed in cars.   Vaping Use     Vaping Use: Never used   Substance Use Topics     Alcohol use: Not Currently     Comment: Alcoholic Drinks/day: Alcoholic Drinks/day: 1-2 drinks a week     Drug use: Never        Medications:    aspirin 81 MG EC tablet  atorvastatin (LIPITOR) 80 MG tablet  calcium carb 1250 mg, 500 mg [place],/vitamin D 200 unit (CALCIUM 500/D) 500-200 MG-UNIT per tablet  cilostazol (PLETAL) 100 MG tablet  diltiazem ER (TIAZAC) 360 MG 24 hr ER beaded capsule  enoxaparin ANTICOAGULANT (LOVENOX ANTICOAGULANT) 80 MG/0.8ML syringe  felodipine ER (PLENDIL) 5 MG 24 hr tablet  furosemide (LASIX) 20 MG tablet  isosorbide mononitrate (IMDUR) 30 MG 24 hr tablet  nitroGLYcerin (NITROSTAT) 0.4 MG sublingual tablet  ranolazine 1000 MG TB12  sertraline (ZOLOFT) 50 MG tablet  warfarin ANTICOAGULANT (COUMADIN) 6 MG tablet  zolpidem (AMBIEN) 5 MG tablet          Review of Systems   Constitutional: Positive for activity change, chills and fever.   HENT: Negative for facial swelling and sore throat.    Eyes: Negative for pain.   Respiratory: Positive for cough and shortness of breath. Negative for wheezing and stridor.    Cardiovascular: Positive for chest pain.        Pleuritic chest pain    Gastrointestinal: Positive for nausea and vomiting. Negative for abdominal pain.   Genitourinary: Negative for flank pain.  "  Musculoskeletal: Negative for back pain and neck pain.   Skin: Negative for pallor.   Neurological: Positive for weakness. Negative for tremors and seizures.   Psychiatric/Behavioral: Negative for agitation.   All other systems reviewed and are negative.      Physical Exam   BP: (!) 154/72  Pulse: 71  Temp: (!) 103.1  F (39.5  C)  Resp: 20  Height: 162.6 cm (5' 4\")  Weight: 69.9 kg (154 lb)  SpO2: 96 %      Physical Exam  Vitals and nursing note reviewed.   Constitutional:       General: She is not in acute distress.     Appearance: Normal appearance. She is not ill-appearing or toxic-appearing.   HENT:      Head: Normocephalic. No raccoon eyes, right periorbital erythema or left periorbital erythema.      Right Ear: No drainage or tenderness.      Left Ear: No drainage or tenderness.      Nose: Nose normal.   Eyes:      General: Lids are normal. Gaze aligned appropriately. No scleral icterus.     Extraocular Movements: Extraocular movements intact.   Neck:      Trachea: No tracheal deviation.   Cardiovascular:      Rate and Rhythm: Normal rate.   Pulmonary:      Effort: Pulmonary effort is normal. No respiratory distress.      Breath sounds: No stridor. No wheezing.      Comments: Lung sounds are clear but decreased.  SaO2 is 96% on room air.  She is not appear to be in any respiratory distress.  No tachypnea.  Has increased chest pain with deep inspiration.  Abdominal:      Tenderness: There is no abdominal tenderness.   Musculoskeletal:         General: No deformity or signs of injury. Normal range of motion.      Cervical back: Normal range of motion. No signs of trauma.   Skin:     General: Skin is warm and dry.      Coloration: Skin is not jaundiced or pale.   Neurological:      General: No focal deficit present.      Mental Status: She is alert and oriented to person, place, and time.      GCS: GCS eye subscore is 4. GCS verbal subscore is 5. GCS motor subscore is 6.      Motor: No tremor or seizure " activity.   Psychiatric:         Attention and Perception: Attention normal.         Mood and Affect: Mood normal.         ED Course     EKG shows atrial sensed ventricularly paced rhythm with a heart rate of 62.    Results for orders placed or performed during the hospital encounter of 09/01/21 (from the past 24 hour(s))   Symptomatic COVID-19 Virus (Coronavirus) by PCR Nasopharyngeal    Specimen: Nasopharyngeal; Swab   Result Value Ref Range    SARS CoV2 PCR Positive (A) Negative    Narrative    Testing was performed using the Xpert Xpress SARS-CoV-2 Assay on the   Cepheid Gene-Xpert Instrument Systems. Additional information about   this Emergency Use Authorization (EUA) assay can be found via the Lab   Guide. This test should be ordered for the detection of SARS-CoV-2 in   individuals who meet SARS-CoV-2 clinical and/or epidemiological   criteria. Test performance is unknown in asymptomatic patients. This   test is for in vitro diagnostic use under the FDA EUA for   laboratories certified under CLIA to perform high complexity testing.   This test has not been FDA cleared or approved. A negative result   does not rule out the presence of PCR inhibitors in the specimen or   target RNA in concentration below the limit of detection for the   assay. The possibility of a false negative should be considered if   the patient's recent exposure or clinical presentation suggests   COVID-19. This test was validated by [facility]. This laboratory is certified under the Clinic  al Laboratory Improvement Amendments (CLIA) as qualified to perform high complex  ity clinical laboratory testing.   CBC with platelets differential    Narrative    The following orders were created for panel order CBC with platelets differential.  Procedure                               Abnormality         Status                     ---------                               -----------         ------                      CBC with platelets and d...[[id number]]  Abnormal            Final result                 Please view results for these tests on the individual orders.   Lactic acid whole blood   Result Value Ref Range    Lactic Acid 1.1 0.7 - 2.0 mmol/L   CBC with platelets and differential   Result Value Ref Range    WBC Count 6.8 4.0 - 11.0 10e3/uL    RBC Count 4.44 3.80 - 5.20 10e6/uL    Hemoglobin 14.2 11.7 - 15.7 g/dL    Hematocrit 41.7 35.0 - 47.0 %    MCV 94 78 - 100 fL    MCH 32.0 26.5 - 33.0 pg    MCHC 34.1 31.5 - 36.5 g/dL    RDW 13.2 10.0 - 15.0 %    Platelet Count 220 150 - 450 10e3/uL    % Neutrophils 78 %    % Lymphocytes 10 %    % Monocytes 11 %    % Eosinophils 0 %    % Basophils 1 %    % Immature Granulocytes 0 %    NRBCs per 100 WBC 0 <1 /100    Absolute Neutrophils 5.3 1.6 - 8.3 10e3/uL    Absolute Lymphocytes 0.7 (L) 0.8 - 5.3 10e3/uL    Absolute Monocytes 0.8 0.0 - 1.3 10e3/uL    Absolute Eosinophils 0.0 0.0 - 0.7 10e3/uL    Absolute Basophils 0.1 0.0 - 0.2 10e3/uL    Absolute Immature Granulocytes 0.0 <=0.0 10e3/uL    Absolute NRBCs 0.0 10e3/uL       Medications   0.9% sodium chloride BOLUS (1,000 mLs Intravenous New Bag 9/1/21 1710)     Followed by   sodium chloride 0.9% infusion (has no administration in time range)   nitroGLYcerin (NITROSTAT) sublingual tablet 0.4 mg (has no administration in time range)   acetaminophen (TYLENOL) tablet 1,000 mg (1,000 mg Oral Given 9/1/21 1706)       Assessments & Plan (with Medical Decision Making)     I have reviewed the nursing notes.    I have reviewed the findings, diagnosis, plan and need for follow up with the patient.      Discharge Medication List as of 9/1/2021  8:22 PM      START taking these medications    Details   albuterol (PROAIR HFA) 108 (90 Base) MCG/ACT inhaler Inhale 2 puffs into the lungs every 4 hours as needed for shortness of breath / dyspnea, Disp-8 g, R-1, Local Print      guaiFENesin-codeine (ROBITUSSIN AC) 100-10 MG/5ML  solution Take 5-10 mLs by mouth every 4 hours as needed for cough, Disp-120 mL, R-1, Local Print             Final diagnoses:   Clinical diagnosis of COVID-19   Fever and chills   Febrile temp 103.1.  Vital signs stable.  Patient was given Tylenol for fever reduction.  Patient reporting some pleuritic type chest pain rates at 8/10.  IV established and she was given nitroglycerin initially.   EKG shows atrial sensed ventricularly paced rhythm with a heart rate of 62.  Initial troponin is normal.  CBC shows normal white blood cells no left shift.  CRP is normal.  Lactic acid normal.  Blood cultures are pending.  INR 2.12.  CMP is normal.  UA shows 14 red blood cells and a few bacteria but squamous cells are present so most likely contaminant.  Chest x-ray shows no acute findings.  Her influenza and RSV tests are negative.  Her Covid returns positive.  Gradually with fluids her chest pain has improved which she feels she can return home.  90-minute troponin returns stable.  I discussed symptomatic support.  Rx for albuterol MDI as well as Robitussin-AC for her cough.  Continue to monitor symptoms return if there is any concerns for further evaluation as needed.  9/1/2021   [facility]     Virgilio Monk PA-C  09/01/21 2124       Virgilio Monk PA-C  09/13/21 2106

## 2021-09-02 LAB
ATRIAL RATE - MUSE: 62 BPM
DIASTOLIC BLOOD PRESSURE - MUSE: NORMAL MMHG
INTERPRETATION ECG - MUSE: NORMAL
P AXIS - MUSE: NORMAL DEGREES
PR INTERVAL - MUSE: 196 MS
QRS DURATION - MUSE: 122 MS
QT - MUSE: 448 MS
QTC - MUSE: 454 MS
R AXIS - MUSE: 106 DEGREES
SYSTOLIC BLOOD PRESSURE - MUSE: NORMAL MMHG
T AXIS - MUSE: -75 DEGREES
VENTRICULAR RATE- MUSE: 62 BPM

## 2021-09-07 LAB
BACTERIA BLD CULT: NO GROWTH
BACTERIA BLD CULT: NO GROWTH

## 2021-09-08 ENCOUNTER — TRANSFERRED RECORDS (OUTPATIENT)
Dept: HEALTH INFORMATION MANAGEMENT | Facility: OTHER | Age: 66
End: 2021-09-08

## 2021-09-08 ENCOUNTER — ANTICOAGULATION THERAPY VISIT (OUTPATIENT)
Dept: ANTICOAGULATION | Facility: OTHER | Age: 66
End: 2021-09-08
Attending: FAMILY MEDICINE
Payer: MEDICARE

## 2021-09-08 DIAGNOSIS — Z79.01 ANTICOAGULATION MONITORING, INR RANGE 2-3: Primary | ICD-10-CM

## 2021-09-08 DIAGNOSIS — D68.2 FACTOR V DEFICIENCY (H): ICD-10-CM

## 2021-09-08 LAB — INR (EXTERNAL): 2.2 (ref 0.9–1.1)

## 2021-09-08 NOTE — PROGRESS NOTES
ANTICOAGULATION FOLLOW-UP CLINIC VISIT    Patient Name:  Lyudmila Fitzgerald  Date:  2021  Contact Type:  no call needed patient to continue same dose    SUBJECTIVE:  Patient Findings         Clinical Outcomes     Negatives:  Major bleeding event, Thromboembolic event, Anticoagulation-related hospital admission, Anticoagulation-related ED visit, Anticoagulation-related fatality           OBJECTIVE    Recent labs: (last 7 days)     21  1616   INR 2.2*       ASSESSMENT / PLAN  INR assessment THER    Recheck INR In: 2 WEEKS    INR Location Home INR      Anticoagulation Summary  As of 2021    INR goal:  2.0-3.0   TTR:  47.2 % (1 y)   INR used for dosin.2 (2021)   Warfarin maintenance plan:  3 mg (6 mg x 0.5) every Mon, Wed, Fri; 6 mg (6 mg x 1) all other days   Full warfarin instructions:  3 mg every Mon, Wed, Fri; 6 mg all other days   Weekly warfarin total:  33 mg   Plan last modified:  Nyla Walker RN (2021)   Next INR check:  2021   Priority:  High   Target end date:  Indefinite    Indications    Acute thromboembolism of deep veins of lower extremity (H) (Resolved) [I82.409]  Anticoagulation monitoring  INR range 2-3 [Z79.01]  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]             Anticoagulation Episode Summary     INR check location:      Preferred lab:      Send INR reminders to:  [facility]    Comments:  home monitor [employer] check INR q 2 weeks      Anticoagulation Care Providers     Provider Role Specialty Phone number    Erika Carrasco MD Referring Family Medicine [phone]            See the Encounter Report to view Anticoagulation Flowsheet and Dosing Calendar (Go to Encounters tab in chart review, and find the Anticoagulation Therapy Visit)        Nyla Walker, RN

## 2021-09-09 ENCOUNTER — MYC MEDICAL ADVICE (OUTPATIENT)
Dept: CARDIOLOGY | Facility: OTHER | Age: 66
End: 2021-09-09

## 2021-09-09 DIAGNOSIS — R11.2 NAUSEA AND VOMITING, INTRACTABILITY OF VOMITING NOT SPECIFIED, UNSPECIFIED VOMITING TYPE: Primary | ICD-10-CM

## 2021-09-09 RX ORDER — ONDANSETRON 4 MG/1
4 TABLET, ORALLY DISINTEGRATING ORAL EVERY 8 HOURS PRN
Qty: 45 TABLET | Refills: 1 | Status: SHIPPED | OUTPATIENT
Start: 2021-09-09 | End: 2022-10-25

## 2021-09-09 NOTE — TELEPHONE ENCOUNTER
Call made to patient notifying her that Moises Islas MD sent in a Rx for Zofran ODT to [facility] pharmacy. Patient had no other questions or concerns at this time. Lynn Bro RN ....................  9/9/2021   1:25 PM

## 2021-09-10 ENCOUNTER — APPOINTMENT (OUTPATIENT)
Dept: GENERAL RADIOLOGY | Facility: OTHER | Age: 66
End: 2021-09-10
Attending: STUDENT IN AN ORGANIZED HEALTH CARE EDUCATION/TRAINING PROGRAM
Payer: MEDICARE

## 2021-09-10 ENCOUNTER — HOSPITAL ENCOUNTER (EMERGENCY)
Facility: OTHER | Age: 66
Discharge: HOME | End: 2021-09-10
Attending: STUDENT IN AN ORGANIZED HEALTH CARE EDUCATION/TRAINING PROGRAM | Admitting: STUDENT IN AN ORGANIZED HEALTH CARE EDUCATION/TRAINING PROGRAM
Payer: MEDICARE

## 2021-09-10 VITALS
SYSTOLIC BLOOD PRESSURE: 147 MMHG | DIASTOLIC BLOOD PRESSURE: 87 MMHG | HEART RATE: 62 BPM | RESPIRATION RATE: 16 BRPM | BODY MASS INDEX: 25.75 KG/M2 | TEMPERATURE: 98.5 F | WEIGHT: 150 LBS | OXYGEN SATURATION: 93 %

## 2021-09-10 DIAGNOSIS — R11.2 INTRACTABLE NAUSEA AND VOMITING: ICD-10-CM

## 2021-09-10 LAB
ALBUMIN SERPL-MCNC: 4.5 G/DL (ref 3.5–5.7)
ALP SERPL-CCNC: 84 U/L (ref 34–104)
ALT SERPL W P-5'-P-CCNC: 20 U/L (ref 7–52)
ANION GAP SERPL CALCULATED.3IONS-SCNC: 14 MMOL/L (ref 3–14)
AST SERPL W P-5'-P-CCNC: 28 U/L (ref 13–39)
BASOPHILS # BLD AUTO: 0 10E3/UL (ref 0–0.2)
BASOPHILS NFR BLD AUTO: 1 %
BILIRUB SERPL-MCNC: 0.9 MG/DL (ref 0.3–1)
BUN SERPL-MCNC: 17 MG/DL (ref 7–25)
CALCIUM SERPL-MCNC: 9.8 MG/DL (ref 8.6–10.3)
CHLORIDE BLD-SCNC: 99 MMOL/L (ref 98–107)
CO2 SERPL-SCNC: 23 MMOL/L (ref 21–31)
CREAT SERPL-MCNC: 0.72 MG/DL (ref 0.6–1.2)
EOSINOPHIL # BLD AUTO: 0 10E3/UL (ref 0–0.7)
EOSINOPHIL NFR BLD AUTO: 1 %
ERYTHROCYTE [DISTWIDTH] IN BLOOD BY AUTOMATED COUNT: 12.4 % (ref 10–15)
GFR SERPL CREATININE-BSD FRML MDRD: 88 ML/MIN/1.73M2
GLUCOSE BLD-MCNC: 81 MG/DL (ref 70–105)
HCT VFR BLD AUTO: 47.4 % (ref 35–47)
HGB BLD-MCNC: 16.7 G/DL (ref 11.7–15.7)
IMM GRANULOCYTES # BLD: 0 10E3/UL
IMM GRANULOCYTES NFR BLD: 1 %
LYMPHOCYTES # BLD AUTO: 1.6 10E3/UL (ref 0.8–5.3)
LYMPHOCYTES NFR BLD AUTO: 26 %
MCH RBC QN AUTO: 32.1 PG (ref 26.5–33)
MCHC RBC AUTO-ENTMCNC: 35.2 G/DL (ref 31.5–36.5)
MCV RBC AUTO: 91 FL (ref 78–100)
MONOCYTES # BLD AUTO: 0.5 10E3/UL (ref 0–1.3)
MONOCYTES NFR BLD AUTO: 8 %
NEUTROPHILS # BLD AUTO: 4 10E3/UL (ref 1.6–8.3)
NEUTROPHILS NFR BLD AUTO: 63 %
NRBC # BLD AUTO: 0 10E3/UL
NRBC BLD AUTO-RTO: 0 /100
NT-PROBNP SERPL-MCNC: 125 PG/ML (ref 0–100)
PLATELET # BLD AUTO: 296 10E3/UL (ref 150–450)
POTASSIUM BLD-SCNC: 3.4 MMOL/L (ref 3.5–5.1)
PROT SERPL-MCNC: 8 G/DL (ref 6.4–8.9)
RBC # BLD AUTO: 5.2 10E6/UL (ref 3.8–5.2)
SODIUM SERPL-SCNC: 136 MMOL/L (ref 134–144)
WBC # BLD AUTO: 6.2 10E3/UL (ref 4–11)

## 2021-09-10 PROCEDURE — 96375 TX/PRO/DX INJ NEW DRUG ADDON: CPT | Performed by: STUDENT IN AN ORGANIZED HEALTH CARE EDUCATION/TRAINING PROGRAM

## 2021-09-10 PROCEDURE — 85025 COMPLETE CBC W/AUTO DIFF WBC: CPT | Performed by: STUDENT IN AN ORGANIZED HEALTH CARE EDUCATION/TRAINING PROGRAM

## 2021-09-10 PROCEDURE — 250N000011 HC RX IP 250 OP 636: Performed by: STUDENT IN AN ORGANIZED HEALTH CARE EDUCATION/TRAINING PROGRAM

## 2021-09-10 PROCEDURE — 99284 EMERGENCY DEPT VISIT MOD MDM: CPT | Mod: 25 | Performed by: STUDENT IN AN ORGANIZED HEALTH CARE EDUCATION/TRAINING PROGRAM

## 2021-09-10 PROCEDURE — 83880 ASSAY OF NATRIURETIC PEPTIDE: CPT | Performed by: STUDENT IN AN ORGANIZED HEALTH CARE EDUCATION/TRAINING PROGRAM

## 2021-09-10 PROCEDURE — 96374 THER/PROPH/DIAG INJ IV PUSH: CPT | Performed by: STUDENT IN AN ORGANIZED HEALTH CARE EDUCATION/TRAINING PROGRAM

## 2021-09-10 PROCEDURE — 99284 EMERGENCY DEPT VISIT MOD MDM: CPT | Performed by: STUDENT IN AN ORGANIZED HEALTH CARE EDUCATION/TRAINING PROGRAM

## 2021-09-10 PROCEDURE — 71045 X-RAY EXAM CHEST 1 VIEW: CPT

## 2021-09-10 PROCEDURE — 36415 COLL VENOUS BLD VENIPUNCTURE: CPT | Performed by: STUDENT IN AN ORGANIZED HEALTH CARE EDUCATION/TRAINING PROGRAM

## 2021-09-10 PROCEDURE — 80053 COMPREHEN METABOLIC PANEL: CPT | Performed by: STUDENT IN AN ORGANIZED HEALTH CARE EDUCATION/TRAINING PROGRAM

## 2021-09-10 RX ORDER — PROCHLORPERAZINE MALEATE 5 MG
5 TABLET ORAL EVERY 6 HOURS PRN
Qty: 30 TABLET | Refills: 0 | Status: SHIPPED | OUTPATIENT
Start: 2021-09-10 | End: 2022-10-25

## 2021-09-10 RX ORDER — ONDANSETRON 2 MG/ML
4 INJECTION INTRAMUSCULAR; INTRAVENOUS EVERY 30 MIN PRN
Status: DISCONTINUED | OUTPATIENT
Start: 2021-09-10 | End: 2021-09-10 | Stop reason: HOSPADM

## 2021-09-10 RX ADMIN — ONDANSETRON 4 MG: 2 INJECTION INTRAMUSCULAR; INTRAVENOUS at 14:24

## 2021-09-10 RX ADMIN — ONDANSETRON 4 MG: 2 INJECTION INTRAMUSCULAR; INTRAVENOUS at 13:39

## 2021-09-10 NOTE — DISCHARGE INSTRUCTIONS
Take Compazine tab every 6 hours during daytime for the next 3 days.  You can take your first dose this evening.  After 3 days take these as needed every 6 hours. Return to ER if uncontrollable nausea/vomiting returns.

## 2021-09-10 NOTE — ED TRIAGE NOTES
ED Nursing Triage Note (General)   ________________________________    Lyudmila ALO Fitzgerald is a 66 year old Female that presents to triage private car  With history of being covid positive, having nausea and vomiting, not being able to keep anything down for five days. Pt has tried zofran at home with no relief. Pt is concerned about being dehydrated and not being able to keep her cardiac medications down.   BP (!) 149/94   Pulse 64   Temp 98.5  F (36.9  C) (Oral)   Resp 16   Wt 68 kg (150 lb)   SpO2 98%   BMI 25.75 kg/m  t  Patient appears alert , in moderate distress., and cooperative, pleasant and calm behavior.    GCS Total = 15  Airway: intact  Breathing noted as Normal  Circulation Normal  Skin:  Normal      PRE HOSPITAL PRIOR LIVING SITUATION Spouse

## 2021-09-13 NOTE — ED PROVIDER NOTES
History     Chief Complaint   Patient presents with     Nausea & Vomiting     Covid Concern       Lyudmila Fitzgerald is a 66 year old female who presents with intractable nausea and vomiting. Diagnosed with Covid 10 days ago and has developed bilious nausea and vomiting over past 5 days. Unable to essentially keep anything down. Has tried zofran dissolvable tabs which contributes to her nausea as she prefers a non-dissolvable sublingual option if possible. Associated symptoms including headache, some diarrhea, and mild chills. Denies chest pain, shortness of breath, lightheadedness, cough, dysuria, fever. Received 1 of 2 covid shots as 2nd one was deferred apparently due to cardiology concerns with her history of CHF and possible cardiac side effects.    Allergies   Allergen Reactions     Morphine Nausea and Vomiting     OK in small doses     Prednisone Nausea and Vomiting     Per IV     Sotalol Nausea and Vomiting       Patient Active Problem List    Diagnosis Date Noted     Lower extremity edema 07/08/2021     Priority: Medium     CHF (congestive heart failure) (H) 07/08/2021     Priority: Medium     Status post coronary angiogram 04/27/2021     Priority: Medium     Chest pain, unspecified type 04/08/2021     Priority: Medium     Chronic insomnia 08/25/2020     Priority: Medium     Depression, unspecified depression type 08/25/2020     Priority: Medium     Pacemaker battery depletion 05/12/2020     Priority: Medium     Added automatically from request for surgery [id number]       Pacemaker at end of battery life 05/12/2020     Priority: Medium     Added automatically from request for surgery [id number]       Anticoagulated on Coumadin 10/17/2019     Priority: Medium     Personal history of supraventricular tachycardia status post ablation 10/17/2019     Priority: Medium     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     Priority: Medium     Myocardial bridge-LAD 10/08/2019     Priority: Medium     History of coronary  artery bypass graft x 1 with LIMA to LAD on 11/15/2016 10/08/2019     Priority: Medium     History of heart failure with a reported EF of 15% now recovered 10/08/2019     Priority: Medium     History of coronary artery stent placement to the LAD x3 10/08/2019     Priority: Medium     History of stroke x7 with left hemiparesis-resolved 10/08/2019     Priority: Medium     History of cardiac radiofrequency ablation for SVT x8 10/08/2019     Priority: Medium     History of DVT to both upper and lower extremity 10/08/2019     Priority: Medium     Mixed hyperlipidemia 10/08/2019     Priority: Medium     History of coronary vasospasm 10/08/2019     Priority: Medium     Pacemaker 10/08/2019     Priority: Medium     Pacemaker-dependent due to native cardiac rhythm insufficient to support life 10/08/2019     Priority: Medium     S/P AV tessie ablation secondary to SVT 10/08/2019     Priority: Medium     Cerebral thrombosis 01/16/2018     Priority: Medium     Overview:   '95 w/ no residual       Other chest pain 01/16/2018     Priority: Medium     Benign neoplasm of colon 01/16/2018     Priority: Medium     Overview:   x 1. 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/2018     Priority: Medium     Overview:   Leiden deficiency-heterozygous       Paroxysmal supraventricular tachycardia (H) 01/16/2018     Priority: Medium     Overview:   s/p multiple ablations w last resulting in PPM       Shortness of breath 01/16/2018     Priority: Medium     Essential hypertension 01/16/2018     Priority: Medium     Anticoagulation monitoring, INR range 2-3 12/19/2017     Priority: Medium     Coronary artery spasm (H) 10/14/2015     Priority: Medium     ACP (advance care planning) 05/06/2014     Priority: Medium     Left-sided weakness 04/27/2014     Priority: Medium     Left  foot drop 08/18/2013     Priority: Medium     Cardiac pacemaker in situ 07/30/2013     Priority: Medium     Overview:      -7/29/2013: generator change and pocket revision with Dr. Sweeney initially placed for a history of complete heart block, a dual chamber pacemaker with previous lead additions at [facility] and so she has four leads -- two that are capped and two that are functional. 7/29: atrial lead revision was deferred given the favorable testing and avoid the risk of laser extraction at this time.       Focal neurological deficit 09/24/2012     Priority: Medium     Diabetes mellitus, type II  07/12/2010     Priority: Medium     Constipation, chronic 06/16/2009     Priority: Medium     Urinary incontinence, mixed 06/16/2009     Priority: Medium     Hemiplegia, late effect of cerebrovascular disease (H) 06/01/2008     Priority: Medium     Disorder of bone and cartilage 07/01/2007     Priority: Medium     Overview:   lumbar spine. Next Dexa due 2011         Past Medical History:   Diagnosis Date     Acute thromboembolism of deep veins of lower extremity (H) 2006     Atrial fibrillation (H)      Cardiac pacemaker in situ 2003     Cerebral thrombosis      Coronary atherosclerosis      Endometrial hyperplasia 2006     Essential hypertension      History of other genital system and obstetric disorders      Other and unspecified angina pectoris      Other and unspecified hyperlipidemia      Other postprocedural states      Paroxysmal supraventricular tachycardia (H)      Personal history of transient ischemic attack (TIA) and cerebral infarction without residual deficit 04/2009     Primary hypercoagulable state (H)        Past Surgical History:   Procedure Laterality Date     ARTHROSCOPY KNEE      01/90,Arthroscopy for chondromalacia patella     AS CABG, ARTERY-VEIN, SINGLE  11/15/2016    Single vessel; done in [place]     ATTEMPTED ARTHROSCOPY      02/04,Right arthroscopy     BIOPSY BREAST      03/08,Breast  "cyst aspiration     COLONOSCOPY      2007,follow up recommended in 10 years.     CV CORONARY ANGIOGRAM N/A 2021    Procedure: CV CORONARY ANGIOGRAM;  Surgeon: Nathaniel Grier MD;  Location:  [facility]     ENDOSCOPIC SINUS SURGERY      ,Sinus node ablation.     EP ABLATION ATRIAL FLUTTER N/A 2020    Procedure: EP VENOGRAM SVC;  Surgeon: Hakeem Moore MD;  Location:  [facility]     EP PACEMAKER N/A 2020    Procedure: EP PACEMAKER;  Surgeon: Tima Johnson MD;  Location:  HEART [facility]ARD[facility] CATH LAB     EP STUDY  1994    EP STUDY /ABLATION,AV re-entry tachycardia, tessie ablation     HEART CATH, ANGIOPLASTY      ,Stenting placed LAD after ergonovine provocation confirmed     HEART CATH, ANGIOPLASTY      ,90% lesion, normal left ventricular function     IMPLANT PACEMAKER      ,Guidant pacemaker placement, AV tessie ablation     LAPAROSCOPIC ABLATION ENDOMETRIOSIS           OTHER SURGICAL HISTORY      3/24/06,972231,([place]) [facility] STENT ANGIO     OTHER SURGICAL HISTORY      ,802405,EP STUDY /ABLATION     OTHER SURGICAL HISTORY      ,05800.0,CT CORONARY ANGIOGRAM ([place]),Coronary angiogram, failed ablation     OTHER SURGICAL HISTORY      ,191276,[facility] RELOCATION OF SKIN POCKET PACEMAKER,[facility] 05 reconfiguration of pacemaker \"pocket\"     OTHER SURGICAL HISTORY      2078,IP CONSULT TO ELECTROPHYSIOLOGY,study and lead change     OTHER SURGICAL HISTORY      ,2058,NEUROSTIMULATOR,Nerve stimulator implanted for chest pain control     OTHER SURGICAL HISTORY      2008,20236.0,CT CORONARY ANGIOGRAM ([place]),with increased left-sided weakness and vertigo, negative CT angiogram.     REPLACE PACEMAKER GENERATOR      04,Replacement of left ventricular pacemaker lead.     STENT  2017    LAD        Family History   Problem Relation Age of Onset     Skin Cancer Father         squamous     Dementia Father          d/t COVID "     Cerebrovascular Disease Father         CHF     Dementia Mother      No Known Problems Sister      Hyperlipidemia Brother      Breast Cancer Paternal Grandmother         Cancer-breast     Deep Vein Thrombosis Daughter         FVL +     Family History Negative Son         Good Health,[facility]     Breast Cancer Paternal Aunt         Cancer-breast       Social History     Tobacco Use     Smoking status: Never Smoker     Smokeless tobacco: Never Used     Tobacco comment: Quit smoking: spouse does not smoke in the house. Exposed in cars.   Vaping Use     Vaping Use: Never used   Substance Use Topics     Alcohol use: Not Currently     Comment: Alcoholic Drinks/day: Alcoholic Drinks/day: 1-2 drinks a week     Drug use: Never       Medications:    prochlorperazine (COMPAZINE) 5 MG tablet  albuterol (PROAIR HFA) 108 (90 Base) MCG/ACT inhaler  aspirin 81 MG EC tablet  atorvastatin (LIPITOR) 80 MG tablet  calcium carb 1250 mg, 500 mg [place],/vitamin D 200 unit (CALCIUM 500/D) 500-200 MG-UNIT per tablet  cilostazol (PLETAL) 100 MG tablet  diltiazem ER (TIAZAC) 360 MG 24 hr ER beaded capsule  enoxaparin ANTICOAGULANT (LOVENOX ANTICOAGULANT) 80 MG/0.8ML syringe  felodipine ER (PLENDIL) 5 MG 24 hr tablet  furosemide (LASIX) 20 MG tablet  guaiFENesin-codeine (ROBITUSSIN AC) 100-10 MG/5ML solution  isosorbide mononitrate (IMDUR) 30 MG 24 hr tablet  nitroGLYcerin (NITROSTAT) 0.4 MG sublingual tablet  ondansetron (ZOFRAN-ODT) 4 MG ODT tab  ranolazine 1000 MG TB12  sertraline (ZOLOFT) 50 MG tablet  warfarin ANTICOAGULANT (COUMADIN) 6 MG tablet  zolpidem (AMBIEN) 5 MG tablet        Review of Systems: See HPI for pertinent negatives and positives. All other systems reviewed and found to be negative.    Physical Exam   BP (!) 147/87   Pulse 62   Temp 98.5  F (36.9  C) (Oral)   Resp 16   Wt 68 kg (150 lb)   SpO2 93%   BMI 25.75 kg/m       General: awake, mildly ill with periodic retching episodes  HEENT: atraumatic, neck  supple  Respiratory: normal effort, clear to auscultation bilaterally  Cardiovascular: regular rate and rhythm, no murmurs, CR<2s  Abdomen: soft, nondistended, nontender  Extremities: no deformities, edema, or tenderness  Skin: warm, dry, no rashes  Neuro: alert, no focal deficits  Psych: appropriate mood and affect    ED Course      ED Course as of Sep 13 1446   Fri Sep 10, 2021   1624 Feeling improved after IV zofran.          No results found for this or any previous visit (from the past 24 hour(s)).    Medications - No data to display    Assessments & Plan (with Medical Decision Making)     I have reviewed the nursing notes.    66 year old female evaluated for intractable nausea and vomiting in context of Covid infection. Afebrile, reassuring vitals on room air. Nontoxic with mildly ill appearing otherwise benign exam. Improved with IV zofran. A non-sublingual Compazine prescription sent in for patient instructed to take scheduled next 2 days then as needed. ED return precautions given. Discharged home in stable condition.    I have reviewed the findings, diagnosis, plan, and need for any follow up with the patient.      Discharge Medication List as of 9/10/2021  4:40 PM      START taking these medications    Details   prochlorperazine (COMPAZINE) 5 MG tablet Take 1 tablet (5 mg) by mouth every 6 hours as needed for nausea or vomiting, Disp-30 tablet, R-0, E-Prescribe             Final diagnoses:   Intractable nausea and vomiting       9/13/2021   [facility]     Nate Bobby MD  09/13/21 1447       Nate Bobby MD  09/14/21 5387

## 2021-09-22 ENCOUNTER — TRANSFERRED RECORDS (OUTPATIENT)
Dept: HEALTH INFORMATION MANAGEMENT | Facility: OTHER | Age: 66
End: 2021-09-22

## 2021-09-22 ENCOUNTER — MYC MEDICAL ADVICE (OUTPATIENT)
Dept: CARDIOLOGY | Facility: OTHER | Age: 66
End: 2021-09-22

## 2021-09-22 ENCOUNTER — ANTICOAGULATION THERAPY VISIT (OUTPATIENT)
Dept: ANTICOAGULATION | Facility: OTHER | Age: 66
End: 2021-09-22
Attending: FAMILY MEDICINE
Payer: MEDICARE

## 2021-09-22 DIAGNOSIS — F51.04 CHRONIC INSOMNIA: ICD-10-CM

## 2021-09-22 DIAGNOSIS — Z79.01 ANTICOAGULATION MONITORING, INR RANGE 2-3: Primary | ICD-10-CM

## 2021-09-22 DIAGNOSIS — D68.2 FACTOR V DEFICIENCY (H): ICD-10-CM

## 2021-09-22 LAB — INR (EXTERNAL): 2 (ref 0.9–1.1)

## 2021-09-22 RX ORDER — ZOLPIDEM TARTRATE 10 MG/1
TABLET ORAL
Qty: 60 TABLET | Refills: 3 | Status: SHIPPED | OUTPATIENT
Start: 2021-09-22 | End: 2021-09-28

## 2021-09-22 NOTE — PROGRESS NOTES
ANTICOAGULATION FOLLOW-UP CLINIC VISIT    Patient Name:  Lyudmila Fitzgerald  Date:  2021  Contact Type:  no call needed patient to continue same dosing    SUBJECTIVE:  Patient Findings         Clinical Outcomes     Negatives:  Major bleeding event, Thromboembolic event, Anticoagulation-related hospital admission, Anticoagulation-related ED visit, Anticoagulation-related fatality           OBJECTIVE    Recent labs: (last 7 days)     21  1338   INR 2.0*       ASSESSMENT / PLAN  INR assessment THER    Recheck INR In: 2 WEEKS    INR Location Home INR      Anticoagulation Summary  As of 2021    INR goal:  2.0-3.0   TTR:  49.2 % (1 y)   INR used for dosin.0 (2021)   Warfarin maintenance plan:  3 mg (6 mg x 0.5) every Mon, Wed, Fri; 6 mg (6 mg x 1) all other days   Full warfarin instructions:  3 mg every Mon, Wed, Fri; 6 mg all other days   Weekly warfarin total:  33 mg   No change documented:  Nyla Walker RN   Plan last modified:  Nyla Walker RN (2021)   Next INR check:  10/6/2021   Priority:  High   Target end date:  Indefinite    Indications    Acute thromboembolism of deep veins of lower extremity (H) (Resolved) [I82.409]  Anticoagulation monitoring  INR range 2-3 [Z79.01]  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]             Anticoagulation Episode Summary     INR check location:      Preferred lab:      Send INR reminders to:  [facility]    Comments:  home monitor [employer] check INR q 2 weeks      Anticoagulation Care Providers     Provider Role Specialty Phone number    Erika Carrasco MD Referring Family Medicine [phone]            See the Encounter Report to view Anticoagulation Flowsheet and Dosing Calendar (Go to Encounters tab in chart review, and find the Anticoagulation Therapy Visit)        Nyla Walker RN

## 2021-09-23 DIAGNOSIS — F51.04 CHRONIC INSOMNIA: ICD-10-CM

## 2021-09-23 NOTE — TELEPHONE ENCOUNTER
Message  Received: Today  Alice Boyd, RN  [facility]  I received a fax declination in [place] for this patient and her Ambien. Looks like Dr. Islas didn't fax it correctly and I don't have the master copy to try and refax it.   Would you guys like to print and refax it?   Thanks!   Benjy     09/22/21 1708 Sign Moises Islas, DO Reorder from Order:[id number]      Disp Refills Start End DARYL   zolpidem (AMBIEN) 10 MG tablet 60 tablet 3 9/22/2021  No   Sig: TAKE 1 TABLET BY MOUTH AT BEDTIME AS NEEDED FOR  SLEEP.   Class: Local Print   Order: [id number]     [facility] [address] [place] [address] up and routing to Saray Zuluaga, for consideration of re-sending. Mayelin Powell RN .............. 9/23/2021  4:11 PM

## 2021-09-24 ENCOUNTER — MYC MEDICAL ADVICE (OUTPATIENT)
Dept: CARDIOLOGY | Facility: OTHER | Age: 66
End: 2021-09-24

## 2021-09-24 RX ORDER — ZOLPIDEM TARTRATE 10 MG/1
TABLET ORAL
Qty: 60 TABLET | Refills: 3 | OUTPATIENT
Start: 2021-09-24

## 2021-09-24 NOTE — TELEPHONE ENCOUNTER
You routed conversation to Saray Zuluaga APRN CNP 18 hours ago (4:12 PM)     Refused Prescriptions     zolpidem (AMBIEN) 10 MG tablet         Sig: TAKE 1 TABLET BY MOUTH AT BEDTIME AS NEEDED FOR  SLEEP.    Disp:  60 tablet    Refills:  3    Start: 9/24/2021    Class: E-Prescribe    Refused by: Saray Zuluaga APRN CNP    Refusal reason: OTHER (Ambien refill needs to go through PCP, not cardiology. )    For: Chronic insomnia        Fill requested from: [facility] [address] [facility], [place] [address]          Saray Zuluaga APRN CNP routed conversation to You 28 minutes ago (10:28 AM)

## 2021-09-28 RX ORDER — ZOLPIDEM TARTRATE 10 MG/1
TABLET ORAL
Qty: 60 TABLET | Refills: 3 | Status: SHIPPED | OUTPATIENT
Start: 2021-09-28 | End: 2022-05-09

## 2021-10-01 ENCOUNTER — MYC MEDICAL ADVICE (OUTPATIENT)
Dept: FAMILY MEDICINE | Facility: OTHER | Age: 66
End: 2021-10-01

## 2021-10-01 ENCOUNTER — HOSPITAL ENCOUNTER (OUTPATIENT)
Dept: BONE DENSITY | Facility: OTHER | Age: 66
Discharge: HOME | End: 2021-10-01
Attending: FAMILY MEDICINE | Admitting: FAMILY MEDICINE
Payer: MEDICARE

## 2021-10-01 DIAGNOSIS — Z78.0 ASYMPTOMATIC MENOPAUSE: ICD-10-CM

## 2021-10-01 PROCEDURE — 77080 DXA BONE DENSITY AXIAL: CPT

## 2021-10-04 ENCOUNTER — TRANSFERRED RECORDS (OUTPATIENT)
Dept: HEALTH INFORMATION MANAGEMENT | Facility: OTHER | Age: 66
End: 2021-10-04

## 2021-10-04 ENCOUNTER — ANTICOAGULATION THERAPY VISIT (OUTPATIENT)
Dept: ANTICOAGULATION | Facility: OTHER | Age: 66
End: 2021-10-04
Attending: FAMILY MEDICINE
Payer: MEDICARE

## 2021-10-04 DIAGNOSIS — Z95.0 PACEMAKER-DEPENDENT DUE TO NATIVE CARDIAC RHYTHM INSUFFICIENT TO SUPPORT LIFE: ICD-10-CM

## 2021-10-04 DIAGNOSIS — Z95.0 PACEMAKER: ICD-10-CM

## 2021-10-04 DIAGNOSIS — I47.10 PAROXYSMAL SUPRAVENTRICULAR TACHYCARDIA (H): ICD-10-CM

## 2021-10-04 DIAGNOSIS — Z98.890 S/P AV NODAL ABLATION: ICD-10-CM

## 2021-10-04 DIAGNOSIS — I66.9 CEREBRAL THROMBOSIS: ICD-10-CM

## 2021-10-04 DIAGNOSIS — R07.89 OTHER CHEST PAIN: Primary | ICD-10-CM

## 2021-10-04 DIAGNOSIS — I49.8 PACEMAKER-DEPENDENT DUE TO NATIVE CARDIAC RHYTHM INSUFFICIENT TO SUPPORT LIFE: ICD-10-CM

## 2021-10-04 DIAGNOSIS — D68.2 FACTOR V DEFICIENCY (H): ICD-10-CM

## 2021-10-04 DIAGNOSIS — Q24.5 MYOCARDIAL BRIDGE: ICD-10-CM

## 2021-10-04 DIAGNOSIS — Z86.79 PERSONAL HISTORY OF SUPRAVENTRICULAR TACHYCARDIA: ICD-10-CM

## 2021-10-04 DIAGNOSIS — Z95.0 CARDIAC PACEMAKER IN SITU: ICD-10-CM

## 2021-10-04 DIAGNOSIS — I10 ESSENTIAL HYPERTENSION: ICD-10-CM

## 2021-10-04 DIAGNOSIS — Z98.890 HISTORY OF CARDIAC RADIOFREQUENCY ABLATION: ICD-10-CM

## 2021-10-04 DIAGNOSIS — I20.1 CORONARY ARTERY SPASM (H): ICD-10-CM

## 2021-10-04 DIAGNOSIS — Z79.01 ANTICOAGULATION MONITORING, INR RANGE 2-3: Primary | ICD-10-CM

## 2021-10-04 DIAGNOSIS — Z86.79 HISTORY OF CORONARY VASOSPASM: ICD-10-CM

## 2021-10-04 LAB — INR (EXTERNAL): 2.4 (ref 0.9–1.1)

## 2021-10-04 RX ORDER — DILTIAZEM HYDROCHLORIDE 360 MG/1
CAPSULE, EXTENDED RELEASE ORAL
Qty: 90 CAPSULE | Refills: 3 | Status: SHIPPED | OUTPATIENT
Start: 2021-10-04 | End: 2022-12-30

## 2021-10-04 RX ORDER — WARFARIN SODIUM 6 MG/1
TABLET ORAL
Qty: 90 TABLET | Refills: 1 | Status: SHIPPED | OUTPATIENT
Start: 2021-10-04 | End: 2021-12-13

## 2021-10-04 NOTE — PROGRESS NOTES
ANTICOAGULATION FOLLOW-UP CLINIC VISIT    Patient Name:  Lyudmila Fitzgerald  Date:  10/4/2021  Contact Type:  no call needed patient to continue same dose    SUBJECTIVE:  Patient Findings         Clinical Outcomes     Negatives:  Major bleeding event, Thromboembolic event, Anticoagulation-related hospital admission, Anticoagulation-related ED visit, Anticoagulation-related fatality           OBJECTIVE    Recent labs: (last 7 days)     10/04/21  1422   INR 2.4*       ASSESSMENT / PLAN  INR assessment THER    Recheck INR In: 2 WEEKS    INR Location Home INR      Anticoagulation Summary  As of 10/4/2021    INR goal:  2.0-3.0   TTR:  52.3 % (1 y)   INR used for dosin.4 (10/4/2021)   Warfarin maintenance plan:  3 mg (6 mg x 0.5) every Mon, Wed, Fri; 6 mg (6 mg x 1) all other days   Full warfarin instructions:  3 mg every Mon, Wed, Fri; 6 mg all other days   Weekly warfarin total:  33 mg   No change documented:  Nyla Walker RN   Plan last modified:  Nyla Walker RN (2021)   Next INR check:  10/18/2021   Priority:  High   Target end date:  Indefinite    Indications    Acute thromboembolism of deep veins of lower extremity (H) (Resolved) [I82.409]  Anticoagulation monitoring  INR range 2-3 [Z79.01]  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]             Anticoagulation Episode Summary     INR check location:      Preferred lab:      Send INR reminders to:  [facility]    Comments:  home monitor [employer] check INR q 2 weeks      Anticoagulation Care Providers     Provider Role Specialty Phone number    Erika Carrasco MD Referring Family Medicine [phone]            See the Encounter Report to view Anticoagulation Flowsheet and Dosing Calendar (Go to Encounters tab in chart review, and find the Anticoagulation Therapy Visit)        Nyla Walker RN

## 2021-10-04 NOTE — TELEPHONE ENCOUNTER
"Requested Prescriptions   Pending Prescriptions Disp Refills     warfarin ANTICOAGULANT (COUMADIN) 6 MG tablet [Pharmacy Med Name: Warfarin Sodium 6 MG Oral Tablet] 90 tablet 0     Sig: TAKE 1 TABLET BY MOUTH ONCE DAILY OR  AS  DIRECTED  BY  THE  [facility]  CLIN[facility].       Vitamin K Antagonists Failed - 10/4/2021 11:21 AM        Failed - INR is within goal in the past 6 weeks     Confirm INR is within goal in the past 6 weeks.     Recent Labs   Lab Test 09/22/21  1338   INR 2.0*                       Passed - Recent (12 mo) or future (30 days) visit within the authorizing provider's specialty     Patient has had an office visit with the authorizing provider or a provider within the authorizing providers department within the previous 12 mos or has a future within next 30 days. See \"Patient Info\" tab in inbasket, or \"Choose Columns\" in Meds & Orders section of the refill encounter.              Passed - Medication is active on med list        Passed - Patient is 18 years of age or older        Passed - Patient is not pregnant        Passed - No positive pregnancy on file in past 12 months           LOV-07/21/2021  Prescription refilled per RN Medication RefillPolicy.................... Karen Lorenzo RN ....................  10/4/2021   12:15 PM          "

## 2021-10-04 NOTE — TELEPHONE ENCOUNTER
"Requested Prescriptions   Pending Prescriptions Disp Refills     diltiazem ER (TIAZAC) 360 MG 24 hr ER beaded capsule [Pharmacy Med Name: dilTIAZem HCl ER Beads 360 MG Oral Capsule Extended Release 24 Hour] 90 capsule 0     Sig: Take 1 capsule by mouth once daily       Calcium Channel Blockers Protocol  Failed - 10/4/2021 11:20 AM        Failed - Blood pressure under 140/90 in past 12 months     BP Readings from Last 3 Encounters:   09/10/21 (!) 147/87   09/01/21 120/71   07/21/21 136/80                 Passed - Normal ALT in past 12 months     Recent Labs   Lab Test 09/10/21  1343   ALT 20             Passed - Recent (12 mo) or future (30 days) visit within the authorizing provider's specialty     Patient has had an office visit with the authorizing provider or a provider within the authorizing providers department within the previous 12 mos or has a future within next 30 days. See \"Patient Info\" tab in inbasket, or \"Choose Columns\" in Meds & Orders section of the refill encounter.              Passed - Medication is active on med list        Passed - Patient is age 18 or older        Passed - No active pregnancy on record        Passed - Normal serum creatinine on file in past 12 months     Recent Labs   Lab Test 09/10/21  1343   CR 0.72       Ok to refill medication if creatinine is low          Passed - No positive pregnancy test in past 12 months         Last Written Prescription Date:  8/25/20  Last Fill Quantity: 90,   # refills: 3  Last Office Visit: 5/11/21  Future Office visit:    Next 5 appointments (look out 90 days)    Nov 23, 2021 10:45 AM  Return Visit with Moises Islas DO  [facility] [address]  [facility] [id number] [phone]        Routing refill request to provider for review/approval because:  Blood pressure out of range     Unable to complete prescription refill per RN Medication Refill Policy. "   Kandace Morelos RN ....................  10/4/2021   1:57 PM

## 2021-10-09 ENCOUNTER — HEALTH MAINTENANCE LETTER (OUTPATIENT)
Age: 66
End: 2021-10-09

## 2021-10-18 ENCOUNTER — TRANSFERRED RECORDS (OUTPATIENT)
Dept: HEALTH INFORMATION MANAGEMENT | Facility: OTHER | Age: 66
End: 2021-10-18

## 2021-10-18 ENCOUNTER — ANTICOAGULATION THERAPY VISIT (OUTPATIENT)
Dept: ANTICOAGULATION | Facility: OTHER | Age: 66
End: 2021-10-18
Attending: FAMILY MEDICINE
Payer: MEDICARE

## 2021-10-18 DIAGNOSIS — D68.2 FACTOR V DEFICIENCY (H): ICD-10-CM

## 2021-10-18 DIAGNOSIS — Z79.01 ANTICOAGULATION MONITORING, INR RANGE 2-3: Primary | ICD-10-CM

## 2021-10-18 LAB — INR (EXTERNAL): 3.2 (ref 0.9–1.1)

## 2021-10-18 NOTE — PROGRESS NOTES
ANTICOAGULATION FOLLOW-UP CLINIC VISIT    Patient Name:  Lyudmila Fitzgerald  Date:  10/18/2021  Contact Type:  Telephone/ spoke with patient reviewed dosing    SUBJECTIVE:  Patient Findings     Comments:  Patient denies any identifiable changes that caused the supratherapeutic INR.           Clinical Outcomes     Negatives:  Major bleeding event, Thromboembolic event, Anticoagulation-related hospital admission, Anticoagulation-related ED visit, Anticoagulation-related fatality    Comments:  Patient denies any identifiable changes that caused the supratherapeutic INR.              OBJECTIVE    Recent labs: (last 7 days)     10/18/21  1246   INR 3.2*       ASSESSMENT / PLAN  INR assessment SUPRA    Recheck INR In: 2 WEEKS    INR Location Home INR      Anticoagulation Summary  As of 10/18/2021    INR goal:  2.0-3.0   TTR:  54.1 % (1 y)   INR used for dosing:  3.2 (10/18/2021)   Warfarin maintenance plan:  3 mg (6 mg x 0.5) every Mon, Wed, Fri; 6 mg (6 mg x 1) all other days   Full warfarin instructions:  3 mg every Mon, Wed, Fri; 6 mg all other days   Weekly warfarin total:  33 mg   No change documented:  Nyla Walker RN   Plan last modified:  Nyla Walker RN (7/20/2021)   Next INR check:  11/1/2021   Priority:  High   Target end date:  Indefinite    Indications    Acute thromboembolism of deep veins of lower extremity (H) (Resolved) [I82.409]  Anticoagulation monitoring  INR range 2-3 [Z79.01]  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]             Anticoagulation Episode Summary     INR check location:      Preferred lab:      Send INR reminders to:  [facility]    Comments:  home monitor [employer] check INR q 2 weeks      Anticoagulation Care Providers     Provider Role Specialty Phone number    Erika Carrasco MD Referring Family Medicine [phone]            See the Encounter Report to view Anticoagulation Flowsheet and Dosing Calendar (Go to Encounters tab in chart review, and find the  Anticoagulation Therapy Visit)        Nyla Walker, RN

## 2021-11-02 ENCOUNTER — ANTICOAGULATION THERAPY VISIT (OUTPATIENT)
Dept: ANTICOAGULATION | Facility: OTHER | Age: 66
End: 2021-11-02
Attending: FAMILY MEDICINE
Payer: MEDICARE

## 2021-11-02 ENCOUNTER — TRANSFERRED RECORDS (OUTPATIENT)
Dept: HEALTH INFORMATION MANAGEMENT | Facility: OTHER | Age: 66
End: 2021-11-02

## 2021-11-02 DIAGNOSIS — D68.2 FACTOR V DEFICIENCY (H): ICD-10-CM

## 2021-11-02 DIAGNOSIS — Z79.01 ANTICOAGULATION MONITORING, INR RANGE 2-3: Primary | ICD-10-CM

## 2021-11-02 LAB — INR (EXTERNAL): 2.5 (ref 0.9–1.1)

## 2021-11-02 NOTE — PROGRESS NOTES
ANTICOAGULATION FOLLOW-UP CLINIC VISIT    Patient Name:  Lyudmila Fitzgerald  Date:  2021  Contact Type:  no call needed patient to continue same dose    SUBJECTIVE:  Patient Findings         Clinical Outcomes     Negatives:  Major bleeding event, Thromboembolic event, Anticoagulation-related hospital admission, Anticoagulation-related ED visit, Anticoagulation-related fatality           OBJECTIVE    Recent labs: (last 7 days)     21  1328   INR 2.5*       ASSESSMENT / PLAN  INR assessment THER    Recheck INR In: 2 WEEKS    INR Location Home INR      Anticoagulation Summary  As of 2021    INR goal:  2.0-3.0   TTR:  54.3 % (1 y)   INR used for dosin.5 (2021)   Warfarin maintenance plan:  3 mg (6 mg x 0.5) every Mon, Wed, Fri; 6 mg (6 mg x 1) all other days   Full warfarin instructions:  3 mg every Mon, Wed, Fri; 6 mg all other days   Weekly warfarin total:  33 mg   No change documented:  Nyla Walker RN   Plan last modified:  Nyla Walker RN (2021)   Next INR check:  2021   Priority:  High   Target end date:  Indefinite    Indications    Acute thromboembolism of deep veins of lower extremity (H) (Resolved) [I82.409]  Anticoagulation monitoring  INR range 2-3 [Z79.01]  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]             Anticoagulation Episode Summary     INR check location:      Preferred lab:      Send INR reminders to:  [facility]    Comments:  home monitor [employer] check INR q 2 weeks      Anticoagulation Care Providers     Provider Role Specialty Phone number    Erika Carrasco MD Referring Family Medicine [phone]            See the Encounter Report to view Anticoagulation Flowsheet and Dosing Calendar (Go to Encounters tab in chart review, and find the Anticoagulation Therapy Visit)        Nyla Walker RN

## 2021-11-15 ENCOUNTER — TRANSFERRED RECORDS (OUTPATIENT)
Dept: HEALTH INFORMATION MANAGEMENT | Facility: OTHER | Age: 66
End: 2021-11-15

## 2021-11-15 ENCOUNTER — ANTICOAGULATION THERAPY VISIT (OUTPATIENT)
Dept: ANTICOAGULATION | Facility: OTHER | Age: 66
End: 2021-11-15
Attending: FAMILY MEDICINE
Payer: MEDICARE

## 2021-11-15 DIAGNOSIS — Z79.01 ANTICOAGULATION MONITORING, INR RANGE 2-3: Primary | ICD-10-CM

## 2021-11-15 DIAGNOSIS — D68.2 FACTOR V DEFICIENCY (H): ICD-10-CM

## 2021-11-15 LAB — INR (EXTERNAL): 1.9 (ref 2–3)

## 2021-11-15 NOTE — PROGRESS NOTES
ANTICOAGULATION FOLLOW-UP CLINIC VISIT    Patient Name:  Lyudmila Fitzgerald  Date:  11/15/2021  Contact Type:  no call needed patient to continue same dose    SUBJECTIVE:  Patient Findings         Clinical Outcomes     Negatives:  Major bleeding event, Thromboembolic event, Anticoagulation-related hospital admission, Anticoagulation-related ED visit, Anticoagulation-related fatality           OBJECTIVE    Recent labs: (last 7 days)     11/15/21  1249   INR 1.9*       ASSESSMENT / PLAN  INR assessment SUB    Recheck INR In: 2 WEEKS    INR Location Home INR      Anticoagulation Summary  As of 11/15/2021    INR goal:  2.0-3.0   TTR:  57.3 % (1 y)   INR used for dosin.9 (11/15/2021)   Warfarin maintenance plan:  3 mg (6 mg x 0.5) every Mon, Wed, Fri; 6 mg (6 mg x 1) all other days   Full warfarin instructions:  3 mg every Mon, Wed, Fri; 6 mg all other days   Weekly warfarin total:  33 mg   No change documented:  Nyla Walker RN   Plan last modified:  Nyla Walker RN (2021)   Next INR check:  2021   Priority:  High   Target end date:  Indefinite    Indications    Acute thromboembolism of deep veins of lower extremity (H) (Resolved) [I82.409]  Anticoagulation monitoring  INR range 2-3 [Z79.01]  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

## 2021-11-22 RX ORDER — NITROGLYCERIN 0.4 MG/1
TABLET SUBLINGUAL
Qty: 25 TABLET | Refills: 3 | Status: CANCELLED | OUTPATIENT
Start: 2021-11-22

## 2021-11-22 NOTE — PROGRESS NOTES
[facility] HEART [facility]   CARDIOLOGY PROGRESS NOTE     Chief Complaint   Patient presents with     Follow Up     6 month          Diagnosis:  1.  Coronary artery spasm involving the LAD with cardiac cath at [facility] on 4/27/2021.  2.  Myocardial bridging involving LAD.  3.  H/O SVT.  4.  CABG x1 with LIMA to LAD on 11/15/2016 in [place].  5.  H/O chronic heart failure with an EF of 15%. Now recovered at 60-65% on 5/16/2019 at [facility].  6.  H/O coronary spasm with stent placement to LAD x3.  7.  H/O radiofrequency ablation for SVT.  8.  H/O stroke x7 with left hemiparesis.  9.  Left foot drop.  10.  S/P AV tessie ablation secondary to SVT.  11.  Pacemaker dependent.  12.  Hyperlipidemia-uncontrolled.  13.  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  14.  H/O DVT to bilateral lower extremities.  15.  Cardiac pacemaker.  16.  Hypertension-variable control.  17.  DM-2.  18.  Lown Ganong Brandt syndrome.    Assessment/Plan:    1.  Could try nifedipine, L-arginine, magnesium, clonidine, and ivabradine.  2.  Medications tried or on include Coreg, chlorthalidone, Pletal, diltiazem, Ranexa, nifedipine, felodipine, Imdur, and lisinopril.  3.  Recently on cilostazol, diltiazem, felodipine, Imdur, Ranexa, and Coumadin.  Cilostazol and felodipine discontinued on 11/23/2021.  She believes felodipine made her feel poorly.  4.  Start on L-arginine 2 g 3 times a day, magnesium oxide 400 mg daily, and ivabradine 5 mg twice a day.  5.  Reviewed her cardiac catheterization, cardiac catheterization with stable nonocclusive disease.  No stenting completed on 4/27/2021 at the [facility].  Mild in-stent restenosis of the LAD with atretic LIMA to LAD, no stent placed.  6.  Continue on Lipitor 80 mg daily, aspirin 81 mg daily, diltiazem 360 mg daily, Lasix 20 mg daily, Imdur 30 mg daily, sublingual nitro as needed for chest pain, Ranexa 1000 mg twice a day.  7.  Follow-up in 6 months or sooner if issues.      Interval history:  Leigh has  had continuing discomfort to her chest.  She has been having x2-3 episodes a week.  She had been taking multiple doses of nitro to improve her symptoms.  Some of the episodes lasted for a day or 2.  She had a cardiac catheterization through [facility] on 8/21/2020 showing stable disease.  She has been having regular chest discomfort.  Have tried nifedipine but did not tolerate the medication.  Nicardipine is expensive. She went to [place] and was seen on 4/1/2021.  Her stress test showed septal myocardial ischemia.  Her echo was indeterminate for diastolic dysfunction and filling pressures.  She was started on chlorthalidone 12.5 mg to reduce in the fluid burden.  It is since been increased to 25 mg daily.  Blood pressures have been soft.  Chlorthalidone stopped and patient started on Lasix 20 mg daily.    Cardiac catheterization at [facility] on 8/21/2020 was reviewed and felt to have stable disease by the internationalists.  She is desperately seeking rest and relief.  To ensure that she is not having severe blockages to her LAD from an occluded stent, she was set up for cardiac catheterization at [facility].  She was found to have mild in-stent restenosis with patent but atretic LIMA to LAD.  No intervention performed.  At this point, her symptoms are from spasm.  We are adjusting medications as tolerated to try to obtain relief as outlined above.    HPI:    Originally, she was diagnosed with a LAD ostial coronary artery spasm at [facility].  She had a positive methergine spasm study in 2005 at [facility] in the mid LAD, treated with drug-eluting stent to the mid-LAD.  She has a history of LAD myocardial bridging, CABG with LIMA to LAD on 11/15/2016 in [place], [place], history of heart failure with reported EF of 15% now recovered, stenting to the LAD x3, history of radiofrequency ablation for SVT x8, ultimately resulting in AV tessie ablation with pacemaker placement with pacemaker dependence, H/O  stroke x7 with left hemiparesis essentially resolved with the exception of intermittent left foot drop, hyperlipidemia, on Coumadin with a history of factor V Leiden deficiency and numerous DVT's involving both the upper and lower extremities, hypertension, DM-2, on anticoagulated on Coumadin.     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  She has had care through [facility] as well as in [place], [place].  She was evaluated at [place] and underwent angiography revealing separate origins of the LAD and circumflex.  She has a history of catheter induced coronary artery spasm originally diagnosed at [place].     Dr. Bill Abreu through [facility] evaluated her in 2005. In spite of pre-treatment with nitroglycerine and calcium channel blockers, she had fairly vigorous mid-LAD spasm with intracoronary methergine. The ostial LAD did not spasm. Dr. Abreu treated this area with a 2.5 x 28 mm Cypher drug-eluting stent. The following year she had instent restenosis treated with a second 8 mm x 2.5 mm Cypher drug-eluting stent.      She did fairly well until 2016, when she had evaluation at [facility] in [place]. She was treated with trans-myocardial revascularization with 25 channels in the inferior, lateral and anterior LV walls, and underwent an LIMA-LAD. Her EF was 35% pre-op, and approximately 50-55% post-operatively.      She had recurrent angina, and on 2/8/17 she underwent repeat angiography which showed mild instent restenosis of the mid-LAD stents, followed by 99% stenosis of the mid-LAD at the LIMA-LAD anastomosis, presumably due to surgical clips/manipulation. The TERRENCE was atretic. Thus, the internationalists placed a 2.25 x 8 mm Promus Premiere drug-eluting stent into the mid-LAD with a good result.      Lately, she has been having an increasingly more frequent and severe anginal chest discomfort, and feels like she is back to her severe coronary  "\"spasm\" again.      He has ongoing concerns for angina felt to be vasospastic in nature.  She has had a total of x4 cardiac catheterizations on 6/17/2008, 11/21/2012, 1/17/2019, and I believe one in [place] do not have this date.       She has done pretty well since. Her EF was greater than 60% on a couple occasions since.  Most recently, on 5/16/2019 her EF was 60% to 65%.  There was no significant valvular disease detected.  Her echo was essentially unchanged from 4/18/2017.  Diastolic dysfunction was normal.      Relevant testing:  [facility]      History of coronary spasm and myocardial bridging s/p EMILIE to LAD and CAB x1 (LIMA-LAD)    Mild instent restenosis of LAD    Patent but atretic LIMA-LAD    [place] stress test with 4/1/2021:  1. Exercise echocardiogram positive for septal myocardial ischemia.  2. The patient's exercise capacity was limited.  3. Ejection fraction response from 60% at rest to 65% at peak stress.  4. Left ventricular end-systolic volume decreased with stress.  5. OTHER ECHO FINDINGS:  6. Findings consistent with elevated left ventricular filling pressure at rest and with exercise.    Echo on 4/1/2021 at [place]:  1. Normal left ventricular chamber size, no regional wall motion abnormalities, calculated 2-D linear ejection fraction 63 %.  2. Indeterminate left ventricular diastolic function and filling pressure.  3. Normal right ventricular chamber size, mildly reduced systolic function (by visual inspection,), estimated right ventricular systolic pressure 26 mmHg assuming right atrial pressure of 5 mmHg.  4. Mild-moderate tricuspid valve regurgitation.  5. No pericardial effusion.    Review of cardiac cath from [facility] by me on 4/1/2021:  1.  Severe coronary artery atherosclerosis  2.  Coronary artery grafts  CORONARY DIAGNOSTIC SUMMARY  Coronary artery dominance is left. Normal right coronary. Normal left main coronary. Normal circumflex coronary.   Side " by side ostia of LAD and LCX.  The LAD is a diffusely diseased vessel, stented in its mid portion, and with a patent but somewhat atretic LIMA graft to its distal portion.,            Past Medical History:   Diagnosis Date     Acute thromboembolism of deep veins of lower extremity (H)     Overview:  Hx of multiple episodes of DVT: 3 lower extremity; 5 upper extremity (right arm)     Atrial fibrillation (H)     No Comments Provided     Cardiac pacemaker in situ     Dual chamber     Cerebral thrombosis     2006,'95 w/ no residual     Coronary atherosclerosis     2006     Endometrial hyperplasia     s/p endometrial ablation      Essential hypertension     No Comments Provided     History of other genital system and obstetric disorders      8, Para 3-0-5-2     Other and unspecified angina pectoris     2006     Other and unspecified hyperlipidemia     No Comments Provided     Other postprocedural states      and ,Followed by Dr. Usman Duran, [facility],.     Paroxysmal supraventricular tachycardia (H)     2006,s/p multiple ablations w last resulting in PPM     Personal history of transient ischemic attack (TIA) and cerebral infarction without residual deficit 2009    with left hemiplegia     Primary hypercoagulable state (H)     No Comments Provided       Past Surgical History:   Procedure Laterality Date     ARTHROSCOPY KNEE      ,Arthroscopy for chondromalacia patella     AS CABG, ARTERY-VEIN, SINGLE  11/15/2016    Single vessel; done in [place]     ATTEMPTED ARTHROSCOPY      ,Right arthroscopy     BIOPSY BREAST      ,Breast cyst aspiration     COLONOSCOPY      2007,follow up recommended in 10 years.     CV CORONARY ANGIOGRAM N/A 2021    Procedure: CV CORONARY ANGIOGRAM;  Surgeon: Nathaniel Grier MD;  Location:  HEART CARDIAC CATH LAB     ENDOSCOPIC SINUS SURGERY      ,Sinus node ablation.     EP ABLATION ATRIAL  "FLUTTER N/A 5/7/2020    Procedure: EP VENOGRAM SVC;  Surgeon: Hakeem Moore MD;  Location:  HEART CARDIAC CATH LAB     EP PACEMAKER N/A 5/20/2020    Procedure: EP PACEMAKER;  Surgeon: Tima Johnson MD;  Location:  HEART CARDIAC CATH LAB     EP STUDY  12/1994    EP STUDY /ABLATION,AV re-entry tachycardia, tessie ablation     HEART CATH, ANGIOPLASTY      01/06,Stenting placed LAD after ergonovine provocation confirmed     HEART CATH, ANGIOPLASTY      03/06,90% lesion, normal left ventricular function     IMPLANT PACEMAKER      03/03,Guidant pacemaker placement, AV tessie ablation     LAPAROSCOPIC ABLATION ENDOMETRIOSIS      06/06     OTHER SURGICAL HISTORY      3/24/06,962524,([place]) HC STENT ANGIO     OTHER SURGICAL HISTORY      08/02,599444,EP STUDY /ABLATION     OTHER SURGICAL HISTORY      11/04,84231.0,CT CORONARY ANGIOGRAM ([place]),Coronary angiogram, failed ablation     OTHER SURGICAL HISTORY      12/04,059615,[facility] RELOCATION OF SKIN POCKET PACEMAKER,[facility] 5/24/05 reconfiguration of pacemaker \"pocket\"     OTHER SURGICAL HISTORY      207882,IP CONSULT TO ELECTROPHYSIOLOGY,study and lead change     OTHER SURGICAL HISTORY      12/06,352810,NEUROSTIMULATOR,Nerve stimulator implanted for chest pain control     OTHER SURGICAL HISTORY      12/2008,62026.0,CT CORONARY ANGIOGRAM ([place]),with increased left-sided weakness and vertigo, negative CT angiogram.     REPLACE PACEMAKER GENERATOR      12/29/04,Replacement of left ventricular pacemaker lead.     STENT  02/2017    LAD        Allergies   Allergen Reactions     Morphine Nausea and Vomiting     OK in small doses     Prednisone Nausea and Vomiting     Per IV     Sotalol Nausea and Vomiting       Current Outpatient Medications   Medication Sig Dispense Refill     arginine (L-ARGININE) 1000 MG tablet Take 2 tablets (2,000 mg) by mouth 3 times daily 90 tablet 11     aspirin 81 MG EC tablet Take 1 tablet (81 mg) by mouth daily       atorvastatin (LIPITOR) 80 MG " tablet Take 1 tablet by mouth once daily 90 tablet 3     calcium carb 1250 mg, 500 mg [place] IRA,/vitamin D 200 unit (CALCIUM 500/D) 500-200 MG-UNIT per tablet Take 1 tablet by mouth       diltiazem ER (TIAZAC) 360 MG 24 hr ER beaded capsule Take 1 capsule by mouth once daily 90 capsule 3     enoxaparin ANTICOAGULANT (LOVENOX ANTICOAGULANT) 80 MG/0.8ML syringe Inject 0.7 mLs (70 mg) Subcutaneous 2 times daily 11.2 mL 1     furosemide (LASIX) 20 MG tablet Take 1 tablet (20 mg) by mouth daily 60 tablet 4     isosorbide mononitrate (IMDUR) 30 MG 24 hr tablet Take 3 tablets (90 mg) by mouth daily 90 tablet 3     ivabradine (CORLANOR) 5 MG tablet Take 1 tablet (5 mg) by mouth 2 times daily (with meals) 60 tablet 3     magnesium oxide (MAG-OX) 400 MG tablet Take 1 tablet (400 mg) by mouth daily 90 tablet 1     NIFEdipine ER OSMOTIC (ADALAT CC) 30 MG 24 hr tablet Take 1 tablet (30 mg) by mouth daily 30 tablet 11     nitroGLYcerin (NITROSTAT) 0.4 MG sublingual tablet For chest pain place 1 tablet under the tongue every 5 minutes for 3 doses. If symptoms persist 5 minutes after 1st dose call 911. 25 tablet 11     ondansetron (ZOFRAN-ODT) 4 MG ODT tab Take 1 tablet (4 mg) by mouth every 8 hours as needed for nausea 45 tablet 1     prochlorperazine (COMPAZINE) 5 MG tablet Take 1 tablet (5 mg) by mouth every 6 hours as needed for nausea or vomiting 30 tablet 0     ranolazine 1000 MG TB12 Take 1,000 mg by mouth 2 times daily 180 tablet 3     sertraline (ZOLOFT) 50 MG tablet Take 1 tablet (50 mg) by mouth daily 90 tablet 3     warfarin ANTICOAGULANT (COUMADIN) 6 MG tablet TAKE 3 MG x 3 DAYS A WEEK AND 6 MG ALL OTHER DAYS OR  AS  DIRECTED  BY  THE  [facility]  CLINIC. 90 tablet 1     zolpidem (AMBIEN) 10 MG tablet TAKE 1 TABLET BY MOUTH AT BEDTIME AS NEEDED FOR  SLEEP. 60 tablet 3       Social History     Socioeconomic History     Marital status:      Spouse name: Antonio     Number of children: 2     Years of education: 16+      Highest education level: Master's degree (e.g., MA, MS, [employer], MEd, MSW, [employer]ARIUS)   Occupational History     Not on file   Tobacco Use     Smoking status: Never Smoker     Smokeless tobacco: Never Used     Tobacco comment: Quit smoking: spouse does not smoke in the house. Exposed in cars.   Vaping Use     Vaping Use: Never used   Substance and Sexual Activity     Alcohol use: Not Currently     Comment: Alcoholic Drinks/day: Alcoholic Drinks/day: 1-2 drinks a week     Drug use: Never     Sexual activity: Not Currently     Partners: Male   Other Topics Concern     Parent/sibling w/ CABG, MI or angioplasty before 65F 55M? Not Asked   Social History Narrative     2nd Marriage x 20 years to  Anthony they live in [place], [place]       in [place]. Completed her Master's Degree 2003. Taught Special Education.  Early shelter due to health 2007.      Total 5 children blended family-3 dgts. And 2 sons    Daughter:  Philip    Son:  Anthony - [place]    6 Grand-children 5 boys and 1 girl    Leigh parents' and siblings live in [place].     Social Determinants of Health     Financial Resource Strain: Not on file   Food Insecurity: Not on file   Transportation Needs: Not on file   Physical Activity: Not on file   Stress: Not on file   Social Connections: Not on file   Intimate Partner Violence: Not on file   Housing Stability: Not on file       LAB RESULTS:   Anticoagulation Therapy Visit on 08/24/2020   Component Date Value Ref Range Status     INR 08/24/2020 1.3* 0.90 - 1.10 Corrected   Anticoagulation Therapy Visit on 08/19/2020   Component Date Value Ref Range Status     INR 08/19/2020 2.3* 0.90 - 1.10 Final   Anticoagulation Therapy Visit on 08/05/2020   Component Date Value Ref Range Status     INR 08/05/2020 2.1* 0.90 - 1.10 Final   Allied Health/Nurse Visit on 08/03/2020   Component Date Value Ref Range Status     COVID-19 Virus PCR to [facility] - So* 08/03/2020 Nasopharyngeal   Final  "    COVID-19 Virus PCR to [facility] - Re* 08/03/2020 Not Detected   Final   Anticoagulation Therapy Visit on 07/30/2020   Component Date Value Ref Range Status     INR 07/30/2020 3.8* 0.90 - 1.10 Final   Anticoagulation Therapy Visit on 07/27/2020   Component Date Value Ref Range Status     INR 07/25/2020 1.1  0.90 - 1.10 Final     INR 07/27/2020 1.2* 0.90 - 1.10 Final   Anticoagulation Therapy Visit on 07/23/2020   Component Date Value Ref Range Status     INR Protime 07/23/2020 1.0  0.86 - 1.14 Final   Anticoagulation Therapy Visit on 07/21/2020   Component Date Value Ref Range Status     INR 07/21/2020 1.4* 0.90 - 1.10 Final   Anticoagulation Therapy Visit on 07/09/2020   Component Date Value Ref Range Status     INR 07/08/2020 2.1* 0.90 - 1.10 Final        Review of systems: Negative except that which was noted in the HPI.    Physical examination:  /82 (BP Location: Right arm, Patient Position: Sitting, Cuff Size: Adult Regular)   Pulse 85   Temp 97.6  F (36.4  C) (Tympanic)   Resp 18   Ht 1.65 m (5' 4.96\")   Wt 71.2 kg (157 lb)   SpO2 97%   BMI 26.16 kg/m      GENERAL APPEARANCE: healthy, alert and no distress  HEENT: no icterus, no xanthelasmas, normal pupil size and reaction, no cyanosis.  NECK: no adenopathy, no asymmetry, masses, or scars.  CHEST: lungs clear to auscultation - no rales, rhonchi or wheezes, no use of accessory muscles, no retractions, respirations are unlabored, normal respiratory rate  CARDIOVASCULAR: regular rhythm, normal S1 with physiologic split S2, no S3 or S4 and no murmur, click or rub  ABDOMEN: soft, non tender, bowel sounds normal  EXTREMITIES: no clubbing, cyanosis with mild peripheral edema  NEURO: alert and oriented normal speech, and affect  VASC: No vascular bruits heard.  SKIN: no ecchymoses, no rashes    Total time spent on day of visit, including review of tests, obtaining/reviewing separately obtained history, ordering medications/tests/procedures, communicating " with PCP/consultants, and documenting in electronic medical record: 50 minutes.       Thank you for allowing me to participate in the care of your patient. Please do not hesitate to contact me if you have any questions.     Moises Islas, DO

## 2021-11-23 ENCOUNTER — MYC MEDICAL ADVICE (OUTPATIENT)
Dept: CARDIOLOGY | Facility: OTHER | Age: 66
End: 2021-11-23
Payer: MEDICARE

## 2021-11-23 ENCOUNTER — HOSPITAL ENCOUNTER (OUTPATIENT)
Dept: CARDIOLOGY | Facility: OTHER | Age: 66
End: 2021-11-23
Attending: INTERNAL MEDICINE
Payer: MEDICARE

## 2021-11-23 VITALS
RESPIRATION RATE: 18 BRPM | BODY MASS INDEX: 26.16 KG/M2 | OXYGEN SATURATION: 97 % | TEMPERATURE: 97.6 F | HEART RATE: 85 BPM | SYSTOLIC BLOOD PRESSURE: 138 MMHG | DIASTOLIC BLOOD PRESSURE: 82 MMHG | WEIGHT: 157 LBS | HEIGHT: 65 IN

## 2021-11-23 DIAGNOSIS — I47.10 PAROXYSMAL SUPRAVENTRICULAR TACHYCARDIA (H): ICD-10-CM

## 2021-11-23 DIAGNOSIS — R07.89 OTHER CHEST PAIN: ICD-10-CM

## 2021-11-23 DIAGNOSIS — E78.2 MIXED HYPERLIPIDEMIA: ICD-10-CM

## 2021-11-23 DIAGNOSIS — Q24.5 MYOCARDIAL BRIDGE: Primary | ICD-10-CM

## 2021-11-23 DIAGNOSIS — F51.04 CHRONIC INSOMNIA: ICD-10-CM

## 2021-11-23 DIAGNOSIS — Z95.1 HISTORY OF CORONARY ARTERY BYPASS GRAFT X 1: ICD-10-CM

## 2021-11-23 DIAGNOSIS — Z45.010 PACEMAKER BATTERY DEPLETION: ICD-10-CM

## 2021-11-23 DIAGNOSIS — Z79.01 ANTICOAGULATION MONITORING, INR RANGE 2-3: ICD-10-CM

## 2021-11-23 DIAGNOSIS — Z98.890 S/P AV NODAL ABLATION: ICD-10-CM

## 2021-11-23 DIAGNOSIS — Z86.79 HISTORY OF HEART FAILURE: ICD-10-CM

## 2021-11-23 DIAGNOSIS — Z45.010 PACEMAKER AT END OF BATTERY LIFE: ICD-10-CM

## 2021-11-23 DIAGNOSIS — R06.02 SHORTNESS OF BREATH: ICD-10-CM

## 2021-11-23 DIAGNOSIS — I10 ESSENTIAL HYPERTENSION: ICD-10-CM

## 2021-11-23 DIAGNOSIS — D68.2 FACTOR V DEFICIENCY (H): ICD-10-CM

## 2021-11-23 DIAGNOSIS — Z86.73 HISTORY OF STROKE: ICD-10-CM

## 2021-11-23 DIAGNOSIS — Z95.0 PACEMAKER: ICD-10-CM

## 2021-11-23 DIAGNOSIS — I45.6 LOWN GANONG LEVINE SYNDROME: ICD-10-CM

## 2021-11-23 DIAGNOSIS — Z95.5 HISTORY OF CORONARY ARTERY STENT PLACEMENT: ICD-10-CM

## 2021-11-23 DIAGNOSIS — Z98.890 STATUS POST CORONARY ANGIOGRAM: ICD-10-CM

## 2021-11-23 DIAGNOSIS — I49.8 PACEMAKER-DEPENDENT DUE TO NATIVE CARDIAC RHYTHM INSUFFICIENT TO SUPPORT LIFE: ICD-10-CM

## 2021-11-23 DIAGNOSIS — Z95.0 CARDIAC PACEMAKER IN SITU: ICD-10-CM

## 2021-11-23 DIAGNOSIS — Z86.718 HISTORY OF DVT (DEEP VEIN THROMBOSIS): ICD-10-CM

## 2021-11-23 DIAGNOSIS — Z95.0 PACEMAKER-DEPENDENT DUE TO NATIVE CARDIAC RHYTHM INSUFFICIENT TO SUPPORT LIFE: ICD-10-CM

## 2021-11-23 DIAGNOSIS — I50.9 CONGESTIVE HEART FAILURE, UNSPECIFIED HF CHRONICITY, UNSPECIFIED HEART FAILURE TYPE (H): ICD-10-CM

## 2021-11-23 DIAGNOSIS — R60.0 LOWER EXTREMITY EDEMA: ICD-10-CM

## 2021-11-23 DIAGNOSIS — Z98.890 HISTORY OF CARDIAC RADIOFREQUENCY ABLATION: ICD-10-CM

## 2021-11-23 DIAGNOSIS — Z86.79 PERSONAL HISTORY OF SUPRAVENTRICULAR TACHYCARDIA: ICD-10-CM

## 2021-11-23 DIAGNOSIS — Z79.01 ANTICOAGULATED ON COUMADIN: ICD-10-CM

## 2021-11-23 DIAGNOSIS — I20.1 CORONARY ARTERY SPASM (H): ICD-10-CM

## 2021-11-23 DIAGNOSIS — R07.9 CHEST PAIN, UNSPECIFIED TYPE: ICD-10-CM

## 2021-11-23 DIAGNOSIS — E83.42 HYPOMAGNESEMIA: ICD-10-CM

## 2021-11-23 DIAGNOSIS — I47.10 SUPRAVENTRICULAR TACHYCARDIA (H): ICD-10-CM

## 2021-11-23 DIAGNOSIS — Z86.79 HISTORY OF CORONARY VASOSPASM: ICD-10-CM

## 2021-11-23 DIAGNOSIS — E11.9 TYPE 2 DIABETES MELLITUS WITHOUT COMPLICATION, WITHOUT LONG-TERM CURRENT USE OF INSULIN (H): ICD-10-CM

## 2021-11-23 PROCEDURE — 93280 PM DEVICE PROGR EVAL DUAL: CPT | Mod: 26 | Performed by: INTERNAL MEDICINE

## 2021-11-23 PROCEDURE — 99215 OFFICE O/P EST HI 40 MIN: CPT | Performed by: INTERNAL MEDICINE

## 2021-11-23 PROCEDURE — G0463 HOSPITAL OUTPT CLINIC VISIT: HCPCS

## 2021-11-23 PROCEDURE — 93280 PM DEVICE PROGR EVAL DUAL: CPT | Performed by: INTERNAL MEDICINE

## 2021-11-23 RX ORDER — NIFEDIPINE 30 MG
30 TABLET, EXTENDED RELEASE ORAL DAILY
Qty: 30 TABLET | Refills: 11 | Status: SHIPPED | OUTPATIENT
Start: 2021-11-23 | End: 2022-06-29

## 2021-11-23 RX ORDER — IVABRADINE 5 MG/1
5 TABLET, FILM COATED ORAL 2 TIMES DAILY WITH MEALS
Qty: 60 TABLET | Refills: 3 | Status: SHIPPED | OUTPATIENT
Start: 2021-11-23 | End: 2021-12-13

## 2021-11-23 RX ORDER — MAGNESIUM OXIDE 400 MG/1
400 TABLET ORAL DAILY
Qty: 90 TABLET | Refills: 1 | Status: SHIPPED | OUTPATIENT
Start: 2021-11-23 | End: 2022-10-25

## 2021-11-23 RX ORDER — NITROGLYCERIN 0.4 MG/1
TABLET SUBLINGUAL
Qty: 25 TABLET | Refills: 11 | Status: SHIPPED | OUTPATIENT
Start: 2021-11-23 | End: 2022-10-25

## 2021-11-23 ASSESSMENT — PAIN SCALES - GENERAL: PAINLEVEL: NO PAIN (0)

## 2021-11-23 ASSESSMENT — MIFFLIN-ST. JEOR: SCORE: 1252.41

## 2021-11-23 NOTE — PATIENT INSTRUCTIONS
It was a pleasure to see you in clinic today.  Please do not hesitate to call with any questions or concerns.  You are scheduled for a remote transmission on 2/23/2022.  We look forward to seeing you in clinic at your next device check in 12 months.    Virgilio Gooden, RN  Electrophysiology Nurse Clinician  [facility]    During Business Hours Please Call:  [phone]  After Hours Please Call:  [phone] - select option #4 and ask for job code [phone]

## 2021-11-23 NOTE — NURSING NOTE
"Patient comes in for 6 month follow up.  Mirella Royal LPN ....................11/23/2021   10:19 AM  Chief Complaint   Patient presents with     Follow Up     6 month       Initial /82 (BP Location: Right arm, Patient Position: Sitting, Cuff Size: Adult Regular)   Pulse 85   Temp 97.6  F (36.4  C) (Tympanic)   Resp 18   Ht 1.65 m (5' 4.96\")   Wt 71.2 kg (157 lb)   SpO2 97%   BMI 26.16 kg/m   Estimated body mass index is 26.16 kg/m  as calculated from the following:    Height as of this encounter: 1.65 m (5' 4.96\").    Weight as of this encounter: 71.2 kg (157 lb).  Meds Reconciled: complete  Pt is on Aspirin  Pt is on a Statin  PHQ and/or JUAN CARLOS reviewed. Pt referred to PCP/MH Provider as appropriate.    Mirella Royal LPN      "

## 2021-11-30 ENCOUNTER — TRANSFERRED RECORDS (OUTPATIENT)
Dept: HEALTH INFORMATION MANAGEMENT | Facility: OTHER | Age: 66
End: 2021-11-30

## 2021-11-30 ENCOUNTER — ANTICOAGULATION THERAPY VISIT (OUTPATIENT)
Dept: ANTICOAGULATION | Facility: OTHER | Age: 66
End: 2021-11-30
Payer: MEDICARE

## 2021-11-30 DIAGNOSIS — D68.2 FACTOR V DEFICIENCY (H): ICD-10-CM

## 2021-11-30 DIAGNOSIS — Z79.01 ANTICOAGULATION MONITORING, INR RANGE 2-3: Primary | ICD-10-CM

## 2021-11-30 LAB — INR (EXTERNAL): 2.1 (ref 0.9–1.1)

## 2021-11-30 NOTE — PROGRESS NOTES
ANTICOAGULATION FOLLOW-UP CLINIC VISIT    Patient Name:  Lyudmila Fitzgerald  Date:  2021  Contact Type:  no call needed patient to continue same dose    SUBJECTIVE:  Patient Findings     Positives:  Change in medications (medication changes OV 21)        Clinical Outcomes     Negatives:  Major bleeding event, Thromboembolic event, Anticoagulation-related hospital admission, Anticoagulation-related ED visit, Anticoagulation-related fatality           OBJECTIVE    Recent labs: (last 7 days)     21  1257   INR 2.1*       ASSESSMENT / PLAN  INR assessment THER    Recheck INR In: 2 WEEKS    INR Location Home INR      Anticoagulation Summary  As of 2021    INR goal:  2.0-3.0   TTR:  56.0 % (1 y)   INR used for dosin.1 (2021)   Warfarin maintenance plan:  3 mg (6 mg x 0.5) every Mon, Wed, Fri; 6 mg (6 mg x 1) all other days   Full warfarin instructions:  3 mg every Mon, Wed, Fri; 6 mg all other days   Weekly warfarin total:  33 mg   No change documented:  Nyla Walker RN   Plan last modified:  Nyla Walker RN (2021)   Next INR check:  2021   Priority:  High   Target end date:  Indefinite    Indications    Acute thromboembolism of deep veins of lower extremity (H) (Resolved) [I82.409]  Anticoagulation monitoring  INR range 2-3 [Z79.01]  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

## 2021-12-03 LAB
MDC_IDC_LEAD_IMPLANT_DT: NORMAL
MDC_IDC_LEAD_IMPLANT_DT: NORMAL
MDC_IDC_LEAD_LOCATION: NORMAL
MDC_IDC_LEAD_LOCATION: NORMAL
MDC_IDC_LEAD_LOCATION_DETAIL_1: NORMAL
MDC_IDC_LEAD_LOCATION_DETAIL_1: NORMAL
MDC_IDC_LEAD_MFG: NORMAL
MDC_IDC_LEAD_MFG: NORMAL
MDC_IDC_LEAD_MODEL: NORMAL
MDC_IDC_LEAD_MODEL: NORMAL
MDC_IDC_LEAD_POLARITY_TYPE: NORMAL
MDC_IDC_LEAD_POLARITY_TYPE: NORMAL
MDC_IDC_LEAD_SERIAL: NORMAL
MDC_IDC_LEAD_SERIAL: NORMAL
MDC_IDC_LEAD_SPECIAL_FUNCTION: NORMAL
MDC_IDC_LEAD_SPECIAL_FUNCTION: NORMAL
MDC_IDC_MSMT_BATTERY_DTM: NORMAL
MDC_IDC_MSMT_BATTERY_REMAINING_LONGEVITY: 132 MO
MDC_IDC_MSMT_BATTERY_STATUS: NORMAL
MDC_IDC_MSMT_LEADCHNL_RA_IMPEDANCE_VALUE: 718 OHM
MDC_IDC_MSMT_LEADCHNL_RA_PACING_THRESHOLD_AMPLITUDE: 0.9 V
MDC_IDC_MSMT_LEADCHNL_RA_PACING_THRESHOLD_PULSEWIDTH: 1 MS
MDC_IDC_MSMT_LEADCHNL_RA_SENSING_INTR_AMPL: 2.6 MV
MDC_IDC_MSMT_LEADCHNL_RV_IMPEDANCE_VALUE: 500 OHM
MDC_IDC_MSMT_LEADCHNL_RV_PACING_THRESHOLD_AMPLITUDE: 0.7 V
MDC_IDC_MSMT_LEADCHNL_RV_PACING_THRESHOLD_PULSEWIDTH: 0.4 MS
MDC_IDC_MSMT_LEADCHNL_RV_SENSING_INTR_AMPL: 11.5 MV
MDC_IDC_PG_IMPLANT_DTM: NORMAL
MDC_IDC_PG_MFG: NORMAL
MDC_IDC_PG_MODEL: NORMAL
MDC_IDC_PG_SERIAL: NORMAL
MDC_IDC_PG_TYPE: NORMAL
MDC_IDC_SESS_CLINIC_NAME: NORMAL
MDC_IDC_SESS_DTM: NORMAL
MDC_IDC_SESS_TYPE: NORMAL
MDC_IDC_SET_BRADY_AT_MODE_SWITCH_MODE: NORMAL
MDC_IDC_SET_BRADY_AT_MODE_SWITCH_RATE: 140 {BEATS}/MIN
MDC_IDC_SET_BRADY_LOWRATE: 60 {BEATS}/MIN
MDC_IDC_SET_BRADY_MAX_SENSOR_RATE: 130 {BEATS}/MIN
MDC_IDC_SET_BRADY_MAX_TRACKING_RATE: 100 {BEATS}/MIN
MDC_IDC_SET_BRADY_MODE: NORMAL
MDC_IDC_SET_BRADY_PAV_DELAY_HIGH: 80 MS
MDC_IDC_SET_BRADY_PAV_DELAY_LOW: 130 MS
MDC_IDC_SET_BRADY_SAV_DELAY_HIGH: 80 MS
MDC_IDC_SET_BRADY_SAV_DELAY_LOW: 130 MS
MDC_IDC_SET_LEADCHNL_RA_PACING_AMPLITUDE: 2 V
MDC_IDC_SET_LEADCHNL_RA_PACING_CAPTURE_MODE: NORMAL
MDC_IDC_SET_LEADCHNL_RA_PACING_POLARITY: NORMAL
MDC_IDC_SET_LEADCHNL_RA_PACING_PULSEWIDTH: 1 MS
MDC_IDC_SET_LEADCHNL_RA_SENSING_ADAPTATION_MODE: NORMAL
MDC_IDC_SET_LEADCHNL_RA_SENSING_POLARITY: NORMAL
MDC_IDC_SET_LEADCHNL_RA_SENSING_SENSITIVITY: 0.6 MV
MDC_IDC_SET_LEADCHNL_RV_PACING_AMPLITUDE: 1.5 V
MDC_IDC_SET_LEADCHNL_RV_PACING_CAPTURE_MODE: NORMAL
MDC_IDC_SET_LEADCHNL_RV_PACING_POLARITY: NORMAL
MDC_IDC_SET_LEADCHNL_RV_PACING_PULSEWIDTH: 0.4 MS
MDC_IDC_SET_LEADCHNL_RV_SENSING_ADAPTATION_MODE: NORMAL
MDC_IDC_SET_LEADCHNL_RV_SENSING_POLARITY: NORMAL
MDC_IDC_SET_LEADCHNL_RV_SENSING_SENSITIVITY: 1.5 MV
MDC_IDC_SET_ZONE_DETECTION_INTERVAL: 375 MS
MDC_IDC_SET_ZONE_TYPE: NORMAL
MDC_IDC_SET_ZONE_VENDOR_TYPE: NORMAL
MDC_IDC_STAT_BRADY_DTM_END: NORMAL
MDC_IDC_STAT_BRADY_DTM_START: NORMAL
MDC_IDC_STAT_BRADY_RA_PERCENT_PACED: 73 %
MDC_IDC_STAT_BRADY_RV_PERCENT_PACED: 100 %
MDC_IDC_STAT_EPISODE_RECENT_COUNT: 0
MDC_IDC_STAT_EPISODE_RECENT_COUNT_DTM_END: NORMAL
MDC_IDC_STAT_EPISODE_RECENT_COUNT_DTM_START: NORMAL
MDC_IDC_STAT_EPISODE_TOTAL_COUNT: 0
MDC_IDC_STAT_EPISODE_TOTAL_COUNT_DTM_END: NORMAL
MDC_IDC_STAT_EPISODE_TYPE: NORMAL
MDC_IDC_STAT_EPISODE_TYPE: NORMAL
MDC_IDC_STAT_EPISODE_VENDOR_TYPE: NORMAL
MDC_IDC_STAT_EPISODE_VENDOR_TYPE: NORMAL

## 2021-12-04 ENCOUNTER — HEALTH MAINTENANCE LETTER (OUTPATIENT)
Age: 66
End: 2021-12-04

## 2021-12-13 ENCOUNTER — ANTICOAGULATION THERAPY VISIT (OUTPATIENT)
Dept: ANTICOAGULATION | Facility: OTHER | Age: 66
End: 2021-12-13
Attending: FAMILY MEDICINE
Payer: MEDICARE

## 2021-12-13 ENCOUNTER — MYC MEDICAL ADVICE (OUTPATIENT)
Dept: CARDIOLOGY | Facility: OTHER | Age: 66
End: 2021-12-13
Payer: MEDICARE

## 2021-12-13 ENCOUNTER — TRANSFERRED RECORDS (OUTPATIENT)
Dept: HEALTH INFORMATION MANAGEMENT | Facility: OTHER | Age: 66
End: 2021-12-13

## 2021-12-13 DIAGNOSIS — Q24.5 MYOCARDIAL BRIDGE: ICD-10-CM

## 2021-12-13 DIAGNOSIS — R06.02 SHORTNESS OF BREATH: ICD-10-CM

## 2021-12-13 DIAGNOSIS — R07.89 OTHER CHEST PAIN: ICD-10-CM

## 2021-12-13 DIAGNOSIS — Z98.890 STATUS POST CORONARY ANGIOGRAM: ICD-10-CM

## 2021-12-13 DIAGNOSIS — R07.9 CHEST PAIN, UNSPECIFIED TYPE: Primary | ICD-10-CM

## 2021-12-13 DIAGNOSIS — D68.2 FACTOR V DEFICIENCY (H): ICD-10-CM

## 2021-12-13 DIAGNOSIS — Z79.01 ANTICOAGULATION MONITORING, INR RANGE 2-3: Primary | ICD-10-CM

## 2021-12-13 DIAGNOSIS — Z95.5 HISTORY OF CORONARY ARTERY STENT PLACEMENT: ICD-10-CM

## 2021-12-13 DIAGNOSIS — I66.9 CEREBRAL THROMBOSIS: ICD-10-CM

## 2021-12-13 DIAGNOSIS — Z95.1 HISTORY OF CORONARY ARTERY BYPASS GRAFT X 1: ICD-10-CM

## 2021-12-13 DIAGNOSIS — I20.1 CORONARY ARTERY SPASM (H): ICD-10-CM

## 2021-12-13 LAB — INR (EXTERNAL): 3.2 (ref 0.9–1.1)

## 2021-12-13 RX ORDER — CLONIDINE HYDROCHLORIDE 0.1 MG/1
0.1 TABLET ORAL 2 TIMES DAILY
Qty: 180 TABLET | Refills: 3 | Status: SHIPPED | OUTPATIENT
Start: 2021-12-13 | End: 2022-10-25

## 2021-12-13 RX ORDER — WARFARIN SODIUM 6 MG/1
TABLET ORAL
Qty: 90 TABLET | Refills: 1 | COMMUNITY
Start: 2021-12-13 | End: 2022-02-17

## 2021-12-13 NOTE — PROGRESS NOTES
ANTICOAGULATION FOLLOW-UP CLINIC VISIT    Patient Name:  Lyudmila Fitzgerald  Date:  12/13/2021  Contact Type:  Telephone/ spoke with patient reviewed dosing    SUBJECTIVE:  Patient Findings     Positives:  Change in medications (a few medications were changed by Dr Islas on 11/23/21 will be starting on clonidine today)        Clinical Outcomes     Negatives:  Major bleeding event, Thromboembolic event, Anticoagulation-related hospital admission, Anticoagulation-related ED visit, Anticoagulation-related fatality           OBJECTIVE    Recent labs: (last 7 days)     12/13/21  1545   INR 3.2*       ASSESSMENT / PLAN  INR assessment SUPRA    Recheck INR In: 2 WEEKS    INR Location Clinic      Anticoagulation Summary  As of 12/13/2021    INR goal:  2.0-3.0   TTR:  58.9 % (1 y)   INR used for dosing:  3.2 (12/13/2021)   Warfarin maintenance plan:  6 mg (6 mg x 1) every Mon, Wed, Fri; 3 mg (6 mg x 0.5) all other days   Full warfarin instructions:  12/13: 3 mg; Otherwise 6 mg every Mon, Wed, Fri; 3 mg all other days   Weekly warfarin total:  30 mg   Plan last modified:  Nyla Walker, RN (12/13/2021)   Next INR check:  12/27/2021   Priority:  High   Target end date:  Indefinite    Indications    Acute thromboembolism of deep veins of lower extremity (H) (Resolved) [I82.409]  Anticoagulation monitoring  INR range 2-3 [Z79.01]  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

## 2021-12-17 ENCOUNTER — ALLIED HEALTH/NURSE VISIT (OUTPATIENT)
Dept: FAMILY MEDICINE | Facility: OTHER | Age: 66
End: 2021-12-17
Attending: FAMILY MEDICINE
Payer: MEDICARE

## 2021-12-17 DIAGNOSIS — Z20.822 EXPOSURE TO 2019 NOVEL CORONAVIRUS: Primary | ICD-10-CM

## 2021-12-17 DIAGNOSIS — Z20.822 COVID-19 RULED OUT: ICD-10-CM

## 2021-12-17 PROCEDURE — C9803 HOPD COVID-19 SPEC COLLECT: HCPCS

## 2021-12-17 PROCEDURE — U0003 INFECTIOUS AGENT DETECTION BY NUCLEIC ACID (DNA OR RNA); SEVERE ACUTE RESPIRATORY SYNDROME CORONAVIRUS 2 (SARS-COV-2) (CORONAVIRUS DISEASE [COVID-19]), AMPLIFIED PROBE TECHNIQUE, MAKING USE OF HIGH THROUGHPUT TECHNOLOGIES AS DESCRIBED BY CMS-2020-01-R: HCPCS | Mod: ZL

## 2021-12-19 LAB — SARS-COV-2 RNA RESP QL NAA+PROBE: NEGATIVE

## 2021-12-30 ENCOUNTER — ANTICOAGULATION THERAPY VISIT (OUTPATIENT)
Dept: ANTICOAGULATION | Facility: OTHER | Age: 66
End: 2021-12-30
Attending: FAMILY MEDICINE
Payer: MEDICARE

## 2021-12-30 DIAGNOSIS — Z79.01 ANTICOAGULATION MONITORING, INR RANGE 2-3: Primary | ICD-10-CM

## 2021-12-30 DIAGNOSIS — D68.2 FACTOR V DEFICIENCY (H): ICD-10-CM

## 2021-12-30 LAB — INR HOME MONITORING: 1.8 (ref 2–3)

## 2022-01-10 LAB — INR HOME MONITORING: 2.5 (ref 2–3)

## 2022-01-11 ENCOUNTER — ANTICOAGULATION THERAPY VISIT (OUTPATIENT)
Dept: ANTICOAGULATION | Facility: OTHER | Age: 67
End: 2022-01-11
Attending: FAMILY MEDICINE
Payer: MEDICARE

## 2022-01-11 DIAGNOSIS — D68.2 FACTOR V DEFICIENCY (H): ICD-10-CM

## 2022-01-11 DIAGNOSIS — Z79.01 ANTICOAGULATION MONITORING, INR RANGE 2-3: Primary | ICD-10-CM

## 2022-01-11 NOTE — PROGRESS NOTES
ANTICOAGULATION FOLLOW-UP CLINIC VISIT    Patient Name:  Lyudmila Fitzgerald  Date:  2022  Contact Type:  no call needed patient to continue same dose    SUBJECTIVE:  Patient Findings         Clinical Outcomes     Negatives:  Major bleeding event, Thromboembolic event, Anticoagulation-related hospital admission, Anticoagulation-related ED visit, Anticoagulation-related fatality           OBJECTIVE    Recent labs: (last 7 days)     01/10/22  0000   INR 2.50       ASSESSMENT / PLAN  INR assessment THER    Recheck INR In: 2 WEEKS    INR Location Home INR      Anticoagulation Summary  As of 2022    INR goal:  2.0-3.0   TTR:  60.1 % (1 y)   INR used for dosin.50 (1/10/2022)   Warfarin maintenance plan:  3 mg (6 mg x 0.5) every Mon, Wed, Fri; 6 mg (6 mg x 1) all other days   Full warfarin instructions:  3 mg every Mon, Wed, Fri; 6 mg all other days   Weekly warfarin total:  33 mg   No change documented:  Nyla Walker RN   Plan last modified:  Nyla Walker RN (2021)   Next INR check:  2022   Priority:  High   Target end date:  Indefinite    Indications    Acute thromboembolism of deep veins of lower extremity (H) (Resolved) [I82.409]  Anticoagulation monitoring  INR range 2-3 [Z79.01]  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

## 2022-01-19 DIAGNOSIS — F32.A DEPRESSION, UNSPECIFIED DEPRESSION TYPE: ICD-10-CM

## 2022-01-19 NOTE — TELEPHONE ENCOUNTER
"Requested Prescriptions   Pending Prescriptions Disp Refills     sertraline (ZOLOFT) 50 MG tablet [Pharmacy Med Name: Sertraline HCl 50 MG Oral Tablet] 90 tablet 0     Sig: Take 1 tablet by mouth once daily       SSRIs Protocol Failed - 1/19/2022 12:09 PM        Failed - PHQ-9 score less than 5 in past 6 months     Please review last PHQ-9 score.           Passed - Medication is active on med list        Passed - Patient is age 18 or older        Passed - No active pregnancy on record        Passed - No positive pregnancy test in last 12 months        Passed - Recent (6 mo) or future (30 days) visit within the authorizing provider's specialty     Patient had office visit in the last 6 months or has a visit in the next 30 days with authorizing provider or within the authorizing provider's specialty.  See \"Patient Info\" tab in inbasket, or \"Choose Columns\" in Meds & Orders section of the refill encounter.               Last Written Prescription Date:  11/24/20  Last Fill Quantity: 90,   # refills: 3  Last Office Visit: 11/23/21  Future Office visit:   none    Routing refill request to provider for review/approval because:  PHQ-9 not under 5 in the past 6 months    Unable to complete prescription refill per RN Medication Refill Policy.   Kandace Morelos RN ....................  1/19/2022   1:53 PM   "

## 2022-01-26 ENCOUNTER — ANTICOAGULATION THERAPY VISIT (OUTPATIENT)
Dept: ANTICOAGULATION | Facility: OTHER | Age: 67
End: 2022-01-26
Attending: FAMILY MEDICINE
Payer: MEDICARE

## 2022-01-26 ENCOUNTER — HOSPITAL ENCOUNTER (OUTPATIENT)
Dept: MAMMOGRAPHY | Facility: OTHER | Age: 67
End: 2022-01-26
Attending: FAMILY MEDICINE
Payer: MEDICARE

## 2022-01-26 DIAGNOSIS — Z12.31 VISIT FOR SCREENING MAMMOGRAM: ICD-10-CM

## 2022-01-26 DIAGNOSIS — D68.2 FACTOR V DEFICIENCY (H): ICD-10-CM

## 2022-01-26 DIAGNOSIS — Z79.01 ANTICOAGULATION MONITORING, INR RANGE 2-3: Primary | ICD-10-CM

## 2022-01-26 LAB
INR (EXTERNAL): 2.4 (ref 0.9–1.1)
INR HOME MONITORING: 2.4 (ref 2–3)

## 2022-01-26 PROCEDURE — 77067 SCR MAMMO BI INCL CAD: CPT

## 2022-01-26 NOTE — PROGRESS NOTES
ANTICOAGULATION FOLLOW-UP CLINIC VISIT    Patient Name:  Lyudmila Fitzgerald  Date:  2022  Contact Type:  Fax from [employer]    SUBJECTIVE:  Patient Findings         Clinical Outcomes     Negatives:  Major bleeding event, Thromboembolic event, Anticoagulation-related hospital admission, Anticoagulation-related ED visit, Anticoagulation-related fatality           OBJECTIVE    Recent labs: (last 7 days)     22  1100   INR 2.4*       ASSESSMENT / PLAN  INR assessment THER    Recheck INR In: 2 WEEKS    INR Location Home INR      Anticoagulation Summary  As of 2022    INR goal:  2.0-3.0   TTR:  61.2 % (1 y)   INR used for dosin.4 (2022)   Warfarin maintenance plan:  3 mg (6 mg x 0.5) every Mon, Wed, Fri; 6 mg (6 mg x 1) all other days   Full warfarin instructions:  3 mg every Mon, Wed, Fri; 6 mg all other days   Weekly warfarin total:  33 mg   No change documented:  Lisbeth Mendes RN   Plan last modified:  Nyla Walker RN (2021)   Next INR check:  2022   Priority:  High   Target end date:  Indefinite    Indications    Acute thromboembolism of deep veins of lower extremity (H) (Resolved) [I82.409]  Anticoagulation monitoring  INR range 2-3 [Z79.01]  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

## 2022-01-30 ENCOUNTER — ALLIED HEALTH/NURSE VISIT (OUTPATIENT)
Dept: FAMILY MEDICINE | Facility: OTHER | Age: 67
End: 2022-01-30
Attending: FAMILY MEDICINE
Payer: MEDICARE

## 2022-01-30 DIAGNOSIS — J02.9 SORE THROAT: ICD-10-CM

## 2022-01-30 DIAGNOSIS — R52 BODY ACHES: ICD-10-CM

## 2022-01-30 DIAGNOSIS — R05.9 COUGH: Primary | ICD-10-CM

## 2022-01-30 PROCEDURE — C9803 HOPD COVID-19 SPEC COLLECT: HCPCS

## 2022-01-30 PROCEDURE — U0005 INFEC AGEN DETEC AMPLI PROBE: HCPCS | Mod: ZL

## 2022-01-30 NOTE — PROGRESS NOTES
Patient swabbed for COVID-19 testing.  Philly Neri MA on 1/30/2022 at 1:06 PM  Potential exposure sore throat

## 2022-02-01 LAB — SARS-COV-2 RNA RESP QL NAA+PROBE: NOT DETECTED

## 2022-02-11 ENCOUNTER — ANTICOAGULATION THERAPY VISIT (OUTPATIENT)
Dept: ANTICOAGULATION | Facility: OTHER | Age: 67
End: 2022-02-11
Attending: FAMILY MEDICINE
Payer: MEDICARE

## 2022-02-11 DIAGNOSIS — D68.2 FACTOR V DEFICIENCY (H): ICD-10-CM

## 2022-02-11 DIAGNOSIS — Z79.01 ANTICOAGULATION MONITORING, INR RANGE 2-3: Primary | ICD-10-CM

## 2022-02-11 LAB — INR HOME MONITORING: 2.2 (ref 2–3)

## 2022-02-11 NOTE — PROGRESS NOTES
ANTICOAGULATION FOLLOW-UP CLINIC VISIT    Patient Name:  Lyudmila Fitzgerald  Date:  2022  Contact Type:  no call needed patient to continue same dose    SUBJECTIVE:  Patient Findings         Clinical Outcomes     Negatives:  Major bleeding event, Thromboembolic event, Anticoagulation-related hospital admission, Anticoagulation-related ED visit, Anticoagulation-related fatality           OBJECTIVE    Recent labs: (last 7 days)     22  0000   INR 2.20       ASSESSMENT / PLAN  INR assessment THER    Recheck INR In: 2 WEEKS    INR Location Home INR      Anticoagulation Summary  As of 2022    INR goal:  2.0-3.0   TTR:  63.9 % (1 y)   INR used for dosin.20 (2022)   Warfarin maintenance plan:  3 mg (6 mg x 0.5) every Mon, Wed, Fri; 6 mg (6 mg x 1) all other days   Full warfarin instructions:  3 mg every Mon, Wed, Fri; 6 mg all other days   Weekly warfarin total:  33 mg   No change documented:  Nyla Walker RN   Plan last modified:  Nyla Walker RN (2021)   Next INR check:  2022   Priority:  High   Target end date:  Indefinite    Indications    Acute thromboembolism of deep veins of lower extremity (H) (Resolved) [I82.409]  Anticoagulation monitoring  INR range 2-3 [Z79.01]  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

## 2022-02-17 ENCOUNTER — TELEPHONE (OUTPATIENT)
Dept: FAMILY MEDICINE | Facility: OTHER | Age: 67
End: 2022-02-17
Payer: MEDICARE

## 2022-02-17 DIAGNOSIS — I66.9 CEREBRAL THROMBOSIS: ICD-10-CM

## 2022-02-17 RX ORDER — WARFARIN SODIUM 6 MG/1
TABLET ORAL
Qty: 90 TABLET | Refills: 1 | Status: SHIPPED | OUTPATIENT
Start: 2022-02-17 | End: 2022-08-15

## 2022-02-17 NOTE — TELEPHONE ENCOUNTER
Spoke with patient refill warfarin. Prescription refilled per RN MedicationRefill Policy.................... Nyla Walker RN ....................  2/17/2022   12:46 PM

## 2022-02-17 NOTE — TELEPHONE ENCOUNTER
Patient needs to get her Warfarin 6 mg refilled. Please call    Brenda Russell on 2/17/2022 at 10:22 AM     [facility]

## 2022-02-24 ENCOUNTER — ANTICOAGULATION THERAPY VISIT (OUTPATIENT)
Dept: ANTICOAGULATION | Facility: OTHER | Age: 67
End: 2022-02-24
Attending: FAMILY MEDICINE
Payer: MEDICARE

## 2022-02-24 DIAGNOSIS — Z79.01 ANTICOAGULATION MONITORING, INR RANGE 2-3: Primary | ICD-10-CM

## 2022-02-24 DIAGNOSIS — D68.2 FACTOR V DEFICIENCY (H): ICD-10-CM

## 2022-02-24 LAB — INR HOME MONITORING: 2.7 (ref 2–3)

## 2022-02-24 NOTE — PROGRESS NOTES
ANTICOAGULATION FOLLOW-UP CLINIC VISIT    Patient Name:  Lyudmila Fitzgerald  Date:  2022  Contact Type:  no call needed patient to continue same dose    SUBJECTIVE:  Patient Findings         Clinical Outcomes     Negatives:  Major bleeding event, Thromboembolic event, Anticoagulation-related hospital admission, Anticoagulation-related ED visit, Anticoagulation-related fatality           OBJECTIVE    Recent labs: (last 7 days)     22  0000   INR 2.70       ASSESSMENT / PLAN  INR assessment THER    Recheck INR In: 2 WEEKS    INR Location Home INR      Anticoagulation Summary  As of 2022    INR goal:  2.0-3.0   TTR:  64.5 % (1 y)   INR used for dosin.70 (2022)   Warfarin maintenance plan:  3 mg (6 mg x 0.5) every Mon, Wed, Fri; 6 mg (6 mg x 1) all other days   Full warfarin instructions:  3 mg every Mon, Wed, Fri; 6 mg all other days   Weekly warfarin total:  33 mg   No change documented:  Nyla Walker RN   Plan last modified:  Nyla Walker RN (2021)   Next INR check:  3/10/2022   Priority:  High   Target end date:  Indefinite    Indications    Acute thromboembolism of deep veins of lower extremity (H) (Resolved) [I82.409]  Anticoagulation monitoring  INR range 2-3 [Z79.01]  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

## 2022-03-03 ENCOUNTER — ANCILLARY PROCEDURE (OUTPATIENT)
Dept: CARDIOLOGY | Facility: CLINIC | Age: 67
End: 2022-03-03
Attending: INTERNAL MEDICINE
Payer: MEDICARE

## 2022-03-03 DIAGNOSIS — Z98.890 S/P AV NODAL ABLATION: ICD-10-CM

## 2022-03-03 DIAGNOSIS — I47.10 SUPRAVENTRICULAR TACHYCARDIA (H): ICD-10-CM

## 2022-03-03 PROCEDURE — 93296 REM INTERROG EVL PM/IDS: CPT

## 2022-03-03 PROCEDURE — 93294 REM INTERROG EVL PM/LDLS PM: CPT | Performed by: INTERNAL MEDICINE

## 2022-03-12 LAB — INR HOME MONITORING: 2 (ref 2–3)

## 2022-03-14 ENCOUNTER — ANTICOAGULATION THERAPY VISIT (OUTPATIENT)
Dept: ANTICOAGULATION | Facility: OTHER | Age: 67
End: 2022-03-14
Attending: FAMILY MEDICINE
Payer: MEDICARE

## 2022-03-14 DIAGNOSIS — D68.2 FACTOR V DEFICIENCY (H): ICD-10-CM

## 2022-03-14 DIAGNOSIS — Z79.01 ANTICOAGULATION MONITORING, INR RANGE 2-3: Primary | ICD-10-CM

## 2022-03-14 NOTE — PROGRESS NOTES
ANTICOAGULATION FOLLOW-UP CLINIC VISIT    Patient Name:  Lyudmila Fitzgerald  Date:  3/14/2022  Contact Type:  no call needed patient to continue same dose    SUBJECTIVE:  Patient Findings         Clinical Outcomes     Negatives:  Major bleeding event, Thromboembolic event, Anticoagulation-related hospital admission, Anticoagulation-related ED visit, Anticoagulation-related fatality           OBJECTIVE    Recent labs: (last 7 days)     22  0000   INR 2.00       ASSESSMENT / PLAN  INR assessment THER    Recheck INR In: 2 WEEKS    INR Location Home INR      Anticoagulation Summary  As of 3/14/2022    INR goal:  2.0-3.0   TTR:  69.3 % (1 y)   INR used for dosin.00 (3/12/2022)   Warfarin maintenance plan:  3 mg (6 mg x 0.5) every Mon, Wed, Fri; 6 mg (6 mg x 1) all other days   Full warfarin instructions:  3 mg every Mon, Wed, Fri; 6 mg all other days   Weekly warfarin total:  33 mg   No change documented:  Nyla Walker RN   Plan last modified:  Nyla Walker RN (2021)   Next INR check:  3/28/2022   Priority:  High   Target end date:  Indefinite    Indications    Acute thromboembolism of deep veins of lower extremity (H) (Resolved) [I82.409]  Anticoagulation monitoring  INR range 2-3 [Z79.01]  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

## 2022-03-26 ENCOUNTER — HEALTH MAINTENANCE LETTER (OUTPATIENT)
Age: 67
End: 2022-03-26

## 2022-03-29 LAB — INR HOME MONITORING: 2.9 (ref 2–3)

## 2022-03-30 ENCOUNTER — ANTICOAGULATION THERAPY VISIT (OUTPATIENT)
Dept: ANTICOAGULATION | Facility: OTHER | Age: 67
End: 2022-03-30
Attending: FAMILY MEDICINE
Payer: MEDICARE

## 2022-03-30 NOTE — PROGRESS NOTES
ANTICOAGULATION FOLLOW-UP CLINIC VISIT    Patient Name:  Lyudmila Fitzgerald  Date:  3/30/2022  Contact Type:  no call needed patient to continue same dose    SUBJECTIVE:  Patient Findings         Clinical Outcomes     Negatives:  Major bleeding event, Thromboembolic event, Anticoagulation-related hospital admission, Anticoagulation-related ED visit, Anticoagulation-related fatality           OBJECTIVE    Recent labs: (last 7 days)     22  0000   INR 2.90       ASSESSMENT / PLAN  INR assessment THER    Recheck INR In: 2 WEEKS    INR Location Home INR      Anticoagulation Summary  As of 3/30/2022    INR goal:  2.0-3.0   TTR:  70.3 % (1 y)   INR used for dosin.90 (3/29/2022)   Warfarin maintenance plan:  3 mg (6 mg x 0.5) every Mon, Wed, Fri; 6 mg (6 mg x 1) all other days   Full warfarin instructions:  3 mg every Mon, Wed, Fri; 6 mg all other days   Weekly warfarin total:  33 mg   No change documented:  Winter Pedraza RN   Plan last modified:  Nyla Walker RN (2021)   Next INR check:  2022   Priority:  High   Target end date:  Indefinite    Indications    Acute thromboembolism of deep veins of lower extremity (H) (Resolved) [I82.409]  Anticoagulation monitoring  INR range 2-3 [Z79.01]  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

## 2022-04-12 ENCOUNTER — ANTICOAGULATION THERAPY VISIT (OUTPATIENT)
Dept: ANTICOAGULATION | Facility: OTHER | Age: 67
End: 2022-04-12
Attending: FAMILY MEDICINE
Payer: MEDICARE

## 2022-04-12 DIAGNOSIS — D68.2 FACTOR V DEFICIENCY (H): ICD-10-CM

## 2022-04-12 DIAGNOSIS — Z79.01 ANTICOAGULATION MONITORING, INR RANGE 2-3: Primary | ICD-10-CM

## 2022-04-12 LAB — INR HOME MONITORING: 2.2 (ref 2–3)

## 2022-04-12 NOTE — PROGRESS NOTES
ANTICOAGULATION FOLLOW-UP CLINIC VISIT    Patient Name:  Lyudmila Fitzgerald  Date:  2022  Contact Type:  no call needed patient to continue same dose    SUBJECTIVE:  Patient Findings         Clinical Outcomes     Negatives:  Major bleeding event, Thromboembolic event, Anticoagulation-related hospital admission, Anticoagulation-related ED visit, Anticoagulation-related fatality           OBJECTIVE    Recent labs: (last 7 days)     22  0000   INR 2.20       ASSESSMENT / PLAN  INR assessment THER    Recheck INR In: 2 WEEKS    INR Location Home INR      Anticoagulation Summary  As of 2022    INR goal:  2.0-3.0   TTR:  70.4 % (1 y)   INR used for dosin.20 (2022)   Warfarin maintenance plan:  3 mg (6 mg x 0.5) every Mon, Wed, Fri; 6 mg (6 mg x 1) all other days   Full warfarin instructions:  3 mg every Mon, Wed, Fri; 6 mg all other days   Weekly warfarin total:  33 mg   No change documented:  Nyla Walker RN   Plan last modified:  Nyla Walker RN (2021)   Next INR check:  2022   Priority:  High   Target end date:  Indefinite    Indications    Acute thromboembolism of deep veins of lower extremity (H) (Resolved) [I82.409]  Anticoagulation monitoring  INR range 2-3 [Z79.01]  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

## 2022-04-21 LAB
MDC_IDC_EPISODE_DTM: NORMAL
MDC_IDC_EPISODE_DURATION: 5 S
MDC_IDC_EPISODE_DURATION: 7 S
MDC_IDC_EPISODE_ID: NORMAL
MDC_IDC_EPISODE_TYPE: NORMAL
MDC_IDC_LEAD_IMPLANT_DT: NORMAL
MDC_IDC_LEAD_IMPLANT_DT: NORMAL
MDC_IDC_LEAD_LOCATION: NORMAL
MDC_IDC_LEAD_LOCATION: NORMAL
MDC_IDC_LEAD_LOCATION_DETAIL_1: NORMAL
MDC_IDC_LEAD_LOCATION_DETAIL_1: NORMAL
MDC_IDC_LEAD_MFG: NORMAL
MDC_IDC_LEAD_MFG: NORMAL
MDC_IDC_LEAD_MODEL: NORMAL
MDC_IDC_LEAD_MODEL: NORMAL
MDC_IDC_LEAD_POLARITY_TYPE: NORMAL
MDC_IDC_LEAD_POLARITY_TYPE: NORMAL
MDC_IDC_LEAD_SERIAL: NORMAL
MDC_IDC_LEAD_SERIAL: NORMAL
MDC_IDC_LEAD_SPECIAL_FUNCTION: NORMAL
MDC_IDC_LEAD_SPECIAL_FUNCTION: NORMAL
MDC_IDC_MSMT_BATTERY_DTM: NORMAL
MDC_IDC_MSMT_BATTERY_REMAINING_LONGEVITY: 132 MO
MDC_IDC_MSMT_BATTERY_REMAINING_PERCENTAGE: 100 %
MDC_IDC_MSMT_BATTERY_STATUS: NORMAL
MDC_IDC_MSMT_LEADCHNL_RA_IMPEDANCE_VALUE: 713 OHM
MDC_IDC_MSMT_LEADCHNL_RV_IMPEDANCE_VALUE: 497 OHM
MDC_IDC_MSMT_LEADCHNL_RV_PACING_THRESHOLD_AMPLITUDE: 1 V
MDC_IDC_MSMT_LEADCHNL_RV_PACING_THRESHOLD_PULSEWIDTH: 0.4 MS
MDC_IDC_PG_IMPLANT_DTM: NORMAL
MDC_IDC_PG_MFG: NORMAL
MDC_IDC_PG_MODEL: NORMAL
MDC_IDC_PG_SERIAL: NORMAL
MDC_IDC_PG_TYPE: NORMAL
MDC_IDC_SESS_CLINIC_NAME: NORMAL
MDC_IDC_SESS_DTM: NORMAL
MDC_IDC_SESS_TYPE: NORMAL
MDC_IDC_SET_BRADY_AT_MODE_SWITCH_MODE: NORMAL
MDC_IDC_SET_BRADY_AT_MODE_SWITCH_RATE: 140 {BEATS}/MIN
MDC_IDC_SET_BRADY_LOWRATE: 60 {BEATS}/MIN
MDC_IDC_SET_BRADY_MAX_SENSOR_RATE: 130 {BEATS}/MIN
MDC_IDC_SET_BRADY_MAX_TRACKING_RATE: 100 {BEATS}/MIN
MDC_IDC_SET_BRADY_MODE: NORMAL
MDC_IDC_SET_BRADY_PAV_DELAY_HIGH: 80 MS
MDC_IDC_SET_BRADY_PAV_DELAY_LOW: 130 MS
MDC_IDC_SET_BRADY_SAV_DELAY_HIGH: 80 MS
MDC_IDC_SET_BRADY_SAV_DELAY_LOW: 130 MS
MDC_IDC_SET_LEADCHNL_RA_PACING_AMPLITUDE: 2 V
MDC_IDC_SET_LEADCHNL_RA_PACING_CAPTURE_MODE: NORMAL
MDC_IDC_SET_LEADCHNL_RA_PACING_POLARITY: NORMAL
MDC_IDC_SET_LEADCHNL_RA_PACING_PULSEWIDTH: 1 MS
MDC_IDC_SET_LEADCHNL_RA_SENSING_ADAPTATION_MODE: NORMAL
MDC_IDC_SET_LEADCHNL_RA_SENSING_POLARITY: NORMAL
MDC_IDC_SET_LEADCHNL_RA_SENSING_SENSITIVITY: 0.6 MV
MDC_IDC_SET_LEADCHNL_RV_PACING_AMPLITUDE: 1.5 V
MDC_IDC_SET_LEADCHNL_RV_PACING_CAPTURE_MODE: NORMAL
MDC_IDC_SET_LEADCHNL_RV_PACING_POLARITY: NORMAL
MDC_IDC_SET_LEADCHNL_RV_PACING_PULSEWIDTH: 0.4 MS
MDC_IDC_SET_LEADCHNL_RV_SENSING_ADAPTATION_MODE: NORMAL
MDC_IDC_SET_LEADCHNL_RV_SENSING_POLARITY: NORMAL
MDC_IDC_SET_LEADCHNL_RV_SENSING_SENSITIVITY: 1.5 MV
MDC_IDC_SET_ZONE_DETECTION_INTERVAL: 375 MS
MDC_IDC_SET_ZONE_TYPE: NORMAL
MDC_IDC_SET_ZONE_VENDOR_TYPE: NORMAL
MDC_IDC_STAT_AT_BURDEN_PERCENT: 1 %
MDC_IDC_STAT_AT_DTM_END: NORMAL
MDC_IDC_STAT_AT_DTM_START: NORMAL
MDC_IDC_STAT_BRADY_DTM_END: NORMAL
MDC_IDC_STAT_BRADY_DTM_START: NORMAL
MDC_IDC_STAT_BRADY_RA_PERCENT_PACED: 93 %
MDC_IDC_STAT_BRADY_RV_PERCENT_PACED: 100 %
MDC_IDC_STAT_EPISODE_RECENT_COUNT: 0
MDC_IDC_STAT_EPISODE_RECENT_COUNT: 2
MDC_IDC_STAT_EPISODE_RECENT_COUNT_DTM_END: NORMAL
MDC_IDC_STAT_EPISODE_RECENT_COUNT_DTM_START: NORMAL
MDC_IDC_STAT_EPISODE_TYPE: NORMAL
MDC_IDC_STAT_EPISODE_VENDOR_TYPE: NORMAL

## 2022-04-27 LAB — INR HOME MONITORING: 1.8 (ref 2–3)

## 2022-04-28 ENCOUNTER — ANTICOAGULATION THERAPY VISIT (OUTPATIENT)
Dept: ANTICOAGULATION | Facility: OTHER | Age: 67
End: 2022-04-28
Attending: FAMILY MEDICINE
Payer: MEDICARE

## 2022-04-28 DIAGNOSIS — D68.2 FACTOR V DEFICIENCY (H): ICD-10-CM

## 2022-04-28 DIAGNOSIS — Z79.01 ANTICOAGULATION MONITORING, INR RANGE 2-3: Primary | ICD-10-CM

## 2022-04-28 NOTE — PROGRESS NOTES
ANTICOAGULATION FOLLOW-UP CLINIC VISIT    Patient Name:  Lyudmila Fitzgerald  Date:  2022  Contact Type:  Telephone/ spoke with patient reviewed dosing and when to recheck INR    SUBJECTIVE:  Patient Findings         Clinical Outcomes     Negatives:  Major bleeding event, Thromboembolic event, Anticoagulation-related hospital admission, Anticoagulation-related ED visit, Anticoagulation-related fatality           OBJECTIVE    Recent labs: (last 7 days)     22  0000   INR 1.80*       ASSESSMENT / PLAN  INR assessment SUB    Recheck INR In: 2 WEEKS    INR Location Home INR      Anticoagulation Summary  As of 2022    INR goal:  2.0-3.0   TTR:  72.0 % (1 y)   INR used for dosin.80 (2022)   Warfarin maintenance plan:  3 mg (6 mg x 0.5) every Mon, Wed, Fri; 6 mg (6 mg x 1) all other days   Full warfarin instructions:  3 mg every Mon, Wed, Fri; 6 mg all other days   Weekly warfarin total:  33 mg   No change documented:  Amy Smith RN   Plan last modified:  Nyla Walker RN (2021)   Next INR check:  2022   Priority:  High   Target end date:  Indefinite    Indications    Acute thromboembolism of deep veins of lower extremity (H) (Resolved) [I82.409]  Anticoagulation monitoring  INR range 2-3 [Z79.01]  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

## 2022-05-06 DIAGNOSIS — Q24.5 MYOCARDIAL BRIDGE: ICD-10-CM

## 2022-05-06 DIAGNOSIS — I10 ESSENTIAL HYPERTENSION: ICD-10-CM

## 2022-05-06 DIAGNOSIS — F51.04 CHRONIC INSOMNIA: ICD-10-CM

## 2022-05-06 DIAGNOSIS — I20.1 CORONARY ARTERY SPASM (H): ICD-10-CM

## 2022-05-06 DIAGNOSIS — Z95.1 HISTORY OF CORONARY ARTERY BYPASS GRAFT X 1: ICD-10-CM

## 2022-05-06 DIAGNOSIS — Z86.79 HISTORY OF CORONARY VASOSPASM: ICD-10-CM

## 2022-05-06 DIAGNOSIS — Z95.5 HISTORY OF CORONARY ARTERY STENT PLACEMENT: ICD-10-CM

## 2022-05-09 RX ORDER — ISOSORBIDE MONONITRATE 30 MG/1
TABLET, EXTENDED RELEASE ORAL
Qty: 90 TABLET | Refills: 3 | Status: SHIPPED | OUTPATIENT
Start: 2022-05-09 | End: 2022-10-25

## 2022-05-09 RX ORDER — ZOLPIDEM TARTRATE 10 MG/1
TABLET ORAL
Qty: 60 TABLET | Refills: 1 | Status: SHIPPED | OUTPATIENT
Start: 2022-05-09 | End: 2022-09-17

## 2022-05-09 NOTE — TELEPHONE ENCOUNTER
"Requested Prescriptions   Pending Prescriptions Disp Refills     zolpidem (AMBIEN) 10 MG tablet [Pharmacy Med Name: Zolpidem Tartrate 10 MG Oral Tablet] 60 tablet 0     Sig: TAKE 1 TABLET BY MOUTH AT BEDTIME AS NEEDED FOR SLEEP       There is no refill protocol information for this order        isosorbide mononitrate (IMDUR) 30 MG 24 hr tablet [Pharmacy Med Name: Isosorbide Mononitrate ER 30 MG Oral Tablet Extended Release 24 Hour] 30 tablet 0     Sig: Take 3 tablets by mouth once daily       Nitrates Passed - 5/6/2022 11:21 AM        Passed - Blood pressure under 140/90 in past 12 months     BP Readings from Last 3 Encounters:   11/23/21 138/82   09/10/21 (!) 147/87   09/01/21 120/71                 Passed - Pt is not on erectile dysfunction medications        Passed - Recent (12 mo) or future (30 days) visit within the authorizing provider's specialty     Patient has had an office visit with the authorizing provider or a provider within the authorizing providers department within the previous 12 mos or has a future within next 30 days. See \"Patient Info\" tab in inbasket, or \"Choose Columns\" in Meds & Orders section of the refill encounter.              Passed - Medication is active on med list        Passed - Patient is age 18 or older         Zolpidem 10mg  Last Written Prescription Date:  9/28/21  Last Fill Quantity: 60,   # refills: 3  Last Office Visit: 11/23/21  Future Office visit:   none    Isosorbide 30mg  Last Written Prescription Date:  4/8/21  Last Fill Quantity: 90,   # refills: 3    Routing refill request to provider for review/approval because:  Drug not on the [facility], [facility] or [facility] refill protocol or controlled substance, blood pressure abnormal    Unable to complete prescription refill per RN Medication Refill Policy.   Kandace Morelos RN ....................  5/9/2022   11:52 AM    "

## 2022-05-15 LAB — INR HOME MONITORING: 2.4 (ref 2–3)

## 2022-05-16 ENCOUNTER — ANTICOAGULATION THERAPY VISIT (OUTPATIENT)
Dept: ANTICOAGULATION | Facility: OTHER | Age: 67
End: 2022-05-16
Attending: FAMILY MEDICINE
Payer: MEDICARE

## 2022-05-16 DIAGNOSIS — Z79.01 ANTICOAGULATION MONITORING, INR RANGE 2-3: Primary | ICD-10-CM

## 2022-05-16 DIAGNOSIS — D68.2 FACTOR V DEFICIENCY (H): ICD-10-CM

## 2022-05-16 NOTE — PROGRESS NOTES
ANTICOAGULATION MANAGEMENT     Lyudmila Fitzgerald 66 year old female is on warfarin with therapeutic INR result. (Goal INR 2.0-3.0)    Recent labs: (last 7 days)     05/15/22  0000   INR 2.40       ASSESSMENT       Source(s): Chart Review    Previous INR was Subtherapeutic    Medication, diet, health changes since last INR chart reviewed; none identified           PLAN     Recommended plan for no diet, medication or health factor changes affecting INR     Dosing Instructions: continue your current warfarin dose with next INR in 2 weeks       Summary  As of 5/16/2022    Full warfarin instructions:  3 mg every Mon, Wed, Fri; 6 mg all other days   Next INR check:  5/30/2022             no call needed patient to continue same dose    Patient to recheck with home meter    Education provided: None required    Plan made per [facility] anticoagulation protocol    Nyla Walker RN  Anticoagulation Clinic  5/16/2022    _______________________________________________________________________     Anticoagulation Episode Summary     Current INR goal:  2.0-3.0   TTR:  74.9 % (1 y)   Target end date:  Indefinite   Send INR reminders to:  [facility]    Indications    Acute thromboembolism of deep veins of lower extremity (H) (Resolved) [I82.409]  Anticoagulation monitoring  INR range 2-3 [Z79.01]  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

## 2022-06-01 ENCOUNTER — ANTICOAGULATION THERAPY VISIT (OUTPATIENT)
Dept: ANTICOAGULATION | Facility: OTHER | Age: 67
End: 2022-06-01
Attending: FAMILY MEDICINE
Payer: MEDICARE

## 2022-06-01 DIAGNOSIS — I66.9 CEREBRAL THROMBOSIS: ICD-10-CM

## 2022-06-01 DIAGNOSIS — D68.2 FACTOR V DEFICIENCY (H): ICD-10-CM

## 2022-06-01 DIAGNOSIS — Z79.01 ANTICOAGULATION MONITORING, INR RANGE 2-3: Primary | ICD-10-CM

## 2022-06-01 DIAGNOSIS — Z95.5 HISTORY OF CORONARY ARTERY STENT PLACEMENT: Primary | ICD-10-CM

## 2022-06-01 LAB — INR HOME MONITORING: 2.6 (ref 2–3)

## 2022-06-01 NOTE — PROGRESS NOTES
ANTICOAGULATION MANAGEMENT     Lyudmila Fitzgerald 66 year old female is on warfarin with therapeutic INR result. (Goal INR 2.0-3.0)    Recent labs: (last 7 days)     06/01/22  0000   INR 2.60       ASSESSMENT        Source(s): Chart Review    Previous INR was Subtherapeutic    Medication, diet, health changes since last INR chart reviewed; none identified       PLAN     Recommended plan for no diet, medication or health factor changes affecting INR     Dosing Instructions: continue your current warfarin dose with next INR in 2 weeks       Summary  As of 6/1/2022    Full warfarin instructions:  3 mg every Mon, Wed, Fri; 6 mg all other days   Next INR check:  6/15/2022             No need to call continue you current dose    Patient to recheck with home meter    Education provided: None required    Plan made per [facility] anticoagulation protocol    Winter Pedraza RN  Anticoagulation Clinic  6/1/2022    _______________________________________________________________________     Anticoagulation Episode Summary     Current INR goal:  2.0-3.0   TTR:  78.7 % (1 y)   Target end date:  Indefinite   Send INR reminders to:  [facility]    Indications    Acute thromboembolism of deep veins of lower extremity (H) (Resolved) [I82.409]  Anticoagulation monitoring  INR range 2-3 [Z79.01]  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

## 2022-06-06 ENCOUNTER — ANCILLARY PROCEDURE (OUTPATIENT)
Dept: CARDIOLOGY | Facility: CLINIC | Age: 67
End: 2022-06-06
Attending: INTERNAL MEDICINE
Payer: MEDICARE

## 2022-06-06 DIAGNOSIS — I47.10 SUPRAVENTRICULAR TACHYCARDIA (H): ICD-10-CM

## 2022-06-06 DIAGNOSIS — Z98.890 S/P AV NODAL ABLATION: ICD-10-CM

## 2022-06-06 PROCEDURE — 93294 REM INTERROG EVL PM/LDLS PM: CPT | Performed by: INTERNAL MEDICINE

## 2022-06-06 PROCEDURE — 93296 REM INTERROG EVL PM/IDS: CPT

## 2022-06-13 LAB
MDC_IDC_EPISODE_DTM: NORMAL
MDC_IDC_EPISODE_DURATION: 11 S
MDC_IDC_EPISODE_DURATION: 5 S
MDC_IDC_EPISODE_DURATION: 5 S
MDC_IDC_EPISODE_DURATION: 7 S
MDC_IDC_EPISODE_DURATION: 7 S
MDC_IDC_EPISODE_DURATION: 9 S
MDC_IDC_EPISODE_ID: NORMAL
MDC_IDC_EPISODE_TYPE: NORMAL
MDC_IDC_LEAD_IMPLANT_DT: NORMAL
MDC_IDC_LEAD_IMPLANT_DT: NORMAL
MDC_IDC_LEAD_LOCATION: NORMAL
MDC_IDC_LEAD_LOCATION: NORMAL
MDC_IDC_LEAD_LOCATION_DETAIL_1: NORMAL
MDC_IDC_LEAD_LOCATION_DETAIL_1: NORMAL
MDC_IDC_LEAD_MFG: NORMAL
MDC_IDC_LEAD_MFG: NORMAL
MDC_IDC_LEAD_MODEL: NORMAL
MDC_IDC_LEAD_MODEL: NORMAL
MDC_IDC_LEAD_POLARITY_TYPE: NORMAL
MDC_IDC_LEAD_POLARITY_TYPE: NORMAL
MDC_IDC_LEAD_SERIAL: NORMAL
MDC_IDC_LEAD_SERIAL: NORMAL
MDC_IDC_LEAD_SPECIAL_FUNCTION: NORMAL
MDC_IDC_LEAD_SPECIAL_FUNCTION: NORMAL
MDC_IDC_MSMT_BATTERY_DTM: NORMAL
MDC_IDC_MSMT_BATTERY_REMAINING_LONGEVITY: 120 MO
MDC_IDC_MSMT_BATTERY_REMAINING_PERCENTAGE: 100 %
MDC_IDC_MSMT_BATTERY_STATUS: NORMAL
MDC_IDC_MSMT_LEADCHNL_RA_IMPEDANCE_VALUE: 714 OHM
MDC_IDC_MSMT_LEADCHNL_RV_IMPEDANCE_VALUE: 486 OHM
MDC_IDC_MSMT_LEADCHNL_RV_PACING_THRESHOLD_AMPLITUDE: 1 V
MDC_IDC_MSMT_LEADCHNL_RV_PACING_THRESHOLD_PULSEWIDTH: 0.4 MS
MDC_IDC_PG_IMPLANT_DTM: NORMAL
MDC_IDC_PG_MFG: NORMAL
MDC_IDC_PG_MODEL: NORMAL
MDC_IDC_PG_SERIAL: NORMAL
MDC_IDC_PG_TYPE: NORMAL
MDC_IDC_SESS_CLINIC_NAME: NORMAL
MDC_IDC_SESS_DTM: NORMAL
MDC_IDC_SESS_TYPE: NORMAL
MDC_IDC_SET_BRADY_AT_MODE_SWITCH_MODE: NORMAL
MDC_IDC_SET_BRADY_AT_MODE_SWITCH_RATE: 140 {BEATS}/MIN
MDC_IDC_SET_BRADY_LOWRATE: 60 {BEATS}/MIN
MDC_IDC_SET_BRADY_MAX_SENSOR_RATE: 130 {BEATS}/MIN
MDC_IDC_SET_BRADY_MAX_TRACKING_RATE: 100 {BEATS}/MIN
MDC_IDC_SET_BRADY_MODE: NORMAL
MDC_IDC_SET_BRADY_PAV_DELAY_HIGH: 80 MS
MDC_IDC_SET_BRADY_PAV_DELAY_LOW: 130 MS
MDC_IDC_SET_BRADY_SAV_DELAY_HIGH: 80 MS
MDC_IDC_SET_BRADY_SAV_DELAY_LOW: 130 MS
MDC_IDC_SET_LEADCHNL_RA_PACING_AMPLITUDE: 2 V
MDC_IDC_SET_LEADCHNL_RA_PACING_CAPTURE_MODE: NORMAL
MDC_IDC_SET_LEADCHNL_RA_PACING_POLARITY: NORMAL
MDC_IDC_SET_LEADCHNL_RA_PACING_PULSEWIDTH: 1 MS
MDC_IDC_SET_LEADCHNL_RA_SENSING_ADAPTATION_MODE: NORMAL
MDC_IDC_SET_LEADCHNL_RA_SENSING_POLARITY: NORMAL
MDC_IDC_SET_LEADCHNL_RA_SENSING_SENSITIVITY: 0.6 MV
MDC_IDC_SET_LEADCHNL_RV_PACING_AMPLITUDE: 1.5 V
MDC_IDC_SET_LEADCHNL_RV_PACING_CAPTURE_MODE: NORMAL
MDC_IDC_SET_LEADCHNL_RV_PACING_POLARITY: NORMAL
MDC_IDC_SET_LEADCHNL_RV_PACING_PULSEWIDTH: 0.4 MS
MDC_IDC_SET_LEADCHNL_RV_SENSING_ADAPTATION_MODE: NORMAL
MDC_IDC_SET_LEADCHNL_RV_SENSING_POLARITY: NORMAL
MDC_IDC_SET_LEADCHNL_RV_SENSING_SENSITIVITY: 1.5 MV
MDC_IDC_SET_ZONE_DETECTION_INTERVAL: 375 MS
MDC_IDC_SET_ZONE_TYPE: NORMAL
MDC_IDC_SET_ZONE_VENDOR_TYPE: NORMAL
MDC_IDC_STAT_AT_BURDEN_PERCENT: 1 %
MDC_IDC_STAT_AT_DTM_END: NORMAL
MDC_IDC_STAT_AT_DTM_START: NORMAL
MDC_IDC_STAT_BRADY_DTM_END: NORMAL
MDC_IDC_STAT_BRADY_DTM_START: NORMAL
MDC_IDC_STAT_BRADY_RA_PERCENT_PACED: 93 %
MDC_IDC_STAT_BRADY_RV_PERCENT_PACED: 100 %
MDC_IDC_STAT_EPISODE_RECENT_COUNT: 0
MDC_IDC_STAT_EPISODE_RECENT_COUNT: 8
MDC_IDC_STAT_EPISODE_RECENT_COUNT_DTM_END: NORMAL
MDC_IDC_STAT_EPISODE_RECENT_COUNT_DTM_START: NORMAL
MDC_IDC_STAT_EPISODE_TYPE: NORMAL
MDC_IDC_STAT_EPISODE_VENDOR_TYPE: NORMAL

## 2022-06-15 LAB — INR HOME MONITORING: 2.4 (ref 2–3)

## 2022-06-16 ENCOUNTER — ANTICOAGULATION THERAPY VISIT (OUTPATIENT)
Dept: ANTICOAGULATION | Facility: OTHER | Age: 67
End: 2022-06-16
Attending: FAMILY MEDICINE
Payer: MEDICARE

## 2022-06-16 DIAGNOSIS — D68.2 FACTOR V DEFICIENCY (H): ICD-10-CM

## 2022-06-16 DIAGNOSIS — Z79.01 ANTICOAGULATION MONITORING, INR RANGE 2-3: Primary | ICD-10-CM

## 2022-06-16 DIAGNOSIS — I66.9 CEREBRAL THROMBOSIS: ICD-10-CM

## 2022-06-16 DIAGNOSIS — Z95.5 HISTORY OF CORONARY ARTERY STENT PLACEMENT: ICD-10-CM

## 2022-06-16 NOTE — PROGRESS NOTES
ANTICOAGULATION  MANAGEMENT-Home Monitor Managed by Exception    Lyudmila ALO Fitzgerald 66 year old female is on warfarin with therapeutic INR result. (Goal INR 2.0-3.0)    Recent labs: (last 7 days)     06/15/22  0000   INR 2.4         Previous INR was Therapeutic    Medication, diet, health changes since last INR:chart reviewed; none identified    Contacted within the last 12 weeks by phone on 4/28/22      ROD     Lyudmila was NOT contacted regarding therapeutic result today per home monitoring policy manage by exception agreement.   Current warfarin dose is to be continued:     Summary  As of 6/16/2022    Full warfarin instructions:  3 mg every Mon, Wed, Fri; 6 mg all other days   Next INR check:  6/30/2022           ?   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

## 2022-06-29 ENCOUNTER — ANTICOAGULATION THERAPY VISIT (OUTPATIENT)
Dept: ANTICOAGULATION | Facility: OTHER | Age: 67
End: 2022-06-29
Attending: FAMILY MEDICINE
Payer: MEDICARE

## 2022-06-29 DIAGNOSIS — Z98.890 STATUS POST CORONARY ANGIOGRAM: ICD-10-CM

## 2022-06-29 DIAGNOSIS — I66.9 CEREBRAL THROMBOSIS: ICD-10-CM

## 2022-06-29 DIAGNOSIS — Z79.01 ANTICOAGULATION MONITORING, INR RANGE 2-3: Primary | ICD-10-CM

## 2022-06-29 DIAGNOSIS — Z95.1 HISTORY OF CORONARY ARTERY BYPASS GRAFT X 1: ICD-10-CM

## 2022-06-29 DIAGNOSIS — I20.1 CORONARY ARTERY SPASM (H): ICD-10-CM

## 2022-06-29 DIAGNOSIS — Z95.5 HISTORY OF CORONARY ARTERY STENT PLACEMENT: ICD-10-CM

## 2022-06-29 DIAGNOSIS — D68.2 FACTOR V DEFICIENCY (H): ICD-10-CM

## 2022-06-29 DIAGNOSIS — Q24.5 MYOCARDIAL BRIDGE: ICD-10-CM

## 2022-06-29 DIAGNOSIS — E78.2 MIXED HYPERLIPIDEMIA: ICD-10-CM

## 2022-06-29 DIAGNOSIS — R07.9 CHEST PAIN, UNSPECIFIED TYPE: ICD-10-CM

## 2022-06-29 DIAGNOSIS — Z86.79 HISTORY OF CORONARY VASOSPASM: ICD-10-CM

## 2022-06-29 LAB — INR HOME MONITORING: 1.6 (ref 2–3)

## 2022-06-29 RX ORDER — NIFEDIPINE 30 MG
TABLET, EXTENDED RELEASE ORAL
Qty: 90 TABLET | Refills: 3 | Status: SHIPPED | OUTPATIENT
Start: 2022-06-29 | End: 2022-10-25

## 2022-06-29 RX ORDER — ATORVASTATIN CALCIUM 80 MG/1
TABLET, FILM COATED ORAL
Qty: 90 TABLET | Refills: 3 | Status: SHIPPED | OUTPATIENT
Start: 2022-06-29 | End: 2023-07-11

## 2022-06-29 NOTE — PROGRESS NOTES
ANTICOAGULATION MANAGEMENT     Lyudmila Fitzgerald 66 year old female is on warfarin with subtherapeutic INR result. (Goal INR 2.0-3.0)    Recent labs: (last 7 days)     06/29/22  0000   INR 1.6*       ASSESSMENT       Source(s): Chart Review and Patient/Caregiver Call       Warfarin doses taken: Missed dose(s) may be affecting INR    Diet: No new diet changes identified    New illness, injury, or hospitalization: No    Medication/supplement changes: None noted    Signs or symptoms of bleeding or clotting: No    Previous INR: Therapeutic last 2(+) visits    Additional findings: None       PLAN     Recommended plan for no diet, medication or health factor changes affecting INR     Dosing Instructions: booster dose then continue your current warfarin dose with next INR in 2 weeks       Summary  As of 6/29/2022    Full warfarin instructions:  6/29: 6 mg; Otherwise 3 mg every Mon, Wed, Fri; 6 mg all other days   Next INR check:  7/13/2022             Telephone call with Leigh who verbalizes understanding and agrees to plan    Patient to recheck with home meter    Education provided: None required    Plan made per [facility] anticoagulation protocol    Nyla Walker, RN  Anticoagulation Clinic  6/29/2022    _______________________________________________________________________     Anticoagulation Episode Summary     Current INR goal:  2.0-3.0   TTR:  78.9 % (1 y)   Target end date:  Indefinite   Send INR reminders to:  [facility]    Indications    Acute thromboembolism of deep veins of lower extremity (H) (Resolved) [I82.409]  Anticoagulation monitoring  INR range 2-3 [Z79.01]  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

## 2022-06-29 NOTE — TELEPHONE ENCOUNTER
"Requested Prescriptions   Pending Prescriptions Disp Refills     NIFEdipine ER (ADALAT CC) 30 MG 24 hr tablet [Pharmacy Med Name: NIFEdipine ER 30 MG Oral Tablet Extended Release 24 Hour] 90 tablet 0     Sig: Take 1 tablet by mouth once daily       Calcium Channel Blockers Protocol  Passed - 6/29/2022 10:26 AM        Passed - Blood pressure under 140/90 in past 12 months     BP Readings from Last 3 Encounters:   11/23/21 138/82   09/10/21 (!) 147/87   09/01/21 120/71                 Passed - Recent (12 mo) or future (30 days) visit within the authorizing provider's specialty     Patient has had an office visit with the authorizing provider or a provider within the authorizing providers department within the previous 12 mos or has a future within next 30 days. See \"Patient Info\" tab in inbasket, or \"Choose Columns\" in Meds & Orders section of the refill encounter.              Passed - Medication is active on med list        Passed - Patient is age 18 or older        Passed - No active pregnancy on record        Passed - Normal serum creatinine on file in past 12 months     Recent Labs   Lab Test 09/10/21  1343   CR 0.72       Ok to refill medication if creatinine is low          Passed - No positive pregnancy test in past 12 months           atorvastatin (LIPITOR) 80 MG tablet [Pharmacy Med Name: Atorvastatin Calcium 80 MG Oral Tablet] 90 tablet 0     Sig: Take 1 tablet by mouth once daily       Statins Protocol Passed - 6/29/2022 10:26 AM        Passed - LDL on file in past 12 months     Recent Labs   Lab Test 07/21/21  1507   *             Passed - No abnormal creatine kinase in past 12 months     No lab results found.             Passed - Recent (12 mo) or future (30 days) visit within the authorizing provider's specialty     Patient has had an office visit with the authorizing provider or a provider within the authorizing providers department within the previous 12 mos or has a future within next 30 days. " "See \"Patient Info\" tab in inbasket, or \"Choose Columns\" in Meds & Orders section of the refill encounter.              Passed - Medication is active on med list        Passed - Patient is age 18 or older        Passed - No active pregnancy on record        Passed - No positive pregnancy test in past 12 months         Nifedipine ER 30mg  Last Written Prescription Date:  11/23/21  Last Fill Quantity: 30,   # refills: 11  Last Office Visit: 11/23/21  Future Office visit:   none    Atorvastatin 80mg  Last Written Prescription Date:  6/15/21  Last Fill Quantity: 90,   # refills: 3    Routing refill request to provider for review/approval because:  Unable to complete prescription refill per RN Medication Refill Policy.   Kandace Morelos RN ....................  6/29/2022   2:27 PM    "

## 2022-07-12 ENCOUNTER — ANTICOAGULATION THERAPY VISIT (OUTPATIENT)
Dept: ANTICOAGULATION | Facility: OTHER | Age: 67
End: 2022-07-12
Payer: MEDICARE

## 2022-07-12 DIAGNOSIS — Z95.5 HISTORY OF CORONARY ARTERY STENT PLACEMENT: ICD-10-CM

## 2022-07-12 DIAGNOSIS — I66.9 CEREBRAL THROMBOSIS: ICD-10-CM

## 2022-07-12 DIAGNOSIS — Z79.01 ANTICOAGULATION MONITORING, INR RANGE 2-3: Primary | ICD-10-CM

## 2022-07-12 DIAGNOSIS — D68.2 FACTOR V DEFICIENCY (H): ICD-10-CM

## 2022-07-12 LAB — INR HOME MONITORING: 2.4 (ref 2–3)

## 2022-07-12 NOTE — PROGRESS NOTES
ANTICOAGULATION  MANAGEMENT-Home Monitor Managed by Exception    Lyudmila ALO Fitzgerald 66 year old female is on warfarin with therapeutic INR result. (Goal INR 2.0-3.0)    Recent labs: (last 7 days)     07/12/22  0000   INR 2.4         Previous INR was Therapeutic    Medication, diet, health changes since last INR:chart reviewed; none identified    Contacted within the last 12 weeks by phone on 6/29/22      ROD     Lyudmila was NOT contacted regarding therapeutic result today per home monitoring policy manage by exception agreement.   Current warfarin dose is to be continued:     Summary  As of 7/12/2022    Full warfarin instructions:  3 mg every Mon, Wed, Fri; 6 mg all other days   Next INR check:  7/26/2022           ?   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

## 2022-07-16 ENCOUNTER — HEALTH MAINTENANCE LETTER (OUTPATIENT)
Age: 67
End: 2022-07-16

## 2022-07-27 ENCOUNTER — ANTICOAGULATION THERAPY VISIT (OUTPATIENT)
Dept: ANTICOAGULATION | Facility: OTHER | Age: 67
End: 2022-07-27
Attending: FAMILY MEDICINE
Payer: MEDICARE

## 2022-07-27 DIAGNOSIS — Z95.5 HISTORY OF CORONARY ARTERY STENT PLACEMENT: ICD-10-CM

## 2022-07-27 DIAGNOSIS — D68.2 FACTOR V DEFICIENCY (H): ICD-10-CM

## 2022-07-27 DIAGNOSIS — I66.9 CEREBRAL THROMBOSIS: ICD-10-CM

## 2022-07-27 DIAGNOSIS — Z79.01 ANTICOAGULATION MONITORING, INR RANGE 2-3: Primary | ICD-10-CM

## 2022-07-27 LAB — INR HOME MONITORING: 2.8 (ref 2–3)

## 2022-07-27 NOTE — PROGRESS NOTES
ANTICOAGULATION  MANAGEMENT-Home Monitor Managed by Exception    Lyudmila ALO Fitzgerald 66 year old female is on warfarin with therapeutic INR result. (Goal INR 2.0-3.0)    Recent labs: (last 7 days)     07/27/22  0000   INR 2.8         Previous INR was Therapeutic    Medication, diet, health changes since last INR:chart reviewed; none identified    Contacted within the last 12 weeks by phone on 6/29/22      ROD     Lyudmila was NOT contacted regarding therapeutic result today per home monitoring policy manage by exception agreement.   Current warfarin dose is to be continued:     Summary  As of 7/27/2022    Full warfarin instructions:  3 mg every Mon, Wed, Fri; 6 mg all other days   Next INR check:  8/3/2022           ?   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

## 2022-08-09 LAB — INR HOME MONITORING: 2.4 (ref 2–3)

## 2022-08-10 ENCOUNTER — ANTICOAGULATION THERAPY VISIT (OUTPATIENT)
Dept: ANTICOAGULATION | Facility: OTHER | Age: 67
End: 2022-08-10
Attending: FAMILY MEDICINE
Payer: MEDICARE

## 2022-08-10 DIAGNOSIS — I66.9 CEREBRAL THROMBOSIS: ICD-10-CM

## 2022-08-10 DIAGNOSIS — D68.2 FACTOR V DEFICIENCY (H): ICD-10-CM

## 2022-08-10 DIAGNOSIS — Z79.01 ANTICOAGULATION MONITORING, INR RANGE 2-3: Primary | ICD-10-CM

## 2022-08-10 DIAGNOSIS — Z95.5 HISTORY OF CORONARY ARTERY STENT PLACEMENT: ICD-10-CM

## 2022-08-10 NOTE — PROGRESS NOTES
ANTICOAGULATION  MANAGEMENT-Home Monitor Managed by Exception    Lyudmila ALO Fitzgerald 67 year old female is on warfarin with therapeutic INR result. (Goal INR 2.0-3.0)    Recent labs: (last 7 days)     08/09/22  0000   INR 2.4         Previous INR was Therapeutic    Medication, diet, health changes since last INR:chart reviewed; none identified    Contacted within the last 12 weeks by phone on 6/29/22      ROD     Lyudmila was NOT contacted regarding therapeutic result today per home monitoring policy manage by exception agreement.   Current warfarin dose is to be continued:     Summary  As of 8/10/2022    Full warfarin instructions:  3 mg every Mon, Wed, Fri; 6 mg all other days   Next INR check:  8/24/2022           ?   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

## 2022-08-15 DIAGNOSIS — I66.9 CEREBRAL THROMBOSIS: ICD-10-CM

## 2022-08-15 NOTE — TELEPHONE ENCOUNTER
[id number] sent Rx request for the following:      Requested Prescriptions   Pending Prescriptions Disp Refills     warfarin ANTICOAGULANT (COUMADIN) 6 MG tablet 90 tablet 1     Sig: TAKE 3 MG x  3 DAYS A WEEK AND 6 MG x 4 DAYS/week OR  AS  DIRECTED  BY  THE  [facility].       Vitamin K Antagonists Failed - 8/15/2022  8:37 AM        Failed - INR is within goal in the past 6 weeks     Confirm INR is within goal in the past 6 weeks.     Recent Labs   Lab Test 08/09/22  0000   INR 2.4          Last Prescription Date:   2/17/22  Last Fill Qty/Refills:         90, R-1    Last Office Visit:              7/21/22  Future Office visit:           None

## 2022-08-16 RX ORDER — WARFARIN SODIUM 6 MG/1
TABLET ORAL
Qty: 90 TABLET | Refills: 0 | Status: SHIPPED | OUTPATIENT
Start: 2022-08-16 | End: 2022-11-11

## 2022-08-23 ENCOUNTER — ANTICOAGULATION THERAPY VISIT (OUTPATIENT)
Dept: ANTICOAGULATION | Facility: OTHER | Age: 67
End: 2022-08-23
Payer: MEDICARE

## 2022-08-23 DIAGNOSIS — Z95.5 HISTORY OF CORONARY ARTERY STENT PLACEMENT: ICD-10-CM

## 2022-08-23 DIAGNOSIS — I66.9 CEREBRAL THROMBOSIS: ICD-10-CM

## 2022-08-23 DIAGNOSIS — Z79.01 ANTICOAGULATION MONITORING, INR RANGE 2-3: Primary | ICD-10-CM

## 2022-08-23 DIAGNOSIS — D68.2 FACTOR V DEFICIENCY (H): ICD-10-CM

## 2022-08-23 LAB — INR HOME MONITORING: 3.6 (ref 2–3)

## 2022-08-23 NOTE — PROGRESS NOTES
ANTICOAGULATION MANAGEMENT     Lyudmila Fitzgerald 67 year old female is on warfarin with subtherapeutic INR result. (Goal INR 2.0-3.0)    Recent labs: (last 7 days)     08/23/22  0000   INR 3.6*       ASSESSMENT       Source(s): Chart Review and Patient/Caregiver Call       Warfarin doses taken: Warfarin taken as instructed    Diet: No new diet changes identified    New illness, injury, or hospitalization: No    Medication/supplement changes: Started a new multivitamin Centrum 50 +    Signs or symptoms of bleeding or clotting: No    Previous INR: Therapeutic last 2(+) visits    Additional findings: None       PLAN     Recommended plan for ongoing change(s) affecting INR     Dosing Instructions: hold 1/2 doses then continue your current warfarin dose with next INR in 1 week       Summary  As of 8/23/2022    Full warfarin instructions:  8/23: 3 mg; Otherwise 3 mg every Mon, Wed, Fri; 6 mg all other days   Next INR check:  8/30/2022             Telephone call with Leigh who verbalizes understanding and agrees to plan    Patient to recheck with home meter    Education provided: None required    Plan made per [facility] anticoagulation protocol    Winter Pedraza RN  Anticoagulation Clinic  8/23/2022    _______________________________________________________________________     Anticoagulation Episode Summary     Current INR goal:  2.0-3.0   TTR:  83.1 % (1 y)   Target end date:  Indefinite   Send INR reminders to:  [facility]    Indications    Acute thromboembolism of deep veins of lower extremity (H) (Resolved) [I82.409]  Anticoagulation monitoring  INR range 2-3 [Z79.01]  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

## 2022-08-31 ENCOUNTER — ANTICOAGULATION THERAPY VISIT (OUTPATIENT)
Dept: ANTICOAGULATION | Facility: OTHER | Age: 67
End: 2022-08-31
Attending: FAMILY MEDICINE
Payer: MEDICARE

## 2022-08-31 DIAGNOSIS — Z79.01 ANTICOAGULATION MONITORING, INR RANGE 2-3: Primary | ICD-10-CM

## 2022-08-31 DIAGNOSIS — Z95.5 HISTORY OF CORONARY ARTERY STENT PLACEMENT: ICD-10-CM

## 2022-08-31 DIAGNOSIS — I66.9 CEREBRAL THROMBOSIS: ICD-10-CM

## 2022-08-31 DIAGNOSIS — D68.2 FACTOR V DEFICIENCY (H): ICD-10-CM

## 2022-08-31 LAB — INR HOME MONITORING: 2.6 (ref 2–3)

## 2022-08-31 NOTE — PROGRESS NOTES
ANTICOAGULATION MANAGEMENT     Lyudmila Fitzgerald 67 year old female is on warfarin with therapeutic INR result. (Goal INR 2.0-3.0)    Recent labs: (last 7 days)     08/31/22  0000   INR 2.6       ASSESSMENT       Source(s): Chart Review and Patient/Caregiver Call       Warfarin doses taken: Warfarin taken as instructed    Diet: No new diet changes identified    New illness, injury, or hospitalization: No    Medication/supplement changes: None noted    Signs or symptoms of bleeding or clotting: No    Previous INR: Supratherapeutic    Additional findings: None       PLAN     Recommended plan for no diet, medication or health factor changes affecting INR     Dosing Instructions: Continue your current warfarin dose with next INR in 2 weeks       Summary  As of 8/31/2022    Full warfarin instructions:  3 mg every Mon, Wed, Fri; 6 mg all other days   Next INR check:  9/14/2022             Telephone call with Leigh who verbalizes understanding and agrees to plan    Patient to recheck with home meter    Education provided: None required    Plan made per [facility] anticoagulation protocol    Nyla Walker RN  Anticoagulation Clinic  8/31/2022    _______________________________________________________________________     Anticoagulation Episode Summary     Current INR goal:  2.0-3.0   TTR:  81.8 % (1 y)   Target end date:  Indefinite   Send INR reminders to:  [facility]    Indications    Acute thromboembolism of deep veins of lower extremity (H) (Resolved) [I82.409]  Anticoagulation monitoring  INR range 2-3 [Z79.01]  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

## 2022-09-06 ENCOUNTER — ANCILLARY PROCEDURE (OUTPATIENT)
Dept: CARDIOLOGY | Facility: CLINIC | Age: 67
End: 2022-09-06
Attending: INTERNAL MEDICINE
Payer: MEDICARE

## 2022-09-06 DIAGNOSIS — I47.10 SUPRAVENTRICULAR TACHYCARDIA (H): ICD-10-CM

## 2022-09-06 DIAGNOSIS — Z98.890 S/P AV NODAL ABLATION: ICD-10-CM

## 2022-09-06 PROCEDURE — 93296 REM INTERROG EVL PM/IDS: CPT

## 2022-09-06 PROCEDURE — 93294 REM INTERROG EVL PM/LDLS PM: CPT | Performed by: INTERNAL MEDICINE

## 2022-09-10 LAB
MDC_IDC_EPISODE_DTM: NORMAL
MDC_IDC_EPISODE_DTM: NORMAL
MDC_IDC_EPISODE_ID: NORMAL
MDC_IDC_EPISODE_ID: NORMAL
MDC_IDC_EPISODE_TYPE: NORMAL
MDC_IDC_EPISODE_TYPE: NORMAL
MDC_IDC_LEAD_IMPLANT_DT: NORMAL
MDC_IDC_LEAD_IMPLANT_DT: NORMAL
MDC_IDC_LEAD_LOCATION: NORMAL
MDC_IDC_LEAD_LOCATION: NORMAL
MDC_IDC_LEAD_LOCATION_DETAIL_1: NORMAL
MDC_IDC_LEAD_LOCATION_DETAIL_1: NORMAL
MDC_IDC_LEAD_MFG: NORMAL
MDC_IDC_LEAD_MFG: NORMAL
MDC_IDC_LEAD_MODEL: NORMAL
MDC_IDC_LEAD_MODEL: NORMAL
MDC_IDC_LEAD_POLARITY_TYPE: NORMAL
MDC_IDC_LEAD_POLARITY_TYPE: NORMAL
MDC_IDC_LEAD_SERIAL: NORMAL
MDC_IDC_LEAD_SERIAL: NORMAL
MDC_IDC_LEAD_SPECIAL_FUNCTION: NORMAL
MDC_IDC_LEAD_SPECIAL_FUNCTION: NORMAL
MDC_IDC_MSMT_BATTERY_DTM: NORMAL
MDC_IDC_MSMT_BATTERY_REMAINING_LONGEVITY: 120 MO
MDC_IDC_MSMT_BATTERY_REMAINING_PERCENTAGE: 100 %
MDC_IDC_MSMT_BATTERY_STATUS: NORMAL
MDC_IDC_MSMT_LEADCHNL_RA_IMPEDANCE_VALUE: 695 OHM
MDC_IDC_MSMT_LEADCHNL_RA_LEAD_CHANNEL_STATUS: NORMAL
MDC_IDC_MSMT_LEADCHNL_RV_IMPEDANCE_VALUE: 470 OHM
MDC_IDC_MSMT_LEADCHNL_RV_PACING_THRESHOLD_AMPLITUDE: 0.8 V
MDC_IDC_MSMT_LEADCHNL_RV_PACING_THRESHOLD_PULSEWIDTH: 0.4 MS
MDC_IDC_PG_IMPLANT_DTM: NORMAL
MDC_IDC_PG_MFG: NORMAL
MDC_IDC_PG_MODEL: NORMAL
MDC_IDC_PG_SERIAL: NORMAL
MDC_IDC_PG_TYPE: NORMAL
MDC_IDC_SESS_CLINIC_NAME: NORMAL
MDC_IDC_SESS_DTM: NORMAL
MDC_IDC_SESS_TYPE: NORMAL
MDC_IDC_SET_BRADY_AT_MODE_SWITCH_MODE: NORMAL
MDC_IDC_SET_BRADY_AT_MODE_SWITCH_RATE: 140 {BEATS}/MIN
MDC_IDC_SET_BRADY_LOWRATE: 60 {BEATS}/MIN
MDC_IDC_SET_BRADY_MAX_SENSOR_RATE: 130 {BEATS}/MIN
MDC_IDC_SET_BRADY_MAX_TRACKING_RATE: 100 {BEATS}/MIN
MDC_IDC_SET_BRADY_MODE: NORMAL
MDC_IDC_SET_BRADY_PAV_DELAY_HIGH: 80 MS
MDC_IDC_SET_BRADY_PAV_DELAY_LOW: 130 MS
MDC_IDC_SET_BRADY_SAV_DELAY_HIGH: 80 MS
MDC_IDC_SET_BRADY_SAV_DELAY_LOW: 130 MS
MDC_IDC_SET_LEADCHNL_RA_PACING_AMPLITUDE: 2 V
MDC_IDC_SET_LEADCHNL_RA_PACING_CAPTURE_MODE: NORMAL
MDC_IDC_SET_LEADCHNL_RA_PACING_POLARITY: NORMAL
MDC_IDC_SET_LEADCHNL_RA_PACING_PULSEWIDTH: 1 MS
MDC_IDC_SET_LEADCHNL_RA_SENSING_ADAPTATION_MODE: NORMAL
MDC_IDC_SET_LEADCHNL_RA_SENSING_POLARITY: NORMAL
MDC_IDC_SET_LEADCHNL_RA_SENSING_SENSITIVITY: 0.6 MV
MDC_IDC_SET_LEADCHNL_RV_PACING_AMPLITUDE: 1.4 V
MDC_IDC_SET_LEADCHNL_RV_PACING_CAPTURE_MODE: NORMAL
MDC_IDC_SET_LEADCHNL_RV_PACING_POLARITY: NORMAL
MDC_IDC_SET_LEADCHNL_RV_PACING_PULSEWIDTH: 0.4 MS
MDC_IDC_SET_LEADCHNL_RV_SENSING_ADAPTATION_MODE: NORMAL
MDC_IDC_SET_LEADCHNL_RV_SENSING_POLARITY: NORMAL
MDC_IDC_SET_LEADCHNL_RV_SENSING_SENSITIVITY: 1.5 MV
MDC_IDC_SET_ZONE_DETECTION_INTERVAL: 375 MS
MDC_IDC_SET_ZONE_TYPE: NORMAL
MDC_IDC_SET_ZONE_VENDOR_TYPE: NORMAL
MDC_IDC_STAT_AT_BURDEN_PERCENT: 1 %
MDC_IDC_STAT_AT_DTM_END: NORMAL
MDC_IDC_STAT_AT_DTM_START: NORMAL
MDC_IDC_STAT_BRADY_DTM_END: NORMAL
MDC_IDC_STAT_BRADY_DTM_START: NORMAL
MDC_IDC_STAT_BRADY_RA_PERCENT_PACED: 93 %
MDC_IDC_STAT_BRADY_RV_PERCENT_PACED: 100 %
MDC_IDC_STAT_EPISODE_RECENT_COUNT: 0
MDC_IDC_STAT_EPISODE_RECENT_COUNT: 8
MDC_IDC_STAT_EPISODE_RECENT_COUNT_DTM_END: NORMAL
MDC_IDC_STAT_EPISODE_RECENT_COUNT_DTM_START: NORMAL
MDC_IDC_STAT_EPISODE_TYPE: NORMAL
MDC_IDC_STAT_EPISODE_VENDOR_TYPE: NORMAL

## 2022-09-16 ENCOUNTER — ANTICOAGULATION THERAPY VISIT (OUTPATIENT)
Dept: ANTICOAGULATION | Facility: OTHER | Age: 67
End: 2022-09-16
Attending: FAMILY MEDICINE
Payer: MEDICARE

## 2022-09-16 DIAGNOSIS — I66.9 CEREBRAL THROMBOSIS: ICD-10-CM

## 2022-09-16 DIAGNOSIS — D68.2 FACTOR V DEFICIENCY (H): ICD-10-CM

## 2022-09-16 DIAGNOSIS — Z95.5 HISTORY OF CORONARY ARTERY STENT PLACEMENT: ICD-10-CM

## 2022-09-16 DIAGNOSIS — Z79.01 ANTICOAGULATION MONITORING, INR RANGE 2-3: Primary | ICD-10-CM

## 2022-09-16 LAB — INR HOME MONITORING: 2.2 (ref 2–3)

## 2022-09-16 NOTE — PROGRESS NOTES
ANTICOAGULATION MANAGEMENT     Lyudmila Fitzgerald 67 year old female is on warfarin with therapeutic INR result. (Goal INR 2.0-3.0)    Recent labs: (last 7 days)     09/16/22  0000   INR 2.2       ASSESSMENT       Source(s): Patient/Caregiver Call       Warfarin doses taken: Warfarin taken as instructed    Diet: No new diet changes identified    New illness, injury, or hospitalization: No    Medication/supplement changes: None noted    Signs or symptoms of bleeding or clotting: No    Previous INR: Therapeutic last visit; previously outside of goal range    Additional findings: None       PLAN     Recommended plan for no diet, medication or health factor changes affecting INR     Dosing Instructions: Continue your current warfarin dose with next INR in 2 weeks       Summary  As of 9/16/2022    Full warfarin instructions:  3 mg every Mon, Wed, Fri; 6 mg all other days   Next INR check:  9/30/2022             Telephone call with Leigh who verbalizes understanding and agrees to plan    Patient to recheck with home meter    Education provided: Please call back if any changes to your diet, medications or how you've been taking warfarin    Plan made per 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

## 2022-09-17 ENCOUNTER — HEALTH MAINTENANCE LETTER (OUTPATIENT)
Age: 67
End: 2022-09-17

## 2022-09-28 DIAGNOSIS — I50.9 CONGESTIVE HEART FAILURE, UNSPECIFIED HF CHRONICITY, UNSPECIFIED HEART FAILURE TYPE (H): Primary | ICD-10-CM

## 2022-09-28 DIAGNOSIS — E11.9 TYPE 2 DIABETES MELLITUS WITHOUT COMPLICATION, WITHOUT LONG-TERM CURRENT USE OF INSULIN (H): ICD-10-CM

## 2022-09-28 DIAGNOSIS — E78.2 MIXED HYPERLIPIDEMIA: ICD-10-CM

## 2022-09-28 DIAGNOSIS — E83.42 HYPOMAGNESEMIA: ICD-10-CM

## 2022-09-28 DIAGNOSIS — R60.0 LOWER EXTREMITY EDEMA: ICD-10-CM

## 2022-09-28 DIAGNOSIS — I10 ESSENTIAL HYPERTENSION: ICD-10-CM

## 2022-09-28 DIAGNOSIS — Z79.01 ANTICOAGULATED ON COUMADIN: ICD-10-CM

## 2022-09-28 DIAGNOSIS — R06.02 SHORTNESS OF BREATH: ICD-10-CM

## 2022-09-28 RX ORDER — FUROSEMIDE 20 MG
TABLET ORAL
Qty: 90 TABLET | Refills: 3 | Status: SHIPPED | OUTPATIENT
Start: 2022-09-28 | End: 2022-10-25

## 2022-09-28 NOTE — TELEPHONE ENCOUNTER
[facility] sent Rx request for the following:      Furosemide 20 MG Oral Tablet      Last Prescription Date:   7/8/2021  Last Fill Qty/Refills:         60, R-4    Last Office Visit:              11/23/2021   Future Office visit:           none    Jason Weller RN, BSN  ....................  9/28/2022   1:50 PM

## 2022-09-30 ENCOUNTER — ANTICOAGULATION THERAPY VISIT (OUTPATIENT)
Dept: ANTICOAGULATION | Facility: OTHER | Age: 67
End: 2022-09-30
Attending: FAMILY MEDICINE
Payer: MEDICARE

## 2022-09-30 DIAGNOSIS — Z53.9 ERRONEOUS ENCOUNTER--DISREGARD: ICD-10-CM

## 2022-09-30 DIAGNOSIS — I66.9 CEREBRAL THROMBOSIS: ICD-10-CM

## 2022-09-30 DIAGNOSIS — Z79.01 ANTICOAGULATION MONITORING, INR RANGE 2-3: Primary | ICD-10-CM

## 2022-09-30 DIAGNOSIS — D68.2 FACTOR V DEFICIENCY (H): ICD-10-CM

## 2022-09-30 DIAGNOSIS — Z95.5 HISTORY OF CORONARY ARTERY STENT PLACEMENT: ICD-10-CM

## 2022-10-04 ENCOUNTER — ANTICOAGULATION THERAPY VISIT (OUTPATIENT)
Dept: ANTICOAGULATION | Facility: OTHER | Age: 67
End: 2022-10-04
Attending: FAMILY MEDICINE
Payer: MEDICARE

## 2022-10-04 DIAGNOSIS — I66.9 CEREBRAL THROMBOSIS: ICD-10-CM

## 2022-10-04 DIAGNOSIS — Z95.5 HISTORY OF CORONARY ARTERY STENT PLACEMENT: ICD-10-CM

## 2022-10-04 DIAGNOSIS — Z79.01 ANTICOAGULATION MONITORING, INR RANGE 2-3: Primary | ICD-10-CM

## 2022-10-04 DIAGNOSIS — D68.2 FACTOR V DEFICIENCY (H): ICD-10-CM

## 2022-10-04 LAB — INR HOME MONITORING: 2.8 (ref 2–3)

## 2022-10-04 NOTE — PROGRESS NOTES
ANTICOAGULATION MANAGEMENT     Lyudmila Fitzgerald 67 year old female is on warfarin with therapeutic INR result. (Goal INR 2.0-3.0)    Recent labs: (last 7 days)     10/04/22  0000   INR 2.8       ASSESSMENT       Source(s): Chart Review and Patient/Caregiver Call       Warfarin doses taken: Warfarin taken as instructed    Diet: No new diet changes identified    New illness, injury, or hospitalization: No    Medication/supplement changes: None noted    Signs or symptoms of bleeding or clotting: No    Previous INR: Therapeutic last 2(+) visits    Additional findings: None       PLAN     Recommended plan for no diet, medication or health factor changes affecting INR     Dosing Instructions: Continue your current warfarin dose with next INR in 2 weeks       Summary  As of 10/4/2022    Full warfarin instructions:  3 mg every Mon, Wed, Fri; 6 mg all other days   Next INR check:  10/18/2022             Telephone call with Leigh who verbalizes understanding and agrees to plan    Patient to recheck with home meter    Education provided: Please call back if any changes to your diet, medications or how you've been taking warfarin    Plan made per [facility] anticoagulation protocol    Nasrin Barragan RN  Anticoagulation Clinic  10/4/2022    _______________________________________________________________________     Anticoagulation Episode Summary     Current INR goal:  2.0-3.0   TTR:  81.8 % (1 y)   Target end date:  Indefinite   Send INR reminders to:  [facility]    Indications    Acute thromboembolism of deep veins of lower extremity (H) (Resolved) [I82.409]  Anticoagulation monitoring  INR range 2-3 [Z79.01]  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

## 2022-10-05 ENCOUNTER — LAB (OUTPATIENT)
Dept: LAB | Facility: OTHER | Age: 67
End: 2022-10-05
Attending: INTERNAL MEDICINE
Payer: MEDICARE

## 2022-10-05 DIAGNOSIS — R60.0 LOWER EXTREMITY EDEMA: ICD-10-CM

## 2022-10-05 DIAGNOSIS — Z79.01 ANTICOAGULATED ON COUMADIN: ICD-10-CM

## 2022-10-05 DIAGNOSIS — I50.9 CONGESTIVE HEART FAILURE, UNSPECIFIED HF CHRONICITY, UNSPECIFIED HEART FAILURE TYPE (H): ICD-10-CM

## 2022-10-05 DIAGNOSIS — E83.42 HYPOMAGNESEMIA: ICD-10-CM

## 2022-10-05 DIAGNOSIS — E78.2 MIXED HYPERLIPIDEMIA: ICD-10-CM

## 2022-10-05 DIAGNOSIS — R06.02 SHORTNESS OF BREATH: ICD-10-CM

## 2022-10-05 DIAGNOSIS — E11.9 TYPE 2 DIABETES MELLITUS WITHOUT COMPLICATION, WITHOUT LONG-TERM CURRENT USE OF INSULIN (H): ICD-10-CM

## 2022-10-05 LAB
ALBUMIN SERPL-MCNC: 4.4 G/DL (ref 3.5–5.7)
ALP SERPL-CCNC: 63 U/L (ref 34–104)
ALT SERPL W P-5'-P-CCNC: 22 U/L (ref 7–52)
ANION GAP SERPL CALCULATED.3IONS-SCNC: 10 MMOL/L (ref 3–14)
AST SERPL W P-5'-P-CCNC: 24 U/L (ref 13–39)
BILIRUB SERPL-MCNC: 0.6 MG/DL (ref 0.3–1)
BUN SERPL-MCNC: 24 MG/DL (ref 7–25)
CALCIUM SERPL-MCNC: 9.7 MG/DL (ref 8.6–10.3)
CHLORIDE BLD-SCNC: 102 MMOL/L (ref 98–107)
CHOLEST SERPL-MCNC: 233 MG/DL
CO2 SERPL-SCNC: 22 MMOL/L (ref 21–31)
CREAT SERPL-MCNC: 0.94 MG/DL (ref 0.6–1.2)
ERYTHROCYTE [DISTWIDTH] IN BLOOD BY AUTOMATED COUNT: 12.4 % (ref 10–15)
FASTING STATUS PATIENT QL REPORTED: ABNORMAL
GFR SERPL CREATININE-BSD FRML MDRD: 66 ML/MIN/1.73M2
GLUCOSE BLD-MCNC: 106 MG/DL (ref 70–105)
HBA1C MFR BLD: 5.8 % (ref 4–6.2)
HCT VFR BLD AUTO: 43.4 % (ref 35–47)
HDLC SERPL-MCNC: 65 MG/DL (ref 23–92)
HGB BLD-MCNC: 15.4 G/DL (ref 11.7–15.7)
INR PPP: 1.8 (ref 0.85–1.15)
LDLC SERPL CALC-MCNC: 141 MG/DL
MAGNESIUM SERPL-MCNC: 2 MG/DL (ref 1.9–2.7)
MCH RBC QN AUTO: 32.6 PG (ref 26.5–33)
MCHC RBC AUTO-ENTMCNC: 35.5 G/DL (ref 31.5–36.5)
MCV RBC AUTO: 92 FL (ref 78–100)
NONHDLC SERPL-MCNC: 168 MG/DL
NT-PROBNP SERPL-MCNC: 80 PG/ML (ref 0–100)
PLATELET # BLD AUTO: 266 10E3/UL (ref 150–450)
POTASSIUM BLD-SCNC: 4.1 MMOL/L (ref 3.5–5.1)
PROT SERPL-MCNC: 7.4 G/DL (ref 6.4–8.9)
RBC # BLD AUTO: 4.73 10E6/UL (ref 3.8–5.2)
SODIUM SERPL-SCNC: 134 MMOL/L (ref 134–144)
T4 FREE SERPL-MCNC: 0.98 NG/DL (ref 0.6–1.6)
TRIGL SERPL-MCNC: 136 MG/DL
TSH SERPL DL<=0.005 MIU/L-ACNC: 4.27 MU/L (ref 0.4–4)
WBC # BLD AUTO: 5.3 10E3/UL (ref 4–11)

## 2022-10-05 PROCEDURE — 85027 COMPLETE CBC AUTOMATED: CPT | Mod: ZL

## 2022-10-05 PROCEDURE — 80053 COMPREHEN METABOLIC PANEL: CPT | Mod: ZL

## 2022-10-05 PROCEDURE — 83036 HEMOGLOBIN GLYCOSYLATED A1C: CPT | Mod: ZL

## 2022-10-05 PROCEDURE — 36415 COLL VENOUS BLD VENIPUNCTURE: CPT | Mod: ZL

## 2022-10-05 PROCEDURE — 80061 LIPID PANEL: CPT | Mod: ZL

## 2022-10-05 PROCEDURE — 83880 ASSAY OF NATRIURETIC PEPTIDE: CPT | Mod: ZL

## 2022-10-05 PROCEDURE — 84443 ASSAY THYROID STIM HORMONE: CPT | Mod: ZL

## 2022-10-05 PROCEDURE — 84439 ASSAY OF FREE THYROXINE: CPT | Mod: ZL

## 2022-10-05 PROCEDURE — 83735 ASSAY OF MAGNESIUM: CPT | Mod: ZL

## 2022-10-05 PROCEDURE — 85610 PROTHROMBIN TIME: CPT | Mod: ZL

## 2022-10-18 ENCOUNTER — ANTICOAGULATION THERAPY VISIT (OUTPATIENT)
Dept: ANTICOAGULATION | Facility: OTHER | Age: 67
End: 2022-10-18
Attending: FAMILY MEDICINE
Payer: MEDICARE

## 2022-10-18 DIAGNOSIS — I66.9 CEREBRAL THROMBOSIS: ICD-10-CM

## 2022-10-18 DIAGNOSIS — D68.2 FACTOR V DEFICIENCY (H): ICD-10-CM

## 2022-10-18 DIAGNOSIS — Z95.5 HISTORY OF CORONARY ARTERY STENT PLACEMENT: ICD-10-CM

## 2022-10-18 DIAGNOSIS — Z79.01 ANTICOAGULATION MONITORING, INR RANGE 2-3: Primary | ICD-10-CM

## 2022-10-18 LAB — INR HOME MONITORING: 1.9 (ref 2–3)

## 2022-10-18 NOTE — PROGRESS NOTES
ANTICOAGULATION MANAGEMENT     Lyudmila Fitzgerald 67 year old female is on warfarin with subtherapeutic INR result. (Goal INR 2.0-3.0)    Recent labs: (last 7 days)     10/18/22  0000   INR 1.9*       ASSESSMENT       Source(s): Chart Review and Patient/Caregiver Call       Warfarin doses taken: Warfarin taken as instructed    Diet: Increased greens/vitamin K in diet; ongoing change wants to decrease a little and see if she can remain on the same dose.    New illness, injury, or hospitalization: No    Medication/supplement changes: None noted    Signs or symptoms of bleeding or clotting: No    Previous INR: Therapeutic last 2(+) visits    Additional findings: None       PLAN     Recommended plan for no diet, medication or health factor changes affecting INR     Dosing Instructions: Continue your current warfarin dose with next INR in 2 weeks       Summary  As of 10/18/2022    Full warfarin instructions:  3 mg every Mon, Wed, Fri; 6 mg all other days   Next INR check:  11/1/2022             Telephone call with Leigh who verbalizes understanding and agrees to plan    Patient to recheck with home meter    Education provided: None required    Plan made per [facility] anticoagulation protocol    Nyla Walker, RN  Anticoagulation Clinic  10/18/2022    _______________________________________________________________________     Anticoagulation Episode Summary     Current INR goal:  2.0-3.0   TTR:  79.3 % (1 y)   Target end date:  Indefinite   Send INR reminders to:  [facility]    Indications    Acute thromboembolism of deep veins of lower extremity (H) (Resolved) [I82.409]  Anticoagulation monitoring  INR range 2-3 [Z79.01]  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

## 2022-10-25 ENCOUNTER — OFFICE VISIT (OUTPATIENT)
Dept: CARDIOLOGY | Facility: OTHER | Age: 67
End: 2022-10-25
Attending: INTERNAL MEDICINE
Payer: MEDICARE

## 2022-10-25 VITALS
OXYGEN SATURATION: 98 % | HEIGHT: 65 IN | HEART RATE: 60 BPM | RESPIRATION RATE: 18 BRPM | WEIGHT: 159 LBS | TEMPERATURE: 97 F | BODY MASS INDEX: 26.49 KG/M2 | DIASTOLIC BLOOD PRESSURE: 90 MMHG | SYSTOLIC BLOOD PRESSURE: 150 MMHG

## 2022-10-25 DIAGNOSIS — I45.6 LOWN GANONG LEVINE SYNDROME: ICD-10-CM

## 2022-10-25 DIAGNOSIS — Z95.5 HISTORY OF CORONARY ARTERY STENT PLACEMENT: ICD-10-CM

## 2022-10-25 DIAGNOSIS — Z79.01 ANTICOAGULATION MONITORING, INR RANGE 2-3: ICD-10-CM

## 2022-10-25 DIAGNOSIS — Z45.010 PACEMAKER AT END OF BATTERY LIFE: ICD-10-CM

## 2022-10-25 DIAGNOSIS — I10 ESSENTIAL HYPERTENSION: ICD-10-CM

## 2022-10-25 DIAGNOSIS — Z86.79 HISTORY OF HEART FAILURE: ICD-10-CM

## 2022-10-25 DIAGNOSIS — Z86.79 HISTORY OF CORONARY VASOSPASM: ICD-10-CM

## 2022-10-25 DIAGNOSIS — I47.10 PAROXYSMAL SUPRAVENTRICULAR TACHYCARDIA (H): ICD-10-CM

## 2022-10-25 DIAGNOSIS — E78.2 MIXED HYPERLIPIDEMIA: ICD-10-CM

## 2022-10-25 DIAGNOSIS — Z95.0 CARDIAC PACEMAKER IN SITU: ICD-10-CM

## 2022-10-25 DIAGNOSIS — Q24.5 MYOCARDIAL BRIDGE: ICD-10-CM

## 2022-10-25 DIAGNOSIS — Z86.718 HISTORY OF DVT (DEEP VEIN THROMBOSIS): ICD-10-CM

## 2022-10-25 DIAGNOSIS — Z45.010 PACEMAKER BATTERY DEPLETION: ICD-10-CM

## 2022-10-25 DIAGNOSIS — I20.1 CORONARY ARTERY SPASM (H): ICD-10-CM

## 2022-10-25 DIAGNOSIS — Z86.79 PERSONAL HISTORY OF SUPRAVENTRICULAR TACHYCARDIA: ICD-10-CM

## 2022-10-25 DIAGNOSIS — Z95.1 HISTORY OF CORONARY ARTERY BYPASS GRAFT X 1: ICD-10-CM

## 2022-10-25 DIAGNOSIS — Z98.890 S/P AV NODAL ABLATION: ICD-10-CM

## 2022-10-25 DIAGNOSIS — R06.02 SHORTNESS OF BREATH: ICD-10-CM

## 2022-10-25 DIAGNOSIS — R07.89 OTHER CHEST PAIN: ICD-10-CM

## 2022-10-25 DIAGNOSIS — Z86.73 HISTORY OF STROKE: ICD-10-CM

## 2022-10-25 DIAGNOSIS — Z95.0 PACEMAKER: ICD-10-CM

## 2022-10-25 DIAGNOSIS — R07.9 CHEST PAIN, UNSPECIFIED TYPE: ICD-10-CM

## 2022-10-25 DIAGNOSIS — Z98.890 STATUS POST CORONARY ANGIOGRAM: ICD-10-CM

## 2022-10-25 DIAGNOSIS — I50.9 CONGESTIVE HEART FAILURE, UNSPECIFIED HF CHRONICITY, UNSPECIFIED HEART FAILURE TYPE (H): Primary | ICD-10-CM

## 2022-10-25 DIAGNOSIS — Z98.890 HISTORY OF CARDIAC RADIOFREQUENCY ABLATION: ICD-10-CM

## 2022-10-25 DIAGNOSIS — Z79.01 ANTICOAGULATED ON COUMADIN: ICD-10-CM

## 2022-10-25 LAB
ATRIAL RATE - MUSE: 60 BPM
DIASTOLIC BLOOD PRESSURE - MUSE: NORMAL MMHG
INTERPRETATION ECG - MUSE: NORMAL
P AXIS - MUSE: NORMAL DEGREES
PR INTERVAL - MUSE: 164 MS
QRS DURATION - MUSE: 128 MS
QT - MUSE: 452 MS
QTC - MUSE: 452 MS
R AXIS - MUSE: 89 DEGREES
SYSTOLIC BLOOD PRESSURE - MUSE: NORMAL MMHG
T AXIS - MUSE: 238 DEGREES
VENTRICULAR RATE- MUSE: 60 BPM

## 2022-10-25 PROCEDURE — G0463 HOSPITAL OUTPT CLINIC VISIT: HCPCS | Mod: 25

## 2022-10-25 PROCEDURE — 93010 ELECTROCARDIOGRAM REPORT: CPT | Performed by: INTERNAL MEDICINE

## 2022-10-25 PROCEDURE — 93005 ELECTROCARDIOGRAM TRACING: CPT | Performed by: INTERNAL MEDICINE

## 2022-10-25 PROCEDURE — 99214 OFFICE O/P EST MOD 30 MIN: CPT | Performed by: INTERNAL MEDICINE

## 2022-10-25 RX ORDER — ASPIRIN 81 MG/1
81 TABLET ORAL DAILY
Qty: 90 TABLET | Refills: 3 | Status: SHIPPED | OUTPATIENT
Start: 2022-10-25

## 2022-10-25 RX ORDER — CLONIDINE HYDROCHLORIDE 0.1 MG/1
0.1 TABLET ORAL AT BEDTIME
Qty: 90 TABLET | Refills: 3 | Status: SHIPPED | OUTPATIENT
Start: 2022-10-25 | End: 2023-11-30

## 2022-10-25 RX ORDER — ISOSORBIDE MONONITRATE 120 MG/1
120 TABLET, EXTENDED RELEASE ORAL DAILY
Qty: 90 TABLET | Refills: 3 | Status: SHIPPED | OUTPATIENT
Start: 2022-10-25 | End: 2023-11-30

## 2022-10-25 RX ORDER — NITROGLYCERIN 0.4 MG/1
TABLET SUBLINGUAL
Qty: 25 TABLET | Refills: 4 | Status: SHIPPED | OUTPATIENT
Start: 2022-10-25 | End: 2023-07-18

## 2022-10-25 RX ORDER — RANOLAZINE 1000 MG/1
1000 TABLET, EXTENDED RELEASE ORAL 2 TIMES DAILY
Qty: 180 TABLET | Refills: 3 | Status: SHIPPED | OUTPATIENT
Start: 2022-10-25 | End: 2023-08-24

## 2022-10-25 ASSESSMENT — PAIN SCALES - GENERAL: PAINLEVEL: MILD PAIN (3)

## 2022-10-25 ASSESSMENT — PATIENT HEALTH QUESTIONNAIRE - PHQ9
SUM OF ALL RESPONSES TO PHQ QUESTIONS 1-9: 5
10. IF YOU CHECKED OFF ANY PROBLEMS, HOW DIFFICULT HAVE THESE PROBLEMS MADE IT FOR YOU TO DO YOUR WORK, TAKE CARE OF THINGS AT HOME, OR GET ALONG WITH OTHER PEOPLE: SOMEWHAT DIFFICULT
SUM OF ALL RESPONSES TO PHQ QUESTIONS 1-9: 5

## 2022-10-25 NOTE — PATIENT INSTRUCTIONS
1.  Imdur: Increase Imdur from 90 mg daily to 120 mg daily.    2.  Nifedipine: Increased from 30 mg daily to 60 mg daily.    3.  Continue L-arginine 2000 mg 3 times a day, clonidine 0.1 mg at bedtime, diltiazem 360 mg daily, sublingual nitro as needed for chest pain, and Ranexa 1000 mg twice a day.    4.  Follow-up in 1 year or sooner with issues.

## 2022-10-25 NOTE — PROGRESS NOTES
[facility] HEART CARE   CARDIOLOGY PROGRESS NOTE     Chief Complaint   Patient presents with     Follow Up     annual          Diagnosis:  1.  Coronary artery spasm, LAD. Cardiac cath at the [facility] on 4/27/2021.  2.  Myocardial bridging involving LAD.  3.  H/O SVT.  4.  CABG x1 with LIMA to LAD on 11/15/2016 in [place].  5.  CHF. 55-60% on 8/5/21. EF of 15%. Now recovered at 60-65% on 5/16/2019 at [facility].  6.  Coronary spasm with stent placement to LAD x3.  7.  H/O ablation for SVT.  8.  H/O stroke x7 with left hemiparesis.  9.  Left foot drop.  10.  AV tessie ablation secondary to SVT.  11.  Pacemaker dependent.  12.  Hyperlipidemia-uncontrolled.  13.  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  14.  H/O DVT to bilateral lower extremities.  15.  Cardiac pacemaker.  16.  Hypertension-variable control.  17.  DM-2.  18.  Lown Ganong Brandt syndrome.    Assessment/Plan:    1.  Could try nifedipine, L-arginine, magnesium, clonidine, and ivabradine.  2.  Medications tried or on include Coreg, chlorthalidone, Pletal, diltiazem, Ranexa, nifedipine, felodipine, Imdur, and lisinopril.  3.  Recently on cilostazol, diltiazem, felodipine, Imdur, Ranexa, and Coumadin.  Cilostazol and felodipine discontinued on 11/23/2021.  She believes felodipine made her feel poorly.  4.  Start on L-arginine 2 g 3 times a day, magnesium oxide 400 mg daily, and ivabradine 5 mg twice a day.  5.  Increase Imdur from 90 mg daily to 120 mg daily.  6.  Increase nifedipine 30 mg daily to 60 mg daily.  7.  Refill clonidine 0.1 mg at bedtime, sublingual nitro as needed for chest pain, Ranexa 1000 mg twice a day, and aspirin 81 mg daily.  8.  Continue Lipitor 80 mg daily for hyperlipidemia.  9.  Continue on Coumadin for factor V Leiden deficiency.  10.  Follow-up 1 year or sooner if issues.        Interval history:  Leigh has had continuing discomfort to her chest.  She reports 1-2 episodes a week.  Approximately, x10 times a year, she will wake  up in the middle of the night/early morning with chest discomfort.  She describes a heaviness in her chest that it will happen at approximate 12 AM or 2 AM.  She also has these episodes throughout the week.  She will take a sublingual nitro as needed for chest discomfort and the symptoms will go away.  The symptoms are somewhat continuous throughout the day.  They do oscillate, relieved with nitro but will recur.  As outlined above, she has had a substantial history including bypass, stenting, and other procedures.  She has been to [place].  We have tried all kinds of medications as outlined above.  Today, will increase Imdur from 90 mg daily to 120 mg daily.  Will increase nifedipine from 30 mg daily to 60 mg daily.      HPI:    Originally, she was diagnosed with a LAD ostial coronary artery spasm at [facility].  She had a positive methergine spasm study in 2005 at [facility] in the mid LAD, treated with drug-eluting stent to the mid-LAD.  She has a history of LAD myocardial bridging, CABG with LIMA to LAD on 11/15/2016 in [place], [place], history of heart failure with reported EF of 15% now recovered, stenting to the LAD x3, history of radiofrequency ablation for SVT x8, ultimately resulting in AV tessie ablation with pacemaker placement with pacemaker dependence, H/O stroke x7 with left hemiparesis essentially resolved with the exception of intermittent left foot drop, hyperlipidemia, on Coumadin with a history of factor V Leiden deficiency and numerous DVT's involving both the upper and lower extremities, hypertension, DM-2, on anticoagulated on Coumadin.     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  She has had care through [facility] as well as in [place], [place].  She was evaluated at [place] and underwent angiography revealing separate origins of the LAD and circumflex.  She has a history of catheter induced coronary artery spasm originally diagnosed at [place].     Dr. Khan  "Lois through [facility] evaluated her in 2005. In spite of pre-treatment with nitroglycerine and calcium channel blockers, she had fairly vigorous mid-LAD spasm with intracoronary methergine. The ostial LAD did not spasm. Dr. Abreu treated this area with a 2.5 x 28 mm Cypher drug-eluting stent. The following year she had instent restenosis treated with a second 8 mm x 2.5 mm Cypher drug-eluting stent.      She did fairly well until 2016, when she had evaluation at [facility] in [place]. She was treated with trans-myocardial revascularization with 25 channels in the inferior, lateral and anterior LV walls, and underwent an LIMA-LAD. Her EF was 35% pre-op, and approximately 50-55% post-operatively.      She had recurrent angina, and on 2/8/17 she underwent repeat angiography which showed mild instent restenosis of the mid-LAD stents, followed by 99% stenosis of the mid-LAD at the LIMA-LAD anastomosis, presumably due to surgical clips/manipulation. The TERRENCE was atretic. Thus, the internationalists placed a 2.25 x 8 mm Promus Premiere drug-eluting stent into the mid-LAD with a good result.      Lately, she has been having an increasingly more frequent and severe anginal chest discomfort, and feels like she is back to her severe coronary \"spasm\" again.      He has ongoing concerns for angina felt to be vasospastic in nature.  She has had a total of x4 cardiac catheterizations on 6/17/2008, 11/21/2012, 1/17/2019, and I believe one in [place] do not have this date.       She has done pretty well since. Her EF was greater than 60% on a couple occasions since.  Most recently, on 5/16/2019 her EF was 60% to 65%.  There was no significant valvular disease detected.  Her echo was essentially unchanged from 4/18/2017.  Diastolic dysfunction was normal.      Relevant testing:  ECHO on 8/5/21:  Left ventricular size, wall motion and function are normal other than abnormal  septal motion likely due to " underlying left bundle branch block or pacing. The  ejection fraction is 55-60%.  Right ventricular function, chamber size, wall motion, and thickness are  normal.  No significant valvular abnormalities present.  No pericardial effusion is present.  Right ventricular systolic pressure is 21mmHg above the right atrial pressure  which is estimated at 3mmHg.  There is no prior study for direct comparison.    [facility]:      History of coronary spasm and myocardial bridging s/p EMILIE to LAD and CAB x1 (LIMA-LAD)    Mild instent restenosis of LAD    Patent but atretic LIMA-LAD    [facility] stress test with 4/1/2021:  1. Exercise echocardiogram positive for septal myocardial ischemia.  2. The patient's exercise capacity was limited.  3. Ejection fraction response from 60% at rest to 65% at peak stress.  4. Left ventricular end-systolic volume decreased with stress.  5. OTHER ECHO FINDINGS:  6. Findings consistent with elevated left ventricular filling pressure at rest and with exercise.    Echo on 4/1/2021 at [facility]:  1. Normal left ventricular chamber size, no regional wall motion abnormalities, calculated 2-D linear ejection fraction 63 %.  2. Indeterminate left ventricular diastolic function and filling pressure.  3. Normal right ventricular chamber size, mildly reduced systolic function (by visual inspection,), estimated right ventricular systolic pressure 26 mmHg assuming right atrial pressure of 5 mmHg.  4. Mild-moderate tricuspid valve regurgitation.  5. No pericardial effusion.    Review of cardiac cath from [facility] by me on 4/1/2021:  1.  Severe coronary artery atherosclerosis  2.  Coronary artery grafts  CORONARY DIAGNOSTIC SUMMARY  Coronary artery dominance is left. Normal right coronary. Normal left main coronary. Normal circumflex coronary.   Side by side ostia of LAD and LCX.  The LAD is a diffusely diseased vessel, stented in its mid portion, and with a patent but somewhat  atretic LIMA graft to its distal portion.,            Past Medical History:   Diagnosis Date     Acute thromboembolism of deep veins of lower extremity (H)     Overview:  Hx of multiple episodes of DVT: 3 lower extremity; 5 upper extremity (right arm)     Atrial fibrillation (H)     No Comments Provided     Cardiac pacemaker in situ     Dual chamber     Cerebral thrombosis     2006,'95 w/ no residual     Coronary atherosclerosis     2006     Endometrial hyperplasia     s/p endometrial ablation      Essential hypertension     No Comments Provided     History of other genital system and obstetric disorders      8, Para 3-0-5-2     Other and unspecified angina pectoris     2006     Other and unspecified hyperlipidemia     No Comments Provided     Other postprocedural states      and ,Followed by Dr. Usman Duran, [facility],.     Paroxysmal supraventricular tachycardia (H)     2006,s/p multiple ablations w last resulting in PPM     Personal history of transient ischemic attack (TIA) and cerebral infarction without residual deficit 2009    with left hemiplegia     Primary hypercoagulable state (H)     No Comments Provided       Past Surgical History:   Procedure Laterality Date     ARTHROSCOPY KNEE      ,Arthroscopy for chondromalacia patella     AS CABG, ARTERY-VEIN, SINGLE  11/15/2016    Single vessel; done in [place]     ATTEMPTED ARTHROSCOPY      ,Right arthroscopy     BIOPSY BREAST      ,Breast cyst aspiration     COLONOSCOPY      2007,follow up recommended in 10 years.     CV CORONARY ANGIOGRAM N/A 2021    Procedure: CV CORONARY ANGIOGRAM;  Surgeon: Nathaniel Grier MD;  Location: [facility] CARDIAC CATH LAB     ENDOSCOPIC SINUS SURGERY      ,Sinus node ablation.     EP ABLATION ATRIAL FLUTTER N/A 2020    Procedure: EP VENOGRAM SVC;  Surgeon: Hakeem Moore MD;  Location: [facility] CARDIAC CATH LAB     EP  "PACEMAKER N/A 5/20/2020    Procedure: EP PACEMAKER;  Surgeon: Tima Johnson MD;  Location:  HEART CARDIAC CATH LAB     EP STUDY  12/1994    EP STUDY /ABLATION,AV re-entry tachycardia, tessie ablation     HEART CATH, ANGIOPLASTY      01/06,Stenting placed LAD after ergonovine provocation confirmed     HEART CATH, ANGIOPLASTY      03/06,90% lesion, normal left ventricular function     IMPLANT PACEMAKER      03/03,Guidant pacemaker placement, AV tessie ablation     LAPAROSCOPIC ABLATION ENDOMETRIOSIS      06/06     OTHER SURGICAL HISTORY      3/24/06,022641,([place]) [facility] STENT ANGIO     OTHER SURGICAL HISTORY      08/02,713150,EP STUDY /ABLATION     OTHER SURGICAL HISTORY      11/04,05215.0,CT CORONARY ANGIOGRAM ([place]),Coronary angiogram, failed ablation     OTHER SURGICAL HISTORY      12/04,946707,[facility] RELOCATION OF SKIN POCKET PACEMAKER,[facility] 5/24/05 reconfiguration of pacemaker \"pocket\"     OTHER SURGICAL HISTORY      207882,IP CONSULT TO ELECTROPHYSIOLOGY,study and lead change     OTHER SURGICAL HISTORY      12/06,627218,NEUROSTIMULATOR,Nerve stimulator implanted for chest pain control     OTHER SURGICAL HISTORY      12/2008,11294.0,CT CORONARY ANGIOGRAM ([place]),with increased left-sided weakness and vertigo, negative CT angiogram.     REPLACE PACEMAKER GENERATOR      12/29/04,Replacement of left ventricular pacemaker lead.     STENT  02/2017    LAD        Allergies   Allergen Reactions     Morphine Nausea and Vomiting     OK in small doses     Prednisone Nausea and Vomiting     Per IV     Sotalol Nausea and Vomiting       Current Outpatient Medications   Medication Sig Dispense Refill     arginine (L-ARGININE) 1000 MG tablet Take 2 tablets (2,000 mg) by mouth 3 times daily 90 tablet 11     aspirin 81 MG EC tablet Take 1 tablet (81 mg) by mouth daily 90 tablet 3     atorvastatin (LIPITOR) 80 MG tablet Take 1 tablet by mouth once daily 90 tablet 3     calcium carbonate-vitamin D (OSCAL W/D) 500-200 MG-UNIT " tablet Take 1 tablet by mouth       cloNIDine (CATAPRES) 0.1 MG tablet Take 1 tablet (0.1 mg) by mouth At Bedtime 90 tablet 3     diltiazem ER (TIAZAC) 360 MG 24 hr ER beaded capsule Take 1 capsule by mouth once daily 90 capsule 3     isosorbide mononitrate (IMDUR) 120 MG 24 HR ER tablet Take 1 tablet (120 mg) by mouth daily 90 tablet 3     NIFEdipine ER (ADALAT CC) 60 MG 24 hr tablet Take 1 tablet (60 mg) by mouth daily 90 tablet 3     nitroGLYcerin (NITROSTAT) 0.4 MG sublingual tablet For chest pain place 1 tablet under the tongue every 5 minutes for 3 doses. If symptoms persist 5 minutes after 1st dose call 911. 25 tablet 4     ranolazine (RANEXA) 1000 MG TB12 12 hr tablet Take 1 tablet (1,000 mg) by mouth 2 times daily 180 tablet 3     sertraline (ZOLOFT) 50 MG tablet Take 1 tablet by mouth once daily 90 tablet 3     warfarin ANTICOAGULANT (COUMADIN) 6 MG tablet TAKE 3 MG x  3 DAYS A WEEK AND 6 MG x 4 DAYS/week OR  AS  DIRECTED  BY  THE  PROTIME  CLINIC. 90 tablet 0     zolpidem (AMBIEN) 10 MG tablet TAKE 1 TABLET BY MOUTH AT BEDTIME AS NEEDED FOR SLEEP 90 tablet 1       Social History     Socioeconomic History     Marital status:      Spouse name: Antonio     Number of children: 2     Years of education: 16+     Highest education level: Master's degree (e.g., MA, MS, [employer], MEd, MSW, [employer])   Occupational History     Not on file   Tobacco Use     Smoking status: Never     Smokeless tobacco: Never     Tobacco comments:     Quit smoking: spouse does not smoke in the house. Exposed in cars.   Vaping Use     Vaping Use: Never used   Substance and Sexual Activity     Alcohol use: Not Currently     Comment: Alcoholic Drinks/day: Alcoholic Drinks/day: 1-2 drinks a week     Drug use: Never     Sexual activity: Not Currently     Partners: Male   Other Topics Concern     Parent/sibling w/ CABG, MI or angioplasty before 65F 55M? Not Asked   Social History Narrative     2nd Marriage x 20 years to  Anthony they live  in [place], [place]       in [place]. Completed her Master's Degree 2003. Taught Special Education.  Early detention due to health 2007.      Total 5 children blended family-3 dgts. And 2 sons    Daughter:  Philip    Son:  Anthony - lives in [place]    6 Grand-children 5 boys and 1 girl    Leigh parents' and siblings live in [place].     Social Determinants of Health     Financial Resource Strain: Not on file   Food Insecurity: Not on file   Transportation Needs: Not on file   Physical Activity: Not on file   Stress: Not on file   Social Connections: Not on file   Intimate Partner Violence: Not on file   Housing Stability: Not on file       LAB RESULTS:   Anticoagulation Therapy Visit on 08/24/2020   Component Date Value Ref Range Status     INR 08/24/2020 1.3* 0.90 - 1.10 Corrected   Anticoagulation Therapy Visit on 08/19/2020   Component Date Value Ref Range Status     INR 08/19/2020 2.3* 0.90 - 1.10 Final   Anticoagulation Therapy Visit on 08/05/2020   Component Date Value Ref Range Status     INR 08/05/2020 2.1* 0.90 - 1.10 Final   Allied Health/Nurse Visit on 08/03/2020   Component Date Value Ref Range Status     COVID-19 Virus PCR to [facility]* 08/03/2020 Nasopharyngeal   Final     COVID-19 Virus PCR to [facility] Re* 08/03/2020 Not Detected   Final   Anticoagulation Therapy Visit on 07/30/2020   Component Date Value Ref Range Status     INR 07/30/2020 3.8* 0.90 - 1.10 Final   Anticoagulation Therapy Visit on 07/27/2020   Component Date Value Ref Range Status     INR 07/25/2020 1.1  0.90 - 1.10 Final     INR 07/27/2020 1.2* 0.90 - 1.10 Final   Anticoagulation Therapy Visit on 07/23/2020   Component Date Value Ref Range Status     INR Protime 07/23/2020 1.0  0.86 - 1.14 Final   Anticoagulation Therapy Visit on 07/21/2020   Component Date Value Ref Range Status     INR 07/21/2020 1.4* 0.90 - 1.10 Final   Anticoagulation Therapy Visit on 07/09/2020   Component Date Value Ref Range  "Status     INR 07/08/2020 2.1* 0.90 - 1.10 Final        Review of systems: Negative except that which was noted in the HPI.    Physical examination:  BP (!) 150/90 (BP Location: Right arm, Patient Position: Sitting, Cuff Size: Adult Regular)   Pulse 60   Temp 97  F (36.1  C) (Temporal)   Resp 18   Ht 1.638 m (5' 4.5\")   Wt 72.1 kg (159 lb)   SpO2 98%   BMI 26.87 kg/m      GENERAL APPEARANCE: healthy, alert and no distress  HEENT: no icterus, no xanthelasmas, normal pupil size and reaction, no cyanosis.  NECK: no adenopathy, no asymmetry, masses, or scars.  CHEST: lungs clear to auscultation - no rales, rhonchi or wheezes, no use of accessory muscles, no retractions, respirations are unlabored, normal respiratory rate  CARDIOVASCULAR: regular rhythm, normal S1 with physiologic split S2, no S3 or S4 and no murmur, click or rub  ABDOMEN: soft, non tender, bowel sounds normal  EXTREMITIES: no clubbing, cyanosis with mild peripheral edema  NEURO: alert and oriented normal speech, and affect  VASC: No vascular bruits heard.  SKIN: no ecchymoses, no rashes    Total time spent on day of visit, including review of tests, obtaining/reviewing separately obtained history, ordering medications/tests/procedures, communicating with PCP/consultants, and documenting in electronic medical record: 50 minutes.       Thank you for allowing me to participate in the care of your patient. Please do not hesitate to contact me if you have any questions.     Moises Islas, DO          "

## 2022-10-25 NOTE — NURSING NOTE
"Patient comes in for annual visit.  Mirella Royal LPN ....................10/25/2022   1:42 PM  Chief Complaint   Patient presents with     Follow Up     annual       Initial BP (!) 146/92 (BP Location: Right arm, Patient Position: Sitting, Cuff Size: Adult Regular)   Pulse 60   Temp 97  F (36.1  C) (Temporal)   Resp 18   Ht 1.638 m (5' 4.5\")   Wt 72.1 kg (159 lb)   SpO2 98%   BMI 26.87 kg/m   Estimated body mass index is 26.87 kg/m  as calculated from the following:    Height as of this encounter: 1.638 m (5' 4.5\").    Weight as of this encounter: 72.1 kg (159 lb).  Meds Reconciled: complete  Pt is on Aspirin  Pt is on a Statin  PHQ and/or JUAN CARLOS reviewed. Pt referred to PCP/MH Provider as appropriate.    Mirella Royal LPN    FOOD SECURITY SCREENING QUESTIONS  Hunger Vital Signs:  Within the past 12 months we worried whether our food would run out before we got money to buy more. Never  Within the past 12 months the food we bought just didn't last and we didn't have money to get more. Never  Mirella Royal LPN 10/25/2022 1:42 PM    "

## 2022-11-01 ENCOUNTER — ANTICOAGULATION THERAPY VISIT (OUTPATIENT)
Dept: ANTICOAGULATION | Facility: OTHER | Age: 67
End: 2022-11-01
Attending: FAMILY MEDICINE
Payer: MEDICARE

## 2022-11-01 DIAGNOSIS — D68.2 FACTOR V DEFICIENCY (H): ICD-10-CM

## 2022-11-01 DIAGNOSIS — Z95.5 HISTORY OF CORONARY ARTERY STENT PLACEMENT: ICD-10-CM

## 2022-11-01 DIAGNOSIS — I66.9 CEREBRAL THROMBOSIS: ICD-10-CM

## 2022-11-01 DIAGNOSIS — Z79.01 ANTICOAGULATION MONITORING, INR RANGE 2-3: Primary | ICD-10-CM

## 2022-11-01 LAB — INR HOME MONITORING: 2.9 (ref 2–3)

## 2022-11-01 NOTE — PROGRESS NOTES
ANTICOAGULATION MANAGEMENT     Lyudmila Fitzgerald 67 year old female is on warfarin with therapeutic INR result. (Goal INR 2.0-3.0)    Recent labs: (last 7 days)     11/01/22  0000   INR 2.9       ASSESSMENT       Source(s): Chart Review and Patient/Caregiver Call       Warfarin doses taken: Warfarin taken as instructed    Diet: No new diet changes identified    New illness, injury, or hospitalization: No    Medication/supplement changes: changes in medications Imdur, nifedipine, and clonidine    Signs or symptoms of bleeding or clotting: No    Previous INR: Subtherapeutic    Additional findings: None       PLAN     Recommended plan for ongoing change(s) affecting INR     Dosing Instructions: Continue your current warfarin dose with next INR in 2 weeks       Summary  As of 11/1/2022    Full warfarin instructions:  3 mg every Mon, Wed, Fri; 6 mg all other days; Starting 11/1/2022   Next INR check:  11/15/2022             Telephone call with Leigh who verbalizes understanding and agrees to plan    Patient to recheck with home meter    Education provided:     None required    Plan made per [facility] anticoagulation protocol    Nyla Walker RN  Anticoagulation Clinic  11/1/2022    _______________________________________________________________________     Anticoagulation Episode Summary     Current INR goal:  2.0-3.0   TTR:  80.0 % (1 y)   Target end date:  Indefinite   Send INR reminders to:  [facility]    Indications    Acute thromboembolism of deep veins of lower extremity (H) (Resolved) [I82.409]  Anticoagulation monitoring  INR range 2-3 [Z79.01]  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]  Cerebral thrombosis [I66.9]  History of coronary artery stent placement to the LAD x3 [Z95.5]           Comments:  home monitor [employer] check INR q 2 weeks         Anticoagulation Care Providers     Provider Role Specialty Phone number    Erika Carrasco MD Referring Family Medicine [phone]

## 2022-11-11 DIAGNOSIS — I66.9 CEREBRAL THROMBOSIS: ICD-10-CM

## 2022-11-11 RX ORDER — WARFARIN SODIUM 6 MG/1
TABLET ORAL
Qty: 90 TABLET | Refills: 1 | Status: SHIPPED | OUTPATIENT
Start: 2022-11-11 | End: 2023-05-15

## 2022-11-11 NOTE — TELEPHONE ENCOUNTER
ANTICOAGULATION MANAGEMENT:  Medication Refill    Anticoagulation Summary  As of 11/1/2022    Warfarin maintenance plan:  3 mg (6 mg x 0.5) every Mon, Wed, Fri; 6 mg (6 mg x 1) all other days; Starting 11/1/2022   Next INR check:  11/15/2022   Target end date:  Indefinite    Indications    Acute thromboembolism of deep veins of lower extremity (H) (Resolved) [I82.409]  Anticoagulation monitoring  INR range 2-3 [Z79.01]  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]  Cerebral thrombosis [I66.9]  History of coronary artery stent placement to the LAD x3 [Z95.5]             Anticoagulation Care Providers     Provider Role Specialty Phone number    Erika Carrasco MD Referring Family Medicine [phone]          Visit with referring provider/group within last year: No, last visit date: 7/21/2021    [facility] referral signed within last year: Yes    Lyudmila meets all criteria for refill (current [facility] referral, office visit with referring provider/group in last year, lab monitoring up to date or not exceeding 2 weeks overdue). Rx instructions and quantity supplied updated to match patient's current dosing plan per [facility] protocol      Winter Pedraza RN  Anticoagulation Clinic

## 2022-11-15 ENCOUNTER — ANTICOAGULATION THERAPY VISIT (OUTPATIENT)
Dept: ANTICOAGULATION | Facility: OTHER | Age: 67
End: 2022-11-15
Attending: FAMILY MEDICINE
Payer: MEDICARE

## 2022-11-15 DIAGNOSIS — Z79.01 ANTICOAGULATION MONITORING, INR RANGE 2-3: Primary | ICD-10-CM

## 2022-11-15 DIAGNOSIS — D68.2 FACTOR V DEFICIENCY (H): ICD-10-CM

## 2022-11-15 DIAGNOSIS — I66.9 CEREBRAL THROMBOSIS: ICD-10-CM

## 2022-11-15 DIAGNOSIS — Z95.5 HISTORY OF CORONARY ARTERY STENT PLACEMENT: ICD-10-CM

## 2022-11-15 LAB — INR HOME MONITORING: 2.4 (ref 2–3)

## 2022-11-15 NOTE — PROGRESS NOTES
ANTICOAGULATION MANAGEMENT     Lyudmila Fitzgerald 67 year old female is on warfarin with therapeutic INR result. (Goal INR 2.0-3.0)    Recent labs: (last 7 days)     11/15/22  0000   INR 2.4       ASSESSMENT       Source(s): Chart Review       Warfarin doses taken: Warfarin taken as instructed    Diet: No new diet changes identified    New illness, injury, or hospitalization: No    Medication/supplement changes: None noted    Signs or symptoms of bleeding or clotting: No    Previous INR: Therapeutic last visit; previously outside of goal range    Additional findings: None       PLAN     Recommended plan for no diet, medication or health factor changes affecting INR     Dosing Instructions: Continue your current warfarin dose with next INR in 2 weeks       Summary  As of 11/15/2022    Full warfarin instructions:  3 mg every Mon, Wed, Fri; 6 mg all other days; Starting 11/15/2022   Next INR check:  11/29/2022             Detailed voice message left for Leigh with dosing instructions and follow up date.     Patient to recheck with home meter    Education provided: Please call back if any changes to your diet, medications or how you've been taking warfarin    Plan made per [facility] anticoagulation protocol    Nasrin Barragan RN  Anticoagulation Clinic  11/15/2022    _______________________________________________________________________     Anticoagulation Episode Summary     Current INR goal:  2.0-3.0   TTR:  80.8 % (1 y)   Target end date:  Indefinite   Send INR reminders to:  [facility]    Indications    Acute thromboembolism of deep veins of lower extremity (H) (Resolved) [I82.409]  Anticoagulation monitoring  INR range 2-3 [Z79.01]  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]  Cerebral thrombosis [I66.9]  History of coronary artery stent placement to the LAD x3 [Z95.5]           Comments:  home monitor [employer] check INR q 2 weeks         Anticoagulation Care Providers     Provider Role Specialty Phone number     Erika Carrasco MD [facility] Medicine [phone]

## 2022-11-29 ENCOUNTER — ANTICOAGULATION THERAPY VISIT (OUTPATIENT)
Dept: ANTICOAGULATION | Facility: OTHER | Age: 67
End: 2022-11-29
Attending: FAMILY MEDICINE
Payer: MEDICARE

## 2022-11-29 DIAGNOSIS — I66.9 CEREBRAL THROMBOSIS: ICD-10-CM

## 2022-11-29 DIAGNOSIS — Z95.5 HISTORY OF CORONARY ARTERY STENT PLACEMENT: ICD-10-CM

## 2022-11-29 DIAGNOSIS — D68.2 FACTOR V DEFICIENCY (H): ICD-10-CM

## 2022-11-29 DIAGNOSIS — Z79.01 ANTICOAGULATION MONITORING, INR RANGE 2-3: Primary | ICD-10-CM

## 2022-11-29 LAB — INR HOME MONITORING: 3.3 (ref 2–3)

## 2022-11-29 NOTE — PROGRESS NOTES
ANTICOAGULATION MANAGEMENT     Lyudmila Fitzgerald 67 year old female is on warfarin with supratherapeutic INR result. (Goal INR 2.0-3.0)    Recent labs: (last 7 days)     11/29/22  0000   INR 3.3*       ASSESSMENT       Source(s): Chart Review and Patient/Caregiver Call       Warfarin doses taken: Warfarin taken as instructed    Diet: No new diet changes identified    New illness, injury, or hospitalization: No    Medication/supplement changes: None noted    Signs or symptoms of bleeding or clotting: No    Previous INR: Therapeutic last 2(+) visits    Additional findings: None       PLAN     Recommended plan for no diet, medication or health factor changes affecting INR     Dosing Instructions: Continue your current warfarin dose with next INR in 1 week       Summary  As of 11/29/2022    Full warfarin instructions:  3 mg every Mon, Wed, Fri; 6 mg all other days; Starting 11/29/2022   Next INR check:  12/6/2022             Telephone call with Leigh who verbalizes understanding and agrees to plan    Patient to recheck with home meter    Education provided:     None required    Plan made per [facility] anticoagulation protocol    Nyla Walker RN  Anticoagulation Clinic  11/29/2022    _______________________________________________________________________     Anticoagulation Episode Summary     Current INR goal:  2.0-3.0   TTR:  81.3 % (1 y)   Target end date:  Indefinite   Send INR reminders to:  [facility]    Indications    Acute thromboembolism of deep veins of lower extremity (H) (Resolved) [I82.409]  Anticoagulation monitoring  INR range 2-3 [Z79.01]  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

## 2022-12-06 ENCOUNTER — ANTICOAGULATION THERAPY VISIT (OUTPATIENT)
Dept: ANTICOAGULATION | Facility: OTHER | Age: 67
End: 2022-12-06
Attending: FAMILY MEDICINE
Payer: MEDICARE

## 2022-12-06 DIAGNOSIS — Z95.5 HISTORY OF CORONARY ARTERY STENT PLACEMENT: ICD-10-CM

## 2022-12-06 DIAGNOSIS — Z79.01 ANTICOAGULATION MONITORING, INR RANGE 2-3: Primary | ICD-10-CM

## 2022-12-06 DIAGNOSIS — D68.2 FACTOR V DEFICIENCY (H): ICD-10-CM

## 2022-12-06 DIAGNOSIS — I66.9 CEREBRAL THROMBOSIS: ICD-10-CM

## 2022-12-06 LAB — INR HOME MONITORING: 1.8 (ref 2–3)

## 2022-12-06 NOTE — PROGRESS NOTES
ANTICOAGULATION MANAGEMENT     Lyudmila Fitzgerald 67 year old female is on warfarin with subtherapeutic INR result. (Goal INR 2.0-3.0)    Recent labs: (last 7 days)     12/06/22  0000   INR 1.8*       ASSESSMENT       Source(s): Chart Review and Patient/Caregiver Call       Warfarin doses taken: Warfarin taken as instructed    Diet: No new diet changes identified    New illness, injury, or hospitalization: No    Medication/supplement changes: None noted    Signs or symptoms of bleeding or clotting: No    Previous INR: Supratherapeutic    Additional findings: None       PLAN     Recommended plan for no diet, medication or health factor changes affecting INR     Dosing Instructions: Continue your current warfarin dose with next INR in 1 week       Summary  As of 12/6/2022    Full warfarin instructions:  3 mg every Mon, Wed, Fri; 6 mg all other days; Starting 12/6/2022   Next INR check:  12/13/2022             Telephone call with Leigh who verbalizes understanding and agrees to plan    Patient to recheck with home meter    Education provided: Please call back if any changes to your diet, medications or how you've been taking warfarin    Plan made per [facility] anticoagulation protocol    Winter Pedraza RN  Anticoagulation Clinic  12/6/2022    _______________________________________________________________________     Anticoagulation Episode Summary     Current INR goal:  2.0-3.0   TTR:  80.6 % (1 y)   Target end date:  Indefinite   Send INR reminders to:  [facility]    Indications    Acute thromboembolism of deep veins of lower extremity (H) (Resolved) [I82.409]  Anticoagulation monitoring  INR range 2-3 [Z79.01]  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

## 2022-12-09 ENCOUNTER — TRANSFERRED RECORDS (OUTPATIENT)
Dept: HEALTH INFORMATION MANAGEMENT | Facility: OTHER | Age: 67
End: 2022-12-09

## 2022-12-09 ENCOUNTER — TRANSFERRED RECORDS (OUTPATIENT)
Dept: MULTI SPECIALTY CLINIC | Facility: CLINIC | Age: 67
End: 2022-12-09

## 2022-12-09 LAB — RETINOPATHY: NORMAL

## 2022-12-13 ENCOUNTER — ANCILLARY PROCEDURE (OUTPATIENT)
Dept: CARDIOLOGY | Facility: CLINIC | Age: 67
End: 2022-12-13
Attending: INTERNAL MEDICINE
Payer: MEDICARE

## 2022-12-13 DIAGNOSIS — I47.10 SUPRAVENTRICULAR TACHYCARDIA (H): ICD-10-CM

## 2022-12-13 DIAGNOSIS — Z98.890 S/P AV NODAL ABLATION: ICD-10-CM

## 2022-12-13 PROCEDURE — 93296 REM INTERROG EVL PM/IDS: CPT

## 2022-12-13 PROCEDURE — 93294 REM INTERROG EVL PM/LDLS PM: CPT | Performed by: INTERNAL MEDICINE

## 2022-12-17 LAB
MDC_IDC_EPISODE_DTM: NORMAL
MDC_IDC_EPISODE_DURATION: 5 S
MDC_IDC_EPISODE_DURATION: 6 S
MDC_IDC_EPISODE_ID: NORMAL
MDC_IDC_EPISODE_TYPE: NORMAL
MDC_IDC_LEAD_IMPLANT_DT: NORMAL
MDC_IDC_LEAD_IMPLANT_DT: NORMAL
MDC_IDC_LEAD_LOCATION: NORMAL
MDC_IDC_LEAD_LOCATION: NORMAL
MDC_IDC_LEAD_LOCATION_DETAIL_1: NORMAL
MDC_IDC_LEAD_LOCATION_DETAIL_1: NORMAL
MDC_IDC_LEAD_MFG: NORMAL
MDC_IDC_LEAD_MFG: NORMAL
MDC_IDC_LEAD_MODEL: NORMAL
MDC_IDC_LEAD_MODEL: NORMAL
MDC_IDC_LEAD_POLARITY_TYPE: NORMAL
MDC_IDC_LEAD_POLARITY_TYPE: NORMAL
MDC_IDC_LEAD_SERIAL: NORMAL
MDC_IDC_LEAD_SERIAL: NORMAL
MDC_IDC_LEAD_SPECIAL_FUNCTION: NORMAL
MDC_IDC_LEAD_SPECIAL_FUNCTION: NORMAL
MDC_IDC_MSMT_BATTERY_DTM: NORMAL
MDC_IDC_MSMT_BATTERY_REMAINING_LONGEVITY: 114 MO
MDC_IDC_MSMT_BATTERY_REMAINING_PERCENTAGE: 100 %
MDC_IDC_MSMT_BATTERY_STATUS: NORMAL
MDC_IDC_MSMT_LEADCHNL_RA_IMPEDANCE_VALUE: 703 OHM
MDC_IDC_MSMT_LEADCHNL_RV_IMPEDANCE_VALUE: 480 OHM
MDC_IDC_MSMT_LEADCHNL_RV_PACING_THRESHOLD_AMPLITUDE: 0.9 V
MDC_IDC_MSMT_LEADCHNL_RV_PACING_THRESHOLD_PULSEWIDTH: 0.4 MS
MDC_IDC_PG_IMPLANT_DTM: NORMAL
MDC_IDC_PG_MFG: NORMAL
MDC_IDC_PG_MODEL: NORMAL
MDC_IDC_PG_SERIAL: NORMAL
MDC_IDC_PG_TYPE: NORMAL
MDC_IDC_SESS_CLINIC_NAME: NORMAL
MDC_IDC_SESS_DTM: NORMAL
MDC_IDC_SESS_TYPE: NORMAL
MDC_IDC_SET_BRADY_AT_MODE_SWITCH_MODE: NORMAL
MDC_IDC_SET_BRADY_AT_MODE_SWITCH_RATE: 140 {BEATS}/MIN
MDC_IDC_SET_BRADY_LOWRATE: 60 {BEATS}/MIN
MDC_IDC_SET_BRADY_MAX_SENSOR_RATE: 130 {BEATS}/MIN
MDC_IDC_SET_BRADY_MAX_TRACKING_RATE: 100 {BEATS}/MIN
MDC_IDC_SET_BRADY_MODE: NORMAL
MDC_IDC_SET_BRADY_PAV_DELAY_HIGH: 80 MS
MDC_IDC_SET_BRADY_PAV_DELAY_LOW: 130 MS
MDC_IDC_SET_BRADY_SAV_DELAY_HIGH: 80 MS
MDC_IDC_SET_BRADY_SAV_DELAY_LOW: 130 MS
MDC_IDC_SET_LEADCHNL_RA_PACING_AMPLITUDE: 2 V
MDC_IDC_SET_LEADCHNL_RA_PACING_CAPTURE_MODE: NORMAL
MDC_IDC_SET_LEADCHNL_RA_PACING_POLARITY: NORMAL
MDC_IDC_SET_LEADCHNL_RA_PACING_PULSEWIDTH: 1 MS
MDC_IDC_SET_LEADCHNL_RA_SENSING_ADAPTATION_MODE: NORMAL
MDC_IDC_SET_LEADCHNL_RA_SENSING_POLARITY: NORMAL
MDC_IDC_SET_LEADCHNL_RA_SENSING_SENSITIVITY: 0.6 MV
MDC_IDC_SET_LEADCHNL_RV_PACING_AMPLITUDE: 1.5 V
MDC_IDC_SET_LEADCHNL_RV_PACING_CAPTURE_MODE: NORMAL
MDC_IDC_SET_LEADCHNL_RV_PACING_POLARITY: NORMAL
MDC_IDC_SET_LEADCHNL_RV_PACING_PULSEWIDTH: 0.4 MS
MDC_IDC_SET_LEADCHNL_RV_SENSING_ADAPTATION_MODE: NORMAL
MDC_IDC_SET_LEADCHNL_RV_SENSING_POLARITY: NORMAL
MDC_IDC_SET_LEADCHNL_RV_SENSING_SENSITIVITY: 1.5 MV
MDC_IDC_SET_ZONE_DETECTION_INTERVAL: 375 MS
MDC_IDC_SET_ZONE_TYPE: NORMAL
MDC_IDC_SET_ZONE_VENDOR_TYPE: NORMAL
MDC_IDC_STAT_AT_BURDEN_PERCENT: 1 %
MDC_IDC_STAT_AT_DTM_END: NORMAL
MDC_IDC_STAT_AT_DTM_START: NORMAL
MDC_IDC_STAT_BRADY_DTM_END: NORMAL
MDC_IDC_STAT_BRADY_DTM_START: NORMAL
MDC_IDC_STAT_BRADY_RA_PERCENT_PACED: 94 %
MDC_IDC_STAT_BRADY_RV_PERCENT_PACED: 100 %
MDC_IDC_STAT_EPISODE_RECENT_COUNT: 0
MDC_IDC_STAT_EPISODE_RECENT_COUNT: 2
MDC_IDC_STAT_EPISODE_RECENT_COUNT_DTM_END: NORMAL
MDC_IDC_STAT_EPISODE_RECENT_COUNT_DTM_START: NORMAL
MDC_IDC_STAT_EPISODE_TYPE: NORMAL
MDC_IDC_STAT_EPISODE_VENDOR_TYPE: NORMAL

## 2022-12-20 ENCOUNTER — ANTICOAGULATION THERAPY VISIT (OUTPATIENT)
Dept: ANTICOAGULATION | Facility: OTHER | Age: 67
End: 2022-12-20
Attending: FAMILY MEDICINE
Payer: MEDICARE

## 2022-12-20 DIAGNOSIS — D68.2 FACTOR V DEFICIENCY (H): ICD-10-CM

## 2022-12-20 DIAGNOSIS — I66.9 CEREBRAL THROMBOSIS: ICD-10-CM

## 2022-12-20 DIAGNOSIS — Z95.5 HISTORY OF CORONARY ARTERY STENT PLACEMENT: ICD-10-CM

## 2022-12-20 DIAGNOSIS — Z79.01 ANTICOAGULATION MONITORING, INR RANGE 2-3: Primary | ICD-10-CM

## 2022-12-20 LAB — INR HOME MONITORING: 3.3 (ref 2–3)

## 2022-12-20 NOTE — PROGRESS NOTES
ANTICOAGULATION MANAGEMENT     Lyudmila Fitzgerald 67 year old female is on warfarin with supratherapeutic INR result. (Goal INR 2.0-3.0)    Recent labs: (last 7 days)     12/20/22  0000   INR 3.3*       ASSESSMENT       Source(s): Chart Review    Previous INR was Subtherapeutic    Medication, diet, health changes since last INR chart reviewed; none identified           PLAN     Recommended plan for no diet, medication or health factor changes affecting INR     Dosing Instructions: Continue your current warfarin dose with next INR in 2 weeks       Summary  As of 12/20/2022    Full warfarin instructions:  3 mg every Mon, Wed, Fri; 6 mg all other days; Starting 12/20/2022   Next INR check:  1/3/2023             Detailed voice message left for Leigh with dosing instructions and follow up date.     Patient to recheck with home meter    Education provided:     Please call back if any changes to your diet, medications or how you've been taking warfarin    Contact [phone] with any changes, questions or concerns.     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

## 2022-12-23 ASSESSMENT — ACTIVITIES OF DAILY LIVING (ADL)
CURRENT_FUNCTION: PREPARING MEALS REQUIRES ASSISTANCE
CURRENT_FUNCTION: HOUSEWORK REQUIRES ASSISTANCE
CURRENT_FUNCTION: SHOPPING REQUIRES ASSISTANCE

## 2022-12-23 ASSESSMENT — ENCOUNTER SYMPTOMS
MYALGIAS: 0
PALPITATIONS: 0
ABDOMINAL PAIN: 0
HEMATURIA: 0
DIARRHEA: 0
ARTHRALGIAS: 0
JOINT SWELLING: 0
NAUSEA: 0
DIZZINESS: 0
HEADACHES: 0
NERVOUS/ANXIOUS: 0
FEVER: 0
CHILLS: 0
CONSTIPATION: 1
BREAST MASS: 0
WEAKNESS: 0
HEARTBURN: 1
HEMATOCHEZIA: 0
SHORTNESS OF BREATH: 1
EYE PAIN: 0
DYSURIA: 0
SORE THROAT: 0
COUGH: 0
PARESTHESIAS: 0
FREQUENCY: 0

## 2022-12-30 ENCOUNTER — OFFICE VISIT (OUTPATIENT)
Dept: FAMILY MEDICINE | Facility: OTHER | Age: 67
End: 2022-12-30
Attending: FAMILY MEDICINE
Payer: MEDICARE

## 2022-12-30 VITALS
SYSTOLIC BLOOD PRESSURE: 112 MMHG | OXYGEN SATURATION: 96 % | HEIGHT: 65 IN | RESPIRATION RATE: 16 BRPM | WEIGHT: 162.6 LBS | BODY MASS INDEX: 27.09 KG/M2 | DIASTOLIC BLOOD PRESSURE: 64 MMHG | HEART RATE: 62 BPM | TEMPERATURE: 98.1 F

## 2022-12-30 DIAGNOSIS — R79.89 ELEVATED TSH: ICD-10-CM

## 2022-12-30 DIAGNOSIS — Z95.0 PACEMAKER-DEPENDENT DUE TO NATIVE CARDIAC RHYTHM INSUFFICIENT TO SUPPORT LIFE: ICD-10-CM

## 2022-12-30 DIAGNOSIS — Z98.890 HISTORY OF CARDIAC RADIOFREQUENCY ABLATION: ICD-10-CM

## 2022-12-30 DIAGNOSIS — I49.8 PACEMAKER-DEPENDENT DUE TO NATIVE CARDIAC RHYTHM INSUFFICIENT TO SUPPORT LIFE: ICD-10-CM

## 2022-12-30 DIAGNOSIS — Z95.5 HISTORY OF CORONARY ARTERY STENT PLACEMENT: ICD-10-CM

## 2022-12-30 DIAGNOSIS — Q24.5 MYOCARDIAL BRIDGE: ICD-10-CM

## 2022-12-30 DIAGNOSIS — Z00.00 ENCOUNTER FOR MEDICARE ANNUAL WELLNESS EXAM: Primary | ICD-10-CM

## 2022-12-30 DIAGNOSIS — Z12.11 SPECIAL SCREENING FOR MALIGNANT NEOPLASMS, COLON: ICD-10-CM

## 2022-12-30 DIAGNOSIS — Z23 NEED FOR PNEUMOCOCCAL VACCINE: ICD-10-CM

## 2022-12-30 DIAGNOSIS — Z86.79 PERSONAL HISTORY OF SUPRAVENTRICULAR TACHYCARDIA: ICD-10-CM

## 2022-12-30 DIAGNOSIS — Z95.1 HISTORY OF CORONARY ARTERY BYPASS GRAFT X 1: ICD-10-CM

## 2022-12-30 DIAGNOSIS — Z98.890 S/P AV NODAL ABLATION: ICD-10-CM

## 2022-12-30 DIAGNOSIS — Z23 NEED FOR PROPHYLACTIC VACCINATION AND INOCULATION AGAINST INFLUENZA: ICD-10-CM

## 2022-12-30 DIAGNOSIS — I10 ESSENTIAL HYPERTENSION: ICD-10-CM

## 2022-12-30 DIAGNOSIS — F32.A DEPRESSION, UNSPECIFIED DEPRESSION TYPE: ICD-10-CM

## 2022-12-30 DIAGNOSIS — I47.10 PAROXYSMAL SUPRAVENTRICULAR TACHYCARDIA (H): ICD-10-CM

## 2022-12-30 DIAGNOSIS — L57.0 ACTINIC KERATOSIS: ICD-10-CM

## 2022-12-30 DIAGNOSIS — E11.9 TYPE 2 DIABETES MELLITUS WITHOUT COMPLICATION, WITHOUT LONG-TERM CURRENT USE OF INSULIN (H): ICD-10-CM

## 2022-12-30 DIAGNOSIS — I20.1 CORONARY ARTERY SPASM (H): ICD-10-CM

## 2022-12-30 DIAGNOSIS — Z86.79 HISTORY OF CORONARY VASOSPASM: ICD-10-CM

## 2022-12-30 DIAGNOSIS — Z12.31 ENCOUNTER FOR SCREENING MAMMOGRAM FOR BREAST CANCER: ICD-10-CM

## 2022-12-30 LAB
CREAT UR-MCNC: 128 MG/DL
HBA1C MFR BLD: 5.6 % (ref 4–6.2)
MICROALBUMIN UR-MCNC: <12 MG/L
MICROALBUMIN/CREAT UR: NORMAL MG/G{CREAT}
TSH SERPL DL<=0.005 MIU/L-ACNC: 2.28 UIU/ML (ref 0.3–4.2)

## 2022-12-30 PROCEDURE — G0008 ADMIN INFLUENZA VIRUS VAC: HCPCS

## 2022-12-30 PROCEDURE — 36415 COLL VENOUS BLD VENIPUNCTURE: CPT | Mod: ZL | Performed by: FAMILY MEDICINE

## 2022-12-30 PROCEDURE — G0463 HOSPITAL OUTPT CLINIC VISIT: HCPCS | Mod: 25

## 2022-12-30 PROCEDURE — 90677 PCV20 VACCINE IM: CPT

## 2022-12-30 PROCEDURE — 82570 ASSAY OF URINE CREATININE: CPT | Mod: ZL | Performed by: FAMILY MEDICINE

## 2022-12-30 PROCEDURE — G0439 PPPS, SUBSEQ VISIT: HCPCS | Performed by: FAMILY MEDICINE

## 2022-12-30 PROCEDURE — 83036 HEMOGLOBIN GLYCOSYLATED A1C: CPT | Mod: ZL | Performed by: FAMILY MEDICINE

## 2022-12-30 PROCEDURE — 99213 OFFICE O/P EST LOW 20 MIN: CPT | Mod: 25 | Performed by: FAMILY MEDICINE

## 2022-12-30 PROCEDURE — 17000 DESTRUCT PREMALG LESION: CPT | Performed by: FAMILY MEDICINE

## 2022-12-30 PROCEDURE — 84443 ASSAY THYROID STIM HORMONE: CPT | Mod: ZL | Performed by: FAMILY MEDICINE

## 2022-12-30 RX ORDER — DILTIAZEM HYDROCHLORIDE 360 MG/1
360 CAPSULE, EXTENDED RELEASE ORAL DAILY
Qty: 90 CAPSULE | Refills: 3 | Status: SHIPPED | OUTPATIENT
Start: 2022-12-30 | End: 2023-11-14

## 2022-12-30 ASSESSMENT — ACTIVITIES OF DAILY LIVING (ADL)
CURRENT_FUNCTION: HOUSEWORK REQUIRES ASSISTANCE
CURRENT_FUNCTION: SHOPPING REQUIRES ASSISTANCE
CURRENT_FUNCTION: PREPARING MEALS REQUIRES ASSISTANCE

## 2022-12-30 ASSESSMENT — PATIENT HEALTH QUESTIONNAIRE - PHQ9
SUM OF ALL RESPONSES TO PHQ QUESTIONS 1-9: 3
SUM OF ALL RESPONSES TO PHQ QUESTIONS 1-9: 3

## 2022-12-30 ASSESSMENT — ENCOUNTER SYMPTOMS
FEVER: 0
HEMATOCHEZIA: 0
ABDOMINAL PAIN: 0
WEAKNESS: 0
HEARTBURN: 1
DYSURIA: 0
HEMATURIA: 0
PALPITATIONS: 0
SORE THROAT: 0
FREQUENCY: 0
HEADACHES: 0
JOINT SWELLING: 0
NAUSEA: 0
PARESTHESIAS: 0
BREAST MASS: 0
CHILLS: 0
DIZZINESS: 0
ARTHRALGIAS: 0
SHORTNESS OF BREATH: 1
COUGH: 0
CONSTIPATION: 1
EYE PAIN: 0
NERVOUS/ANXIOUS: 0
DIARRHEA: 0
MYALGIAS: 0

## 2022-12-30 ASSESSMENT — PAIN SCALES - GENERAL: PAINLEVEL: MILD PAIN (3)

## 2022-12-30 NOTE — PATIENT INSTRUCTIONS
Ok to take the stool softener daily      Cologuard Patient Instructions    You received an order for a Cologuard test. You will receive your kit in the mail. Please follow the instructions that are provided in the kit.      Reminder: Ship Cologuard test the same day or the next day to allow enough delivery time. The lab must receive your specimen within 4 days for successful testing. If not delivered in time, you may have to complete the process again.    Home Pick-up:  Once you complete the kit, call [facility] at [phone] and they will schedule a UPS pickup for you.    OR    Drop Off Locations:  Once you have completed the collection and have it ready to be shipped, bring it to one of the following sites listed below. *Note: none of the sites ship out later than mid-morning. Please do not drop off Fridays, as they will not go out until Monday which will make your specimen inacceptable.     - [facility] ([address], [place] [address])      Drop off: Monday-Thursday, 8:00am-4:30pm at the [address] check-in desk      - [facility] ([address], [place] [address])   Drop off: Monday-Thursday, 8:00am-5:45pm     - [employer] ([address], [place] [address])     Drop off: Monday-Thursday, 3:00pm-5:30pm

## 2022-12-30 NOTE — PROGRESS NOTES
"Review current opioid prescriptions    For a patient with a current opioid prescription:    Reviewed potential Opioid Use Disorder (OUD) risk factors: Yes  - on schedule V per cardiology     Evaluate their pain severity and current treatment plan:     Provide information on non-opioid treatment options:    Refer to a specialist, as appropriate:    Get more information on pain management in the [facility] Pain Management Best Practices Inter-agency Task Force Report    Screen for potential Substance Use Disorders (SUDs)    Reviewed the patient s potential risk factors for SUDs: Yes     Refer to treatment or specialist, as appropriate:     A screening tool isn t required but you may use one:  Find more information in the National [place] on Drug Abuse Screening and Assessment Tools Chart  SUBJECTIVE:   Leigh is a 67 year old who presents for Preventive Visit.    Are you in the first 12 months of your Medicare coverage?  No    Healthy Habits:     In general, how would you rate your overall health?  Poor    Frequency of exercise:  None    Do you usually eat at least 4 servings of fruit and vegetables a day, include whole grains    & fiber and avoid regularly eating high fat or \"junk\" foods?  No    Taking medications regularly:  Yes    Ability to successfully perform activities of daily living:  Shopping requires assistance, preparing meals requires assistance and housework requires assistance    Home Safety:  No safety concerns identified    Hearing Impairment:  No hearing concerns    In the past 6 months, have you been bothered by leaking of urine?  No    In general, how would you rate your overall mental or emotional health?  Poor      PHQ-2 Total Score: 2    Additional concerns today:  No    1.  Recent exposure to influenza; home COVID test was negative.  2.  Coronary disease:  Shortness of breath limits her activity.  Activity thru the day:  ADLs, light housekeeping; during nicer weather she will walk about 15 minutes a " day.    Saw cardiology 2 months ago - imdur and nifedipine increased at that visit.  No new headaches with that change.  Some increased dizziness.    [id number] steps a day is her goal.      Have you ever done Advance Care Planning? (For example, a Health Directive, POLST, or a discussion with a medical provider or your loved ones about your wishes): Yes, advance care planning is on file.       Fall risk  Fallen 2 or more times in the past year?: No  Any fall with injury in the past year?: No    Cognitive Screening   1) Repeat 3 items (Leader, Season, Table)    2) Clock draw: NORMAL  3) 3 item recall: Recalls 2 objects   Results: NORMAL clock, 1-2 items recalled: COGNITIVE IMPAIRMENT LESS LIKELY    Mini-CogTM Copyright S Harjeet. Licensed by the author for use in [facility]; reprinted with permission (remigio@[facility]). All rights reserved.          Reviewed and updated as needed this visit by clinical staff   Tobacco  Allergies  Meds      Soc Hx        Reviewed and updated as needed this visit by Provider                 Social History     Tobacco Use     Smoking status: Never     Smokeless tobacco: Never     Tobacco comments:     Quit smoking: spouse does not smoke in the house. Exposed in cars.   Substance Use Topics     Alcohol use: Yes     Comment: Alcoholic Drinks/day: Alcoholic Drinks/day: 1-2 drinks a week         Alcohol Use 12/23/2022   Prescreen: >3 drinks/day or >7 drinks/week? No               Current providers sharing in care for this patient include:   Patient Care Team:  Erika Carrasco MD as PCP - General (Family Practice)  Erika Carrasco MD as Assigned PCP  Xiao Davis RN as Specialty Care Coordinator (Cardiology)  Hakeem Moore MD as MD (Clinical Cardiac Electrophysiology)  Tima Johnson MD as MD (Clinical Cardiac Electrophysiology)  Moises Islas,  as Assigned Heart and Vascular Provider    The following health maintenance items are reviewed  in Epic and correct as of today:  Health Maintenance   Topic Date Due     HF ACTION PLAN  Never done     ZOSTER IMMUNIZATION (1 of 2) Never done     EYE EXAM  05/09/2020     COLORECTAL CANCER SCREENING  03/13/2023     A1C  03/30/2023     BMP  04/05/2023     PHQ-9  06/30/2023     ALT  10/05/2023     LIPID  10/05/2023     CBC  10/05/2023     MEDICARE ANNUAL WELLNESS VISIT  12/30/2023     MICROALBUMIN  12/30/2023     FALL RISK ASSESSMENT  12/30/2023     DIABETIC FOOT EXAM  01/01/2024     MAMMO SCREENING  01/26/2024     ADVANCE CARE PLANNING  12/30/2027     DTAP/TDAP/TD IMMUNIZATION (4 - Td or Tdap) 09/27/2029     DEXA  10/01/2036     TSH W/FREE T4 REFLEX  Completed     HEPATITIS C SCREENING  Completed     INFLUENZA VACCINE  Completed     Pneumococcal Vaccine: 65+ Years  Completed     IPV IMMUNIZATION  Aged Out     MENINGITIS IMMUNIZATION  Aged Out     PAP  Discontinued     COVID-19 Vaccine  Discontinued         Any new diagnosis of family breast, ovarian, or bowel cancer? No    FHS-7:   Breast CA Risk Assessment (FHS-7) 1/26/2022   Did any of your first-degree relatives have breast or ovarian cancer? Yes   Did any of your relatives have bilateral breast cancer? No   Did any man in your family have breast cancer? No   Did any woman in your family have breast and ovarian cancer? No   Did any woman in your family have breast cancer before age 50 y? No   Do you have 2 or more relatives with breast and/or ovarian cancer? No   Do you have 2 or more relatives with breast and/or bowel cancer? No       Mammogram Screening: Recommended mammography every 1-2 years with patient discussion and risk factor consideration  Pertinent mammograms are reviewed under the imaging tab.    Review of Systems   Constitutional: Negative for chills and fever.   HENT: Negative for congestion, ear pain, hearing loss and sore throat.    Eyes: Positive for visual disturbance. Negative for pain.   Respiratory: Positive for shortness of breath.  Negative for cough.    Cardiovascular: Positive for chest pain and peripheral edema. Negative for palpitations.   Gastrointestinal: Positive for constipation and heartburn. Negative for abdominal pain, diarrhea, hematochezia and nausea.   Breasts:  Negative for tenderness, breast mass and discharge.   Genitourinary: Negative for dysuria, frequency, genital sores, hematuria, pelvic pain, urgency, vaginal bleeding and vaginal discharge.   Musculoskeletal: Negative for arthralgias, joint swelling and myalgias.   Skin: Negative for rash.   Neurological: Negative for dizziness, weakness, headaches and paresthesias.   Psychiatric/Behavioral: Negative for mood changes. The patient is not nervous/anxious.      Sleep - taking zolpidem 5mg.  Prescription from Dr Islas  No abnormal bleeding  Dentist is up to date  - goes again in February  GI:  Taking a stool softener every other day   TSH up slightly in October   Hip pain  - extra stiffness    Lab Results   Component Value Date    WBC 5.3 10/05/2022    WBC 7.1 04/27/2021     Lab Results   Component Value Date    RBC 4.73 10/05/2022    RBC 4.36 04/27/2021     Lab Results   Component Value Date    HGB 15.4 10/05/2022    HGB 14.2 04/27/2021     Lab Results   Component Value Date    HCT 43.4 10/05/2022    HCT 40.3 04/27/2021     No components found for: MCT  Lab Results   Component Value Date    MCV 92 10/05/2022    MCV 92 04/27/2021     Lab Results   Component Value Date    MCH 32.6 10/05/2022    MCH 32.6 04/27/2021     Lab Results   Component Value Date    MCHC 35.5 10/05/2022    MCHC 35.2 04/27/2021     Lab Results   Component Value Date    RDW 12.4 10/05/2022    RDW 12.5 04/27/2021     Lab Results   Component Value Date     10/05/2022     04/27/2021     Last Comprehensive Metabolic Panel:  Sodium   Date Value Ref Range Status   10/05/2022 134 134 - 144 mmol/L Final   04/27/2021 129 (L) 133 - 144 mmol/L Final     Potassium   Date Value Ref Range Status    10/05/2022 4.1 3.5 - 5.1 mmol/L Final   04/27/2021 3.4 3.4 - 5.3 mmol/L Final     Chloride   Date Value Ref Range Status   10/05/2022 102 98 - 107 mmol/L Final   04/27/2021 98 94 - 109 mmol/L Final     Carbon Dioxide   Date Value Ref Range Status   04/27/2021 23 20 - 32 mmol/L Final     Carbon Dioxide (CO2)   Date Value Ref Range Status   10/05/2022 22 21 - 31 mmol/L Final     Anion Gap   Date Value Ref Range Status   10/05/2022 10 3 - 14 mmol/L Final   04/27/2021 8 3 - 14 mmol/L Final     Glucose   Date Value Ref Range Status   10/05/2022 106 (H) 70 - 105 mg/dL Final   04/27/2021 98 70 - 99 mg/dL Final     Urea Nitrogen   Date Value Ref Range Status   10/05/2022 24 7 - 25 mg/dL Final   04/27/2021 15 7 - 30 mg/dL Final     Creatinine   Date Value Ref Range Status   10/05/2022 0.94 0.60 - 1.20 mg/dL Final   04/27/2021 0.76 0.52 - 1.04 mg/dL Final     GFR Estimate   Date Value Ref Range Status   10/05/2022 66 >60 mL/min/1.73m2 Final     Comment:     Effective December 21, 2021 eGFRcr in adults is calculated using the 2021 CKD-EPI creatinine equation which includes age and gender (Mike dewitt al., [place], DOI: 10.1056/WAQVax3865200)   04/27/2021 81 >60 mL/min/[1.73_m2] Final     Comment:     Non  GFR Calc  Starting 12/18/2018, serum creatinine based estimated GFR (eGFR) will be   calculated using the Chronic Kidney Disease Epidemiology Collaboration   (CKD-EPI) equation.       Calcium   Date Value Ref Range Status   10/05/2022 9.7 8.6 - 10.3 mg/dL Final   04/27/2021 9.1 8.5 - 10.1 mg/dL Final     Bilirubin Total   Date Value Ref Range Status   10/05/2022 0.6 0.3 - 1.0 mg/dL Final   08/19/2020 0.5 0.3 - 1.0 mg/dL Final     Alkaline Phosphatase   Date Value Ref Range Status   10/05/2022 63 34 - 104 U/L Final   08/19/2020 91 34 - 104 U/L Final     ALT   Date Value Ref Range Status   10/05/2022 22 7 - 52 U/L Final   08/19/2020 26 7 - 52 U/L Final     AST   Date Value Ref Range Status   10/05/2022 24 13 - 39  "U/L Final   08/19/2020 31 13 - 39 U/L Final               OBJECTIVE:   /64   Pulse 62   Temp 98.1  F (36.7  C) (Tympanic)   Resp 16   Ht 1.638 m (5' 4.5\")   Wt 73.8 kg (162 lb 9.6 oz)   LMP  (LMP Unknown)   SpO2 96%   Breastfeeding No   BMI 27.48 kg/m   Estimated body mass index is 27.48 kg/m  as calculated from the following:    Height as of this encounter: 1.638 m (5' 4.5\").    Weight as of this encounter: 73.8 kg (162 lb 9.6 oz).  Physical Exam  GENERAL: healthy, alert and no distress  EYES: Eyes grossly normal to inspection, PERRL and conjunctivae and sclerae normal  HENT: normal cephalic/atraumatic, ear canals and TM's normal and on her right ear there is a scaly macule on the right outer   NECK: no adenopathy, no asymmetry, masses, or scars and thyroid normal to palpation  RESP: lungs clear to auscultation - no rales, rhonchi or wheezes  BREAST: normal without masses, tenderness or nipple discharge and no palpable axillary masses or adenopathy  Left upper chest wall pacemaker   CV: regular rate and rhythm, grade 3 murmur   ABDOMEN: soft, nontender, no hepatosplenomegaly, no masses and bowel sounds normal  MS: no gross musculoskeletal defects noted, no edema  SKIN:see right ear - treated with liquid nitrogen cryotherapy   NEURO: Normal strength and tone, mentation intact and speech normal  PSYCH: mentation appears normal, affect normal/bright  Diabetic foot exam within normal limits     Diagnostic Test Results:  Labs reviewed in Epic  Results for orders placed or performed in visit on 12/30/22   Albumin Random Urine Quantitative with Creat Ratio     Status: None   Result Value Ref Range    Albumin Urine mg/L <12.0 mg/L    Albumin Urine mg/g Cr      Creatinine Urine mg/dL 128.0 mg/dL   Hemoglobin A1c     Status: Normal   Result Value Ref Range    Hemoglobin A1C 5.6 4.0 - 6.2 %   TSH     Status: Normal   Result Value Ref Range    TSH 2.28 0.30 - 4.20 uIU/mL       ASSESSMENT / PLAN:       ICD-10-CM  "   1. Encounter for Medicare annual wellness exam  Z00.00       2. Type 2 diabetes mellitus without complication, without long-term current use of insulin (H)  E11.9 Hemoglobin A1c     TSH     Albumin Random Urine Quantitative with Creat Ratio     Hemoglobin A1c     TSH     FOOT EXAM     CANCELED: Albumin Random Urine Quantitative with Creat Ratio      3. Coronary artery spasm (H)  I20.1 arginine (L-ARGININE) 1000 MG tablet     diltiazem ER (TIAZAC) 360 MG 24 hr ER beaded capsule     TSH     TSH      4. Myocardial bridge-LAD  Q24.5 arginine (L-ARGININE) 1000 MG tablet     diltiazem ER (TIAZAC) 360 MG 24 hr ER beaded capsule     TSH     TSH      5. History of coronary artery bypass graft x 1 with LIMA to LAD on 11/15/2016  Z95.1 arginine (L-ARGININE) 1000 MG tablet     TSH     TSH      6. History of coronary artery stent placement to the LAD x3  Z95.5 arginine (L-ARGININE) 1000 MG tablet     TSH     TSH      7. History of coronary vasospasm  Z86.79 arginine (L-ARGININE) 1000 MG tablet     diltiazem ER (TIAZAC) 360 MG 24 hr ER beaded capsule     TSH     TSH      8. Essential hypertension  I10 diltiazem ER (TIAZAC) 360 MG 24 hr ER beaded capsule     TSH     TSH      9. Pacemaker-dependent due to native cardiac rhythm insufficient to support life  I49.8 diltiazem ER (TIAZAC) 360 MG 24 hr ER beaded capsule    Z95.0 TSH     TSH      10. Paroxysmal supraventricular tachycardia (H)  I47.1 diltiazem ER (TIAZAC) 360 MG 24 hr ER beaded capsule     TSH     TSH      11. History of cardiac radiofrequency ablation for SVT x8  Z98.890 diltiazem ER (TIAZAC) 360 MG 24 hr ER beaded capsule     TSH     TSH      12. Personal history of supraventricular tachycardia status post ablation  Z86.79 diltiazem ER (TIAZAC) 360 MG 24 hr ER beaded capsule     TSH     TSH      13. S/P AV tessie ablation secondary to SVT  Z98.890 diltiazem ER (TIAZAC) 360 MG 24 hr ER beaded capsule     TSH     TSH      14. Depression, unspecified depression type   "F32.A sertraline (ZOLOFT) 50 MG tablet     TSH     TSH      15. Need for prophylactic vaccination and inoculation against influenza  Z23 TSH     TSH      16. Need for pneumococcal vaccine  Z23 PNEUMOCOCCAL 20 VALENT CONJUGATE (PREVNAR 20)     TSH     TSH      17. Elevated TSH  R79.89 TSH     TSH      18. Encounter for screening mammogram for breast cancer  Z12.31 MA Screening Bilateral w/ Magdiel      19. Special screening for malignant neoplasms, colon  Z12.11 COLOGUARD([employer] SCIENCES)      20. Actinic keratosis  L57.0 DESTRUCT PREMALIGNANT LESION, FIRST        1.  Cologuard for colon cancer screening  2.  Mammogram is ordered  3.  Prevnar updated  4.  Continue zoloft for depression  5.  Follow up care after cryotherapy dicussed - if doesn't respond consider removal/biopsy  6.  Recheck of TSH due            COUNSELING:  Reviewed preventive health counseling, as reflected in patient instructions       Regular exercise       Healthy diet/nutrition       Fall risk prevention       Mental health      BMI:   Estimated body mass index is 27.48 kg/m  as calculated from the following:    Height as of this encounter: 1.638 m (5' 4.5\").    Weight as of this encounter: 73.8 kg (162 lb 9.6 oz).         She reports that she has never smoked. She has never used smokeless tobacco.      Appropriate preventive services were discussed with this patient, including applicable screening as appropriate for cardiovascular disease, diabetes, osteopenia/osteoporosis, and glaucoma.  As appropriate for age/gender, discussed screening for colorectal cancer, prostate cancer, breast cancer, and cervical cancer. Checklist reviewing preventive services available has been given to the patient.    Reviewed patients plan of care and provided an AVS. The Complex Care Plan (for patients with higher acuity and needing more deliberate coordination of services) for Lyudmila meets the Care Plan requirement. This Care Plan has been established and reviewed " with the Patient.      MARYURI SANDOVAL MD  [facility]    Identified Health Risks:  Answers for HPI/ROS submitted by the patient on 12/30/2022  PHQ9 TOTAL SCORE: 3

## 2022-12-30 NOTE — PROGRESS NOTES
"Previous A1C is at goal of <8  Lab Results   Component Value Date    A1C 5.8 10/05/2022    A1C 5.6 07/21/2021    A1C 5.8 03/09/2020    A1C 6.1 09/27/2019     Urine microalbumin:creatine: N/A  Foot exam DUE  Eye exam Dr. Nails in December 2022     Tobacco User No  Patient is on a daily aspirin  Patient is on a Statin.  Blood pressure today of:     BP Readings from Last 1 Encounters:   12/30/22 112/64      is at the goal of <139/89 for diabetics.    Renetta Pan CMA on 12/30/2022 at 3:30 PM      Answers for HPI/ROS submitted by the patient on 12/30/2022  PHQ9 TOTAL SCORE: 3  In general, how would you rate your overall physical health?: poor  Frequency of exercise:: None  Do you usually eat at least 4 servings of fruit and vegetables a day, include whole grains & fiber, and avoid regularly eating high fat or \"junk\" foods? : No  Taking medications regularly:: Yes  Activities of Daily Living: shopping requires assistance, preparing meals requires assistance, housework requires assistance  Home safety: no safety concerns identified  Hearing Impairment:: no hearing concerns  In the past 6 months, have you been bothered by leaking of urine?: No  abdominal pain: No  Blood in stool: No  Blood in urine: No  chest pain: Yes  chills: No  congestion: No  constipation: Yes  cough: No  diarrhea: No  dizziness: No  ear pain: No  eye pain: No  nervous/anxious: No  fever: No  frequency: No  genital sores: No  headaches: No  hearing loss: No  heartburn: Yes  arthralgias: No  joint swelling: No  peripheral edema: Yes  mood changes: No  myalgias: No  nausea: No  dysuria: No  palpitations: No  Skin sensation changes: No  sore throat: No  urgency: No  rash: No  shortness of breath: Yes  visual disturbance: Yes  weakness: No  pelvic pain: No  vaginal bleeding: No  vaginal discharge: No  tenderness: No  breast mass: No  breast discharge: No  In general, how would you rate your overall mental or emotional health?: poor  Additional " concerns today:: No

## 2022-12-30 NOTE — NURSING NOTE
"Chief Complaint   Patient presents with     Medicare Visit     Patient is here for Medicare exam     Initial /64   Pulse 62   Temp 98.1  F (36.7  C) (Tympanic)   Resp 16   Ht 1.638 m (5' 4.5\")   Wt 73.8 kg (162 lb 9.6 oz)   LMP  (LMP Unknown)   SpO2 96%   Breastfeeding No   BMI 27.48 kg/m   Estimated body mass index is 27.48 kg/m  as calculated from the following:    Height as of this encounter: 1.638 m (5' 4.5\").    Weight as of this encounter: 73.8 kg (162 lb 9.6 oz).  Medication Reconciliation: complete    Renetta Pan CMA       FOOD SECURITY SCREENING QUESTIONS:    The next two questions are to help us understand your food security.  If you are feeling you need any assistance in this area, we have resources available to support you today.    Hunger Vital Signs:  Within the past 12 months we worried whether our food would run out before we got money to buy more. Never  Within the past 12 months the food we bought just didn't last and we didn't have money to get more. Never  Renetta Pan CMA,LPN on 12/30/2022 at 3:26 PM    Immunization Documentation    Prior to Immunization administration, verified patients identity using patient's name and date of birth. Please see IMMUNIZATIONS  and order for additional information.  Patient / Parent instructed to remain in clinic for 15 minutes and report any adverse reaction to staff immediately.    Was the entire amount of vaccines given used? Yes    Renetta Pan CMA  12/30/2022   4:34 PM        "

## 2023-01-03 ENCOUNTER — ANTICOAGULATION THERAPY VISIT (OUTPATIENT)
Dept: ANTICOAGULATION | Facility: OTHER | Age: 68
End: 2023-01-03
Attending: FAMILY MEDICINE
Payer: MEDICARE

## 2023-01-03 DIAGNOSIS — Z95.5 HISTORY OF CORONARY ARTERY STENT PLACEMENT: ICD-10-CM

## 2023-01-03 DIAGNOSIS — Z79.01 ANTICOAGULATION MONITORING, INR RANGE 2-3: Primary | ICD-10-CM

## 2023-01-03 DIAGNOSIS — I66.9 CEREBRAL THROMBOSIS: ICD-10-CM

## 2023-01-03 DIAGNOSIS — D68.2 FACTOR V DEFICIENCY (H): ICD-10-CM

## 2023-01-03 LAB — INR HOME MONITORING: 2.9 (ref 2–3)

## 2023-01-03 NOTE — PROGRESS NOTES
ANTICOAGULATION MANAGEMENT     Lyudmila Fitzgerald 67 year old female is on warfarin with therapeutic INR result. (Goal INR 2.0-3.0)    Recent labs: (last 7 days)     01/03/23  0000   INR 2.9       ASSESSMENT       Source(s): Chart Review and Patient/Caregiver Call       Warfarin doses taken: Warfarin taken as instructed    Diet: No new diet changes identified    New illness, injury, or hospitalization: No    Medication/supplement changes: None noted    Signs or symptoms of bleeding or clotting: No    Previous INR: Supratherapeutic    Additional findings: None       PLAN     Recommended plan for no diet, medication or health factor changes affecting INR     Dosing Instructions: Continue your current warfarin dose with next INR in 2 weeks       Summary  As of 1/3/2023    Full warfarin instructions:  3 mg every Mon, Wed, Fri; 6 mg all other days   Next INR check:  1/17/2023             Telephone call with Leigh who verbalizes understanding and agrees to plan    Patient to recheck with home meter    Education provided: Please call back if any changes to your diet, medications or how you've been taking warfarin    Plan made per [facility] anticoagulation protocol    Nasrin Barragan RN  Anticoagulation Clinic  1/3/2023    _______________________________________________________________________     Anticoagulation Episode Summary     Current INR goal:  2.0-3.0   TTR:  79.3 % (1 y)   Target end date:  Indefinite   Send INR reminders to:  [facility]    Indications    Acute thromboembolism of deep veins of lower extremity (H) (Resolved) [I82.409]  Anticoagulation monitoring  INR range 2-3 [Z79.01]  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

## 2023-01-04 ENCOUNTER — TELEPHONE (OUTPATIENT)
Dept: FAMILY MEDICINE | Facility: OTHER | Age: 68
End: 2023-01-04

## 2023-01-09 ENCOUNTER — MYC MEDICAL ADVICE (OUTPATIENT)
Dept: FAMILY MEDICINE | Facility: OTHER | Age: 68
End: 2023-01-09

## 2023-01-09 NOTE — TELEPHONE ENCOUNTER
Assist patient with getting appointment with Dr Velasquez for shoulder injection.  Erika Gupta MD   
Notified patient to schedule appointment with Sports medicine Dr. Benjamin Velasquez.  Caron Almeida, NAY ....................  1/9/2023   4:20 PM      
(0) independent

## 2023-01-11 ENCOUNTER — HOSPITAL ENCOUNTER (OUTPATIENT)
Dept: GENERAL RADIOLOGY | Facility: OTHER | Age: 68
Discharge: HOME | End: 2023-01-11
Attending: FAMILY MEDICINE
Payer: MEDICARE

## 2023-01-11 ENCOUNTER — OFFICE VISIT (OUTPATIENT)
Dept: FAMILY MEDICINE | Facility: OTHER | Age: 68
End: 2023-01-11
Attending: FAMILY MEDICINE
Payer: MEDICARE

## 2023-01-11 VITALS
BODY MASS INDEX: 27.34 KG/M2 | SYSTOLIC BLOOD PRESSURE: 132 MMHG | DIASTOLIC BLOOD PRESSURE: 80 MMHG | OXYGEN SATURATION: 96 % | RESPIRATION RATE: 16 BRPM | TEMPERATURE: 97.8 F | WEIGHT: 161.8 LBS | HEART RATE: 68 BPM

## 2023-01-11 DIAGNOSIS — M75.01 ADHESIVE CAPSULITIS OF RIGHT SHOULDER: Primary | ICD-10-CM

## 2023-01-11 PROCEDURE — G0463 HOSPITAL OUTPT CLINIC VISIT: HCPCS | Mod: 25

## 2023-01-11 PROCEDURE — 99214 OFFICE O/P EST MOD 30 MIN: CPT | Performed by: FAMILY MEDICINE

## 2023-01-11 PROCEDURE — G0463 HOSPITAL OUTPT CLINIC VISIT: HCPCS

## 2023-01-11 PROCEDURE — 73030 X-RAY EXAM OF SHOULDER: CPT | Mod: RT

## 2023-01-11 ASSESSMENT — PAIN SCALES - GENERAL: PAINLEVEL: EXTREME PAIN (9)

## 2023-01-11 NOTE — NURSING NOTE
"Chief Complaint   Patient presents with     Shoulder Pain     Right shoulder pain     Patient is here for right shoulder pain that has been ongoing for 1 week. Pain 9/10. She has a past history of shoulder injury with surgery and past injections.     Initial /80 (BP Location: Right arm, Patient Position: Sitting, Cuff Size: Adult Regular)   Pulse 68   Temp 97.8  F (36.6  C) (Tympanic)   Resp 16   Wt 73.4 kg (161 lb 12.8 oz)   LMP  (LMP Unknown)   SpO2 96%   BMI 27.34 kg/m   Estimated body mass index is 27.34 kg/m  as calculated from the following:    Height as of 12/30/22: 1.638 m (5' 4.5\").    Weight as of this encounter: 73.4 kg (161 lb 12.8 oz).       Medication Reconciliation: Complete    Millicent Rico LPN .......  1/11/2023  8:22 AM   "

## 2023-01-11 NOTE — PROGRESS NOTES
Sports Medicine Office Note    HPI:  67-year-old female coming in for evaluation of right shoulder pain.  Her pain started approximately 1 week ago.  She has a history of previous shoulder surgery in  for a torn rotator cuff.  She had a subacromial injection performed in 2017.  She reports significant improvement in her symptoms with this intervention.  Most recently her pain came on without inciting event or injury.  Her current pain is 9/10.  She characterizes the pain as sharp, stabbing, aching, and throbbing.  She has difficulty with lifting and sleeping.  She has tried heat, ice, and rest.  She denies radicular symptoms.      EXAM:  /80 (BP Location: Right arm, Patient Position: Sitting, Cuff Size: Adult Regular)   Pulse 68   Temp 97.8  F (36.6  C) (Tympanic)   Resp 16   Wt 73.4 kg (161 lb 12.8 oz)   LMP  (LMP Unknown)   SpO2 96%   BMI 27.34 kg/m    MUSCULOSKELETAL EXAM:  RIGHT SHOULDER  Inspection:  -No gross deformity    Range of Motion:  -Active flexion:  170 left, 20 right (150 passively)  -Active abduction:  160 left, 30 right (110 passively)  -Passive external rotation in 90 of abduction:  50  -Passive internal rotation in 90 of abduction:  45    Special Tests:  -Beasley test: Positive    Other:  -Intact sensation to light touch distally.  -No signs of cyanosis. Normal skin temperature of the upper extremity.  -Elbow:  No gross deformity. Full range of motion.  -Hand/wrist:  No gross deformity. Full range of motion.  -Left shoulder:  No gross deformity. No palpable tenderness. Normal strength.      IMAGIN2023: 3 view right shoulder x-ray  - Mild-moderate degenerative changes at the AC and GH joints.  No acute fracture.      ASSESSMENT/PLAN:  Diagnoses and all orders for this visit:  Adhesive capsulitis of right shoulder  -     XR Shoulder Right G/E 3 Views  -     Physical Therapy Referral; Future    67-year-old female with acute onset atraumatic right shoulder pain with  significant ROM deficits consistent with adhesive capsulitis.  Treatment for this includes therapy to work through range of motion limitations which can be augmented with medication/injections.  X-rays were performed in the office today and personally reviewed by me with the findings as demonstrated above by my interpretation.  - Handout given with home exercises for adhesive capsulitis  - Referral placed to physical therapy  - Follow-up in the office for an ultrasound-guided injection into the right GH joint      Benjamin Rojas MD  1/11/2023  8:22 AM    Total time spent with this patient was 26 minutes which included chart review, visualization and independent interpretation of images, time spent with the patient, and documentation.    Procedure time:  0 minute(s)

## 2023-01-16 ENCOUNTER — OFFICE VISIT (OUTPATIENT)
Dept: FAMILY MEDICINE | Facility: OTHER | Age: 68
End: 2023-01-16
Attending: FAMILY MEDICINE
Payer: MEDICARE

## 2023-01-16 VITALS
BODY MASS INDEX: 27.21 KG/M2 | RESPIRATION RATE: 16 BRPM | TEMPERATURE: 96.9 F | HEART RATE: 68 BPM | DIASTOLIC BLOOD PRESSURE: 80 MMHG | OXYGEN SATURATION: 97 % | SYSTOLIC BLOOD PRESSURE: 130 MMHG | WEIGHT: 161 LBS

## 2023-01-16 DIAGNOSIS — M75.01 ADHESIVE CAPSULITIS OF RIGHT SHOULDER: Primary | ICD-10-CM

## 2023-01-16 DIAGNOSIS — E11.9 TYPE 2 DIABETES MELLITUS WITHOUT COMPLICATION, WITHOUT LONG-TERM CURRENT USE OF INSULIN (H): ICD-10-CM

## 2023-01-16 PROCEDURE — G0463 HOSPITAL OUTPT CLINIC VISIT: HCPCS

## 2023-01-16 PROCEDURE — 20611 DRAIN/INJ JOINT/BURSA W/US: CPT | Mod: RT | Performed by: FAMILY MEDICINE

## 2023-01-16 PROCEDURE — 250N000009 HC RX 250: Performed by: FAMILY MEDICINE

## 2023-01-16 PROCEDURE — 250N000011 HC RX IP 250 OP 636: Performed by: FAMILY MEDICINE

## 2023-01-16 RX ORDER — TRIAMCINOLONE ACETONIDE 40 MG/ML
40 INJECTION, SUSPENSION INTRA-ARTICULAR; INTRAMUSCULAR ONCE
Status: COMPLETED | OUTPATIENT
Start: 2023-01-16 | End: 2023-01-16

## 2023-01-16 RX ORDER — LIDOCAINE HYDROCHLORIDE 10 MG/ML
2 INJECTION, SOLUTION EPIDURAL; INFILTRATION; INTRACAUDAL; PERINEURAL ONCE
Status: COMPLETED | OUTPATIENT
Start: 2023-01-16 | End: 2023-01-16

## 2023-01-16 RX ORDER — LIDOCAINE HYDROCHLORIDE 10 MG/ML
3 INJECTION, SOLUTION EPIDURAL; INFILTRATION; INTRACAUDAL; PERINEURAL ONCE
Status: COMPLETED | OUTPATIENT
Start: 2023-01-16 | End: 2023-01-16

## 2023-01-16 RX ADMIN — LIDOCAINE HYDROCHLORIDE 2 ML: 10 INJECTION, SOLUTION INFILTRATION; PERINEURAL at 10:34

## 2023-01-16 RX ADMIN — TRIAMCINOLONE ACETONIDE 40 MG: 40 INJECTION, SUSPENSION INTRA-ARTICULAR; INTRAMUSCULAR at 10:34

## 2023-01-16 RX ADMIN — LIDOCAINE HYDROCHLORIDE 3 ML: 10 INJECTION, SOLUTION INFILTRATION; PERINEURAL at 10:34

## 2023-01-16 ASSESSMENT — PAIN SCALES - GENERAL: PAINLEVEL: EXTREME PAIN (9)

## 2023-01-16 NOTE — PROGRESS NOTES
Sports Medicine Office Note    HPI:  67-year-old female coming in for follow-up evaluation of right shoulder pain.  Her pain started approximately 2 weeks ago.  She has a history of a previous shoulder surgery in  for a torn rotator cuff.  In 2017 she had a subacromial injection.  This provided significant improvement in her symptoms.  Currently she rates her pain at 9/10.  She characterized the pain as stabbing, aching, and throbbing.  She was last seen in this office on .  At that time she was noted to have significant ROM deficits.  She comes in today for an ultrasound-guided injection into the right GH joint.      EXAM:  /80 (BP Location: Right arm, Patient Position: Sitting, Cuff Size: Adult Regular)   Pulse 68   Temp 96.9  F (36.1  C) (Tympanic)   Resp 16   Wt 73 kg (161 lb)   LMP  (LMP Unknown)   SpO2 97%   BMI 27.21 kg/m    Musculoskeletal exam: Right shoulder  - No gross deformity  - No bruising or swelling  - Normal skin temperature without cyanosis  - Mild tenderness to palpation with passive internal and external ROM      IMAGIN2023: 3 view right shoulder x-ray  - Mild-moderate degenerative changes at the AC and GH joints.  No acute fracture.      ASSESSMENT/PLAN:  Diagnoses and all orders for this visit:  Adhesive capsulitis of right shoulder  -     POC US SOFT TISSUE  -     [place] ARTHROCENTESIS ASPIR&/INJ MAJOR JT/BURSA W/US  -     triamcinolone (KENALOG-40) injection 40 mg  -     lidocaine (PF) (XYLOCAINE) 1 % injection 3 mL  -     lidocaine (PF) (XYLOCAINE) 1 % injection 2 mL  Type 2 diabetes mellitus without complication, without long-term current use of insulin (H)    67-year-old female with acute onset asymptomatic adhesive capsulitis.  She has been doing home exercises that were provided to her at her last visit.  X-rays from  were again personally reviewed in the office with findings as demonstrated above by my interpretation.  After reviewing the risks/benefits,  the patient elects to proceed with an ultrasound-guided GH joint injection.  See procedure note below:    PROCEDURE:  Ultrasound Guided Injection of the Right Glenohumeral Joint      EQUIPMENT:  Theodore AffinmPortico 70 with Linear [id number] (2-22MHz) Transducer.      PROCEDURAL PAUSE:    A procedural pause was conducted to verify:  correct patient identity, procedure to be performed, and as applicable, correct side, site, and correct patient position.      INFORMED CONSENT:   I discussed the risks, possible benefits, and alternatives to injection.  Following denial of allergy and review of potential side effects and complications (including but not necessarily limited to infection, allergic reaction, fat necrosis, skin depigmentation, local tissue breakdown, systemic effects of corticosteroids, elevation of blood glucose, injury to soft tissue and/or nerves, and seizure), all questions were answered, and consent was given to proceed.  Patient verbalized understanding.      PROCEDURE DETAILS:    The use of direct ultrasound visualization of the needle (rather than a non-guided ultrasound procedure) was required to increase patient safety by excluding inadvertent intramuscular or intratendinous placement and minimizing bleeding and injury by avoiding osteochondral and nearby neurovascular structures.  Guidance also maximizes accurate injection placement and likely clinical benefit beyond that obtained with a non-guided injection.  This allows increased diagnostic specificity when evaluating effectiveness of the injection.      Prior to the procedure, the right shoulder was examined with a 12MHz linear transducer to determine the optimal needle path and visualize the posterior glenohumeral joint and labrum deep to the infraspinatus musculotendinous junction.  Following this, the skin was marked, and the right shoulder was prepared with chlorhexidine.  Local anesthesia was obtained with 2mL of 1% lidocaine.  Then this area was  re-examined using the same transducer, a sterile ultrasound transducer cover, sterile gloves, and sterile gel.  The transducer was placed at the posterior shoulder in an axial plane.  Under ultrasound guidance, a 3-inch 22-gauge needle was advanced from lateral to medial with an in-plane approach into the posterior glenohumeral joint.  One needle pass was performed.  After ultrasound visualization of the needle tip in the target area and negative aspiration for blood, a mixture of 3mL of 1% lidocaine and 1mL of triamcinolone(40mg/mL) was injected into the right glenohumeral joint.  0mL was wasted.  Images have been saved on the musculoskeletal ultrasound in clinic.     The patient tolerated the procedure without complication and was discharged in good condition after a short observation period.  The patient was instructed to contact me regarding any questions pertaining to the procedure.      DIAGNOSIS:    -Successful ultrasound guided injection of the right glenohumeral joint without immediate complication      PLAN:   -Post-procedure care reviewed, including avoiding submersion of the injection site for 48 hours   -Return precautions reviewed for signs/symptoms that would be concerning for infection   -The patient was instructed to ice and take APAP this evening if needed   -Continue with home exercises  -Physical therapy referral previously placed, encourage patient to establish  -Return to clinic as needed      Benjamin Rojas MD  1/16/2023  8:13 AM    Total time spent with this patient was 37 minutes which included chart review, visualization and independent interpretation of images, time spent with the patient, and documentation.    Procedure time:  22 minute(s)

## 2023-01-16 NOTE — NURSING NOTE
"Chief Complaint   Patient presents with     Imm/Inj     Ultrasound guided right glenohumeral joint injection     Patient is here for ultrasound guided right glenohumeral joint injection. Pain 9/10.     Initial /80 (BP Location: Right arm, Patient Position: Sitting, Cuff Size: Adult Regular)   Pulse 68   Temp 96.9  F (36.1  C) (Tympanic)   Resp 16   Wt 73 kg (161 lb)   LMP  (LMP Unknown)   SpO2 97%   BMI 27.21 kg/m   Estimated body mass index is 27.21 kg/m  as calculated from the following:    Height as of 12/30/22: 1.638 m (5' 4.5\").    Weight as of this encounter: 73 kg (161 lb).       Medication Reconciliation: Complete    Millicent Rico LPN .......  1/16/2023  8:08 AM   "

## 2023-01-17 ENCOUNTER — ANTICOAGULATION THERAPY VISIT (OUTPATIENT)
Dept: ANTICOAGULATION | Facility: OTHER | Age: 68
End: 2023-01-17
Attending: FAMILY MEDICINE
Payer: MEDICARE

## 2023-01-17 DIAGNOSIS — I66.9 CEREBRAL THROMBOSIS: ICD-10-CM

## 2023-01-17 DIAGNOSIS — D68.2 FACTOR V DEFICIENCY (H): ICD-10-CM

## 2023-01-17 DIAGNOSIS — Z95.5 HISTORY OF CORONARY ARTERY STENT PLACEMENT: ICD-10-CM

## 2023-01-17 DIAGNOSIS — Z79.01 ANTICOAGULATION MONITORING, INR RANGE 2-3: Primary | ICD-10-CM

## 2023-01-17 LAB — INR HOME MONITORING: 2.3 (ref 2–3)

## 2023-01-17 NOTE — PROGRESS NOTES
ANTICOAGULATION  MANAGEMENT-Home Monitor Managed by Exception    Lyudmila Fitzgerald 67 year old female is on warfarin with therapeutic INR result. (Goal INR 2.0-3.0)    Recent labs: (last 7 days)     01/17/23  0000   INR 2.3         Previous INR was Therapeutic    Medication, diet, health changes since last INR:Yes: shoulder injection done yesterday, but not anticipated to affect INR    Contacted within the last 12 weeks by phone on 1/3/23      ROD Coreas was NOT contacted regarding therapeutic result today per home monitoring policy manage by exception agreement.   Current warfarin dose is to be continued:     Summary  As of 1/17/2023    Full warfarin instructions:  3 mg every Mon, Wed, Fri; 6 mg all other days   Next INR check:  1/31/2023           ?   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

## 2023-01-31 ENCOUNTER — HOSPITAL ENCOUNTER (OUTPATIENT)
Dept: MAMMOGRAPHY | Facility: OTHER | Age: 68
Discharge: HOME | End: 2023-01-31
Attending: FAMILY MEDICINE | Admitting: FAMILY MEDICINE
Payer: MEDICARE

## 2023-01-31 DIAGNOSIS — Z12.31 ENCOUNTER FOR SCREENING MAMMOGRAM FOR BREAST CANCER: ICD-10-CM

## 2023-01-31 PROCEDURE — 77067 SCR MAMMO BI INCL CAD: CPT

## 2023-02-04 LAB — INR HOME MONITORING: 2.6 (ref 2–3)

## 2023-02-06 ENCOUNTER — ANTICOAGULATION THERAPY VISIT (OUTPATIENT)
Dept: ANTICOAGULATION | Facility: OTHER | Age: 68
End: 2023-02-06
Attending: FAMILY MEDICINE
Payer: MEDICARE

## 2023-02-06 DIAGNOSIS — I66.9 CEREBRAL THROMBOSIS: ICD-10-CM

## 2023-02-06 DIAGNOSIS — Z79.01 ANTICOAGULATION MONITORING, INR RANGE 2-3: Primary | ICD-10-CM

## 2023-02-06 DIAGNOSIS — D68.2 FACTOR V DEFICIENCY (H): ICD-10-CM

## 2023-02-06 DIAGNOSIS — Z95.5 HISTORY OF CORONARY ARTERY STENT PLACEMENT: ICD-10-CM

## 2023-02-06 NOTE — PROGRESS NOTES
ANTICOAGULATION  MANAGEMENT-Home Monitor Managed by Exception    Lyudmila PRAJAPATI Fitzgerald 67 year old female is on warfarin with therapeutic INR result. (Goal INR 2.0-3.0)    Recent labs: (last 7 days)     02/04/23  0000   INR 2.6         Previous INR was Therapeutic    Medication, diet, health changes since last INR:chart reviewed; none identified    Contacted within the last 12 weeks by phone on 1/3/23      PLAN     Lyudmila was NOT contacted regarding therapeutic result today per home monitoring policy manage by exception agreement.   Current warfarin dose is to be continued:     Summary  As of 2/6/2023    Full warfarin instructions:  3 mg every Mon, Wed, Fri; 6 mg all other days   Next INR check:  3/6/2023           ?   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

## 2023-02-07 ENCOUNTER — HOSPITAL ENCOUNTER (OUTPATIENT)
Dept: PHYSICAL THERAPY | Facility: OTHER | Age: 68
Setting detail: THERAPIES SERIES
Discharge: HOME | End: 2023-02-07
Attending: FAMILY MEDICINE
Payer: MEDICARE

## 2023-02-07 DIAGNOSIS — M75.01 ADHESIVE CAPSULITIS OF RIGHT SHOULDER: ICD-10-CM

## 2023-02-07 PROCEDURE — 97161 PT EVAL LOW COMPLEX 20 MIN: CPT | Mod: GP | Performed by: PHYSICAL THERAPIST

## 2023-02-07 PROCEDURE — 97110 THERAPEUTIC EXERCISES: CPT | Mod: GP | Performed by: PHYSICAL THERAPIST

## 2023-02-07 NOTE — PROGRESS NOTES
[facility]    OUTPATIENT PHYSICAL THERAPY ORTHOPEDIC EVALUATION  PLAN OF TREATMENT FOR OUTPATIENT REHABILITATION  (COMPLETE FOR INITIAL CLAIMS ONLY)  Patient's Last Name, First Name, M.I.  YOB: 1955  Lyudmila Fitzgerald    Provider s Name:  [facility]   Medical Record No.  [id number]   Start of Care Date:  02/07/23   Onset Date:  11/08/22   Type:     _X__PT   ___OT   ___SLP Medical Diagnosis:  M75.01 (ICD-10-CM) - Adhesive capsulitis of right shoulder     PT Diagnosis:  Adhesive capsulitis of right shoulder   Visits from SOC:  1      _________________________________________________________________________________  Plan of Treatment/Functional Goals:  stretching, strengthening, ROM, neuromuscular re-education, motor coordination training, manual therapy, joint mobilization     Cryotherapy, Ultrasound     Goals  Goal Identifier: (P) HEP  Goal Description: (P) Pt to maintain current HEP and progess with new exercises to help prevent future future flare-ups of R shoulder pain  Target Date: (P) 03/21/23    Goal Identifier: (P) ROM  Goal Description: (P) Pt to demo R shoulder AROM abd of minimum 140 degrees for improved ability to reach out to the side  Target Date: (P) 03/21/23          Therapy Frequency:  2 times/Week  Predicted Duration of Therapy Intervention:  8 weeks    Mark Ojeda, PT, ATC                 I CERTIFY THE NEED FOR THESE SERVICES FURNISHED UNDER        THIS PLAN OF TREATMENT AND WHILE UNDER MY CARE     (Physician co-signature of this document indicates review and certification of the therapy plan).                     Certification Date From:  02/07/23   Certification Date To:  04/04/23    Referring Provider:  Dr Rojas    Initial Assessment        See Epic Evaluation Start of Care Date: 02/07/23

## 2023-02-07 NOTE — PROGRESS NOTES
"   02/07/23 1000   General Information   Type of Visit Initial OP Ortho PT Evaluation   Start of Care Date 02/07/23   Referring Physician Dr Rojas   Patient/Family Goals Statement maintain her HEP to maintain her improvement   Orders Evaluate and Treat   Date of Order 01/11/23   Certification Required? Yes   Medical Diagnosis M75.01 (ICD-10-CM) - Adhesive capsulitis of right shoulder   Surgical/Medical history reviewed Yes  (pt has an extensive history including strokes and cardiac issues)   Precautions/Limitations no known precautions/limitations   Weight-Bearing Status - LUE full weight-bearing   Weight-Bearing Status - RUE full weight-bearing   General Information Comments please refer to pt's medical record for any additional information   Body Part(s)   Body Part(s) Shoulder   Presentation and Etiology   Pertinent history of current problem (include personal factors and/or comorbidities that impact the POC) Pt had cuff surgery on her R shoulder 20 years ago and she states her shoulder gives her a hard time every once in awhile. She receives an injection and that always helps, including this time. She had an injection last month and it \"did wonders\" to the point she even debated canceling today's PT appmt. She keeps up with the exercises the dr showed her.   Impairments E. Decreased flexibility;F. Decreased strength and endurance   Functional Limitations perform desired leisure / sports activities;perform activities of daily living   Symptom Location R shoulder   How/Where did it occur From insidious onset   Onset date of current episode/exacerbation 11/08/22   Chronicity Recurrent   Pain rating (0-10 point scale) Best (/10);Worst (/10)   Best (/10) 0   Worst (/10) 3   Pain quality C. Aching   Frequency of pain/symptoms B. Intermittent   Pain/symptoms exacerbated by C. Lifting;D. Carrying   Pain/symptoms eased by K. 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 No   Have you fallen and had an injury in the past year? No   Is patient a fall risk? No;Department fall risk interventions implemented   Abuse Screen (yes response referral indicated)   Feels Unsafe at Home or Work/School no   Feels Threatened by Someone no   Does Anyone Try to Keep You From Having Contact with Others or Doing Things Outside Your Home? no   Physical Signs of Abuse Present no   Patient needs abuse support services and resources No   Functional Scales   Functional Scales Other   Other Scales  PSFS   Shoulder Objective Findings   Side (if bilateral, select both right and left) Right   Cervical Screen (ROM, quadrant) normal   Shoulder Special Tests Comments no special tests warranted at this time   Observation Pt is very pleasant and in no acute distress   Right Shoulder Flexion AROM 165 - equal to uninvolved   Right Shoulder Abduction AROM 90   Right Shoulder Abduction PROM 120   Right Shoulder IR AROM 80% compared to uninvolved with reaching behind back   Right Shoulder Flexion Strength 5-/5   Right Shoulder Abduction Strength 4+/5   Right Shoulder ER Strength 5/5   Right Shoulder IR Strength 5/5   Planned Therapy Interventions   Planned Therapy Interventions stretching;strengthening;ROM;neuromuscular re-education;motor coordination training;manual therapy;joint mobilization   Planned Modality Interventions   Planned Modality Interventions Cryotherapy;Ultrasound   Clinical Impression   Criteria for Skilled Therapeutic Interventions Met yes, treatment indicated    PT Diagnosis Adhesive capsulitis of right shoulder   Influenced by the following impairments decreased R shoulder AROM - mainly abd   Functional limitations due to impairments reaching out to the side   Clinical Presentation Stable/Uncomplicated   Clinical Decision Making (Complexity) Low complexity   Therapy Frequency 2 times/Week   Predicted Duration of Therapy Intervention (days/wks) 8 weeks   Risk & Benefits of therapy have been explained Yes   Patient, Family & other staff in agreement with plan of care Yes   Clinical Impression Comments Patient prefers to cont on her own with HEP/self mgmt techniques. She feels very comfortable with this plan since she has been doing well on her own for the past 4 weeks.   Education Assessment   Preferred Learning Style Listening;Reading;Demonstration;Pictures/video   Barriers to Learning No barriers   ORTHO GOALS   PT Ortho Eval Goals 1;2   Ortho Goal 1   Goal Identifier HEP   Goal Description Pt to maintain current HEP and progess with new exercises to help prevent future future flare-ups of R shoulder pain   Target Date 03/21/23   Ortho Goal 2   Goal Identifier ROM   Goal Description Pt to demo R shoulder AROM abd of minimum 140 degrees for improved ability to reach out to the side   Target Date 03/21/23   Total Evaluation Time   PT Mary Low Complexity Minutes (31758) 15   Therapy Certification   Certification date from 02/07/23   Certification date to 04/04/23   Medical Diagnosis M75.01 (ICD-10-CM) - Adhesive capsulitis of right shoulder

## 2023-02-17 ENCOUNTER — ANTICOAGULATION THERAPY VISIT (OUTPATIENT)
Dept: ANTICOAGULATION | Facility: OTHER | Age: 68
End: 2023-02-17
Attending: FAMILY MEDICINE
Payer: MEDICARE

## 2023-02-17 DIAGNOSIS — Z95.5 HISTORY OF CORONARY ARTERY STENT PLACEMENT: ICD-10-CM

## 2023-02-17 DIAGNOSIS — D68.2 FACTOR V DEFICIENCY (H): ICD-10-CM

## 2023-02-17 DIAGNOSIS — I66.9 CEREBRAL THROMBOSIS: ICD-10-CM

## 2023-02-17 DIAGNOSIS — Z79.01 ANTICOAGULATION MONITORING, INR RANGE 2-3: Primary | ICD-10-CM

## 2023-02-17 LAB — INR HOME MONITORING: 3 (ref 2–3)

## 2023-02-23 NOTE — PROGRESS NOTES
[facility]    Outpatient Physical Therapy Discharge Note  Patient: Lyudmila Fitzgerald  : 1955    Beginning/End Dates:  23    Referring Provider: Dr Rojas    Therapy Diagnosis: M75.01 (ICD-10-CM) - Adhesive capsulitis of right shoulder     Plan:  Discharge from therapy.    Discharge:   Pt has not returned to physical therapy after a trial of self mgmt techniques indicating that she is doing well with HEP. Please refer to pt's chart for most recent information on objective measurements, goals or any additional information. This is an unplanned DC    Reason for Discharge:   Patient has not scheduled further appointments.    Discharge Plan: Patient to continue home program.

## 2023-03-09 ENCOUNTER — ANTICOAGULATION THERAPY VISIT (OUTPATIENT)
Dept: ANTICOAGULATION | Facility: OTHER | Age: 68
End: 2023-03-09
Attending: FAMILY MEDICINE
Payer: MEDICARE

## 2023-03-09 DIAGNOSIS — Z95.5 HISTORY OF CORONARY ARTERY STENT PLACEMENT: ICD-10-CM

## 2023-03-09 DIAGNOSIS — I66.9 CEREBRAL THROMBOSIS: ICD-10-CM

## 2023-03-09 DIAGNOSIS — D68.2 FACTOR V DEFICIENCY (H): ICD-10-CM

## 2023-03-09 DIAGNOSIS — Z79.01 ANTICOAGULATION MONITORING, INR RANGE 2-3: Primary | ICD-10-CM

## 2023-03-09 LAB — INR HOME MONITORING: 2 (ref 2–3)

## 2023-03-09 NOTE — PROGRESS NOTES
ANTICOAGULATION  MANAGEMENT-Home Monitor Managed by Exception    Lyudmila PRAJAPATI Fitzgerald 67 year old female is on warfarin with therapeutic INR result. (Goal INR 2.0-3.0)    Recent labs: (last 7 days)     03/09/23  0000   INR 2.0         Previous INR was Therapeutic    Medication, diet, health changes since last INR:chart reviewed; none identified    Contacted within the last 12 weeks by phone on 1/3/23      PLAN     Lyudmila was NOT contacted regarding therapeutic result today per home monitoring policy manage by exception agreement.   Current warfarin dose is to be continued:     Summary  As of 3/9/2023    Full warfarin instructions:  3 mg every Mon, Wed, Fri; 6 mg all other days   Next INR check:  3/23/2023           ?   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

## 2023-03-16 ENCOUNTER — ANCILLARY PROCEDURE (OUTPATIENT)
Dept: CARDIOLOGY | Facility: CLINIC | Age: 68
End: 2023-03-16
Attending: INTERNAL MEDICINE
Payer: MEDICARE

## 2023-03-16 DIAGNOSIS — I47.10 SUPRAVENTRICULAR TACHYCARDIA (H): ICD-10-CM

## 2023-03-16 DIAGNOSIS — Z98.890 S/P AV NODAL ABLATION: ICD-10-CM

## 2023-03-16 PROCEDURE — 93296 REM INTERROG EVL PM/IDS: CPT

## 2023-03-16 PROCEDURE — 93294 REM INTERROG EVL PM/LDLS PM: CPT | Performed by: INTERNAL MEDICINE

## 2023-03-23 ENCOUNTER — ANTICOAGULATION THERAPY VISIT (OUTPATIENT)
Dept: ANTICOAGULATION | Facility: OTHER | Age: 68
End: 2023-03-23
Attending: FAMILY MEDICINE
Payer: MEDICARE

## 2023-03-23 DIAGNOSIS — Z95.5 HISTORY OF CORONARY ARTERY STENT PLACEMENT: ICD-10-CM

## 2023-03-23 DIAGNOSIS — I66.9 CEREBRAL THROMBOSIS: ICD-10-CM

## 2023-03-23 DIAGNOSIS — D68.2 FACTOR V DEFICIENCY (H): ICD-10-CM

## 2023-03-23 DIAGNOSIS — Z79.01 ANTICOAGULATION MONITORING, INR RANGE 2-3: Primary | ICD-10-CM

## 2023-03-23 LAB — INR HOME MONITORING: 1.8 (ref 2–3)

## 2023-03-23 NOTE — PROGRESS NOTES
ANTICOAGULATION MANAGEMENT     Lyudmila Fitzgerald 67 year old female is on warfarin with subtherapeutic INR result. (Goal INR 2.0-3.0)    Recent labs: (last 7 days)     03/23/23  0000   INR 1.8*       ASSESSMENT       Source(s): Chart Review    Previous INR was Therapeutic last 2(+) visits    Medication, diet, health changes since last INR chart reviewed; none identified             PLAN     Recommended plan for no diet, medication or health factor changes affecting INR     Dosing Instructions: booster dose then continue your current warfarin dose with next INR in 2 weeks       Summary  As of 3/23/2023    Full warfarin instructions:  3/23: 9 mg; Otherwise 3 mg every Mon, Wed, Fri; 6 mg all other days   Next INR check:  4/6/2023             Detailed voice message left for Leigh with dosing instructions and follow up date.     Patient to recheck with home meter    Education provided:     Contact [phone] with any changes, questions or concerns.     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

## 2023-03-27 LAB
MDC_IDC_EPISODE_DTM: NORMAL
MDC_IDC_EPISODE_DURATION: 10 S
MDC_IDC_EPISODE_DURATION: 5 S
MDC_IDC_EPISODE_DURATION: 5 S
MDC_IDC_EPISODE_ID: NORMAL
MDC_IDC_EPISODE_TYPE: NORMAL
MDC_IDC_LEAD_IMPLANT_DT: NORMAL
MDC_IDC_LEAD_IMPLANT_DT: NORMAL
MDC_IDC_LEAD_LOCATION: NORMAL
MDC_IDC_LEAD_LOCATION: NORMAL
MDC_IDC_LEAD_LOCATION_DETAIL_1: NORMAL
MDC_IDC_LEAD_LOCATION_DETAIL_1: NORMAL
MDC_IDC_LEAD_MFG: NORMAL
MDC_IDC_LEAD_MFG: NORMAL
MDC_IDC_LEAD_MODEL: NORMAL
MDC_IDC_LEAD_MODEL: NORMAL
MDC_IDC_LEAD_POLARITY_TYPE: NORMAL
MDC_IDC_LEAD_POLARITY_TYPE: NORMAL
MDC_IDC_LEAD_SERIAL: NORMAL
MDC_IDC_LEAD_SERIAL: NORMAL
MDC_IDC_LEAD_SPECIAL_FUNCTION: NORMAL
MDC_IDC_LEAD_SPECIAL_FUNCTION: NORMAL
MDC_IDC_MSMT_BATTERY_DTM: NORMAL
MDC_IDC_MSMT_BATTERY_REMAINING_LONGEVITY: 114 MO
MDC_IDC_MSMT_BATTERY_REMAINING_PERCENTAGE: 100 %
MDC_IDC_MSMT_BATTERY_STATUS: NORMAL
MDC_IDC_MSMT_LEADCHNL_RA_IMPEDANCE_VALUE: 690 OHM
MDC_IDC_MSMT_LEADCHNL_RV_IMPEDANCE_VALUE: 476 OHM
MDC_IDC_MSMT_LEADCHNL_RV_PACING_THRESHOLD_AMPLITUDE: 1 V
MDC_IDC_MSMT_LEADCHNL_RV_PACING_THRESHOLD_PULSEWIDTH: 0.4 MS
MDC_IDC_PG_IMPLANT_DTM: NORMAL
MDC_IDC_PG_MFG: NORMAL
MDC_IDC_PG_MODEL: NORMAL
MDC_IDC_PG_SERIAL: NORMAL
MDC_IDC_PG_TYPE: NORMAL
MDC_IDC_SESS_CLINIC_NAME: NORMAL
MDC_IDC_SESS_DTM: NORMAL
MDC_IDC_SESS_TYPE: NORMAL
MDC_IDC_SET_BRADY_AT_MODE_SWITCH_MODE: NORMAL
MDC_IDC_SET_BRADY_AT_MODE_SWITCH_RATE: 140 {BEATS}/MIN
MDC_IDC_SET_BRADY_LOWRATE: 60 {BEATS}/MIN
MDC_IDC_SET_BRADY_MAX_SENSOR_RATE: 130 {BEATS}/MIN
MDC_IDC_SET_BRADY_MAX_TRACKING_RATE: 100 {BEATS}/MIN
MDC_IDC_SET_BRADY_MODE: NORMAL
MDC_IDC_SET_BRADY_PAV_DELAY_HIGH: 80 MS
MDC_IDC_SET_BRADY_PAV_DELAY_LOW: 130 MS
MDC_IDC_SET_BRADY_SAV_DELAY_HIGH: 80 MS
MDC_IDC_SET_BRADY_SAV_DELAY_LOW: 130 MS
MDC_IDC_SET_LEADCHNL_RA_PACING_AMPLITUDE: 2 V
MDC_IDC_SET_LEADCHNL_RA_PACING_CAPTURE_MODE: NORMAL
MDC_IDC_SET_LEADCHNL_RA_PACING_POLARITY: NORMAL
MDC_IDC_SET_LEADCHNL_RA_PACING_PULSEWIDTH: 1 MS
MDC_IDC_SET_LEADCHNL_RA_SENSING_ADAPTATION_MODE: NORMAL
MDC_IDC_SET_LEADCHNL_RA_SENSING_POLARITY: NORMAL
MDC_IDC_SET_LEADCHNL_RA_SENSING_SENSITIVITY: 0.6 MV
MDC_IDC_SET_LEADCHNL_RV_PACING_AMPLITUDE: 1.5 V
MDC_IDC_SET_LEADCHNL_RV_PACING_CAPTURE_MODE: NORMAL
MDC_IDC_SET_LEADCHNL_RV_PACING_POLARITY: NORMAL
MDC_IDC_SET_LEADCHNL_RV_PACING_PULSEWIDTH: 0.4 MS
MDC_IDC_SET_LEADCHNL_RV_SENSING_ADAPTATION_MODE: NORMAL
MDC_IDC_SET_LEADCHNL_RV_SENSING_POLARITY: NORMAL
MDC_IDC_SET_LEADCHNL_RV_SENSING_SENSITIVITY: 1.5 MV
MDC_IDC_SET_ZONE_DETECTION_INTERVAL: 375 MS
MDC_IDC_SET_ZONE_TYPE: NORMAL
MDC_IDC_SET_ZONE_VENDOR_TYPE: NORMAL
MDC_IDC_STAT_AT_BURDEN_PERCENT: 1 %
MDC_IDC_STAT_AT_DTM_END: NORMAL
MDC_IDC_STAT_AT_DTM_START: NORMAL
MDC_IDC_STAT_BRADY_DTM_END: NORMAL
MDC_IDC_STAT_BRADY_DTM_START: NORMAL
MDC_IDC_STAT_BRADY_RA_PERCENT_PACED: 93 %
MDC_IDC_STAT_BRADY_RV_PERCENT_PACED: 100 %
MDC_IDC_STAT_EPISODE_RECENT_COUNT: 0
MDC_IDC_STAT_EPISODE_RECENT_COUNT: 4
MDC_IDC_STAT_EPISODE_RECENT_COUNT_DTM_END: NORMAL
MDC_IDC_STAT_EPISODE_RECENT_COUNT_DTM_START: NORMAL
MDC_IDC_STAT_EPISODE_TYPE: NORMAL
MDC_IDC_STAT_EPISODE_VENDOR_TYPE: NORMAL

## 2023-03-28 DIAGNOSIS — F51.04 CHRONIC INSOMNIA: ICD-10-CM

## 2023-03-28 RX ORDER — ZOLPIDEM TARTRATE 10 MG/1
TABLET ORAL
Qty: 90 TABLET | Refills: 1 | OUTPATIENT
Start: 2023-03-28

## 2023-03-28 NOTE — TELEPHONE ENCOUNTER
[id number] - [address]      Requested Prescriptions   Pending Prescriptions Disp Refills     zolpidem (AMBIEN) 10 MG tablet [Pharmacy Med Name: Zolpidem Tartrate 10 MG Oral Tablet] 90 tablet 0     Sig: TAKE 1 TABLET BY MOUTH AT BEDTIME AS NEEDED FOR SLEEP       There is no refill protocol information for this order          Last Prescription Date:   9/20/22  Last Fill Qty/Refills:         90, R-1    Last Office Visit:              10/25/22  Future Office visit:           6/22/23    Char Deal RN on 3/28/2023 at 3:54 PM

## 2023-03-30 ENCOUNTER — LAB (OUTPATIENT)
Dept: FAMILY MEDICINE | Facility: OTHER | Age: 68
End: 2023-03-30
Payer: MEDICARE

## 2023-03-30 DIAGNOSIS — Z12.11 SPECIAL SCREENING FOR MALIGNANT NEOPLASMS, COLON: ICD-10-CM

## 2023-04-03 ENCOUNTER — MYC MEDICAL ADVICE (OUTPATIENT)
Dept: FAMILY MEDICINE | Facility: OTHER | Age: 68
End: 2023-04-03
Payer: MEDICARE

## 2023-04-03 DIAGNOSIS — F51.04 CHRONIC INSOMNIA: ICD-10-CM

## 2023-04-04 RX ORDER — ZOLPIDEM TARTRATE 10 MG/1
10 TABLET ORAL
Qty: 90 TABLET | Refills: 1 | Status: CANCELLED | OUTPATIENT
Start: 2023-04-04

## 2023-04-05 NOTE — TELEPHONE ENCOUNTER
Call patient - schedule follow up for repeat treatment of her ear.    Ambien has been prescribed by Dr Islas.  I will forward request to him.   Erika Gupta MD

## 2023-04-06 NOTE — TELEPHONE ENCOUNTER
If PCP is going to take over prescription for Ambien, patient would need to be seen, have assessment done, contract signed, toxicology testing done.    Erika Gupta MD

## 2023-04-06 NOTE — TELEPHONE ENCOUNTER
Called patient to discuss Ambien prescription.  She has an appt with Dr. Gupta on 5/9 for an ear cleaning, and I told her I would have to check with [facility] to see if a 20 minute appt would be sufficient.  She has about a weeks worth left of her prescription.      I will check with [facility] about this appt.  And then forward to Dr. Islas for him to see next week when he returns to clinic to see if he would prescribe her enough to get her to that appt.       Katrin Disla RN on 4/6/2023 at 2:48 PM

## 2023-04-06 NOTE — TELEPHONE ENCOUNTER
Called patient to get her scheduled for repeat cryotherapy treatment of her ear.  Sent to [facility] to schedule this.      Patient also asking about her Zolpidem being refilled.  Cardiology nurse told her her PCP should order and PCP asking Dr. Islas to order as he was the original prescribing physician.   I told patient I would follow up on this.      Called cardoilogy RN and Dr. Islas is out for a week.  Will route back to Dr. Gupta to see if she is willing to fill in his absence.      Katrin Disla RN on 4/6/2023 at 9:27 AM

## 2023-04-08 LAB — INR HOME MONITORING: 1.5 (ref 2–3)

## 2023-04-08 RX ORDER — ZOLPIDEM TARTRATE 10 MG/1
10 TABLET ORAL
Qty: 30 TABLET | Refills: 0 | Status: SHIPPED | OUTPATIENT
Start: 2023-04-08 | End: 2023-07-18

## 2023-04-10 ENCOUNTER — ANTICOAGULATION THERAPY VISIT (OUTPATIENT)
Dept: ANTICOAGULATION | Facility: OTHER | Age: 68
End: 2023-04-10
Attending: FAMILY MEDICINE
Payer: MEDICARE

## 2023-04-10 DIAGNOSIS — D68.2 FACTOR V DEFICIENCY (H): ICD-10-CM

## 2023-04-10 DIAGNOSIS — Z79.01 ANTICOAGULATION MONITORING, INR RANGE 2-3: Primary | ICD-10-CM

## 2023-04-10 DIAGNOSIS — Z95.5 HISTORY OF CORONARY ARTERY STENT PLACEMENT: ICD-10-CM

## 2023-04-10 DIAGNOSIS — I66.9 CEREBRAL THROMBOSIS: ICD-10-CM

## 2023-04-10 NOTE — PROGRESS NOTES
ANTICOAGULATION MANAGEMENT     Lyudmila Fitzgerald 67 year old female is on warfarin with subtherapeutic INR result. (Goal INR 2.0-3.0)    Recent labs: (last 7 days)     04/08/23  0000   INR 1.5*       ASSESSMENT       Source(s): Chart Review    Previous INR was Subtherapeutic    Medication, diet, health changes since last INR chart reviewed; none identified             PLAN     Recommended plan for no diet, medication or health factor changes affecting INR     Dosing Instructions: Increase your warfarin dose (9.1% change) with next INR in 1 week       Summary  As of 4/10/2023    Full warfarin instructions:  3 mg every Wed, Fri; 6 mg all other days   Next INR check:  4/17/2023             Detailed voice message left for Leigh with dosing instructions and follow up date.     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

## 2023-04-14 LAB — NONINV COLON CA DNA+OCC BLD SCRN STL QL: NEGATIVE

## 2023-04-15 ENCOUNTER — MYC MEDICAL ADVICE (OUTPATIENT)
Dept: FAMILY MEDICINE | Facility: OTHER | Age: 68
End: 2023-04-15
Payer: MEDICARE

## 2023-04-15 ENCOUNTER — MYC MEDICAL ADVICE (OUTPATIENT)
Dept: CARDIOLOGY | Facility: OTHER | Age: 68
End: 2023-04-15
Payer: MEDICARE

## 2023-04-17 ENCOUNTER — HOSPITAL ENCOUNTER (EMERGENCY)
Facility: OTHER | Age: 68
Discharge: HOME | End: 2023-04-17
Attending: INTERNAL MEDICINE | Admitting: INTERNAL MEDICINE
Payer: MEDICARE

## 2023-04-17 ENCOUNTER — NURSE TRIAGE (OUTPATIENT)
Dept: FAMILY MEDICINE | Facility: OTHER | Age: 68
End: 2023-04-17
Payer: MEDICARE

## 2023-04-17 ENCOUNTER — APPOINTMENT (OUTPATIENT)
Dept: GENERAL RADIOLOGY | Facility: OTHER | Age: 68
End: 2023-04-17
Attending: INTERNAL MEDICINE
Payer: MEDICARE

## 2023-04-17 ENCOUNTER — TELEPHONE (OUTPATIENT)
Dept: CARDIOLOGY | Facility: OTHER | Age: 68
End: 2023-04-17

## 2023-04-17 VITALS
WEIGHT: 160 LBS | HEART RATE: 60 BPM | OXYGEN SATURATION: 99 % | DIASTOLIC BLOOD PRESSURE: 88 MMHG | SYSTOLIC BLOOD PRESSURE: 155 MMHG | RESPIRATION RATE: 16 BRPM | TEMPERATURE: 95.7 F | BODY MASS INDEX: 26.66 KG/M2 | HEIGHT: 65 IN

## 2023-04-17 DIAGNOSIS — R06.02 SHORTNESS OF BREATH: Primary | ICD-10-CM

## 2023-04-17 DIAGNOSIS — Z86.79 HISTORY OF HEART FAILURE: ICD-10-CM

## 2023-04-17 DIAGNOSIS — I50.33 ACUTE ON CHRONIC HEART FAILURE WITH PRESERVED EJECTION FRACTION (H): ICD-10-CM

## 2023-04-17 DIAGNOSIS — I50.33 ACUTE ON CHRONIC HEART FAILURE WITH PRESERVED EJECTION FRACTION (H): Primary | ICD-10-CM

## 2023-04-17 DIAGNOSIS — I50.9 CONGESTIVE HEART FAILURE, UNSPECIFIED HF CHRONICITY, UNSPECIFIED HEART FAILURE TYPE (H): ICD-10-CM

## 2023-04-17 DIAGNOSIS — R60.0 LOWER EXTREMITY EDEMA: ICD-10-CM

## 2023-04-17 PROBLEM — I50.31 ACUTE HEART FAILURE WITH PRESERVED EJECTION FRACTION (HFPEF) (H): Status: ACTIVE | Noted: 2023-04-17

## 2023-04-17 LAB
ALBUMIN SERPL BCG-MCNC: 4.3 G/DL (ref 3.5–5.2)
ALP SERPL-CCNC: 97 U/L (ref 35–104)
ALT SERPL W P-5'-P-CCNC: 29 U/L (ref 10–35)
ANION GAP SERPL CALCULATED.3IONS-SCNC: 15 MMOL/L (ref 7–15)
AST SERPL W P-5'-P-CCNC: 34 U/L (ref 10–35)
BILIRUB SERPL-MCNC: 0.4 MG/DL
BUN SERPL-MCNC: 12.4 MG/DL (ref 8–23)
CALCIUM SERPL-MCNC: 8.9 MG/DL (ref 8.8–10.2)
CHLORIDE SERPL-SCNC: 104 MMOL/L (ref 98–107)
CREAT SERPL-MCNC: 0.77 MG/DL (ref 0.51–0.95)
DEPRECATED HCO3 PLAS-SCNC: 19 MMOL/L (ref 22–29)
GFR SERPL CREATININE-BSD FRML MDRD: 84 ML/MIN/1.73M2
GLUCOSE SERPL-MCNC: 83 MG/DL (ref 70–99)
POTASSIUM SERPL-SCNC: 3.7 MMOL/L (ref 3.4–5.3)
PROT SERPL-MCNC: 7 G/DL (ref 6.4–8.3)
SODIUM SERPL-SCNC: 138 MMOL/L (ref 136–145)

## 2023-04-17 PROCEDURE — 93010 ELECTROCARDIOGRAM REPORT: CPT | Performed by: INTERNAL MEDICINE

## 2023-04-17 PROCEDURE — 99285 EMERGENCY DEPT VISIT HI MDM: CPT | Mod: 25 | Performed by: INTERNAL MEDICINE

## 2023-04-17 PROCEDURE — 99284 EMERGENCY DEPT VISIT MOD MDM: CPT | Performed by: INTERNAL MEDICINE

## 2023-04-17 PROCEDURE — 93005 ELECTROCARDIOGRAM TRACING: CPT | Performed by: INTERNAL MEDICINE

## 2023-04-17 PROCEDURE — 36415 COLL VENOUS BLD VENIPUNCTURE: CPT | Performed by: INTERNAL MEDICINE

## 2023-04-17 PROCEDURE — 71046 X-RAY EXAM CHEST 2 VIEWS: CPT

## 2023-04-17 PROCEDURE — 80053 COMPREHEN METABOLIC PANEL: CPT | Performed by: INTERNAL MEDICINE

## 2023-04-17 RX ORDER — SPIRONOLACTONE 25 MG/1
25 TABLET ORAL DAILY
Qty: 90 TABLET | Refills: 1 | Status: SHIPPED | OUTPATIENT
Start: 2023-04-17 | End: 2023-09-25

## 2023-04-17 RX ORDER — FUROSEMIDE 20 MG
20-40 TABLET ORAL DAILY
Qty: 180 TABLET | Refills: 4 | Status: SHIPPED | OUTPATIENT
Start: 2023-04-17 | End: 2023-09-18

## 2023-04-17 RX ORDER — FUROSEMIDE 20 MG
20 TABLET ORAL DAILY
COMMUNITY
Start: 2021-07-08 | End: 2023-04-17

## 2023-04-17 ASSESSMENT — ACTIVITIES OF DAILY LIVING (ADL): ADLS_ACUITY_SCORE: 35

## 2023-04-17 NOTE — ED TRIAGE NOTES
"Pt presents for swelling in the legs, shortness of breath. Pt diagnosed CHF several years ago, has been managing symptoms with Lasix at home, over the past month or so has been noting increasing issues with shortness of breath, swelling in the legs, and weight gain/fluctuation. Reports over the course of a day she can gain 5-6 lbs compared to the morning. Called PCP about adjusting Lasix and was advised EC eval. Denies any recent illnesses or injuries.      /74   Pulse 86   Temp (!) 95.7  F (35.4  C) (Tympanic)   Resp 16   Ht 1.651 m (5' 5\")   Wt 72.6 kg (160 lb)   SpO2 96%   BMI 26.63 kg/m         Triage Assessment     Row Name 04/17/23 1116       Triage Assessment (Adult)    Airway WDL WDL       Respiratory WDL    Respiratory WDL X    Rhythm/Pattern, Respiratory shortness of breath  not in acute distress.       Skin Circulation/Temperature WDL    Skin Circulation/Temperature WDL WDL       Cardiac WDL    Cardiac WDL WDL       Peripheral/Neurovascular WDL    Peripheral Neurovascular WDL X  BLE Edema       Cognitive/Neuro/Behavioral WDL    Cognitive/Neuro/Behavioral WDL WDL              "

## 2023-04-17 NOTE — TELEPHONE ENCOUNTER
Patient should be seen - recommend ED due to possible causes including heart failure, kidney failure, coronary disease.  Erika Gupta MD

## 2023-04-17 NOTE — TELEPHONE ENCOUNTER
Patient returned call- transferred to triage nurse Karen. Myaelin Powell RN .............. 4/17/2023  9:51 AM

## 2023-04-17 NOTE — ED PROVIDER NOTES
Emergency Department Provider Note  : 1955 Age: 67 year old Sex: female MRN: [id number]    Chief Complaint   Patient presents with     Leg Swelling     Shortness of Breath        Medical Decision Making / Assessment / Plan   67 year old female presenting with acute on chronic heart failure with preserved ejection fraction    ED Course as of 23 1544   Mon 2023   1446 Patient evaluated.  She is not having any acute cardiopulmonary events.  States that she has been having about 3 weeks of gradual fluid retention, edema and some shortness of breath.  States that her Lasix is not working as well as it had previously.  She is hoping to just get her Lasix dose adjusted.  Following with [facility] for her cardiovascular status and they have told her that there is nothing else that they can do for her blood vessels.  She has in-stent stenosis with restenting, now with stent and stent placement.  History of LAD myocardial bridge.  Reports that she gets myocardial coronary spasms quite regularly and takes diltiazem and nifedipine.  20 mg Lasix daily has not been enough to get rid of her fluid retention.  She would like to make some dose adjustments today and her PCP told her to get her renal function checked.    Patient denies need for any additional testing, lab work, IV.  She had a chest x-ray that was negative for acute cardiopulmonary process.  EKG showed AV dual paced rhythm.  -CMP ordered.  -Increase Lasix to 40 mg Daily when needed for edema, otherwise 20 mg daily.  + Start Spironolactone 25 mg once daily.   Check home blood pressures.   Patient otherwise discharged home in stable condition with medication changes as noted.  Close outpatient follow-up advised.   1544 EKG was obtained.  Findings showed AV dual paced rhythm with rate of 67 bpm.  Abnormal EKG.        A shared decision making model was used. Plan and all results were discussed  Time was taken to answer all questions. Patient and/or  associated parties understood and were agreeable to treatment plan.  Strict return to Emergency Department precautions as well as appropriate follow up instructions were provided. The patient was discharged in stable condition.    Discharge Medication List as of 4/17/2023  3:35 PM      START taking these medications    Details   spironolactone (ALDACTONE) 25 MG tablet Take 1 tablet (25 mg) by mouth daily, Disp-90 tablet, R-1, E-Prescribe             Final diagnoses:   Acute on chronic heart failure with preserved ejection fraction (H)       Mir Douglas MD  4/17/2023   Emergency Department    Yovani Coreas is a 67 year old female who presents at  2:08 PM with gradual worsening of shortness of breath and edema over the last 3 weeks or so.  Previously was taking 20 mg Lasix and she would urinate quite significantly within about an hour after taking her diuretic.  However for the last 3 weeks or so, when she would take her Lasix, she really did not have any increase in urination.  This has been gradually changing over the last 3 weeks or so.  Has been having some increased edema, weight gain, shortness of breath with this as well.  Denies chest pain.    She messaged her primary care clinic and they advised to come in for evaluation and suggested some lab work.    Patient has extensive coronary artery issues and history of numerous strokes.  She states that she has had 49 heart procedures in the past and 7 strokes previously.  She has diastolic dysfunction, possibly VSD.  She has LAD and circumflex artery stents.  Myocardial bridge of the LAD.  Chronic myocardial coronary spasms she also has stent in-stent stenosis.  Has history of bypass surgery.    She would like to try making some adjustment to her diuretics to help with fluid retention.  Otherwise is not having any acute problems that she is concerned about at this time.    I have reviewed the Medications, Allergies, Past Medical and Surgical History, and  "Social History in the Epic System and with family.    Review of Systems:  Please see Subjective / HPI for pertinent positives and negatives. All other systems reviewed and found to be negative.      Objective     Patient Vitals for the past 24 hrs:   BP Temp Temp src Pulse Resp SpO2 Height Weight   04/17/23 1433 (!) 155/88 -- -- 60 -- 99 % -- --   04/17/23 1413 (!) 168/97 -- -- 65 -- -- -- --   04/17/23 1117 139/74 (!) 95.7  F (35.4  C) Tympanic 86 16 96 % -- --   04/17/23 1113 -- -- -- -- -- -- 1.651 m (5' 5\") 72.6 kg (160 lb)       Physical Exam:     General: Awake, alert, in no acute respiratory distress.  Head: Normocephalic, atraumatic.  Eyes: Conjugate gaze.  ENT: Moist membranes, external ear appears normal.   Chest/Respiratory: Equal chest rise, clear bilaterally.  Cardiovascular: Peripheral pulses present, regular rate and rhythm.  2+ pitting pedal edema  Abdominal: Soft, non-distended, non-tender.  Extremities: No obvious deformity.  Neurological: GCS 15, moving all extremities without gross deficit.  Skin: Warm, no rashes, lesions, or bruising.   Psychiatric: Appropriate affect.     Procedures / Critical Care   Procedures    Aggregate Critical Care Time: None.     Orders Placed This Encounter   Procedures     XR Chest 2 Views     Comprehensive metabolic panel     EKG 12-lead, tracing only     EKG 12-lead, tracing only       RESULTS: As noted above.          Medical/Surgical History:  Past Medical History:   Diagnosis Date     Acute thromboembolism of deep veins of lower extremity (H) 2006    Overview:  Hx of multiple episodes of DVT: 3 lower extremity; 5 upper extremity (right arm)     Atrial fibrillation (H)     No Comments Provided     Cardiac pacemaker in situ 2003    Dual chamber     Cerebral thrombosis     8/31/2006,'95 w/ no residual     Coronary atherosclerosis     2/6/2006     Endometrial hyperplasia 2006    s/p endometrial ablation 6/06     Essential hypertension     No Comments Provided     " History of other genital system and obstetric disorders      8, Para 3-0-5-2     Other and unspecified angina pectoris     2006     Other and unspecified hyperlipidemia     No Comments Provided     Other postprocedural states      and ,Followed by Dr. Usman Duran, [facility],.     Paroxysmal supraventricular tachycardia (H)     2006,s/p multiple ablations w last resulting in PPM     Personal history of transient ischemic attack (TIA) and cerebral infarction without residual deficit 2009    with left hemiplegia     Primary hypercoagulable state (H)     No Comments Provided     Past Surgical History:   Procedure Laterality Date     ARTHROSCOPY KNEE      ,Arthroscopy for chondromalacia patella     AS CABG, ARTERY-VEIN, SINGLE  11/15/2016    Single vessel; done in [place]     ATTEMPTED ARTHROSCOPY      ,Right arthroscopy     BIOPSY BREAST      ,Breast cyst aspiration     COLONOSCOPY      2007,follow up recommended in 10 years.     CV CORONARY ANGIOGRAM N/A 2021    Procedure: CV CORONARY ANGIOGRAM;  Surgeon: Nathaniel Grier MD;  Location:  HEART CARDIAC CATH LAB     ENDOSCOPIC SINUS SURGERY      ,Sinus node ablation.     EP ABLATION ATRIAL FLUTTER N/A 2020    Procedure: EP VENOGRAM SVC;  Surgeon: Hakeem Moore MD;  Location:  HEART CARDI[facility] CATH LAB     EP PACEMAKER N/A 2020    Procedure: EP PACEMAKER;  Surgeon: Tima Johnson MD;  Location:  HEART CARDIAC CATH LAB     EP STUDY  1994    EP STUDY /ABLATION,AV re-entry tachycardia, tessie ablation     HEART CATH, ANGIOPLASTY      ,Stenting placed LAD after ergonovine provocation confirmed     HEART CATH, ANGIOPLASTY      ,90% lesion, normal left ventricular function     IMPLANT PACEMAKER      ,Guidant pacemaker placement, AV tessie ablation     LAPAROSCOPIC ABLATION ENDOMETRIOSIS           OTHER SURGICAL HISTORY      3/24/06,891718,([place]) [facility] STENT  "ANGIO     OTHER SURGICAL HISTORY      08/02,364511,EP STUDY /ABLATION     OTHER SURGICAL HISTORY      11/04,90600.0,CT CORONARY ANGIOGRAM ([place]),Coronary angiogram, failed ablation     OTHER SURGICAL HISTORY      12/04,515089,[facility] RELOCATION OF SKIN POCKET PACEMAKER,[facility] 5/24/05 reconfiguration of pacemaker \"pocket\"     OTHER SURGICAL HISTORY      328247,IP CONSULT TO ELECTROPHYSIOLOGY,study and lead change     OTHER SURGICAL HISTORY      12/06,373147,NEUROSTIMULATOR,Nerve stimulator implanted for chest pain control     OTHER SURGICAL HISTORY      12/2008,65829.0,CT CORONARY ANGIOGRAM ([place]),with increased left-sided weakness and vertigo, negative CT angiogram.     REPLACE PACEMAKER GENERATOR      12/29/04,Replacement of left ventricular pacemaker lead.     STENT  02/2017    LAD        Medications:  No current facility-administered medications for this encounter.     Current Outpatient Medications   Medication     furosemide (LASIX) 20 MG tablet     spironolactone (ALDACTONE) 25 MG tablet     arginine (L-ARGININE) 1000 MG tablet     aspirin 81 MG EC tablet     atorvastatin (LIPITOR) 80 MG tablet     calcium carbonate-vitamin D (OSCAL W/D) 500-200 MG-UNIT tablet     cloNIDine (CATAPRES) 0.1 MG tablet     diltiazem ER (TIAZAC) 360 MG 24 hr ER beaded capsule     isosorbide mononitrate (IMDUR) 120 MG 24 HR ER tablet     NIFEdipine ER (ADALAT CC) 60 MG 24 hr tablet     nitroGLYcerin (NITROSTAT) 0.4 MG sublingual tablet     ranolazine (RANEXA) 1000 MG TB12 12 hr tablet     sertraline (ZOLOFT) 50 MG tablet     warfarin ANTICOAGULANT (COUMADIN) 6 MG tablet     zolpidem (AMBIEN) 10 MG tablet       Allergies:  Morphine, Prednisone, and Sotalol    Relevant labs, images, EKGs, Epic and outside hospital (if applicable) charts were reviewed. The findings, diagnosis, plan, and need for follow up were discussed with the patient/family. Nursing notes were reviewed.      Mir Douglas MD  04/17/23 1537       Mir Douglas, " MD  04/17/23 1549

## 2023-04-17 NOTE — TELEPHONE ENCOUNTER
"S- Situation Hi Dr. Alvarado,  Hello Dr. Alvarado. I have noticed that with in the last two weeks, my water retension has gotten severe! My legs are at least 2x larger at night, and they are still swollen when I wake up in the morning! Should I try to take 2 or 3 furosimide pills?  I continue having a hard time breathing, especially when doing daily chores and walking.  Chest pain has been controlled by taking 8-15 nitros per day. Some added dizziness.  Otherwise, still ticking and living life!  Sincerely, Leigh Fitzgerald          A-(assessment): Swelling of both my leg. Feet to up to my chest. My fingers are swollen and tingling.  Been wearing rebecca socks. Chest pain/pressure about the same. Nitroglycerin is working. I am used to it now-rating 02-3/10 not bad. Increase in difficulty breathing. Just all getting worse.  I am perfectly fine- just the swelling has gotten worse and worse. When my legs get swollen I have a hard time breathing. Yesterday when I was laying down I heard myself wheezing\".      R-(recommendations): Writer recommended patient go to the ED \" I don't want to waste their time. I have been dealing with for years. Wondering if I should increase my furosemide? or a different water pill that works better?  But if she wants me to come in I will\"    Pt requests physician consideration and a callback today please  Karen Lorenzo RN on 4/17/2023 at 10:08 AM      "

## 2023-04-17 NOTE — TELEPHONE ENCOUNTER
"Per my chart: \"Hi Dr. ISLAS,   Hello Dr. Islas. I have noticed that with in the last two weeks, my water retension has gotten severe! My legs are at least 2x larger at night, and they are still swollen when I wake up in the morning! Should I try to take 2 or 3 furosimide pills?   I continue having a hard time breathing, especially when doing daily chores and walking.   Chest pain has been controlled by taking 8-15 nitros per day. Some added dizziness.   Otherwise, still ticking and living life!   Sincerely, Leigh Fitzgerald     Called and spoke to Patient after verifying last name and date of birth.  Pt stated that 'she just left the ED and they made medication changes for the swelling and wanted Moises Islas DO to look and make sure they are right\"    Per ED notes: \"Increase Lasix to 40 mg Daily when needed for edema, otherwise 20 mg daily.  + Start Spironolactone 25 mg once daily.\"    Writer informed unit 2 scheduling to schedule Pt with an appointment with Moises Islas DO    Writer will route to DO Char Bales RN on 4/17/2023 at 3:58 PM     "

## 2023-04-17 NOTE — DISCHARGE INSTRUCTIONS
Take 2 Lasix daily when needed for fluid retention and edema.    Start spironolactone 25 mg once daily.    Blood pressure checks at home - check some in AM, some in Afternoon, some in Evening and record   -- bring these with you to your next appointment.     Goal blood pressures -- less than 140 and less than 90.    -- Ideally would like the numbers about 110-130 and 70-80.  -- If running higher or lower than this on regular basis, will need to adjust your medications.      Consider clinic follow-up in 1 to 2 weeks.  Return as needed for new or worsening symptoms.

## 2023-04-17 NOTE — TELEPHONE ENCOUNTER
"Contacted patient and informed of Provider's response. Patient verbalized understanding and intent to comply. \" I will have  drive me in now\". Contacted ED and informed of patient's arrival. Karen Lorenzo RN on 4/17/2023 at 10:34 AM    "

## 2023-04-17 NOTE — TELEPHONE ENCOUNTER
Per My chart message-Hi Dr. Alvarado,  Hello Dr. Alvarado. I have noticed that with in the last two weeks, my water retension has gotten severe! My legs are at least 2x larger at night, and they are still swollen when I wake up in the morning! Should I try to take 2 or 3 furosimide pills?  I continue having a hard time breathing, especially when doing daily chores and walking.  Chest pain has been controlled by taking 8-15 nitros per day. Some added dizziness.  Otherwise, still ticking and living life!  Sincerely, Leigh Fitzgerald    Left message for patient requesting a return call to triage. Will route my chart message to PCP for review and advisement in the interim.     Please note patient also sent My chart messages to Dr. Islas. Dr. Islas is out of clinic.     Karen Lorenzo RN on 4/17/2023 at 9:36 AM

## 2023-04-18 ENCOUNTER — ANTICOAGULATION THERAPY VISIT (OUTPATIENT)
Dept: ANTICOAGULATION | Facility: OTHER | Age: 68
End: 2023-04-18
Attending: FAMILY MEDICINE
Payer: MEDICARE

## 2023-04-18 DIAGNOSIS — D68.2 FACTOR V DEFICIENCY (H): ICD-10-CM

## 2023-04-18 DIAGNOSIS — I66.9 CEREBRAL THROMBOSIS: ICD-10-CM

## 2023-04-18 DIAGNOSIS — Z79.01 ANTICOAGULATION MONITORING, INR RANGE 2-3: Primary | ICD-10-CM

## 2023-04-18 DIAGNOSIS — Z95.5 HISTORY OF CORONARY ARTERY STENT PLACEMENT: ICD-10-CM

## 2023-04-18 LAB — INR HOME MONITORING: 2.5 (ref 2–3)

## 2023-04-18 NOTE — PROGRESS NOTES
ANTICOAGULATION MANAGEMENT     Lyudmila Fitzgerald 67 year old female is on warfarin with therapeutic INR result. (Goal INR 2.0-3.0)    Recent labs: (last 7 days)     04/18/23  0000   INR 2.5       ASSESSMENT       Source(s): Chart Review and Patient/Caregiver Call       Warfarin doses taken: Warfarin taken as instructed    Diet: No new diet changes identified    New illness, injury, or hospitalization: Yes: ED on 4/17/23 for CHF    Medication/supplement changes: Aldactone started on 4/17/23 Concurrent use of WARFARIN and SPIRONOLACTONE may result in decreased anticoagulant effectiveness.     Increase lasix    Signs or symptoms of bleeding or clotting: No    Previous INR: Subtherapeutic    Additional findings: None       PLAN     Recommended plan for ongoing change(s) affecting INR     Dosing Instructions: Continue your current warfarin dose with next INR in 1 week       Summary  As of 4/18/2023    Full warfarin instructions:  3 mg every Wed, Fri; 6 mg all other days   Next INR check:  4/25/2023             Telephone call with Leigh who verbalizes understanding and agrees to plan    Patient to recheck with home meter    Education provided: Please call back if any changes to your diet, medications or how you've been taking warfarin    Plan made per [facility] anticoagulation protocol    Nasrin Barragan, RN  Anticoagulation Clinic  4/18/2023    _______________________________________________________________________     Anticoagulation Episode Summary     Current INR goal:  2.0-3.0   TTR:  69.7 % (1 y)   Target end date:  Indefinite   Send INR reminders to:  [facility]    Indications    Acute thromboembolism of deep veins of lower extremity (H) (Resolved) [I82.409]  Anticoagulation monitoring  INR range 2-3 [Z79.01]  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

## 2023-04-18 NOTE — TELEPHONE ENCOUNTER
"Per Moises Islas, :  \"Let Carolanny know that I think these changes are reasonable.  However, they may not be fully effective in reducing her swelling if she is having as much as she states she is.  I would have her try taking 40 mg of Lasix and the spironolactone 25 mg daily as directed in the ER.  She was to take 20 mg of Lasix and increase it to 40 mg with increase swelling.  Spironolactone will help improve her potassium as her potassium has been low/low normal in the past.  The spironolactone also has some diuretic properties Also, I have placed an order for an echocardiogram.  Her last echocardiogram was in 2021.  I would like to see if there are concerns for heart failure.  I can see her in follow-up after. \"    Called and spoke to Patient after verifying last name and date of birth.   Informed Pt of Moises Islas DO recommendations. Pt verbalized understanding and is in agreement to the plan and has no other concerns at this time.     Char Deal RN on 4/18/2023 at 2:38 PM    "

## 2023-04-19 LAB
ATRIAL RATE - MUSE: 67 BPM
DIASTOLIC BLOOD PRESSURE - MUSE: NORMAL MMHG
INTERPRETATION ECG - MUSE: NORMAL
P AXIS - MUSE: NORMAL DEGREES
PR INTERVAL - MUSE: 154 MS
QRS DURATION - MUSE: 122 MS
QT - MUSE: 444 MS
QTC - MUSE: 469 MS
R AXIS - MUSE: 94 DEGREES
SYSTOLIC BLOOD PRESSURE - MUSE: NORMAL MMHG
T AXIS - MUSE: -80 DEGREES
VENTRICULAR RATE- MUSE: 67 BPM

## 2023-04-27 ENCOUNTER — HOSPITAL ENCOUNTER (OUTPATIENT)
Dept: CARDIOLOGY | Facility: OTHER | Age: 68
Discharge: HOME | End: 2023-04-27
Attending: INTERNAL MEDICINE | Admitting: INTERNAL MEDICINE
Payer: MEDICARE

## 2023-04-27 DIAGNOSIS — R60.0 LOWER EXTREMITY EDEMA: ICD-10-CM

## 2023-04-27 DIAGNOSIS — I50.33 ACUTE ON CHRONIC HEART FAILURE WITH PRESERVED EJECTION FRACTION (H): ICD-10-CM

## 2023-04-27 DIAGNOSIS — Z86.79 HISTORY OF HEART FAILURE: ICD-10-CM

## 2023-04-27 DIAGNOSIS — I50.9 CONGESTIVE HEART FAILURE, UNSPECIFIED HF CHRONICITY, UNSPECIFIED HEART FAILURE TYPE (H): ICD-10-CM

## 2023-04-27 DIAGNOSIS — R06.02 SHORTNESS OF BREATH: ICD-10-CM

## 2023-04-27 LAB — LVEF ECHO: NORMAL

## 2023-04-27 PROCEDURE — 93306 TTE W/DOPPLER COMPLETE: CPT | Mod: 26 | Performed by: INTERNAL MEDICINE

## 2023-04-27 PROCEDURE — 93306 TTE W/DOPPLER COMPLETE: CPT

## 2023-05-02 ENCOUNTER — ANTICOAGULATION THERAPY VISIT (OUTPATIENT)
Dept: ANTICOAGULATION | Facility: OTHER | Age: 68
End: 2023-05-02
Attending: FAMILY MEDICINE
Payer: MEDICARE

## 2023-05-02 DIAGNOSIS — Z95.5 HISTORY OF CORONARY ARTERY STENT PLACEMENT: ICD-10-CM

## 2023-05-02 DIAGNOSIS — Z79.01 ANTICOAGULATION MONITORING, INR RANGE 2-3: Primary | ICD-10-CM

## 2023-05-02 DIAGNOSIS — D68.2 FACTOR V DEFICIENCY (H): ICD-10-CM

## 2023-05-02 DIAGNOSIS — I66.9 CEREBRAL THROMBOSIS: ICD-10-CM

## 2023-05-02 LAB — INR HOME MONITORING: 1.4 (ref 2–3)

## 2023-05-02 NOTE — PROGRESS NOTES
ANTICOAGULATION MANAGEMENT     Lyudmila Fitzgerald 67 year old female is on warfarin with subtherapeutic INR result. (Goal INR 2.0-3.0)    Recent labs: (last 7 days)     05/02/23  0000   INR 1.4*       ASSESSMENT       Source(s): Chart Review and Patient/Caregiver Call       Warfarin doses taken: Warfarin taken as instructed    Diet: No new diet changes identified    Medication/supplement changes: None noted    New illness, injury, or hospitalization: No    Signs or symptoms of bleeding or clotting: No    Previous result: Therapeutic last visit; previously outside of goal range    Additional findings: None         PLAN     Recommended plan for no diet, medication or health factor changes affecting INR     Dosing Instructions: Increase your warfarin dose (8.3% change) with next INR in 1 week       Summary  As of 5/2/2023    Full warfarin instructions:  3 mg every Fri; 6 mg all other days   Next INR check:  5/9/2023             Telephone call with Leigh who verbalizes understanding and agrees to plan    Patient to recheck with home meter    Education provided:     None required    Plan made per [facility] anticoagulation protocol    Nyla Walker RN  Anticoagulation Clinic  5/2/2023    _______________________________________________________________________     Anticoagulation Episode Summary     Current INR goal:  2.0-3.0   TTR:  71.0 % (1 y)   Target end date:  Indefinite   Send INR reminders to:  [facility]    Indications    Acute thromboembolism of deep veins of lower extremity (H) (Resolved) [I82.409]  Anticoagulation monitoring  INR range 2-3 [Z79.01]  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

## 2023-05-08 ENCOUNTER — ANTICOAGULATION THERAPY VISIT (OUTPATIENT)
Dept: ANTICOAGULATION | Facility: OTHER | Age: 68
End: 2023-05-08
Attending: FAMILY MEDICINE
Payer: MEDICARE

## 2023-05-08 DIAGNOSIS — Z79.01 ANTICOAGULATION MONITORING, INR RANGE 2-3: Primary | ICD-10-CM

## 2023-05-08 DIAGNOSIS — Z95.5 HISTORY OF CORONARY ARTERY STENT PLACEMENT: ICD-10-CM

## 2023-05-08 DIAGNOSIS — D68.2 FACTOR V DEFICIENCY (H): ICD-10-CM

## 2023-05-08 DIAGNOSIS — I66.9 CEREBRAL THROMBOSIS: ICD-10-CM

## 2023-05-08 LAB — INR HOME MONITORING: 1.8 (ref 2–3)

## 2023-05-08 NOTE — PROGRESS NOTES
ANTICOAGULATION MANAGEMENT     Lyudmila Fitzgerald 67 year old female is on warfarin with subtherapeutic INR result. (Goal INR 2.0-3.0)    Recent labs: (last 7 days)     05/08/23  0000   INR 1.8*       ASSESSMENT       Source(s): Chart Review    Previous INR was Subtherapeutic    Medication, diet, health changes since last INR chart reviewed; none identified             PLAN     Recommended plan for no diet, medication or health factor changes affecting INR     Dosing Instructions: Continue your current warfarin dose with next INR in 1 week       Summary  As of 5/8/2023    Full warfarin instructions:  3 mg every Fri; 6 mg all other days   Next INR check:  5/15/2023             Detailed voice message left for Leigh with dosing instructions and follow up date.     Patient to recheck with home meter    Education provided:     Contact [phone] with any changes, questions or concerns.     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

## 2023-05-09 ENCOUNTER — OFFICE VISIT (OUTPATIENT)
Dept: FAMILY MEDICINE | Facility: OTHER | Age: 68
End: 2023-05-09
Attending: FAMILY MEDICINE
Payer: MEDICARE

## 2023-05-09 VITALS
TEMPERATURE: 97.5 F | RESPIRATION RATE: 14 BRPM | BODY MASS INDEX: 26.83 KG/M2 | SYSTOLIC BLOOD PRESSURE: 134 MMHG | HEART RATE: 75 BPM | OXYGEN SATURATION: 97 % | WEIGHT: 161.2 LBS | DIASTOLIC BLOOD PRESSURE: 80 MMHG

## 2023-05-09 DIAGNOSIS — L57.0 ACTINIC KERATOSIS: Primary | ICD-10-CM

## 2023-05-09 DIAGNOSIS — E11.9 TYPE 2 DIABETES MELLITUS WITHOUT COMPLICATION, WITHOUT LONG-TERM CURRENT USE OF INSULIN (H): ICD-10-CM

## 2023-05-09 PROCEDURE — 99212 OFFICE O/P EST SF 10 MIN: CPT | Mod: 25 | Performed by: FAMILY MEDICINE

## 2023-05-09 PROCEDURE — G0463 HOSPITAL OUTPT CLINIC VISIT: HCPCS

## 2023-05-09 PROCEDURE — G0463 HOSPITAL OUTPT CLINIC VISIT: HCPCS | Mod: 25

## 2023-05-09 PROCEDURE — 17000 DESTRUCT PREMALG LESION: CPT | Performed by: FAMILY MEDICINE

## 2023-05-09 ASSESSMENT — PATIENT HEALTH QUESTIONNAIRE - PHQ9
10. IF YOU CHECKED OFF ANY PROBLEMS, HOW DIFFICULT HAVE THESE PROBLEMS MADE IT FOR YOU TO DO YOUR WORK, TAKE CARE OF THINGS AT HOME, OR GET ALONG WITH OTHER PEOPLE: NOT DIFFICULT AT ALL
SUM OF ALL RESPONSES TO PHQ QUESTIONS 1-9: 4
SUM OF ALL RESPONSES TO PHQ QUESTIONS 1-9: 4

## 2023-05-09 ASSESSMENT — PAIN SCALES - GENERAL: PAINLEVEL: MILD PAIN (2)

## 2023-05-09 NOTE — NURSING NOTE
"Chief Complaint   Patient presents with     Ear Problem     Spot on right ear      Patient is here for a spot on right ear. She states it itches during the day and has a burning feeling when laying on it at night. She has had it treated with liquid nitrogen in the past.     Initial /80   Pulse 75   Temp 97.5  F (36.4  C) (Tympanic)   Resp 14   Wt 73.1 kg (161 lb 3.2 oz)   SpO2 97%   Breastfeeding No   BMI 26.83 kg/m   Estimated body mass index is 26.83 kg/m  as calculated from the following:    Height as of 4/17/23: 1.651 m (5' 5\").    Weight as of this encounter: 73.1 kg (161 lb 3.2 oz).  Medication Reconciliation: complete    Renetta Pan CMA       FOOD SECURITY SCREENING QUESTIONS:    The next two questions are to help us understand your food security.  If you are feeling you need any assistance in this area, we have resources available to support you today.    Hunger Vital Signs:  Within the past 12 months we worried whether our food would run out before we got money to buy more. Never  Within the past 12 months the food we bought just didn't last and we didn't have money to get more. Never  Renetta Pan CMA,LPN on 5/9/2023 at 10:42 AM      "

## 2023-05-09 NOTE — PROGRESS NOTES
Assessment & Plan       ICD-10-CM    1. Actinic keratosis  L57.0 DESTRUCT PREMALIGNANT LESION, FIRST      2. Type 2 diabetes mellitus without complication, without long-term current use of insulin (H)  E11.9 Hemoglobin A1c        1.  Post-cryo care instructions discussed - follow up prn  2.  A1C when she has labs done with cardiology      Ordering of each unique test             No follow-ups on file.    MARYURI SANDOVAL MD  [facility] [place]   Leigh is a 67 year old, presenting for the following health issues:  Ear Problem (Spot on right ear )        5/9/2023    10:38 AM   Additional Questions   Roomed by TRISTA Jackson   Accompanied by Self     History of Present Illness       Reason for visit:  Burn off pre cancerous spot on ear    She eats 2-3 servings of fruits and vegetables daily.She consumes 0 sweetened beverage(s) daily.She exercises with enough effort to increase her heart rate 9 or less minutes per day.  She exercises with enough effort to increase her heart rate 3 or less days per week.   She is taking medications regularly.    Today's PHQ-9         PHQ-9 Total Score: 4    PHQ-9 Q9 Thoughts of better off dead/self-harm past 2 weeks :   Not at all    How difficult have these problems made it for you to do your work, take care of things at home, or get along with other people: Not difficult at all         Skin Lesion     Description  Location: right ear   Color: brown and pink  Border description: scaly  Character: round, flakey, burning, red  Itching: moderate  Bleeding:  No  Intensity:  moderate  Progression of Symptoms:  same  Accompanying signs and symptoms:   Bleeding: No  Scaling: YES  Excessive sun exposure/tanning: No  Sunscreen used: YES  History:           Any previous history of skin cancer: No  Any family history of melanoma: YES  Previous episodes of similar lesion: No    Therapies tried and outcome: none- has had liquid nitrogen used on it in the past          Review of Systems   Patient with dmw   Type 2 diabetes -   Lab Results   Component Value Date    A1C 5.6 12/30/2022    A1C 5.8 10/05/2022    A1C 5.6 07/21/2021    A1C 5.8 03/09/2020    A1C 6.1 09/27/2019       Objective    /80   Pulse 75   Temp 97.5  F (36.4  C) (Tympanic)   Resp 14   Wt 73.1 kg (161 lb 3.2 oz)   SpO2 97%   Breastfeeding No   BMI 26.83 kg/m    Body mass index is 26.83 kg/m .  Physical Exam  Vitals and nursing note reviewed.   HENT:      Ears:      Comments: Right ear with 4mm scaly, red macule - treated with liquid nitrogen cryotherapy

## 2023-05-15 DIAGNOSIS — I66.9 CEREBRAL THROMBOSIS: ICD-10-CM

## 2023-05-15 RX ORDER — WARFARIN SODIUM 6 MG/1
TABLET ORAL
Qty: 160 TABLET | Refills: 1 | Status: SHIPPED | OUTPATIENT
Start: 2023-05-15 | End: 2023-05-15

## 2023-05-15 RX ORDER — WARFARIN SODIUM 6 MG/1
TABLET ORAL
Qty: 160 TABLET | Refills: 1 | Status: SHIPPED | OUTPATIENT
Start: 2023-05-15 | End: 2023-11-24

## 2023-05-15 NOTE — TELEPHONE ENCOUNTER
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 Rx instructions and quantity supplied updated to match patient's current dosing plan per [facility] protocol     Winter Pedraza RN  Anticoagulation Clinic

## 2023-05-17 ENCOUNTER — ANTICOAGULATION THERAPY VISIT (OUTPATIENT)
Dept: ANTICOAGULATION | Facility: OTHER | Age: 68
End: 2023-05-17
Attending: FAMILY MEDICINE
Payer: MEDICARE

## 2023-05-17 DIAGNOSIS — I66.9 CEREBRAL THROMBOSIS: ICD-10-CM

## 2023-05-17 DIAGNOSIS — D68.2 FACTOR V DEFICIENCY (H): ICD-10-CM

## 2023-05-17 DIAGNOSIS — Z79.01 ANTICOAGULATION MONITORING, INR RANGE 2-3: Primary | ICD-10-CM

## 2023-05-17 DIAGNOSIS — Z95.5 HISTORY OF CORONARY ARTERY STENT PLACEMENT: ICD-10-CM

## 2023-05-17 LAB — INR HOME MONITORING: 2.2 (ref 2–3)

## 2023-05-17 NOTE — PROGRESS NOTES
ANTICOAGULATION MANAGEMENT     Lyudmila Fitzgerald 67 year old female is on warfarin with therapeutic INR result. (Goal INR 2.0-3.0)    Recent labs: (last 7 days)     05/17/23  0000   INR 2.2       ASSESSMENT       Source(s): Chart Review and Patient/Caregiver Call       Warfarin doses taken: Warfarin taken as instructed    Diet: No new diet changes identified    Medication/supplement changes: None noted    New illness, injury, or hospitalization: No    Signs or symptoms of bleeding or clotting: No    Previous result: Subtherapeutic    Additional findings: None         PLAN     Recommended plan for no diet, medication or health factor changes affecting INR     Dosing Instructions: Continue your current warfarin dose with next INR in 2 weeks       Summary  As of 5/17/2023    Full warfarin instructions:  3 mg every Fri; 6 mg all other days   Next INR check:  5/31/2023             Telephone call with Leigh who verbalizes understanding and agrees to plan    Patient to recheck with home meter    Education provided:     Resume manage by exception with home monitor. Continue to submit INR results to home monitor company.You will only be called when your result is out of range. Please call and notify [facility] if new medication started, dose missed, signs or symptoms of bleeding or clotting, or a surgery/procedure is scheduled.    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

## 2023-05-31 ENCOUNTER — ANTICOAGULATION THERAPY VISIT (OUTPATIENT)
Dept: ANTICOAGULATION | Facility: OTHER | Age: 68
End: 2023-05-31
Attending: FAMILY MEDICINE
Payer: MEDICARE

## 2023-05-31 DIAGNOSIS — Z79.01 ANTICOAGULATION MONITORING, INR RANGE 2-3: Primary | ICD-10-CM

## 2023-05-31 DIAGNOSIS — Z95.5 HISTORY OF CORONARY ARTERY STENT PLACEMENT: ICD-10-CM

## 2023-05-31 DIAGNOSIS — D68.2 FACTOR V DEFICIENCY (H): ICD-10-CM

## 2023-05-31 DIAGNOSIS — I66.9 CEREBRAL THROMBOSIS: ICD-10-CM

## 2023-05-31 LAB — INR HOME MONITORING: 1.7 (ref 2–3)

## 2023-05-31 NOTE — PROGRESS NOTES
ANTICOAGULATION MANAGEMENT     Lyudmila Fitzgerald 67 year old female is on warfarin with subtherapeutic INR result. (Goal INR 2.0-3.0)    Recent labs: (last 7 days)     05/31/23  0000   INR 1.7*       ASSESSMENT       Source(s): Chart Review and Patient/Caregiver Call       Warfarin doses taken: Warfarin taken as instructed    Diet: No new diet changes identified    Medication/supplement changes: None noted    New illness, injury, or hospitalization: No    Signs or symptoms of bleeding or clotting: No    Previous result: Therapeutic last visit; previously outside of goal range    Additional findings: None         PLAN     Recommended plan for no diet, medication or health factor changes affecting INR     Dosing Instructions: Continue your current warfarin dose with next INR in 2 weeks       Summary  As of 5/31/2023    Full warfarin instructions:  3 mg every Fri; 6 mg all other days   Next INR check:  6/14/2023             Telephone call with Leigh who verbalizes understanding and agrees to plan    Patient to recheck with home meter    Education provided:     None required    Plan made per [facility] anticoagulation protocol    Nyla Walker RN  Anticoagulation Clinic  5/31/2023    _______________________________________________________________________     Anticoagulation Episode Summary     Current INR goal:  2.0-3.0   TTR:  66.1 % (1 y)   Target end date:  Indefinite   Send INR reminders to:  [facility]    Indications    Acute thromboembolism of deep veins of lower extremity (H) (Resolved) [I82.409]  Anticoagulation monitoring  INR range 2-3 [Z79.01]  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

## 2023-06-02 DIAGNOSIS — I20.1 CORONARY ARTERY SPASM (H): ICD-10-CM

## 2023-06-02 DIAGNOSIS — Z95.1 HISTORY OF CORONARY ARTERY BYPASS GRAFT X 1: ICD-10-CM

## 2023-06-02 DIAGNOSIS — Z86.79 HISTORY OF CORONARY VASOSPASM: ICD-10-CM

## 2023-06-02 DIAGNOSIS — Z95.5 HISTORY OF CORONARY ARTERY STENT PLACEMENT: ICD-10-CM

## 2023-06-02 DIAGNOSIS — I10 ESSENTIAL HYPERTENSION: ICD-10-CM

## 2023-06-02 DIAGNOSIS — Q24.5 MYOCARDIAL BRIDGE: ICD-10-CM

## 2023-06-05 RX ORDER — ISOSORBIDE MONONITRATE 30 MG/1
TABLET, EXTENDED RELEASE ORAL
Qty: 90 TABLET | Refills: 0 | OUTPATIENT
Start: 2023-06-05

## 2023-06-05 NOTE — PROGRESS NOTES
[facility] HEART CARE   CARDIOLOGY PROGRESS NOTE     Chief Complaint   Patient presents with     Follow Up     chest pain          Diagnosis:  1.  Coronary artery spasm involving LAD.  2.  Myocardial bridging involving LAD.  3.  H/O SVT.  4.  CABG x1 with LIMA to LAD on 11/15/2016 in [place].  5.  H/O chronic heart failure with an EF of 15% now recovered at 60 to 65% on 5/16/2019 at [facility].  6.  H/O coronary spasm with stent placement to LAD x3.  7.  H/O radiofrequency ablation for SVT x8.  8.  H/O stroke x7 with left hemiparesis.  9.  Left foot drop.  10.  S/P AV tessie lesion secondary to SVT.  11.  Pacemaker dependent.  12.  Hyperlipidemia.  13.  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  14.  H/O DVT to bilateral lower extremities.  15.  Cardiac pacemaker.  16.  Hypertension.  17.  DM-2.  18.  Lown Ganong Brandt syndrome.    Assessment/Plan:    1.  We will increase diltiazem from 240 mg to 360 mg daily secondary to coronary spasm.  Diltiazem has most effective medication in treating coronary spasm.  2.  We will start Ranexa 500 mg twice a day secondary to chest pain the results of spasm.  Goal dose would be 1000 mg twice a day.    3.  Patient is requesting Ambien as she has had a difficult sleeping with chest pain.  She was given 5 mg to be taken as needed for sleep.  4.  Patient states she did better when she was on Zoloft previously.  She states she has been anxious.  Her  agrees.  She will be started on Zoloft 25 mg daily to be increased in the future as needed.  5.  In future could try nicardipine, an increase of Norvasc from 5 mg to 10 mg daily, Imdur increased from 30 to 60 mg, and possibly clonidine.  She states she was previously on diltiazem 120 edition of nicardipine 60 mg with relatively good results.  6.  Follow-up in the future in 3 months or sooner with issues.      Interval history:  Leigh has had increasing discomfort to her chest.  Her discomfort has been waking her up at night.  Consent was obtained explaining the risks and benefits of the procedure. The patient was placed supine with a roll under shoulders to hyperextend the neck.  The FiO2 was increased to 1.0 and endotracheal tube wa adjusted under fiberoptic guidance to the highest position safely possible.  The bronchoscope was introduced into the distal end of the endotracheal tube to monitor the procedure.  Tracheal landmarks were identified and marked.  The procedure site was prepped and draped and the patient sedated on a propofol drip.  The overlying skin was anesthetized with 1% lidocaine with epinephrine and a thin gauge needle gradually inserted below the second tracheal ring in the midline until air was aspirated and it was visualized endoscopically.  A wire was then threaded into the trachea cephalad.  A small horizontal incision was made adjacent to the wire through the skin only.  The dilator was then introduced into the trachea.  Subsequently, a tracheostomy tube was inserted over the obturator and the position confirmed and the wire and obturator were removed and the cuff inflated.  The ventilator circuit was then switched from the endotracheal tube to the tracheostomy and adequate ventilation assured.  The endotracheal tube cuff was then deflated and the endotracheal tube removed.  The entire procedure was monitored with ventilator checks, vital signs, ECG, pulse oximetry and endoscopy.  No complications were witnessed. Minimal blood loss noted. Follow up chest x-ray ordered.  She has taken multiple nitros without relief.  She ultimately presented to the ER on 8/19/2020.  Troponin was increased but still within the normal range.  She was sent to [place].  She underwent cardiac catheterization in [place] with stable disease.  She does have a history of bypass x1 with stenting related to spasm and small vessels.  This is a tricky issue to treat as medications are not always successful.  She has had every procedure but continues with symptoms.  We will try adjusting her medications as best we can.  Previously, she was put on Ranexa but never filled this secondary to cost.  She has changed insurance and I believe that her deductible.  We will try this medication.    HPI:    Originally, she was diagnosed with a LAD ostial coronary artery spasm at [facility].  She had a positive methergine spasm study in 2005 at [facility] in the mid LAD, treated with drug-eluting stent to the mid-LAD.  She has a history of LAD myocardial bridging, CABG with LIMA to LAD on 11/15/2016 in [place], [place], history of heart failure with reported EF of 15% now recovered, stenting to the LAD x3, history of radiofrequency ablation for SVT x8, ultimately resulting in AV tessie ablation with pacemaker placement with pacemaker dependence, H/O stroke x7 with left hemiparesis essentially resolved with the exception of intermittent left foot drop, hyperlipidemia, on Coumadin with a history of factor V Leiden deficiency and numerous DVTs involving both the upper and lower extremities, hypertension, DM-2, on anticoagulated on Coumadin.     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  She has had care through [facility] as well as in [place], [place].  She was evaluated at [place] and underwent angiography revealing separate origins of the LAD and circumflex.  She has a history of catheter induced coronary artery spasm originally diagnosed at [place].     Dr. Bill Abreu through [facility] evaluated her in 2005. In spite of pre-treatment with nitroglycerine and calcium channel blockers, she had fairly vigorous mid-LAD spasm with intracoronary methergine. The ostial LAD did not spasm. Dr. Abreu treated this area with a 2.5 x 28 mm Cypher drug-eluting stent. The following year she had instent restenosis treated with a second 8 mm x 2.5 mm Cypher drug-eluting stent.      She did fairly well until 2016, when she had evaluation at [facility] in [place]. She was treated with trans-myocardial revascularization with 25 channels in the inferior, lateral and anterior LV walls, and underwent an LIMA-LAD. Her EF was 35% pre-op, and approximately 50-55% post-operatively.      She had recurrent angina, and on 2/8/17 she underwent repeat angiography which showed mild instent restenosis of the mid-LAD stents, followed by 99% stenosis of the mid-LAD at the LIMA-LAD anastomosis, presumably due to surgical clips/manipulation. The TERRENCE was atretic. Thus, the interventionalist placed a 2.25 x 8 mm Promus Premiere drug-eluting stent into the mid-LAD with a good result.      Lately, she has been having an increasingly more frequent and severe anginal chest discomfort, and feels like she is back to her severe coronary \"spasm\" again.      He has ongoing concerns for angina felt to be vasospastic in nature.  She has had a total of x4 cardiac catheterizations on 6/17/2008, 11/21/2012, 1/17/2019, and I believe one in [place] do not have this date.       She has done pretty well since. Her EF was greater than 60% on a couple occasions since.  Most recently, on 5/16/2019 her EF was 60% to 65%.  There was no significant valvular disease detected.  Her echo was essentially unchanged from 4/18/2017.  Diastolic dysfunction was normal.      Relevant testing:        Past Medical History:   Diagnosis Date     Acute thromboembolism of deep veins of lower extremity (H) 2006    Overview:  Hx of multiple " episodes of DVT: 3 lower extremity; 5 upper extremity (right arm)     Atrial fibrillation (H)     No Comments Provided     Cardiac pacemaker in situ     Dual chamber     Cerebral thrombosis     2006,'95 w/ no residual     Coronary atherosclerosis     2006     Endometrial hyperplasia     s/p endometrial ablation      Essential hypertension     No Comments Provided     History of other genital system and obstetric disorders      8, Para 3-0-5-2     Other and unspecified angina pectoris     2006     Other and unspecified hyperlipidemia     No Comments Provided     Other postprocedural states      and ,Followed by Dr. Usman Duran, [facility],.     Paroxysmal supraventricular tachycardia (H)     2006,s/p multiple ablations w last resulting in PPM     Personal history of transient ischemic attack (TIA) and cerebral infarction without residual deficit 2009    with left hemiplegia     Primary hypercoagulable state (H)     No Comments Provided       Past Surgical History:   Procedure Laterality Date     ARTHROSCOPY KNEE      ,Arthroscopy for chondromalacia patella     AS CABG, ARTERY-VEIN, SINGLE  11/15/2016    Single vessel; done in [place]     ATTEMPTED ARTHROSCOPY      ,Right arthroscopy     BIOPSY BREAST      ,Breast cyst aspiration     COLONOSCOPY      2007,follow up recommended in 10 years.     ENDOSCOPIC SINUS SURGERY      ,Sinus node ablation.     EP ABLATION ATRIAL FLUTTER N/A 2020    Procedure: EP VENOGRAM SVC;  Surgeon: Hakeem Moore MD;  Location:  HEART CARDIAC CATH LAB     EP PACEMAKER N/A 2020    Procedure: EP PACEMAKER;  Surgeon: Tima Johnson MD;  Location:  HEART CARDIAC CATH LAB     EP STUDY  1994    EP STUDY /ABLATION,AV re-entry tachycardia, tessie ablation     HEART CATH, ANGIOPLASTY      ,Stenting placed LAD after ergonovine provocation confirmed     HEART CATH, ANGIOPLASTY       "03/06,90% lesion, normal left ventricular function     IMPLANT PACEMAKER      03/03,Guidant pacemaker placement, AV tessie ablation     LAPAROSCOPIC ABLATION ENDOMETRIOSIS      06/06     OTHER SURGICAL HISTORY      3/24/06,364568,([place]) [facility] STENT ANGIO     OTHER SURGICAL HISTORY      08/02,735825,EP STUDY /ABLATION     OTHER SURGICAL HISTORY      11/04,46387.0,CT CORONARY ANGIOGRAM ([place]),Coronary angiogram, failed ablation     OTHER SURGICAL HISTORY      12/04,178489,[facility] RELOCATION OF SKIN POCKET PACEMAKER,[facility] 5/24/05 reconfiguration of pacemaker \"pocket\"     OTHER SURGICAL HISTORY      247128,IP CONSULT TO ELECTROPHYSIOLOGY,study and lead change     OTHER SURGICAL HISTORY      12/06,268861,NEUROSTIMULATOR,Nerve stimulator implanted for chest pain control     OTHER SURGICAL HISTORY      12/2008,66988.0,CT CORONARY ANGIOGRAM ([place]),with increased left-sided weakness and vertigo, negative CT angiogram.     REPLACE PACEMAKER GENERATOR      12/29/04,Replacement of left ventricular pacemaker lead.     STENT  02/2017    LAD        Allergies   Allergen Reactions     Morphine Nausea and Vomiting     OK in small doses     Prednisone Nausea and Vomiting     Per IV     Sotalol Nausea and Vomiting       Current Outpatient Medications   Medication Sig Dispense Refill     amLODIPine (NORVASC) 5 MG tablet Take 1 tablet (5 mg) by mouth daily 90 tablet 3     aspirin EC 81 MG EC tablet Take 81 mg by mouth       atorvastatin (LIPITOR) 80 MG tablet Take 1 tablet (80 mg) by mouth daily 90 tablet 3     calcium carb 1250 mg, 500 mg Inupiat,/vitamin D 200 unit (CALCIUM 500/D) 500-200 MG-UNIT per tablet Take 1 tablet by mouth       diltiazem ER (DILT-XR) 240 MG 24 hr ER beaded capsule Take 1 capsule (240 mg) by mouth daily 90 capsule 3     diltiazem ER (TIAZAC) 360 MG 24 hr ER beaded capsule Take 1 capsule (360 mg) by mouth daily 90 capsule 3     isosorbide mononitrate (IMDUR) 30 MG 24 hr tablet Take 1 tablet (30 mg) by mouth daily 90 " tablet 3     lisinopril (ZESTRIL) 5 MG tablet Take 1 tablet (5 mg) by mouth daily 90 tablet 3     nitroGLYcerin (NITROSTAT) 0.4 MG sublingual tablet For chest pain place 1 tablet under the tongue every 5 minutes for 3 doses. If symptoms persist 5 minutes after 1st dose call 911. 25 tablet 3     ranolazine (RANEXA) 500 MG 12 hr tablet Take 1 tablet (500 mg) by mouth 2 times daily 60 tablet 3     sertraline (ZOLOFT) 25 MG tablet Take 1 tablet (25 mg) by mouth daily 90 tablet 3     warfarin ANTICOAGULANT (COUMADIN) 6 MG tablet Take 6 mg daily.  Or as directed by the [facility] clinic. 90 tablet 1     zolpidem (AMBIEN) 5 MG tablet Take 1 tablet (5 mg) by mouth nightly as needed for sleep 30 tablet 3       Social History     Socioeconomic History     Marital status:      Spouse name: Antonio     Number of children: 2     Years of education: 16+     Highest education level: Master's degree (e.g., MA, MS, [employer], MEd, MSW, [employer])   Occupational History     Not on file   Social Needs     Financial resource strain: Not hard at all     Food insecurity     Worry: Never true     Inability: Never true     Transportation needs     Medical: No     Non-medical: No   Tobacco Use     Smoking status: Never Smoker     Smokeless tobacco: Never Used     Tobacco comment: Quit smoking: spouse does not smoke in the house. Exposed in cars.   Substance and Sexual Activity     Alcohol use: Yes     Comment: Alcoholic Drinks/day: Alcoholic Drinks/day: 1-2 drinks twice a week     Drug use: Never     Sexual activity: Not Currently     Partners: Male   Lifestyle     Physical activity     Days per week: 0 days     Minutes per session: 0 min     Stress: To some extent   Relationships     Social connections     Talks on phone: Not on file     Gets together: Not on file     Attends Sikhism service: Not on file     Active member of club or organization: Not on file     Attends meetings of clubs or organizations: Not on file     Relationship status:  Not on file     Intimate partner violence     Fear of current or ex partner: Not on file     Emotionally abused: Not on file     Physically abused: Not on file     Forced sexual activity: Not on file   Other Topics Concern     Not on file   Social History Narrative     2nd Marriage x 20 years to  Anthony they live in [place], [place]       in [place]. Completed her Master's Degree 2003. Taught Special Education.  Early alf due to health 2007.      Total 5 children blended family-3 dgts. And 2 sons    Daughter:  Philip    Son:  Anthony - lives in [place] [place] [facility]    6 Grand-children 5 boys and 1 girl    Leigh parents' and siblings live in [place] [place].       LAB RESULTS:   Anticoagulation Therapy Visit on 08/24/2020   Component Date Value Ref Range Status     INR 08/24/2020 1.3* 0.90 - 1.10 Corrected   Anticoagulation Therapy Visit on 08/19/2020   Component Date Value Ref Range Status     INR 08/19/2020 2.3* 0.90 - 1.10 Final   Anticoagulation Therapy Visit on 08/05/2020   Component Date Value Ref Range Status     INR 08/05/2020 2.1* 0.90 - 1.10 Final   Allied Health/Nurse Visit on 08/03/2020   Component Date Value Ref Range Status     COVID-19 Virus PCR to [facility] - So* 08/03/2020 Nasopharyngeal   Final     COVID-19 Virus PCR to [facility] - Re* 08/03/2020 Not Detected   Final   Anticoagulation Therapy Visit on 07/30/2020   Component Date Value Ref Range Status     INR 07/30/2020 3.8* 0.90 - 1.10 Final   Anticoagulation Therapy Visit on 07/27/2020   Component Date Value Ref Range Status     INR 07/25/2020 1.1  0.90 - 1.10 Final     INR 07/27/2020 1.2* 0.90 - 1.10 Final   Anticoagulation Therapy Visit on 07/23/2020   Component Date Value Ref Range Status     INR Protime 07/23/2020 1.0  0.86 - 1.14 Final   Anticoagulation Therapy Visit on 07/21/2020   Component Date Value Ref Range Status     INR 07/21/2020 1.4* 0.90 - 1.10 Final   Anticoagulation Therapy Visit on 07/09/2020   Component Date  "Value Ref Range Status     INR 07/08/2020 2.1* 0.90 - 1.10 Final        Review of systems: Negative except that which was noted in the HPI.    Physical examination:  /80 (BP Location: Right arm, Patient Position: Sitting, Cuff Size: Adult Regular)   Pulse 76   Temp 97.3  F (36.3  C) (Tympanic)   Resp 18   Ht 1.65 m (5' 4.96\")   Wt 67.1 kg (148 lb)   SpO2 97%   BMI 24.66 kg/m      GENERAL APPEARANCE: healthy, alert and no distress  HEENT: no icterus, no xanthelasmas, normal pupil size and reaction, no cyanosis.  NECK: no adenopathy, no asymmetry, masses, or scars.  CHEST: lungs clear to auscultation - no rales, rhonchi or wheezes, no use of accessory muscles, no retractions, respirations are unlabored, normal respiratory rate  CARDIOVASCULAR: regular rhythm, normal S1 with physiologic split S2, no S3 or S4 and no murmur, click or rub  ABDOMEN: soft, non tender, bowel sounds normal  EXTREMITIES: no clubbing, cyanosis or edema  NEURO: alert and oriented normal speech, and affect  VASC: No vascular bruits heard.  SKIN: no ecchymoses, no rashes        Thank you for allowing me to participate in the care of your patient. Please do not hesitate to contact me if you have any questions.     Moises Islas, DO          "

## 2023-06-05 NOTE — TELEPHONE ENCOUNTER
Refill request refused, with note to pharmacy. Should have refills on file.     Requested Prescriptions   Pending Prescriptions Disp Refills     isosorbide mononitrate (IMDUR) 30 MG 24 hr tablet [Pharmacy Med Name: Isosorbide Mononitrate ER 30 MG Oral Tablet Extended Release 24 Hour] 90 tablet 0     Sig: Take 3 tablets by mouth once daily     Last Prescription Date:   10/25/22  Last Fill Qty/Refills:         90, R-3    Amy Smith RN on 6/5/2023 at 8:25 AM

## 2023-06-14 ENCOUNTER — ANTICOAGULATION THERAPY VISIT (OUTPATIENT)
Dept: ANTICOAGULATION | Facility: OTHER | Age: 68
End: 2023-06-14
Attending: FAMILY MEDICINE
Payer: MEDICARE

## 2023-06-14 DIAGNOSIS — Z95.5 HISTORY OF CORONARY ARTERY STENT PLACEMENT: ICD-10-CM

## 2023-06-14 DIAGNOSIS — D68.2 FACTOR V DEFICIENCY (H): ICD-10-CM

## 2023-06-14 DIAGNOSIS — I66.9 CEREBRAL THROMBOSIS: ICD-10-CM

## 2023-06-14 DIAGNOSIS — Z79.01 ANTICOAGULATION MONITORING, INR RANGE 2-3: Primary | ICD-10-CM

## 2023-06-15 LAB — INR HOME MONITORING: 2.2 (ref 2–3)

## 2023-06-15 NOTE — PROGRESS NOTES
ANTICOAGULATION MANAGEMENT     Lyudmila Fitzgerald 67 year old female is on warfarin with therapeutic INR result. (Goal INR 2.0-3.0)    Recent labs: (last 7 days)     06/15/23  0000   INR 2.2       ASSESSMENT       Source(s): Chart Review    Previous INR was Subtherapeutic    Medication, diet, health changes since last INR chart reviewed; none identified             PLAN     Recommended plan for no diet, medication or health factor changes affecting INR     Dosing Instructions: Continue your current warfarin dose with next INR in 2 weeks       Summary  As of 6/14/2023    Full warfarin instructions:  3 mg every Fri; 6 mg all other days   Next INR check:  6/29/2023             Detailed voice message left for Leigh with dosing instructions and follow up date.     Patient to recheck with home meter    Education provided:     Contact [phone] with any changes, questions or concerns.     Plan made per [facility] anticoagulation protocol    Nyla Walker RN  Anticoagulation Clinic  6/15/2023    _______________________________________________________________________     Anticoagulation Episode Summary     Current INR goal:  2.0-3.0   TTR:  63.7 % (1 y)   Target end date:  Indefinite   Send INR reminders to:  [facility]    Indications    Acute thromboembolism of deep veins of lower extremity (H) (Resolved) [I82.409]  Anticoagulation monitoring  INR range 2-3 [Z79.01]  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]  Cerebral thrombosis [I66.9]  History of coronary artery stent placement to the LAD x3 [Z95.5]           Comments:  home monitor [employer] check INR q 2 weeks         Anticoagulation Care Providers     Provider Role Specialty Phone number    Erika Carrasco MD Referring Family Medicine [phone]

## 2023-06-21 ENCOUNTER — ANCILLARY PROCEDURE (OUTPATIENT)
Dept: CARDIOLOGY | Facility: CLINIC | Age: 68
End: 2023-06-21
Attending: INTERNAL MEDICINE
Payer: MEDICARE

## 2023-06-21 DIAGNOSIS — I47.10 SUPRAVENTRICULAR TACHYCARDIA (H): ICD-10-CM

## 2023-06-21 DIAGNOSIS — Z98.890 S/P AV NODAL ABLATION: ICD-10-CM

## 2023-06-21 PROCEDURE — 93294 REM INTERROG EVL PM/LDLS PM: CPT | Performed by: INTERNAL MEDICINE

## 2023-06-21 PROCEDURE — 93296 REM INTERROG EVL PM/IDS: CPT

## 2023-06-23 LAB
MDC_IDC_EPISODE_DTM: NORMAL
MDC_IDC_EPISODE_DURATION: 6 S
MDC_IDC_EPISODE_DURATION: 6 S
MDC_IDC_EPISODE_DURATION: 8 S
MDC_IDC_EPISODE_ID: NORMAL
MDC_IDC_EPISODE_TYPE: NORMAL
MDC_IDC_LEAD_IMPLANT_DT: NORMAL
MDC_IDC_LEAD_IMPLANT_DT: NORMAL
MDC_IDC_LEAD_LOCATION: NORMAL
MDC_IDC_LEAD_LOCATION: NORMAL
MDC_IDC_LEAD_LOCATION_DETAIL_1: NORMAL
MDC_IDC_LEAD_LOCATION_DETAIL_1: NORMAL
MDC_IDC_LEAD_MFG: NORMAL
MDC_IDC_LEAD_MFG: NORMAL
MDC_IDC_LEAD_MODEL: NORMAL
MDC_IDC_LEAD_MODEL: NORMAL
MDC_IDC_LEAD_POLARITY_TYPE: NORMAL
MDC_IDC_LEAD_POLARITY_TYPE: NORMAL
MDC_IDC_LEAD_SERIAL: NORMAL
MDC_IDC_LEAD_SERIAL: NORMAL
MDC_IDC_LEAD_SPECIAL_FUNCTION: NORMAL
MDC_IDC_LEAD_SPECIAL_FUNCTION: NORMAL
MDC_IDC_MSMT_BATTERY_DTM: NORMAL
MDC_IDC_MSMT_BATTERY_REMAINING_LONGEVITY: 114 MO
MDC_IDC_MSMT_BATTERY_REMAINING_PERCENTAGE: 100 %
MDC_IDC_MSMT_BATTERY_STATUS: NORMAL
MDC_IDC_MSMT_LEADCHNL_RA_IMPEDANCE_VALUE: 676 OHM
MDC_IDC_MSMT_LEADCHNL_RV_IMPEDANCE_VALUE: 467 OHM
MDC_IDC_MSMT_LEADCHNL_RV_PACING_THRESHOLD_AMPLITUDE: 1.1 V
MDC_IDC_MSMT_LEADCHNL_RV_PACING_THRESHOLD_PULSEWIDTH: 0.4 MS
MDC_IDC_PG_IMPLANT_DTM: NORMAL
MDC_IDC_PG_MFG: NORMAL
MDC_IDC_PG_MODEL: NORMAL
MDC_IDC_PG_SERIAL: NORMAL
MDC_IDC_PG_TYPE: NORMAL
MDC_IDC_SESS_CLINIC_NAME: NORMAL
MDC_IDC_SESS_DTM: NORMAL
MDC_IDC_SESS_TYPE: NORMAL
MDC_IDC_SET_BRADY_AT_MODE_SWITCH_MODE: NORMAL
MDC_IDC_SET_BRADY_AT_MODE_SWITCH_RATE: 140 {BEATS}/MIN
MDC_IDC_SET_BRADY_LOWRATE: 60 {BEATS}/MIN
MDC_IDC_SET_BRADY_MAX_SENSOR_RATE: 130 {BEATS}/MIN
MDC_IDC_SET_BRADY_MAX_TRACKING_RATE: 100 {BEATS}/MIN
MDC_IDC_SET_BRADY_MODE: NORMAL
MDC_IDC_SET_BRADY_PAV_DELAY_HIGH: 80 MS
MDC_IDC_SET_BRADY_PAV_DELAY_LOW: 130 MS
MDC_IDC_SET_BRADY_SAV_DELAY_HIGH: 80 MS
MDC_IDC_SET_BRADY_SAV_DELAY_LOW: 130 MS
MDC_IDC_SET_LEADCHNL_RA_PACING_AMPLITUDE: 2 V
MDC_IDC_SET_LEADCHNL_RA_PACING_CAPTURE_MODE: NORMAL
MDC_IDC_SET_LEADCHNL_RA_PACING_POLARITY: NORMAL
MDC_IDC_SET_LEADCHNL_RA_PACING_PULSEWIDTH: 1 MS
MDC_IDC_SET_LEADCHNL_RA_SENSING_ADAPTATION_MODE: NORMAL
MDC_IDC_SET_LEADCHNL_RA_SENSING_POLARITY: NORMAL
MDC_IDC_SET_LEADCHNL_RA_SENSING_SENSITIVITY: 0.6 MV
MDC_IDC_SET_LEADCHNL_RV_PACING_AMPLITUDE: 1.6 V
MDC_IDC_SET_LEADCHNL_RV_PACING_CAPTURE_MODE: NORMAL
MDC_IDC_SET_LEADCHNL_RV_PACING_POLARITY: NORMAL
MDC_IDC_SET_LEADCHNL_RV_PACING_PULSEWIDTH: 0.4 MS
MDC_IDC_SET_LEADCHNL_RV_SENSING_ADAPTATION_MODE: NORMAL
MDC_IDC_SET_LEADCHNL_RV_SENSING_POLARITY: NORMAL
MDC_IDC_SET_LEADCHNL_RV_SENSING_SENSITIVITY: 1.5 MV
MDC_IDC_SET_ZONE_DETECTION_INTERVAL: 375 MS
MDC_IDC_SET_ZONE_TYPE: NORMAL
MDC_IDC_SET_ZONE_VENDOR_TYPE: NORMAL
MDC_IDC_STAT_AT_BURDEN_PERCENT: 1 %
MDC_IDC_STAT_AT_DTM_END: NORMAL
MDC_IDC_STAT_AT_DTM_START: NORMAL
MDC_IDC_STAT_BRADY_DTM_END: NORMAL
MDC_IDC_STAT_BRADY_DTM_START: NORMAL
MDC_IDC_STAT_BRADY_RA_PERCENT_PACED: 91 %
MDC_IDC_STAT_BRADY_RV_PERCENT_PACED: 100 %
MDC_IDC_STAT_EPISODE_RECENT_COUNT: 0
MDC_IDC_STAT_EPISODE_RECENT_COUNT: 3
MDC_IDC_STAT_EPISODE_RECENT_COUNT_DTM_END: NORMAL
MDC_IDC_STAT_EPISODE_RECENT_COUNT_DTM_START: NORMAL
MDC_IDC_STAT_EPISODE_TYPE: NORMAL
MDC_IDC_STAT_EPISODE_VENDOR_TYPE: NORMAL

## 2023-06-27 ENCOUNTER — ANTICOAGULATION THERAPY VISIT (OUTPATIENT)
Dept: ANTICOAGULATION | Facility: OTHER | Age: 68
End: 2023-06-27
Attending: FAMILY MEDICINE
Payer: MEDICARE

## 2023-06-27 DIAGNOSIS — D68.2 FACTOR V DEFICIENCY (H): ICD-10-CM

## 2023-06-27 DIAGNOSIS — Z79.01 ANTICOAGULATION MONITORING, INR RANGE 2-3: Primary | ICD-10-CM

## 2023-06-27 DIAGNOSIS — Z95.5 HISTORY OF CORONARY ARTERY STENT PLACEMENT: ICD-10-CM

## 2023-06-27 DIAGNOSIS — I66.9 CEREBRAL THROMBOSIS: ICD-10-CM

## 2023-06-27 LAB — INR HOME MONITORING: 2 (ref 2–3)

## 2023-06-27 NOTE — PROGRESS NOTES
ANTICOAGULATION  MANAGEMENT-Home Monitor Managed by Exception    Lydumila ALO Fitzgerald 67 year old female is on warfarin with therapeutic INR result. (Goal INR 2.0-3.0)    Recent labs: (last 7 days)     06/27/23  0000   INR 2.0         Previous INR was Therapeutic    Medication, diet, health changes since last INR:chart reviewed; none identified    Contacted within the last 12 weeks by phone on 6/14/23      ROD     Lyudmila was NOT contacted regarding therapeutic result today per home monitoring policy manage by exception agreement.   Current warfarin dose is to be continued:     Summary  As of 6/27/2023    Full warfarin instructions:  3 mg every Fri; 6 mg all other days   Next INR check:  7/11/2023           ?   Nyla Walker RN  Anticoagulation Clinic  6/27/2023    _______________________________________________________________________     Anticoagulation Episode Summary     Current INR goal:  2.0-3.0   TTR:  65.1 % (1 y)   Target end date:  Indefinite   Send INR reminders to:  [facility]    Indications    Acute thromboembolism of deep veins of lower extremity (H) (Resolved) [I82.409]  Anticoagulation monitoring  INR range 2-3 [Z79.01]  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]  Cerebral thrombosis [I66.9]  History of coronary artery stent placement to the LAD x3 [Z95.5]           Comments:  home monitor [employer] check INR q 2 weeks         Anticoagulation Care Providers     Provider Role Specialty Phone number    Erika Carrasco MD Referring Family Medicine [phone]

## 2023-07-10 DIAGNOSIS — Z95.5 HISTORY OF CORONARY ARTERY STENT PLACEMENT: ICD-10-CM

## 2023-07-10 DIAGNOSIS — Z98.890 STATUS POST CORONARY ANGIOGRAM: ICD-10-CM

## 2023-07-10 DIAGNOSIS — F51.04 CHRONIC INSOMNIA: ICD-10-CM

## 2023-07-10 DIAGNOSIS — R07.9 CHEST PAIN, UNSPECIFIED TYPE: ICD-10-CM

## 2023-07-10 DIAGNOSIS — Z95.1 HISTORY OF CORONARY ARTERY BYPASS GRAFT X 1: ICD-10-CM

## 2023-07-10 DIAGNOSIS — Z86.73 HISTORY OF STROKE: ICD-10-CM

## 2023-07-10 DIAGNOSIS — E78.2 MIXED HYPERLIPIDEMIA: ICD-10-CM

## 2023-07-10 DIAGNOSIS — I50.9 CONGESTIVE HEART FAILURE, UNSPECIFIED HF CHRONICITY, UNSPECIFIED HEART FAILURE TYPE (H): Primary | ICD-10-CM

## 2023-07-11 ENCOUNTER — ANTICOAGULATION THERAPY VISIT (OUTPATIENT)
Dept: ANTICOAGULATION | Facility: OTHER | Age: 68
End: 2023-07-11
Attending: FAMILY MEDICINE
Payer: MEDICARE

## 2023-07-11 DIAGNOSIS — Z79.01 ANTICOAGULATION MONITORING, INR RANGE 2-3: Primary | ICD-10-CM

## 2023-07-11 DIAGNOSIS — I66.9 CEREBRAL THROMBOSIS: ICD-10-CM

## 2023-07-11 DIAGNOSIS — D68.2 FACTOR V DEFICIENCY (H): ICD-10-CM

## 2023-07-11 DIAGNOSIS — Z95.5 HISTORY OF CORONARY ARTERY STENT PLACEMENT: ICD-10-CM

## 2023-07-11 LAB — INR HOME MONITORING: 2.3 (ref 2–3)

## 2023-07-11 RX ORDER — ZOLPIDEM TARTRATE 10 MG/1
10 TABLET ORAL
Qty: 30 TABLET | Refills: 0 | Status: CANCELLED | OUTPATIENT
Start: 2023-07-11

## 2023-07-11 RX ORDER — ATORVASTATIN CALCIUM 80 MG/1
80 TABLET, FILM COATED ORAL DAILY
Qty: 90 TABLET | Refills: 3 | Status: SHIPPED | OUTPATIENT
Start: 2023-07-11 | End: 2024-07-22

## 2023-07-11 NOTE — PROGRESS NOTES
ANTICOAGULATION  MANAGEMENT-Home Monitor Managed by Exception    Lyudmila ALO Fitzgerald 67 year old female is on warfarin with therapeutic INR result. (Goal INR 2.0-3.0)    Recent labs: (last 7 days)     07/11/23  0000   INR 2.3         Previous INR was Therapeutic    Medication, diet, health changes since last INR:chart reviewed; none identified    Contacted within the last 12 weeks by phone on 6/14/23      ROD     Lyudmila was NOT contacted regarding therapeutic result today per home monitoring policy manage by exception agreement.   Current warfarin dose is to be continued:     Summary  As of 7/11/2023    Full warfarin instructions:  3 mg every Fri; 6 mg all other days   Next INR check:  7/25/2023           ?   Nyla Walker RN  Anticoagulation Clinic  7/11/2023    _______________________________________________________________________     Anticoagulation Episode Summary     Current INR goal:  2.0-3.0   TTR:  67.4 % (1 y)   Target end date:  Indefinite   Send INR reminders to:  [facility]    Indications    Acute thromboembolism of deep veins of lower extremity (H) (Resolved) [I82.409]  Anticoagulation monitoring  INR range 2-3 [Z79.01]  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]  Cerebral thrombosis [I66.9]  History of coronary artery stent placement to the LAD x3 [Z95.5]           Comments:  home monitor [employer] check INR q 2 weeks         Anticoagulation Care Providers     Provider Role Specialty Phone number    Erika Carrasco MD Referring Family Medicine [phone]

## 2023-07-11 NOTE — TELEPHONE ENCOUNTER
"Pharmacy sent Rx request for the following:      Requested Prescriptions   Pending Prescriptions Disp Refills     atorvastatin (LIPITOR) 80 MG tablet [Pharmacy Med Name: Atorvastatin Calcium 80 MG Oral Tablet] 90 tablet 3     Sig: Take 1 tablet (80 mg) by mouth daily       [facility] Protocol Passed - 7/10/2023 11:21 AM        Passed - LDL on file in past 12 months     Recent Labs   Lab Test 10/05/22  0848   *             Passed - No abnormal creatine kinase in past 12 months     No lab results found.             Passed - Recent (12 mo) or future (30 days) visit within the authorizing provider's specialty     Patient has had an office visit with the authorizing provider or a provider within the authorizing providers department within the previous 12 mos or has a future within next 30 days. See \"Patient Info\" tab in inbasket, or \"Choose Columns\" in Meds & Orders section of the refill encounter.              Passed - Medication is active on med list        Passed - Patient is age 18 or older        Passed - No active pregnancy on record        Passed - No positive pregnancy test in past 12 months           zolpidem (AMBIEN) 10 MG tablet 30 tablet 0     Sig: Take 1 tablet (10 mg) by mouth nightly as needed for sleep       There is no refill protocol information for this order          Last Prescription Date:   For Atorvastatin on 3/27/2023 and Ambien on 4/8/2023  Last Fill Qty/Refills:         90, R-0 for the Atorvastatin and 30, 0 for the Ambien  Last Office Visit:              With Moises Islas DO on 10/25/2022   Future Office visit:           With Moises Islas DO on 7/18/2023.    Please review and advise, thank you.     Bettye Miller LPN on 7/11/2023 at 8:52 AM      "

## 2023-07-11 NOTE — TELEPHONE ENCOUNTER
[id number]  sent Rx request for the following:      Requested Prescriptions   Pending Prescriptions Disp Refills     zolpidem (AMBIEN) 10 MG tablet 30 tablet 0     Sig: Take 1 tablet (10 mg) by mouth nightly as needed for sleep       There is no refill protocol information for this order     Last Prescription Date:   04/08/23  Last Fill Qty/Refills:         30, R-0       Signed Prescriptions Disp Refills    atorvastatin (LIPITOR) 80 MG tablet 90 tablet 3     Sig: Take 1 tablet (80 mg) by mouth daily        Last Office Visit:              05/09/23   Future Office visit:             Next 5 appointments (look out 90 days)    Jul 18, 2023 11:15 AM  Return Visit with Moises Islas DO  [facility] ([facility] ) [address]  [place] [address]  [phone]        Amy Smith RN on 7/11/2023 at 10:02 AM

## 2023-07-17 NOTE — PROGRESS NOTES
[facility] HEART CARE   CARDIOLOGY PROGRESS NOTE     Chief Complaint   Patient presents with     Follow Up     meds          Diagnosis:  1.  Coronary artery spasm, LAD. [facility].  2.  Myocardial bridging involving LAD.  3.  H/O SVT.  4.  CABG x1 with LIMA to LAD on 11/15/2016 in [place].  5.  CHF.    -55-60% on 8/5/21.    -EF 15%. Recovered, 60-65% on 5/16/2019, [facility].   -Diastolic-concern for, 5/17/23.  6.  Coronary spasm with stent placement to LAD x3.  7.  H/O ablation for SVT.  8.  H/O stroke x7 with left hemiparesis.  9.  Left foot drop.  10.  AV tessie ablation secondary to SVT.  11.  Pacemaker dependent.  12.  Hyperlipidemia-uncontrolled.  13.  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  14.  H/O DVT to bilateral lower extremities.  15.  Cardiac pacemaker.  16.  Hypertension-variable control.  17.  DM-2.  18.  Lown Ganong Brandt syndrome.      Assessment/Plan:    1.  Could try nifedipine, L-arginine, magnesium, clonidine, and ivabradine.  2.  Medications tried or on include Coreg, chlorthalidone, Pletal, diltiazem, Ranexa, nifedipine, felodipine, Imdur, and lisinopril.  3.  Recently on cilostazol, diltiazem, felodipine, Imdur, Ranexa, and Coumadin. Cilostazol and felodipine discontinued on 11/23/2021. She believes felodipine made her feel poorly.  4.  Continue on L-arginine 2000 mg 3 times a day, clonidine 0.1 mg at bedtime, diltiazem 360 mg daily, Imdur 120 mg daily, nifedipine 60 mg daily, sublingual nitro as needed for chest pain, and Ranexa 1000 mg twice a day.    5.  Increase Imdur from 90 mg daily to 120 mg daily.  6.  Had consider starting ivabradine but it interacts with diltiazem and the medication was not initiated.  7.  We will refill sublingual nitro as needed for chest pain.  He was given 100 tablets with refills as she uses it regularly.  8.  Plan to refill Ambien for insomnia.  Patient requested.  Suggest that she follow-up with her primary in the future  for refills.  9.  Factor V Leiden deficiency/DVT: Continue Coumadin.  10.  RM: Potentially diastolic dysfunction.  Left ventricular pressure is increased.  We will plan for right heart cath.  11.  Chest discomfort: Has ongoing chest discomfort.  She is concerned it is related to her heart.  Previously, had a rather unremarkable cardiac cath.  Has history of spasm. We will plan for left heart cath at the same time as a right heart cath.  12.  Follow-up in 1 year or sooner with issues.          Interval history:  As above.      HPI:    Originally, she was diagnosed with a LAD ostial coronary artery spasm at [facility].  She had a positive methergine spasm study in 2005 at [facility] in the mid LAD, treated with drug-eluting stent to the mid-LAD.  She has a history of LAD myocardial bridging, CABG with LIMA to LAD on 11/15/2016 in [place], [place], history of heart failure with reported EF of 15% now recovered, stenting to the LAD x3, history of radiofrequency ablation for SVT x8, ultimately resulting in AV tessie ablation with pacemaker placement with pacemaker dependence, H/O stroke x7 with left hemiparesis essentially resolved with the exception of intermittent left foot drop, hyperlipidemia, on Coumadin with a history of factor V Leiden deficiency and numerous DVT's involving both the upper and lower extremities, hypertension, DM-2, on anticoagulated on Coumadin.     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  She has had care through [facility] as well as in [place], [place].  [facility] and underwent angiography revealing separate origins of the LAD and circumflex.  She has a history of catheter induced coronary artery spasm originally diagnosed at [facility].     Dr. Bill Abreu through [facility] evaluated her in 2005. In spite of pre-treatment with nitroglycerine and calcium channel blockers, she had fairly vigorous mid-LAD spasm with intracoronary  "methergine. The ostial LAD did not spasm. Dr. Abreu treated this area with a 2.5 x 28 mm Cypher drug-eluting stent. The following year she had instent restenosis treated with a second 8 mm x 2.5 mm Cypher drug-eluting stent.      She did fairly well until 2016, when she had evaluation at [facility] in [place]. She was treated with trans-myocardial revascularization with 25 channels in the inferior, lateral and anterior LV walls, and underwent an LIMA-LAD. Her EF was 35% pre-op, and approximately 50-55% post-operatively.      She had recurrent angina, and on 2/8/17 she underwent repeat angiography which showed mild instent restenosis of the mid-LAD stents, followed by 99% stenosis of the mid-LAD at the LIMA-LAD anastomosis, presumably due to surgical clips/manipulation. The TERRENCE was atretic. Thus, the internationalists placed a 2.25 x 8 mm Promus Premiere drug-eluting stent into the mid-LAD with a good result.      Lately, she has been having an increasingly more frequent and severe anginal chest discomfort, and feels like she is back to her severe coronary \"spasm\" again.      He has ongoing concerns for angina felt to be vasospastic in nature.  She has had a total of x4 cardiac catheterizations on 6/17/2008, 11/21/2012, 1/17/2019, and I believe one in [place] do not have this date.       She has done pretty well since. Her EF was greater than 60% on a couple occasions since.  Most recently, on 5/16/2019 her EF was 60% to 65%.  There was no significant valvular disease detected.  Her echo was essentially unchanged from 4/18/2017.  Diastolic dysfunction was normal.      Relevant testing:  Echo on 5/17/2023:  1. Normal left ventricular chamber size, no regional wall motion abnormalities, calculated 2-D linear ejection fraction 63 %.   2. Indeterminate left ventricular diastolic function and filling pressure.   3. Normal right ventricular chamber size, mildly reduced systolic function (by visual " inspection,), estimated right ventricular systolic pressure 26 mmHg assuming right atrial pressure of 5 mmHg.   4. Mild-moderate tricuspid valve regurgitation.   5. No pericardial effusion.    TTE on 4/27/2023:  Interpretation Summary  Left ventricular size, wall motion and function are normal. The ejection fraction is 55-60%.  Global right ventricular function is normal.  No hemodynamically significant valve abnormalities.  The inferior vena cava is normal.    ECHO on 8/5/21:  Left ventricular size, wall motion and function are normal other than abnormal  septal motion likely due to underlying left bundle branch block or pacing. The  ejection fraction is 55-60%.  Right ventricular function, chamber size, wall motion, and thickness are  normal.  No significant valvular abnormalities present.  No pericardial effusion is present.  Right ventricular systolic pressure is 21mmHg above the right atrial pressure  which is estimated at 3mmHg.  There is no prior study for direct comparison.    Cardiac cath on 4/27/2021 at the  of :      History of coronary spasm and myocardial bridging s/p EMILIE to LAD and CAB x1 (LIMA-LAD)    Mild instent restenosis of LAD    Patent but atretic LIMA-LAD    [facility] stress test with 4/1/2021:  1. Exercise echocardiogram positive for septal myocardial ischemia.  2. The patient's exercise capacity was limited.  3. Ejection fraction response from 60% at rest to 65% at peak stress.  4. Left ventricular end-systolic volume decreased with stress.  5. OTHER ECHO FINDINGS:  6. Findings consistent with elevated left ventricular filling pressure at rest and with exercise.    Echo on 4/1/2021 at [facility]:  1. Normal left ventricular chamber size, no regional wall motion abnormalities, calculated 2-D linear ejection fraction 63 %.  2. Indeterminate left ventricular diastolic function and filling pressure.  3. Normal right ventricular chamber size, mildly reduced systolic function (by visual inspection,),  estimated right ventricular systolic pressure 26 mmHg assuming right atrial pressure of 5 mmHg.  4. Mild-moderate tricuspid valve regurgitation.  5. No pericardial effusion.    Review of cardiac cath from [facility] by me on 2021:  1.  Severe coronary artery atherosclerosis  2.  Coronary artery grafts  CORONARY DIAGNOSTIC SUMMARY  Coronary artery dominance is left. Normal right coronary. Normal left main coronary. Normal circumflex coronary.   Side by side ostia of LAD and LCX.  The LAD is a diffusely diseased vessel, stented in its mid portion, and with a patent but somewhat atretic LIMA graft to its distal portion.,            Past Medical History:   Diagnosis Date     Acute thromboembolism of deep veins of lower extremity (H)     Overview:  Hx of multiple episodes of DVT: 3 lower extremity; 5 upper extremity (right arm)     Atrial fibrillation (H)     No Comments Provided     Cardiac pacemaker in situ     Dual chamber     Cerebral thrombosis     2006,'95 w/ no residual     Coronary atherosclerosis     2006     Endometrial hyperplasia     s/p endometrial ablation      Essential hypertension     No Comments Provided     History of other genital system and obstetric disorders      8, Para 3-0-5-2     Other and unspecified angina pectoris     2006     Other and unspecified hyperlipidemia     No Comments Provided     Other postprocedural states      and ,Followed by Dr. Usman Duran, [facility],.     Paroxysmal supraventricular tachycardia (H)     2006,s/p multiple ablations w last resulting in PPM     Personal history of transient ischemic attack (TIA) and cerebral infarction without residual deficit 2009    with left hemiplegia     Primary hypercoagulable state (H)     No Comments Provided       Past Surgical History:   Procedure Laterality Date     ARTHROSCOPY KNEE      ,Arthroscopy for chondromalacia patella     AS CABG,  "ARTERY-VEIN, SINGLE  11/15/2016    Single vessel; done in [place]     ATTEMPTED ARTHROSCOPY      02/04,Right arthroscopy     BIOPSY BREAST      03/08,Breast cyst aspiration     COLONOSCOPY      8/2007,follow up recommended in 10 years.     CV CORONARY ANGIOGRAM N/A 4/27/2021    Procedure: CV CORONARY ANGIOGRAM;  Surgeon: Nathaniel Grier MD;  Location:  HEART CARDIAC CATH LAB     ENDOSCOPIC SINUS SURGERY      03/04,Sinus node ablation.     EP ABLATION ATRIAL FLUTTER N/A 5/7/2020    Procedure: EP VENOGRAM SVC;  Surgeon: Hakeem Moore MD;  Location:  HEART CARDIAC CATH LAB     EP PACEMAKER N/A 5/20/2020    Procedure: EP PACEMAKER;  Surgeon: Tima Johnson MD;  Location: [facility] CARDIAC CATH LAB     EP STUDY  12/1994    EP STUDY /ABLATION,AV re-entry tachycardia, tessie ablation     HEART CATH, ANGIOPLASTY      01/06,Stenting placed LAD after ergonovine provocation confirmed     HEART CATH, ANGIOPLASTY      03/06,90% lesion, normal left ventricular function     IMPLANT PACEMAKER      03/03,Guidant pacemaker placement, AV tessie ablation     LAPAROSCOPIC ABLATION ENDOMETRIOSIS      06/06     OTHER SURGICAL HISTORY      3/24/06,321397,([place]) HC STENT ANGIO     OTHER SURGICAL HISTORY      08/02,230817,EP STUDY /ABLATION     OTHER SURGICAL HISTORY      11/04,04192.0,CT CORONARY ANGIOGRAM ([place]),Coronary angiogram, failed ablation     OTHER SURGICAL HISTORY      12/04,244874,[facility] RELOCATION OF SKIN POCKET PACEMAKER,[facility] 5/24/05 reconfiguration of pacemaker \"pocket\"     OTHER SURGICAL HISTORY      207882,IP CONSULT TO ELECTROPHYSIOLOGY,study and lead change     OTHER SURGICAL HISTORY      12/06,205897,NEUROSTIMULATOR,Nerve stimulator implanted for chest pain control     OTHER SURGICAL HISTORY      12/2008,26611.0,CT CORONARY ANGIOGRAM ([place]),with increased left-sided weakness and vertigo, negative CT angiogram.     REPLACE PACEMAKER GENERATOR      12/29/04,Replacement of left ventricular pacemaker lead.     " STENT  02/2017    LAD        Allergies   Allergen Reactions     Morphine Nausea and Vomiting     OK in small doses     Prednisone Nausea and Vomiting     Per IV     Sotalol Nausea and Vomiting       Current Outpatient Medications   Medication Sig Dispense Refill     arginine (L-ARGININE) 1000 MG tablet Take 2 tablets (2,000 mg) by mouth 3 times daily 90 tablet 11     aspirin 81 MG EC tablet Take 1 tablet (81 mg) by mouth daily 90 tablet 3     atorvastatin (LIPITOR) 80 MG tablet Take 1 tablet (80 mg) by mouth daily 90 tablet 3     calcium carbonate-vitamin D (OSCAL W/D) 500-200 MG-UNIT tablet Take 1 tablet by mouth       cloNIDine (CATAPRES) 0.1 MG tablet Take 1 tablet (0.1 mg) by mouth At Bedtime 90 tablet 3     diltiazem ER (TIAZAC) 360 MG 24 hr ER beaded capsule Take 1 capsule (360 mg) by mouth daily 90 capsule 3     furosemide (LASIX) 20 MG tablet Take 1-2 tablets (20-40 mg) by mouth daily 180 tablet 4     isosorbide mononitrate (IMDUR) 120 MG 24 HR ER tablet Take 1 tablet (120 mg) by mouth daily 90 tablet 3     NIFEdipine ER (ADALAT CC) 60 MG 24 hr tablet Take 1 tablet (60 mg) by mouth daily 90 tablet 3     nitroGLYcerin (NITROSTAT) 0.4 MG sublingual tablet For chest pain place 1 tablet under the tongue every 5 minutes for 3 doses. If symptoms persist 5 minutes after 1st dose call 911. 100 tablet 3     ranolazine (RANEXA) 1000 MG TB12 12 hr tablet Take 1 tablet (1,000 mg) by mouth 2 times daily 180 tablet 3     sertraline (ZOLOFT) 50 MG tablet Take 1 tablet (50 mg) by mouth daily 90 tablet 3     spironolactone (ALDACTONE) 25 MG tablet Take 1 tablet (25 mg) by mouth daily 90 tablet 1     warfarin ANTICOAGULANT (COUMADIN) 6 MG tablet TAKE 3 MG (1/2 TABLET) ON FRIDAYS AND  AND 6 MG (1 TABLETS) ALL OTHER DAYS OR  AS  DIRECTED  BY  THE  [facility]  CL[facility]. 160 tablet 1     zolpidem (AMBIEN) 10 MG tablet Take 1 tablet (10 mg) by mouth nightly as needed for sleep 90 tablet 0       Social History     Socioeconomic History      Marital status:      Spouse name: Antonio     Number of children: 2     Years of education: 16+     Highest education level: Master's degree (e.g., MA, MS, [employer], MEd, MSW, [employer]ARI[employer])   Occupational History     Not on file   Tobacco Use     Smoking status: Never     Smokeless tobacco: Never     Tobacco comments:     Quit smoking: spouse does not smoke in the house. Exposed in cars.   Vaping Use     Vaping Use: Never used   Substance and Sexual Activity     Alcohol use: Yes     Comment: Alcoholic Drinks/day: Alcoholic Drinks/day: 1-2 drinks a week     Drug use: Never     Sexual activity: Not Currently     Partners: Male   Other Topics Concern     Parent/sibling w/ CABG, MI or angioplasty before 65F 55M? Not Asked   Social History Narrative     2nd Marriage x 20 years to  Anthony they live in [place], [place]       in [place]. Completed her Master's Degree 2003. Taught Special Education.  Early half-way due to health 2007.      Total 5 children blended family-3 dgts. And 2 sons    Daughter:  Philip    Son:  Anthony - lives in [place] [facility]    6 Grand-children 5 boys and 1 girl    Leigh parents' and siblings live in [facility] [place] [facility].     Social Determinants of Health     Financial Resource Strain: Low Risk  (9/28/2019)    Overall Financial Resource Strain (CARDIA)      Difficulty of Paying Living Expenses: Not hard at all   Food Insecurity: No Food Insecurity (9/28/2019)    Hunger Vital Sign      Worried About Running Out of Food in the Last Year: Never true      Ran Out of Food in the Last Year: Never true   Transportation Needs: No Transportation Needs (9/28/2019)    PRAPARE - Transportation      Lack of Transportation (Medical): No      Lack of Transportation (Non-Medical): No   Physical Activity: Inactive (9/28/2019)    Exercise Vital Sign      Days of Exercise per Week: 0 days      Minutes of Exercise per Session: 0 min   Stress: Stress Concern Present (9/28/2019)    [employer]  "Occupational Health - Occupational Stress Questionnaire      Feeling of Stress : To some extent   Social Connections: Not on file   Intimate Partner Violence: Not on file   Housing Stability: Not on file       LAB RESULTS:   Anticoagulation Therapy Visit on 08/24/2020   Component Date Value Ref Range Status     INR 08/24/2020 1.3* 0.90 - 1.10 Corrected   Anticoagulation Therapy Visit on 08/19/2020   Component Date Value Ref Range Status     INR 08/19/2020 2.3* 0.90 - 1.10 Final   Anticoagulation Therapy Visit on 08/05/2020   Component Date Value Ref Range Status     INR 08/05/2020 2.1* 0.90 - 1.10 Final   Allied Health/Nurse Visit on 08/03/2020   Component Date Value Ref Range Status     COVID-19 Virus PCR to [facility] [place] - So* 08/03/2020 Nasopharyngeal   Final     COVID-19 Virus PCR to [facility] [place] - Re* 08/03/2020 Not Detected   Final   Anticoagulation Therapy Visit on 07/30/2020   Component Date Value Ref Range Status     INR 07/30/2020 3.8* 0.90 - 1.10 Final   Anticoagulation Therapy Visit on 07/27/2020   Component Date Value Ref Range Status     INR 07/25/2020 1.1  0.90 - 1.10 Final     INR 07/27/2020 1.2* 0.90 - 1.10 Final   Anticoagulation Therapy Visit on 07/23/2020   Component Date Value Ref Range Status     INR Protime 07/23/2020 1.0  0.86 - 1.14 Final   Anticoagulation Therapy Visit on 07/21/2020   Component Date Value Ref Range Status     INR 07/21/2020 1.4* 0.90 - 1.10 Final   Anticoagulation Therapy Visit on 07/09/2020   Component Date Value Ref Range Status     INR 07/08/2020 2.1* 0.90 - 1.10 Final        Review of systems: Negative except that which was noted in the HPI.    Physical examination:  /88 (BP Location: Right arm, Patient Position: Sitting, Cuff Size: Adult Regular)   Pulse 72   Temp 98  F (36.7  C) (Temporal)   Resp 16   Ht 1.64 m (5' 4.57\")   Wt 69.9 kg (154 lb)   SpO2 97%   BMI 25.97 kg/m      GENERAL APPEARANCE: healthy, alert and no distress  CHEST: lungs clear to auscultation - no " rales, rhonchi or wheezes, no use of accessory muscles, no retractions, respirations are unlabored, normal respiratory rate  CARDIOVASCULAR: regular rhythm, normal S1 with physiologic split S2, no S3 or S4 and no murmur, click or rub  EXTREMITIES: no clubbing, cyanosis with mild peripheral edema      Total time spent on day of visit, including review of tests, obtaining/reviewing separately obtained history, ordering medications/tests/procedures, communicating with PCP/consultants, and documenting in electronic medical record: 30minutes.       Thank you for allowing me to participate in the care of your patient. Please do not hesitate to contact me if you have any questions.     Moises Islas, DO

## 2023-07-18 ENCOUNTER — OFFICE VISIT (OUTPATIENT)
Dept: CARDIOLOGY | Facility: OTHER | Age: 68
End: 2023-07-18
Attending: INTERNAL MEDICINE
Payer: MEDICARE

## 2023-07-18 VITALS
RESPIRATION RATE: 16 BRPM | OXYGEN SATURATION: 97 % | HEIGHT: 65 IN | HEART RATE: 72 BPM | BODY MASS INDEX: 25.66 KG/M2 | TEMPERATURE: 98 F | DIASTOLIC BLOOD PRESSURE: 88 MMHG | SYSTOLIC BLOOD PRESSURE: 136 MMHG | WEIGHT: 154 LBS

## 2023-07-18 DIAGNOSIS — I20.1 CORONARY ARTERY SPASM (H): ICD-10-CM

## 2023-07-18 DIAGNOSIS — Q24.5 MYOCARDIAL BRIDGE: ICD-10-CM

## 2023-07-18 DIAGNOSIS — R06.02 SHORTNESS OF BREATH: ICD-10-CM

## 2023-07-18 DIAGNOSIS — R07.9 CHEST PAIN, UNSPECIFIED TYPE: ICD-10-CM

## 2023-07-18 DIAGNOSIS — I50.9 CONGESTIVE HEART FAILURE, UNSPECIFIED HF CHRONICITY, UNSPECIFIED HEART FAILURE TYPE (H): ICD-10-CM

## 2023-07-18 DIAGNOSIS — I47.10 PAROXYSMAL SUPRAVENTRICULAR TACHYCARDIA (H): ICD-10-CM

## 2023-07-18 DIAGNOSIS — Z95.1 HISTORY OF CORONARY ARTERY BYPASS GRAFT X 1: ICD-10-CM

## 2023-07-18 DIAGNOSIS — I49.8 PACEMAKER-DEPENDENT DUE TO NATIVE CARDIAC RHYTHM INSUFFICIENT TO SUPPORT LIFE: ICD-10-CM

## 2023-07-18 DIAGNOSIS — Z95.0 PACEMAKER-DEPENDENT DUE TO NATIVE CARDIAC RHYTHM INSUFFICIENT TO SUPPORT LIFE: ICD-10-CM

## 2023-07-18 DIAGNOSIS — R07.89 OTHER CHEST PAIN: Primary | ICD-10-CM

## 2023-07-18 DIAGNOSIS — Z98.890 STATUS POST CORONARY ANGIOGRAM: ICD-10-CM

## 2023-07-18 DIAGNOSIS — E11.9 TYPE 2 DIABETES MELLITUS WITHOUT COMPLICATION, WITHOUT LONG-TERM CURRENT USE OF INSULIN (H): ICD-10-CM

## 2023-07-18 DIAGNOSIS — I10 ESSENTIAL HYPERTENSION: ICD-10-CM

## 2023-07-18 DIAGNOSIS — Z95.5 HISTORY OF CORONARY ARTERY STENT PLACEMENT: ICD-10-CM

## 2023-07-18 DIAGNOSIS — E78.2 MIXED HYPERLIPIDEMIA: ICD-10-CM

## 2023-07-18 DIAGNOSIS — F51.04 CHRONIC INSOMNIA: ICD-10-CM

## 2023-07-18 DIAGNOSIS — I50.33 ACUTE ON CHRONIC HEART FAILURE WITH PRESERVED EJECTION FRACTION (H): ICD-10-CM

## 2023-07-18 DIAGNOSIS — Z95.0 CARDIAC PACEMAKER IN SITU: ICD-10-CM

## 2023-07-18 DIAGNOSIS — Z86.79 HISTORY OF CORONARY VASOSPASM: ICD-10-CM

## 2023-07-18 DIAGNOSIS — R60.0 LOWER EXTREMITY EDEMA: ICD-10-CM

## 2023-07-18 PROCEDURE — G0463 HOSPITAL OUTPT CLINIC VISIT: HCPCS

## 2023-07-18 PROCEDURE — 99214 OFFICE O/P EST MOD 30 MIN: CPT | Performed by: INTERNAL MEDICINE

## 2023-07-18 RX ORDER — POTASSIUM CHLORIDE 1500 MG/1
20 TABLET, EXTENDED RELEASE ORAL
Status: CANCELLED | OUTPATIENT
Start: 2023-07-18

## 2023-07-18 RX ORDER — NITROGLYCERIN 0.4 MG/1
TABLET SUBLINGUAL
Qty: 100 TABLET | Refills: 3 | Status: SHIPPED | OUTPATIENT
Start: 2023-07-18 | End: 2023-11-14

## 2023-07-18 RX ORDER — IVABRADINE 5 MG/1
5 TABLET, FILM COATED ORAL 2 TIMES DAILY WITH MEALS
Qty: 60 TABLET | Refills: 3 | Status: CANCELLED | OUTPATIENT
Start: 2023-07-18

## 2023-07-18 RX ORDER — POTASSIUM CHLORIDE 1500 MG/1
40 TABLET, EXTENDED RELEASE ORAL
Status: CANCELLED | OUTPATIENT
Start: 2023-07-18

## 2023-07-18 RX ORDER — LIDOCAINE 40 MG/G
CREAM TOPICAL
Status: CANCELLED | OUTPATIENT
Start: 2023-07-18

## 2023-07-18 RX ORDER — SODIUM CHLORIDE 9 MG/ML
INJECTION, SOLUTION INTRAVENOUS CONTINUOUS
Status: CANCELLED | OUTPATIENT
Start: 2023-07-18

## 2023-07-18 RX ORDER — ZOLPIDEM TARTRATE 10 MG/1
10 TABLET ORAL
Qty: 90 TABLET | Refills: 0 | Status: SHIPPED | OUTPATIENT
Start: 2023-07-18 | End: 2023-10-18

## 2023-07-18 ASSESSMENT — PAIN SCALES - GENERAL: PAINLEVEL: NO PAIN (0)

## 2023-07-18 NOTE — NURSING NOTE
"Per Moises Islas DO, pt to have COR Angiogram at [facility]. Reviewed allergies and medications.     -Pt will continue on Aspirin 81 mg daily, including day of procedure.     -Pt will HOLD warfarin 5-7 days prior to procedure per Moises Islas DO.  Patient notified of this via telephone and verbalized understanding.    Angiogram teaching done using the \"Coronary Angiogram, Angioplasty and Stent Placement\" patient guide provided by the [facility].  Packet given, instructions reviewed, questions answered.  Pt verbalizes understanding. Now scheduled for 7/31/22 with 11AM check in.  Pt is agreeable to plan.  Kandace Morelos RN......July 18, 2023...1:23 PM   "

## 2023-07-18 NOTE — PATIENT INSTRUCTIONS
Pre-procedure instructions - Coronary Angiogram  Patient Education    Your arrival time is 11AM.  Location is [address] Waiting Room  Please plan on being at the hospital all day.  At any time, emergencies and/or urgent cases may come up which could delay the start of your procedure.    Pre-procedure instructions - Coronary Angiogram  Shower in the evening before or the morning of the procedure  No solid food for 8 hours prior and nothing to drink 2 hours prior to arrival time  You can take your morning medications (except for diabetic and blood thinners) with sips of water.  You will need to arrange a ride to drop you off and pick you up, as you will be unable to drive home.  Prior to discharge you may be required to lay flat for approximately 2-4 hours in the recovery unit to ensure proper clotting of the artery.                            Anticoagulation Medication Instructions             You will need to follow up with one of our cardiology APPs 1-2 weeks after your procedure. If you need help scheduling or rescheduling your appointment, please call [phone]

## 2023-07-18 NOTE — NURSING NOTE
"Patient comes in for follow up.  Mirella Royal LPN ....................7/18/2023   11:23 AM  Chief Complaint   Patient presents with     Follow Up     meds       Initial /88 (BP Location: Right arm, Patient Position: Sitting, Cuff Size: Adult Regular)   Pulse 72   Temp 98  F (36.7  C) (Temporal)   Resp 16   Ht 1.64 m (5' 4.57\")   Wt 69.9 kg (154 lb)   SpO2 97%   BMI 25.97 kg/m   Estimated body mass index is 25.97 kg/m  as calculated from the following:    Height as of this encounter: 1.64 m (5' 4.57\").    Weight as of this encounter: 69.9 kg (154 lb).  Meds Reconciled: complete  Pt is on Aspirin  Pt is on a Statin  PHQ and/or JUAN CARLOS reviewed. Pt referred to PCP/MH Provider as appropriate.    Mirella Royal LPN      "

## 2023-07-20 ENCOUNTER — APPOINTMENT (OUTPATIENT)
Dept: GENERAL RADIOLOGY | Facility: OTHER | Age: 68
End: 2023-07-20
Attending: INTERNAL MEDICINE
Payer: MEDICARE

## 2023-07-20 ENCOUNTER — HOSPITAL ENCOUNTER (EMERGENCY)
Facility: OTHER | Age: 68
Discharge: HOME | End: 2023-07-20
Attending: INTERNAL MEDICINE | Admitting: INTERNAL MEDICINE
Payer: MEDICARE

## 2023-07-20 ENCOUNTER — ANESTHESIA EVENT (OUTPATIENT)
Dept: EMERGENCY MEDICINE | Facility: OTHER | Age: 68
End: 2023-07-20
Payer: MEDICARE

## 2023-07-20 ENCOUNTER — ANESTHESIA (OUTPATIENT)
Dept: EMERGENCY MEDICINE | Facility: OTHER | Age: 68
End: 2023-07-20
Payer: MEDICARE

## 2023-07-20 VITALS
BODY MASS INDEX: 25.66 KG/M2 | SYSTOLIC BLOOD PRESSURE: 113 MMHG | DIASTOLIC BLOOD PRESSURE: 63 MMHG | OXYGEN SATURATION: 95 % | WEIGHT: 154 LBS | TEMPERATURE: 97.5 F | RESPIRATION RATE: 13 BRPM | HEIGHT: 65 IN | HEART RATE: 59 BPM

## 2023-07-20 DIAGNOSIS — Q24.5 MYOCARDIAL BRIDGE: ICD-10-CM

## 2023-07-20 DIAGNOSIS — I20.1 CORONARY ARTERY SPASM (H): Primary | ICD-10-CM

## 2023-07-20 DIAGNOSIS — Z79.01 ANTICOAGULATION MONITORING, INR RANGE 2-3: ICD-10-CM

## 2023-07-20 LAB
ALBUMIN SERPL BCG-MCNC: 4.6 G/DL (ref 3.5–5.2)
ALP SERPL-CCNC: 101 U/L (ref 35–104)
ALT SERPL W P-5'-P-CCNC: 23 U/L (ref 0–50)
ANION GAP SERPL CALCULATED.3IONS-SCNC: 21 MMOL/L (ref 7–15)
AST SERPL W P-5'-P-CCNC: 28 U/L (ref 0–45)
BASOPHILS # BLD AUTO: 0.1 10E3/UL (ref 0–0.2)
BASOPHILS NFR BLD AUTO: 1 %
BILIRUB SERPL-MCNC: 0.3 MG/DL
BUN SERPL-MCNC: 20.9 MG/DL (ref 8–23)
CALCIUM SERPL-MCNC: 10.5 MG/DL (ref 8.8–10.2)
CHLORIDE SERPL-SCNC: 98 MMOL/L (ref 98–107)
CREAT SERPL-MCNC: 1.1 MG/DL (ref 0.51–0.95)
DEPRECATED HCO3 PLAS-SCNC: 14 MMOL/L (ref 22–29)
EOSINOPHIL # BLD AUTO: 0.1 10E3/UL (ref 0–0.7)
EOSINOPHIL NFR BLD AUTO: 1 %
ERYTHROCYTE [DISTWIDTH] IN BLOOD BY AUTOMATED COUNT: 12.8 % (ref 10–15)
GFR SERPL CREATININE-BSD FRML MDRD: 55 ML/MIN/1.73M2
GLUCOSE SERPL-MCNC: 174 MG/DL (ref 70–99)
HCT VFR BLD AUTO: 43.1 % (ref 35–47)
HGB BLD-MCNC: 15.5 G/DL (ref 11.7–15.7)
HOLD SPECIMEN: NORMAL
IMM GRANULOCYTES # BLD: 0 10E3/UL
IMM GRANULOCYTES NFR BLD: 0 %
LYMPHOCYTES # BLD AUTO: 2.7 10E3/UL (ref 0.8–5.3)
LYMPHOCYTES NFR BLD AUTO: 20 %
MCH RBC QN AUTO: 32.2 PG (ref 26.5–33)
MCHC RBC AUTO-ENTMCNC: 36 G/DL (ref 31.5–36.5)
MCV RBC AUTO: 89 FL (ref 78–100)
MONOCYTES # BLD AUTO: 1.1 10E3/UL (ref 0–1.3)
MONOCYTES NFR BLD AUTO: 8 %
NEUTROPHILS # BLD AUTO: 9.2 10E3/UL (ref 1.6–8.3)
NEUTROPHILS NFR BLD AUTO: 70 %
NRBC # BLD AUTO: 0 10E3/UL
NRBC BLD AUTO-RTO: 0 /100
PLATELET # BLD AUTO: 287 10E3/UL (ref 150–450)
POTASSIUM SERPL-SCNC: 4.1 MMOL/L (ref 3.4–5.3)
PROT SERPL-MCNC: 7.3 G/DL (ref 6.4–8.3)
RBC # BLD AUTO: 4.82 10E6/UL (ref 3.8–5.2)
SODIUM SERPL-SCNC: 133 MMOL/L (ref 136–145)
TROPONIN T SERPL HS-MCNC: 7 NG/L
TROPONIN T SERPL HS-MCNC: 9 NG/L
WBC # BLD AUTO: 13.3 10E3/UL (ref 4–11)

## 2023-07-20 PROCEDURE — 250N000013 HC RX MED GY IP 250 OP 250 PS 637: Performed by: INTERNAL MEDICINE

## 2023-07-20 PROCEDURE — 85025 COMPLETE CBC W/AUTO DIFF WBC: CPT | Performed by: INTERNAL MEDICINE

## 2023-07-20 PROCEDURE — 93010 ELECTROCARDIOGRAM REPORT: CPT | Performed by: INTERNAL MEDICINE

## 2023-07-20 PROCEDURE — 36410 VNPNXR 3YR/> PHY/QHP DX/THER: CPT | Performed by: NURSE ANESTHETIST, CERTIFIED REGISTERED

## 2023-07-20 PROCEDURE — 84484 ASSAY OF TROPONIN QUANT: CPT | Performed by: INTERNAL MEDICINE

## 2023-07-20 PROCEDURE — 99291 CRITICAL CARE FIRST HOUR: CPT | Mod: 25 | Performed by: INTERNAL MEDICINE

## 2023-07-20 PROCEDURE — 36415 COLL VENOUS BLD VENIPUNCTURE: CPT | Performed by: INTERNAL MEDICINE

## 2023-07-20 PROCEDURE — 71045 X-RAY EXAM CHEST 1 VIEW: CPT

## 2023-07-20 PROCEDURE — 99291 CRITICAL CARE FIRST HOUR: CPT | Performed by: INTERNAL MEDICINE

## 2023-07-20 PROCEDURE — 250N000011 HC RX IP 250 OP 636: Performed by: INTERNAL MEDICINE

## 2023-07-20 PROCEDURE — 96366 THER/PROPH/DIAG IV INF ADDON: CPT | Performed by: INTERNAL MEDICINE

## 2023-07-20 PROCEDURE — 258N000003 HC RX IP 258 OP 636: Performed by: INTERNAL MEDICINE

## 2023-07-20 PROCEDURE — 96365 THER/PROPH/DIAG IV INF INIT: CPT | Performed by: INTERNAL MEDICINE

## 2023-07-20 PROCEDURE — 80053 COMPREHEN METABOLIC PANEL: CPT | Performed by: INTERNAL MEDICINE

## 2023-07-20 PROCEDURE — 93005 ELECTROCARDIOGRAM TRACING: CPT | Performed by: INTERNAL MEDICINE

## 2023-07-20 PROCEDURE — 96375 TX/PRO/DX INJ NEW DRUG ADDON: CPT | Performed by: INTERNAL MEDICINE

## 2023-07-20 RX ORDER — NITROGLYCERIN 20 MG/100ML
10-200 INJECTION INTRAVENOUS CONTINUOUS
Status: DISCONTINUED | OUTPATIENT
Start: 2023-07-20 | End: 2023-07-20 | Stop reason: HOSPADM

## 2023-07-20 RX ORDER — MORPHINE SULFATE 4 MG/ML
4 INJECTION, SOLUTION INTRAMUSCULAR; INTRAVENOUS ONCE
Status: COMPLETED | OUTPATIENT
Start: 2023-07-20 | End: 2023-07-20

## 2023-07-20 RX ORDER — ASPIRIN 81 MG/1
324 TABLET, CHEWABLE ORAL ONCE
Status: COMPLETED | OUTPATIENT
Start: 2023-07-20 | End: 2023-07-20

## 2023-07-20 RX ORDER — NITROGLYCERIN 40 MG/1
1 PATCH TRANSDERMAL DAILY
Qty: 30 PATCH | Refills: 1 | Status: SHIPPED | OUTPATIENT
Start: 2023-07-20

## 2023-07-20 RX ADMIN — MORPHINE SULFATE 2 MG: 4 INJECTION, SOLUTION INTRAMUSCULAR; INTRAVENOUS at 16:47

## 2023-07-20 RX ADMIN — NITROGLYCERIN 10 MCG/MIN: 20 INJECTION INTRAVENOUS at 16:50

## 2023-07-20 RX ADMIN — ASPIRIN 81 MG CHEWABLE TABLET 324 MG: 81 TABLET CHEWABLE at 16:42

## 2023-07-20 RX ADMIN — SODIUM CHLORIDE 1000 ML: 9 INJECTION, SOLUTION INTRAVENOUS at 16:46

## 2023-07-20 ASSESSMENT — ACTIVITIES OF DAILY LIVING (ADL)
ADLS_ACUITY_SCORE: 35

## 2023-07-20 NOTE — ED TRIAGE NOTES
"    Patient comes in from home with complains of sudden chest pain, tightness, and SOB.  She reports sweating, pain that is in her neck and both arms.  She has an extensive cardiac history with STENTS X3, a pacemaker, hx of thinners, \"ECHO didn't look good\", a a planned angiogram at the [facility] on 7/31/23.  Pain started at 1500. Took three nitrogrlycerin with no relief.  Last dose at 1515.  "

## 2023-07-21 LAB
ATRIAL RATE - MUSE: 78 BPM
DIASTOLIC BLOOD PRESSURE - MUSE: NORMAL MMHG
INTERPRETATION ECG - MUSE: NORMAL
P AXIS - MUSE: 38 DEGREES
PR INTERVAL - MUSE: 150 MS
QRS DURATION - MUSE: 134 MS
QT - MUSE: 396 MS
QTC - MUSE: 430 MS
R AXIS - MUSE: 100 DEGREES
SYSTOLIC BLOOD PRESSURE - MUSE: NORMAL MMHG
T AXIS - MUSE: -76 DEGREES
VENTRICULAR RATE- MUSE: 71 BPM

## 2023-07-21 NOTE — ED PROVIDER NOTES
Emergency Department Provider Note  : 1955 Age: 67 year old Sex: female MRN: [id number]    Chief Complaint   Patient presents with     Chest Pain     Shortness of Breath       Medical Decision Making / Assessment / Plan   67 year old female presenting with chronic angina, coronary artery vasospasm, myocardial bridge, coronary artery disease, warfarin anticoagulation    ED Course as of 23   Thu 2023   161 Patient presents with chest pain and shortness of breath.  EKG obtained showing AV dual paced rhythm with a rate of 71 bpm.  Unchanged from previous.  Single view portable chest x-ray is clear.  No infiltrate.   1613 Troponin normal.    162 Patient states chest pain symptoms started about 15 minutes prior to arrival.  She was very diaphoretic and nauseated and both of her arms hurt.  Takes warfarin.  Takes aspirin in the evening, no aspirin today.   162 Start IV nitroglycerin drip.    IV morphine 4 mg ordered.  Initial troponin negative.  Repeat troponin in 2 hours.   162 Administer 500 mL saline over 1 hour.  Creatinine is up from baseline.   172 Chest pain was as bad as 9/10, now down to 6/10 with 40 mcg/min IV nitroglycerin infusion    Second troponin normal.   185 Reports chest pain down to 3/10.  Currently on 60 mcg/min nitroglycerin drip    Plan to start tapering nitroglycerin drip.  States that she uses numerous nitroglycerin sublingual tablets per day.  Has chronic chest pain of about 3/10 daily.    Nitroglycerin drip down to 40 mcg/min, chest pain remains the same 3/10.    Patient titrated off of the nitroglycerin drip.  Chest pain remained stable.  She is comfortable to discharge home.  Start nitroglycerin patches as noted.  0.2 mg/h.  Continue sublingual nitroglycerin as needed.  Keep outpatient cardiology follow-up as scheduled.  Return as needed for new or worsening symptoms.        A shared decision making model was used. Plan and all results were  discussed  Time was taken to answer all questions. Patient and/or associated parties understood and were agreeable to treatment plan.  Strict return to Emergency Department precautions as well as appropriate follow up instructions were provided. The patient was discharged in stable condition.    New Prescriptions    NITROGLYCERIN (NITRODUR) 0.2 MG/HR 24 HR PATCH    Place 1 patch onto the skin daily - for chronic angina, coronary vasospasm       Final diagnoses:   Coronary artery spasm (H)   Myocardial bridge-LAD   Anticoagulation monitoring, INR range 2-3       Mir Douglas MD  7/20/2023   Emergency Department    Yovani Coreas is a 67 year old female who presents at  3:27 PM with chest pain and shortness of breath.  Symptoms started up about 15 minutes prior to arrival.  Has chronic chest pain, takes up to 15 sublingual nitro glycerin tablets per day.  Was previously on nitroglycerin patches.    She is scheduled to have coronary angiogram later this month.  Just saw cardiology.    Reports chest pain got as bad as 10/10.  Improved down to 8/10 sublingual nitroglycerin tablets and subsequently came into the emergency room.    At the time of evaluation pain was 9/10.    She reports having fairly significant episode of diaphoresis, nausea.  States both of her arms hurt.  Eventually just having some numbness into the left hand.    Denies rashes.  No abdominal pain.    I have reviewed the Medications, Allergies, Past Medical and Surgical History, and Social History in the [employer] [facility] and with family.    Review of Systems:  Please see Subjective / HPI for pertinent positives and negatives. All other systems reviewed and found to be negative.      Objective     Patient Vitals for the past 24 hrs:   BP Temp Pulse Resp SpO2 Height Weight   07/20/23 2035 113/66 -- 59 -- 95 % -- --   07/20/23 2020 115/65 -- 60 -- 95 % -- --   07/20/23 2005 118/66 -- 60 -- 95 % -- --   07/20/23 1950 117/65 -- 59 -- 95 % -- --   07/20/23  "1929 117/62 -- 59 -- 95 % -- --   07/20/23 1914 121/60 -- 59 -- 96 % -- --   07/20/23 1859 118/61 -- 62 -- 96 % -- --   07/20/23 1844 118/71 -- 66 -- 97 % -- --   07/20/23 1829 127/67 -- 66 -- 97 % -- --   07/20/23 1816 118/62 -- 65 13 96 % -- --   07/20/23 1805 114/65 -- 67 13 97 % -- --   07/20/23 1750 116/61 -- 60 13 97 % -- --   07/20/23 1735 118/70 -- 71 17 96 % -- --   07/20/23 1720 118/73 -- 78 23 98 % -- --   07/20/23 1659 119/69 -- 78 15 98 % -- --   07/20/23 1640 127/76 -- 79 20 -- -- --   07/20/23 1625 111/85 -- 79 13 -- -- --   07/20/23 1610 120/69 -- 78 17 -- -- --   07/20/23 1601 110/70 -- 76 15 -- -- --   07/20/23 1532 109/65 97.5  F (36.4  C) 59 20 100 % 1.651 m (5' 5\") 69.9 kg (154 lb)       Physical Exam:     General: Awake, alert, in no acute respiratory distress.  Head: Normocephalic, atraumatic.  Eyes: Conjugate gaze.  ENT: Moist membranes, external ear appears normal.   Chest/Respiratory: Equal chest rise, clear bilaterally.  Cardiovascular: Peripheral pulses present, regular rate and rhythm .  Abdominal: Soft, non-distended, non-tender.  Extremities: No obvious deformity.  Neurological: GCS 15, moving all extremities without gross deficit.  Skin: Warm, no rashes, lesions, or bruising.   Psychiatric: Appropriate affect.     Procedures / Critical Care   Procedures    Aggregate Critical Care Time:  is 30 minutes.  This was the time seeing the patient at the bedside while the patient was critical.  My time did not include any pertinent procedures or activities that did not contribute to the patient's care while the patient was critical.      Orders Placed This Encounter   Procedures     XR Chest Port 1 View     Troponin T, High Sensitivity     Comprehensive metabolic panel     CBC with platelets and differential     Extra Tube ([place] Draw)     Extra Blue Top Tube     Troponin T, High Sensitivity     EKG 12 lead     Pulse oximetry nursing     Cardiac Continuous Monitoring     Peripheral IV: " Standard     Review medications with patient     Oxygen: Nasal cannula, Oxygen mask     CBC with platelets differential       RESULTS: As noted above.          Medical/Surgical History:  Past Medical History:   Diagnosis Date     Acute thromboembolism of deep veins of lower extremity (H)     Overview:  Hx of multiple episodes of DVT: 3 lower extremity; 5 upper extremity (right arm)     Atrial fibrillation (H)     No Comments Provided     Cardiac pacemaker in situ     Dual chamber     Cerebral thrombosis     2006,'95 w/ no residual     Coronary atherosclerosis     2006     Endometrial hyperplasia     s/p endometrial ablation      Essential hypertension     No Comments Provided     History of other genital system and obstetric disorders      8, Para 3-0-5-2     Other and unspecified angina pectoris     2006     Other and unspecified hyperlipidemia     No Comments Provided     Other postprocedural states      and ,Followed by Dr. Usman Duran, [facility],.     Paroxysmal supraventricular tachycardia (H)     2006,s/p multiple ablations w last resulting in PPM     Personal history of transient ischemic attack (TIA) and cerebral infarction without residual deficit 2009    with left hemiplegia     Primary hypercoagulable state (H)     No Comments Provided     Past Surgical History:   Procedure Laterality Date     ARTHROSCOPY KNEE      ,Arthroscopy for chondromalacia patella     AS CABG, ARTERY-VEIN, SINGLE  11/15/2016    Single vessel; done in [place]     ATTEMPTED ARTHROSCOPY      ,Right arthroscopy     BIOPSY BREAST      ,Breast cyst aspiration     COLONOSCOPY      2007,follow up recommended in 10 years.     CV CORONARY ANGIOGRAM N/A 2021    Procedure: CV CORONARY ANGIOGRAM;  Surgeon: Nathaniel Grier MD;  Location:  HEART CARDIAC CATH LAB     ENDOSCOPIC SINUS SURGERY      ,Sinus node ablation.     EP ABLATION ATRIAL  "FLUTTER N/A 5/7/2020    Procedure: EP VENOGRAM SVC;  Surgeon: Hakeem Moore MD;  Location:  HEART CARDIAC CATH LAB     EP PACEMAKER N/A 5/20/2020    Procedure: EP PACEMAKER;  Surgeon: Tima Johnson MD;  Location:  HEART CARDIAC CATH LAB     EP STUDY  12/1994    EP STUDY /ABLATION,AV re-entry tachycardia, tessie ablation     HEART CATH, ANGIOPLASTY      01/06,Stenting placed LAD after ergonovine provocation confirmed     HEART CATH, ANGIOPLASTY      03/06,90% lesion, normal left ventricular function     IMPLANT PACEMAKER      03/03,Guidant pacemaker placement, AV tessie ablation     LAPAROSCOPIC ABLATION ENDOMETRIOSIS      06/06     OTHER SURGICAL HISTORY      3/24/06,146080,([place]) HC STENT ANGIO     OTHER SURGICAL HISTORY      08/02,474545,EP STUDY /ABLATION     OTHER SURGICAL HISTORY      11/04,55041.0,CT CORONARY ANGIOGRAM ([place]),Coronary angiogram, failed ablation     OTHER SURGICAL HISTORY      12/04,558364,[facility] RELOCATION OF SKIN POCKET PACEMAKER,[facility] 5/24/05 reconfiguration of pacemaker \"pocket\"     OTHER SURGICAL HISTORY      469272,IP CONSULT TO ELECTROPHYSIOLOGY,study and lead change     OTHER SURGICAL HISTORY      12/06,415883,NEUROSTIMULATOR,Nerve stimulator implanted for chest pain control     OTHER SURGICAL HISTORY      12/2008,70066.0,CT CORONARY ANGIOGRAM ([place]),with increased left-sided weakness and vertigo, negative CT angiogram.     REPLACE PACEMAKER GENERATOR      12/29/04,Replacement of left ventricular pacemaker lead.     STENT  02/2017    LAD        Medications:  Current Facility-Administered Medications   Medication     nitroGLYcerin 50 mg in D5W 250 mL (adult std) infusion CENTRAL     Current Outpatient Medications   Medication     aspirin 81 MG EC tablet     atorvastatin (LIPITOR) 80 MG tablet     cloNIDine (CATAPRES) 0.1 MG tablet     diltiazem ER (TIAZAC) 360 MG 24 hr ER beaded capsule     furosemide (LASIX) 20 MG tablet     isosorbide mononitrate (IMDUR) 120 MG 24 HR ER " tablet     NIFEdipine ER (ADALAT CC) 60 MG 24 hr tablet     nitroGLYcerin (NITRODUR) 0.2 MG/HR 24 hr patch     nitroGLYcerin (NITROSTAT) 0.4 MG sublingual tablet     ranolazine (RANEXA) 1000 MG TB12 12 hr tablet     sertraline (ZOLOFT) 50 MG tablet     spironolactone (ALDACTONE) 25 MG tablet     warfarin ANTICOAGULANT (COUMADIN) 6 MG tablet     zolpidem (AMBIEN) 10 MG tablet     arginine (L-ARGININE) 1000 MG tablet     calcium carbonate-vitamin D (OSCAL W/D) 500-200 MG-UNIT tablet       Allergies:  Morphine, Prednisone, and Sotalol    Relevant labs, images, EKGs, Epic and outside hospital (if applicable) charts were reviewed. The findings, diagnosis, plan, and need for follow up were discussed with the patient/family. Nursing notes were reviewed.      Mir Douglas MD  07/20/23 7003

## 2023-07-21 NOTE — ED NOTES
Pt's chest pain remains a 3/10. Umchanged from earlier. Now off of the nitroglycerin drip. MD aware.

## 2023-07-21 NOTE — DISCHARGE INSTRUCTIONS
Start nitroglycerin patch 0.2 mg/hour  -- Apply in the morning, remove after 12 hours.    Keep cardiology follow-up as previously arranged.    Continue other home medications.

## 2023-07-25 ENCOUNTER — ANTICOAGULATION THERAPY VISIT (OUTPATIENT)
Dept: ANTICOAGULATION | Facility: OTHER | Age: 68
End: 2023-07-25
Attending: FAMILY MEDICINE
Payer: MEDICARE

## 2023-07-25 DIAGNOSIS — Z79.01 ANTICOAGULATION MONITORING, INR RANGE 2-3: Primary | ICD-10-CM

## 2023-07-25 DIAGNOSIS — I66.9 CEREBRAL THROMBOSIS: ICD-10-CM

## 2023-07-25 DIAGNOSIS — Z95.5 HISTORY OF CORONARY ARTERY STENT PLACEMENT: ICD-10-CM

## 2023-07-25 DIAGNOSIS — D68.2 FACTOR V DEFICIENCY (H): ICD-10-CM

## 2023-07-25 LAB — INR HOME MONITORING: 2.7 (ref 2–3)

## 2023-07-25 NOTE — PROGRESS NOTES
ANTICOAGULATION  MANAGEMENT-Home Monitor Managed by Exception    Lyudmila Fitzgerald 67 year old female is on warfarin with therapeutic INR result. (Goal INR 2.0-3.0)    Recent labs: (last 7 days)     07/25/23  0000   INR 2.7       Previous INR was Therapeutic  Medication, diet, health changes since last INR:Yes: ED visit 7/20 chest pains SOB, but not anticipated to affect INR  Contacted within the last 12 weeks by phone on 6/14/23  Angiogram on 7/31/23-held warfarin X 5 days.      RDO Coreas was NOT contacted regarding therapeutic result today per home monitoring policy manage by exception agreement.   Current warfarin dose is to be continued:     Summary  As of 7/25/2023      Full warfarin instructions:  3 mg every Fri; 6 mg all other days   Next INR check:  8/8/2023             ?   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

## 2023-07-31 ENCOUNTER — APPOINTMENT (OUTPATIENT)
Dept: LAB | Facility: CLINIC | Age: 68
End: 2023-07-31
Attending: INTERNAL MEDICINE
Payer: MEDICARE

## 2023-07-31 ENCOUNTER — APPOINTMENT (OUTPATIENT)
Dept: MEDSURG UNIT | Facility: CLINIC | Age: 68
End: 2023-07-31
Attending: INTERNAL MEDICINE
Payer: MEDICARE

## 2023-07-31 ENCOUNTER — HOSPITAL ENCOUNTER (OUTPATIENT)
Facility: CLINIC | Age: 68
Discharge: HOME | End: 2023-07-31
Attending: INTERNAL MEDICINE | Admitting: INTERNAL MEDICINE
Payer: MEDICARE

## 2023-07-31 VITALS
HEIGHT: 65 IN | WEIGHT: 155 LBS | SYSTOLIC BLOOD PRESSURE: 142 MMHG | RESPIRATION RATE: 16 BRPM | HEART RATE: 60 BPM | TEMPERATURE: 97.8 F | OXYGEN SATURATION: 96 % | DIASTOLIC BLOOD PRESSURE: 76 MMHG | BODY MASS INDEX: 25.83 KG/M2

## 2023-07-31 DIAGNOSIS — R07.9 CHEST PAIN, UNSPECIFIED TYPE: ICD-10-CM

## 2023-07-31 DIAGNOSIS — Z95.5 HISTORY OF CORONARY ARTERY STENT PLACEMENT: ICD-10-CM

## 2023-07-31 DIAGNOSIS — F51.04 CHRONIC INSOMNIA: ICD-10-CM

## 2023-07-31 DIAGNOSIS — Z95.1 HISTORY OF CORONARY ARTERY BYPASS GRAFT X 1: ICD-10-CM

## 2023-07-31 DIAGNOSIS — I50.33 ACUTE ON CHRONIC HEART FAILURE WITH PRESERVED EJECTION FRACTION (H): ICD-10-CM

## 2023-07-31 DIAGNOSIS — Z98.890 STATUS POST CORONARY ANGIOGRAM: ICD-10-CM

## 2023-07-31 DIAGNOSIS — Z86.79 HISTORY OF CORONARY VASOSPASM: ICD-10-CM

## 2023-07-31 DIAGNOSIS — R07.89 OTHER CHEST PAIN: ICD-10-CM

## 2023-07-31 DIAGNOSIS — R60.0 LOWER EXTREMITY EDEMA: ICD-10-CM

## 2023-07-31 DIAGNOSIS — R06.02 SHORTNESS OF BREATH: ICD-10-CM

## 2023-07-31 DIAGNOSIS — I25.10 CORONARY ARTERY DISEASE INVOLVING NATIVE CORONARY ARTERY OF NATIVE HEART WITHOUT ANGINA PECTORIS: Primary | ICD-10-CM

## 2023-07-31 DIAGNOSIS — I20.1 CORONARY ARTERY SPASM (H): ICD-10-CM

## 2023-07-31 DIAGNOSIS — I50.9 CONGESTIVE HEART FAILURE, UNSPECIFIED HF CHRONICITY, UNSPECIFIED HEART FAILURE TYPE (H): ICD-10-CM

## 2023-07-31 LAB
ANION GAP SERPL CALCULATED.3IONS-SCNC: 15 MMOL/L (ref 7–15)
BUN SERPL-MCNC: 15.7 MG/DL (ref 8–23)
CALCIUM SERPL-MCNC: 9.3 MG/DL (ref 8.8–10.2)
CHLORIDE SERPL-SCNC: 101 MMOL/L (ref 98–107)
CREAT SERPL-MCNC: 0.86 MG/DL (ref 0.51–0.95)
DEPRECATED HCO3 PLAS-SCNC: 18 MMOL/L (ref 22–29)
ERYTHROCYTE [DISTWIDTH] IN BLOOD BY AUTOMATED COUNT: 13 % (ref 10–15)
GFR SERPL CREATININE-BSD FRML MDRD: 73 ML/MIN/1.73M2
GLUCOSE SERPL-MCNC: 98 MG/DL (ref 70–99)
HCT VFR BLD AUTO: 43.5 % (ref 35–47)
HGB BLD-MCNC: 14.2 G/DL (ref 11.7–15.7)
HGB BLD-MCNC: 14.7 G/DL (ref 11.7–15.7)
HGB BLD-MCNC: 15.1 G/DL (ref 11.7–15.7)
INR PPP: 1.11 (ref 0.85–1.15)
MCH RBC QN AUTO: 32.1 PG (ref 26.5–33)
MCHC RBC AUTO-ENTMCNC: 34.7 G/DL (ref 31.5–36.5)
MCV RBC AUTO: 92 FL (ref 78–100)
OXYHGB MFR BLDA: 93 % (ref 92–100)
OXYHGB MFR BLDV: 67 % (ref 92–100)
PLATELET # BLD AUTO: 281 10E3/UL (ref 150–450)
POTASSIUM SERPL-SCNC: 3.9 MMOL/L (ref 3.4–5.3)
RBC # BLD AUTO: 4.71 10E6/UL (ref 3.8–5.2)
SODIUM SERPL-SCNC: 134 MMOL/L (ref 136–145)
WBC # BLD AUTO: 6.3 10E3/UL (ref 4–11)

## 2023-07-31 PROCEDURE — 250N000009 HC RX 250: Performed by: STUDENT IN AN ORGANIZED HEALTH CARE EDUCATION/TRAINING PROGRAM

## 2023-07-31 PROCEDURE — 99152 MOD SED SAME PHYS/QHP 5/>YRS: CPT | Performed by: INTERNAL MEDICINE

## 2023-07-31 PROCEDURE — 999N000054 HC STATISTIC EKG NON-CHARGEABLE

## 2023-07-31 PROCEDURE — 82810 BLOOD GASES O2 SAT ONLY: CPT | Mod: 91

## 2023-07-31 PROCEDURE — C1751 CATH, INF, PER/CENT/MIDLINE: HCPCS | Performed by: INTERNAL MEDICINE

## 2023-07-31 PROCEDURE — 258N000003 HC RX IP 258 OP 636: Performed by: STUDENT IN AN ORGANIZED HEALTH CARE EDUCATION/TRAINING PROGRAM

## 2023-07-31 PROCEDURE — 85014 HEMATOCRIT: CPT | Performed by: INTERNAL MEDICINE

## 2023-07-31 PROCEDURE — 250N000011 HC RX IP 250 OP 636: Performed by: INTERNAL MEDICINE

## 2023-07-31 PROCEDURE — 82810 BLOOD GASES O2 SAT ONLY: CPT

## 2023-07-31 PROCEDURE — 272N000001 HC OR GENERAL SUPPLY STERILE: Performed by: INTERNAL MEDICINE

## 2023-07-31 PROCEDURE — 99152 MOD SED SAME PHYS/QHP 5/>YRS: CPT | Mod: GC | Performed by: INTERNAL MEDICINE

## 2023-07-31 PROCEDURE — C1894 INTRO/SHEATH, NON-LASER: HCPCS | Performed by: INTERNAL MEDICINE

## 2023-07-31 PROCEDURE — 36415 COLL VENOUS BLD VENIPUNCTURE: CPT | Performed by: INTERNAL MEDICINE

## 2023-07-31 PROCEDURE — 99153 MOD SED SAME PHYS/QHP EA: CPT | Performed by: INTERNAL MEDICINE

## 2023-07-31 PROCEDURE — 93457 R HRT ART/GRFT ANGIO: CPT | Performed by: INTERNAL MEDICINE

## 2023-07-31 PROCEDURE — 85610 PROTHROMBIN TIME: CPT | Performed by: INTERNAL MEDICINE

## 2023-07-31 PROCEDURE — 250N000009 HC RX 250: Performed by: INTERNAL MEDICINE

## 2023-07-31 PROCEDURE — 999N000142 HC STATISTIC PROCEDURE PREP ONLY

## 2023-07-31 PROCEDURE — 93457 R HRT ART/GRFT ANGIO: CPT | Mod: 26 | Performed by: INTERNAL MEDICINE

## 2023-07-31 PROCEDURE — 93005 ELECTROCARDIOGRAM TRACING: CPT

## 2023-07-31 PROCEDURE — 999N000128 HC STATISTIC PERIPHERAL IV START W/O US GUIDANCE

## 2023-07-31 PROCEDURE — 80048 BASIC METABOLIC PNL TOTAL CA: CPT | Performed by: INTERNAL MEDICINE

## 2023-07-31 PROCEDURE — 999N000134 HC STATISTIC PP CARE STAGE 3

## 2023-07-31 PROCEDURE — 250N000011 HC RX IP 250 OP 636: Mod: JZ | Performed by: INTERNAL MEDICINE

## 2023-07-31 PROCEDURE — 85018 HEMOGLOBIN: CPT

## 2023-07-31 RX ORDER — ASPIRIN 81 MG/1
81 TABLET ORAL DAILY
Qty: 90 TABLET | Refills: 3 | Status: SHIPPED | OUTPATIENT
Start: 2023-08-01 | End: 2023-09-07

## 2023-07-31 RX ORDER — NALOXONE HYDROCHLORIDE 0.4 MG/ML
0.4 INJECTION, SOLUTION INTRAMUSCULAR; INTRAVENOUS; SUBCUTANEOUS
Status: DISCONTINUED | OUTPATIENT
Start: 2023-07-31 | End: 2023-07-31 | Stop reason: HOSPADM

## 2023-07-31 RX ORDER — ATORVASTATIN CALCIUM 80 MG/1
80 TABLET, FILM COATED ORAL DAILY
Qty: 90 TABLET | Refills: 3 | Status: SHIPPED | OUTPATIENT
Start: 2023-07-31 | End: 2023-09-07

## 2023-07-31 RX ORDER — ASPIRIN 81 MG/1
81 TABLET, CHEWABLE ORAL ONCE
Status: DISCONTINUED | OUTPATIENT
Start: 2023-07-31 | End: 2023-07-31

## 2023-07-31 RX ORDER — IOPAMIDOL 755 MG/ML
INJECTION, SOLUTION INTRAVASCULAR
Status: DISCONTINUED | OUTPATIENT
Start: 2023-07-31 | End: 2023-07-31 | Stop reason: HOSPADM

## 2023-07-31 RX ORDER — FLUMAZENIL 0.1 MG/ML
0.2 INJECTION, SOLUTION INTRAVENOUS
Status: DISCONTINUED | OUTPATIENT
Start: 2023-07-31 | End: 2023-07-31 | Stop reason: HOSPADM

## 2023-07-31 RX ORDER — OXYCODONE HYDROCHLORIDE 5 MG/1
5 TABLET ORAL EVERY 4 HOURS PRN
Status: DISCONTINUED | OUTPATIENT
Start: 2023-07-31 | End: 2023-07-31 | Stop reason: HOSPADM

## 2023-07-31 RX ORDER — ASPIRIN 325 MG
325 TABLET ORAL ONCE
Status: COMPLETED | OUTPATIENT
Start: 2023-07-31 | End: 2023-07-31

## 2023-07-31 RX ORDER — FENTANYL CITRATE 50 UG/ML
25 INJECTION, SOLUTION INTRAMUSCULAR; INTRAVENOUS
Status: DISCONTINUED | OUTPATIENT
Start: 2023-07-31 | End: 2023-07-31 | Stop reason: HOSPADM

## 2023-07-31 RX ORDER — ASPIRIN 81 MG/1
81 TABLET ORAL DAILY
Status: DISCONTINUED | OUTPATIENT
Start: 2023-08-01 | End: 2023-07-31

## 2023-07-31 RX ORDER — OXYCODONE HYDROCHLORIDE 5 MG/1
10 TABLET ORAL EVERY 4 HOURS PRN
Status: DISCONTINUED | OUTPATIENT
Start: 2023-07-31 | End: 2023-07-31 | Stop reason: HOSPADM

## 2023-07-31 RX ORDER — POTASSIUM CHLORIDE 750 MG/1
20 TABLET, EXTENDED RELEASE ORAL
Status: DISCONTINUED | OUTPATIENT
Start: 2023-07-31 | End: 2023-07-31 | Stop reason: HOSPADM

## 2023-07-31 RX ORDER — ASPIRIN 81 MG/1
243 TABLET, CHEWABLE ORAL ONCE
Status: COMPLETED | OUTPATIENT
Start: 2023-07-31 | End: 2023-07-31

## 2023-07-31 RX ORDER — SODIUM CHLORIDE 9 MG/ML
INJECTION, SOLUTION INTRAVENOUS CONTINUOUS
Status: DISCONTINUED | OUTPATIENT
Start: 2023-07-31 | End: 2023-07-31 | Stop reason: HOSPADM

## 2023-07-31 RX ORDER — NITROGLYCERIN 0.4 MG/1
0.4 TABLET SUBLINGUAL EVERY 5 MIN PRN
Status: DISCONTINUED | OUTPATIENT
Start: 2023-07-31 | End: 2023-07-31 | Stop reason: HOSPADM

## 2023-07-31 RX ORDER — LIDOCAINE 40 MG/G
CREAM TOPICAL
Status: DISCONTINUED | OUTPATIENT
Start: 2023-07-31 | End: 2023-07-31 | Stop reason: HOSPADM

## 2023-07-31 RX ORDER — ATROPINE SULFATE 0.1 MG/ML
0.5 INJECTION INTRAVENOUS
Status: DISCONTINUED | OUTPATIENT
Start: 2023-07-31 | End: 2023-07-31 | Stop reason: HOSPADM

## 2023-07-31 RX ORDER — ATORVASTATIN CALCIUM 80 MG/1
80 TABLET, FILM COATED ORAL DAILY
Status: DISCONTINUED | OUTPATIENT
Start: 2023-07-31 | End: 2023-07-31

## 2023-07-31 RX ORDER — NALOXONE HYDROCHLORIDE 0.4 MG/ML
0.2 INJECTION, SOLUTION INTRAMUSCULAR; INTRAVENOUS; SUBCUTANEOUS
Status: DISCONTINUED | OUTPATIENT
Start: 2023-07-31 | End: 2023-07-31 | Stop reason: HOSPADM

## 2023-07-31 RX ORDER — SODIUM CHLORIDE 9 MG/ML
INJECTION, SOLUTION INTRAVENOUS CONTINUOUS
Status: DISCONTINUED | OUTPATIENT
Start: 2023-07-31 | End: 2023-07-31

## 2023-07-31 RX ORDER — FENTANYL CITRATE 50 UG/ML
INJECTION, SOLUTION INTRAMUSCULAR; INTRAVENOUS
Status: DISCONTINUED | OUTPATIENT
Start: 2023-07-31 | End: 2023-07-31 | Stop reason: HOSPADM

## 2023-07-31 RX ORDER — ONDANSETRON 2 MG/ML
4 INJECTION INTRAMUSCULAR; INTRAVENOUS EVERY 6 HOURS PRN
Status: DISCONTINUED | OUTPATIENT
Start: 2023-07-31 | End: 2023-07-31 | Stop reason: HOSPADM

## 2023-07-31 RX ORDER — METOPROLOL TARTRATE 1 MG/ML
5 INJECTION, SOLUTION INTRAVENOUS
Status: DISCONTINUED | OUTPATIENT
Start: 2023-07-31 | End: 2023-07-31 | Stop reason: HOSPADM

## 2023-07-31 RX ORDER — ONDANSETRON 4 MG/1
4 TABLET, ORALLY DISINTEGRATING ORAL EVERY 6 HOURS PRN
Status: DISCONTINUED | OUTPATIENT
Start: 2023-07-31 | End: 2023-07-31 | Stop reason: HOSPADM

## 2023-07-31 RX ORDER — HYDRALAZINE HYDROCHLORIDE 20 MG/ML
10 INJECTION INTRAMUSCULAR; INTRAVENOUS EVERY 4 HOURS PRN
Status: DISCONTINUED | OUTPATIENT
Start: 2023-07-31 | End: 2023-07-31 | Stop reason: HOSPADM

## 2023-07-31 RX ORDER — POTASSIUM CHLORIDE 750 MG/1
40 TABLET, EXTENDED RELEASE ORAL
Status: DISCONTINUED | OUTPATIENT
Start: 2023-07-31 | End: 2023-07-31 | Stop reason: HOSPADM

## 2023-07-31 RX ORDER — OXYCODONE HYDROCHLORIDE 10 MG/1
10 TABLET ORAL EVERY 4 HOURS PRN
Status: DISCONTINUED | OUTPATIENT
Start: 2023-07-31 | End: 2023-07-31 | Stop reason: HOSPADM

## 2023-07-31 RX ADMIN — SODIUM CHLORIDE: 9 INJECTION, SOLUTION INTRAVENOUS at 12:40

## 2023-07-31 RX ADMIN — LIDOCAINE: 40 CREAM TOPICAL at 12:39

## 2023-07-31 ASSESSMENT — ACTIVITIES OF DAILY LIVING (ADL)
ADLS_ACUITY_SCORE: 35

## 2023-07-31 NOTE — PROGRESS NOTES
D/I/A:  Patient is tolerating liquids and foods, ambulating, urinating, puncture sites are stable ( no bleeding and no hematoma) and patient has a .  A+O x4 and making needs known.  CCL access sites C/D/I; no bleeding or hematoma; CMS intact.  VSSA.  SR on monitor.  IV access removed.  Education completed and outlined in AVS or handout: medications reviewed with patient.  Questions answered prior to discharge.  Belongings returned to patient at discharge.    P: Discharged to self care.  Patient to follow up with appts as per discharge instruction.

## 2023-07-31 NOTE — DISCHARGE INSTRUCTIONS
Going Home after a RHC & Coronary Angiogram  ______________________________________________      After you go home:  Have an adult stay with you for 24 hours.  Drink plenty of fluids.  You may eat your normal diet, unless your doctor tells you otherwise.  For 24 hours:  Relax and take it easy.  Do NOT smoke.  Do NOT make any important or legal decisions.  Do NOT drive or operate machines at home or at work.  Do NOT drink alcohol.  Remove the Band-Aid after 24 hours. If there is minor oozing, apply another Band-aid and remove it after 12 hours.  For 2 days, do NOT have sex or do any heavy exercise.  Do NOT take a bath, or use a hot tub or pool for at least 3 days. You may shower after 24 hours hours (you may shower on Wednesday).      Care of groin site  It is normal to have a small bruise or lump at the site.  Do not scrub the site.  For the first 2 days: Do not stoop or squat. When you cough, sneeze or move your bowels, hold your hand over the puncture site and press gently.  Do not lift more than 10 pounds for at least 3 to 5 days.  Do not use lotion or powder near the puncture site for 3 days.    If you start bleeding from the site in your groin, lie down flat and press firmly  on the site. Call your doctor as soon as you can.    Medicines  If you have stopped any other medicines, check with your nurse or provider about when to restart them.    Call 911 right away if you have bleeding that is heavy or does not stop.    Call your doctor if:  You have a large or growing hard lump around the site.  The site is red, swollen, hot or tender.  Blood or fluid is draining from the site.  You have chills or a fever greater than 101 F (38 C).  Your leg or arm feels numb or cool.  You have hives, a rash or unusual itching.      [facility] [place]:  [phone] (7 days a week)

## 2023-07-31 NOTE — PROGRESS NOTES
D/I/A: Pt roomed on 3C in bay [address].  Arrived via litter and accompanied by RN off  monitor.  VSSA.  Rhythm upon arrival SR on monitor.  Denies pain or sob.  Reviewed activity restrictions and when to notify RN, ie-changes to breathing or increased chest pressure or chest pain.  CCL access:  R groin site 4FVA and 7FFV sheaths were removed in CCL/site covered with drsg and it is CDI/no bleeding or hematoma.  P: Continue to monitor status.  Discharge to home once meeting criteria.

## 2023-07-31 NOTE — PROGRESS NOTES
Pt has RX for ASA and Lipitor at discharge pharmacy.    Pt said that she has enough of those meds and not due for a refill. Thus, pt chose not to  Rx from discharge pharmacy on her way home.

## 2023-07-31 NOTE — PROGRESS NOTES
Pt ended bed rest at 1645.  Pt tolerated activity.  AVSS R groin remained CDI w/o any bleeding or hematoma.

## 2023-07-31 NOTE — PROGRESS NOTES
Pt arrived on 2A for CORS, RHC. Vitals stable. Denies any pain. Consent signed. Labs reviewed. Prep complete except for EKG. Pt on daily ASA 81mg.  Anthony and Friend Karen at bedside: [phone]

## 2023-08-02 LAB
ATRIAL RATE - MUSE: 60 BPM
DIASTOLIC BLOOD PRESSURE - MUSE: NORMAL MMHG
INTERPRETATION ECG - MUSE: NORMAL
P AXIS - MUSE: -88 DEGREES
PR INTERVAL - MUSE: 158 MS
QRS DURATION - MUSE: 126 MS
QT - MUSE: 466 MS
QTC - MUSE: 466 MS
R AXIS - MUSE: 90 DEGREES
SYSTOLIC BLOOD PRESSURE - MUSE: NORMAL MMHG
T AXIS - MUSE: 270 DEGREES
VENTRICULAR RATE- MUSE: 60 BPM

## 2023-08-03 LAB — INR HOME MONITORING: 1.3 (ref 2–3)

## 2023-08-04 ENCOUNTER — ANTICOAGULATION THERAPY VISIT (OUTPATIENT)
Dept: ANTICOAGULATION | Facility: OTHER | Age: 68
End: 2023-08-04
Attending: FAMILY MEDICINE
Payer: MEDICARE

## 2023-08-04 DIAGNOSIS — Z95.5 HISTORY OF CORONARY ARTERY STENT PLACEMENT: ICD-10-CM

## 2023-08-04 DIAGNOSIS — D68.2 FACTOR V DEFICIENCY (H): ICD-10-CM

## 2023-08-04 DIAGNOSIS — I66.9 CEREBRAL THROMBOSIS: ICD-10-CM

## 2023-08-04 DIAGNOSIS — Z79.01 ANTICOAGULATION MONITORING, INR RANGE 2-3: Primary | ICD-10-CM

## 2023-08-04 NOTE — PROGRESS NOTES
ANTICOAGULATION MANAGEMENT     Lyudmila Fitzgerald 68 year old female is on warfarin with subtherapeutic INR result. (Goal INR 2.0-3.0)    Recent labs: (last 7 days)     08/03/23  0000   INR 1.3*       ASSESSMENT     Source(s): Chart Review and Patient/Caregiver Call     Warfarin doses taken: Held for angiogram  recently which may be affecting INR  Diet: No new diet changes identified  New illness, injury, or hospitalization: No  Medication/supplement changes: None noted  Signs or symptoms of bleeding or clotting: No  Previous INR: Subtherapeutic  Additional findings: None     PLAN     Recommended plan for temporary change(s) affecting INR     Dosing Instructions: booster dose then continue your current warfarin dose with next INR in 4 days       Summary  As of 8/4/2023      Full warfarin instructions:  8/4: 6 mg; Otherwise 3 mg every Fri; 6 mg all other days   Next INR check:  8/8/2023               Telephone call with Leigh who verbalizes understanding and agrees to plan    Patient to recheck with home meter    Education provided: Please call back if any changes to your diet, medications or how you've been taking warfarin    Plan made per [facility] anticoagulation protocol    Nasrin Barragan RN  Anticoagulation Clinic  8/4/2023    _______________________________________________________________________     Anticoagulation Episode Summary       Current INR goal:  2.0-3.0   TTR:  65.7 % (1 y)   Target end date:  Indefinite   Send INR reminders to:  [facility]    Indications    Acute thromboembolism of deep veins of lower extremity (H) (Resolved) [I82.409]  Anticoagulation monitoring  INR range 2-3 [Z79.01]  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

## 2023-08-07 LAB — INR HOME MONITORING: 1.8 (ref 2–3)

## 2023-08-08 ENCOUNTER — OFFICE VISIT (OUTPATIENT)
Dept: CARDIOLOGY | Facility: OTHER | Age: 68
End: 2023-08-08
Attending: INTERNAL MEDICINE
Payer: MEDICARE

## 2023-08-08 ENCOUNTER — ANTICOAGULATION THERAPY VISIT (OUTPATIENT)
Dept: ANTICOAGULATION | Facility: OTHER | Age: 68
End: 2023-08-08
Attending: FAMILY MEDICINE
Payer: MEDICARE

## 2023-08-08 VITALS
HEART RATE: 73 BPM | BODY MASS INDEX: 25.66 KG/M2 | SYSTOLIC BLOOD PRESSURE: 126 MMHG | WEIGHT: 154 LBS | RESPIRATION RATE: 16 BRPM | TEMPERATURE: 97.5 F | OXYGEN SATURATION: 97 % | HEIGHT: 65 IN | DIASTOLIC BLOOD PRESSURE: 80 MMHG

## 2023-08-08 DIAGNOSIS — Z95.5 HISTORY OF CORONARY ARTERY STENT PLACEMENT: ICD-10-CM

## 2023-08-08 DIAGNOSIS — R07.9 CHEST PAIN, UNSPECIFIED TYPE: ICD-10-CM

## 2023-08-08 DIAGNOSIS — I50.33 ACUTE ON CHRONIC HEART FAILURE WITH PRESERVED EJECTION FRACTION (H): ICD-10-CM

## 2023-08-08 DIAGNOSIS — I10 ESSENTIAL HYPERTENSION: ICD-10-CM

## 2023-08-08 DIAGNOSIS — E11.9 TYPE 2 DIABETES MELLITUS WITHOUT COMPLICATION, WITHOUT LONG-TERM CURRENT USE OF INSULIN (H): ICD-10-CM

## 2023-08-08 DIAGNOSIS — I66.9 CEREBRAL THROMBOSIS: ICD-10-CM

## 2023-08-08 DIAGNOSIS — D68.2 FACTOR V DEFICIENCY (H): ICD-10-CM

## 2023-08-08 DIAGNOSIS — E83.42 HYPOMAGNESEMIA: ICD-10-CM

## 2023-08-08 DIAGNOSIS — R07.89 OTHER CHEST PAIN: ICD-10-CM

## 2023-08-08 DIAGNOSIS — Q24.5 MYOCARDIAL BRIDGE: ICD-10-CM

## 2023-08-08 DIAGNOSIS — I25.10 CORONARY ARTERY DISEASE INVOLVING NATIVE CORONARY ARTERY OF NATIVE HEART WITHOUT ANGINA PECTORIS: ICD-10-CM

## 2023-08-08 DIAGNOSIS — I50.9 CONGESTIVE HEART FAILURE, UNSPECIFIED HF CHRONICITY, UNSPECIFIED HEART FAILURE TYPE (H): Primary | ICD-10-CM

## 2023-08-08 DIAGNOSIS — Z79.01 ANTICOAGULATION MONITORING, INR RANGE 2-3: Primary | ICD-10-CM

## 2023-08-08 DIAGNOSIS — Z95.1 HISTORY OF CORONARY ARTERY BYPASS GRAFT X 1: ICD-10-CM

## 2023-08-08 DIAGNOSIS — E78.2 MIXED HYPERLIPIDEMIA: ICD-10-CM

## 2023-08-08 DIAGNOSIS — R60.0 LOWER EXTREMITY EDEMA: ICD-10-CM

## 2023-08-08 DIAGNOSIS — R06.02 SHORTNESS OF BREATH: ICD-10-CM

## 2023-08-08 PROCEDURE — 99215 OFFICE O/P EST HI 40 MIN: CPT | Performed by: INTERNAL MEDICINE

## 2023-08-08 PROCEDURE — G0463 HOSPITAL OUTPT CLINIC VISIT: HCPCS

## 2023-08-08 RX ORDER — SACUBITRIL AND VALSARTAN 24; 26 MG/1; MG/1
1 TABLET, FILM COATED ORAL 2 TIMES DAILY
Qty: 60 TABLET | Refills: 11 | Status: SHIPPED | OUTPATIENT
Start: 2023-08-08 | End: 2023-11-30

## 2023-08-08 ASSESSMENT — PAIN SCALES - GENERAL: PAINLEVEL: MILD PAIN (2)

## 2023-08-08 NOTE — PROGRESS NOTES
ANTICOAGULATION MANAGEMENT     Lyudmila Fitzgerald 68 year old female is on warfarin with subtherapeutic INR result. (Goal INR 2.0-3.0)    Recent labs: (last 7 days)     08/07/23  0000   INR 1.8*       ASSESSMENT     Source(s): Chart Review and Patient/Caregiver Call     Warfarin doses taken: Warfarin taken as instructed  Diet: No new diet changes identified  Medication/supplement changes: None noted  New illness, injury, or hospitalization: No  Signs or symptoms of bleeding or clotting: No  Previous result: Subtherapeutic  Additional findings: None       PLAN     Recommended plan for no diet, medication or health factor changes affecting INR     Dosing Instructions: Continue your current warfarin dose with next INR in 2 weeks       Summary  As of 8/8/2023      Full warfarin instructions:  3 mg every Fri; 6 mg all other days   Next INR check:  8/22/2023               Telephone call with Leigh who verbalizes understanding and agrees to plan    Patient to recheck with home meter    Education provided:   None required    Plan made per [facility] anticoagulation protocol    Nyla Walker RN  Anticoagulation Clinic  8/8/2023    _______________________________________________________________________     Anticoagulation Episode Summary       Current INR goal:  2.0-3.0   TTR:  64.6 % (1 y)   Target end date:  Indefinite   Send INR reminders to:  [facility]    Indications    Acute thromboembolism of deep veins of lower extremity (H) (Resolved) [I82.409]  Anticoagulation monitoring  INR range 2-3 [Z79.01]  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

## 2023-08-08 NOTE — NURSING NOTE
"Patient comes in for after angiogram.  Mirella Royal LPN ....................8/8/2023   1:10 PM  Chief Complaint   Patient presents with    Follow Up     S/p angio 7/31       Initial /80 (BP Location: Right arm, Patient Position: Sitting, Cuff Size: Adult Regular)   Pulse 73   Temp 97.5  F (36.4  C) (Temporal)   Resp 16   Ht 1.64 m (5' 4.57\")   Wt 69.9 kg (154 lb)   SpO2 97%   BMI 25.97 kg/m   Estimated body mass index is 25.97 kg/m  as calculated from the following:    Height as of this encounter: 1.64 m (5' 4.57\").    Weight as of this encounter: 69.9 kg (154 lb).  Meds Reconciled: complete  Pt is on Aspirin  Pt is on a Statin  PHQ and/or JUAN CARLOS reviewed. Pt referred to PCP/MH Provider as appropriate.    Mirella Royal LPN      "

## 2023-08-08 NOTE — PROGRESS NOTES
[facility] HEART CARE   CARDIOLOGY PROGRESS NOTE     Chief Complaint   Patient presents with    Follow Up     S/p angio 7/31          Diagnosis:  1.  Coronary artery spasm, LAD. [facility].  2.  Myocardial bridging involving LAD.  3.  H/O SVT.  4.  CABG x1 with LIMA to LAD on 11/15/2016 in [place].  5.  CHF.    -55-60% on 8/5/21.    -EF 15%. Recovered, 60-65% on 5/16/2019, [facility].   -Diastolic-concern for, 5/17/23.  6.  Coronary spasm with stent placement to LAD x3.  7.  H/O ablation for SVT.  8.  H/O stroke x7 with left hemiparesis.  9.  Left foot drop.  10.  AV tessie ablation secondary to SVT.  11.  Pacemaker dependent.  12.  Hyperlipidemia-uncontrolled.  13.  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  14.  H/O DVT to bilateral lower extremities.  15.  Cardiac pacemaker.  16.  Hypertension-variable control.  17.  DM-2.  18.  Lown Ganong Brandt syndrome.      Assessment/Plan:    1.  In the future, could try nifedipine, L-arginine, magnesium, clonidine, and ivabradine.  2.  Medications tried or on include Coreg, chlorthalidone, Pletal, diltiazem, Ranexa, nifedipine, felodipine, Imdur, and lisinopril.  3.  Previously has been on cilostazol, diltiazem, felodipine, Imdur, Ranexa, and Coumadin. Cilostazol and felodipine discontinued on 11/23/2021. She believes felodipine made her feel poorly.  4.  Continue on L-arginine 2000 mg 3 times a day, clonidine 0.1 mg at bedtime, diltiazem 360 mg daily, Imdur 120 mg daily, nifedipine 60 mg daily-max 120 mg daily, sublingual nitro as needed for chest pain, and Ranexa 1000 mg twice a day.    5.  Had consider starting ivabradine but it interacts with diltiazem and the medication was not initiated.  6.  Factor V Leiden deficiency/DVT: Continue Coumadin.  10.  RM: Potentially diastolic dysfunction.  Mild left ventricular pressures on cardiac catheterization from [facility]/20[facility].  However, patient is on diuretic.  Previously, pressures  were assessed through [facility] and were elevated.  At this point, I am not fully sure what is causing her shortness of breath but I suspect it is a combination of diastolic dysfunction and deconditioning.  Will refer to cardiac rehab.  We will also start on Entresto 24/26 mg twice a day.  11.  Chest discomfort: Has ongoing chest discomfort.  She is concerned it is related to her heart.  Previously, had a rather unremarkable cardiac cath.  Has history of spasm.  Had a cardiac cath at the [facility] on 7/31/2023.  Had a mid LAD 30% lesion.  LIMA to LAD patent.  No other appreciable disease.  Again, I believe her symptoms are from spasm.  Tried many medications as outlined above and not sure how to control this.  She has been to [facility], [facility], and [facility].  Continue to try medications to alleviate her symptoms.  12.  Follow-up in 3 months unless she is doing well.  She has been started on Entresto.      Interval history:  Leigh is being seen in follow-up.  She continues to suffer from angina.  Her symptoms are relieved with nitro.  She has coronary spasm.  She has had stenting and bypass to her LAD.  She continues to have daily discomfort to her chest.  Cardiac catheterization was completed on 7/31/2023 at [facility].  She had a mid to distal LAD 30% lesion.  Her LIMA to LAD was patent.  She had no other appreciable disease.  I believe her symptoms are from spasm.  She was seen in [place].  She was seen on 4/1/2021 in cardiology.  They did an exercise stress test.  It appears at 100 beats a minute or greater, this induced angina.  Her exercise capacity was limited.  Heart function is normal.  Filling pressures were elevated.  Her case was discussed with one of the interventionalists.  They did not feel that they could offer intervention to improve her symptoms.  She was started on chlorthalidone 12.5 mg daily.  She was to follow-up with the pain clinic for a spinal cord stimulator.  She she continues have discomfort to  her chest.  Again, she was set up for cardiac catheterization, nothing has changed.  Have tried multiple medications.  I am concerned she still has diastolic dysfunction even though her filling pressures on her cardiac cath were normal.  I suspect the Lasix i has reduced these pressures making them look inappropriately normal.  We will try Entresto low-dose 24/26 mg twice a day.  We will continue to get other medications to alleviate her symptoms.  Certainly, if she like she could go back to [facility] or [facility] in the future.  She continues to be dyspneic on exertion.  I suggested cardiac rehab.  She will try cardiac rehab.      HPI:    Originally, she was diagnosed with a LAD ostial coronary artery spasm at [facility].  She had a positive methergine spasm study in 2005 at [facility] in the mid LAD, treated with drug-eluting stent to the mid-LAD.  She has a history of LAD myocardial bridging, CABG with LIMA to LAD on 11/15/2016 in [place], [place], history of heart failure with reported EF of 15% now recovered, stenting to the LAD x3, history of radiofrequency ablation for SVT x8, ultimately resulting in AV tessie ablation with pacemaker placement with pacemaker dependence, H/O stroke x7 with left hemiparesis essentially resolved with the exception of intermittent left foot drop, hyperlipidemia, on Coumadin with a history of factor V Leiden deficiency and numerous DVT's involving both the upper and lower extremities, hypertension, DM-2, on anticoagulated on Coumadin.     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  She has had care through [facility] as well as in [place], [place].  She was evaluated at [facility] and underwent angiography revealing separate origins of the LAD and circumflex.  She has a history of catheter induced coronary artery spasm originally diagnosed at [facility].     Dr. Bill Abreu through [facility] evaluated her in 2005. In spite of pre-treatment with  "nitroglycerine and calcium channel blockers, she had fairly vigorous mid-LAD spasm with intracoronary methergine. The ostial LAD did not spasm. Dr. Abreu treated this area with a 2.5 x 28 mm Cypher drug-eluting stent. The following year she had instent restenosis treated with a second 8 mm x 2.5 mm Cypher drug-eluting stent.      She did fairly well until 2016, when she had evaluation at [facility] in [place]. She was treated with trans-myocardial revascularization with 25 channels in the inferior, lateral and anterior LV walls, and underwent an LIMA-LAD. Her EF was 35% pre-op, and approximately 50-55% post-operatively.      She had recurrent angina, and on 2/8/17 she underwent repeat angiography which showed mild instent restenosis of the mid-LAD stents, followed by 99% stenosis of the mid-LAD at the LIMA-LAD anastomosis, presumably due to surgical clips/manipulation. The TERRENCE was atretic. Thus, the internationalists placed a 2.25 x 8 mm Promus Premiere drug-eluting stent into the mid-LAD with a good result.      Lately, she has been having an increasingly more frequent and severe anginal chest discomfort, and feels like she is back to her severe coronary \"spasm\" again.      He has ongoing concerns for angina felt to be vasospastic in nature.  She has had a total of x4 cardiac catheterizations on 6/17/2008, 11/21/2012, 1/17/2019, and I believe one in [place] do not have this date.       She has done pretty well since. Her EF was greater than 60% on a couple occasions since.  Most recently, on 5/16/2019 her EF was 60% to 65%.  There was no significant valvular disease detected.  Her echo was essentially unchanged from 4/18/2017.  Diastolic dysfunction was normal.      Relevant testing:  Cardiac cath on 7/31/2023:    Mid LAD to Dist LAD lesion is 30% stenosed.    Right sided filling pressures are normal.    Left sided filling pressures are normal.    Normal PA pressures.    Left ventricular filling " pressures are normal.    Normal cardiac output level.  Medical management for coronary artery disease.   No evidence of elevated cardiac filling pressures.     Echo on 5/17/2023:  1. Normal left ventricular chamber size, no regional wall motion abnormalities, calculated 2-D linear ejection fraction 63 %.   2. Indeterminate left ventricular diastolic function and filling pressure.   3. Normal right ventricular chamber size, mildly reduced systolic function (by visual inspection,), estimated right ventricular systolic pressure 26 mmHg assuming right atrial pressure of 5 mmHg.   4. Mild-moderate tricuspid valve regurgitation.   5. No pericardial effusion.    TTE on 4/27/2023:  Interpretation Summary  Left ventricular size, wall motion and function are normal. The ejection fraction is 55-60%.  Global right ventricular function is normal.  No hemodynamically significant valve abnormalities.  The inferior vena cava is normal.    ECHO on 8/5/21:  Left ventricular size, wall motion and function are normal other than abnormal  septal motion likely due to underlying left bundle branch block or pacing. The  ejection fraction is 55-60%.  Right ventricular function, chamber size, wall motion, and thickness are  normal.  No significant valvular abnormalities present.  No pericardial effusion is present.  Right ventricular systolic pressure is 21mmHg above the right atrial pressure  which is estimated at 3mmHg.  There is no prior study for direct comparison.    [facility]      History of coronary spasm and myocardial bridging s/p EMILIE to LAD and CAB x1 (LIMA-LAD)  Mild instent restenosis of LAD  Patent but atretic LIMA-LAD    [place] stress test with 4/1/2021:  1. Exercise echocardiogram positive for septal myocardial ischemia.  2. The patient's exercise capacity was limited.  3. Ejection fraction response from 60% at rest to 65% at peak stress.  4. Left ventricular end-systolic volume decreased with  stress.  5. OTHER ECHO FINDINGS:  6. Findings consistent with elevated left ventricular filling pressure at rest and with exercise.    Echo on 2021 at [facility]:  1. Normal left ventricular chamber size, no regional wall motion abnormalities, calculated 2-D linear ejection fraction 63 %.  2. Indeterminate left ventricular diastolic function and filling pressure.  3. Normal right ventricular chamber size, mildly reduced systolic function (by visual inspection,), estimated right ventricular systolic pressure 26 mmHg assuming right atrial pressure of 5 mmHg.  4. Mild-moderate tricuspid valve regurgitation.  5. No pericardial effusion.    Review of cardiac cath from [facility] by me on 2021:  1.  Severe coronary artery atherosclerosis  2.  Coronary artery grafts  CORONARY DIAGNOSTIC SUMMARY  Coronary artery dominance is left. Normal right coronary. Normal left main coronary. Normal circumflex coronary.   Side by side ostia of LAD and LCX.  The LAD is a diffusely diseased vessel, stented in its mid portion, and with a patent but somewhat atretic LIMA graft to its distal portion.,            Past Medical History:   Diagnosis Date    Acute thromboembolism of deep veins of lower extremity (H)     Overview:  Hx of multiple episodes of DVT: 3 lower extremity; 5 upper extremity (right arm)    Atrial fibrillation (H)     No Comments Provided    Cardiac pacemaker in situ     Dual chamber    Cerebral thrombosis     2006,'95 w/ no residual    Coronary atherosclerosis     2006    Endometrial hyperplasia     s/p endometrial ablation     Essential hypertension     No Comments Provided    History of other genital system and obstetric disorders      8, Para 3-0-5-2    Other and unspecified angina pectoris     2006    Other and unspecified hyperlipidemia     No Comments Provided    Other postprocedural states      and ,Followed by Dr. Usman Duran, [facility],.  "   Paroxysmal supraventricular tachycardia (H)     8/31/2006,s/p multiple ablations w last resulting in PPM    Personal history of transient ischemic attack (TIA) and cerebral infarction without residual deficit 04/2009    with left hemiplegia    Primary hypercoagulable state (H)     No Comments Provided       Past Surgical History:   Procedure Laterality Date    ARTHROSCOPY KNEE      01/90,Arthroscopy for chondromalacia patella    AS CABG, ARTERY-VEIN, SINGLE  11/15/2016    Single vessel; done in [place]    ATTEMPTED ARTHROSCOPY      02/04,Right arthroscopy    BIOPSY BREAST      03/08,Breast cyst aspiration    COLONOSCOPY      8/2007,follow up recommended in 10 years.    CV CORONARY ANGIOGRAM N/A 4/27/2021    Procedure: CV CORONARY ANGIOGRAM;  Surgeon: Nathaniel Grier MD;  Location:  HEART CARDIAC CATH LAB    ENDOSCOPIC SINUS SURGERY      03/04,Sinus node ablation.    EP ABLATION ATRIAL FLUTTER N/A 5/7/2020    Procedure: EP VENOGRAM SVC;  Surgeon: Hakeem Moore MD;  Location:  HEART CARDIAC CATH LAB    EP PACEMAKER N/A 5/20/2020    Procedure: EP PACEMAKER;  Surgeon: Tima Johnson MD;  Location:  HEART CARDIAC CATH LAB    EP STUDY  12/1994    EP STUDY /ABLATION,AV re-entry tachycardia, tessie ablation    HEART CATH, ANGIOPLASTY      01/06,Stenting placed LAD after ergonovine provocation confirmed    HEART CATH, ANGIOPLASTY      03/06,90% lesion, normal left ventricular function    IMPLANT PACEMAKER      03/03,Guidant pacemaker placement, AV tessie ablation    LAPAROSCOPIC ABLATION ENDOMETRIOSIS      06/06    OTHER SURGICAL HISTORY      3/24/06,887242,([place]) [facility] STENT ANGIO    OTHER SURGICAL HISTORY      08/02,428172,EP STUDY /ABLATION    OTHER SURGICAL HISTORY      11/04,07737.0,CT CORONARY ANGIOGRAM ([place]),Coronary angiogram, failed ablation    OTHER SURGICAL HISTORY      12/04,000169,[facility] RELOCATION OF SKIN POCKET PACEMAKER,[facility] 5/24/05 reconfiguration of pacemaker \"pocket\"    OTHER SURGICAL " HISTORY      122259,IP CONSULT TO ELECTROPHYSIOLOGY,study and lead change    OTHER SURGICAL HISTORY      12/06,444744,NEUROSTIMULATOR,Nerve stimulator implanted for chest pain control    OTHER SURGICAL HISTORY      12/2008,01478.0,CT CORONARY ANGIOGRAM ([place]),with increased left-sided weakness and vertigo, negative CT angiogram.    REPLACE PACEMAKER GENERATOR      12/29/04,Replacement of left ventricular pacemaker lead.    STENT  02/2017    LAD        Allergies   Allergen Reactions    Adhesive Tape        Only Tele patches cause rash and skin irritation. Okay to use other adhesive tape.     Morphine Nausea and Vomiting     OK in small doses    Prednisone Nausea and Vomiting     Per IV    Sotalol Nausea and Vomiting       Current Outpatient Medications   Medication Sig Dispense Refill    sacubitril-valsartan (ENTRESTO) 24-26 MG per tablet Take 1 tablet by mouth 2 times daily 60 tablet 11    arginine (L-ARGININE) 1000 MG tablet Take 2 tablets (2,000 mg) by mouth 3 times daily 90 tablet 11    aspirin 81 MG EC tablet Take 1 tablet (81 mg) by mouth daily for 360 days Start tomorrow. 90 tablet 3    aspirin 81 MG EC tablet Take 1 tablet (81 mg) by mouth daily 90 tablet 3    atorvastatin (LIPITOR) 80 MG tablet Take 1 tablet (80 mg) by mouth daily 90 tablet 3    atorvastatin (LIPITOR) 80 MG tablet Take 1 tablet (80 mg) by mouth daily 90 tablet 3    calcium carbonate-vitamin D (OSCAL W/D) 500-200 MG-UNIT tablet Take 1 tablet by mouth      cloNIDine (CATAPRES) 0.1 MG tablet Take 1 tablet (0.1 mg) by mouth At Bedtime 90 tablet 3    diltiazem ER (TIAZAC) 360 MG 24 hr ER beaded capsule Take 1 capsule (360 mg) by mouth daily 90 capsule 3    furosemide (LASIX) 20 MG tablet Take 1-2 tablets (20-40 mg) by mouth daily 180 tablet 4    isosorbide mononitrate (IMDUR) 120 MG 24 HR ER tablet Take 1 tablet (120 mg) by mouth daily 90 tablet 3    NIFEdipine ER (ADALAT CC) 60 MG 24 hr tablet Take 1 tablet (60 mg) by mouth daily 90 tablet 3     nitroGLYcerin (NITRODUR) 0.2 MG/HR 24 hr patch Place 1 patch onto the skin daily - for chronic angina, coronary vasospasm 30 patch 1    nitroGLYcerin (NITROSTAT) 0.4 MG sublingual tablet For chest pain place 1 tablet under the tongue every 5 minutes for 3 doses. If symptoms persist 5 minutes after 1st dose call 911. 100 tablet 3    ranolazine (RANEXA) 1000 MG TB12 12 hr tablet Take 1 tablet (1,000 mg) by mouth 2 times daily 180 tablet 3    sertraline (ZOLOFT) 50 MG tablet Take 1 tablet (50 mg) by mouth daily 90 tablet 3    spironolactone (ALDACTONE) 25 MG tablet Take 1 tablet (25 mg) by mouth daily 90 tablet 1    warfarin ANTICOAGULANT (COUMADIN) 6 MG tablet TAKE 3 MG (1/2 TABLET) ON FRIDAYS AND  AND 6 MG (1 TABLETS) ALL OTHER DAYS OR  AS  DIRECTED  BY  THE  [facility]  CL[facility]. 160 tablet 1    zolpidem (AMBIEN) 10 MG tablet Take 1 tablet (10 mg) by mouth nightly as needed for sleep 90 tablet 0       Social History     Socioeconomic History    Marital status:      Spouse name: Antonio    Number of children: 2    Years of education: 16+    Highest education level: Master's degree (e.g., MA, MS, [employer], MEd, MSW, [employer])   Occupational History    Not on file   Tobacco Use    Smoking status: Never    Smokeless tobacco: Never    Tobacco comments:     Quit smoking: spouse does not smoke in the house. Exposed in cars.   Vaping Use    Vaping Use: Never used   Substance and Sexual Activity    Alcohol use: Yes     Comment: Alcoholic Drinks/day: Alcoholic Drinks/day: 1-2 drinks a week    Drug use: Never    Sexual activity: Not Currently     Partners: Male   Other Topics Concern    Parent/sibling w/ CABG, MI or angioplasty before 65F 55M? Not Asked   Social History Narrative     2nd Marriage x 20 years to  Anthony they live in [place], [place]       in [place]. Completed her Master's Degree 2003. Taught Special Education.  Early residential due to health 2007.      Total 5 children blended family-3  dgts. And 2 sons    Daughter:  Philip    Son:  Anthony - lives in [facility]    6 Grand-children 5 boys and 1 girl    Leigh parents' and siblings live in [facility] [place].     Social Determinants of Health     Financial Resource Strain: Low Risk  (9/28/2019)    Overall Financial Resource Strain (CARDIA)     Difficulty of Paying Living Expenses: Not hard at all   Food Insecurity: No Food Insecurity (9/28/2019)    Hunger Vital Sign     Worried About Running Out of Food in the Last Year: Never true     Ran Out of Food in the Last Year: Never true   Transportation Needs: No Transportation Needs (9/28/2019)    PRAPARE - Transportation     Lack of Transportation (Medical): No     Lack of Transportation (Non-Medical): No   Physical Activity: Inactive (9/28/2019)    Exercise Vital Sign     Days of Exercise per Week: 0 days     Minutes of Exercise per Session: 0 min   Stress: Stress Concern Present (9/28/2019)    [employer] - Occupational Stress Questionnaire     Feeling of Stress : To some extent   Social Connections: Not on file   Intimate Partner Violence: Not on file   Housing Stability: Not on file       LAB RESULTS:   Anticoagulation Therapy Visit on 08/24/2020   Component Date Value Ref Range Status    INR 08/24/2020 1.3* 0.90 - 1.10 Corrected   Anticoagulation Therapy Visit on 08/19/2020   Component Date Value Ref Range Status    INR 08/19/2020 2.3* 0.90 - 1.10 Final   Anticoagulation Therapy Visit on 08/05/2020   Component Date Value Ref Range Status    INR 08/05/2020 2.1* 0.90 - 1.10 Final   Allied Health/Nurse Visit on 08/03/2020   Component Date Value Ref Range Status    COVID-19 Virus PCR to [facility] [place] - So* 08/03/2020 Nasopharyngeal   Final    COVID-19 Virus PCR to [facility] [place] - Re* 08/03/2020 Not Detected   Final   Anticoagulation Therapy Visit on 07/30/2020   Component Date Value Ref Range Status    INR 07/30/2020 3.8* 0.90 - 1.10 Final   Anticoagulation Therapy Visit on 07/27/2020   Component  "Date Value Ref Range Status    INR 07/25/2020 1.1  0.90 - 1.10 Final    INR 07/27/2020 1.2* 0.90 - 1.10 Final   Anticoagulation Therapy Visit on 07/23/2020   Component Date Value Ref Range Status    INR Protime 07/23/2020 1.0  0.86 - 1.14 Final   Anticoagulation Therapy Visit on 07/21/2020   Component Date Value Ref Range Status    INR 07/21/2020 1.4* 0.90 - 1.10 Final   Anticoagulation Therapy Visit on 07/09/2020   Component Date Value Ref Range Status    INR 07/08/2020 2.1* 0.90 - 1.10 Final        Review of systems: Negative except that which was noted in the HPI.    Physical examination:  /80 (BP Location: Right arm, Patient Position: Sitting, Cuff Size: Adult Regular)   Pulse 73   Temp 97.5  F (36.4  C) (Temporal)   Resp 16   Ht 1.64 m (5' 4.57\")   Wt 69.9 kg (154 lb)   SpO2 97%   BMI 25.97 kg/m      GENERAL APPEARANCE: healthy, alert and no distress  CHEST: lungs clear to auscultation - no rales, rhonchi or wheezes, no use of accessory muscles, no retractions, respirations are unlabored, normal respiratory rate  CARDIOVASCULAR: regular rhythm, normal S1 with physiologic split S2, no S3 or S4 and no murmur, click or rub  EXTREMITIES: no clubbing, cyanosis with mild peripheral edema      Total time spent on day of visit, including review of tests, obtaining/reviewing separately obtained history, ordering medications/tests/procedures, communicating with PCP/consultants, and documenting in electronic medical record: 40 minutes.       Thank you for allowing me to participate in the care of your patient. Please do not hesitate to contact me if you have any questions.     Moises Islas, DO        "

## 2023-08-10 DIAGNOSIS — Z86.79 HISTORY OF CORONARY VASOSPASM: ICD-10-CM

## 2023-08-10 DIAGNOSIS — I20.1 CORONARY ARTERY SPASM (H): ICD-10-CM

## 2023-08-10 DIAGNOSIS — R07.9 CHEST PAIN, UNSPECIFIED TYPE: Primary | ICD-10-CM

## 2023-08-10 DIAGNOSIS — Z95.1 HISTORY OF CORONARY ARTERY BYPASS GRAFT X 1: ICD-10-CM

## 2023-08-10 DIAGNOSIS — Z95.5 HISTORY OF CORONARY ARTERY STENT PLACEMENT: ICD-10-CM

## 2023-08-10 DIAGNOSIS — R07.89 OTHER CHEST PAIN: ICD-10-CM

## 2023-08-10 DIAGNOSIS — Z98.890 STATUS POST CORONARY ANGIOGRAM: ICD-10-CM

## 2023-08-10 DIAGNOSIS — Q24.5 MYOCARDIAL BRIDGE: ICD-10-CM

## 2023-08-15 ENCOUNTER — MYC MEDICAL ADVICE (OUTPATIENT)
Dept: CARDIOLOGY | Facility: OTHER | Age: 68
End: 2023-08-15
Payer: MEDICARE

## 2023-08-15 NOTE — TELEPHONE ENCOUNTER
Dr Islas,   Please read and advise My Chart message. Thank you. Bettye Miller LPN on 8/15/2023 at 11:03 AM

## 2023-08-15 NOTE — TELEPHONE ENCOUNTER
Dr Islas,   Please read and advise MyChart message from patient. Last office visit with you was on 8/8/2023.     Bettye Miller LPN on 8/15/2023 at 11:01 AM

## 2023-08-16 ENCOUNTER — TELEPHONE (OUTPATIENT)
Dept: CARDIAC REHAB | Facility: OTHER | Age: 68
End: 2023-08-16
Payer: MEDICARE

## 2023-08-16 NOTE — TELEPHONE ENCOUNTER
Patient verified their .  Called patient re: referral to cardiac rehab received from .  Instructed on program and goals of cardiac rehab.  Appointment set for 23.  Patient verbalized understanding.

## 2023-08-17 ENCOUNTER — HOSPITAL ENCOUNTER (OUTPATIENT)
Dept: CARDIAC REHAB | Facility: OTHER | Age: 68
Discharge: HOME | End: 2023-08-17
Attending: INTERNAL MEDICINE
Payer: MEDICARE

## 2023-08-17 PROCEDURE — 93798 PHYS/QHP OP CAR RHAB W/ECG: CPT

## 2023-08-17 ASSESSMENT — PATIENT HEALTH QUESTIONNAIRE - PHQ9: SUM OF ALL RESPONSES TO PHQ QUESTIONS 1-9: 0

## 2023-08-18 ENCOUNTER — HOSPITAL ENCOUNTER (OUTPATIENT)
Dept: CARDIAC REHAB | Facility: OTHER | Age: 68
Discharge: HOME | End: 2023-08-18
Attending: STUDENT IN AN ORGANIZED HEALTH CARE EDUCATION/TRAINING PROGRAM
Payer: MEDICARE

## 2023-08-18 PROCEDURE — 93798 PHYS/QHP OP CAR RHAB W/ECG: CPT

## 2023-08-21 ENCOUNTER — HOSPITAL ENCOUNTER (OUTPATIENT)
Dept: CARDIAC REHAB | Facility: OTHER | Age: 68
Discharge: HOME | End: 2023-08-21
Attending: STUDENT IN AN ORGANIZED HEALTH CARE EDUCATION/TRAINING PROGRAM
Payer: MEDICARE

## 2023-08-21 PROCEDURE — 93798 PHYS/QHP OP CAR RHAB W/ECG: CPT

## 2023-08-22 ENCOUNTER — ANTICOAGULATION THERAPY VISIT (OUTPATIENT)
Dept: ANTICOAGULATION | Facility: OTHER | Age: 68
End: 2023-08-22
Attending: FAMILY MEDICINE
Payer: MEDICARE

## 2023-08-22 DIAGNOSIS — D68.2 FACTOR V DEFICIENCY (H): ICD-10-CM

## 2023-08-22 DIAGNOSIS — I66.9 CEREBRAL THROMBOSIS: ICD-10-CM

## 2023-08-22 DIAGNOSIS — Z79.01 ANTICOAGULATION MONITORING, INR RANGE 2-3: Primary | ICD-10-CM

## 2023-08-22 DIAGNOSIS — Z95.5 HISTORY OF CORONARY ARTERY STENT PLACEMENT: ICD-10-CM

## 2023-08-22 LAB — INR HOME MONITORING: 3.5 (ref 2–3)

## 2023-08-22 NOTE — PROGRESS NOTES
ANTICOAGULATION MANAGEMENT     Lyudmila Fitzgerald 68 year old female is on warfarin with supratherapeutic INR result. (Goal INR 2.0-3.0)    Recent labs: (last 7 days)     08/22/23  0000   INR 3.5*       ASSESSMENT     Source(s): Chart Review and Patient/Caregiver Call     Warfarin doses taken: Warfarin taken as instructed  Diet: No new diet changes identified  Medication/supplement changes:  started entresto  New illness, injury, or hospitalization: No  Signs or symptoms of bleeding or clotting: No  Previous result: Subtherapeutic  Additional findings: None       PLAN     Recommended plan for ongoing change(s) affecting INR     Dosing Instructions: partial hold then decrease your warfarin dose (7.7% change) with next INR in 2 weeks       Summary  As of 8/22/2023      Full warfarin instructions:  8/22: 3 mg; Otherwise 3 mg every Mon, Fri; 6 mg all other days   Next INR check:  9/5/2023               Telephone call with Leigh who verbalizes understanding and agrees to plan    Patient to recheck with home meter    Education provided:   None required    Plan made per [facility] anticoagulation protocol    Nyla Walker RN  Anticoagulation Clinic  8/22/2023    _______________________________________________________________________     Anticoagulation Episode Summary       Current INR goal:  2.0-3.0   TTR:  64.6 % (1 y)   Target end date:  Indefinite   Send INR reminders to:  [facility]    Indications    Acute thromboembolism of deep veins of lower extremity (H) (Resolved) [I82.409]  Anticoagulation monitoring  INR range 2-3 [Z79.01]  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]  Cerebral thrombosis [I66.9]  History of coronary artery stent placement to the LAD x3 [Z95.5]             Comments:  home monitor [employer] check INR q 2 weeks             Anticoagulation Care Providers       Provider Role Specialty Phone number    Erika Carrasco MD Referring Family Medicine [phone]

## 2023-08-23 ENCOUNTER — HOSPITAL ENCOUNTER (OUTPATIENT)
Dept: CARDIAC REHAB | Facility: OTHER | Age: 68
Discharge: HOME | End: 2023-08-23
Attending: STUDENT IN AN ORGANIZED HEALTH CARE EDUCATION/TRAINING PROGRAM
Payer: MEDICARE

## 2023-08-23 ENCOUNTER — MYC MEDICAL ADVICE (OUTPATIENT)
Dept: CARDIOLOGY | Facility: OTHER | Age: 68
End: 2023-08-23
Payer: MEDICARE

## 2023-08-23 PROCEDURE — 93798 PHYS/QHP OP CAR RHAB W/ECG: CPT

## 2023-08-23 NOTE — TELEPHONE ENCOUNTER
Please review My Chart message sent by patient in regards to her medication. Thank you.     Bettye Miller LPN on 8/23/2023 at 3:18 PM

## 2023-08-24 DIAGNOSIS — I20.1 CORONARY ARTERY SPASM (H): ICD-10-CM

## 2023-08-24 DIAGNOSIS — Q24.5 MYOCARDIAL BRIDGE: ICD-10-CM

## 2023-08-24 DIAGNOSIS — Z95.1 HISTORY OF CORONARY ARTERY BYPASS GRAFT X 1: ICD-10-CM

## 2023-08-24 DIAGNOSIS — Z95.5 HISTORY OF CORONARY ARTERY STENT PLACEMENT: ICD-10-CM

## 2023-08-24 DIAGNOSIS — Z86.79 HISTORY OF CORONARY VASOSPASM: ICD-10-CM

## 2023-08-24 DIAGNOSIS — R07.9 CHEST PAIN, UNSPECIFIED TYPE: ICD-10-CM

## 2023-08-24 DIAGNOSIS — R07.89 OTHER CHEST PAIN: ICD-10-CM

## 2023-08-24 RX ORDER — RANOLAZINE 1000 MG/1
1000 TABLET, EXTENDED RELEASE ORAL 2 TIMES DAILY
Qty: 180 TABLET | Refills: 3 | Status: SHIPPED | OUTPATIENT
Start: 2023-08-24 | End: 2023-08-29

## 2023-08-25 ENCOUNTER — TELEPHONE (OUTPATIENT)
Dept: CARDIOLOGY | Facility: OTHER | Age: 68
End: 2023-08-25
Payer: MEDICARE

## 2023-08-25 ENCOUNTER — HOSPITAL ENCOUNTER (OUTPATIENT)
Dept: CARDIAC REHAB | Facility: OTHER | Age: 68
Discharge: HOME | End: 2023-08-25
Attending: STUDENT IN AN ORGANIZED HEALTH CARE EDUCATION/TRAINING PROGRAM
Payer: MEDICARE

## 2023-08-25 PROCEDURE — 93798 PHYS/QHP OP CAR RHAB W/ECG: CPT

## 2023-08-25 NOTE — TELEPHONE ENCOUNTER
Calling regarding the prescription for Ranexa. There is a drug interaction with diltiazem.    Please advise.    Shantel Garcia on 8/25/2023 at 12:55 PM

## 2023-08-28 NOTE — TELEPHONE ENCOUNTER
Moises Islas, DO. Please review insurance information that you requested. Thank you.     Bettye Miller LPN on 8/28/2023 at 10:45 AM

## 2023-08-29 DIAGNOSIS — I20.1 CORONARY ARTERY SPASM (H): ICD-10-CM

## 2023-08-29 DIAGNOSIS — R07.89 OTHER CHEST PAIN: ICD-10-CM

## 2023-08-29 DIAGNOSIS — R07.9 CHEST PAIN, UNSPECIFIED TYPE: ICD-10-CM

## 2023-08-29 DIAGNOSIS — Q24.5 MYOCARDIAL BRIDGE: ICD-10-CM

## 2023-08-29 DIAGNOSIS — Z95.5 HISTORY OF CORONARY ARTERY STENT PLACEMENT: ICD-10-CM

## 2023-08-29 DIAGNOSIS — Z86.79 HISTORY OF CORONARY VASOSPASM: ICD-10-CM

## 2023-08-29 DIAGNOSIS — Z95.1 HISTORY OF CORONARY ARTERY BYPASS GRAFT X 1: ICD-10-CM

## 2023-08-29 RX ORDER — RANOLAZINE 500 MG/1
500 TABLET, EXTENDED RELEASE ORAL 2 TIMES DAILY
Qty: 180 TABLET | Refills: 3 | Status: SHIPPED | OUTPATIENT
Start: 2023-08-29 | End: 2023-11-14

## 2023-08-29 NOTE — TELEPHONE ENCOUNTER
I spoke with [facility] pharmacy and he said that suggested maximum dose for Ranexa is 500 mg BID with using diltiazem. Please advise.  Mirella Royal LPN ....................8/29/2023   9:27 AM

## 2023-08-29 NOTE — TELEPHONE ENCOUNTER
Yes,  I agree.  The patient should be on Ranexa/ranolazine 500 mg twice a day.  I did change the prescription and resent it to [facility].  I gave her a 90-day supply with 3 refills.    Dr. Islas          Patient notified. of change in Rx dose.  Mirella Royal LPN ....................8/29/2023   11:24 AM

## 2023-08-30 ENCOUNTER — ALLIED HEALTH/NURSE VISIT (OUTPATIENT)
Dept: FAMILY MEDICINE | Facility: OTHER | Age: 68
End: 2023-08-30
Attending: FAMILY MEDICINE
Payer: MEDICARE

## 2023-08-30 DIAGNOSIS — Z20.822 COVID-19 RULED OUT: Primary | ICD-10-CM

## 2023-08-30 LAB — SARS-COV-2 RNA RESP QL NAA+PROBE: NEGATIVE

## 2023-08-30 PROCEDURE — 87635 SARS-COV-2 COVID-19 AMP PRB: CPT | Mod: ZL | Performed by: PHYSICIAN ASSISTANT

## 2023-08-30 PROCEDURE — C9803 HOPD COVID-19 SPEC COLLECT: HCPCS

## 2023-08-30 NOTE — PROGRESS NOTES
Presents for covid testing. Body aches, congestion and low grade fever. Sample collected and sent to lab.  Patient has mychart.  Lisbeth Mendes RN on 8/30/2023 at 3:07 PM

## 2023-08-31 NOTE — TELEPHONE ENCOUNTER
Called [facility] and they state the ranolazine is covered by her insurance.  Kandace Morelos RN......August 31, 2023...11:06 AM

## 2023-09-07 ENCOUNTER — TELEPHONE (OUTPATIENT)
Dept: CARDIAC REHAB | Facility: OTHER | Age: 68
End: 2023-09-07
Payer: MEDICARE

## 2023-09-07 ENCOUNTER — HOSPITAL ENCOUNTER (OUTPATIENT)
Facility: OTHER | Age: 68
Setting detail: OBSERVATION
Discharge: HOME | End: 2023-09-08
Attending: PHYSICIAN ASSISTANT | Admitting: INTERNAL MEDICINE
Payer: MEDICARE

## 2023-09-07 ENCOUNTER — APPOINTMENT (OUTPATIENT)
Dept: GENERAL RADIOLOGY | Facility: OTHER | Age: 68
End: 2023-09-07
Attending: PHYSICIAN ASSISTANT
Payer: MEDICARE

## 2023-09-07 DIAGNOSIS — R79.1 SUPRATHERAPEUTIC INR: ICD-10-CM

## 2023-09-07 DIAGNOSIS — I95.9 HYPOTENSION, UNSPECIFIED HYPOTENSION TYPE: ICD-10-CM

## 2023-09-07 DIAGNOSIS — R42 LIGHTHEADEDNESS: ICD-10-CM

## 2023-09-07 LAB
ALBUMIN SERPL BCG-MCNC: 3.5 G/DL (ref 3.5–5.2)
ALBUMIN UR-MCNC: NEGATIVE MG/DL
ALP SERPL-CCNC: 98 U/L (ref 35–104)
ALT SERPL W P-5'-P-CCNC: 54 U/L (ref 0–50)
ANION GAP SERPL CALCULATED.3IONS-SCNC: 14 MMOL/L (ref 7–15)
APPEARANCE UR: CLEAR
AST SERPL W P-5'-P-CCNC: 43 U/L (ref 0–45)
BASOPHILS # BLD AUTO: 0.1 10E3/UL (ref 0–0.2)
BASOPHILS NFR BLD AUTO: 1 %
BILIRUB SERPL-MCNC: 0.4 MG/DL
BILIRUB UR QL STRIP: NEGATIVE
BUN SERPL-MCNC: 8.7 MG/DL (ref 8–23)
CALCIUM SERPL-MCNC: 9.3 MG/DL (ref 8.8–10.2)
CHLORIDE SERPL-SCNC: 100 MMOL/L (ref 98–107)
COLOR UR AUTO: ABNORMAL
CREAT SERPL-MCNC: 0.78 MG/DL (ref 0.51–0.95)
DEPRECATED HCO3 PLAS-SCNC: 21 MMOL/L (ref 22–29)
EGFRCR SERPLBLD CKD-EPI 2021: 82 ML/MIN/1.73M2
EOSINOPHIL # BLD AUTO: 0.1 10E3/UL (ref 0–0.7)
EOSINOPHIL NFR BLD AUTO: 1 %
ERYTHROCYTE [DISTWIDTH] IN BLOOD BY AUTOMATED COUNT: 12.9 % (ref 10–15)
FLUAV RNA SPEC QL NAA+PROBE: NEGATIVE
FLUBV RNA RESP QL NAA+PROBE: NEGATIVE
GLUCOSE SERPL-MCNC: 117 MG/DL (ref 70–99)
GLUCOSE UR STRIP-MCNC: NEGATIVE MG/DL
HCT VFR BLD AUTO: 38.8 % (ref 35–47)
HGB BLD-MCNC: 13.7 G/DL (ref 11.7–15.7)
HGB UR QL STRIP: NEGATIVE
HOLD SPECIMEN: NORMAL
HYALINE CASTS: 9 /LPF
IMM GRANULOCYTES # BLD: 0.1 10E3/UL
IMM GRANULOCYTES NFR BLD: 1 %
INR PPP: 4.07 (ref 0.85–1.15)
KETONES UR STRIP-MCNC: NEGATIVE MG/DL
LACTATE SERPL-SCNC: 1.5 MMOL/L (ref 0.7–2)
LEUKOCYTE ESTERASE UR QL STRIP: ABNORMAL
LYMPHOCYTES # BLD AUTO: 1.6 10E3/UL (ref 0.8–5.3)
LYMPHOCYTES NFR BLD AUTO: 15 %
MAGNESIUM SERPL-MCNC: 2 MG/DL (ref 1.7–2.3)
MCH RBC QN AUTO: 31.3 PG (ref 26.5–33)
MCHC RBC AUTO-ENTMCNC: 35.3 G/DL (ref 31.5–36.5)
MCV RBC AUTO: 89 FL (ref 78–100)
MONOCYTES # BLD AUTO: 0.6 10E3/UL (ref 0–1.3)
MONOCYTES NFR BLD AUTO: 6 %
NEUTROPHILS # BLD AUTO: 8.5 10E3/UL (ref 1.6–8.3)
NEUTROPHILS NFR BLD AUTO: 76 %
NITRATE UR QL: NEGATIVE
NRBC # BLD AUTO: 0 10E3/UL
NRBC BLD AUTO-RTO: 0 /100
NT-PROBNP SERPL-MCNC: 365 PG/ML (ref 0–900)
PH UR STRIP: 6.5 [PH] (ref 5–9)
PLATELET # BLD AUTO: 370 10E3/UL (ref 150–450)
POTASSIUM SERPL-SCNC: 4 MMOL/L (ref 3.4–5.3)
PROT SERPL-MCNC: 6.9 G/DL (ref 6.4–8.3)
RBC # BLD AUTO: 4.38 10E6/UL (ref 3.8–5.2)
RBC URINE: <1 /HPF
RSV RNA SPEC NAA+PROBE: NEGATIVE
SARS-COV-2 RNA RESP QL NAA+PROBE: NEGATIVE
SODIUM SERPL-SCNC: 135 MMOL/L (ref 136–145)
SP GR UR STRIP: 1.01 (ref 1–1.03)
TROPONIN T SERPL HS-MCNC: <6 NG/L
TSH SERPL DL<=0.005 MIU/L-ACNC: 3.54 UIU/ML (ref 0.3–4.2)
UROBILINOGEN UR STRIP-MCNC: NORMAL MG/DL
WBC # BLD AUTO: 11 10E3/UL (ref 4–11)
WBC URINE: 23 /HPF

## 2023-09-07 PROCEDURE — 258N000003 HC RX IP 258 OP 636: Performed by: PHYSICIAN ASSISTANT

## 2023-09-07 PROCEDURE — G0378 HOSPITAL OBSERVATION PER HR: HCPCS

## 2023-09-07 PROCEDURE — 99285 EMERGENCY DEPT VISIT HI MDM: CPT | Performed by: PHYSICIAN ASSISTANT

## 2023-09-07 PROCEDURE — 84443 ASSAY THYROID STIM HORMONE: CPT | Performed by: PHYSICIAN ASSISTANT

## 2023-09-07 PROCEDURE — 85610 PROTHROMBIN TIME: CPT | Performed by: PHYSICIAN ASSISTANT

## 2023-09-07 PROCEDURE — 83735 ASSAY OF MAGNESIUM: CPT | Performed by: PHYSICIAN ASSISTANT

## 2023-09-07 PROCEDURE — 99222 1ST HOSP IP/OBS MODERATE 55: CPT | Performed by: INTERNAL MEDICINE

## 2023-09-07 PROCEDURE — 258N000003 HC RX IP 258 OP 636: Performed by: INTERNAL MEDICINE

## 2023-09-07 PROCEDURE — 83880 ASSAY OF NATRIURETIC PEPTIDE: CPT | Performed by: PHYSICIAN ASSISTANT

## 2023-09-07 PROCEDURE — 93005 ELECTROCARDIOGRAM TRACING: CPT | Performed by: PHYSICIAN ASSISTANT

## 2023-09-07 PROCEDURE — 36415 COLL VENOUS BLD VENIPUNCTURE: CPT | Performed by: PHYSICIAN ASSISTANT

## 2023-09-07 PROCEDURE — 87637 SARSCOV2&INF A&B&RSV AMP PRB: CPT | Performed by: PHYSICIAN ASSISTANT

## 2023-09-07 PROCEDURE — 83605 ASSAY OF LACTIC ACID: CPT | Performed by: PHYSICIAN ASSISTANT

## 2023-09-07 PROCEDURE — 85025 COMPLETE CBC W/AUTO DIFF WBC: CPT | Performed by: PHYSICIAN ASSISTANT

## 2023-09-07 PROCEDURE — 84484 ASSAY OF TROPONIN QUANT: CPT | Performed by: PHYSICIAN ASSISTANT

## 2023-09-07 PROCEDURE — 71045 X-RAY EXAM CHEST 1 VIEW: CPT

## 2023-09-07 PROCEDURE — 99285 EMERGENCY DEPT VISIT HI MDM: CPT | Mod: 25 | Performed by: PHYSICIAN ASSISTANT

## 2023-09-07 PROCEDURE — 87086 URINE CULTURE/COLONY COUNT: CPT | Mod: GZ | Performed by: PHYSICIAN ASSISTANT

## 2023-09-07 PROCEDURE — 93010 ELECTROCARDIOGRAM REPORT: CPT | Performed by: STUDENT IN AN ORGANIZED HEALTH CARE EDUCATION/TRAINING PROGRAM

## 2023-09-07 PROCEDURE — 81001 URINALYSIS AUTO W/SCOPE: CPT | Performed by: PHYSICIAN ASSISTANT

## 2023-09-07 PROCEDURE — 250N000013 HC RX MED GY IP 250 OP 250 PS 637: Performed by: INTERNAL MEDICINE

## 2023-09-07 PROCEDURE — 96361 HYDRATE IV INFUSION ADD-ON: CPT | Performed by: PHYSICIAN ASSISTANT

## 2023-09-07 PROCEDURE — 80053 COMPREHEN METABOLIC PANEL: CPT | Performed by: PHYSICIAN ASSISTANT

## 2023-09-07 PROCEDURE — 96360 HYDRATION IV INFUSION INIT: CPT | Performed by: PHYSICIAN ASSISTANT

## 2023-09-07 RX ORDER — ATORVASTATIN CALCIUM 40 MG/1
80 TABLET, FILM COATED ORAL AT BEDTIME
Status: DISCONTINUED | OUTPATIENT
Start: 2023-09-07 | End: 2023-09-08 | Stop reason: HOSPADM

## 2023-09-07 RX ORDER — ISOSORBIDE MONONITRATE 60 MG/1
120 TABLET, EXTENDED RELEASE ORAL DAILY
Status: DISCONTINUED | OUTPATIENT
Start: 2023-09-08 | End: 2023-09-08 | Stop reason: HOSPADM

## 2023-09-07 RX ORDER — NIFEDIPINE 30 MG/1
60 TABLET, EXTENDED RELEASE ORAL AT BEDTIME
Status: DISCONTINUED | OUTPATIENT
Start: 2023-09-07 | End: 2023-09-08 | Stop reason: HOSPADM

## 2023-09-07 RX ORDER — AMOXICILLIN 250 MG
2 CAPSULE ORAL 2 TIMES DAILY PRN
Status: DISCONTINUED | OUTPATIENT
Start: 2023-09-07 | End: 2023-09-08 | Stop reason: HOSPADM

## 2023-09-07 RX ORDER — SODIUM CHLORIDE 9 MG/ML
INJECTION, SOLUTION INTRAVENOUS CONTINUOUS
Status: DISCONTINUED | OUTPATIENT
Start: 2023-09-07 | End: 2023-09-08 | Stop reason: HOSPADM

## 2023-09-07 RX ORDER — ZOLPIDEM TARTRATE 5 MG/1
5 TABLET ORAL
Status: DISCONTINUED | OUTPATIENT
Start: 2023-09-07 | End: 2023-09-08 | Stop reason: HOSPADM

## 2023-09-07 RX ORDER — AMOXICILLIN 250 MG
1 CAPSULE ORAL 2 TIMES DAILY PRN
Status: DISCONTINUED | OUTPATIENT
Start: 2023-09-07 | End: 2023-09-08 | Stop reason: HOSPADM

## 2023-09-07 RX ORDER — ASPIRIN 81 MG/1
81 TABLET ORAL AT BEDTIME
Status: DISCONTINUED | OUTPATIENT
Start: 2023-09-07 | End: 2023-09-08 | Stop reason: HOSPADM

## 2023-09-07 RX ORDER — NITROGLYCERIN 0.4 MG/1
0.4 TABLET SUBLINGUAL EVERY 5 MIN PRN
Status: DISCONTINUED | OUTPATIENT
Start: 2023-09-07 | End: 2023-09-08 | Stop reason: HOSPADM

## 2023-09-07 RX ORDER — ZOLPIDEM TARTRATE 5 MG/1
5 TABLET ORAL
Status: DISCONTINUED | OUTPATIENT
Start: 2023-09-07 | End: 2023-09-07

## 2023-09-07 RX ORDER — POLYETHYLENE GLYCOL 3350 17 G/17G
17 POWDER, FOR SOLUTION ORAL DAILY PRN
Status: DISCONTINUED | OUTPATIENT
Start: 2023-09-07 | End: 2023-09-08 | Stop reason: HOSPADM

## 2023-09-07 RX ORDER — CLONIDINE HYDROCHLORIDE 0.1 MG/1
0.1 TABLET ORAL AT BEDTIME
Status: DISCONTINUED | OUTPATIENT
Start: 2023-09-07 | End: 2023-09-08 | Stop reason: HOSPADM

## 2023-09-07 RX ORDER — RANOLAZINE 500 MG/1
500 TABLET, EXTENDED RELEASE ORAL 2 TIMES DAILY
Status: DISCONTINUED | OUTPATIENT
Start: 2023-09-07 | End: 2023-09-08 | Stop reason: HOSPADM

## 2023-09-07 RX ORDER — ONDANSETRON 4 MG/1
4 TABLET, ORALLY DISINTEGRATING ORAL EVERY 6 HOURS PRN
Status: DISCONTINUED | OUTPATIENT
Start: 2023-09-07 | End: 2023-09-08 | Stop reason: HOSPADM

## 2023-09-07 RX ORDER — BISACODYL 10 MG
10 SUPPOSITORY, RECTAL RECTAL DAILY PRN
Status: DISCONTINUED | OUTPATIENT
Start: 2023-09-07 | End: 2023-09-08 | Stop reason: HOSPADM

## 2023-09-07 RX ORDER — ACETAMINOPHEN 325 MG/1
975 TABLET ORAL EVERY 6 HOURS PRN
Status: DISCONTINUED | OUTPATIENT
Start: 2023-09-07 | End: 2023-09-08 | Stop reason: HOSPADM

## 2023-09-07 RX ORDER — DILTIAZEM HYDROCHLORIDE 360 MG/1
360 CAPSULE, EXTENDED RELEASE ORAL DAILY
Status: DISCONTINUED | OUTPATIENT
Start: 2023-09-08 | End: 2023-09-08 | Stop reason: HOSPADM

## 2023-09-07 RX ORDER — LIDOCAINE 40 MG/G
CREAM TOPICAL
Status: DISCONTINUED | OUTPATIENT
Start: 2023-09-07 | End: 2023-09-08 | Stop reason: HOSPADM

## 2023-09-07 RX ORDER — NITROGLYCERIN 40 MG/1
1 PATCH TRANSDERMAL DAILY
Status: DISCONTINUED | OUTPATIENT
Start: 2023-09-07 | End: 2023-09-07

## 2023-09-07 RX ORDER — ONDANSETRON 2 MG/ML
4 INJECTION INTRAMUSCULAR; INTRAVENOUS EVERY 6 HOURS PRN
Status: DISCONTINUED | OUTPATIENT
Start: 2023-09-07 | End: 2023-09-08 | Stop reason: HOSPADM

## 2023-09-07 RX ADMIN — ATORVASTATIN CALCIUM 80 MG: 40 TABLET, FILM COATED ORAL at 21:58

## 2023-09-07 RX ADMIN — SODIUM CHLORIDE 1000 ML: 9 INJECTION, SOLUTION INTRAVENOUS at 20:35

## 2023-09-07 RX ADMIN — SODIUM CHLORIDE 1000 ML: 9 INJECTION, SOLUTION INTRAVENOUS at 14:49

## 2023-09-07 RX ADMIN — SODIUM CHLORIDE: 9 INJECTION, SOLUTION INTRAVENOUS at 19:44

## 2023-09-07 RX ADMIN — SODIUM CHLORIDE 500 ML: 9 INJECTION, SOLUTION INTRAVENOUS at 12:52

## 2023-09-07 RX ADMIN — RANOLAZINE 500 MG: 500 TABLET, FILM COATED, EXTENDED RELEASE ORAL at 21:58

## 2023-09-07 RX ADMIN — ZOLPIDEM TARTRATE 5 MG: 5 TABLET ORAL at 21:58

## 2023-09-07 RX ADMIN — SODIUM CHLORIDE 500 ML: 9 INJECTION, SOLUTION INTRAVENOUS at 13:58

## 2023-09-07 RX ADMIN — ASPIRIN 81 MG: 81 TABLET, COATED ORAL at 21:58

## 2023-09-07 ASSESSMENT — ACTIVITIES OF DAILY LIVING (ADL)
ADLS_ACUITY_SCORE: 35
ADLS_ACUITY_SCORE: 31
ADLS_ACUITY_SCORE: 35
ADLS_ACUITY_SCORE: 31

## 2023-09-07 NOTE — ED TRIAGE NOTES
"Pt presents to ED with c/o low blood pressure. Pt checked her BP in a sitting position and was noted to be low. Pt also c/o of some dizziness and feeling light headed. BP 91/60   Pulse 71   Temp 97.8  F (36.6  C) (Tympanic)   Resp 16   Ht 1.651 m (5' 5\")   Wt 68 kg (150 lb)   SpO2 98%   BMI 24.96 kg/m        Catherine Hameed RN on 9/7/2023 at 12:13 PM       Triage Assessment       Row Name 09/07/23 1212       Skin Circulation/Temperature WDL    Skin Circulation/Temperature WDL X;all    Skin Circulation pallor    Skin Temperature warm       Cognitive/Neuro/Behavioral WDL    Cognitive/Neuro/Behavioral WDL WDL                    "

## 2023-09-07 NOTE — H&P
[facility]    History and Physical - Hospitalist Service       Date of Admission:  9/7/2023    Assessment & Plan      Lyudmila Fitzgerald is a 68 year old female admitted on 9/7/2023. She presents with hypotension.     Principal Problem:    Hypotension  Assessment: likely hypovolemia from GI losses. Improving after 2 liters of intravenous fluids   Plan: Observation  Additional 1 liter bolus NS  NS at 125 mL/hr after  Anticipate discharge in AM    Active Problems:    Cardiac pacemaker in situ      Coronary artery spasm (H)  Assessment: chronic, no symptoms now      Diabetes mellitus, type II   Assessment: chronic      Supratherapeutic INR  Assessment: INR 4  Plan: hold warfarin overnight       Diet:  regular  DVT Prophylaxis: Warfarin  Chowdhury Catheter: Not present  Lines: None     Cardiac Monitoring: None  Code Status:  Full    Clinically Significant Risk Factors Present on Admission               # Drug Induced Coagulation Defect: home medication list includes an anticoagulant medication  # Drug Induced Platelet Defect: home medication list includes an antiplatelet medication   # Hypertension: Noted on problem list  # Chronic heart failure with preserved ejection fraction: heart failure noted on problem list and last echo with EF >50%          # Pacemaker present       Disposition Plan      Expected Discharge Date: 09/08/2023                  David Bernal MD  Hospitalist Service  [facility]  Securely message with [facility] (more info)  Text page via [facility] Paging/Directory     ______________________________________________________________________    Chief Complaint   Low BP    History is obtained from the patient    History of Present Illness   Lyudmila Fitzgerald is a 68 year old female who has a history of severe coronary vasospasm presents from cardiac rehab to the emergency department with hypotension.  She has felt somewhat lightheaded over the last couple of days.  She  had 1 week of severe gastroenteritis with vomiting and diarrhea.  She has tried to keep up with some fluids but her diet has been poor.  In the emergency department her blood pressure was 80 systolic.  She is not having any chest pain.  No fevers or chills.  Her last diarrheal episode was 2 days ago.  She received 2 L of normal saline in the emergency department and her blood pressures in the mid 90s.  She is feeling better but still lightheaded with standing.  She was placed on observation for further management.      Past Medical History    Past Medical History:   Diagnosis Date    Acute thromboembolism of deep veins of lower extremity (H)     Overview:  Hx of multiple episodes of DVT: 3 lower extremity; 5 upper extremity (right arm)    Atrial fibrillation (H)     No Comments Provided    Cardiac pacemaker in situ     Dual chamber    Cerebral thrombosis     2006,95 w/ no residual    Coronary atherosclerosis     2006    Endometrial hyperplasia     s/p endometrial ablation     Essential hypertension     No Comments Provided    History of other genital system and obstetric disorders      8, Para 3-0-5-2    Other and unspecified angina pectoris     2006    Other and unspecified hyperlipidemia     No Comments Provided    Other postprocedural states      and ,Followed by Dr. Usman Duran, [facility],.    Paroxysmal supraventricular tachycardia (H)     2006,s/p multiple ablations w last resulting in PPM    Personal history of transient ischemic attack (TIA) and cerebral infarction without residual deficit 2009    with left hemiplegia    Primary hypercoagulable state (H)     No Comments Provided       Past Surgical History   Past Surgical History:   Procedure Laterality Date    ARTHROSCOPY KNEE      ,Arthroscopy for chondromalacia patella    AS CABG, ARTERY-VEIN, SINGLE  11/15/2016    Single vessel; done in [place]    ATTEMPTED ARTHROSCOPY    "   02/04,Right arthroscopy    BIOPSY BREAST      03/08,Breast cyst aspiration    COLONOSCOPY      8/2007,follow up recommended in 10 years.    CV CORONARY ANGIOGRAM N/A 4/27/2021    Procedure: CV CORONARY ANGIOGRAM;  Surgeon: Nathaniel Grier MD;  Location:  HEART CARDIAC CATH LAB    CV CORONARY ANGIOGRAM N/A 7/31/2023    Procedure: Coronary Angiogram;  Surgeon: Dru Bentley MD;  Location:  HEART CARDIAC CATH LAB    CV RIGHT HEART CATH MEASUREMENTS RECORDED N/A 7/31/2023    Procedure: Right Heart Catheterization;  Surgeon: Dru Bentley MD;  Location:  HEART CARDIAC CATH LAB    ENDOSCOPIC SINUS SURGERY      03/04,Sinus node ablation.    EP ABLATION ATRIAL FLUTTER N/A 5/7/2020    Procedure: EP VENOGRAM SVC;  Surgeon: Hakeem Moore MD;  Location:  HEART CARDIAC CATH LAB    EP PACEMAKER N/A 5/20/2020    Procedure: EP PACEMAKER;  Surgeon: Tima Johnson MD;  Location:  HEART CARDIAC CATH LAB    EP STUDY  12/1994    EP STUDY /ABLATION,AV re-entry tachycardia, tessie ablation    HEART CATH, ANGIOPLASTY      01/06,Stenting placed LAD after ergonovine provocation confirmed    HEART CATH, ANGIOPLASTY      03/06,90% lesion, normal left ventricular function    IMPLANT PACEMAKER      03/03,Guidant pacemaker placement, AV tessie ablation    LAPAROSCOPIC ABLATION ENDOMETRIOSIS      06/06    OTHER SURGICAL HISTORY      3/24/06,006836,([place]) HC STENT ANGIO    OTHER SURGICAL HISTORY      08/02,569375,EP STUDY /ABLATION    OTHER SURGICAL HISTORY      11/04,43942.0,CT CORONARY ANGIOGRAM ([place]),Coronary angiogram, failed ablation    OTHER SURGICAL HISTORY      12/04,375286,[facility] RELOCATION OF SKIN POCKET PACEMAKER,[facility] 5/24/05 reconfiguration of pacemaker \"pocket\"    OTHER SURGICAL HISTORY      207882,IP CONSULT TO ELECTROPHYSIOLOGY,study and lead change    OTHER SURGICAL HISTORY      12/06,205897,NEUROSTIMULATOR,Nerve stimulator implanted for chest pain control    OTHER SURGICAL HISTORY      " 12/2008,23337.0,CT CORONARY ANGIOGRAM ([place]),with increased left-sided weakness and vertigo, negative CT angiogram.    REPLACE PACEMAKER GENERATOR      12/29/04,Replacement of left ventricular pacemaker lead.    STENT  02/2017    LAD        Prior to Admission Medications   Prior to Admission Medications   Prescriptions Last Dose Informant Patient Reported? Taking?   NIFEdipine ER (ADALAT CC) 60 MG 24 hr tablet 9/6/2023  No Yes   Sig: Take 1 tablet (60 mg) by mouth daily   arginine (L-ARGININE) 1000 MG tablet   No No   Sig: Take 2 tablets (2,000 mg) by mouth 3 times daily   aspirin 81 MG EC tablet   No No   Sig: Take 1 tablet (81 mg) by mouth daily   atorvastatin (LIPITOR) 80 MG tablet   No No   Sig: Take 1 tablet (80 mg) by mouth daily   calcium carbonate-vitamin D (OSCAL W/D) 500-200 MG-UNIT tablet  Self Yes No   Sig: Take 1 tablet by mouth   cloNIDine (CATAPRES) 0.1 MG tablet 9/6/2023  No Yes   Sig: Take 1 tablet (0.1 mg) by mouth At Bedtime   diltiazem ER (TIAZAC) 360 MG 24 hr ER beaded capsule 9/7/2023  No Yes   Sig: Take 1 capsule (360 mg) by mouth daily   furosemide (LASIX) 20 MG tablet 9/7/2023  No Yes   Sig: Take 1-2 tablets (20-40 mg) by mouth daily   isosorbide mononitrate (IMDUR) 120 MG 24 HR ER tablet 9/7/2023  No Yes   Sig: Take 1 tablet (120 mg) by mouth daily   nitroGLYcerin (NITRODUR) 0.2 MG/HR 24 hr patch   No No   Sig: Place 1 patch onto the skin daily - for chronic angina, coronary vasospasm   nitroGLYcerin (NITROSTAT) 0.4 MG sublingual tablet   No No   Sig: For chest pain place 1 tablet under the tongue every 5 minutes for 3 doses. If symptoms persist 5 minutes after 1st dose call 911.   ranolazine (RANEXA) 500 MG 12 hr tablet   No No   Sig: Take 1 tablet (500 mg) by mouth 2 times daily   sacubitril-valsartan (ENTRESTO) 24-26 MG per tablet   No No   Sig: Take 1 tablet by mouth 2 times daily   sertraline (ZOLOFT) 50 MG tablet   No No   Sig: Take 1 tablet (50 mg) by mouth daily   spironolactone  (ALDACTONE) 25 MG tablet 2023  No Yes   Sig: Take 1 tablet (25 mg) by mouth daily   warfarin ANTICOAGULANT (COUMADIN) 6 MG tablet 2023  No Yes   Sig: TAKE 3 MG (1/2 TABLET) ON  AND  AND 6 MG (1 TABLETS) ALL OTHER DAYS OR  AS  DIRECTED  BY  THE  [facility]  CLINIC.   zolpidem (AMBIEN) 10 MG tablet   No No   Sig: Take 1 tablet (10 mg) by mouth nightly as needed for sleep      Facility-Administered Medications: None        Review of Systems    CONSTITUTIONAL:POSITIVE  for fatigue  INTEGUMENTARY/SKIN: NEGATIVE for worrisome rashes, moles or lesions  EYES: NEGATIVE for vision changes or irritation  ENT/MOUTH: NEGATIVE for ear, mouth and throat problems  RESP: NEGATIVE for significant cough or SOB  CV: NEGATIVE for chest pain, palpitations or peripheral edema  GI: POSITIVE for diarrhea, nausea, poor appetite, and vomiting  : NEGATIVE for frequency, dysuria, or hematuria  MUSCULOSKELETAL: NEGATIVE for significant arthralgias or myalgia  NEURO: POSITIVE for dizziness/lightheadedness  ENDOCRINE: NEGATIVE for temperature intolerance, skin/hair changes  HEME: NEGATIVE for bleeding problems  PSYCHIATRIC: NEGATIVE for changes in mood or affect    Social History   I have reviewed this patient's social history and updated it with pertinent information if needed.  Social History     Tobacco Use    Smoking status: Never    Smokeless tobacco: Never    Tobacco comments:     Quit smoking: spouse does not smoke in the house. Exposed in cars.   Vaping Use    Vaping Use: Never used   Substance Use Topics    Alcohol use: Yes     Comment: Alcoholic Drinks/day: Alcoholic Drinks/day: 1-2 drinks a week    Drug use: Never         Family History   I have reviewed this patient's family history and updated it with pertinent information if needed.  Family History   Problem Relation Age of Onset    Dementia Mother         89 in     Skin Cancer Father         squamous    Dementia Father          d/t COVID    Cerebrovascular  Disease Father         CHF    Osteoarthritis Sister     Hyperlipidemia Brother     Breast Cancer Paternal Grandmother         Cancer-breast    Deep Vein Thrombosis Daughter         FVL +    Family History Negative Son         Good Health,lives in [facility]    Breast Cancer Paternal Aunt         Cancer-breast         Allergies   Allergies   Allergen Reactions    Adhesive Tape        Only Tele patches cause rash and skin irritation. Okay to use other adhesive tape.     Morphine Nausea and Vomiting     OK in small doses    Prednisone Nausea and Vomiting     Per IV    Sotalol Nausea and Vomiting        Physical Exam   Vital Signs: Temp: 97.8  F (36.6  C) Temp src: Tympanic BP: 95/61 Pulse: 60   Resp: 15 SpO2: 96 % O2 Device: None (Room air)    Weight: 150 lbs 0 oz    GENERAL: Comfortable, talkative, in no apparent distress.  HEENT: Anicteric, non-injected sclera, mouth moist.   NECK: No JVD.  CARDIOVASCULAR: regular rate and rhythm, no murmur. No lower extremity edema   RESPIRATORY: Clear to auscultation bilaterally, no wheezes, no crackles.  GI: Non-distended, normal bowel sounds, soft, non-tender.  SKIN: No rashes, sores.   NEUROLOGY: Alert and oriented x3, follows commands, speech and language normal.      Medical Decision Making       60 MINUTES SPENT BY ME on the date of service doing chart review, history, exam, documentation & further activities per the note.      Data     I have personally reviewed the following data over the past 24 hrs:    11.0  \   13.7   / 370     135 (L) 100 8.7 /  117 (H)   4.0 21 (L) 0.78 \     ALT: 54 (H) AST: 43 AP: 98 TBILI: 0.4   ALB: 3.5 TOT PROTEIN: 6.9 LIPASE: N/A     Trop: <6 BNP: 365     TSH: 3.54 T4: N/A A1C: N/A     Procal: N/A CRP: N/A Lactic Acid: 1.5       INR:  4.07 (H) PTT:  N/A   D-dimer:  N/A Fibrinogen:  N/A       Imaging results reviewed over the past 24 hrs:   Recent Results (from the past 24 hour(s))   XR Chest Port 1 View    Narrative    Procedure:XR CHEST PORT 1  VIEW    Clinical history:Female, 68 years, hypotension    Technique: Single view was obtained.    Comparison: 7/20/2023    Findings: The cardiac silhouette is within normal limits.. The  pulmonary vasculature is within normal limits.    The lungs are clear. Bony structures are unremarkable.      Impression    Impression:   No acute abnormality. No evidence of acute or active cardiopulmonary  disease.    TUSHAR PEREZ MD         SYSTEM ID:  [id number]

## 2023-09-07 NOTE — ED PROVIDER NOTES
"      EMERGENCY DEPARTMENT ENCOUNTER      NAME: Lyudmila Fitzgerald  AGE: 68 year old female  YOB: 1955  MRN: [id number]  EVALUATION DATE & TIME: 9/7/2023 12:17 PM    PCP: Erika Carrasco    ED PROVIDER: Noah Ramirez PA-C       CHIEF COMPLAINT:  Chief Complaint   Patient presents with    Hypotension       ED COURSE, MEDICAL DECISION MAKING, FINAL IMPRESSION AND PLAN:     The patient was interviewed and examined.  HPI and physical exam as below.  Differential diagnosis and MDM Key Documentation Elements as below.  Vitals and triage note were reviewed.  BP (!) 86/55   Pulse 60   Temp 97.8  F (36.6  C) (Tympanic)   Resp 14   Ht 1.651 m (5' 5\")   Wt 68 kg (150 lb)   SpO2 96%   BMI 24.96 kg/m      Lyudmila Fitzgerald is a pleasant 68 year old female with history of factor V deficiency, coronary artery disease, CABG x1, cardiac stents, hypertension, pacemaker, CVAx7, and atrial fibrillation currently anticoagulated on Coumadin who presents to the ER today with her  for evaluation of low blood pressure.  Patient states that she has been having flulike symptoms for the past 2 weeks.  2 weeks ago she initially had fever, chills, sweats, nausea, vomiting, diarrhea and a cough.  Patient has been having poor oral intake since.  Patient states that diarrhea has improved with last bout of diarrhea 2 days ago.  Nausea and vomiting have resolved a week ago.  Patient continues to have poor oral intake today.  Patient feeling lightheaded and fatigued.  Patient took her blood pressure at home with blood pressure being in the 50s and 60s systolically.  Normally her blood pressures are around 120s/130s systolically.  Last echocardiogram was performed in April 2023 with ejection fraction of 55 to 60%.  Patient does have a pacemaker.  Patient states that she is also been feeling lightheaded and fatigued.  Does have some dyspnea on exertion.  Currently no chest pain, back pain, abdominal pain.  No " bloody or melanotic stools.  No abnormal bruising or petechiae.  Denies any sick contacts.  No recent travel.    Differential includes but is not limited to UTI, dehydration, vomiting, diarrhea, cardiac arrhythmia    Patient today was afebrile with hypotension.  Patient was in no acute distress.  Patient did have lightheadedness with ambulation.  Lungs were clear.  Heart was regular.  No chest wall, abdominal, flank, or CVA tenderness.  No abnormal bruising or petechia.  No signs of cellulitis.    EKG showed a ventricular paced rhythm but no ST segment deviation suggest ACS/MI.  Screening troponin and BNP were not elevated.  Patient was negative for COVID, influenza, and flu.  Normal TSH.  Normal renal function.  ALT slightly elevated at 54, otherwise normal hepatic function.  Glucose 117.  No electrolyte abnormalities.  INR elevated at 4.07.  Lactic acid normal.  UA shows no signs of hematuria, but does show signs of leukocyte esterase and WBCs in the urine, urine culture pending.  Chest x-ray was negative.    Patient was given a bolus of 2 L IV normal saline here in the ER with blood pressure still remaining hypotensive.  Patient still continued to be symptomatic with ambulation.  Due to patient being symptomatic, will admit patient to observation.  Do suspect that patient's probably still dehydrated as patient was having nausea, vomiting, diarrhea over the past 2 weeks.    Assessment / Plan:  1. Hypotension, unspecified hypotension type    2. Lightheadedness    3. Supratherapeutic INR      1.  Hypotension  -2 L IV fluids given with no improvement in symptoms.  Probably secondary to dehydration from recent illness.  Patient also planing of decreased oral intake.  Will place patient observation for further cares.  Discussed with hospitalist service.  -Pressure slightly improved.  Currently no negation for vasopressors.     2.  Lightheadedness  -Most likely secondary to hypotension.  EKG so no signs of ACS/MI.  UA  without hematuria, possible UTI.  Patient denies any bloody or melanotic stools.  Chest x-ray negative.  Patient without shortness of breath.    3.  Suprapubic INR  -On Coumadin for history of factor V Leiden deficiency and multiple CVAs and A-fib.  INR 4.07.  Compounding factors is patient is not eating or drinking well.  We will hold INR today.      Pertinent Labs & Imaging studies reviewed. (See chart for details)  Results for orders placed or performed during the hospital encounter of 09/07/23   XR Chest Port 1 View    Impression    Impression:   No acute abnormality. No evidence of acute or active cardiopulmonary  disease.    TUSHAR PEREZ MD         SYSTEM ID:  [id number]   Comprehensive metabolic panel   Result Value Ref Range    Sodium 135 (L) 136 - 145 mmol/L    Potassium 4.0 3.4 - 5.3 mmol/L    Chloride 100 98 - 107 mmol/L    Carbon Dioxide (CO2) 21 (L) 22 - 29 mmol/L    Anion Gap 14 7 - 15 mmol/L    Urea Nitrogen 8.7 8.0 - 23.0 mg/dL    Creatinine 0.78 0.51 - 0.95 mg/dL    Calcium 9.3 8.8 - 10.2 mg/dL    Glucose 117 (H) 70 - 99 mg/dL    Alkaline Phosphatase 98 35 - 104 U/L    AST 43 0 - 45 U/L    ALT 54 (H) 0 - 50 U/L    Protein Total 6.9 6.4 - 8.3 g/dL    Albumin 3.5 3.5 - 5.2 g/dL    Bilirubin Total 0.4 <=1.2 mg/dL    GFR Estimate 82 >60 mL/min/1.73m2   Lactic acid whole blood   Result Value Ref Range    Lactic Acid 1.5 0.7 - 2.0 mmol/L   Result Value Ref Range    Troponin T, High Sensitivity <6 <=14 ng/L   Result Value Ref Range    Magnesium 2.0 1.7 - 2.3 mg/dL   TSH Reflex GH   Result Value Ref Range    TSH 3.54 0.30 - 4.20 uIU/mL   Nt probnp inpatient (BNP)   Result Value Ref Range    N terminal Pro BNP Inpatient 365 0 - 900 pg/mL   UA with Microscopic reflex to Culture    Specimen: Urine, Clean Catch   Result Value Ref Range    Color Urine Light Yellow Colorless, Straw, Light Yellow, Yellow    Appearance Urine Clear Clear    Glucose Urine Negative Negative mg/dL    Bilirubin Urine Negative  Negative    Ketones Urine Negative Negative mg/dL    Specific Gravity Urine 1.006 1.000 - 1.030    Blood Urine Negative Negative    pH Urine 6.5 5.0 - 9.0    Protein Albumin Urine Negative Negative mg/dL    Urobilinogen Urine Normal Normal, 2.0 mg/dL    Nitrite Urine Negative Negative    Leukocyte Esterase Urine Small (A) Negative    RBC Urine <1 <=2 /HPF    WBC Urine 23 (H) <=5 /HPF    Hyaline Casts Urine 9 (H) <=2 /LPF   Result Value Ref Range    INR 4.07 (H) 0.85 - 1.15   Symptomatic Influenza A/B, RSV, & SARS-CoV2 PCR (COVID-19) Nasopharyngeal    Specimen: Nasopharyngeal; Swab   Result Value Ref Range    Influenza A PCR Negative Negative    Influenza B PCR Negative Negative    RSV PCR Negative Negative    SARS CoV2 PCR Negative Negative   CBC with platelets and differential   Result Value Ref Range    WBC Count 11.0 4.0 - 11.0 10e3/uL    RBC Count 4.38 3.80 - 5.20 10e6/uL    Hemoglobin 13.7 11.7 - 15.7 g/dL    Hematocrit 38.8 35.0 - 47.0 %    MCV 89 78 - 100 fL    MCH 31.3 26.5 - 33.0 pg    MCHC 35.3 31.5 - 36.5 g/dL    RDW 12.9 10.0 - 15.0 %    Platelet Count 370 150 - 450 10e3/uL    % Neutrophils 76 %    % Lymphocytes 15 %    % Monocytes 6 %    % Eosinophils 1 %    % Basophils 1 %    % Immature Granulocytes 1 %    NRBCs per 100 WBC 0 <1 /100    Absolute Neutrophils 8.5 (H) 1.6 - 8.3 10e3/uL    Absolute Lymphocytes 1.6 0.8 - 5.3 10e3/uL    Absolute Monocytes 0.6 0.0 - 1.3 10e3/uL    Absolute Eosinophils 0.1 0.0 - 0.7 10e3/uL    Absolute Basophils 0.1 0.0 - 0.2 10e3/uL    Absolute Immature Granulocytes 0.1 <=0.4 10e3/uL    Absolute NRBCs 0.0 10e3/uL   Extra Red Top Tube   Result Value Ref Range    Hold Specimen JIC      No results found for: ABORH    Medications given during today's ER visit:  Medications   0.9% sodium chloride BOLUS (0 mLs Intravenous Stopped 9/7/23 1357)   0.9% sodium chloride BOLUS (0 mLs Intravenous Stopped 9/7/23 1715)   0.9% sodium chloride BOLUS (0 mLs Intravenous Stopped 9/7/23 1715)        New prescriptions started at today's ER visit  New Prescriptions    No medications on file     Reassessments, Medications, Interventions, & Response to Treatments:  Patient given IV fluids, 2 L.  Slight improvement of blood pressure but patient still remained slightly hypotensive.  Still continuing to have lightheadedness with ambulation.    Consultations:  Dr. Bernal -hospitalist service    Decision Rules, Medical Calculators, and Risk Stratification Tools:  None    MDM Key Documentation Elements for Patient's Evaluation:  Differential diagnosis to include high risk considerations: As above  Escalation to admission/observation considered: Admission/observation considered, will place patient observation  Discussions and management with other clinicians:    3a. Independent interpretation of testing performed by another health professional:  -No  3b. Discussion of management or test interpretation with another health professional: -Yes  Independent interpretation of tests:  Ordering and/or review of 3+ test(s)  Discussion of test interpretations with radiology:  No  History obtained from source other than patient or assessment requiring an independent historian:  No  Review of non-ED/external records:  review of 3+ records  Diagnostic tests considered but not ultimately performed/deferred:  -CT of the chest, abdomen, and pelvis  Prescription medications considered but not prescribed:  -Patient placed in observation  Chronic conditions affecting care:  -Factor V deficiency  Care affected by social determinants of health:  -None    The patient's management involved:   - Laboratory studies  - Imaging studies  - Parenteral controlled substance  - Decision regarding hospitalization      A shared decision making model was used. Time was taken to answer all questions.  Patient and/or associated parties understood and were agreeable to treatment plan.  Plan and all results were discussed.  Patient admitted in stable  condition.    PPE worn during patient evaluation:  Mask: No  Eye Protection: No  Gown: No  Hair cover: No  Face Shield: No  Patient wearing a mask: No    =================================================================    HPI  Lyudmila Fitzgerald is a pleasant 68 year old female with history of factor V deficiency, coronary artery disease, CABG x1, cardiac stents, hypertension, pacemaker, CVAx7, and atrial fibrillation currently anticoagulated on Coumadin who presents to the ER today with her  for evaluation of low blood pressure.  Patient states that she has been having flulike symptoms for the past 2 weeks.  2 weeks ago she initially had fever, chills, sweats, nausea, vomiting, diarrhea and a cough.  Patient has been having poor oral intake since.  Patient states that diarrhea has improved with last bout of diarrhea 2 days ago.  Nausea and vomiting have resolved a week ago.  Patient continues to have poor oral intake today.  Patient feeling lightheaded and fatigued.  Patient took her blood pressure at home with blood pressure being in the 50s and 60s systolically.  Normally her blood pressures are around 120s/130s systolically.  Last echocardiogram was performed in April 2023 with ejection fraction of 55 to 60%.  Patient does have a pacemaker.  Patient states that she is also been feeling lightheaded and fatigued.  Does have some dyspnea on exertion.  Currently no chest pain, back pain, abdominal pain.  No bloody or melanotic stools.  No abnormal bruising or petechiae.  Denies any sick contacts.  No recent travel.      REVIEW OF SYSTEMS   Review of Systems  As above, otherwise ROS is unremarkable.      PAST MEDICAL HISTORY:  Past Medical History:   Diagnosis Date    Acute thromboembolism of deep veins of lower extremity (H) 2006    Overview:  Hx of multiple episodes of DVT: 3 lower extremity; 5 upper extremity (right arm)    Atrial fibrillation (H)     No Comments Provided    Cardiac pacemaker in situ 2003     Dual chamber    Cerebral thrombosis     2006,'95 w/ no residual    Coronary atherosclerosis     2006    Endometrial hyperplasia     s/p endometrial ablation     Essential hypertension     No Comments Provided    History of other genital system and obstetric disorders      8, Para 3-0-5-2    Other and unspecified angina pectoris     2006    Other and unspecified hyperlipidemia     No Comments Provided    Other postprocedural states      and ,Followed by Dr. Usman Duran, [facility],.    Paroxysmal supraventricular tachycardia (H)     2006,s/p multiple ablations w last resulting in PPM    Personal history of transient ischemic attack (TIA) and cerebral infarction without residual deficit 2009    with left hemiplegia    Primary hypercoagulable state (H)     No Comments Provided       PAST SURGICAL HISTORY:  Past Surgical History:   Procedure Laterality Date    ARTHROSCOPY KNEE      ,Arthroscopy for chondromalacia patella    AS CABG, ARTERY-VEIN, SINGLE  11/15/2016    Single vessel; done in [place]    ATTEMPTED ARTHROSCOPY      ,Right arthroscopy    BIOPSY BREAST      ,Breast cyst aspiration    COLONOSCOPY      2007,follow up recommended in 10 years.    CV CORONARY ANGIOGRAM N/A 2021    Procedure: CV CORONARY ANGIOGRAM;  Surgeon: Nathaniel Grier MD;  Location:  [facility]    CV CORONARY ANGIOGRAM N/A 2023    Procedure: Coronary Angiogram;  Surgeon: Dru Bentley MD;  Location:  [facility]    CV RIGHT HEART CATH MEASUREMENTS RECORDED N/A 2023    Procedure: Right Heart Catheterization;  Surgeon: Dru Bentley MD;  Location:  [facility]    ENDOSCOPIC SINUS SURGERY      ,Sinus node ablation.    EP ABLATION ATRIAL FLUTTER N/A 2020    Procedure: EP VENOGRAM SVC;  Surgeon: Hakeem Moore MD;  Location:  [facility]ATH LAB    EP PACEMAKER N/A 2020     "Procedure: EP PACEMAKER;  Surgeon: Tima Johnson MD;  Location:  [facility]ARD[facility]ATH LAB    EP STUDY  12/1994    EP STUDY /ABLATION,AV re-entry tachycardia, tessie ablation    HEART CATH, ANGIOPLASTY      01/06,Stenting placed LAD after ergonovine provocation confirmed    HEART CATH, ANGIOPLASTY      03/06,90% lesion, normal left ventricular function    IMPLANT PACEMAKER      03/03,Guidant pacemaker placement, AV tessie ablation    LAPAROSCOPIC ABLATION ENDOMETRIOSIS      06/06    OTHER SURGICAL HISTORY      3/24/06,161663,([place]) [facility] STENT ANGIO    OTHER SURGICAL HISTORY      08/02,657584,EP STUDY /ABLATION    OTHER SURGICAL HISTORY      11/04,51249.0,CT CORONARY ANGIOGRAM ([place]),Coronary angiogram, failed ablation    OTHER SURGICAL HISTORY      12/04,433034,[facility] RELOCATION OF SKIN POCKET PACEMAKER,[facility] 5/24/05 reconfiguration of pacemaker \"pocket\"    OTHER SURGICAL HISTORY      766158,IP CONSULT TO ELECTROPHYSIOLOGY,study and lead change    OTHER SURGICAL HISTORY      12/06,254384,NEUROSTIMULATOR,Nerve stimulator implanted for chest pain control    OTHER SURGICAL HISTORY      12/2008,83237.0,CT CORONARY ANGIOGRAM ([place]),with increased left-sided weakness and vertigo, negative CT angiogram.    REPLACE PACEMAKER GENERATOR      12/29/04,Replacement of left ventricular pacemaker lead.    STENT  02/2017    LAD        CURRENT MEDICATIONS:    Current Outpatient Medications   Medication Instructions    arginine 2,000 mg, Oral, 3 TIMES DAILY    aspirin 81 mg, Oral, DAILY    aspirin 81 mg, Oral, DAILY, Start tomorrow.    atorvastatin (LIPITOR) 80 mg, Oral, DAILY    atorvastatin (LIPITOR) 80 mg, Oral, DAILY    calcium carbonate-vitamin D (OSCAL W/D) 500-200 MG-UNIT tablet 1 tablet, Oral    cloNIDine (CATAPRES) 0.1 mg, Oral, AT BEDTIME    diltiazem ER (TIAZAC) 360 mg, Oral, DAILY    furosemide (LASIX) 20-40 mg, Oral, DAILY    isosorbide mononitrate CR (IMDUR) 120 mg, Oral, DAILY    NIFEdipine ER (ADALAT CC) 60 mg, " Oral, DAILY    nitroGLYcerin (NITRODUR) 0.2 MG/HR 24 hr patch 1 patch, Transdermal, DAILY, - for chronic angina, coronary vasospasm    nitroGLYcerin (NITROSTAT) 0.4 MG sublingual tablet For chest pain place 1 tablet under the tongue every 5 minutes for 3 doses. If symptoms persist 5 minutes after 1st dose call 911.    ranolazine (RANEXA) 500 mg, Oral, 2 TIMES DAILY    sacubitril-valsartan (ENTRESTO) 24-26 MG per tablet 1 tablet, Oral, 2 TIMES DAILY    sertraline (ZOLOFT) 50 mg, Oral, DAILY    spironolactone (ALDACTONE) 25 mg, Oral, DAILY    warfarin ANTICOAGULANT (COUMADIN) 6 MG tablet TAKE 3 MG (1/2 TABLET) ON  AND  AND 6 MG (1 TABLETS) ALL OTHER DAYS OR  AS  DIRECTED  BY  THE  PROTIME  CLIN[facility].    zolpidem (AMBIEN) 10 mg, Oral, AT BEDTIME PRN       ALLERGIES:  Allergies   Allergen Reactions    Adhesive Tape        Only Tele patches cause rash and skin irritation. Okay to use other adhesive tape.     Morphine Nausea and Vomiting     OK in small doses    Prednisone Nausea and Vomiting     Per IV    Sotalol Nausea and Vomiting       FAMILY HISTORY:  Family History   Problem Relation Age of Onset    Dementia Mother         89 in     Skin Cancer Father         squamous    Dementia Father          d/t COVID    Cerebrovascular Disease Father         CHF    Osteoarthritis Sister     Hyperlipidemia Brother     Breast Cancer Paternal Grandmother         Cancer-breast    Deep Vein Thrombosis Daughter         FVL +    Family History Negative Son         Good Health,lives in [facility]    Breast Cancer Paternal Aunt         Cancer-breast       SOCIAL HISTORY:   Social History     Socioeconomic History    Marital status:      Spouse name: Antonio    Number of children: 2    Years of education: 16+    Highest education level: Master's degree (e.g., MA, MS, [employer], MEd, MSW, [employer]ARI[employer])   Tobacco Use    Smoking status: Never    Smokeless tobacco: Never    Tobacco comments:     Quit smoking: spouse does not smoke in the  "house. Exposed in cars.   Vaping Use    Vaping Use: Never used   Substance and Sexual Activity    Alcohol use: Yes     Comment: Alcoholic Drinks/day: Alcoholic Drinks/day: 1-2 drinks a week    Drug use: Never    Sexual activity: Not Currently     Partners: Male   Social History Narrative     2nd Marriage x 20 years to  Anthony they live in [place], [place]       in [place]. Completed her Master's Degree 2003. Taught Special Education.  Early half-way due to health 2007.      Total 5 children blended family-3 dgts. And 2 sons    Daughter:  Philip    Son:  Anthony - lives in [facility]    6 Grand-children 5 boys and 1 girl    Leigh parents' and siblings live in [facility].     Social Determinants of Health     Financial Resource Strain: Low Risk  (9/28/2019)    Overall Financial Resource Strain (CARDIA)     Difficulty of Paying Living Expenses: Not hard at all   Food Insecurity: No Food Insecurity (9/28/2019)    Hunger Vital Sign     Worried About Running Out of Food in the Last Year: Never true     Ran Out of Food in the Last Year: Never true   Transportation Needs: No Transportation Needs (9/28/2019)    PRAPARE - Transportation     Lack of Transportation (Medical): No     Lack of Transportation (Non-Medical): No   Physical Activity: Inactive (9/28/2019)    Exercise Vital Sign     Days of Exercise per Week: 0 days     Minutes of Exercise per Session: 0 min   Stress: Stress Concern Present (9/28/2019)    [employer] [place] [employer] - Occupational Stress Questionnaire     Feeling of Stress : To some extent       PHYSICAL EXAM    VITAL SIGNS: BP (!) 86/55   Pulse 60   Temp 97.8  F (36.6  C) (Tympanic)   Resp 14   Ht 1.651 m (5' 5\")   Wt 68 kg (150 lb)   SpO2 96%   BMI 24.96 kg/m      Patient Vitals for the past 24 hrs:   BP Temp Temp src Pulse Resp SpO2 Height Weight   09/07/23 1518 (!) 86/55 -- -- 60 14 96 % -- --   09/07/23 1503 (!) 86/52 -- -- 60 15 96 % -- -- " "  09/07/23 1448 -- -- -- 60 23 96 % -- --   09/07/23 1447 (!) 80/53 -- -- 60 13 96 % -- --   09/07/23 1445 (!) 80/53 -- -- 60 13 96 % -- --   09/07/23 1433 -- -- -- 60 13 95 % -- --   09/07/23 1430 (!) 86/52 -- -- 60 13 95 % -- --   09/07/23 1418 -- -- -- 60 13 96 % -- --   09/07/23 1415 (!) 85/50 -- -- 60 13 96 % -- --   09/07/23 1400 (!) 84/52 -- -- 60 10 96 % -- --   09/07/23 1345 (!) 82/50 -- -- 60 16 95 % -- --   09/07/23 1330 (!) 77/50 -- -- 60 14 96 % -- --   09/07/23 1315 (!) 78/46 -- -- 60 13 94 % -- --   09/07/23 1300 (!) 83/46 -- -- 60 20 94 % -- --   09/07/23 1245 (!) 79/50 -- -- 60 12 96 % -- --   09/07/23 1230 -- -- -- -- -- 96 % -- --   09/07/23 1220 (!) 81/50 -- -- -- -- -- -- --   09/07/23 1215 93/67 -- -- -- -- -- -- --   09/07/23 1212 91/60 -- -- -- -- -- -- --   09/07/23 1211 -- 97.8  F (36.6  C) Tympanic 71 16 98 % 1.651 m (5' 5\") 68 kg (150 lb)       Physical Exam  Vitals and nursing note reviewed.   Constitutional:       Appearance: Normal appearance. She is not ill-appearing or diaphoretic.   HENT:      Right Ear: Tympanic membrane, ear canal and external ear normal.      Left Ear: Tympanic membrane, ear canal and external ear normal.      Nose: Nose normal.      Mouth/Throat:      Mouth: Mucous membranes are moist.      Pharynx: Oropharynx is clear.   Eyes:      Extraocular Movements: Extraocular movements intact.      Conjunctiva/sclera: Conjunctivae normal.      Pupils: Pupils are equal, round, and reactive to light.   Cardiovascular:      Rate and Rhythm: Normal rate and regular rhythm.      Pulses: Normal pulses.      Heart sounds: Normal heart sounds. No murmur heard.     No friction rub. No gallop.   Pulmonary:      Effort: Pulmonary effort is normal.      Breath sounds: Normal breath sounds. No wheezing, rhonchi or rales.   Abdominal:      General: Abdomen is flat. Bowel sounds are normal.      Palpations: Abdomen is soft.      Tenderness: There is no abdominal tenderness. There is no " guarding or rebound.   Musculoskeletal:         General: Normal range of motion.      Cervical back: Normal range of motion and neck supple. No rigidity or tenderness.   Skin:     General: Skin is warm and dry.      Capillary Refill: Capillary refill takes less than 2 seconds.      Findings: No bruising, erythema or rash.   Neurological:      General: No focal deficit present.      Mental Status: She is alert and oriented to person, place, and time.      Cranial Nerves: No cranial nerve deficit.      Sensory: No sensory deficit.      Motor: No weakness.      Comments: GCS 15.  Alert and oriented times x4.  Cranial nerves II through XII exam unremarkable.  Normal finger to finger finger-to-nose.  Patient with lightheadedness with ambulation otherwise normal gait.  Muscle strength 5/5 in all fields in the upper lower extremities and symmetrical.  Sensation to light touch and equal bilaterally in the upper lower extremities.   Psychiatric:         Mood and Affect: Mood normal.              LABS & RADIOLOGY:  All pertinent labs reviewed and interpreted. Reviewed all pertinent imaging. Please see official radiology report.  Results for orders placed or performed during the hospital encounter of 09/07/23   XR Chest Port 1 View    Impression    Impression:   No acute abnormality. No evidence of acute or active cardiopulmonary  disease.    TUSHAR PEREZ MD         SYSTEM ID:  [id number]   Comprehensive metabolic panel   Result Value Ref Range    Sodium 135 (L) 136 - 145 mmol/L    Potassium 4.0 3.4 - 5.3 mmol/L    Chloride 100 98 - 107 mmol/L    Carbon Dioxide (CO2) 21 (L) 22 - 29 mmol/L    Anion Gap 14 7 - 15 mmol/L    Urea Nitrogen 8.7 8.0 - 23.0 mg/dL    Creatinine 0.78 0.51 - 0.95 mg/dL    Calcium 9.3 8.8 - 10.2 mg/dL    Glucose 117 (H) 70 - 99 mg/dL    Alkaline Phosphatase 98 35 - 104 U/L    AST 43 0 - 45 U/L    ALT 54 (H) 0 - 50 U/L    Protein Total 6.9 6.4 - 8.3 g/dL    Albumin 3.5 3.5 - 5.2 g/dL    Bilirubin Total  0.4 <=1.2 mg/dL    GFR Estimate 82 >60 mL/min/1.73m2   Lactic acid whole blood   Result Value Ref Range    Lactic Acid 1.5 0.7 - 2.0 mmol/L   Result Value Ref Range    Troponin T, High Sensitivity <6 <=14 ng/L   Result Value Ref Range    Magnesium 2.0 1.7 - 2.3 mg/dL   TSH Reflex GH   Result Value Ref Range    TSH 3.54 0.30 - 4.20 uIU/mL   Nt probnp inpatient (BNP)   Result Value Ref Range    N terminal Pro BNP Inpatient 365 0 - 900 pg/mL   UA with Microscopic reflex to Culture    Specimen: Urine, Clean Catch   Result Value Ref Range    Color Urine Light Yellow Colorless, Straw, Light Yellow, Yellow    Appearance Urine Clear Clear    Glucose Urine Negative Negative mg/dL    Bilirubin Urine Negative Negative    Ketones Urine Negative Negative mg/dL    Specific Gravity Urine 1.006 1.000 - 1.030    Blood Urine Negative Negative    pH Urine 6.5 5.0 - 9.0    Protein Albumin Urine Negative Negative mg/dL    Urobilinogen Urine Normal Normal, 2.0 mg/dL    Nitrite Urine Negative Negative    Leukocyte Esterase Urine Small (A) Negative    RBC Urine <1 <=2 /HPF    WBC Urine 23 (H) <=5 /HPF    Hyaline Casts Urine 9 (H) <=2 /LPF   Result Value Ref Range    INR 4.07 (H) 0.85 - 1.15   Symptomatic Influenza A/B, RSV, & SARS-CoV2 PCR (COVID-19) Nasopharyngeal    Specimen: Nasopharyngeal; Swab   Result Value Ref Range    Influenza A PCR Negative Negative    Influenza B PCR Negative Negative    RSV PCR Negative Negative    SARS CoV2 PCR Negative Negative   CBC with platelets and differential   Result Value Ref Range    WBC Count 11.0 4.0 - 11.0 10e3/uL    RBC Count 4.38 3.80 - 5.20 10e6/uL    Hemoglobin 13.7 11.7 - 15.7 g/dL    Hematocrit 38.8 35.0 - 47.0 %    MCV 89 78 - 100 fL    MCH 31.3 26.5 - 33.0 pg    MCHC 35.3 31.5 - 36.5 g/dL    RDW 12.9 10.0 - 15.0 %    Platelet Count 370 150 - 450 10e3/uL    % Neutrophils 76 %    % Lymphocytes 15 %    % Monocytes 6 %    % Eosinophils 1 %    % Basophils 1 %    % Immature Granulocytes 1 %     NRBCs per 100 WBC 0 <1 /100    Absolute Neutrophils 8.5 (H) 1.6 - 8.3 10e3/uL    Absolute Lymphocytes 1.6 0.8 - 5.3 10e3/uL    Absolute Monocytes 0.6 0.0 - 1.3 10e3/uL    Absolute Eosinophils 0.1 0.0 - 0.7 10e3/uL    Absolute Basophils 0.1 0.0 - 0.2 10e3/uL    Absolute Immature Granulocytes 0.1 <=0.4 10e3/uL    Absolute NRBCs 0.0 10e3/uL   Extra Red Top Tube   Result Value Ref Range    Hold Specimen JIC      XR Chest Port 1 View   Final Result   Impression:    No acute abnormality. No evidence of acute or active cardiopulmonary   disease.      TUSHAR PEREZ MD            SYSTEM ID:  [id number]            [id number] EKG available for comparison. EKG reviewed at 1225.  1) Rhythm: Atrial sensed ventricular paced rhythm  2) Rate: ventricular rate 65 bpm.  3) QRS Axis: No axis deviation  4) P waves/ IL interval: Sinus. Nml SHILO. No atrial enlargement  5) QRS complex: Narrow. No BBB. No ventricular hypertrophy. No pathological Q waves.  6) ST Segment: No acute ST segment elevation or depression  7) T waves: No T wave inversions. No peaked or flattened T waves.     I have independently reviewed and interpreted today's EKG, pending cardiologist over read.         I, Leeroy Ramirez PA-C, personally performed the services described in this documentation, and it is both accurate and complete.       Noah Ramirez PA-C  [phone]

## 2023-09-07 NOTE — TELEPHONE ENCOUNTER
Patients  called cardiac rehab to report low bp's.  Patient has not been to cardiac rehab recently due to illness.  He is reporting that he has checked her BP 5 times and is getting 50's/30's with the last being 57/36. He also reported that he tried the blood pressure cuff on himself so he knows its accurate.   I told him that she should be seen in the ER and to call an ambulance. Called ER and gave Renetta information and patient MRN.

## 2023-09-08 VITALS
RESPIRATION RATE: 14 BRPM | WEIGHT: 155.1 LBS | HEART RATE: 60 BPM | TEMPERATURE: 97.6 F | HEIGHT: 65 IN | OXYGEN SATURATION: 96 % | BODY MASS INDEX: 25.84 KG/M2 | DIASTOLIC BLOOD PRESSURE: 70 MMHG | SYSTOLIC BLOOD PRESSURE: 112 MMHG

## 2023-09-08 LAB
ANION GAP SERPL CALCULATED.3IONS-SCNC: 9 MMOL/L (ref 7–15)
ATRIAL RATE - MUSE: 65 BPM
BUN SERPL-MCNC: 7.7 MG/DL (ref 8–23)
CALCIUM SERPL-MCNC: 8 MG/DL (ref 8.8–10.2)
CHLORIDE SERPL-SCNC: 109 MMOL/L (ref 98–107)
CREAT SERPL-MCNC: 0.73 MG/DL (ref 0.51–0.95)
DEPRECATED HCO3 PLAS-SCNC: 20 MMOL/L (ref 22–29)
DIASTOLIC BLOOD PRESSURE - MUSE: NORMAL MMHG
EGFRCR SERPLBLD CKD-EPI 2021: 89 ML/MIN/1.73M2
ERYTHROCYTE [DISTWIDTH] IN BLOOD BY AUTOMATED COUNT: 13.1 % (ref 10–15)
GLUCOSE SERPL-MCNC: 100 MG/DL (ref 70–99)
HCT VFR BLD AUTO: 31.3 % (ref 35–47)
HGB BLD-MCNC: 10.7 G/DL (ref 11.7–15.7)
INR PPP: 5.19 (ref 0.85–1.15)
INTERPRETATION ECG - MUSE: NORMAL
MCH RBC QN AUTO: 31.1 PG (ref 26.5–33)
MCHC RBC AUTO-ENTMCNC: 34.2 G/DL (ref 31.5–36.5)
MCV RBC AUTO: 91 FL (ref 78–100)
P AXIS - MUSE: NORMAL DEGREES
PLATELET # BLD AUTO: 298 10E3/UL (ref 150–450)
POTASSIUM SERPL-SCNC: 3.4 MMOL/L (ref 3.4–5.3)
PR INTERVAL - MUSE: 192 MS
QRS DURATION - MUSE: 132 MS
QT - MUSE: 452 MS
QTC - MUSE: 470 MS
R AXIS - MUSE: 98 DEGREES
RBC # BLD AUTO: 3.44 10E6/UL (ref 3.8–5.2)
SODIUM SERPL-SCNC: 138 MMOL/L (ref 136–145)
SYSTOLIC BLOOD PRESSURE - MUSE: NORMAL MMHG
T AXIS - MUSE: 256 DEGREES
VENTRICULAR RATE- MUSE: 65 BPM
WBC # BLD AUTO: 6.5 10E3/UL (ref 4–11)

## 2023-09-08 PROCEDURE — 36415 COLL VENOUS BLD VENIPUNCTURE: CPT | Performed by: STUDENT IN AN ORGANIZED HEALTH CARE EDUCATION/TRAINING PROGRAM

## 2023-09-08 PROCEDURE — 99239 HOSP IP/OBS DSCHRG MGMT >30: CPT | Performed by: INTERNAL MEDICINE

## 2023-09-08 PROCEDURE — 82310 ASSAY OF CALCIUM: CPT | Performed by: INTERNAL MEDICINE

## 2023-09-08 PROCEDURE — 36415 COLL VENOUS BLD VENIPUNCTURE: CPT | Performed by: INTERNAL MEDICINE

## 2023-09-08 PROCEDURE — 85610 PROTHROMBIN TIME: CPT | Performed by: STUDENT IN AN ORGANIZED HEALTH CARE EDUCATION/TRAINING PROGRAM

## 2023-09-08 PROCEDURE — 85014 HEMATOCRIT: CPT | Performed by: INTERNAL MEDICINE

## 2023-09-08 PROCEDURE — 258N000003 HC RX IP 258 OP 636: Performed by: INTERNAL MEDICINE

## 2023-09-08 PROCEDURE — G0378 HOSPITAL OBSERVATION PER HR: HCPCS

## 2023-09-08 RX ADMIN — SODIUM CHLORIDE 500 ML: 0.9 INJECTION, SOLUTION INTRAVENOUS at 06:39

## 2023-09-08 ASSESSMENT — ACTIVITIES OF DAILY LIVING (ADL)
ADLS_ACUITY_SCORE: 31

## 2023-09-08 NOTE — PHARMACY - DISCHARGE MEDICATION RECONCILIATION AND EDUCATION
Pharmacy:  Discharge Counseling and Medication Reconciliation    Lyudmila Peñanett  [address] [place] [id number] [phone] (home)   68 year old female  PCP: Erika Carrasco    Allergies: Adhesive tape, Morphine, Prednisone, and Sotalol    Discharge Counseling:    Pharmacist met with patient (and/or family) today to review the medication portion of the After Visit Summary (with an emphasis on NEW medications) and to address patient's questions/concerns.    Summary of Education: Counseled patient on plan in regard to medications at discharge, also counseled patient on increased INR at discharge- discussed this with Dr. Martin, and patient was given the option to stay for monitoring for one more day, but would not  other than re-checking INR again tomorrow morning. Patient reports she has a home INR monitor at home, will recheck tomorrow morning, and if INR still increasing or experiencing signs/symptoms of bleeding will follow up for further directions. Also counseled to HOLD her Warfarin today, tomorrow, and Sunday, with INR recheck appointment scheduled for Monday (9/11), where she will receive further dosing directions. Patient reports she is tolerating food better since admission, is comfortable with the plan to go home and recheck INR in AM.    Patient also counseled to monitor blood pressures at home and record BP's- advised that changes may be necessary to her blood pressure regimen at follow up if continuing to have dizziness, light-headedness, and lower blood pressures.    Materials Provided:  -Pink warfarin dosing card provided to patient outlining Warfarin dosing recommendations, as above.    Discharge Medication Reconciliation:    It has been determined that the patient has an adequate supply of medications available or which can be obtained from the patient's preferred pharmacy.    Thank you for the consult.    Juan Carlos Israel [facility]........September 8, 2023 9:56  AM

## 2023-09-08 NOTE — PHARMACY-ADMISSION MEDICATION HISTORY
Pharmacist Admission Medication History    Admission medication history is complete. The information provided in this note is only as accurate as the sources available at the time of the update.    Medication reconciliation/reorder completed by provider prior to medication history? Yes    Information Source(s): Patient and CareEverywhere/SureScripts via in-person    Pertinent Information: Patient states she has not used her nitroglycerin patches or tablets in over two weeks.  Patient also states she takes ambien 5 mg every HS    Changes made to PTA medication list:  Added: None  Deleted: arginine  Changed: warfarin to 3 mg on Mondays and Fridays and 6 mg on all other days    Medication Affordability:  Not including over the counter (OTC) medications, was there a time in the past 3 months when you did not take your medications as prescribed because of cost?: No    Allergies reviewed with patient and updates made in EHR: yes    Medication History Completed By: Yelena Blood [facility] 9/7/2023 7:07 PM    Prior to Admission medications    Medication Sig Last Dose Taking? Auth Provider Long Term End Date   aspirin 81 MG EC tablet Take 1 tablet (81 mg) by mouth daily 9/6/2023 at HS Yes Moises Islas, DO     atorvastatin (LIPITOR) 80 MG tablet Take 1 tablet (80 mg) by mouth daily 9/6/2023 at HS Yes Moises Islas, DO Yes    calcium carbonate-vitamin D (OSCAL W/D) 500-200 MG-UNIT tablet Take 1 tablet by mouth daily 9/7/2023 at AM Yes Reported, Patient     cloNIDine (CATAPRES) 0.1 MG tablet Take 1 tablet (0.1 mg) by mouth At Bedtime 9/6/2023 at HS Yes Moises Islas, DO Yes    diltiazem ER (TIAZAC) 360 MG 24 hr ER beaded capsule Take 1 capsule (360 mg) by mouth daily 9/7/2023 at AM Yes Erika Carrasco MD Yes    furosemide (LASIX) 20 MG tablet Take 1-2 tablets (20-40 mg) by mouth daily 9/7/2023 at 2 TABS DAILY Yes Mir Douglas MD Yes    isosorbide mononitrate (IMDUR) 120 MG 24 HR ER tablet Take 1  tablet (120 mg) by mouth daily 9/7/2023 at AM Yes Moises Islas, DO Yes    NIFEdipine ER (ADALAT CC) 60 MG 24 hr tablet Take 1 tablet (60 mg) by mouth daily 9/6/2023 at HS Yes Moises Islas, DO Yes    nitroGLYcerin (NITRODUR) 0.2 MG/HR 24 hr patch Place 1 patch onto the skin daily - for chronic angina, coronary vasospasm Past Month at NOT FOR 2 WEEKS Yes Mir Douglas MD Yes    nitroGLYcerin (NITROSTAT) 0.4 MG sublingual tablet For chest pain place 1 tablet under the tongue every 5 minutes for 3 doses. If symptoms persist 5 minutes after 1st dose call 911. Past Month at NOT FOR 2 WEEKS Yes Moises Islas, DO Yes    ranolazine (RANEXA) 500 MG 12 hr tablet Take 1 tablet (500 mg) by mouth 2 times daily 9/7/2023 at AM Yes Moises Islas, DO Yes    sacubitril-valsartan (ENTRESTO) 24-26 MG per tablet Take 1 tablet by mouth 2 times daily 9/7/2023 at AM Yes Moises Islas, DO Yes    sertraline (ZOLOFT) 50 MG tablet Take 1 tablet (50 mg) by mouth daily 9/7/2023 at AM Yes Erika Carrasco MD Yes    spironolactone (ALDACTONE) 25 MG tablet Take 1 tablet (25 mg) by mouth daily 9/7/2023 at AM Yes Mir Douglas MD Yes    warfarin ANTICOAGULANT (COUMADIN) 6 MG tablet TAKE 3 MG (1/2 TABLET) ON FRIDAYS AND  AND 6 MG (1 TABLETS) ALL OTHER DAYS OR  AS  DIRECTED  BY  THE  [facility]  CLINIC. 9/6/2023 at 6MG PM Yes Erika Carrasco MD     zolpidem (AMBIEN) 10 MG tablet Take 1 tablet (10 mg) by mouth nightly as needed for sleep  Patient taking differently: Take 5 mg by mouth nightly as needed for sleep 9/6/2023 at TAKES 5MG QHS Yes Moises Islas, DO

## 2023-09-08 NOTE — PLAN OF CARE
"  500mL bolus and continuous fluids at 125mL/hr over night  Pressures remain soft (see doc flowsheets).  C/o dizziness with sitting and standing.  Pleasant and cooperative.    Problem: Plan of Care - These are the overarching goals to be used throughout the patient stay.    Goal: Plan of Care Review  Description: The Plan of Care Review/Shift note should be completed every shift.  The Outcome Evaluation is a brief statement about your assessment that the patient is improving, declining, or no change.  This information will be displayed automatically on your shift note.  Outcome: Progressing  Goal: Patient-Specific Goal (Individualized)  Description: You can add care plan individualizations to a care plan. Examples of Individualization might be:  \"Parent requests to be called daily at 9am for status\", \"I have a hard time hearing out of my right ear\", or \"Do not touch me to wake me up as it startles me\".  Outcome: Progressing  Goal: Absence of Hospital-Acquired Illness or Injury  Outcome: Progressing  Intervention: Identify and Manage Fall Risk  Recent Flowsheet Documentation  Taken 9/8/2023 0000 by Michelle Whitaker, RN  Safety Promotion/Fall Prevention: activity supervised  Taken 9/7/2023 2005 by Michelle Whitaker, RN  Safety Promotion/Fall Prevention: activity supervised  Goal: Optimal Comfort and Wellbeing  Outcome: Progressing  Goal: Readiness for Transition of Care  Outcome: Progressing   Goal Outcome Evaluation:                        "

## 2023-09-08 NOTE — DISCHARGE SUMMARY
"[facility]  Hospitalist Discharge Summary      Date of Admission:  9/7/2023  Date of Discharge:  9/8/2023  Discharging Provider: David Bernal MD  Discharge Service: Hospitalist Service    Discharge Diagnoses   Principal Problem:    Hypotension, unspecified hypotension type  Active Problems:    Cardiac pacemaker in situ    Coronary artery spasm (H)    Diabetes mellitus, type II     Lightheadedness    Supratherapeutic INR       Clinically Significant Risk Factors     # Overweight: Estimated body mass index is 25.81 kg/m  as calculated from the following:    Height as of this encounter: 1.651 m (5' 5\").    Weight as of this encounter: 70.4 kg (155 lb 1.6 oz).       Follow-ups Needed After Discharge   Follow-up Appointments     Follow-up and recommended labs and tests       Follow up with primary care provider, MARYURI SANDOVAL, within 7   days for hospital follow- up.  No follow up labs or test are needed.            Unresulted Labs Ordered in the Past 30 Days of this Admission       Date and Time Order Name Status Description    9/7/2023  4:16 PM Urine Culture In process         These results will be followed up by hospitalistst    Discharge Disposition   Discharged to home  Condition at discharge: Stable    Hospital Course      Lyudmila Fitzgerald is a 68 year old female admitted on 9/7/2023. She presents with hypotension.     Principal Problem:    Hypotension  Assessment: likely hypovolemia from GI losses. Improving after 2 liters of intravenous fluids. She received nearly 4 liters of intravenous fluids and felt much better this AM. She is up moving without difficulty and eating. No GI symptoms.     Active Problems:    Cardiac pacemaker in situ      Coronary artery spasm (H)  Assessment: chronic, no symptoms now      Diabetes mellitus, type II   Assessment: chronic      Supratherapeutic INR  Assessment: INR 4  Plan: hold warfarin overnight    Consultations This Hospital Stay "   None    Code Status   Full Code    Time Spent on this Encounter   I, David Bernal MD, personally saw the patient today and spent greater than 30 minutes discharging this patient.       David Bernal MD  [facility]  [address]  [place] [address]  Phone: [phone]  Fax: [phone]  ______________________________________________________________________    Physical Exam   Vital Signs: Temp: 97.6  F (36.4  C) Temp src: Temporal BP: 103/60 Pulse: 60   Resp: 11 SpO2: 93 % O2 Device: None (Room air)    Weight: 155 lbs 1.6 oz  GENERAL: Comfortable, no apparent distress.  CARDIOVASCULAR: regular rate and rhythm, no murmur. No lower extremity edema   RESPIRATORY: Clear to auscultation bilaterally, no wheezes or crackles.  GI: non-tender, non-distended, normal bowel sounds.   SKIN: warm periphery, no rashes         Primary Care Physician   MARYURI SANDOVAL    Discharge Orders      Reason for your hospital stay    Hypotension from dehydration     Follow-up and recommended labs and tests     Follow up with primary care provider, MARYURI SANDOVAL, within 7 days for hospital follow- up.  No follow up labs or test are needed.     Activity    Your activity upon discharge: activity as tolerated     Diet    Follow this diet upon discharge: Orders Placed This Encounter      Regular Diet Adult       Significant Results and Procedures   Most Recent 3 CBC's:  Recent Labs   Lab Test 09/08/23  0446 09/07/23  1240 07/31/23  1357 07/31/23  1356 07/31/23  1104   WBC 6.5 11.0  --   --  6.3   HGB 10.7* 13.7 14.2   < > 15.1   MCV 91 89  --   --  92    370  --   --  281    < > = values in this interval not displayed.     Most Recent 3 BMP's:  Recent Labs   Lab Test 09/08/23  0446 09/07/23  1240 07/31/23  1104    135* 134*   POTASSIUM 3.4 4.0 3.9   CHLORIDE 109* 100 101   CO2 20* 21* 18*   BUN 7.7* 8.7 15.7   CR 0.73 0.78 0.86   ANIONGAP 9 14 15   BENJAMIN 8.0* 9.3 9.3    * 117* 98     Most Recent 2 LFT's:  Recent Labs   Lab Test 09/07/23  1240 07/20/23  1530   AST 43 28   ALT 54* 23   ALKPHOS 98 101   BILITOTAL 0.4 0.3   ,   Results for orders placed or performed during the hospital encounter of 09/07/23   XR Chest Port 1 View    Narrative    Procedure:XR CHEST PORT 1 VIEW    Clinical history:Female, 68 years, hypotension    Technique: Single view was obtained.    Comparison: 7/20/2023    Findings: The cardiac silhouette is within normal limits.. The  pulmonary vasculature is within normal limits.    The lungs are clear. Bony structures are unremarkable.      Impression    Impression:   No acute abnormality. No evidence of acute or active cardiopulmonary  disease.    TUSHAR PEREZ MD         SYSTEM ID:  [id number]       Discharge Medications   Current Discharge Medication List        CONTINUE these medications which have NOT CHANGED    Details   aspirin 81 MG EC tablet Take 1 tablet (81 mg) by mouth daily  Qty: 90 tablet, Refills: 3    Associated Diagnoses: Chest pain, unspecified type; Other chest pain; Coronary artery spasm (H); Myocardial bridge; History of coronary artery bypass graft x 1; History of coronary artery stent placement; Status post coronary angiogram; History of DVT (deep vein thrombosis)      atorvastatin (LIPITOR) 80 MG tablet Take 1 tablet (80 mg) by mouth daily  Qty: 90 tablet, Refills: 3    Associated Diagnoses: History of coronary artery bypass graft x 1; History of coronary artery stent placement; Mixed hyperlipidemia; Chest pain, unspecified type; Status post coronary angiogram      calcium carbonate-vitamin D (OSCAL W/D) 500-200 MG-UNIT tablet Take 1 tablet by mouth daily      cloNIDine (CATAPRES) 0.1 MG tablet Take 1 tablet (0.1 mg) by mouth At Bedtime  Qty: 90 tablet, Refills: 3    Associated Diagnoses: Chest pain, unspecified type; Other chest pain; Shortness of breath; Coronary artery spasm (H); Myocardial bridge; History of coronary artery  bypass graft x 1; History of coronary artery stent placement; Status post coronary angiogram; History of coronary vasospasm      diltiazem ER (TIAZAC) 360 MG 24 hr ER beaded capsule Take 1 capsule (360 mg) by mouth daily  Qty: 90 capsule, Refills: 3    Associated Diagnoses: Coronary artery spasm (H); Myocardial bridge; History of coronary vasospasm; Essential hypertension; Pacemaker-dependent due to native cardiac rhythm insufficient to support life; Paroxysmal supraventricular tachycardia (H); History of cardiac radiofrequency ablation; Personal history of supraventricular tachycardia; S/P AV tessie ablation      furosemide (LASIX) 20 MG tablet Take 1-2 tablets (20-40 mg) by mouth daily  Qty: 180 tablet, Refills: 4    Associated Diagnoses: Acute on chronic heart failure with preserved ejection fraction (H)      isosorbide mononitrate (IMDUR) 120 MG 24 HR ER tablet Take 1 tablet (120 mg) by mouth daily  Qty: 90 tablet, Refills: 3    Associated Diagnoses: Chest pain, unspecified type; Other chest pain; Coronary artery spasm (H); Myocardial bridge; History of coronary artery bypass graft x 1; History of coronary artery stent placement; History of coronary vasospasm; Essential hypertension      NIFEdipine ER (ADALAT CC) 60 MG 24 hr tablet Take 1 tablet (60 mg) by mouth daily  Qty: 90 tablet, Refills: 3    Associated Diagnoses: Chest pain, unspecified type; Other chest pain; Coronary artery spasm (H); Myocardial bridge; History of coronary artery bypass graft x 1; History of coronary artery stent placement; History of coronary vasospasm      nitroGLYcerin (NITRODUR) 0.2 MG/HR 24 hr patch Place 1 patch onto the skin daily - for chronic angina, coronary vasospasm  Qty: 30 patch, Refills: 1    Associated Diagnoses: Coronary artery spasm (H); Myocardial bridge      nitroGLYcerin (NITROSTAT) 0.4 MG sublingual tablet For chest pain place 1 tablet under the tongue every 5 minutes for 3 doses. If symptoms persist 5 minutes after  1st dose call 911.  Qty: 100 tablet, Refills: 3    Associated Diagnoses: Other chest pain; Chest pain, unspecified type; Myocardial bridge; Coronary artery spasm (H); History of coronary artery bypass graft x 1; History of coronary artery stent placement; Status post coronary angiogram; History of coronary vasospasm      ranolazine (RANEXA) 500 MG 12 hr tablet Take 1 tablet (500 mg) by mouth 2 times daily  Qty: 180 tablet, Refills: 3    Associated Diagnoses: Other chest pain; Coronary artery spasm (H); History of coronary artery bypass graft x 1; History of coronary vasospasm; Myocardial bridge; History of coronary artery stent placement; Chest pain, unspecified type      sacubitril-valsartan (ENTRESTO) 24-26 MG per tablet Take 1 tablet by mouth 2 times daily  Qty: 60 tablet, Refills: 11    Associated Diagnoses: Congestive heart failure, unspecified HF chronicity, unspecified heart failure type (H); Lower extremity edema      sertraline (ZOLOFT) 50 MG tablet Take 1 tablet (50 mg) by mouth daily  Qty: 90 tablet, Refills: 3    Associated Diagnoses: Depression, unspecified depression type      spironolactone (ALDACTONE) 25 MG tablet Take 1 tablet (25 mg) by mouth daily  Qty: 90 tablet, Refills: 1    Associated Diagnoses: Acute on chronic heart failure with preserved ejection fraction (H)      warfarin ANTICOAGULANT (COUMADIN) 6 MG tablet TAKE 3 MG (1/2 TABLET) ON FRIDAYS AND  AND 6 MG (1 TABLETS) ALL OTHER DAYS OR  AS  DIRECTED  BY  THE  [facility]  CLINIC.  Qty: 160 tablet, Refills: 1    Associated Diagnoses: Cerebral thrombosis      zolpidem (AMBIEN) 10 MG tablet Take 1 tablet (10 mg) by mouth nightly as needed for sleep  Qty: 90 tablet, Refills: 0    Associated Diagnoses: Chronic insomnia           Allergies   Allergies   Allergen Reactions    Adhesive Tape        Only Tele patches cause rash and skin irritation. Okay to use other adhesive tape.     Morphine Nausea and Vomiting     OK in small doses    Prednisone  Nausea and Vomiting     Per IV    Sotalol Nausea and Vomiting

## 2023-09-09 LAB — INR HOME MONITORING: 2.7 (ref 2–3)

## 2023-09-10 LAB — BACTERIA UR CULT: NO GROWTH

## 2023-09-11 ENCOUNTER — ANTICOAGULATION THERAPY VISIT (OUTPATIENT)
Dept: ANTICOAGULATION | Facility: OTHER | Age: 68
End: 2023-09-11
Attending: FAMILY MEDICINE
Payer: MEDICARE

## 2023-09-11 ENCOUNTER — PATIENT OUTREACH (OUTPATIENT)
Dept: FAMILY MEDICINE | Facility: OTHER | Age: 68
End: 2023-09-11
Payer: MEDICARE

## 2023-09-11 DIAGNOSIS — D68.2 FACTOR V DEFICIENCY (H): ICD-10-CM

## 2023-09-11 DIAGNOSIS — I66.9 CEREBRAL THROMBOSIS: ICD-10-CM

## 2023-09-11 DIAGNOSIS — Z95.5 HISTORY OF CORONARY ARTERY STENT PLACEMENT: ICD-10-CM

## 2023-09-11 DIAGNOSIS — Z79.01 ANTICOAGULATION MONITORING, INR RANGE 2-3: Primary | ICD-10-CM

## 2023-09-11 LAB — INR HOME MONITORING: 1.8 (ref 2–3)

## 2023-09-11 NOTE — PROGRESS NOTES
ANTICOAGULATION MANAGEMENT     Lyudmila Fitzgerald 68 year old female is on warfarin with subtherapeutic INR result. (Goal INR 2.0-3.0)    Recent labs: (last 7 days)     09/11/23  0000   INR 1.8*       ASSESSMENT     Source(s): Chart Review and Patient/Caregiver Call     Warfarin doses taken: While hospitalized on 9/7-9/8: held warfarin due to supratherapuetic INR .  Discharged on: hold warfarin until INR on 9/11  Diet: No new diet changes identified  Medication/supplement changes: None noted  New illness, injury, or hospitalization: Yes: hypotension, angina  Signs or symptoms of bleeding or clotting: No  Previous result: Supratherapeutic  Additional findings: None       PLAN     Recommended plan for temporary change(s) affecting INR     Dosing Instructions: Continue your current warfarin dose with next INR in 4 days       Summary  As of 9/11/2023      Full warfarin instructions:  3 mg every Mon, Fri; 6 mg all other days   Next INR check:  9/14/2023               Telephone call with Leigh who verbalizes understanding and agrees to plan    Patient to recheck with home meter    Education provided:   None required    Plan made per [facility] anticoagulation protocol    Nyla Walker RN  Anticoagulation Clinic  9/11/2023    _______________________________________________________________________     Anticoagulation Episode Summary       Current INR goal:  2.0-3.0   TTR:  61.2 % (1 y)   Target end date:  Indefinite   Send INR reminders to:  [facility] [place]    Indications    Acute thromboembolism of deep veins of lower extremity (H) (Resolved) [I82.409]  Anticoagulation monitoring  INR range 2-3 [Z79.01]  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

## 2023-09-11 NOTE — TELEPHONE ENCOUNTER
Transitional Care Management Phone Call      Summary of hospitalization:  [facility] discharge summary reviewed    DISCHARGE DIAGNOSIS: Principal Problem:    Hypotension, unspecified hypotension type  Active Problems:    Cardiac pacemaker in situ    Coronary artery spasm (H)    Diabetes mellitus, type II     Lightheadedness    Supratherapeutic INR    DATE OF DISCHARGE: 9/08/2023    Diagnostic Tests/Treatments reviewed.  Follow up needed: none    Post Discharge Medication Reconciliation: discharge medications reconciled, continue medications without change      Medications reviewed by: by myself    Problems taking medications regularly:  None    Problems adhering to non-medication therapy:  None    Other Healthcare Providers Involved in Patient's Care:         None    Update since discharge: improved.     Plan of care communicated with: patient    Just a friendly reminder that you appointment is:  Next 5 appointments (look out 90 days)      Sep 18, 2023  8:00 AM  Office Visit with Erika Plascencia MD  [facility] ([facility] ) [address]  [place] [id number] [phone]              We encourage you to keep this appointment.    Please remember to bring all of your pills in their bottles (including any vitamins or over the counter pills) with you to your appointment.     The patient indicates understanding of these issues and agrees with the plan of care.   Yes    Was the patient contacted within the 2 business days or other approved timeframe?  Yes    Was the Medication reconciliation and management done since the patient was discharged? Yes    Shelia Morse RN  9/11/2023 8:34 AM

## 2023-09-14 ENCOUNTER — ANTICOAGULATION THERAPY VISIT (OUTPATIENT)
Dept: ANTICOAGULATION | Facility: OTHER | Age: 68
End: 2023-09-14
Attending: FAMILY MEDICINE
Payer: MEDICARE

## 2023-09-14 DIAGNOSIS — I66.9 CEREBRAL THROMBOSIS: ICD-10-CM

## 2023-09-14 DIAGNOSIS — Z79.01 ANTICOAGULATION MONITORING, INR RANGE 2-3: Primary | ICD-10-CM

## 2023-09-14 DIAGNOSIS — D68.2 FACTOR V DEFICIENCY (H): ICD-10-CM

## 2023-09-14 DIAGNOSIS — Z95.5 HISTORY OF CORONARY ARTERY STENT PLACEMENT: ICD-10-CM

## 2023-09-14 LAB — INR HOME MONITORING: 1.3 (ref 2–3)

## 2023-09-14 NOTE — PROGRESS NOTES
ANTICOAGULATION MANAGEMENT     Lyudmila Fitzgerald 68 year old female is on warfarin with subtherapeutic INR result. (Goal INR 2.0-3.0)    Recent labs: (last 7 days)     09/14/23  0000   INR 1.3*       ASSESSMENT     Source(s): Chart Review and Patient/Caregiver Call     Warfarin doses taken: Warfarin taken as instructed  Diet: No new diet changes identified  Medication/supplement changes: None noted  New illness, injury, or hospitalization: No  Signs or symptoms of bleeding or clotting: No  Previous result: Subtherapeutic  Additional findings: None       PLAN     Recommended plan for temporary change(s) affecting INR     Dosing Instructions: booster dose then Increase your warfarin dose (8.3% change) with next INR in 5 days       Summary  As of 9/14/2023      Full warfarin instructions:  9/14: 9 mg; Otherwise 3 mg every Mon; 6 mg all other days   Next INR check:  9/19/2023               Telephone call with Leigh who verbalizes understanding and agrees to plan    Patient to recheck with home meter    Education provided:   None required    Plan made per [facility] anticoagulation protocol    Nyla Walker RN  Anticoagulation Clinic  9/14/2023    _______________________________________________________________________     Anticoagulation Episode Summary       Current INR goal:  2.0-3.0   TTR:  60.4 % (1 y)   Target end date:  Indefinite   Send INR reminders to:  [facility]    Indications    Acute thromboembolism of deep veins of lower extremity (H) (Resolved) [I82.409]  Anticoagulation monitoring  INR range 2-3 [Z79.01]  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

## 2023-09-17 ASSESSMENT — PATIENT HEALTH QUESTIONNAIRE - PHQ9
SUM OF ALL RESPONSES TO PHQ QUESTIONS 1-9: 7
SUM OF ALL RESPONSES TO PHQ QUESTIONS 1-9: 7
10. IF YOU CHECKED OFF ANY PROBLEMS, HOW DIFFICULT HAVE THESE PROBLEMS MADE IT FOR YOU TO DO YOUR WORK, TAKE CARE OF THINGS AT HOME, OR GET ALONG WITH OTHER PEOPLE: EXTREMELY DIFFICULT

## 2023-09-18 ENCOUNTER — OFFICE VISIT (OUTPATIENT)
Dept: FAMILY MEDICINE | Facility: OTHER | Age: 68
End: 2023-09-18
Attending: FAMILY MEDICINE
Payer: MEDICARE

## 2023-09-18 VITALS
BODY MASS INDEX: 25.16 KG/M2 | SYSTOLIC BLOOD PRESSURE: 110 MMHG | RESPIRATION RATE: 15 BRPM | OXYGEN SATURATION: 96 % | TEMPERATURE: 96.9 F | WEIGHT: 151.2 LBS | HEART RATE: 74 BPM | DIASTOLIC BLOOD PRESSURE: 52 MMHG

## 2023-09-18 DIAGNOSIS — D64.9 ANEMIA, UNSPECIFIED TYPE: ICD-10-CM

## 2023-09-18 DIAGNOSIS — I50.33 ACUTE ON CHRONIC HEART FAILURE WITH PRESERVED EJECTION FRACTION (H): ICD-10-CM

## 2023-09-18 DIAGNOSIS — I20.1 CORONARY ARTERY SPASM (H): ICD-10-CM

## 2023-09-18 DIAGNOSIS — Z95.0 CARDIAC PACEMAKER IN SITU: ICD-10-CM

## 2023-09-18 DIAGNOSIS — I95.9 HYPOTENSION, UNSPECIFIED HYPOTENSION TYPE: Primary | ICD-10-CM

## 2023-09-18 DIAGNOSIS — E11.9 TYPE 2 DIABETES MELLITUS WITHOUT COMPLICATION, WITHOUT LONG-TERM CURRENT USE OF INSULIN (H): ICD-10-CM

## 2023-09-18 DIAGNOSIS — R79.1 SUPRATHERAPEUTIC INR: ICD-10-CM

## 2023-09-18 DIAGNOSIS — Z23 NEEDS FLU SHOT: ICD-10-CM

## 2023-09-18 LAB
CHOLEST SERPL-MCNC: 212 MG/DL
ERYTHROCYTE [DISTWIDTH] IN BLOOD BY AUTOMATED COUNT: 13.6 % (ref 10–15)
HBA1C MFR BLD: 6.4 % (ref 4–6.2)
HCT VFR BLD AUTO: 41.9 % (ref 35–47)
HDLC SERPL-MCNC: 48 MG/DL
HGB BLD-MCNC: 14.4 G/DL (ref 11.7–15.7)
LDLC SERPL CALC-MCNC: 123 MG/DL
MCH RBC QN AUTO: 31.5 PG (ref 26.5–33)
MCHC RBC AUTO-ENTMCNC: 34.4 G/DL (ref 31.5–36.5)
MCV RBC AUTO: 92 FL (ref 78–100)
NONHDLC SERPL-MCNC: 164 MG/DL
PLATELET # BLD AUTO: 415 10E3/UL (ref 150–450)
RBC # BLD AUTO: 4.57 10E6/UL (ref 3.8–5.2)
TRIGL SERPL-MCNC: 204 MG/DL
WBC # BLD AUTO: 6.4 10E3/UL (ref 4–11)

## 2023-09-18 PROCEDURE — G0463 HOSPITAL OUTPT CLINIC VISIT: HCPCS | Mod: 25

## 2023-09-18 PROCEDURE — 85041 AUTOMATED RBC COUNT: CPT | Mod: ZL | Performed by: FAMILY MEDICINE

## 2023-09-18 PROCEDURE — 80061 LIPID PANEL: CPT | Mod: ZL | Performed by: FAMILY MEDICINE

## 2023-09-18 PROCEDURE — 99495 TRANSJ CARE MGMT MOD F2F 14D: CPT | Performed by: FAMILY MEDICINE

## 2023-09-18 PROCEDURE — G0008 ADMIN INFLUENZA VIRUS VAC: HCPCS

## 2023-09-18 PROCEDURE — 36415 COLL VENOUS BLD VENIPUNCTURE: CPT | Mod: ZL | Performed by: FAMILY MEDICINE

## 2023-09-18 PROCEDURE — 99495 TRANSJ CARE MGMT MOD F2F 14D: CPT | Mod: 25 | Performed by: FAMILY MEDICINE

## 2023-09-18 PROCEDURE — 83036 HEMOGLOBIN GLYCOSYLATED A1C: CPT | Mod: ZL | Performed by: FAMILY MEDICINE

## 2023-09-18 RX ORDER — FUROSEMIDE 20 MG
20 TABLET ORAL DAILY
Qty: 90 TABLET | Refills: 4 | Status: SHIPPED | OUTPATIENT
Start: 2023-09-18 | End: 2023-09-25

## 2023-09-18 ASSESSMENT — PAIN SCALES - GENERAL: PAINLEVEL: NO PAIN (0)

## 2023-09-18 NOTE — PATIENT INSTRUCTIONS
Change furosemide to 20mg once a day  Send in BP readings Wednesday morning - if stable then ok to return to cardiac rehab.  Recheck of hemoglobin today.    Echocardiogram

## 2023-09-18 NOTE — NURSING NOTE
"Chief Complaint   Patient presents with    Hospital F/U     Hypotension          Initial /52   Pulse 74   Temp 96.9  F (36.1  C) (Tympanic)   Resp 15   Wt 68.6 kg (151 lb 3.2 oz)   SpO2 96%   BMI 25.16 kg/m   Estimated body mass index is 25.16 kg/m  as calculated from the following:    Height as of 9/7/23: 1.651 m (5' 5\").    Weight as of this encounter: 68.6 kg (151 lb 3.2 oz).  Medication Reconciliation: complete    Cait Paul LPN on 9/18/2023 at 8:14 AM     "

## 2023-09-18 NOTE — PROGRESS NOTES
Assessment & Plan       ICD-10-CM    1. Hypotension, unspecified hypotension type  I95.9 Echocardiogram Complete      2. Cardiac pacemaker in situ  Z95.0       3. Type 2 diabetes mellitus without complication, without long-term current use of insulin (H)  E11.9 Hemoglobin A1c      4. Coronary artery spasm (H)  I20.1 Lipid Profile     Echocardiogram Complete      5. Supratherapeutic INR  R79.1       6. Anemia, unspecified type  D64.9 CBC W PLT No Diff      7. Acute on chronic heart failure with preserved ejection fraction (H)  I50.33 furosemide (LASIX) 20 MG tablet      8. Needs flu shot  Z23 INFLUENZA VACCINE 65+ (FLUZONE HD)          After discussion with patient, decision was made to change her Lasix to 20 mg once a day.  We discussed other medications for which a change in her blood pressure medication would have a quicker effect on her blood pressure.  However, these medications are also indicated for angina, coronary artery spasm control, and also heart failure furosemide was chosen instead.  She will continue her home blood pressure monitoring, send those numbers into me in 2 days.  If they are stable, then she can return to cardiac rehab.  Recommend follow-up echocardiogram due to hypotension and known heart failure.  Anemia, recheck today.  If it is lower, would recommend additional evaluation, including possible GI evaluation.  Continue home INR monitoring.  A1c checked today.  Flu vaccine today    PDMP Review         Value Time User    State PDMP site checked  Yes 4/5/2023  3:28 PM Erkia Carrasco MD            Ordering of each unique test  Prescription drug management       MED REC REQUIRED  Post Medication Reconciliation Status:     MEDICATIONS:        - Decrease lasix  to 20mg daily    No follow-ups on file.    ERIKA SANDOVAL MD  [facility]   Lyudmila Fitzgerald is a 68 year old female  presenting for the following health issues: Nursing  "Notes:   Cait Paul LPN  9/18/2023  8:14 AM  Sign at exiting of workspace  Chief Complaint   Patient presents with    Hospital F/U     Hypotension          Initial /52   Pulse 74   Temp 96.9  F (36.1  C) (Tympanic)   Resp 15   Wt 68.6 kg (151 lb 3.2 oz)   SpO2 96%   BMI 25.16 kg/m   Estimated body mass index is 25.16 kg/m  as calculated from the following:    Height as of 9/7/23: 1.651 m (5' 5\").    Weight as of this encounter: 68.6 kg (151 lb 3.2 oz).  Medication Reconciliation: complete    Cait Paul LPN on 9/18/2023 at 8:14 AM                                HPI Lyudmila Fitzgerald is a 68 year old female presents for hospital follow up.  Last time she vomited was over a week ago.  Continues to feel weak and drained.  Her \"typical\" chest pain is unchanged but her activity remains limited.        Hospital Follow-up Visit:    Hospital/Nursing Home/IP Rehab Facility: [facility]   Date of Admission: 9/7/23  Date of Discharge: 9/8/23  Reason(s) for Admission: hypotention  Follow up phone call on 9/11/23    Was your hospitalization related to COVID-19? No   Problems taking medications regularly:  None  Medication changes since discharge: Adjustments in Coumadin  Problems adhering to non-medication therapy:  None    Summary of hospitalization:  [facility] discharge summary reviewed  Diagnostic Tests/Treatments reviewed.  Follow up needed: INRs have been rechecked.  Needs hemoglobin recheck   Other Healthcare Providers Involved in Patient s Care:         None  Update since discharge: fluctuating course.       Answers submitted by the patient for this visit:  Patient Health Questionnaire (Submitted on 9/17/2023)  If you checked off any problems, how difficult have these problems made it for you to do your work, take care of things at home, or get along with other people?: Extremely difficult  PHQ9 TOTAL SCORE: 7  General Questionnaire (Submitted on " 9/17/2023)  Chief Complaint: Chronic problems general questions HPI Form  What is the reason for your visit today? : Hypotension after illness  How many servings of fruits and vegetables do you eat daily?: 2-3  On average, how many sweetened beverages do you drink each day (Examples: soda, juice, sweet tea, etc.  Do NOT count diet or artificially sweetened beverages)?: 0  How many minutes a day do you exercise enough to make your heart beat faster?: 9 or less  How many days a week do you exercise enough to make your heart beat faster?: 3 or less  How many days per week do you miss taking your medication?: 0    Plan of care communicated with patient                 Current Outpatient Medications   Medication    aspirin 81 MG EC tablet    atorvastatin (LIPITOR) 80 MG tablet    calcium carbonate-vitamin D (OSCAL W/D) 500-200 MG-UNIT tablet    cloNIDine (CATAPRES) 0.1 MG tablet    diltiazem ER (TIAZAC) 360 MG 24 hr ER beaded capsule    furosemide (LASIX) 20 MG tablet    isosorbide mononitrate (IMDUR) 120 MG 24 HR ER tablet    NIFEdipine ER (ADALAT CC) 60 MG 24 hr tablet    nitroGLYcerin (NITRODUR) 0.2 MG/HR 24 hr patch    nitroGLYcerin (NITROSTAT) 0.4 MG sublingual tablet    ranolazine (RANEXA) 500 MG 12 hr tablet    sacubitril-valsartan (ENTRESTO) 24-26 MG per tablet    sertraline (ZOLOFT) 50 MG tablet    spironolactone (ALDACTONE) 25 MG tablet    warfarin ANTICOAGULANT (COUMADIN) 6 MG tablet    zolpidem (AMBIEN) 10 MG tablet     No current facility-administered medications for this visit.     Past Medical History:   Diagnosis Date    Acute thromboembolism of deep veins of lower extremity (H) 2006    Overview:  Hx of multiple episodes of DVT: 3 lower extremity; 5 upper extremity (right arm)    Atrial fibrillation (H)     No Comments Provided    Cardiac pacemaker in situ 2003    Dual chamber    Cerebral thrombosis     8/31/2006,'95 w/ no residual    Coronary atherosclerosis     2/6/2006    Endometrial hyperplasia 2006     s/p endometrial ablation     Essential hypertension     No Comments Provided    History of other genital system and obstetric disorders      8, Para 3-0-5-2    Other and unspecified angina pectoris     2006    Other and unspecified hyperlipidemia     No Comments Provided    Other postprocedural states      and ,Followed by Dr. Usman Duran, [facility],.    Paroxysmal supraventricular tachycardia (H)     2006,s/p multiple ablations w last resulting in PPM    Personal history of transient ischemic attack (TIA) and cerebral infarction without residual deficit 2009    with left hemiplegia    Primary hypercoagulable state (H)     No Comments Provided               Review of Systems           2023    10:27 AM 2023     8:52 AM 2023    12:34 PM   PHQ   PHQ-9 Total Score 4 0 7   Q9: Thoughts of better off dead/self-harm past 2 weeks Not at all Not at all Not at all         3/9/2020    11:02 AM   JUAN CARLOS-7 SCORE   Total Score 0             Objective  /52   Pulse 74   Temp 96.9  F (36.1  C) (Tympanic)   Resp 15   Wt 68.6 kg (151 lb 3.2 oz)   SpO2 96%   BMI 25.16 kg/m     Physical Exam   GENERAL: healthy, alert and no distress  CV: RRR  No LE edema     Labs pending

## 2023-09-19 ENCOUNTER — APPOINTMENT (OUTPATIENT)
Dept: ANTICOAGULATION | Facility: OTHER | Age: 68
End: 2023-09-19
Attending: FAMILY MEDICINE
Payer: MEDICARE

## 2023-09-20 ENCOUNTER — MYC MEDICAL ADVICE (OUTPATIENT)
Dept: FAMILY MEDICINE | Facility: OTHER | Age: 68
End: 2023-09-20
Payer: MEDICARE

## 2023-09-21 ENCOUNTER — ANTICOAGULATION THERAPY VISIT (OUTPATIENT)
Dept: ANTICOAGULATION | Facility: OTHER | Age: 68
End: 2023-09-21
Attending: FAMILY MEDICINE
Payer: MEDICARE

## 2023-09-21 DIAGNOSIS — Z95.5 HISTORY OF CORONARY ARTERY STENT PLACEMENT: ICD-10-CM

## 2023-09-21 DIAGNOSIS — D68.2 FACTOR V DEFICIENCY (H): ICD-10-CM

## 2023-09-21 DIAGNOSIS — Z79.01 ANTICOAGULATION MONITORING, INR RANGE 2-3: Primary | ICD-10-CM

## 2023-09-21 DIAGNOSIS — I66.9 CEREBRAL THROMBOSIS: ICD-10-CM

## 2023-09-21 LAB — INR HOME MONITORING: 3.6 (ref 2–3)

## 2023-09-21 NOTE — PROGRESS NOTES
ANTICOAGULATION MANAGEMENT     Lyudmila Fitzgerald 68 year old female is on warfarin with supratherapeutic INR result. (Goal INR 2.0-3.0)    Recent labs: (last 7 days)     09/21/23  0000   INR 3.6*       ASSESSMENT     Source(s): Chart Review and Patient/Caregiver Call     Warfarin doses taken: Warfarin taken as instructed  Diet: No new diet changes identified  New illness, injury, or hospitalization: No  Medication/supplement changes: None noted  Signs or symptoms of bleeding or clotting: No  Previous INR: Subtherapeutic  Additional findings: None Starting Cardiac rehab Friday     PLAN     Recommended plan for ongoing change(s) affecting INR     Dosing Instructions: Hold today then decrease your warfarin dose (7.7% change) with next INR in 1 week       Summary  As of 9/21/2023      Full warfarin instructions:  9/21: Hold; Otherwise 3 mg every Mon, Thu; 6 mg all other days   Next INR check:  9/28/2023               Telephone call with Leigh who verbalizes understanding and agrees to plan    Patient to recheck with home meter    Education provided: None required    Plan made per [facility] anticoagulation protocol    Winter Pedraza RN  Anticoagulation Clinic  9/21/2023    _______________________________________________________________________     Anticoagulation Episode Summary       Current INR goal:  2.0-3.0   TTR:  59.3 % (1 y)   Target end date:  Indefinite   Send INR reminders to:  [facility]    Indications    Acute thromboembolism of deep veins of lower extremity (H) (Resolved) [I82.409]  Anticoagulation monitoring  INR range 2-3 [Z79.01]  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

## 2023-09-22 ENCOUNTER — TELEPHONE (OUTPATIENT)
Dept: CARDIAC REHAB | Facility: OTHER | Age: 68
End: 2023-09-22

## 2023-09-22 ENCOUNTER — HOSPITAL ENCOUNTER (OUTPATIENT)
Dept: CARDIAC REHAB | Facility: OTHER | Age: 68
Discharge: HOME | End: 2023-09-22
Attending: STUDENT IN AN ORGANIZED HEALTH CARE EDUCATION/TRAINING PROGRAM
Payer: MEDICARE

## 2023-09-22 ENCOUNTER — HOSPITAL ENCOUNTER (OUTPATIENT)
Dept: CARDIOLOGY | Facility: OTHER | Age: 68
Discharge: HOME | End: 2023-09-22
Attending: FAMILY MEDICINE | Admitting: FAMILY MEDICINE
Payer: MEDICARE

## 2023-09-22 DIAGNOSIS — I20.1 CORONARY ARTERY SPASM (H): ICD-10-CM

## 2023-09-22 DIAGNOSIS — I95.9 HYPOTENSION, UNSPECIFIED HYPOTENSION TYPE: ICD-10-CM

## 2023-09-22 LAB — LVEF ECHO: NORMAL

## 2023-09-22 PROCEDURE — 93306 TTE W/DOPPLER COMPLETE: CPT | Mod: 26 | Performed by: INTERNAL MEDICINE

## 2023-09-22 PROCEDURE — 93306 TTE W/DOPPLER COMPLETE: CPT

## 2023-09-22 PROCEDURE — 93798 PHYS/QHP OP CAR RHAB W/ECG: CPT

## 2023-09-22 NOTE — TELEPHONE ENCOUNTER
ERIN: Low BP readings    Lyudmila came to cardiac rehab today. Her resting BP was 76/56. We had her drink 12 oz cup water and her BP came up to 83/60. She complained of feeling weak yet, since her 3 week viral illness, but wanted to exercise. Her BP came up with  exercise to 91/60, but dropped again 81/56. She finished exercise and her BP was 74/49. We had her drink another glass of water and eat juan francisco crackers. She came back up to 82/58. She is on  Ranexa, Imdur and Entresto. She had no complaints of dizziness or feeling lightheaded. She was released home, but told to keep drinking liquids.

## 2023-09-25 ENCOUNTER — HOSPITAL ENCOUNTER (OUTPATIENT)
Dept: CARDIAC REHAB | Facility: OTHER | Age: 68
Discharge: HOME | End: 2023-09-25
Attending: STUDENT IN AN ORGANIZED HEALTH CARE EDUCATION/TRAINING PROGRAM
Payer: MEDICARE

## 2023-09-25 PROCEDURE — 93798 PHYS/QHP OP CAR RHAB W/ECG: CPT

## 2023-09-27 ENCOUNTER — HOSPITAL ENCOUNTER (OUTPATIENT)
Dept: CARDIAC REHAB | Facility: OTHER | Age: 68
Discharge: HOME | End: 2023-09-27
Attending: STUDENT IN AN ORGANIZED HEALTH CARE EDUCATION/TRAINING PROGRAM
Payer: MEDICARE

## 2023-09-27 PROCEDURE — 93798 PHYS/QHP OP CAR RHAB W/ECG: CPT

## 2023-09-28 ENCOUNTER — ANTICOAGULATION THERAPY VISIT (OUTPATIENT)
Dept: ANTICOAGULATION | Facility: OTHER | Age: 68
End: 2023-09-28
Attending: FAMILY MEDICINE
Payer: MEDICARE

## 2023-09-28 DIAGNOSIS — Z79.01 ANTICOAGULATION MONITORING, INR RANGE 2-3: Primary | ICD-10-CM

## 2023-09-28 DIAGNOSIS — D68.2 FACTOR V DEFICIENCY (H): ICD-10-CM

## 2023-09-28 DIAGNOSIS — I66.9 CEREBRAL THROMBOSIS: ICD-10-CM

## 2023-09-28 DIAGNOSIS — Z95.5 HISTORY OF CORONARY ARTERY STENT PLACEMENT: ICD-10-CM

## 2023-09-28 LAB — INR HOME MONITORING: 3.2 (ref 2–3)

## 2023-09-28 NOTE — PROGRESS NOTES
ANTICOAGULATION MANAGEMENT     Lyudmila Fitzgerald 68 year old female is on warfarin with supratherapeutic INR result. (Goal INR 2.0-3.0)    Recent labs: (last 7 days)     09/28/23  0000   INR 3.2*       ASSESSMENT     Source(s): Chart Review and Patient/Caregiver Call     Warfarin doses taken: Warfarin taken as instructed  Diet: No new diet changes identified  Medication/supplement changes: None noted  New illness, injury, or hospitalization: No  Signs or symptoms of bleeding or clotting: No  Previous result: Supratherapeutic  Additional findings: None       PLAN     Recommended plan for no diet, medication or health factor changes affecting INR     Dosing Instructions: decrease your warfarin dose (8.3% change) with next INR in 1 week       Summary  As of 9/28/2023      Full warfarin instructions:  3 mg every Mon, Wed, Fri; 6 mg all other days   Next INR check:  10/5/2023               Telephone call with Leigh who verbalizes understanding and agrees to plan    Patient to recheck with home meter    Education provided:   None required    Plan made per [facility] anticoagulation protocol    Nyla Walker RN  Anticoagulation Clinic  9/28/2023    _______________________________________________________________________     Anticoagulation Episode Summary       Current INR goal:  2.0-3.0   TTR:  57.4 % (1 y)   Target end date:  Indefinite   Send INR reminders to:  [facility]    Indications    Acute thromboembolism of deep veins of lower extremity (H) (Resolved) [I82.409]  Anticoagulation monitoring  INR range 2-3 [Z79.01]  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

## 2023-09-29 ENCOUNTER — HOSPITAL ENCOUNTER (OUTPATIENT)
Dept: CARDIAC REHAB | Facility: OTHER | Age: 68
Discharge: HOME | End: 2023-09-29
Attending: STUDENT IN AN ORGANIZED HEALTH CARE EDUCATION/TRAINING PROGRAM
Payer: MEDICARE

## 2023-09-29 PROCEDURE — 93798 PHYS/QHP OP CAR RHAB W/ECG: CPT

## 2023-10-02 ENCOUNTER — HOSPITAL ENCOUNTER (OUTPATIENT)
Dept: CARDIAC REHAB | Facility: OTHER | Age: 68
Discharge: HOME | End: 2023-10-02
Attending: STUDENT IN AN ORGANIZED HEALTH CARE EDUCATION/TRAINING PROGRAM
Payer: MEDICARE

## 2023-10-02 PROCEDURE — 93798 PHYS/QHP OP CAR RHAB W/ECG: CPT

## 2023-10-05 ENCOUNTER — APPOINTMENT (OUTPATIENT)
Dept: ANTICOAGULATION | Facility: OTHER | Age: 68
End: 2023-10-05
Attending: FAMILY MEDICINE
Payer: MEDICARE

## 2023-10-06 ENCOUNTER — HOSPITAL ENCOUNTER (OUTPATIENT)
Dept: CARDIAC REHAB | Facility: OTHER | Age: 68
Discharge: HOME | End: 2023-10-06
Attending: STUDENT IN AN ORGANIZED HEALTH CARE EDUCATION/TRAINING PROGRAM
Payer: MEDICARE

## 2023-10-06 PROCEDURE — 93798 PHYS/QHP OP CAR RHAB W/ECG: CPT

## 2023-10-09 ENCOUNTER — HOSPITAL ENCOUNTER (OUTPATIENT)
Dept: CARDIAC REHAB | Facility: OTHER | Age: 68
Discharge: HOME | End: 2023-10-09
Attending: STUDENT IN AN ORGANIZED HEALTH CARE EDUCATION/TRAINING PROGRAM
Payer: MEDICARE

## 2023-10-09 PROCEDURE — 93798 PHYS/QHP OP CAR RHAB W/ECG: CPT

## 2023-10-10 ENCOUNTER — HOSPITAL ENCOUNTER (OUTPATIENT)
Dept: CARDIOLOGY | Facility: OTHER | Age: 68
Discharge: HOME | End: 2023-10-10
Attending: INTERNAL MEDICINE
Payer: MEDICARE

## 2023-10-10 DIAGNOSIS — Z98.890 S/P AV NODAL ABLATION: ICD-10-CM

## 2023-10-10 DIAGNOSIS — I47.10 SUPRAVENTRICULAR TACHYCARDIA (H): ICD-10-CM

## 2023-10-10 PROCEDURE — 93280 PM DEVICE PROGR EVAL DUAL: CPT | Mod: 26 | Performed by: INTERNAL MEDICINE

## 2023-10-10 PROCEDURE — 93280 PM DEVICE PROGR EVAL DUAL: CPT | Performed by: INTERNAL MEDICINE

## 2023-10-10 NOTE — PATIENT INSTRUCTIONS
It was a pleasure to see you in clinic today, please do not hesitate to call us for any questions or concerns.  Your next automatic remote pacemaker check from home is scheduled for 1/16/2024, we will plan to see you back in clinic in 1 year.     Lynsey Alexander RN    Electrophysiology Nurse Clinician  [facility]    During Business Hours Please Call:  [phone]  After Hours Please Call:  [phone] - select option #4 and ask for job code [phone]

## 2023-10-11 ENCOUNTER — HOSPITAL ENCOUNTER (OUTPATIENT)
Dept: CARDIAC REHAB | Facility: OTHER | Age: 68
Discharge: HOME | End: 2023-10-11
Attending: STUDENT IN AN ORGANIZED HEALTH CARE EDUCATION/TRAINING PROGRAM
Payer: MEDICARE

## 2023-10-11 ENCOUNTER — ANTICOAGULATION THERAPY VISIT (OUTPATIENT)
Dept: ANTICOAGULATION | Facility: OTHER | Age: 68
End: 2023-10-11
Attending: FAMILY MEDICINE
Payer: MEDICARE

## 2023-10-11 DIAGNOSIS — Z79.01 ANTICOAGULATION MONITORING, INR RANGE 2-3: Primary | ICD-10-CM

## 2023-10-11 DIAGNOSIS — D68.2 FACTOR V DEFICIENCY (H): ICD-10-CM

## 2023-10-11 DIAGNOSIS — Z95.5 HISTORY OF CORONARY ARTERY STENT PLACEMENT: ICD-10-CM

## 2023-10-11 DIAGNOSIS — I66.9 CEREBRAL THROMBOSIS: ICD-10-CM

## 2023-10-11 LAB — INR HOME MONITORING: 2.7 (ref 2–3)

## 2023-10-11 PROCEDURE — 93798 PHYS/QHP OP CAR RHAB W/ECG: CPT

## 2023-10-11 NOTE — PROGRESS NOTES
ANTICOAGULATION MANAGEMENT     Lyudmila Fitzgerald 68 year old female is on warfarin with therapeutic INR result. (Goal INR 2.0-3.0)    Recent labs: (last 7 days)     10/11/23  0000   INR 2.7       ASSESSMENT     Source(s): Chart Review and Patient/Caregiver Call     Warfarin doses taken: Warfarin taken as instructed  Diet: No new diet changes identified  Medication/supplement changes: None noted  New illness, injury, or hospitalization: No  Signs or symptoms of bleeding or clotting: No  Previous result: Supratherapeutic  Additional findings: None       PLAN     Recommended plan for no diet, medication or health factor changes affecting INR     Dosing Instructions: Continue your current warfarin dose with next INR in 2 weeks       Summary  As of 10/11/2023      Full warfarin instructions:  3 mg every Mon, Wed, Fri; 6 mg all other days   Next INR check:  10/25/2023               Telephone call with Leigh who verbalizes understanding and agrees to plan    Patient to recheck with home meter    Education provided:   Resume manage by exception with home monitor. Continue to submit INR results to home monitor company.You will only be called when your result is out of range. Please call and notify [facility] if new medication started, dose missed, signs or symptoms of bleeding or clotting, or a surgery/procedure is scheduled.    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

## 2023-10-13 ENCOUNTER — HOSPITAL ENCOUNTER (OUTPATIENT)
Dept: CARDIAC REHAB | Facility: OTHER | Age: 68
Discharge: HOME | End: 2023-10-13
Attending: STUDENT IN AN ORGANIZED HEALTH CARE EDUCATION/TRAINING PROGRAM
Payer: MEDICARE

## 2023-10-13 PROCEDURE — 93798 PHYS/QHP OP CAR RHAB W/ECG: CPT

## 2023-10-16 ENCOUNTER — HOSPITAL ENCOUNTER (OUTPATIENT)
Dept: CARDIAC REHAB | Facility: OTHER | Age: 68
Discharge: HOME | End: 2023-10-16
Attending: STUDENT IN AN ORGANIZED HEALTH CARE EDUCATION/TRAINING PROGRAM
Payer: MEDICARE

## 2023-10-16 PROCEDURE — 93798 PHYS/QHP OP CAR RHAB W/ECG: CPT

## 2023-10-18 ENCOUNTER — HOSPITAL ENCOUNTER (OUTPATIENT)
Dept: CARDIAC REHAB | Facility: OTHER | Age: 68
Discharge: HOME | End: 2023-10-18
Attending: STUDENT IN AN ORGANIZED HEALTH CARE EDUCATION/TRAINING PROGRAM
Payer: MEDICARE

## 2023-10-18 DIAGNOSIS — F51.04 CHRONIC INSOMNIA: ICD-10-CM

## 2023-10-18 PROCEDURE — 93798 PHYS/QHP OP CAR RHAB W/ECG: CPT

## 2023-10-18 RX ORDER — ZOLPIDEM TARTRATE 10 MG/1
10 TABLET ORAL
Qty: 30 TABLET | Refills: 0 | Status: SHIPPED | OUTPATIENT
Start: 2023-10-18 | End: 2023-11-30

## 2023-10-18 NOTE — TELEPHONE ENCOUNTER
Requested Prescriptions   Pending Prescriptions Disp Refills    zolpidem (AMBIEN) 10 MG tablet [Pharmacy Med Name: Zolpidem Tartrate 10 MG Oral Tablet] 90 tablet 0     Sig: TAKE 1 TABLET BY MOUTH NIGHTLY AS NEEDED FOR SLEEP       There is no refill protocol information for this order        Last Written Prescription Date:  7/18/23  Last Fill Quantity: 90,   # refills: 0  Last Office Visit: 8/8/23  Future Office visit:    Next 5 appointments (look out 90 days)      Nov 30, 2023  3:15 PM  Return Visit with Moises Islas, [facility] ([facility] ) [address]  [place] [address] [phone]     Routing refill request to provider for review/approval because:  Drug not on the [facility], [facility] or [facility] refill protocol or controlled substance    Unable to complete prescription refill per RN Medication Refill Policy.   Kandace Morelos RN ....................  10/18/2023   1:50 PM

## 2023-10-19 ENCOUNTER — APPOINTMENT (OUTPATIENT)
Dept: ANTICOAGULATION | Facility: OTHER | Age: 68
End: 2023-10-19
Attending: FAMILY MEDICINE
Payer: MEDICARE

## 2023-10-20 ENCOUNTER — HOSPITAL ENCOUNTER (OUTPATIENT)
Dept: CARDIAC REHAB | Facility: OTHER | Age: 68
Discharge: HOME | End: 2023-10-20
Attending: STUDENT IN AN ORGANIZED HEALTH CARE EDUCATION/TRAINING PROGRAM
Payer: MEDICARE

## 2023-10-20 PROCEDURE — 93798 PHYS/QHP OP CAR RHAB W/ECG: CPT

## 2023-10-25 ENCOUNTER — HOSPITAL ENCOUNTER (OUTPATIENT)
Dept: CARDIAC REHAB | Facility: OTHER | Age: 68
Discharge: HOME | End: 2023-10-25
Attending: STUDENT IN AN ORGANIZED HEALTH CARE EDUCATION/TRAINING PROGRAM
Payer: MEDICARE

## 2023-10-25 PROCEDURE — 93798 PHYS/QHP OP CAR RHAB W/ECG: CPT

## 2023-10-26 ENCOUNTER — ANTICOAGULATION THERAPY VISIT (OUTPATIENT)
Dept: ANTICOAGULATION | Facility: OTHER | Age: 68
End: 2023-10-26
Attending: FAMILY MEDICINE
Payer: MEDICARE

## 2023-10-26 DIAGNOSIS — D68.2 FACTOR V DEFICIENCY (H): ICD-10-CM

## 2023-10-26 DIAGNOSIS — Z79.01 ANTICOAGULATION MONITORING, INR RANGE 2-3: Primary | ICD-10-CM

## 2023-10-26 DIAGNOSIS — Z95.5 HISTORY OF CORONARY ARTERY STENT PLACEMENT: ICD-10-CM

## 2023-10-26 DIAGNOSIS — I66.9 CEREBRAL THROMBOSIS: ICD-10-CM

## 2023-10-26 LAB — INR HOME MONITORING: 3.4 (ref 2–3)

## 2023-10-26 NOTE — PROGRESS NOTES
ANTICOAGULATION MANAGEMENT     Lyudmila Fitzgerald 68 year old female is on warfarin with supratherapeutic INR result. (Goal INR 2.0-3.0)    Recent labs: (last 7 days)     10/26/23  0000   INR 3.4*       ASSESSMENT     Source(s): Chart Review and Patient/Caregiver Call     Warfarin doses taken: Warfarin taken as instructed  Diet: No new diet changes identified  Medication/supplement changes: None noted  New illness, injury, or hospitalization: No  Signs or symptoms of bleeding or clotting: No  Previous result: Therapeutic last visit; previously outside of goal range  Additional findings: None       PLAN     Recommended plan for no diet, medication or health factor changes affecting INR     Dosing Instructions: Continue your current warfarin dose with next INR in 2 weeks       Summary  As of 10/26/2023      Full warfarin instructions:  3 mg every Mon, Wed, Fri; 6 mg all other days   Next INR check:  11/9/2023               Telephone call with Leigh who verbalizes understanding and agrees to plan    Patient to recheck with home meter    Education provided:   None required    Plan made per [facility] anticoagulation protocol    Nyla Walker RN  Anticoagulation Clinic  10/26/2023    _______________________________________________________________________     Anticoagulation Episode Summary       Current INR goal:  2.0-3.0   TTR:  57.5% (1 y)   Target end date:  Indefinite   Send INR reminders to:  [facility]    Indications    Acute thromboembolism of deep veins of lower extremity (H) (Resolved) [I82.409]  Anticoagulation monitoring  INR range 2-3 [Z79.01]  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

## 2023-10-27 ENCOUNTER — HOSPITAL ENCOUNTER (OUTPATIENT)
Dept: CARDIAC REHAB | Facility: OTHER | Age: 68
Discharge: HOME | End: 2023-10-27
Attending: STUDENT IN AN ORGANIZED HEALTH CARE EDUCATION/TRAINING PROGRAM
Payer: MEDICARE

## 2023-10-27 PROCEDURE — 93798 PHYS/QHP OP CAR RHAB W/ECG: CPT

## 2023-10-28 ENCOUNTER — MYC MEDICAL ADVICE (OUTPATIENT)
Dept: CARDIOLOGY | Facility: OTHER | Age: 68
End: 2023-10-28
Payer: MEDICARE

## 2023-10-30 ENCOUNTER — HOSPITAL ENCOUNTER (OUTPATIENT)
Dept: CARDIAC REHAB | Facility: OTHER | Age: 68
Discharge: HOME | End: 2023-10-30
Attending: STUDENT IN AN ORGANIZED HEALTH CARE EDUCATION/TRAINING PROGRAM
Payer: MEDICARE

## 2023-10-30 PROCEDURE — 93798 PHYS/QHP OP CAR RHAB W/ECG: CPT

## 2023-11-03 ENCOUNTER — HOSPITAL ENCOUNTER (OUTPATIENT)
Dept: CARDIAC REHAB | Facility: OTHER | Age: 68
Discharge: HOME | End: 2023-11-03
Attending: STUDENT IN AN ORGANIZED HEALTH CARE EDUCATION/TRAINING PROGRAM
Payer: MEDICARE

## 2023-11-03 PROCEDURE — 93798 PHYS/QHP OP CAR RHAB W/ECG: CPT

## 2023-11-06 ENCOUNTER — HOSPITAL ENCOUNTER (OUTPATIENT)
Dept: CARDIAC REHAB | Facility: OTHER | Age: 68
Discharge: HOME | End: 2023-11-06
Attending: STUDENT IN AN ORGANIZED HEALTH CARE EDUCATION/TRAINING PROGRAM
Payer: MEDICARE

## 2023-11-06 LAB
MDC_IDC_LEAD_CONNECTION_STATUS: NORMAL
MDC_IDC_LEAD_CONNECTION_STATUS: NORMAL
MDC_IDC_LEAD_IMPLANT_DT: NORMAL
MDC_IDC_LEAD_IMPLANT_DT: NORMAL
MDC_IDC_LEAD_LOCATION: NORMAL
MDC_IDC_LEAD_LOCATION: NORMAL
MDC_IDC_LEAD_LOCATION_DETAIL_1: NORMAL
MDC_IDC_LEAD_LOCATION_DETAIL_1: NORMAL
MDC_IDC_LEAD_MFG: NORMAL
MDC_IDC_LEAD_MFG: NORMAL
MDC_IDC_LEAD_MODEL: NORMAL
MDC_IDC_LEAD_MODEL: NORMAL
MDC_IDC_LEAD_POLARITY_TYPE: NORMAL
MDC_IDC_LEAD_POLARITY_TYPE: NORMAL
MDC_IDC_LEAD_SERIAL: NORMAL
MDC_IDC_LEAD_SERIAL: NORMAL
MDC_IDC_LEAD_SPECIAL_FUNCTION: NORMAL
MDC_IDC_LEAD_SPECIAL_FUNCTION: NORMAL
MDC_IDC_MSMT_BATTERY_DTM: NORMAL
MDC_IDC_MSMT_BATTERY_REMAINING_LONGEVITY: 108 MO
MDC_IDC_MSMT_BATTERY_STATUS: NORMAL
MDC_IDC_MSMT_LEADCHNL_RA_IMPEDANCE_VALUE: 687 OHM
MDC_IDC_MSMT_LEADCHNL_RA_PACING_THRESHOLD_AMPLITUDE: 0.8 V
MDC_IDC_MSMT_LEADCHNL_RA_PACING_THRESHOLD_PULSEWIDTH: 1 MS
MDC_IDC_MSMT_LEADCHNL_RA_SENSING_INTR_AMPL: 0.9 MV
MDC_IDC_MSMT_LEADCHNL_RV_IMPEDANCE_VALUE: 471 OHM
MDC_IDC_MSMT_LEADCHNL_RV_PACING_THRESHOLD_AMPLITUDE: 0.7 V
MDC_IDC_MSMT_LEADCHNL_RV_PACING_THRESHOLD_PULSEWIDTH: 0.4 MS
MDC_IDC_MSMT_LEADCHNL_RV_SENSING_INTR_AMPL: 8.8 MV
MDC_IDC_PG_IMPLANT_DTM: NORMAL
MDC_IDC_PG_MFG: NORMAL
MDC_IDC_PG_MODEL: NORMAL
MDC_IDC_PG_SERIAL: NORMAL
MDC_IDC_PG_TYPE: NORMAL
MDC_IDC_SESS_CLINIC_NAME: NORMAL
MDC_IDC_SESS_DTM: NORMAL
MDC_IDC_SESS_TYPE: NORMAL
MDC_IDC_SET_BRADY_AT_MODE_SWITCH_MODE: NORMAL
MDC_IDC_SET_BRADY_AT_MODE_SWITCH_RATE: 140 {BEATS}/MIN
MDC_IDC_SET_BRADY_LOWRATE: 60 {BEATS}/MIN
MDC_IDC_SET_BRADY_MAX_SENSOR_RATE: 130 {BEATS}/MIN
MDC_IDC_SET_BRADY_MAX_TRACKING_RATE: 100 {BEATS}/MIN
MDC_IDC_SET_BRADY_MODE: NORMAL
MDC_IDC_SET_BRADY_PAV_DELAY_HIGH: 80 MS
MDC_IDC_SET_BRADY_PAV_DELAY_LOW: 130 MS
MDC_IDC_SET_BRADY_SAV_DELAY_HIGH: 80 MS
MDC_IDC_SET_BRADY_SAV_DELAY_LOW: 130 MS
MDC_IDC_SET_LEADCHNL_RA_PACING_AMPLITUDE: 2 V
MDC_IDC_SET_LEADCHNL_RA_PACING_CAPTURE_MODE: NORMAL
MDC_IDC_SET_LEADCHNL_RA_PACING_POLARITY: NORMAL
MDC_IDC_SET_LEADCHNL_RA_PACING_PULSEWIDTH: 1 MS
MDC_IDC_SET_LEADCHNL_RA_SENSING_ADAPTATION_MODE: NORMAL
MDC_IDC_SET_LEADCHNL_RA_SENSING_POLARITY: NORMAL
MDC_IDC_SET_LEADCHNL_RA_SENSING_SENSITIVITY: 0.6 MV
MDC_IDC_SET_LEADCHNL_RV_PACING_AMPLITUDE: 1.6 V
MDC_IDC_SET_LEADCHNL_RV_PACING_CAPTURE_MODE: NORMAL
MDC_IDC_SET_LEADCHNL_RV_PACING_POLARITY: NORMAL
MDC_IDC_SET_LEADCHNL_RV_PACING_PULSEWIDTH: 0.4 MS
MDC_IDC_SET_LEADCHNL_RV_SENSING_ADAPTATION_MODE: NORMAL
MDC_IDC_SET_LEADCHNL_RV_SENSING_POLARITY: NORMAL
MDC_IDC_SET_LEADCHNL_RV_SENSING_SENSITIVITY: 1.5 MV
MDC_IDC_SET_ZONE_DETECTION_INTERVAL: 375 MS
MDC_IDC_SET_ZONE_STATUS: NORMAL
MDC_IDC_SET_ZONE_TYPE: NORMAL
MDC_IDC_SET_ZONE_VENDOR_TYPE: NORMAL
MDC_IDC_STAT_AT_BURDEN_PERCENT: 0.1 %
MDC_IDC_STAT_BRADY_DTM_END: NORMAL
MDC_IDC_STAT_BRADY_DTM_START: NORMAL
MDC_IDC_STAT_BRADY_RA_PERCENT_PACED: 90 %
MDC_IDC_STAT_BRADY_RV_PERCENT_PACED: 100 %
MDC_IDC_STAT_EPISODE_RECENT_COUNT: 0
MDC_IDC_STAT_EPISODE_RECENT_COUNT_DTM_END: NORMAL
MDC_IDC_STAT_EPISODE_RECENT_COUNT_DTM_START: NORMAL
MDC_IDC_STAT_EPISODE_TOTAL_COUNT: 0
MDC_IDC_STAT_EPISODE_TOTAL_COUNT_DTM_END: NORMAL
MDC_IDC_STAT_EPISODE_TYPE: NORMAL
MDC_IDC_STAT_EPISODE_TYPE: NORMAL
MDC_IDC_STAT_EPISODE_VENDOR_TYPE: NORMAL
MDC_IDC_STAT_EPISODE_VENDOR_TYPE: NORMAL

## 2023-11-06 PROCEDURE — 93798 PHYS/QHP OP CAR RHAB W/ECG: CPT

## 2023-11-09 ENCOUNTER — ANTICOAGULATION THERAPY VISIT (OUTPATIENT)
Dept: ANTICOAGULATION | Facility: OTHER | Age: 68
End: 2023-11-09
Attending: FAMILY MEDICINE
Payer: MEDICARE

## 2023-11-09 DIAGNOSIS — Z79.01 ANTICOAGULATION MONITORING, INR RANGE 2-3: Primary | ICD-10-CM

## 2023-11-09 DIAGNOSIS — I66.9 CEREBRAL THROMBOSIS: ICD-10-CM

## 2023-11-09 DIAGNOSIS — Z95.5 HISTORY OF CORONARY ARTERY STENT PLACEMENT: ICD-10-CM

## 2023-11-09 DIAGNOSIS — D68.2 FACTOR V DEFICIENCY (H): ICD-10-CM

## 2023-11-09 LAB — INR HOME MONITORING: 3.4 (ref 2–3)

## 2023-11-09 NOTE — PROGRESS NOTES
ANTICOAGULATION MANAGEMENT     Lyudmila Fitzgerald 68 year old female is on warfarin with supratherapeutic INR result. (Goal INR 2.0-3.0)    Recent labs: (last 7 days)     11/09/23  0000   INR 3.4*       ASSESSMENT     Source(s): Chart Review  Previous INR was Supratherapeutic  Medication, diet, health changes since last INR chart reviewed; none identified         PLAN     Recommended plan for no diet, medication or health factor changes affecting INR     Dosing Instructions: decrease your warfarin dose (9.1% change) with next INR in 2 weeks       Summary  As of 11/9/2023      Full warfarin instructions:  6 mg every Mon, Wed, Fri; 3 mg all other days   Next INR check:  11/23/2023               Detailed voice message left for Leigh with dosing instructions and follow up date.   Sent [employer] message with dosing and follow up instructions    Patient to recheck with home meter    Education provided:   Contact [phone] with any changes, questions or concerns.     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

## 2023-11-10 ENCOUNTER — MYC MEDICAL ADVICE (OUTPATIENT)
Dept: CARDIOLOGY | Facility: OTHER | Age: 68
End: 2023-11-10
Payer: MEDICARE

## 2023-11-10 DIAGNOSIS — Z86.79 HISTORY OF CORONARY VASOSPASM: ICD-10-CM

## 2023-11-10 DIAGNOSIS — R07.9 CHEST PAIN, UNSPECIFIED TYPE: Primary | ICD-10-CM

## 2023-11-10 DIAGNOSIS — Q24.5 MYOCARDIAL BRIDGE: ICD-10-CM

## 2023-11-10 DIAGNOSIS — I49.8 PACEMAKER-DEPENDENT DUE TO NATIVE CARDIAC RHYTHM INSUFFICIENT TO SUPPORT LIFE: ICD-10-CM

## 2023-11-10 DIAGNOSIS — Z95.1 HISTORY OF CORONARY ARTERY BYPASS GRAFT X 1: ICD-10-CM

## 2023-11-10 DIAGNOSIS — I47.10 PAROXYSMAL SUPRAVENTRICULAR TACHYCARDIA (H): ICD-10-CM

## 2023-11-10 DIAGNOSIS — Z95.5 HISTORY OF CORONARY ARTERY STENT PLACEMENT: ICD-10-CM

## 2023-11-10 DIAGNOSIS — Z95.0 CARDIAC PACEMAKER IN SITU: ICD-10-CM

## 2023-11-10 DIAGNOSIS — Z86.79 PERSONAL HISTORY OF SUPRAVENTRICULAR TACHYCARDIA: ICD-10-CM

## 2023-11-10 DIAGNOSIS — I10 ESSENTIAL HYPERTENSION: ICD-10-CM

## 2023-11-10 DIAGNOSIS — R07.89 OTHER CHEST PAIN: ICD-10-CM

## 2023-11-10 DIAGNOSIS — Z95.0 PACEMAKER-DEPENDENT DUE TO NATIVE CARDIAC RHYTHM INSUFFICIENT TO SUPPORT LIFE: ICD-10-CM

## 2023-11-10 DIAGNOSIS — Z98.890 STATUS POST CORONARY ANGIOGRAM: ICD-10-CM

## 2023-11-10 DIAGNOSIS — Z98.890 HISTORY OF CARDIAC RADIOFREQUENCY ABLATION: ICD-10-CM

## 2023-11-10 DIAGNOSIS — I20.1 CORONARY ARTERY SPASM (H): ICD-10-CM

## 2023-11-10 DIAGNOSIS — Z98.890 S/P AV NODAL ABLATION: ICD-10-CM

## 2023-11-10 NOTE — TELEPHONE ENCOUNTER
Katrin Lang CNP How, Katrina, RN;  Cardiology7 minutes ago (2:05 PM)     MO  I replied to her e[employer] message but she has an extensive history, on multiple medications, and it looks like lower blood pressures so I did not make any medication recommendations. If she would be willing to do a virtual visit with Dr. Gonzales next week he has openings. Otherwise, wait and see what Dr. Islas recommends.    Thanks,    Katrin Lang CNP on 11/10/2023 at 2:05 PM

## 2023-11-13 ENCOUNTER — HOSPITAL ENCOUNTER (OUTPATIENT)
Dept: CARDIAC REHAB | Facility: OTHER | Age: 68
Discharge: HOME | End: 2023-11-13
Attending: FAMILY MEDICINE
Payer: MEDICARE

## 2023-11-13 PROCEDURE — 93798 PHYS/QHP OP CAR RHAB W/ECG: CPT

## 2023-11-14 DIAGNOSIS — E83.42 HYPOMAGNESEMIA: Primary | ICD-10-CM

## 2023-11-14 RX ORDER — NITROGLYCERIN 0.4 MG/1
TABLET SUBLINGUAL
Qty: 100 TABLET | Refills: 4 | Status: SHIPPED | OUTPATIENT
Start: 2023-11-14

## 2023-11-14 RX ORDER — NIFEDIPINE 90 MG/1
90 TABLET, FILM COATED, EXTENDED RELEASE ORAL DAILY
Qty: 90 TABLET | Refills: 3 | Status: SHIPPED | OUTPATIENT
Start: 2023-11-14

## 2023-11-14 RX ORDER — DILTIAZEM HYDROCHLORIDE 240 MG/1
240 CAPSULE, EXTENDED RELEASE ORAL EVERY 12 HOURS
Qty: 180 CAPSULE | Refills: 3 | Status: SHIPPED | OUTPATIENT
Start: 2023-11-14 | End: 2024-03-04

## 2023-11-14 RX ORDER — RANOLAZINE 1000 MG/1
1000 TABLET, EXTENDED RELEASE ORAL 2 TIMES DAILY
Qty: 180 TABLET | Refills: 3 | Status: SHIPPED | OUTPATIENT
Start: 2023-11-14

## 2023-11-14 RX ORDER — MAGNESIUM OXIDE 400 MG/1
400 TABLET ORAL DAILY
Qty: 90 TABLET | Refills: 0 | Status: SHIPPED | OUTPATIENT
Start: 2023-11-14 | End: 2024-02-08

## 2023-11-15 ENCOUNTER — HOSPITAL ENCOUNTER (OUTPATIENT)
Dept: CARDIAC REHAB | Facility: OTHER | Age: 68
Discharge: HOME | End: 2023-11-15
Attending: FAMILY MEDICINE
Payer: MEDICARE

## 2023-11-15 PROCEDURE — 93798 PHYS/QHP OP CAR RHAB W/ECG: CPT

## 2023-11-17 ENCOUNTER — HOSPITAL ENCOUNTER (OUTPATIENT)
Dept: CARDIAC REHAB | Facility: OTHER | Age: 68
Discharge: HOME | End: 2023-11-17
Attending: FAMILY MEDICINE
Payer: MEDICARE

## 2023-11-17 PROCEDURE — 93798 PHYS/QHP OP CAR RHAB W/ECG: CPT

## 2023-11-20 ENCOUNTER — MYC MEDICAL ADVICE (OUTPATIENT)
Dept: FAMILY MEDICINE | Facility: OTHER | Age: 68
End: 2023-11-20
Payer: MEDICARE

## 2023-11-20 ENCOUNTER — HOSPITAL ENCOUNTER (OUTPATIENT)
Dept: CARDIAC REHAB | Facility: OTHER | Age: 68
Discharge: HOME | End: 2023-11-20
Attending: FAMILY MEDICINE
Payer: MEDICARE

## 2023-11-20 PROCEDURE — 93798 PHYS/QHP OP CAR RHAB W/ECG: CPT

## 2023-11-20 PROCEDURE — 93797 PHYS/QHP OP CAR RHAB WO ECG: CPT

## 2023-11-22 ENCOUNTER — HOSPITAL ENCOUNTER (OUTPATIENT)
Dept: CARDIAC REHAB | Facility: OTHER | Age: 68
Discharge: HOME | End: 2023-11-22
Attending: FAMILY MEDICINE
Payer: MEDICARE

## 2023-11-22 PROCEDURE — 93798 PHYS/QHP OP CAR RHAB W/ECG: CPT

## 2023-11-24 DIAGNOSIS — I66.9 CEREBRAL THROMBOSIS: ICD-10-CM

## 2023-11-24 RX ORDER — WARFARIN SODIUM 6 MG/1
TABLET ORAL
Qty: 70 TABLET | Refills: 1 | Status: SHIPPED | OUTPATIENT
Start: 2023-11-24 | End: 2024-05-06

## 2023-11-24 NOTE — TELEPHONE ENCOUNTER
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]  Cerebral thrombosis [I66.9]  History of coronary artery stent placement to the LAD x3 [Z95.5]                 Anticoagulation Care Providers       Provider Role Specialty Phone number    Erika Carrasco MD Referring Family Medicine [phone]            Visit with referring provider/group within last year: Yes 9/18/23    [facility] referral signed within last year: Yes    Lyudmila meets all criteria for refill (current [facility] referral, office visit with referring provider/group in last 1 year unless directed to return in 2 years in last referring provider visit note, lab monitoring up to date or not exceeding 2 weeks overdue). Rx instructions and quantity supplied updated to match patient's current dosing plan. Warfarin 90 day supply with 1 refill granted per [facility] protocol     Nasrin Ying RN  Anticoagulation Clinic

## 2023-11-27 ENCOUNTER — HOSPITAL ENCOUNTER (OUTPATIENT)
Dept: CARDIAC REHAB | Facility: OTHER | Age: 68
Discharge: HOME | End: 2023-11-27
Attending: FAMILY MEDICINE
Payer: MEDICARE

## 2023-11-27 PROCEDURE — 93798 PHYS/QHP OP CAR RHAB W/ECG: CPT

## 2023-11-28 LAB — INR HOME MONITORING: 1.8 (ref 2–3)

## 2023-11-29 ENCOUNTER — HOSPITAL ENCOUNTER (OUTPATIENT)
Dept: CARDIAC REHAB | Facility: OTHER | Age: 68
Discharge: HOME | End: 2023-11-29
Attending: FAMILY MEDICINE
Payer: MEDICARE

## 2023-11-29 ENCOUNTER — ANTICOAGULATION THERAPY VISIT (OUTPATIENT)
Dept: ANTICOAGULATION | Facility: OTHER | Age: 68
End: 2023-11-29
Attending: FAMILY MEDICINE
Payer: MEDICARE

## 2023-11-29 DIAGNOSIS — Z86.79 HISTORY OF CORONARY VASOSPASM: ICD-10-CM

## 2023-11-29 DIAGNOSIS — I66.9 CEREBRAL THROMBOSIS: ICD-10-CM

## 2023-11-29 DIAGNOSIS — Z98.890 STATUS POST CORONARY ANGIOGRAM: ICD-10-CM

## 2023-11-29 DIAGNOSIS — Z95.5 HISTORY OF CORONARY ARTERY STENT PLACEMENT: ICD-10-CM

## 2023-11-29 DIAGNOSIS — R06.02 SHORTNESS OF BREATH: ICD-10-CM

## 2023-11-29 DIAGNOSIS — Q24.5 MYOCARDIAL BRIDGE: ICD-10-CM

## 2023-11-29 DIAGNOSIS — Z79.01 ANTICOAGULATION MONITORING, INR RANGE 2-3: Primary | ICD-10-CM

## 2023-11-29 DIAGNOSIS — D68.2 FACTOR V DEFICIENCY (H): ICD-10-CM

## 2023-11-29 DIAGNOSIS — R07.89 OTHER CHEST PAIN: ICD-10-CM

## 2023-11-29 DIAGNOSIS — Z95.1 HISTORY OF CORONARY ARTERY BYPASS GRAFT X 1: ICD-10-CM

## 2023-11-29 DIAGNOSIS — R07.9 CHEST PAIN, UNSPECIFIED TYPE: ICD-10-CM

## 2023-11-29 DIAGNOSIS — I20.1 CORONARY ARTERY SPASM (H): ICD-10-CM

## 2023-11-29 PROCEDURE — 93798 PHYS/QHP OP CAR RHAB W/ECG: CPT

## 2023-11-29 NOTE — PROGRESS NOTES
ANTICOAGULATION MANAGEMENT     Lyudmila Fitzgerald 68 year old female is on warfarin with subtherapeutic INR result. (Goal INR 2.0-3.0)    Recent labs: (last 7 days)     11/28/23  0000   INR 1.8*       ASSESSMENT     Source(s): Chart Review and Patient/Caregiver Call     Warfarin doses taken: Warfarin taken as instructed  Diet: No new diet changes identified  Medication/supplement changes: None noted  New illness, injury, or hospitalization: No  Signs or symptoms of bleeding or clotting: No  Previous result: Supratherapeutic  Additional findings: None       PLAN     Recommended plan for no diet, medication or health factor changes affecting INR     Dosing Instructions: Increase your warfarin dose (10% change) with next INR in 2 weeks       Summary  As of 11/29/2023      Full warfarin instructions:  3 mg every Mon, Wed, Fri; 6 mg all other days   Next INR check:  12/13/2023               Telephone call with Leigh who verbalizes understanding and agrees to plan    Patient to recheck with home meter    Education provided:   None required    Plan made per [facility] anticoagulation protocol    Nyla Walker RN  Anticoagulation Clinic  11/29/2023    _______________________________________________________________________     Anticoagulation Episode Summary       Current INR goal:  2.0-3.0   TTR:  52.9% (1 y)   Target end date:  Indefinite   Send INR reminders to:  [facility]    Indications    Acute thromboembolism of deep veins of lower extremity (H) (Resolved) [I82.409]  Anticoagulation monitoring  INR range 2-3 [Z79.01]  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

## 2023-11-30 ENCOUNTER — OFFICE VISIT (OUTPATIENT)
Dept: CARDIOLOGY | Facility: OTHER | Age: 68
End: 2023-11-30
Attending: INTERNAL MEDICINE
Payer: MEDICARE

## 2023-11-30 ENCOUNTER — HOSPITAL ENCOUNTER (OUTPATIENT)
Dept: GENERAL RADIOLOGY | Facility: OTHER | Age: 68
Discharge: HOME | End: 2023-11-30
Attending: INTERNAL MEDICINE
Payer: MEDICARE

## 2023-11-30 VITALS
WEIGHT: 155 LBS | SYSTOLIC BLOOD PRESSURE: 122 MMHG | RESPIRATION RATE: 16 BRPM | BODY MASS INDEX: 25.83 KG/M2 | TEMPERATURE: 97 F | HEART RATE: 60 BPM | HEIGHT: 65 IN | DIASTOLIC BLOOD PRESSURE: 62 MMHG | OXYGEN SATURATION: 97 %

## 2023-11-30 DIAGNOSIS — Z95.1 HISTORY OF CORONARY ARTERY BYPASS GRAFT X 1: ICD-10-CM

## 2023-11-30 DIAGNOSIS — R07.9 CHEST PAIN, UNSPECIFIED TYPE: ICD-10-CM

## 2023-11-30 DIAGNOSIS — M54.50 CHRONIC LOW BACK PAIN, UNSPECIFIED BACK PAIN LATERALITY, UNSPECIFIED WHETHER SCIATICA PRESENT: ICD-10-CM

## 2023-11-30 DIAGNOSIS — Z86.79 HISTORY OF CORONARY VASOSPASM: ICD-10-CM

## 2023-11-30 DIAGNOSIS — Z95.5 HISTORY OF CORONARY ARTERY STENT PLACEMENT: ICD-10-CM

## 2023-11-30 DIAGNOSIS — I10 ESSENTIAL HYPERTENSION: ICD-10-CM

## 2023-11-30 DIAGNOSIS — F51.04 CHRONIC INSOMNIA: ICD-10-CM

## 2023-11-30 DIAGNOSIS — G89.29 CHRONIC LOW BACK PAIN, UNSPECIFIED BACK PAIN LATERALITY, UNSPECIFIED WHETHER SCIATICA PRESENT: ICD-10-CM

## 2023-11-30 DIAGNOSIS — Q24.5 MYOCARDIAL BRIDGE: ICD-10-CM

## 2023-11-30 DIAGNOSIS — R07.89 OTHER CHEST PAIN: ICD-10-CM

## 2023-11-30 DIAGNOSIS — I20.1 CORONARY ARTERY SPASM (H): ICD-10-CM

## 2023-11-30 DIAGNOSIS — I47.10 PAROXYSMAL SUPRAVENTRICULAR TACHYCARDIA (H): Primary | ICD-10-CM

## 2023-11-30 LAB
ATRIAL RATE - MUSE: 60 BPM
DIASTOLIC BLOOD PRESSURE - MUSE: NORMAL MMHG
INTERPRETATION ECG - MUSE: NORMAL
P AXIS - MUSE: NORMAL DEGREES
PR INTERVAL - MUSE: 156 MS
QRS DURATION - MUSE: 126 MS
QT - MUSE: 444 MS
QTC - MUSE: 444 MS
R AXIS - MUSE: 83 DEGREES
SYSTOLIC BLOOD PRESSURE - MUSE: NORMAL MMHG
T AXIS - MUSE: -84 DEGREES
VENTRICULAR RATE- MUSE: 60 BPM

## 2023-11-30 PROCEDURE — 93005 ELECTROCARDIOGRAM TRACING: CPT | Performed by: INTERNAL MEDICINE

## 2023-11-30 PROCEDURE — 93010 ELECTROCARDIOGRAM REPORT: CPT | Performed by: INTERNAL MEDICINE

## 2023-11-30 PROCEDURE — G0463 HOSPITAL OUTPT CLINIC VISIT: HCPCS | Mod: 25

## 2023-11-30 PROCEDURE — 99214 OFFICE O/P EST MOD 30 MIN: CPT | Performed by: INTERNAL MEDICINE

## 2023-11-30 PROCEDURE — 72100 X-RAY EXAM L-S SPINE 2/3 VWS: CPT

## 2023-11-30 RX ORDER — IVABRADINE 5 MG/1
5 TABLET, FILM COATED ORAL 2 TIMES DAILY WITH MEALS
Qty: 60 TABLET | Refills: 1 | Status: CANCELLED | OUTPATIENT
Start: 2023-11-30

## 2023-11-30 RX ORDER — ZOLPIDEM TARTRATE 10 MG/1
10 TABLET ORAL
Qty: 90 TABLET | Refills: 1 | Status: SHIPPED | OUTPATIENT
Start: 2023-11-30 | End: 2024-06-11

## 2023-11-30 RX ORDER — CLONIDINE HYDROCHLORIDE 0.1 MG/1
0.1 TABLET ORAL AT BEDTIME
Qty: 90 TABLET | Refills: 0 | OUTPATIENT
Start: 2023-11-30

## 2023-11-30 RX ORDER — ISOSORBIDE MONONITRATE 120 MG/1
120 TABLET, EXTENDED RELEASE ORAL DAILY
Qty: 90 TABLET | Refills: 3 | Status: SHIPPED | OUTPATIENT
Start: 2023-11-30

## 2023-11-30 ASSESSMENT — PAIN SCALES - GENERAL: PAINLEVEL: NO PAIN (0)

## 2023-11-30 NOTE — NURSING NOTE
"Patient comes in for follow up after ER visit.    Chief Complaint   Patient presents with    Follow Up     ER follow up       Initial /62 (BP Location: Right arm, Patient Position: Sitting, Cuff Size: Adult Regular)   Pulse 60   Temp 97  F (36.1  C) (Temporal)   Resp 16   Ht 1.64 m (5' 4.57\")   Wt 70.3 kg (155 lb)   SpO2 97%   BMI 26.14 kg/m   Estimated body mass index is 26.14 kg/m  as calculated from the following:    Height as of this encounter: 1.64 m (5' 4.57\").    Weight as of this encounter: 70.3 kg (155 lb).  Meds Reconciled: complete  Pt is on Aspirin  Pt is on a Statin  PHQ and/or JUAN CARLOS reviewed. Pt referred to PCP/MH Provider as appropriate.    Mirella Royal LPN      "

## 2023-11-30 NOTE — PROGRESS NOTES
[facility] HEART CARE   CARDIOLOGY PROGRESS NOTE     Chief Complaint   Patient presents with    Follow Up     ER follow up          Diagnosis:  1.  Coronary artery spasm, LAD. [facility].  2.  Myocardial bridging involving LAD.  3.  H/O SVT.  4.  CABG x1 with LIMA to LAD on 11/15/2016 in [place].  5.  CHF.    -55-60% on 8/5/21.    -EF 15%. Recovered, 60-65% on 5/16/2019, [facility].   -Diastolic-concern for, 5/17/23.  6.  Coronary spasm with stent placement to LAD x3.  7.  H/O ablation for SVT.  8.  H/O stroke x7 with left hemiparesis.  9.  Left foot drop.  10.  AV tessie ablation secondary to SVT.  11.  Pacemaker dependent.  12.  Hyperlipidemia-uncontrolled.  13.  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  14.  H/O DVT to bilateral lower extremities.  15.  Cardiac pacemaker.  16.  Hypertension-variable control.  17.  DM-2.  18.  Lown Ganong Brandt syndrome.      Assessment/Plan:    1.  Active Meds: Lipitor 80 mg daily, diltiazem 240 mg twice a day, Imdur 120 mg daily, nifedipine 90 mg daily, Nitropatch, magnesium, SL nitro as needed for chest discomfort, and Ranexa 1000 mg twice a day.  2.  Discontinued clonidine 0.1 mg twice a day and Entresto 24/26 mg twice a day today, 11/30/2023.  3.  Previously on amlodipine, L-arginine, Coreg, cilostazol-short period of time, clonidine, ivabradine-unaffordable, metoprolol, felodipine, and nifedipine,.  4.  Had consider starting ivabradine but it interacts with diltiazem and the medication was not initiated.  5.  Factor V Leiden deficiency/DVT: Continue Coumadin.  10.  RM: Potentially diastolic dysfunction.  Mild left ventricular pressures on cardiac catheter[facility]  Previously on a diuretic.  Previously, pressures were assessed through [place] and were elevated.  Previously on Entresto but discontinued on 11/30/2023 as she is not seeing any difference and is expensive.  6.  Chest discomfort: Has ongoing chest  discomfort.  She is concerned it is related to her heart.  Previously, had a rather unremarkable cardiac cath.  Has history of spasm.  Had a cardiac cath at the [facility] on 7/31/2023.  Had a mid LAD 30% lesion.  LIMA to LAD patent.  No other appreciable disease.  Again, I believe her symptoms are from spasm.  Tried many medications as outlined above and not sure how to control this.  She has been to [place], [facility], and [facility].  Continue to try medications to alleviate her symptoms.  7.  Follow-up in the future as planned.      Interval history:  See above.      HPI:    Originally, she was diagnosed with a LAD ostial coronary artery spasm at [facility].  She had a positive methergine spasm study in 2005 at [facility] in the mid LAD, treated with drug-eluting stent to the mid-LAD.  She has a history of LAD myocardial bridging, CABG with LIMA to LAD on 11/15/2016 in [place], [place], history of heart failure with reported EF of 15% now recovered, stenting to the LAD x3, history of radiofrequency ablation for SVT x8, ultimately resulting in AV tessie ablation with pacemaker placement with pacemaker dependence, H/O stroke x7 with left hemiparesis essentially resolved with the exception of intermittent left foot drop, hyperlipidemia, on Coumadin with a history of factor V Leiden deficiency and numerous DVT's involving both the upper and lower extremities, hypertension, DM-2, on anticoagulated on Coumadin.     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  She has had care through [facility] as well as in [place], [place].  She was evaluated at [place] and underwent angiography revealing separate origins of the LAD and circumflex.  She has a history of catheter induced coronary artery spasm originally diagnosed at [place].     Dr. Bill Abreu through [facility] evaluated her in 2005. In spite of pre-treatment with nitroglycerine and calcium channel blockers, she had fairly  "vigorous mid-LAD spasm with intracoronary methergine. The ostial LAD did not spasm. Dr. Abreu treated this area with a 2.5 x 28 mm Cypher drug-eluting stent. The following year she had instent restenosis treated with a second 8 mm x 2.5 mm Cypher drug-eluting stent.      She did fairly well until 2016, when she had evaluation at [facility] in [place]. She was treated with trans-myocardial revascularization with 25 channels in the inferior, lateral and anterior LV walls, and underwent an LIMA-LAD. Her EF was 35% pre-op, and approximately 50-55% post-operatively.      She had recurrent angina, and on 2/8/17 she underwent repeat angiography which showed mild instent restenosis of the mid-LAD stents, followed by 99% stenosis of the mid-LAD at the LIMA-LAD anastomosis, presumably due to surgical clips/manipulation. The TERRENCE was atretic. Thus, the internationalists placed a 2.25 x 8 mm Promus Premiere drug-eluting stent into the mid-LAD with a good result.      Lately, she has been having an increasingly more frequent and severe anginal chest discomfort, and feels like she is back to her severe coronary \"spasm\" again.      He has ongoing concerns for angina felt to be vasospastic in nature.  She has had a total of x4 cardiac catheterizations on 6/17/2008, 11/21/2012, 1/17/2019, and I believe one in [place] do not have this date.       She has done pretty well since. Her EF was greater than 60% on a couple occasions since.  Most recently, on 5/16/2019 her EF was 60% to 65%.  There was no significant valvular disease detected.  Her echo was essentially unchanged from 4/18/2017.  Diastolic dysfunction was normal.      Relevant testing:  Echocardiogram in 9/22/2023:  Left ventricular function is decreased. The ejection fraction is 50-55%  (borderline). Mild diffuse hypokinesis is present. Mild concentric wall  thickening consistent with left ventricular hypertrophy is present.  The right ventricle is normal " size. Global right ventricular function is normal.  No significant valvular abnormalities present.  The inferior vena cava was normal in size with preserved respiratory variability.  No pericardial effusion is present.    Cardiac cath on 7/31/2023:    Mid LAD to Dist LAD lesion is 30% stenosed.    Right sided filling pressures are normal.    Left sided filling pressures are normal.    Normal PA pressures.    Left ventricular filling pressures are normal.    Normal cardiac output level.  Medical management for coronary artery disease.   No evidence of elevated cardiac filling pressures.     Echo on 5/17/2023:  1. Normal left ventricular chamber size, no regional wall motion abnormalities, calculated 2-D linear ejection fraction 63 %.   2. Indeterminate left ventricular diastolic function and filling pressure.   3. Normal right ventricular chamber size, mildly reduced systolic function (by visual inspection,), estimated right ventricular systolic pressure 26 mmHg assuming right atrial pressure of 5 mmHg.   4. Mild-moderate tricuspid valve regurgitation.   5. No pericardial effusion.    TTE on 4/27/2023:  Interpretation Summary  Left ventricular size, wall motion and function are normal. The ejection fraction is 55-60%.  Global right ventricular function is normal.  No hemodynamically significant valve abnormalities.  The inferior vena cava is normal.    ECHO on 8/5/21:  Left ventricular size, wall motion and function are normal other than abnormal  septal motion likely due to underlying left bundle branch block or pacing. The  ejection fraction is 55-60%.  Right ventricular function, chamber size, wall motion, and thickness are  normal.  No significant valvular abnormalities present.  No pericardial effusion is present.  Right ventricular systolic pressure is 21mmHg above the right atrial pressure  which is estimated at 3mmHg.  There is no prior study for direct comparison.    [facility]:      History of coronary spasm and myocardial bridging s/p EMILIE to LAD and CAB x1 (LIMA-LAD)  Mild instent restenosis of LAD  Patent but atretic LIMA-LAD    [facility] stress test with 4/1/2021:  1. Exercise echocardiogram positive for septal myocardial ischemia.  2. The patient's exercise capacity was limited.  3. Ejection fraction response from 60% at rest to 65% at peak stress.  4. Left ventricular end-systolic volume decreased with stress.  5. OTHER ECHO FINDINGS:  6. Findings consistent with elevated left ventricular filling pressure at rest and with exercise.    Echo on 4/1/2021 at [facility]:  1. Normal left ventricular chamber size, no regional wall motion abnormalities, calculated 2-D linear ejection fraction 63 %.  2. Indeterminate left ventricular diastolic function and filling pressure.  3. Normal right ventricular chamber size, mildly reduced systolic function (by visual inspection,), estimated right ventricular systolic pressure 26 mmHg assuming right atrial pressure of 5 mmHg.  4. Mild-moderate tricuspid valve regurgitation.  5. No pericardial effusion.    Review of cardiac cath from [facility] by me on 4/1/2021:  1.  Severe coronary artery atherosclerosis  2.  Coronary artery grafts  CORONARY DIAGNOSTIC SUMMARY  Coronary artery dominance is left. Normal right coronary. Normal left main coronary. Normal circumflex coronary.   Side by side ostia of LAD and LCX.  The LAD is a diffusely diseased vessel, stented in its mid portion, and with a patent but somewhat atretic LIMA graft to its distal portion.,            Past Medical History:   Diagnosis Date    Acute thromboembolism of deep veins of lower extremity (H) 2006    Overview:  Hx of multiple episodes of DVT: 3 lower extremity; 5 upper extremity (right arm)    Atrial fibrillation (H)     No Comments Provided    Cardiac pacemaker in situ 2003    Dual chamber    Cerebral thrombosis     8/31/2006,'95 w/ no residual    Coronary atherosclerosis     2/6/2006     Endometrial hyperplasia     s/p endometrial ablation     Essential hypertension     No Comments Provided    History of other genital system and obstetric disorders      8, Para 3-0-5-2    Other and unspecified angina pectoris     2006    Other and unspecified hyperlipidemia     No Comments Provided    Other postprocedural states      and ,Followed by Dr. Usman Duran, [facility],.    Paroxysmal supraventricular tachycardia     2006,s/p multiple ablations w last resulting in PPM    Personal history of transient ischemic attack (TIA) and cerebral infarction without residual deficit 2009    with left hemiplegia    Primary hypercoagulable state (H24)     No Comments Provided       Past Surgical History:   Procedure Laterality Date    ARTHROSCOPY KNEE      ,Arthroscopy for chondromalacia patella    AS CABG, ARTERY-VEIN, SINGLE  11/15/2016    Single vessel; done in [place]    ATTEMPTED ARTHROSCOPY      ,Right arthroscopy    BIOPSY BREAST      ,Breast cyst aspiration    COLONOSCOPY      2007,follow up recommended in 10 years.    CV CORONARY ANGIOGRAM N/A 2021    Procedure: CV CORONARY ANGIOGRAM;  Surgeon: Nathaniel Grier MD;  Location:  HEART CARDIAC CATH LAB    CV CORONARY ANGIOGRAM N/A 2023    Procedure: Coronary Angiogram;  Surgeon: Dru Bentley MD;  Location:  HEART CARDIAC CATH LAB    CV RIGHT HEART CATH MEASUREMENTS RECORDED N/A 2023    Procedure: Right Heart Catheterization;  Surgeon: Dru Bentley MD;  Location:  HEART CARDIAC CATH LAB    ENDOSCOPIC SINUS SURGERY      ,Sinus node ablation.    EP ABLATION ATRIAL FLUTTER N/A 2020    Procedure: EP VENOGRAM SVC;  Surgeon: Hakeem Moore MD;  Location:  HEART CARDIAC CATH LAB    EP PACEMAKER N/A 2020    Procedure: EP PACEMAKER;  Surgeon: Tima Johnson MD;  Location:  HEART CARDIAC CATH LAB    EP STUDY  1994    EP STUDY /ABLATION,AV  "re-entry tachycardia, tessie ablation    HEART CATH, ANGIOPLASTY      01/06,Stenting placed LAD after ergonovine provocation confirmed    HEART CATH, ANGIOPLASTY      03/06,90% lesion, normal left ventricular function    IMPLANT PACEMAKER      03/03,Guidant pacemaker placement, AV tessie ablation    LAPAROSCOPIC ABLATION ENDOMETRIOSIS      06/06    OTHER SURGICAL HISTORY      3/24/06,394205,([place]) [facility] STENT ANGIO    OTHER SURGICAL HISTORY      08/02,202474,EP STUDY /ABLATION    OTHER SURGICAL HISTORY      11/04,77940.0,CT CORONARY ANGIOGRAM ([place]),Coronary angiogram, failed ablation    OTHER SURGICAL HISTORY      12/04,680500,[facility] RELOCATION OF SKIN POCKET PACEMAKER,[facility] 5/24/05 reconfiguration of pacemaker \"pocket\"    OTHER SURGICAL HISTORY      207882,IP CONSULT TO ELECTROPHYSIOLOGY,study and lead change    OTHER SURGICAL HISTORY      12/06,089928,NEUROSTIMULATOR,Nerve stimulator implanted for chest pain control    OTHER SURGICAL HISTORY      12/2008,47145.0,CT CORONARY ANGIOGRAM ([place]),with increased left-sided weakness and vertigo, negative CT angiogram.    REPLACE PACEMAKER GENERATOR      12/29/04,Replacement of left ventricular pacemaker lead.    STENT  02/2017    LAD        Allergies   Allergen Reactions    Adhesive Tape        Only Tele patches cause rash and skin irritation. Okay to use other adhesive tape.     Morphine Nausea and Vomiting     OK in small doses    Prednisone Nausea and Vomiting     Per IV    Sotalol Nausea and Vomiting       Current Outpatient Medications   Medication Sig Dispense Refill    aspirin 81 MG EC tablet Take 1 tablet (81 mg) by mouth daily 90 tablet 3    atorvastatin (LIPITOR) 80 MG tablet Take 1 tablet (80 mg) by mouth daily 90 tablet 3    calcium carbonate-vitamin D (OSCAL W/D) 500-200 MG-UNIT tablet Take 1 tablet by mouth daily      diltiazem ER (TIAZAC) 240 MG 24 hr ER beaded capsule Take 1 capsule (240 mg) by mouth every 12 hours 180 capsule 3    isosorbide mononitrate CR " (IMDUR) 120 MG 24 HR ER tablet Take 1 tablet (120 mg) by mouth daily 90 tablet 3    magnesium oxide (MAG-OX) 400 MG tablet Take 1 tablet (400 mg) by mouth daily 90 tablet 0    NIFEdipine ER (ADALAT CC) 90 MG 24 hr tablet Take 1 tablet (90 mg) by mouth daily 90 tablet 3    nitroGLYcerin (NITRODUR) 0.2 MG/HR 24 hr patch Place 1 patch onto the skin daily - for chronic angina, coronary vasospasm 30 patch 1    nitroGLYcerin (NITROSTAT) 0.4 MG sublingual tablet For chest pain place 1 tablet under the tongue every 5 minutes for 3 doses. If symptoms persist 5 minutes after 1st dose call 911. 100 tablet 4    ranolazine (RANEXA) 1000 MG TB12 12 hr tablet Take 1 tablet (1,000 mg) by mouth 2 times daily 180 tablet 3    sertraline (ZOLOFT) 50 MG tablet Take 1 tablet (50 mg) by mouth daily 90 tablet 3    warfarin ANTICOAGULANT (COUMADIN) 6 MG tablet 6 mg (1 tablet) every Mon, Wed, Fri; 3 mg (0.5 tablets) all other days OR  AS  DIRECTED  BY  [facility]. 70 tablet 1    zolpidem (AMBIEN) 10 MG tablet Take 1 tablet (10 mg) by mouth nightly as needed for sleep 90 tablet 1       Social History     Socioeconomic History    Marital status:      Spouse name: Antonio    Number of children: 2    Years of education: 16+    Highest education level: Master's degree (e.g., MA, MS, [employer], MEd, MSW, [employer]ARI[employer])   Occupational History    Not on file   Tobacco Use    Smoking status: Never    Smokeless tobacco: Never    Tobacco comments:     Quit smoking: spouse does not smoke in the house. Exposed in cars.   Vaping Use    Vaping Use: Never used   Substance and Sexual Activity    Alcohol use: Yes     Comment: Alcoholic Drinks/day: Alcoholic Drinks/day: 1-2 drinks a week    Drug use: Never    Sexual activity: Not Currently     Partners: Male   Other Topics Concern    Parent/sibling w/ CABG, MI or angioplasty before 65F 55M? Not Asked   Social History Narrative     2nd Marriage x 20 years to  Anthony they live in [place], [place]        in [place]. Completed her Master's Degree 2003. Taught Special Education.  Early residential due to health 2007.      Total 5 children blended family-3 dgts. And 2 sons    Daughter:  Philip    Son:  Anthony - lives in [place]    6 Grand-children 5 boys and 1 girl    Leigh parents' and siblings live in [facility] [place].     Social Determinants of Health     Financial Resource Strain: Low Risk  (9/28/2019)    Overall Financial Resource Strain (CARDIA)     Difficulty of Paying Living Expenses: Not hard at all   Food Insecurity: No Food Insecurity (9/28/2019)    Hunger Vital Sign     Worried About Running Out of Food in the Last Year: Never true     Ran Out of Food in the Last Year: Never true   Transportation Needs: No Transportation Needs (9/28/2019)    PRAPARE - Transportation     Lack of Transportation (Medical): No     Lack of Transportation (Non-Medical): No   Physical Activity: Inactive (9/28/2019)    Exercise Vital Sign     Days of Exercise per Week: 0 days     Minutes of Exercise per Session: 0 min   Stress: Stress Concern Present (9/28/2019)    [employer] - Occupational Stress Questionnaire     Feeling of Stress : To some extent   Social Connections: Not on file   Interpersonal Safety: Low Risk  (11/30/2023)    Interpersonal Safety     Do you feel physically and emotionally safe where you currently live?: Yes     Within the past 12 months, have you been hit, slapped, kicked or otherwise physically hurt by someone?: No     Within the past 12 months, have you been humiliated or emotionally abused in other ways by your partner or ex-partner?: No   Housing Stability: Not on file       LAB RESULTS:   Anticoagulation Therapy Visit on 08/24/2020   Component Date Value Ref Range Status    INR 08/24/2020 1.3* 0.90 - 1.10 Corrected   Anticoagulation Therapy Visit on 08/19/2020   Component Date Value Ref Range Status    INR 08/19/2020 2.3* 0.90 - 1.10 Final  "  Anticoagulation Therapy Visit on 08/05/2020   Component Date Value Ref Range Status    INR 08/05/2020 2.1* 0.90 - 1.10 Final   Allied Health/Nurse Visit on 08/03/2020   Component Date Value Ref Range Status    COVID-19 Virus PCR to [facility] [place] - So* 08/03/2020 Nasopharyngeal   Final    COVID-19 Virus PCR to [facility] [place] - Re* 08/03/2020 Not Detected   Final   Anticoagulation Therapy Visit on 07/30/2020   Component Date Value Ref Range Status    INR 07/30/2020 3.8* 0.90 - 1.10 Final   Anticoagulation Therapy Visit on 07/27/2020   Component Date Value Ref Range Status    INR 07/25/2020 1.1  0.90 - 1.10 Final    INR 07/27/2020 1.2* 0.90 - 1.10 Final   Anticoagulation Therapy Visit on 07/23/2020   Component Date Value Ref Range Status    INR Protime 07/23/2020 1.0  0.86 - 1.14 Final   Anticoagulation Therapy Visit on 07/21/2020   Component Date Value Ref Range Status    INR 07/21/2020 1.4* 0.90 - 1.10 Final   Anticoagulation Therapy Visit on 07/09/2020   Component Date Value Ref Range Status    INR 07/08/2020 2.1* 0.90 - 1.10 Final        Review of systems: Negative except that which was noted in the HPI.    Physical examination:  /62 (BP Location: Right arm, Patient Position: Sitting, Cuff Size: Adult Regular)   Pulse 60   Temp 97  F (36.1  C) (Temporal)   Resp 16   Ht 1.64 m (5' 4.57\")   Wt 70.3 kg (155 lb)   SpO2 97%   BMI 26.14 kg/m      GENERAL APPEARANCE: healthy, alert and no distress  CHEST: lungs clear to auscultation - no rales, rhonchi or wheezes, no use of accessory muscles, no retractions, respirations are unlabored, normal respiratory rate  CARDIOVASCULAR: regular rhythm, normal S1 with physiologic split S2, no S3 or S4 and no murmur, click or rub  EXTREMITIES: no clubbing, cyanosis with mild peripheral edema      Total time spent on day of visit, including review of tests, obtaining/reviewing separately obtained history, ordering medications/tests/procedures, communicating with PCP/consultants, and " documenting in electronic medical record: 34 minutes.       Thank you for allowing me to participate in the care of your patient. Please do not hesitate to contact me if you have any questions.     Moises Islas, DO

## 2023-12-01 ENCOUNTER — HOSPITAL ENCOUNTER (OUTPATIENT)
Dept: CARDIAC REHAB | Facility: OTHER | Age: 68
Discharge: HOME | End: 2023-12-01
Attending: FAMILY MEDICINE
Payer: MEDICARE

## 2023-12-01 PROCEDURE — 93798 PHYS/QHP OP CAR RHAB W/ECG: CPT

## 2023-12-05 ENCOUNTER — OFFICE VISIT (OUTPATIENT)
Dept: FAMILY MEDICINE | Facility: OTHER | Age: 68
End: 2023-12-05
Attending: FAMILY MEDICINE
Payer: MEDICARE

## 2023-12-05 VITALS
TEMPERATURE: 97.5 F | WEIGHT: 152.6 LBS | RESPIRATION RATE: 16 BRPM | HEART RATE: 65 BPM | DIASTOLIC BLOOD PRESSURE: 70 MMHG | OXYGEN SATURATION: 95 % | BODY MASS INDEX: 25.74 KG/M2 | SYSTOLIC BLOOD PRESSURE: 120 MMHG

## 2023-12-05 DIAGNOSIS — M75.01 ADHESIVE CAPSULITIS OF RIGHT SHOULDER: Primary | ICD-10-CM

## 2023-12-05 PROCEDURE — G0463 HOSPITAL OUTPT CLINIC VISIT: HCPCS | Mod: 25

## 2023-12-05 PROCEDURE — 20611 DRAIN/INJ JOINT/BURSA W/US: CPT | Performed by: FAMILY MEDICINE

## 2023-12-05 PROCEDURE — 99213 OFFICE O/P EST LOW 20 MIN: CPT | Mod: 25 | Performed by: FAMILY MEDICINE

## 2023-12-05 PROCEDURE — 250N000009 HC RX 250: Performed by: FAMILY MEDICINE

## 2023-12-05 PROCEDURE — 250N000011 HC RX IP 250 OP 636: Mod: JZ | Performed by: FAMILY MEDICINE

## 2023-12-05 RX ORDER — LIDOCAINE HYDROCHLORIDE 10 MG/ML
2 INJECTION, SOLUTION EPIDURAL; INFILTRATION; INTRACAUDAL; PERINEURAL ONCE
Status: COMPLETED | OUTPATIENT
Start: 2023-12-05 | End: 2023-12-05

## 2023-12-05 RX ORDER — TRIAMCINOLONE ACETONIDE 40 MG/ML
40 INJECTION, SUSPENSION INTRA-ARTICULAR; INTRAMUSCULAR ONCE
Status: COMPLETED | OUTPATIENT
Start: 2023-12-05 | End: 2023-12-05

## 2023-12-05 RX ORDER — LIDOCAINE HYDROCHLORIDE 10 MG/ML
3 INJECTION, SOLUTION EPIDURAL; INFILTRATION; INTRACAUDAL; PERINEURAL ONCE
Status: COMPLETED | OUTPATIENT
Start: 2023-12-05 | End: 2023-12-05

## 2023-12-05 RX ADMIN — LIDOCAINE HYDROCHLORIDE 2 ML: 10 INJECTION, SOLUTION INFILTRATION; PERINEURAL at 12:54

## 2023-12-05 RX ADMIN — LIDOCAINE HYDROCHLORIDE 3 ML: 10 INJECTION, SOLUTION INFILTRATION; PERINEURAL at 12:54

## 2023-12-05 RX ADMIN — TRIAMCINOLONE ACETONIDE 40 MG: 40 INJECTION, SUSPENSION INTRA-ARTICULAR; INTRAMUSCULAR at 12:54

## 2023-12-05 ASSESSMENT — PAIN SCALES - GENERAL: PAINLEVEL: EXTREME PAIN (9)

## 2023-12-05 NOTE — PROGRESS NOTES
Sports Medicine Office Note    HPI:  68-year-old female coming for follow-up evaluation of right shoulder pain.  Her pain has been present since 2023.  She received a glenohumeral injection to help augment treatment of frozen shoulder.  She got 5-6 months of significant symptom relief.  Her pain has recently returned.  She rates her pain at 9/10.  She characterized the pain as sharp, stabbing, and aching.  This pain as affecting sleep.  She has a history of a previous fracture back in 2017 that provided significant symptom relief.      EXAM:  /70 (BP Location: Right arm, Patient Position: Sitting, Cuff Size: Adult Regular)   Pulse 65   Temp 97.5  F (36.4  C) (Temporal)   Resp 16   Wt 69.2 kg (152 lb 9.6 oz)   SpO2 95%   BMI 25.74 kg/m    MUSCULOSKELETAL EXAM:  RIGHT SHOULDER  Inspection:  -No gross deformity  -No bruising or swelling  -Scars:  None    Tenderness to palpation of the:  -SC joint:  Negative  -AC joint:   Negative  -Clavicle:   Negative  -Biceps tendon in bicipital groove:   Mild pain  -Deltoid musculature:   Mild pain  -Upper trapezius musculature:   Negative    Range of Motion:  -Active flexion:  170 left, 90 right (160 passively)  -Active abduction:  170 left, 80 right (160 passively)    Strength:  -Supraspinatus:  4/5  -Infraspinatus:  5/5  -Subscapularis:  5/5  -Deltoid:  4/5    Special Tests:  -Nathaly test:  Positive  -Beasley test:   Positive  -Neer test:   Positive  -Mid-arc pain: Present  -Lag sign:   Negative    Other:  -Intact sensation to light touch distally.  -No signs of cyanosis. Normal skin temperature of the upper extremity.  -Elbow:  No gross deformity. Full range of motion.  -Hand/wrist:  No gross deformity. Full range of motion.  -Left shoulder:  No gross deformity. No palpable tenderness. Normal strength.      IMAGIN2023: 3 view right shoulder x-ray  - Mild-moderate degenerative changes at the AC and GH joints.  No acute  fracture.      ASSESSMENT/PLAN:  Diagnoses and all orders for this visit:  Adhesive capsulitis of right shoulder  -     AZ ARTHROCENTESIS ASPIR&/INJ MAJOR JT/BURSA W/US  -     POC US SOFT TISSUE  -     lidocaine (PF) (XYLOCAINE) 1 % injection 3 mL  -     lidocaine (PF) (XYLOCAINE) 1 % injection 2 mL  -     triamcinolone (KENALOG-40) injection 40 mg    68-year-old female with persistent yet improved symptoms and exam findings of adhesive capsulitis of the right shoulder.  X-rays from 1/11 were again personally reviewed in the office with the findings as demonstrated above by my interpretation.  After reviewing the risks/benefits, the patient elects to proceed with a repeat glenohumeral injection of the right shoulder.  See procedure note below:    PROCEDURE:  Ultrasound Guided Injection of the Right Glenohumeral Joint      EQUIPMENT:  Theodore AffinYamli 70 with Linear eL18-4 (2-22MHz) Transducer.      PROCEDURAL PAUSE:    A procedural pause was conducted to verify:  correct patient identity, procedure to be performed, and as applicable, correct side, site, and correct patient position.      INFORMED CONSENT:   I discussed the risks, possible benefits, and alternatives to injection.  Following denial of allergy and review of potential side effects and complications (including but not necessarily limited to infection, allergic reaction, fat necrosis, skin depigmentation, local tissue breakdown, systemic effects of corticosteroids, elevation of blood glucose, injury to soft tissue and/or nerves, and seizure), all questions were answered, and consent was given to proceed.  Patient verbalized understanding.      PROCEDURE DETAILS:    The use of direct ultrasound visualization of the needle (rather than a non-guided ultrasound procedure) was required to increase patient safety by excluding inadvertent intramuscular or intratendinous placement and minimizing bleeding and injury by avoiding osteochondral and nearby  neurovascular structures.  Guidance also maximizes accurate injection placement and likely clinical benefit beyond that obtained with a non-guided injection.  This allows increased diagnostic specificity when evaluating effectiveness of the injection.      Prior to the procedure, the right shoulder was examined with a 12MHz linear transducer to determine the optimal needle path and visualize the posterior glenohumeral joint and labrum deep to the infraspinatus musculotendinous junction.  Following this, the skin was marked, and the right shoulder was prepared with chlorhexidine.  Local anesthesia was obtained with 2mL of 1% lidocaine.  Then this area was re-examined using the same transducer, a sterile ultrasound transducer cover, sterile gloves, and sterile gel.  The transducer was placed at the posterior shoulder in an axial plane.  Under ultrasound guidance, a 3-inch 22-gauge needle was advanced from lateral to medial with an in-plane approach into the posterior glenohumeral joint.  One needle pass was performed.  After ultrasound visualization of the needle tip in the target area and negative aspiration for blood, a mixture of 3mL of 1% lidocaine and 1mL of triamcinolone(40mg/mL) was injected into the right glenohumeral joint.  0mL was wasted.  Images have been saved on the musculoskeletal ultrasound in clinic.     The patient tolerated the procedure without complication and was discharged in good condition after a short observation period.  The patient was instructed to contact me regarding any questions pertaining to the procedure.      DIAGNOSIS:    -Successful ultrasound guided injection of the right glenohumeral joint without immediate complication      PLAN:   -Post-procedure care reviewed, including avoiding submersion of the injection site for 48 hours   -Return precautions reviewed for signs/symptoms that would be concerning for infection   -The patient was instructed to ice and take APAP this evening  if needed  -Continue home exercises  -Return to clinic as needed      Benjamin Rojas MD  12/5/2023  11:24 AM    Total time spent with this patient was 32 minutes which included chart review, visualization and independent interpretation of images, time spent with the patient, and documentation.    Procedure time:  21 minute(s)

## 2023-12-06 ENCOUNTER — HOSPITAL ENCOUNTER (OUTPATIENT)
Dept: CARDIAC REHAB | Facility: OTHER | Age: 68
Discharge: HOME | End: 2023-12-06
Attending: FAMILY MEDICINE
Payer: MEDICARE

## 2023-12-06 PROCEDURE — 93798 PHYS/QHP OP CAR RHAB W/ECG: CPT

## 2023-12-11 ENCOUNTER — HOSPITAL ENCOUNTER (OUTPATIENT)
Dept: CARDIAC REHAB | Facility: OTHER | Age: 68
Discharge: HOME | End: 2023-12-11
Attending: FAMILY MEDICINE
Payer: MEDICARE

## 2023-12-11 PROCEDURE — 93798 PHYS/QHP OP CAR RHAB W/ECG: CPT

## 2023-12-13 ENCOUNTER — ANTICOAGULATION THERAPY VISIT (OUTPATIENT)
Dept: ANTICOAGULATION | Facility: OTHER | Age: 68
End: 2023-12-13
Payer: MEDICARE

## 2023-12-13 ENCOUNTER — HOSPITAL ENCOUNTER (OUTPATIENT)
Dept: CARDIAC REHAB | Facility: OTHER | Age: 68
Discharge: HOME | End: 2023-12-13
Attending: FAMILY MEDICINE
Payer: MEDICARE

## 2023-12-13 DIAGNOSIS — I66.9 CEREBRAL THROMBOSIS: ICD-10-CM

## 2023-12-13 DIAGNOSIS — Z95.5 HISTORY OF CORONARY ARTERY STENT PLACEMENT: ICD-10-CM

## 2023-12-13 DIAGNOSIS — Z79.01 ANTICOAGULATION MONITORING, INR RANGE 2-3: Primary | ICD-10-CM

## 2023-12-13 DIAGNOSIS — D68.2 FACTOR V DEFICIENCY (H): ICD-10-CM

## 2023-12-13 LAB — INR HOME MONITORING: 2.3 (ref 2–3)

## 2023-12-13 PROCEDURE — 93798 PHYS/QHP OP CAR RHAB W/ECG: CPT

## 2023-12-13 NOTE — PROGRESS NOTES
ANTICOAGULATION MANAGEMENT     Lyudmila Fitzgerald 68 year old female is on warfarin with therapeutic INR result. (Goal INR 2.0-3.0)    Recent labs: (last 7 days)     12/13/23  0000   INR 2.3       ASSESSMENT     Source(s): Chart Review  Previous INR was Subtherapeutic  Medication, diet, health changes since last INR chart reviewed; none identified         PLAN     Recommended plan for no diet, medication or health factor changes affecting INR     Dosing Instructions: Continue your current warfarin dose with next INR in 2 weeks       Summary  As of 12/13/2023      Full warfarin instructions:  3 mg every Mon, Wed, Fri; 6 mg all other days   Next INR check:  12/27/2023               Detailed voice message left for Leigh with dosing instructions and follow up date.     Patient to recheck with home meter    Education provided:   Contact [phone] with any changes, questions or concerns.     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

## 2023-12-15 ENCOUNTER — HOSPITAL ENCOUNTER (OUTPATIENT)
Dept: CARDIAC REHAB | Facility: OTHER | Age: 68
Discharge: HOME | End: 2023-12-15
Attending: FAMILY MEDICINE
Payer: MEDICARE

## 2023-12-15 PROCEDURE — 93798 PHYS/QHP OP CAR RHAB W/ECG: CPT

## 2023-12-26 LAB — INR HOME MONITORING: 2.2 (ref 2–3)

## 2023-12-27 ENCOUNTER — ANTICOAGULATION THERAPY VISIT (OUTPATIENT)
Dept: ANTICOAGULATION | Facility: OTHER | Age: 68
End: 2023-12-27
Attending: FAMILY MEDICINE
Payer: MEDICARE

## 2023-12-27 DIAGNOSIS — I66.9 CEREBRAL THROMBOSIS: ICD-10-CM

## 2023-12-27 DIAGNOSIS — Z79.01 ANTICOAGULATION MONITORING, INR RANGE 2-3: Primary | ICD-10-CM

## 2023-12-27 DIAGNOSIS — Z95.5 HISTORY OF CORONARY ARTERY STENT PLACEMENT: ICD-10-CM

## 2023-12-27 DIAGNOSIS — D68.2 FACTOR V DEFICIENCY (H): ICD-10-CM

## 2023-12-27 NOTE — PROGRESS NOTES
ANTICOAGULATION  MANAGEMENT-Home Monitor Managed by Exception    Lyudmila PRAJAPATI Fitzgerald 68 year old female is on warfarin with therapeutic INR result. (Goal INR 2.0-3.0)    Recent labs: (last 7 days)     12/26/23  0000   INR 2.2       Previous INR was Therapeutic  Medication, diet, health changes since last INR:chart reviewed; none identified  Contacted within the last 12 weeks by phone on 12/13/23  Last [facility] referral date: 04/18/2023      ROD     Lyudmila was NOT contacted regarding therapeutic result today per home monitoring policy manage by exception agreement.   Current warfarin dose is to be continued:     Summary  As of 12/27/2023      Full warfarin instructions:  3 mg every Mon, Wed, Fri; 6 mg all other days   Next INR check:  1/10/2024             ?   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

## 2024-01-09 ENCOUNTER — ANTICOAGULATION THERAPY VISIT (OUTPATIENT)
Dept: ANTICOAGULATION | Facility: OTHER | Age: 69
End: 2024-01-09
Attending: FAMILY MEDICINE
Payer: MEDICARE

## 2024-01-09 DIAGNOSIS — I66.9 CEREBRAL THROMBOSIS: ICD-10-CM

## 2024-01-09 DIAGNOSIS — Z95.5 HISTORY OF CORONARY ARTERY STENT PLACEMENT: ICD-10-CM

## 2024-01-09 DIAGNOSIS — D68.2 FACTOR V DEFICIENCY (H): ICD-10-CM

## 2024-01-09 DIAGNOSIS — Z79.01 ANTICOAGULATION MONITORING, INR RANGE 2-3: Primary | ICD-10-CM

## 2024-01-09 LAB — INR HOME MONITORING: 2.5 (ref 2–3)

## 2024-01-09 NOTE — PROGRESS NOTES
ANTICOAGULATION  MANAGEMENT-Home Monitor Managed by Exception    Lyudmila ALO Fitzgerald 68 year old female is on warfarin with therapeutic INR result. (Goal INR 2.0-3.0)    Recent labs: (last 7 days)     01/09/24  0000   INR 2.5       Previous INR was Therapeutic  Medication, diet, health changes since last INR:chart reviewed; none identified  Contacted within the last 12 weeks by phone on 12/13  Last [facility] referral date: 04/18/2023      ROD     Lyudmila was NOT contacted regarding therapeutic result today per home monitoring policy manage by exception agreement.   Current warfarin dose is to be continued:     Summary  As of 1/9/2024      Full warfarin instructions:  3 mg every Mon, Wed, Fri; 6 mg all other days   Next INR check:  1/23/2024             ?   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

## 2024-01-16 ENCOUNTER — ANCILLARY PROCEDURE (OUTPATIENT)
Dept: CARDIOLOGY | Facility: CLINIC | Age: 69
End: 2024-01-16
Attending: INTERNAL MEDICINE
Payer: MEDICARE

## 2024-01-16 DIAGNOSIS — I47.10 SUPRAVENTRICULAR TACHYCARDIA (H): ICD-10-CM

## 2024-01-16 DIAGNOSIS — Z98.890 S/P AV NODAL ABLATION: ICD-10-CM

## 2024-01-16 PROCEDURE — 93296 REM INTERROG EVL PM/IDS: CPT

## 2024-01-16 PROCEDURE — 93294 REM INTERROG EVL PM/LDLS PM: CPT | Performed by: INTERNAL MEDICINE

## 2024-01-23 ENCOUNTER — ANTICOAGULATION THERAPY VISIT (OUTPATIENT)
Dept: ANTICOAGULATION | Facility: OTHER | Age: 69
End: 2024-01-23
Attending: FAMILY MEDICINE
Payer: MEDICARE

## 2024-01-23 DIAGNOSIS — Z95.5 HISTORY OF CORONARY ARTERY STENT PLACEMENT: ICD-10-CM

## 2024-01-23 DIAGNOSIS — D68.2 FACTOR V DEFICIENCY (H): ICD-10-CM

## 2024-01-23 DIAGNOSIS — I66.9 CEREBRAL THROMBOSIS: ICD-10-CM

## 2024-01-23 DIAGNOSIS — Z79.01 ANTICOAGULATION MONITORING, INR RANGE 2-3: Primary | ICD-10-CM

## 2024-01-23 LAB — INR HOME MONITORING: 1.7 (ref 2–3)

## 2024-01-23 NOTE — PROGRESS NOTES
ANTICOAGULATION MANAGEMENT     Lyudmila Fitzgerald 68 year old female is on warfarin with subtherapeutic INR result. (Goal INR 2.0-3.0)    Recent labs: (last 7 days)     01/23/24  0000   INR 1.7*       ASSESSMENT     Source(s): Chart Review and Patient/Caregiver Call     Warfarin doses taken: Warfarin taken as instructed  Diet: No new diet changes identified  Medication/supplement changes: None noted  New illness, injury, or hospitalization: Yes: had Covid a week or so ago and did have some nausea  Signs or symptoms of bleeding or clotting: No  Previous result: Therapeutic last 2(+) visits  Additional findings: None       PLAN     Recommended plan for temporary change(s) affecting INR     Dosing Instructions: booster dose then continue your current warfarin dose with next INR in 2 weeks       Summary  As of 1/23/2024      Full warfarin instructions:  1/23: 9 mg; Otherwise 3 mg every Mon, Wed, Fri; 6 mg all other days   Next INR check:  2/6/2024               Telephone call with Leigh who verbalizes understanding and agrees to plan    Patient to recheck with home meter    Education provided:   None required    Plan made per [facility] anticoagulation protocol    Nyla Walker, RN  Anticoagulation Clinic  1/23/2024    _______________________________________________________________________     Anticoagulation Episode Summary       Current INR goal:  2.0-3.0   TTR:  54.7% (1 y)   Target end date:  Indefinite   Send INR reminders to:  [facility]    Indications    Acute thromboembolism of deep veins of lower extremity (H) (Resolved) [I82.409]  Anticoagulation monitoring  INR range 2-3 [Z79.01]  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

## 2024-02-06 ENCOUNTER — ANTICOAGULATION THERAPY VISIT (OUTPATIENT)
Dept: ANTICOAGULATION | Facility: OTHER | Age: 69
End: 2024-02-06
Attending: FAMILY MEDICINE
Payer: MEDICARE

## 2024-02-06 DIAGNOSIS — D68.2 FACTOR V DEFICIENCY (H): ICD-10-CM

## 2024-02-06 DIAGNOSIS — Z95.5 HISTORY OF CORONARY ARTERY STENT PLACEMENT: ICD-10-CM

## 2024-02-06 DIAGNOSIS — Z79.01 ANTICOAGULATION MONITORING, INR RANGE 2-3: Primary | ICD-10-CM

## 2024-02-06 DIAGNOSIS — I66.9 CEREBRAL THROMBOSIS: ICD-10-CM

## 2024-02-06 LAB
INR HOME MONITORING: 1.7 (ref 2–3)
MDC_IDC_EPISODE_DTM: NORMAL
MDC_IDC_EPISODE_DURATION: 11 S
MDC_IDC_EPISODE_DURATION: 5 S
MDC_IDC_EPISODE_DURATION: 9 S
MDC_IDC_EPISODE_ID: NORMAL
MDC_IDC_EPISODE_TYPE: NORMAL
MDC_IDC_LEAD_CONNECTION_STATUS: NORMAL
MDC_IDC_LEAD_CONNECTION_STATUS: NORMAL
MDC_IDC_LEAD_IMPLANT_DT: NORMAL
MDC_IDC_LEAD_IMPLANT_DT: NORMAL
MDC_IDC_LEAD_LOCATION: NORMAL
MDC_IDC_LEAD_LOCATION: NORMAL
MDC_IDC_LEAD_LOCATION_DETAIL_1: NORMAL
MDC_IDC_LEAD_LOCATION_DETAIL_1: NORMAL
MDC_IDC_LEAD_MFG: NORMAL
MDC_IDC_LEAD_MFG: NORMAL
MDC_IDC_LEAD_MODEL: NORMAL
MDC_IDC_LEAD_MODEL: NORMAL
MDC_IDC_LEAD_POLARITY_TYPE: NORMAL
MDC_IDC_LEAD_POLARITY_TYPE: NORMAL
MDC_IDC_LEAD_SERIAL: NORMAL
MDC_IDC_LEAD_SERIAL: NORMAL
MDC_IDC_LEAD_SPECIAL_FUNCTION: NORMAL
MDC_IDC_LEAD_SPECIAL_FUNCTION: NORMAL
MDC_IDC_MSMT_BATTERY_DTM: NORMAL
MDC_IDC_MSMT_BATTERY_REMAINING_LONGEVITY: 102 MO
MDC_IDC_MSMT_BATTERY_REMAINING_PERCENTAGE: 100 %
MDC_IDC_MSMT_BATTERY_STATUS: NORMAL
MDC_IDC_MSMT_LEADCHNL_RA_IMPEDANCE_VALUE: 679 OHM
MDC_IDC_MSMT_LEADCHNL_RA_LEAD_CHANNEL_STATUS: NORMAL
MDC_IDC_MSMT_LEADCHNL_RV_IMPEDANCE_VALUE: 487 OHM
MDC_IDC_MSMT_LEADCHNL_RV_PACING_THRESHOLD_AMPLITUDE: 1 V
MDC_IDC_MSMT_LEADCHNL_RV_PACING_THRESHOLD_PULSEWIDTH: 0.4 MS
MDC_IDC_PG_IMPLANT_DTM: NORMAL
MDC_IDC_PG_MFG: NORMAL
MDC_IDC_PG_MODEL: NORMAL
MDC_IDC_PG_SERIAL: NORMAL
MDC_IDC_PG_TYPE: NORMAL
MDC_IDC_SESS_CLINIC_NAME: NORMAL
MDC_IDC_SESS_DTM: NORMAL
MDC_IDC_SESS_TYPE: NORMAL
MDC_IDC_SET_BRADY_AT_MODE_SWITCH_MODE: NORMAL
MDC_IDC_SET_BRADY_AT_MODE_SWITCH_RATE: 140 {BEATS}/MIN
MDC_IDC_SET_BRADY_LOWRATE: 60 {BEATS}/MIN
MDC_IDC_SET_BRADY_MAX_SENSOR_RATE: 130 {BEATS}/MIN
MDC_IDC_SET_BRADY_MAX_TRACKING_RATE: 100 {BEATS}/MIN
MDC_IDC_SET_BRADY_MODE: NORMAL
MDC_IDC_SET_BRADY_PAV_DELAY_HIGH: 80 MS
MDC_IDC_SET_BRADY_PAV_DELAY_LOW: 130 MS
MDC_IDC_SET_BRADY_SAV_DELAY_HIGH: 80 MS
MDC_IDC_SET_BRADY_SAV_DELAY_LOW: 130 MS
MDC_IDC_SET_LEADCHNL_RA_PACING_AMPLITUDE: 2 V
MDC_IDC_SET_LEADCHNL_RA_PACING_CAPTURE_MODE: NORMAL
MDC_IDC_SET_LEADCHNL_RA_PACING_POLARITY: NORMAL
MDC_IDC_SET_LEADCHNL_RA_PACING_PULSEWIDTH: 1 MS
MDC_IDC_SET_LEADCHNL_RA_SENSING_ADAPTATION_MODE: NORMAL
MDC_IDC_SET_LEADCHNL_RA_SENSING_POLARITY: NORMAL
MDC_IDC_SET_LEADCHNL_RA_SENSING_SENSITIVITY: 0.6 MV
MDC_IDC_SET_LEADCHNL_RV_PACING_AMPLITUDE: 1.5 V
MDC_IDC_SET_LEADCHNL_RV_PACING_CAPTURE_MODE: NORMAL
MDC_IDC_SET_LEADCHNL_RV_PACING_POLARITY: NORMAL
MDC_IDC_SET_LEADCHNL_RV_PACING_PULSEWIDTH: 0.4 MS
MDC_IDC_SET_LEADCHNL_RV_SENSING_ADAPTATION_MODE: NORMAL
MDC_IDC_SET_LEADCHNL_RV_SENSING_POLARITY: NORMAL
MDC_IDC_SET_LEADCHNL_RV_SENSING_SENSITIVITY: 1.5 MV
MDC_IDC_SET_ZONE_DETECTION_INTERVAL: 375 MS
MDC_IDC_SET_ZONE_STATUS: NORMAL
MDC_IDC_SET_ZONE_TYPE: NORMAL
MDC_IDC_SET_ZONE_VENDOR_TYPE: NORMAL
MDC_IDC_STAT_AT_BURDEN_PERCENT: 1 %
MDC_IDC_STAT_AT_DTM_END: NORMAL
MDC_IDC_STAT_AT_DTM_START: NORMAL
MDC_IDC_STAT_BRADY_DTM_END: NORMAL
MDC_IDC_STAT_BRADY_DTM_START: NORMAL
MDC_IDC_STAT_BRADY_RA_PERCENT_PACED: 90 %
MDC_IDC_STAT_BRADY_RV_PERCENT_PACED: 100 %
MDC_IDC_STAT_EPISODE_RECENT_COUNT: 0
MDC_IDC_STAT_EPISODE_RECENT_COUNT: 0
MDC_IDC_STAT_EPISODE_RECENT_COUNT_DTM_END: NORMAL
MDC_IDC_STAT_EPISODE_RECENT_COUNT_DTM_END: NORMAL
MDC_IDC_STAT_EPISODE_RECENT_COUNT_DTM_START: NORMAL
MDC_IDC_STAT_EPISODE_RECENT_COUNT_DTM_START: NORMAL
MDC_IDC_STAT_EPISODE_TYPE: NORMAL
MDC_IDC_STAT_EPISODE_TYPE: NORMAL
MDC_IDC_STAT_EPISODE_VENDOR_TYPE: NORMAL
MDC_IDC_STAT_EPISODE_VENDOR_TYPE: NORMAL

## 2024-02-06 NOTE — PROGRESS NOTES
ANTICOAGULATION MANAGEMENT     Lyudmila Fitzgerald 68 year old female is on warfarin with subtherapeutic INR result. (Goal INR 2.0-3.0)    Recent labs: (last 7 days)     02/06/24  0000   INR 1.7*       ASSESSMENT     Source(s): Patient/ Care giver call     Warfarin doses taken: Warfarin taken as instructed  Diet: No new diet changes identified  New illness, injury, or hospitalization: No  Medication/supplement changes: None noted  Signs or symptoms of bleeding or clotting: No  Previous INR: Subtherapeutic  Additional findings: None     PLAN     Recommended plan for no diet, medication or health factor changes affecting INR     Dosing Instructions: Increase your warfarin dose (9.1% change) with next INR in 2 weeks       Summary  As of 2/6/2024      Full warfarin instructions:  3 mg every Mon, Fri; 6 mg all other days   Next INR check:  2/20/2024               Telephone call with Leigh who verbalizes understanding and agrees to plan    Patient to recheck with home meter    Education provided: Please call back if any changes to your diet, medications or how you've been taking warfarin    Plan made per [facility] anticoagulation protocol    Nasrin Ying RN  Anticoagulation Clinic  2/6/2024    _______________________________________________________________________     Anticoagulation Episode Summary       Current INR goal:  2.0-3.0   TTR:  50.9% (1 y)   Target end date:  Indefinite   Send INR reminders to:  [facility]    Indications    Acute thromboembolism of deep veins of lower extremity (H) (Resolved) [I82.409]  Anticoagulation monitoring  INR range 2-3 [Z79.01]  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

## 2024-02-08 DIAGNOSIS — E83.42 HYPOMAGNESEMIA: ICD-10-CM

## 2024-02-08 RX ORDER — MAGNESIUM OXIDE 400 MG/1
400 TABLET ORAL DAILY
Qty: 90 TABLET | Refills: 1 | Status: SHIPPED | OUTPATIENT
Start: 2024-02-08 | End: 2024-08-06

## 2024-02-08 NOTE — TELEPHONE ENCOUNTER
Requested Prescriptions   Pending Prescriptions Disp Refills    magnesium oxide (MAG-OX) 400 MG tablet 90 tablet 0     Sig: Take 1 tablet (400 mg) by mouth daily       There is no refill protocol information for this order        Last Prescription Date:   11/14/23  Last Fill Qty/Refills:         90, R-0    Last Office Visit:              9/18/23   Future Office visit:           3/4/24    Unable to complete prescription refill per RN Medication Refill Policy.     Routing to covering provider for refill consideration, as PCP/provider is out of clinic >48 hours or Pt is completely out of medication and provider is out of the clinic today.    Mayelin Powell RN .............. 2/8/2024  10:47 AM

## 2024-02-08 NOTE — TELEPHONE ENCOUNTER
Mag oxide 40 mg tablets,[facility]. [phone]. None    Reason for call: Medication or medication refill    Name of medication requested: Magnesium Oxide 400 mg tablet    How many days of medication do you have left? She is out    What pharmacy do you use? [facility] in [facility] ph: [phone]    Preferred method for responding to this message: Telephone Call    Phone number patient can be reached at: Cell number on file:    Telephone Information:   Mobile [phone]       If we cannot reach you directly, may we leave a detailed response at the number you provided? Yes     Brenda Russell on 2/8/2024 at 10:40 AM

## 2024-02-12 ENCOUNTER — PATIENT OUTREACH (OUTPATIENT)
Dept: GASTROENTEROLOGY | Facility: CLINIC | Age: 69
End: 2024-02-12
Payer: MEDICARE

## 2024-02-20 ENCOUNTER — ANTICOAGULATION THERAPY VISIT (OUTPATIENT)
Dept: ANTICOAGULATION | Facility: OTHER | Age: 69
End: 2024-02-20
Payer: MEDICARE

## 2024-02-20 DIAGNOSIS — D68.2 FACTOR V DEFICIENCY (H): ICD-10-CM

## 2024-02-20 DIAGNOSIS — Z95.5 HISTORY OF CORONARY ARTERY STENT PLACEMENT: ICD-10-CM

## 2024-02-20 DIAGNOSIS — Z79.01 ANTICOAGULATION MONITORING, INR RANGE 2-3: Primary | ICD-10-CM

## 2024-02-20 DIAGNOSIS — I66.9 CEREBRAL THROMBOSIS: ICD-10-CM

## 2024-02-20 LAB — INR HOME MONITORING: 3.8 (ref 2–3)

## 2024-02-20 NOTE — PROGRESS NOTES
ANTICOAGULATION MANAGEMENT     Lyudmila Fitzgerald 68 year old female is on warfarin with supratherapeutic INR result. (Goal INR 2.0-3.0)    Recent labs: (last 7 days)     02/20/24  0000   INR 3.8*       ASSESSMENT     Source(s): Patient/ Care giver call     Warfarin doses taken: Warfarin taken as instructed  Diet: No new diet changes identified  New illness, injury, or hospitalization: No  Medication/supplement changes: None noted  Signs or symptoms of bleeding or clotting: No  Previous INR: Subtherapeutic  Additional findings: None     PLAN     Recommended plan for no diet, medication or health factor changes affecting INR     Dosing Instructions: hold dose then decrease your warfarin dose (8.3% change) with next INR in 2 weeks       Summary  As of 2/20/2024      Full warfarin instructions:  2/20: Hold; Otherwise 3 mg every Mon, Wed, Fri; 6 mg all other days   Next INR check:  3/5/2024               Telephone call with Leigh who verbalizes understanding and agrees to plan    Patient to recheck with home meter    Education provided: Please call back if any changes to your diet, medications or how you've been taking warfarin    Plan made per [facility] anticoagulation protocol    Shelia Morse RN  Anticoagulation Clinic  2/20/2024    _______________________________________________________________________     Anticoagulation Episode Summary       Current INR goal:  2.0-3.0   TTR:  48.9% (1 y)   Target end date:  Indefinite   Send INR reminders to:  [facility]    Indications    Acute thromboembolism of deep veins of lower extremity (H) (Resolved) [I82.409]  Anticoagulation monitoring  INR range 2-3 [Z79.01]  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

## 2024-02-24 ENCOUNTER — HEALTH MAINTENANCE LETTER (OUTPATIENT)
Age: 69
End: 2024-02-24

## 2024-02-26 SDOH — HEALTH STABILITY: PHYSICAL HEALTH: ON AVERAGE, HOW MANY MINUTES DO YOU ENGAGE IN EXERCISE AT THIS LEVEL?: 0 MIN

## 2024-02-26 SDOH — HEALTH STABILITY: PHYSICAL HEALTH: ON AVERAGE, HOW MANY DAYS PER WEEK DO YOU ENGAGE IN MODERATE TO STRENUOUS EXERCISE (LIKE A BRISK WALK)?: 0 DAYS

## 2024-02-26 ASSESSMENT — SOCIAL DETERMINANTS OF HEALTH (SDOH): HOW OFTEN DO YOU GET TOGETHER WITH FRIENDS OR RELATIVES?: ONCE A WEEK

## 2024-02-26 NOTE — COMMUNITY RESOURCES LIST (ENGLISH)
02/26/2024    [address] [place]  N/A  For questions about this resource list or additional care needs, please contact your primary care clinic or care manager.  Phone: [phone]   Email: N/A   Address: [address] [place] [id number]   Hours: N/A        Exercise and Recreation       Gym or workout facility  1  [facility] Distance: 1.09 miles      In-Person   [address] [place] [address]  Language: English  Hours: Mon - Fri 5:00 AM - 9:00 PM , Sat 7:00 AM - 7:00 PM , Sun 10:00 AM - 6:00 PM  Fees: Self Pay, Sliding Fee   Phone: [phone] Email: membership@[email or web address] Website: [email or web address].org/          Important Numbers & Websites       Emergency Services   911  [facility]   311  Poison Control   (546) 871-1423  Suicide Prevention Lifeline   (154) 684-6459 (TALK)  Child Abuse Hotline   (655) 747-5112 (4-A-Child)  Sexual Assault Hotline   (285) 628-9428 (HOPE)  National Runaway Safeline   (376) 785-5757 (RUNAWAY)  All-Options Talkline   (440) 811-4514  Substance Abuse Referral   (982) 915-5707 (HELP)

## 2024-03-04 ENCOUNTER — OFFICE VISIT (OUTPATIENT)
Dept: FAMILY MEDICINE | Facility: OTHER | Age: 69
End: 2024-03-04
Attending: FAMILY MEDICINE
Payer: MEDICARE

## 2024-03-04 VITALS
DIASTOLIC BLOOD PRESSURE: 82 MMHG | HEART RATE: 60 BPM | TEMPERATURE: 97 F | SYSTOLIC BLOOD PRESSURE: 122 MMHG | RESPIRATION RATE: 16 BRPM | BODY MASS INDEX: 24.93 KG/M2 | OXYGEN SATURATION: 98 % | WEIGHT: 149.6 LBS | HEIGHT: 65 IN

## 2024-03-04 DIAGNOSIS — Z98.890 HISTORY OF CARDIAC RADIOFREQUENCY ABLATION: ICD-10-CM

## 2024-03-04 DIAGNOSIS — D64.9 ANEMIA, UNSPECIFIED TYPE: ICD-10-CM

## 2024-03-04 DIAGNOSIS — I50.82 BIVENTRICULAR CONGESTIVE HEART FAILURE (H): ICD-10-CM

## 2024-03-04 DIAGNOSIS — I50.33 ACUTE ON CHRONIC HEART FAILURE WITH PRESERVED EJECTION FRACTION (H): ICD-10-CM

## 2024-03-04 DIAGNOSIS — L82.1 VERRUCOUS KERATOSIS: ICD-10-CM

## 2024-03-04 DIAGNOSIS — I20.1 CORONARY ARTERY SPASM (H): ICD-10-CM

## 2024-03-04 DIAGNOSIS — I69.954 HEMIPLEGIA OF LEFT NONDOMINANT SIDE AS LATE EFFECT OF CEREBROVASCULAR DISEASE, UNSPECIFIED CEREBROVASCULAR DISEASE TYPE, UNSPECIFIED HEMIPLEGIA TYPE (H): ICD-10-CM

## 2024-03-04 DIAGNOSIS — E11.9 TYPE 2 DIABETES MELLITUS WITHOUT COMPLICATION, WITHOUT LONG-TERM CURRENT USE OF INSULIN (H): ICD-10-CM

## 2024-03-04 DIAGNOSIS — Z86.79 PERSONAL HISTORY OF SUPRAVENTRICULAR TACHYCARDIA: ICD-10-CM

## 2024-03-04 DIAGNOSIS — Z00.00 ENCOUNTER FOR MEDICARE ANNUAL WELLNESS EXAM: Primary | ICD-10-CM

## 2024-03-04 DIAGNOSIS — Z98.890 S/P AV NODAL ABLATION: ICD-10-CM

## 2024-03-04 DIAGNOSIS — I10 ESSENTIAL HYPERTENSION: ICD-10-CM

## 2024-03-04 DIAGNOSIS — I49.8 PACEMAKER-DEPENDENT DUE TO NATIVE CARDIAC RHYTHM INSUFFICIENT TO SUPPORT LIFE: ICD-10-CM

## 2024-03-04 DIAGNOSIS — Z86.79 HISTORY OF CORONARY VASOSPASM: ICD-10-CM

## 2024-03-04 DIAGNOSIS — D68.51 FACTOR V LEIDEN MUTATION (H): ICD-10-CM

## 2024-03-04 DIAGNOSIS — Z95.0 PACEMAKER-DEPENDENT DUE TO NATIVE CARDIAC RHYTHM INSUFFICIENT TO SUPPORT LIFE: ICD-10-CM

## 2024-03-04 DIAGNOSIS — I48.11 LONGSTANDING PERSISTENT ATRIAL FIBRILLATION (H): ICD-10-CM

## 2024-03-04 DIAGNOSIS — Q24.5 MYOCARDIAL BRIDGE: ICD-10-CM

## 2024-03-04 LAB
ALBUMIN SERPL BCG-MCNC: 4.9 G/DL (ref 3.5–5.2)
ALP SERPL-CCNC: 109 U/L (ref 40–150)
ALT SERPL W P-5'-P-CCNC: NORMAL U/L
ANION GAP SERPL CALCULATED.3IONS-SCNC: 15 MMOL/L (ref 7–15)
AST SERPL W P-5'-P-CCNC: NORMAL U/L
BILIRUB SERPL-MCNC: 0.5 MG/DL
BUN SERPL-MCNC: 12 MG/DL (ref 8–23)
CALCIUM SERPL-MCNC: 9.4 MG/DL (ref 8.8–10.2)
CHLORIDE SERPL-SCNC: 101 MMOL/L (ref 98–107)
CREAT SERPL-MCNC: 0.87 MG/DL (ref 0.51–0.95)
CREAT UR-MCNC: 47.5 MG/DL
DEPRECATED HCO3 PLAS-SCNC: 23 MMOL/L (ref 22–29)
EGFRCR SERPLBLD CKD-EPI 2021: 72 ML/MIN/1.73M2
ERYTHROCYTE [DISTWIDTH] IN BLOOD BY AUTOMATED COUNT: 13.7 % (ref 10–15)
GLUCOSE SERPL-MCNC: 94 MG/DL (ref 70–99)
HBA1C MFR BLD: 5.6 % (ref 4–6.2)
HCT VFR BLD AUTO: 42.9 % (ref 35–47)
HGB BLD-MCNC: 15 G/DL (ref 11.7–15.7)
MCH RBC QN AUTO: 33.1 PG (ref 26.5–33)
MCHC RBC AUTO-ENTMCNC: 35 G/DL (ref 31.5–36.5)
MCV RBC AUTO: 95 FL (ref 78–100)
MICROALBUMIN UR-MCNC: <12 MG/L
MICROALBUMIN/CREAT UR: NORMAL MG/G{CREAT}
PLATELET # BLD AUTO: 255 10E3/UL (ref 150–450)
POTASSIUM SERPL-SCNC: 3.9 MMOL/L (ref 3.4–5.3)
PROT SERPL-MCNC: 7.7 G/DL (ref 6.4–8.3)
RBC # BLD AUTO: 4.53 10E6/UL (ref 3.8–5.2)
SODIUM SERPL-SCNC: 139 MMOL/L (ref 135–145)
WBC # BLD AUTO: 7.6 10E3/UL (ref 4–11)

## 2024-03-04 PROCEDURE — G0439 PPPS, SUBSEQ VISIT: HCPCS | Performed by: FAMILY MEDICINE

## 2024-03-04 PROCEDURE — 36415 COLL VENOUS BLD VENIPUNCTURE: CPT | Mod: ZL | Performed by: FAMILY MEDICINE

## 2024-03-04 PROCEDURE — 84155 ASSAY OF PROTEIN SERUM: CPT | Mod: ZL | Performed by: FAMILY MEDICINE

## 2024-03-04 PROCEDURE — 82043 UR ALBUMIN QUANTITATIVE: CPT | Mod: ZL | Performed by: FAMILY MEDICINE

## 2024-03-04 PROCEDURE — 85014 HEMATOCRIT: CPT | Mod: ZL | Performed by: FAMILY MEDICINE

## 2024-03-04 PROCEDURE — 17000 DESTRUCT PREMALG LESION: CPT | Performed by: FAMILY MEDICINE

## 2024-03-04 PROCEDURE — 83036 HEMOGLOBIN GLYCOSYLATED A1C: CPT | Mod: ZL | Performed by: FAMILY MEDICINE

## 2024-03-04 PROCEDURE — 99214 OFFICE O/P EST MOD 30 MIN: CPT | Mod: 25 | Performed by: FAMILY MEDICINE

## 2024-03-04 PROCEDURE — 84075 ASSAY ALKALINE PHOSPHATASE: CPT | Mod: ZL | Performed by: FAMILY MEDICINE

## 2024-03-04 PROCEDURE — G0463 HOSPITAL OUTPT CLINIC VISIT: HCPCS | Mod: 25 | Performed by: FAMILY MEDICINE

## 2024-03-04 RX ORDER — DILTIAZEM HYDROCHLORIDE 240 MG/1
240 CAPSULE, EXTENDED RELEASE ORAL EVERY 12 HOURS
Qty: 180 CAPSULE | Refills: 3 | Status: SHIPPED | OUTPATIENT
Start: 2024-03-04

## 2024-03-04 ASSESSMENT — PATIENT HEALTH QUESTIONNAIRE - PHQ9
SUM OF ALL RESPONSES TO PHQ QUESTIONS 1-9: 4
SUM OF ALL RESPONSES TO PHQ QUESTIONS 1-9: 4
10. IF YOU CHECKED OFF ANY PROBLEMS, HOW DIFFICULT HAVE THESE PROBLEMS MADE IT FOR YOU TO DO YOUR WORK, TAKE CARE OF THINGS AT HOME, OR GET ALONG WITH OTHER PEOPLE: VERY DIFFICULT

## 2024-03-04 ASSESSMENT — PAIN SCALES - GENERAL: PAINLEVEL: MILD PAIN (2)

## 2024-03-04 NOTE — PROGRESS NOTES
Answers submitted by the patient for this visit:  Patient Health Questionnaire (Submitted on 3/4/2024)  If you checked off any problems, how difficult have these problems made it for you to do your work, take care of things at home, or get along with other people?: Very difficult  PHQ9 TOTAL SCORE: 4     None

## 2024-03-04 NOTE — NURSING NOTE
"Chief Complaint   Patient presents with    Medicare Visit     Annual well visit       Initial /82 (BP Location: Right arm, Patient Position: Sitting, Cuff Size: Adult Regular)   Pulse 60   Temp 97  F (36.1  C) (Tympanic)   Resp 16   Ht 1.651 m (5' 5\")   Wt 67.9 kg (149 lb 9.6 oz)   LMP 03/04/2006 (Approximate)   SpO2 98%   Breastfeeding No   BMI 24.89 kg/m   Estimated body mass index is 24.89 kg/m  as calculated from the following:    Height as of this encounter: 1.651 m (5' 5\").    Weight as of this encounter: 67.9 kg (149 lb 9.6 oz).    Medication Review: complete    The next two questions are to help us understand your food security.  If you are feeling you need any assistance in this area, we have resources available to support you today.          2/26/2024   SDOH- Food Insecurity   Within the past 12 months, did you worry that your food would run out before you got money to buy more? N   Within the past 12 months, did the food you bought just not last and you didn t have money to get more? N     Health Care Directive:  Patient has a Health Care Directive on file      Carlee Carmona LPN      " Bedside report received.  Assessment complete.  A&O x 4. Patient calls appropriately.  Patient ambulates with x1 assist and FWW. Bed alarm on.   Patient reports tolerable pain  Denies N&V. Tolerating regular diet.  Bruising to right torso  + void, + flatus, + BM.  Patient denies SOB.  SCD's off-patient up in chair.    Review plan with of care with patient. Call light and personal belongings with in reach. Hourly rounding in place. All needs met at this time.

## 2024-03-04 NOTE — PROGRESS NOTES
Preventive Care Visit  [facility] AND [facility]  MARYURI SANDOVAL MD, Family Medicine  Mar 4, 2024    Assessment & Plan     ICD-10-CM    1. Encounter for Medicare annual wellness exam  Z00.00       2. Diabetes mellitus, type II   E11.9       3. Anemia, unspecified type  D64.9 CBC W PLT No Diff     CBC W PLT No Diff      4. Coronary artery spasm (H24)  I20.1 diltiazem ER (TIAZAC) 240 MG 24 hr ER beaded capsule      5. Essential hypertension  I10 diltiazem ER (TIAZAC) 240 MG 24 hr ER beaded capsule      6. Myocardial bridge-LAD  Q24.5       7. Pacemaker-dependent due to native cardiac rhythm insufficient to support life  I49.8 diltiazem ER (TIAZAC) 240 MG 24 hr ER beaded capsule    Z95.0       8. History of coronary vasospasm  Z86.79 diltiazem ER (TIAZAC) 240 MG 24 hr ER beaded capsule      9. History of cardiac radiofrequency ablation for SVT x8  Z98.890       10. Personal history of supraventricular tachycardia status post ablation  Z86.79       11. S/P AV tessie ablation secondary to SVT  Z98.890       12. Longstanding persistent atrial fibrillation (H)  I48.11       13. 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 Acute on chronic heart failure with preserved ejection fraction (H)  I50.33       15. Biventricular congestive heart failure (H)  I50.82       16. Verrucous keratosis  L82.1 Adult Dermatology  Referral     DESTRUCT PREMALIGNANT LESION, FIRST      17. Type 2 diabetes mellitus without complication, without long-term current use of insulin (H)  E11.9 Hemoglobin A1c     Albumin Random Urine Quantitative with Creat Ratio     Comprehensive Metabolic Panel     Albumin Random Urine Quantitative with Creat Ratio     Comprehensive Metabolic Panel     Hemoglobin A1c      18. Factor V Leiden mutation (H24)  D68.51             - Encounter for Medicare annual wellness  exam  - Type 2 diabetes mellitus without complication, without long-term current use of insulin (H)  Patient's last A1c was 6.4 at LOV on 9/18/2024 with lifestyle mods only.  Patient not on any pharmacotherapy for glucose control.  Recheck A1C today was 5.6.  No further action needed at this time as A1c is well at goal of < 8.  Patient due for microalbumin screening and 6-month CMP, ordered.  - Hemoglobin A1c  - Albumin Random Urine Quantitative with Creat Ratio  - Comprehensive Metabolic Panel    - Anemia, unspecified type  Patient has history of anemia.  Update 6-month screening today.  - CBC W PLT No Diff    - Coronary artery spasm (H24)  - History of coronary vasospasm  - Myocardial bridge-LAD  - Longstanding persistent atrial fibrillation (H)  - Pacemaker-dependent due to native cardiac rhythm   insufficient to support life  - Longstanding persistent atrial fibrillation (H)  - History of cardiac radiofrequency ablation for SVT x8  - Personal history of supraventricular tachycardia status post ablation  - S/P AV tessie ablation secondary to SVT  Stable, no recent cardiac episodes of afib, spasm, or SVT. Completed cardiac rehab in December.  Patient follows closely with cardiology and next appointment is 4/17/2024.  - diltiazem ER (TIAZAC) 240 MG 24 hr ER beaded capsule  Dispense: 180 capsule; Refill: 3    - Essential hypertension  Blood pressure stable 122/82 today.  Did not ask patient if she checks her pressures at home. Per patient report, cardiology discontinued her Entresto and clonidine for low blood pressure concerns in December.  - diltiazem ER (TIAZAC) 240 MG 24 hr ER beaded capsule  Dispense: 180 capsule; Refill: 3    - Verrucous keratosis of R pinna  Patient notes that this is increased in size following 1 round of cryotherapy prior. Second round of cryotherapy today with Derm referral to follow.  - Adult Dermatology  Referral    - Hemiplegia of left nondominant side as late effect of  cerebrovascular disease, unspecified cerebrovascular disease type, unspecified hemiplegia type (H)  - Factor V Leiden mutation (H24)   Patient has a strong history of recurrent strokes likely secondary to factor V leiden.  Patient's factor V leiden mutation makes her high risk for recurrent stroke.  Recommend patient continue to wear her left ankle brace for safety, as this helps prevent left foot drop and subsequent mechanical falls.  Her most recent fall in [place] was most likely result of not wearing this brace and her tripping over her foot.     Acute on chronic heart failure with preserved ejection fraction (H)  Biventricular congestive heart failure (H)  Patient follows with cardiology.  Last echo 9/22/2023 showed ejection fraction 50 to 55%.  Mildly decreased from prior in April 2023.      Counseling  Appropriate preventive services were discussed with this patient, including applicable screening as appropriate for fall prevention, nutrition, physical activity, Tobacco-use cessation, weight loss and cognition.  Checklist reviewing preventive services available has been given to the patient.  Reviewed patient's diet, addressing concerns and/or questions.   She is at risk for psychosocial distress and has been provided with information to reduce risk.   Updated plan of care.  Patient reported difficulty with activities of daily living were addressed today.    Follow up in 1 year for annual physical. RSV vaccine recommended in the fall.     Erika Gupta MD     Yovani Abraham is a 68 year old, presenting for the following:  Medicare Visit (Annual well visit)        3/4/2024    11:18 AM   Additional Questions   Roomed by Carlee MEDELLIN LPN         Health Care Directive  Patient has a Health Care Directive on file  Advance care planning document is on file and is current.    HPI  LOV with primary care 9/18/2023.  Patient finished cardiac rehab in December and traveled to [place] for January-February of this  year.     Patient had a fall in December, accompanied by leg weakness and inability to move her bilateral lower extremities.  She informed cardiology and cardiac rehab of this event and it was worked up to be unremarkable.  She received an x-ray of her back to rule out spinal causes.    Patient also had 2 falls in [place], 1 fall she does not remember the mechanism.  1 fall she notes she was walking out of [employer] and tripped on her left foot.  She fell to the ground and fractured several ribs.  Patient notes that she normally wears her left leg brace but she was not wearing it in [place] due to the warm weather.    Patient has a complicated cardiac history including history of coronary artery spasm, A-fib requiring pacemaker and ablation, CHF, and paroxysmal SVT for which she follows closely with cardiology.  In December cardiology discontinued patient's Entresto and clonidine due to hypotension concerns. Has received extensive workup at [place] and has joined a genetic testing study.  There are no updates to her family history at this time. Has pacemaker.     Patient also has a persistent verrucous lesion of her right pinna, she has had this frozen once before and would like cryotherapy again today.  She notes it has increased in size since last treatment.    Diabetes Follow-up    How often are you checking your blood sugar? Not at all  What concerns do you have today about your diabetes? None   Do you have any of these symptoms? (Select all that apply)  Numbness in feet  Have you had a diabetic eye exam in the last 12 months? No            Hyperlipidemia Follow-Up    Are you regularly taking any medication or supplement to lower your cholesterol?   Yes-    Are you having muscle aches or other side effects that you think could be caused by your cholesterol lowering medication?  No    Hypertension Follow-up    Do you check your blood pressure regularly outside of the clinic? No   Are your blood pressures ever more than  140 on the top number (systolic) OR more   than 90 on the bottom number (diastolic), for example 140/90? No    BP Readings from Last 2 Encounters:   03/04/24 122/82   12/05/23 120/70     Hemoglobin A1C (%)   Date Value   03/04/2024 5.6   09/18/2023 6.4 (H)   03/09/2020 5.8   09/27/2019 6.1 (H)     LDL Cholesterol Calculated (mg/dL)   Date Value   09/18/2023 123 (H)   10/05/2022 141 (H)   03/09/2020 178 (H)   09/27/2019 97         Atrial Fibrillation Follow-up    Symptoms: chest pain chronic and dyspnea chronic  Stroke prevention: Coumadin (Warfarin)        9/8/2023     9:00 AM 9/18/2023     8:11 AM 11/30/2023     3:09 PM 12/5/2023    10:44 AM 3/4/2024    11:20 AM   Date   Pulse 60 74 60 65 60     Current        Vascular Disease Follow-up    How often do you take nitroglycerin? IMDUR long-acting daily    Do you take an aspirin every day? Yes     Cerebrovascular Follow-up    Patient history: ischemic   Residual symptoms: L sided weakness and foot drop  Worsened or new symptoms since last visit: No  Daily aspirin use: Yes  Hypertension controlled: Yes    Heart Failure Follow-up   Managed by cardiology  Last Echo: Echo result w/o MOPS: Interpretation SummaryLeft ventricular function is decreased. The ejection fraction is 50-55%(borderline). Mild diffuse hypokinesis is present. Mild concentric wallthickening consistent with left ventricular hypertrophy is present.The right ventricle is normal size. Global right ventricular function isnormal.No significant valvular abnormalities present.The inferior vena cava was normal in size with preserved respiratoryvariability.No pericardial effusion is present. This study was compared with the study from 4/27/23. There is a slightdecrease in LV systolic function.      Depression and Anxiety Follow-Up  Stable on Zoloft and ambien    Social History     Tobacco Use    Smoking status: Never     Passive exposure: Current    Smokeless tobacco: Never    Tobacco comments:     Quit smoking:  spouse does not smoke in the house. Exposed in cars.   Vaping Use    Vaping Use: Never used   Substance Use Topics    Alcohol use: Yes     Comment: Alcoholic Drinks/day: Alcoholic Drinks/day: 1-2 drinks a week    Drug use: Never         8/17/2023     8:52 AM 9/17/2023    12:34 PM 3/4/2024    11:07 AM   PHQ   PHQ-9 Total Score 0 7 4   Q9: Thoughts of better off dead/self-harm past 2 weeks Not at all Not at all Not at all         3/9/2020    11:02 AM   JUAN CARLOS-7 SCORE   Total Score 0         3/4/2024    11:07 AM   Last PHQ-9   1.  Little interest or pleasure in doing things 0   2.  Feeling down, depressed, or hopeless 0   3.  Trouble falling or staying asleep, or sleeping too much 1   4.  Feeling tired or having little energy 3   5.  Poor appetite or overeating 0   6.  Feeling bad about yourself 0   7.  Trouble concentrating 0   8.  Moving slowly or restless 0   Q9: Thoughts of better off dead/self-harm past 2 weeks 0   PHQ-9 Total Score 4         3/9/2020    11:02 AM   JUAN CARLOS-7    1. Feeling nervous, anxious, or on edge 0   2. Not being able to stop or control worrying 0   3. Worrying too much about different things 0   4. Trouble relaxing 0   5. Being so restless that it is hard to sit still 0   6. Becoming easily annoyed or irritable 0   7. Feeling afraid, as if something awful might happen 0   JUAN CARLOS-7 Total Score 0   If you checked any problems, how difficult have they made it for you to do your work, take care of things at home, or get along with other people? Not difficult at all       Suicide Assessment Five-step Evaluation and Treatment (SAFE-T)             2/26/2024   General Health   How would you rate your overall physical health? (!) FAIR   Feel stress (tense, anxious, or unable to sleep) Only a little   (!) STRESS CONCERN      2/26/2024   Nutrition   Diet: Low salt         2/26/2024   Exercise   Days per week of moderate/strenous exercise 0 days   Average minutes spent exercising at this level 0 min   (!) EXERCISE  CONCERN      2/26/2024   Social Factors   Frequency of gathering with friends or relatives Once a week   Worry food won't last until get money to buy more No   Food not last or not have enough money for food? No   Do you have housing?  Yes   Are you worried about losing your housing? No   Lack of transportation? No   Unable to get utilities (heat,electricity)? No         3/4/2024   Fall Risk   Gait Speed Test (Document in seconds) 5   Gait Speed Test Interpretation Less than or equal to 5.00 seconds - PASS          2/26/2024   Activities of Daily Living- Home Safety   Needs help with the following daily activites Shopping    Preparing meals    Housework   Safety concerns in the home None of the above         2/26/2024   Dental   Dentist two times every year? Yes         2/26/2024   Hearing Screening   Hearing concerns? None of the above         2/26/2024   Driving Risk Screening   Patient/family members have concerns about driving No         2/26/2024   General Alertness/Fatigue Screening   Have you been more tired than usual lately? No         2/26/2024   Urinary Incontinence Screening   Bothered by leaking urine in past 6 months No         2/26/2024   TB Screening   Were you born outside of [place]?  No       Today's PHQ-9 Score:       3/4/2024    11:07 AM   PHQ-9 SCORE   PHQ-9 Total Score MyChart 4 (Minimal depression)   PHQ-9 Total Score 4         2/26/2024   Substance Use   Alcohol more than 3/day or more than 7/wk No   Do you have a current opioid prescription? No   How severe/bad is pain from 1 to 10? 3/10   Do you use any other substances recreationally? No     Social History     Tobacco Use    Smoking status: Never     Passive exposure: Current    Smokeless tobacco: Never    Tobacco comments:     Quit smoking: spouse does not smoke in the house. Exposed in cars.   Vaping Use    Vaping Use: Never used   Substance Use Topics    Alcohol use: Yes     Comment: Alcoholic Drinks/day: Alcoholic Drinks/day: 1-2 drinks a  week    Drug use: Never             2/26/2024   Breast Cancer Screening   Family history of breast, colon, or ovarian cancer? No / Unknown         1/31/2023   LAST FHS-7 RESULTS   1st degree relative breast or ovarian cancer Yes   Any relative bilateral breast cancer No   Any male have breast cancer No   Any ONE woman have BOTH breast AND ovarian cancer No   Any woman with breast cancer before 50yrs No   2 or more relatives with breast AND/OR ovarian cancer Yes    No   2 or more relatives with breast AND/OR bowel cancer No          ASCVD Risk   The ASCVD Risk score (Oralia DK, et al., 2019) failed to calculate for the following reasons:    The patient has a prior MI or stroke diagnosis          Reviewed and updated as needed this visit by Provider                    Current providers sharing in care for this patient include:  Patient Care Team:  Erika Carrasco MD as PCP - General (Family Practice)  Erika Carrasco MD as Assigned PCP  Xiao Davis, RN as Specialty Care Coordinator (Cardiology)  Hakeem Moore MD as MD (Clinical Cardiac Electrophysiology)  Tima Johnson MD as MD (Clinical Cardiac Electrophysiology)  Moises Islas DO as Assigned Heart and Vascular Provider    The following health maintenance items are reviewed in Epic and correct as of today:  Health Maintenance   Topic Date Due    HF ACTION PLAN  Never done    ZOSTER IMMUNIZATION (1 of 2) Never done    RSV VACCINE (Pregnancy & 60+) (1 - 1-dose 60+ series) Never done    EYE EXAM  12/09/2023    A1C  06/04/2024    BMP  09/04/2024    ALT  09/07/2024    LIPID  09/18/2024    MAMMO SCREENING  01/31/2025    MEDICARE ANNUAL WELLNESS VISIT  03/04/2025    MICROALBUMIN  03/04/2025    DIABETIC FOOT EXAM  03/04/2025    FALL RISK ASSESSMENT  03/04/2025    CBC  03/04/2025    COLORECTAL CANCER SCREENING  04/04/2026    ADVANCE CARE PLANNING  03/04/2029    DTAP/TDAP/TD IMMUNIZATION (4 - Td or Tdap) 09/27/2029    DEXA   "10/01/2036    TSH W/FREE T4 REFLEX  Completed    HEPATITIS C SCREENING  Completed    PHQ-2 (once per calendar year)  Completed    INFLUENZA VACCINE  Completed    Pneumococcal Vaccine: 65+ Years  Completed    IPV IMMUNIZATION  Aged Out    HPV IMMUNIZATION  Aged Out    MENINGITIS IMMUNIZATION  Aged Out    RSV MONOCLONAL ANTIBODY  Aged Out    PAP  Discontinued    COVID-19 Vaccine  Discontinued          Objective    Exam  /82 (BP Location: Right arm, Patient Position: Sitting, Cuff Size: Adult Regular)   Pulse 60   Temp 97  F (36.1  C) (Tympanic)   Resp 16   Ht 1.651 m (5' 5\")   Wt 67.9 kg (149 lb 9.6 oz)   LMP 03/04/2006 (Approximate)   SpO2 98%   Breastfeeding No   BMI 24.89 kg/m     Estimated body mass index is 24.89 kg/m  as calculated from the following:    Height as of this encounter: 1.651 m (5' 5\").    Weight as of this encounter: 67.9 kg (149 lb 9.6 oz).    Physical Exam  Constitutional:       Appearance: Normal appearance.   HENT:      Head: Normocephalic and atraumatic.      Ears: Tympanic membrane and external ear normal b/l.     Nose: Nose normal.      Mouth: Mucous membranes are dry.   Eyes:      Extraocular Movements: Extraocular movements intact.      Conjunctiva/sclera: Conjunctivae normal.   Cardiovascular:      Rate and Rhythm: Normal rate and regular rhythm.      Pulses: Normal pulses.      Heart sounds: Normal heart sounds.   Pulmonary:      Effort: Pulmonary effort is normal.      Breath sounds: Normal breath sounds.   Musculoskeletal:      Cervical back: Normal range of motion.     Right lower leg: No edema.      Left lower leg: No edema.   Skin:     General: Skin is warm and dry.   Neurological:      General: No focal deficit present.      Mental Status: Alert and oriented to person, place, and time.   Psychiatric:         Mood and Affect: Mood normal.         Behavior: Behavior normal.     Diabetic foot exam: normal DP and PT pulses, no trophic changes or ulcerative lesions.  " Diffusely decreased sensation, sensation intact under the arches bilaterally.        3/4/2024   Mini Cog   Clock Draw Score 2 Normal   3 Item Recall 3 objects recalled   Mini Cog Total Score 5             3/4/2024   Vision Screen   Vision Screen Results REFER     Patient discussed with Dr. Kirill Haynes, OMS-IV  Medical Student  [facility]     I was present with Lissett, MS4 who participated in the service and in the documentation of this note.  I have verified the history and personally performed the physical exam medical decision making, and have verified the content of the note, which accurately reflects my assessment of the patient and the plan of care.  MARYURI SANDOVAL MD on 3/4/2024 at 5:22 PM         Signed Electronically by: MARYURI SANDOVAL MD    Answers submitted by the patient for this visit:  Patient Health Questionnaire (Submitted on 3/4/2024)  If you checked off any problems, how difficult have these problems made it for you to do your work, take care of things at home, or get along with other people?: Very difficult  PHQ9 TOTAL SCORE: 4

## 2024-03-05 ENCOUNTER — ANTICOAGULATION THERAPY VISIT (OUTPATIENT)
Dept: ANTICOAGULATION | Facility: OTHER | Age: 69
End: 2024-03-05
Attending: FAMILY MEDICINE
Payer: MEDICARE

## 2024-03-05 DIAGNOSIS — D68.2 FACTOR V DEFICIENCY (H): ICD-10-CM

## 2024-03-05 DIAGNOSIS — I66.9 CEREBRAL THROMBOSIS: Primary | ICD-10-CM

## 2024-03-05 DIAGNOSIS — Z79.01 ANTICOAGULATION MONITORING, INR RANGE 2-3: ICD-10-CM

## 2024-03-05 DIAGNOSIS — Z95.5 HISTORY OF CORONARY ARTERY STENT PLACEMENT: ICD-10-CM

## 2024-03-05 LAB — INR HOME MONITORING: 2.7 (ref 2–3)

## 2024-03-05 NOTE — PROGRESS NOTES
ANTICOAGULATION MANAGEMENT     Lyudmila Fitzgerald 68 year old female is on warfarin with therapeutic INR result. (Goal INR 2.0-3.0)    Recent labs: (last 7 days)     03/05/24  0000   INR 2.7       ASSESSMENT     Source(s): Chart Review and Patient/Caregiver Call     Warfarin doses taken: Warfarin taken as instructed  Diet: No new diet changes identified  Medication/supplement changes: None noted  New illness, injury, or hospitalization: No  Signs or symptoms of bleeding or clotting: No  Previous result: Supratherapeutic  Additional findings: None       PLAN     Recommended plan for no diet, medication or health factor changes affecting INR     Dosing Instructions: Continue your current warfarin dose with next INR in 2 weeks       Summary  As of 3/5/2024      Full warfarin instructions:  3 mg every Mon, Wed, Fri; 6 mg all other days   Next INR check:  3/19/2024               Telephone call with Leigh who verbalizes understanding and agrees to plan    Patient to recheck with home meter    Education provided:   None required    Plan made per 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

## 2024-03-11 DIAGNOSIS — F32.A DEPRESSION, UNSPECIFIED DEPRESSION TYPE: ICD-10-CM

## 2024-03-13 NOTE — TELEPHONE ENCOUNTER
[id number] sent Rx request for the following:      Requested Prescriptions   Pending Prescriptions Disp Refills    sertraline (ZOLOFT) 50 MG tablet [Pharmacy Med Name: Sertraline HCl 50 MG Oral Tablet] 90 tablet 0     Sig: Take 1 tablet by mouth once daily   Last Prescription Date:   12/30/22  Last Fill Qty/Refills:         90, R-3    Last Office Visit:              3/4/24   Future Office visit:           None    Per LOV note:  Follow up in 1 year for annual physical. RSV vaccine recommended in the fall.      Prescription refilled per RN Medication Refill Policy.................... Mayelin Powell RN ....................  3/13/2024   9:37 AM

## 2024-03-19 ENCOUNTER — ANTICOAGULATION THERAPY VISIT (OUTPATIENT)
Dept: ANTICOAGULATION | Facility: OTHER | Age: 69
End: 2024-03-19
Attending: FAMILY MEDICINE
Payer: MEDICARE

## 2024-03-19 DIAGNOSIS — D68.2 FACTOR V DEFICIENCY (H): ICD-10-CM

## 2024-03-19 DIAGNOSIS — I66.9 CEREBRAL THROMBOSIS: ICD-10-CM

## 2024-03-19 DIAGNOSIS — Z79.01 ANTICOAGULATION MONITORING, INR RANGE 2-3: Primary | ICD-10-CM

## 2024-03-19 DIAGNOSIS — Z95.5 HISTORY OF CORONARY ARTERY STENT PLACEMENT: ICD-10-CM

## 2024-03-19 LAB — INR HOME MONITORING: 3.2 (ref 2–3)

## 2024-03-20 ENCOUNTER — MYC MEDICAL ADVICE (OUTPATIENT)
Dept: CARDIOLOGY | Facility: OTHER | Age: 69
End: 2024-03-20
Payer: MEDICARE

## 2024-03-20 DIAGNOSIS — I50.9 CONGESTIVE HEART FAILURE, UNSPECIFIED HF CHRONICITY, UNSPECIFIED HEART FAILURE TYPE (H): ICD-10-CM

## 2024-03-20 DIAGNOSIS — I95.9 HYPOTENSION, UNSPECIFIED HYPOTENSION TYPE: ICD-10-CM

## 2024-03-20 DIAGNOSIS — I50.33 ACUTE ON CHRONIC HEART FAILURE WITH PRESERVED EJECTION FRACTION (H): ICD-10-CM

## 2024-03-20 DIAGNOSIS — R06.02 SHORTNESS OF BREATH: Primary | ICD-10-CM

## 2024-03-20 DIAGNOSIS — R60.0 LOWER EXTREMITY EDEMA: ICD-10-CM

## 2024-03-20 DIAGNOSIS — Z86.79 HISTORY OF HEART FAILURE: ICD-10-CM

## 2024-03-20 RX ORDER — SPIRONOLACTONE 25 MG/1
25 TABLET ORAL DAILY
Qty: 90 TABLET | Refills: 1 | Status: SHIPPED | OUTPATIENT
Start: 2024-03-20 | End: 2024-09-16

## 2024-03-20 RX ORDER — FUROSEMIDE 20 MG
20 TABLET ORAL DAILY
Qty: 90 TABLET | Refills: 1 | Status: SHIPPED | OUTPATIENT
Start: 2024-03-20 | End: 2024-09-16

## 2024-03-20 NOTE — TELEPHONE ENCOUNTER
Called patient and she states that she still has been taking Lasix 20mg daily and needs it refilled.  She is almost out of them and would like them refilled.  She still is having fluid build up.  She also would like a refill of her Spironactone which she has been out of for the last 3 months.  She didn't realize that someone had stopped these or taken them off her list.   Geovanna Kelly LPN .......3/20/2024 2:42 PM

## 2024-03-20 NOTE — PROGRESS NOTES
ANTICOAGULATION MANAGEMENT     Lyudmila Fitzgerald 68 year old female is on warfarin with supratherapeutic INR result. (Goal INR 2.0-3.0)    Recent labs: (last 7 days)     03/19/24  0000   INR 3.2*       ASSESSMENT     Source(s): Chart Review and Patient/Caregiver Call     Warfarin doses taken: Warfarin taken as instructed  Diet: No new diet changes identified  Medication/supplement changes: None noted  New illness, injury, or hospitalization: No  Signs or symptoms of bleeding or clotting: No  Previous result: Therapeutic last visit; previously outside of goal range  Additional findings: None       PLAN     Recommended plan for no diet, medication or health factor changes affecting INR     Dosing Instructions: Continue your current warfarin dose with next INR in 2 weeks       Summary  As of 3/19/2024      Full warfarin instructions:  3 mg every Mon, Wed, Fri; 6 mg all other days   Next INR check:  4/3/2024               Telephone call with Leigh who verbalizes understanding and agrees to plan    Patient to recheck with home meter    Education provided:   None required    Plan made per [facility] anticoagulation protocol    Nyla Walker, RN  Anticoagulation Clinic  3/20/2024    _______________________________________________________________________     Anticoagulation Episode Summary       Current INR goal:  2.0-3.0   TTR:  47.7% (1 y)   Target end date:  Indefinite   Send INR reminders to:  [facility]    Indications    Acute thromboembolism of deep veins of lower extremity (H) (Resolved) [I82.409]  Anticoagulation monitoring  INR range 2-3 [Z79.01]  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

## 2024-04-02 ENCOUNTER — ANTICOAGULATION THERAPY VISIT (OUTPATIENT)
Dept: ANTICOAGULATION | Facility: OTHER | Age: 69
End: 2024-04-02
Attending: FAMILY MEDICINE
Payer: MEDICARE

## 2024-04-02 DIAGNOSIS — Z79.01 ANTICOAGULATION MONITORING, INR RANGE 2-3: Primary | ICD-10-CM

## 2024-04-02 DIAGNOSIS — Z95.5 HISTORY OF CORONARY ARTERY STENT PLACEMENT: ICD-10-CM

## 2024-04-02 DIAGNOSIS — D68.2 FACTOR V DEFICIENCY (H): ICD-10-CM

## 2024-04-02 DIAGNOSIS — I66.9 CEREBRAL THROMBOSIS: ICD-10-CM

## 2024-04-02 LAB — INR HOME MONITORING: 1.7 (ref 2–3)

## 2024-04-02 NOTE — PROGRESS NOTES
ANTICOAGULATION MANAGEMENT     Lyudmila Fitzgerald 68 year old female is on warfarin with subtherapeutic INR result. (Goal INR 2.0-3.0)    Recent labs: (last 7 days)     04/02/24  0000   INR 1.7*       ASSESSMENT     Source(s): Chart Review and Patient/Caregiver Call     Warfarin doses taken: Warfarin taken as instructed  Diet:  was nauseous and vomiting a couple of days ago may be affecting diet and INR  Medication/supplement changes: None noted  New illness, injury, or hospitalization: No  Signs or symptoms of bleeding or clotting: No  Previous result: Supratherapeutic  Additional findings: None       PLAN     Recommended plan for temporary change(s) affecting INR     Dosing Instructions: booster dose then continue your current warfarin dose with next INR in 2 weeks       Summary  As of 4/2/2024      Full warfarin instructions:  4/2: 9 mg; Otherwise 3 mg every Mon, Wed, Fri; 6 mg all other days   Next INR check:  4/16/2024               Telephone call with Leigh who verbalizes understanding and agrees to plan    Patient to recheck with home meter    Education provided:   None required    Plan made per [facility] anticoagulation protocol    Nyla Walker, RN  Anticoagulation Clinic  4/2/2024    _______________________________________________________________________     Anticoagulation Episode Summary       Current INR goal:  2.0-3.0   TTR:  50.1% (1 y)   Target end date:  Indefinite   Send INR reminders to:  [facility]    Indications    Acute thromboembolism of deep veins of lower extremity (H) (Resolved) [I82.409]  Anticoagulation monitoring  INR range 2-3 [Z79.01]  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

## 2024-04-16 ENCOUNTER — ANTICOAGULATION THERAPY VISIT (OUTPATIENT)
Dept: ANTICOAGULATION | Facility: OTHER | Age: 69
End: 2024-04-16
Attending: FAMILY MEDICINE
Payer: MEDICARE

## 2024-04-16 DIAGNOSIS — D68.2 FACTOR V DEFICIENCY (H): ICD-10-CM

## 2024-04-16 DIAGNOSIS — Z95.5 HISTORY OF CORONARY ARTERY STENT PLACEMENT: ICD-10-CM

## 2024-04-16 DIAGNOSIS — Z79.01 ANTICOAGULATION MONITORING, INR RANGE 2-3: Primary | ICD-10-CM

## 2024-04-16 DIAGNOSIS — I66.9 CEREBRAL THROMBOSIS: ICD-10-CM

## 2024-04-16 LAB — INR HOME MONITORING: 2.3 (ref 2–3)

## 2024-04-16 NOTE — PROGRESS NOTES
ANTICOAGULATION MANAGEMENT     Lyudmila Fitzgerald 68 year old female is on warfarin with therapeutic INR result. (Goal INR 2.0-3.0)    Recent labs: (last 7 days)     04/16/24  0000   INR 2.3       ASSESSMENT     Source(s): Patient/ Care giver call     Warfarin doses taken: Warfarin taken as instructed  Diet: No new diet changes identified  New illness, injury, or hospitalization: No  Medication/supplement changes: None noted  Signs or symptoms of bleeding or clotting: No  Previous INR: Subtherapeutic  Additional findings: Upcoming surgery/procedure Having skin cancer removed on 4/24/24      PLAN     Recommended plan for no diet, medication or health factor changes affecting INR     Dosing Instructions: Continue your current warfarin dose with next INR in 2 weeks       Summary  As of 4/16/2024      Full warfarin instructions:  3 mg every Mon, Wed, Fri; 6 mg all other days   Next INR check:  4/24/2024               Telephone call with Leigh who verbalizes understanding and agrees to plan    Patient to recheck with home meter    Education provided: Please call back if any changes to your diet, medications or how you've been taking warfarin    Plan made per [facility] anticoagulation protocol    Josefina Rivera RN  Anticoagulation Clinic  4/16/2024    _______________________________________________________________________     Anticoagulation Episode Summary       Current INR goal:  2.0-3.0   TTR:  50.9% (1 y)   Target end date:  Indefinite   Send INR reminders to:  [facility]    Indications    Acute thromboembolism of deep veins of lower extremity (H) (Resolved) [I82.409]  Anticoagulation monitoring  INR range 2-3 [Z79.01]  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 Family [facility] [phone]

## 2024-04-17 ENCOUNTER — ANCILLARY PROCEDURE (OUTPATIENT)
Dept: CARDIOLOGY | Facility: CLINIC | Age: 69
End: 2024-04-17
Attending: INTERNAL MEDICINE
Payer: MEDICARE

## 2024-04-17 PROCEDURE — 93294 REM INTERROG EVL PM/LDLS PM: CPT | Performed by: INTERNAL MEDICINE

## 2024-04-17 PROCEDURE — 93296 REM INTERROG EVL PM/IDS: CPT

## 2024-04-24 ENCOUNTER — ANTICOAGULATION THERAPY VISIT (OUTPATIENT)
Dept: ANTICOAGULATION | Facility: OTHER | Age: 69
End: 2024-04-24
Attending: FAMILY MEDICINE
Payer: MEDICARE

## 2024-04-24 DIAGNOSIS — Z79.01 ANTICOAGULATION MONITORING, INR RANGE 2-3: Primary | ICD-10-CM

## 2024-04-24 DIAGNOSIS — D68.2 FACTOR V DEFICIENCY (H): ICD-10-CM

## 2024-04-24 DIAGNOSIS — I66.9 CEREBRAL THROMBOSIS: ICD-10-CM

## 2024-04-24 DIAGNOSIS — Z95.5 HISTORY OF CORONARY ARTERY STENT PLACEMENT: ICD-10-CM

## 2024-04-24 LAB — INR HOME MONITORING: 2.1 (ref 2–3)

## 2024-04-24 NOTE — PROGRESS NOTES
ANTICOAGULATION  MANAGEMENT-Home Monitor Managed by Exception    Lyudmila PRAJAPATI Fitzgerald 68 year old female is on warfarin with therapeutic INR result. (Goal INR 2.0-3.0)    Recent labs: (last 7 days)     04/24/24  0000   INR 2.1       Previous INR was Therapeutic  Medication, diet, health changes since last INR:chart reviewed; none identified  Contacted within the last 12 weeks by phone on 4/16/24  Last [facility] referral date: 03/05/2024      ROD     Lyudmila was NOT contacted regarding therapeutic result today per home monitoring policy manage by exception agreement.   Current warfarin dose is to be continued:     Summary  As of 4/24/2024      Full warfarin instructions:  3 mg every Mon, Wed, Fri; 6 mg all other days   Next INR check:  5/8/2024             ?   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

## 2024-05-06 DIAGNOSIS — I66.9 CEREBRAL THROMBOSIS: ICD-10-CM

## 2024-05-07 ENCOUNTER — ANTICOAGULATION THERAPY VISIT (OUTPATIENT)
Dept: ANTICOAGULATION | Facility: OTHER | Age: 69
End: 2024-05-07
Attending: FAMILY MEDICINE
Payer: MEDICARE

## 2024-05-07 DIAGNOSIS — D68.2 FACTOR V DEFICIENCY (H): ICD-10-CM

## 2024-05-07 DIAGNOSIS — I66.9 CEREBRAL THROMBOSIS: ICD-10-CM

## 2024-05-07 DIAGNOSIS — Z79.01 ANTICOAGULATION MONITORING, INR RANGE 2-3: Primary | ICD-10-CM

## 2024-05-07 DIAGNOSIS — Z95.5 HISTORY OF CORONARY ARTERY STENT PLACEMENT: ICD-10-CM

## 2024-05-07 LAB — INR HOME MONITORING: 2.6 (ref 2–3)

## 2024-05-07 NOTE — PROGRESS NOTES
ANTICOAGULATION  MANAGEMENT-Home Monitor Managed by Exception    Lyudmila PRAJAPATI Fitzgerald 68 year old female is on warfarin with therapeutic INR result. (Goal INR 2.0-3.0)    Recent labs: (last 7 days)     05/07/24  0000   INR 2.6       Previous INR was Therapeutic  Medication, diet, health changes since last INR:chart reviewed; none identified  Contacted within the last 12 weeks by phone on 4/16/24  Last [facility] referral date: 03/05/2024      ROD     Lyudmila was NOT contacted regarding therapeutic result today per home monitoring policy manage by exception agreement.   Current warfarin dose is to be continued:     Summary  As of 5/7/2024      Full warfarin instructions:  3 mg every Mon, Wed, Fri; 6 mg all other days   Next INR check:  5/21/2024             ?   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

## 2024-05-08 RX ORDER — WARFARIN SODIUM 6 MG/1
TABLET ORAL
Qty: 70 TABLET | Refills: 1 | Status: SHIPPED | OUTPATIENT
Start: 2024-05-08

## 2024-05-08 NOTE — TELEPHONE ENCOUNTER
[id number]  sent Rx request for the following:     sent Rx request for the following:      Requested Prescriptions   Pending Prescriptions Disp Refills    warfarin ANTICOAGULANT (COUMADIN) 6 MG tablet 70 tablet 1     Sig: 3 mg (0.5 tablets) every Mon, Wed, Fri; 6 mg (1 tablet)  all other days OR  AS  DIRECTED  BY  THE  [facility].       Vitamin K Antagonists Failed - 5/6/2024  3:22 PM        Failed - INR is within goal in the past 6 weeks     Confirm INR is within goal in the past 6 weeks.     Recent Labs   Lab Test 05/07/24  0000   INR 2.6           Failed - Medication indicated for diagnosis     Medication is associated with one or more of the following diagnoses:     Atrial fibrillation   Prosthetic cardiac valve   Pulmonary embolism   Thrombosis, Post myocardial infarction   Venous thromboembloism          Failed - Always fail criteria - route to anticoagulation team     Medication protocol for this drug may only be authorized per protocol by the System Anticoagulation Team.   Please forward this request to the anticoagulation pool.         Last Prescription Date:   11/24/2023  Last Fill Qty/Refills:         70, R-1  Last Office Visit:              03/04/2024   Future Office visit:           None    Amy Smith RN on 5/8/2024 at 10:27 AM

## 2024-05-23 ENCOUNTER — ANTICOAGULATION THERAPY VISIT (OUTPATIENT)
Dept: ANTICOAGULATION | Facility: OTHER | Age: 69
End: 2024-05-23
Attending: FAMILY MEDICINE
Payer: MEDICARE

## 2024-05-23 DIAGNOSIS — Z95.5 HISTORY OF CORONARY ARTERY STENT PLACEMENT: ICD-10-CM

## 2024-05-23 DIAGNOSIS — D68.2 FACTOR V DEFICIENCY (H): ICD-10-CM

## 2024-05-23 DIAGNOSIS — Z79.01 ANTICOAGULATION MONITORING, INR RANGE 2-3: Primary | ICD-10-CM

## 2024-05-23 DIAGNOSIS — I66.9 CEREBRAL THROMBOSIS: ICD-10-CM

## 2024-05-23 LAB — INR HOME MONITORING: 3.3 (ref 2–3)

## 2024-05-23 NOTE — PROGRESS NOTES
ANTICOAGULATION MANAGEMENT     Lyudmila Fitzgerald 68 year old female is on warfarin with supratherapeutic INR result. (Goal INR 2.0-3.0)    Recent labs: (last 7 days)     05/23/24  0000   INR 3.3*       ASSESSMENT     Source(s): Patient/ Care giver call     Warfarin doses taken: Warfarin taken as instructed  Diet: No new diet changes identified  New illness, injury, or hospitalization: No  Medication/supplement changes: None noted  Signs or symptoms of bleeding or clotting: No  Previous INR: Therapeutic last 2(+) visits  Additional findings: None     PLAN     Recommended plan for no diet, medication or health factor changes and previous 2 INR results within goal affecting INR     Dosing Instructions: Continue your current warfarin dose with next INR in 2 weeks       Summary  As of 5/23/2024      Full warfarin instructions:  3 mg every Mon, Wed, Fri; 6 mg all other days   Next INR check:  6/6/2024               Telephone call with Leigh who verbalizes understanding and agrees to plan    Patient to recheck with home meter    Education provided: Please call back if any changes to your diet, medications or how you've been taking warfarin    Plan made per [facility] anticoagulation protocol    Josefina Rivera RN  Anticoagulation Clinic  5/23/2024    _______________________________________________________________________     Anticoagulation Episode Summary       Current INR goal:  2.0-3.0   TTR:  54.4% (1 y)   Target end date:  Indefinite   Send INR reminders to:  [facility]    Indications    Acute thromboembolism of deep veins of lower extremity (H) (Resolved) [I82.409]  Anticoagulation monitoring  INR range 2-3 [Z79.01]  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

## 2024-06-03 NOTE — TELEPHONE ENCOUNTER
I can do her ear treatment and medication visit at the same time.  Erika Gupta MD    Stable.  Continue Dulera daily and albuterol as needed for shortness of breath.

## 2024-06-04 ENCOUNTER — OFFICE VISIT (OUTPATIENT)
Dept: FAMILY MEDICINE | Facility: OTHER | Age: 69
End: 2024-06-04
Payer: MEDICARE

## 2024-06-04 VITALS
WEIGHT: 144 LBS | SYSTOLIC BLOOD PRESSURE: 122 MMHG | BODY MASS INDEX: 23.99 KG/M2 | HEIGHT: 65 IN | RESPIRATION RATE: 14 BRPM | TEMPERATURE: 98 F | HEART RATE: 68 BPM | DIASTOLIC BLOOD PRESSURE: 79 MMHG | OXYGEN SATURATION: 97 %

## 2024-06-04 DIAGNOSIS — J01.10 ACUTE NON-RECURRENT FRONTAL SINUSITIS: Primary | ICD-10-CM

## 2024-06-04 PROCEDURE — G0463 HOSPITAL OUTPT CLINIC VISIT: HCPCS

## 2024-06-04 PROCEDURE — 99213 OFFICE O/P EST LOW 20 MIN: CPT | Performed by: NURSE PRACTITIONER

## 2024-06-04 ASSESSMENT — ENCOUNTER SYMPTOMS
EYES NEGATIVE: 1
GASTROINTESTINAL NEGATIVE: 1
NEUROLOGICAL NEGATIVE: 1
SINUS PRESSURE: 1
FEVER: 1
COUGH: 1
CARDIOVASCULAR NEGATIVE: 1
MUSCULOSKELETAL NEGATIVE: 1
FATIGUE: 1
SORE THROAT: 1
SINUS PAIN: 1
ACTIVITY CHANGE: 1

## 2024-06-04 ASSESSMENT — PAIN SCALES - GENERAL: PAINLEVEL: MODERATE PAIN (5)

## 2024-06-04 NOTE — PROGRESS NOTES
Lyudmila Fitzgerald  1955    ASSESSMENT/PLAN      1. Acute non-recurrent frontal sinusitis    Presents to [facility] clinic with 2 weeks of worsening congestion, cough, nasal drainage and sinus pain. Patient's vitals are stable and she appears nontoxic.      - amoxicillin-clavulanate (AUGMENTIN) 875-125 MG tablet; Take 1 tablet by mouth 2 times daily for 10 days  Dispense: 20 tablet; Refill: 0     - Continue Mucinex, Claritin, Flonase, [place] pot/saline spray  - Symptomatic treatment - Encouraged fluids, salt water gargles, honey, humidifier, saline nasal spray, lozenges, tea, soup, smoothies, popsicles, topical vapor rub, rest, etc   - May use over-the-counter Tylenol or ibuprofen PRN  - Follow up as needed for new or worsening symptoms        *Explanation of diagnosis, treatment options and risk and benefits of medications reviewed with patient. Patient agrees with plan of care.  *All questions were answered.    *Red flags symptoms were discussed and patient was advised when they should return for reevaluation or for prompt emergency evaluation.   *Patient was given verbal and written instructions on plan of care. Instructions were printed or are available on [employer] on electronic AVS.   *We discussed potential side effects of any prescribed or recommended therapies, as well as expectations for response to treatments.  *Patient discharged in stable condition    Mercy Gee CNP  [facility]    SUBJECTIVE  CHIEF COMPLAINT/ REASON FOR VISIT  Patient presents with:  Headache  Cough: Flem, coughs to hard she vomits  Sinus Problem: Sinus congestion/ pressure  Chest Pain: PRESSURE/ TIGHTNESS     HISTORY OF PRESENT ILLNESS  Lyudmila Fitzgerald is a pleasant 68 year old female presents to [facility] clinic today with several weeks of headache, cough, sinus pressure and chest congestion.  Patient states this started as allergies about a month ago and she has been trying to treat as such.  Patient has been  "using Mucinex, Flonase, allergy medications.  Patient has not been using her [place] pot.  She mentioned there are times she will cough so hard she vomits. Fever up to 101.2.       I have reviewed the nursing notes.  I have reviewed allergies, medication list, problem list, and past medical history.    REVIEW OF SYSTEMS  Review of Systems   Constitutional:  Positive for activity change, fatigue and fever.   HENT:  Positive for congestion, postnasal drip, sinus pressure, sinus pain and sore throat.    Eyes: Negative.    Respiratory:  Positive for cough.    Cardiovascular: Negative.    Gastrointestinal: Negative.    Genitourinary: Negative.    Musculoskeletal: Negative.    Neurological: Negative.    All other systems reviewed and are negative.       VITAL SIGNS  Vitals:    06/04/24 1423   BP: 122/79   BP Location: Left arm   Patient Position: Sitting   Cuff Size: Adult Regular   Pulse: 68   Resp: 14   Temp: 98  F (36.7  C)   TempSrc: Tympanic   SpO2: 97%   Weight: 65.3 kg (144 lb)   Height: 1.651 m (5' 5\")      Body mass index is 23.96 kg/m .      OBJECTIVE  PHYSICAL EXAM  Physical Exam  Vitals and nursing note reviewed.   Constitutional:       Appearance: Normal appearance.   HENT:      Head: Normocephalic.      Right Ear: Tympanic membrane normal.      Left Ear: Tympanic membrane normal.      Nose: Congestion and rhinorrhea present.      Right Sinus: Frontal sinus tenderness present.      Left Sinus: Frontal sinus tenderness present.      Mouth/Throat:      Pharynx: Posterior oropharyngeal erythema present.   Eyes:      Pupils: Pupils are equal, round, and reactive to light.   Cardiovascular:      Rate and Rhythm: Normal rate.      Pulses: Normal pulses.   Pulmonary:      Effort: Pulmonary effort is normal.      Breath sounds: Normal breath sounds.   Abdominal:      Palpations: Abdomen is soft.   Musculoskeletal:         General: Normal range of motion.      Cervical back: Normal range of motion.   Skin:     General: " Skin is warm and dry.      Capillary Refill: Capillary refill takes less than 2 seconds.   Neurological:      General: No focal deficit present.      Mental Status: She is alert.

## 2024-06-04 NOTE — NURSING NOTE
"Chief Complaint   Patient presents with    Headache    Cough     Flem, coughs to hard she vomits    Sinus Problem     Sinus congestion/ pressure    Chest Pain     PRESSURE/ TIGHTNESS   Patient presents today for ongoing headaches, cough, phlegm, sinus pressure, chest congestion/ pressure. She mentioned there are times she will cough so hard she vomits. She has taken Mucinex at home and it did not help.         Initial /79 (BP Location: Left arm, Patient Position: Sitting, Cuff Size: Adult Regular)   Pulse 68   Temp 98  F (36.7  C) (Tympanic)   Resp 14   Ht 1.651 m (5' 5\")   Wt 65.3 kg (144 lb)   LMP 03/04/2006 (Approximate)   SpO2 97%   BMI 23.96 kg/m   Estimated body mass index is 23.96 kg/m  as calculated from the following:    Height as of this encounter: 1.651 m (5' 5\").    Weight as of this encounter: 65.3 kg (144 lb).     FOOD SECURITY SCREENING QUESTIONS:    The next two questions are to help us understand your food security.  If you are feeling you need any assistance in this area, we have resources available to support you today.    Hunger Vital Signs:  Within the past 12 months we worried whether our food would run out before we got money to buy more. Never  Within the past 12 months the food we bought just didn't last and we didn't have money to get more. Never      Kerrie Matos    "

## 2024-06-06 ENCOUNTER — APPOINTMENT (OUTPATIENT)
Dept: ANTICOAGULATION | Facility: OTHER | Age: 69
End: 2024-06-06
Attending: FAMILY MEDICINE
Payer: MEDICARE

## 2024-06-10 DIAGNOSIS — F51.04 CHRONIC INSOMNIA: ICD-10-CM

## 2024-06-11 ENCOUNTER — ANTICOAGULATION THERAPY VISIT (OUTPATIENT)
Dept: ANTICOAGULATION | Facility: OTHER | Age: 69
End: 2024-06-11
Attending: FAMILY MEDICINE
Payer: MEDICARE

## 2024-06-11 DIAGNOSIS — D68.2 FACTOR V DEFICIENCY (H): ICD-10-CM

## 2024-06-11 DIAGNOSIS — Z79.01 ANTICOAGULATION MONITORING, INR RANGE 2-3: Primary | ICD-10-CM

## 2024-06-11 DIAGNOSIS — Z95.5 HISTORY OF CORONARY ARTERY STENT PLACEMENT: ICD-10-CM

## 2024-06-11 DIAGNOSIS — I66.9 CEREBRAL THROMBOSIS: ICD-10-CM

## 2024-06-11 LAB — INR HOME MONITORING: 2.3 (ref 2–3)

## 2024-06-11 RX ORDER — ZOLPIDEM TARTRATE 10 MG/1
10 TABLET ORAL
Qty: 90 TABLET | Refills: 0 | Status: SHIPPED | OUTPATIENT
Start: 2024-06-11 | End: 2024-10-01

## 2024-06-11 NOTE — PROGRESS NOTES
ANTICOAGULATION MANAGEMENT     Lyudmila Fitzgerald 68 year old female is on warfarin with therapeutic INR result. (Goal INR 2.0-3.0)    Recent labs: (last 7 days)     06/11/24  0000   INR 2.3       ASSESSMENT     Source(s): Chart Review and Patient/Caregiver Call     Warfarin doses taken: Warfarin taken as instructed  Diet: No new diet changes identified  Medication/supplement changes:  taking augmentin  6/4-6/14 for sinusitis  New illness, injury, or hospitalization: No  Signs or symptoms of bleeding or clotting: No  Previous result: Supratherapeutic  Additional findings: None       PLAN     Recommended plan for temporary change(s) affecting INR     Dosing Instructions: Continue your current warfarin dose with next INR in 2 weeks       Summary  As of 6/11/2024      Full warfarin instructions:  3 mg every Mon, Wed, Fri; 6 mg all other days   Next INR check:  6/25/2024               Telephone call with Leigh who verbalizes understanding and agrees to plan    Patient to recheck with home meter    Education provided:   None required    Plan made per [facility] anticoagulation protocol    Nyla Walker, RN  Anticoagulation Clinic  6/11/2024    _______________________________________________________________________     Anticoagulation Episode Summary       Current INR goal:  2.0-3.0   TTR:  57.2% (1 y)   Target end date:  Indefinite   Send INR reminders to:  [facility]    Indications    Acute thromboembolism of deep veins of lower extremity (H) (Resolved) [I82.409]  Anticoagulation monitoring  INR range 2-3 [Z79.01]  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

## 2024-06-11 NOTE — TELEPHONE ENCOUNTER
"Requested Prescriptions   Pending Prescriptions Disp Refills    zolpidem (AMBIEN) 10 MG tablet [Pharmacy Med Name: Zolpidem Tartrate 10 MG Oral Tablet] 90 tablet 0     Sig: TAKE 1 TABLET BY MOUTH NIGHTLY AS NEEDED FOR SLEEP       There is no refill protocol information for this order         Last Written Prescription Date:  11/30/23  Last Fill Quantity: 90,   # refills: 1  Last Office Visit: 11/30/23 with Moises Islas, DO and note states: \"Follow-up in the future as planned.\"  Future Office visit:   none    Routing refill request to provider for review/approval because:  Drug not on the G, P or [facility] refill protocol or controlled substance    Unable to complete prescription refill per RN Medication Refill Policy.     To PCP to address.  Kandace Morelos RN ....................  6/11/2024   12:08 PM    "

## 2024-06-25 ENCOUNTER — ANTICOAGULATION THERAPY VISIT (OUTPATIENT)
Dept: ANTICOAGULATION | Facility: OTHER | Age: 69
End: 2024-06-25
Attending: FAMILY MEDICINE
Payer: MEDICARE

## 2024-06-25 DIAGNOSIS — I66.9 CEREBRAL THROMBOSIS: ICD-10-CM

## 2024-06-25 DIAGNOSIS — D68.2 FACTOR V DEFICIENCY (H): ICD-10-CM

## 2024-06-25 DIAGNOSIS — Z79.01 ANTICOAGULATION MONITORING, INR RANGE 2-3: Primary | ICD-10-CM

## 2024-06-25 DIAGNOSIS — Z95.5 HISTORY OF CORONARY ARTERY STENT PLACEMENT: ICD-10-CM

## 2024-06-25 LAB — INR HOME MONITORING: 2.4 (ref 2–3)

## 2024-06-25 NOTE — PROGRESS NOTES
ANTICOAGULATION  MANAGEMENT-Home Monitor Managed by Exception    Lyudmila PRAJAPATI Fitzgerald 68 year old female is on warfarin with therapeutic INR result. (Goal INR 2.0-3.0)    Recent labs: (last 7 days)     06/25/24  0000   INR 2.4       Previous INR was Therapeutic  Medication, diet, health changes since last INR:chart reviewed; none identified  Contacted within the last 12 weeks by phone on 6/11/24  Last [facility] referral date: 03/05/2024      ROD     Lyudmila was NOT contacted regarding therapeutic result today per home monitoring policy manage by exception agreement.   Current warfarin dose is to be continued:     Summary  As of 6/25/2024      Full warfarin instructions:  3 mg every Mon, Wed, Fri; 6 mg all other days   Next INR check:  7/9/2024             ?   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

## 2024-07-11 ENCOUNTER — ANTICOAGULATION THERAPY VISIT (OUTPATIENT)
Dept: ANTICOAGULATION | Facility: OTHER | Age: 69
End: 2024-07-11
Attending: FAMILY MEDICINE
Payer: MEDICARE

## 2024-07-11 DIAGNOSIS — Z79.01 ANTICOAGULATION MONITORING, INR RANGE 2-3: Primary | ICD-10-CM

## 2024-07-11 DIAGNOSIS — I66.9 CEREBRAL THROMBOSIS: ICD-10-CM

## 2024-07-11 DIAGNOSIS — Z95.5 HISTORY OF CORONARY ARTERY STENT PLACEMENT: ICD-10-CM

## 2024-07-11 DIAGNOSIS — D68.2 FACTOR V DEFICIENCY (H): ICD-10-CM

## 2024-07-11 LAB — INR HOME MONITORING: 2.3 (ref 2–3)

## 2024-07-11 NOTE — PROGRESS NOTES
ANTICOAGULATION  MANAGEMENT-Home Monitor Managed by Exception    Lyudmila PRAJAPATI Fitzgerald 68 year old female is on warfarin with therapeutic INR result. (Goal INR 2.0-3.0)    Recent labs: (last 7 days)     07/11/24  0000   INR 2.3       Previous INR was Therapeutic  Medication, diet, health changes since last INR:chart reviewed; none identified  Contacted within the last 12 weeks by phone on 6/11/24  Last [facility] referral date: 03/05/2024      ROD     Lyudmila was NOT contacted regarding therapeutic result today per home monitoring policy manage by exception agreement.   Current warfarin dose is to be continued:     Summary  As of 7/11/2024      Full warfarin instructions:  3 mg every Mon, Wed, Fri; 6 mg all other days   Next INR check:  7/25/2024             ?   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

## 2024-07-13 ENCOUNTER — HEALTH MAINTENANCE LETTER (OUTPATIENT)
Age: 69
End: 2024-07-13

## 2024-07-18 ENCOUNTER — ANCILLARY PROCEDURE (OUTPATIENT)
Dept: CARDIOLOGY | Facility: CLINIC | Age: 69
End: 2024-07-18
Attending: INTERNAL MEDICINE
Payer: MEDICARE

## 2024-07-18 DIAGNOSIS — I47.10 SUPRAVENTRICULAR TACHYCARDIA (H): ICD-10-CM

## 2024-07-18 DIAGNOSIS — Z98.890 S/P AV NODAL ABLATION: ICD-10-CM

## 2024-07-18 PROCEDURE — 93294 REM INTERROG EVL PM/LDLS PM: CPT | Performed by: INTERNAL MEDICINE

## 2024-07-18 PROCEDURE — 93296 REM INTERROG EVL PM/IDS: CPT

## 2024-07-20 DIAGNOSIS — Z95.5 HISTORY OF CORONARY ARTERY STENT PLACEMENT: ICD-10-CM

## 2024-07-20 DIAGNOSIS — E78.2 MIXED HYPERLIPIDEMIA: ICD-10-CM

## 2024-07-20 DIAGNOSIS — R07.9 CHEST PAIN, UNSPECIFIED TYPE: ICD-10-CM

## 2024-07-20 DIAGNOSIS — Z95.1 HISTORY OF CORONARY ARTERY BYPASS GRAFT X 1: ICD-10-CM

## 2024-07-20 DIAGNOSIS — Z98.890 STATUS POST CORONARY ANGIOGRAM: ICD-10-CM

## 2024-07-22 RX ORDER — ATORVASTATIN CALCIUM 80 MG/1
80 TABLET, FILM COATED ORAL DAILY
Qty: 90 TABLET | Refills: 0 | Status: SHIPPED | OUTPATIENT
Start: 2024-07-22

## 2024-07-22 NOTE — TELEPHONE ENCOUNTER
"Requested Prescriptions   Pending Prescriptions Disp Refills    atorvastatin (LIPITOR) 80 MG tablet [Pharmacy Med Name: Atorvastatin Calcium 80 MG Oral Tablet] 90 tablet 0     Sig: Take 1 tablet by mouth once daily       Antihyperlipidemic agents Passed - 7/20/2024  9:46 AM        Passed - LDL on file in the past 12 months        Passed - Medication is active on med list        Passed - Recent (12 mo) or future (90 days) visit within the authorizing provider's specialty     The patient must have completed an in-person or virtual visit within the past 12 months or has a future visit scheduled within the next 90 days with the authorizing provider s specialty.  Urgent care and e-visits do not quality as an office visit for this protocol.        Passed - Patient is age 18 years or older        Passed - No active pregnancy on record        Passed - No positive pregnancy test in past 12 mos     Last Written Prescription Date:  7/11/23  Last Fill Quantity: 90,   # refills: 3  Last Office Visit: 11/30/23 and note states:  \"Assessment/Plan:    1.  Active Meds: Lipitor 80 mg daily\"    Future Office visit:   none    Prescription approved per [facility] Refill Protocol.   Kandace Morelos RN ....................  7/22/2024   11:02 AM    "

## 2024-07-23 LAB
MDC_IDC_EPISODE_DTM: NORMAL
MDC_IDC_EPISODE_DURATION: 4 S
MDC_IDC_EPISODE_DURATION: 6 S
MDC_IDC_EPISODE_DURATION: 7 S
MDC_IDC_EPISODE_ID: NORMAL
MDC_IDC_EPISODE_TYPE: NORMAL
MDC_IDC_LEAD_CONNECTION_STATUS: NORMAL
MDC_IDC_LEAD_CONNECTION_STATUS: NORMAL
MDC_IDC_LEAD_IMPLANT_DT: NORMAL
MDC_IDC_LEAD_IMPLANT_DT: NORMAL
MDC_IDC_LEAD_LOCATION: NORMAL
MDC_IDC_LEAD_LOCATION: NORMAL
MDC_IDC_LEAD_LOCATION_DETAIL_1: NORMAL
MDC_IDC_LEAD_LOCATION_DETAIL_1: NORMAL
MDC_IDC_LEAD_MFG: NORMAL
MDC_IDC_LEAD_MFG: NORMAL
MDC_IDC_LEAD_MODEL: NORMAL
MDC_IDC_LEAD_MODEL: NORMAL
MDC_IDC_LEAD_POLARITY_TYPE: NORMAL
MDC_IDC_LEAD_POLARITY_TYPE: NORMAL
MDC_IDC_LEAD_SERIAL: NORMAL
MDC_IDC_LEAD_SERIAL: NORMAL
MDC_IDC_LEAD_SPECIAL_FUNCTION: NORMAL
MDC_IDC_LEAD_SPECIAL_FUNCTION: NORMAL
MDC_IDC_MSMT_BATTERY_DTM: NORMAL
MDC_IDC_MSMT_BATTERY_REMAINING_LONGEVITY: 96 MO
MDC_IDC_MSMT_BATTERY_REMAINING_PERCENTAGE: 95 %
MDC_IDC_MSMT_BATTERY_STATUS: NORMAL
MDC_IDC_MSMT_LEADCHNL_RA_IMPEDANCE_VALUE: 658 OHM
MDC_IDC_MSMT_LEADCHNL_RV_IMPEDANCE_VALUE: 462 OHM
MDC_IDC_MSMT_LEADCHNL_RV_PACING_THRESHOLD_AMPLITUDE: 1.2 V
MDC_IDC_MSMT_LEADCHNL_RV_PACING_THRESHOLD_PULSEWIDTH: 0.4 MS
MDC_IDC_PG_IMPLANT_DTM: NORMAL
MDC_IDC_PG_MFG: NORMAL
MDC_IDC_PG_MODEL: NORMAL
MDC_IDC_PG_SERIAL: NORMAL
MDC_IDC_PG_TYPE: NORMAL
MDC_IDC_SESS_CLINIC_NAME: NORMAL
MDC_IDC_SESS_DTM: NORMAL
MDC_IDC_SESS_TYPE: NORMAL
MDC_IDC_SET_BRADY_AT_MODE_SWITCH_MODE: NORMAL
MDC_IDC_SET_BRADY_AT_MODE_SWITCH_RATE: 140 {BEATS}/MIN
MDC_IDC_SET_BRADY_LOWRATE: 60 {BEATS}/MIN
MDC_IDC_SET_BRADY_MAX_SENSOR_RATE: 130 {BEATS}/MIN
MDC_IDC_SET_BRADY_MAX_TRACKING_RATE: 100 {BEATS}/MIN
MDC_IDC_SET_BRADY_MODE: NORMAL
MDC_IDC_SET_BRADY_PAV_DELAY_HIGH: 80 MS
MDC_IDC_SET_BRADY_PAV_DELAY_LOW: 130 MS
MDC_IDC_SET_BRADY_SAV_DELAY_HIGH: 80 MS
MDC_IDC_SET_BRADY_SAV_DELAY_LOW: 130 MS
MDC_IDC_SET_LEADCHNL_RA_PACING_AMPLITUDE: 2 V
MDC_IDC_SET_LEADCHNL_RA_PACING_CAPTURE_MODE: NORMAL
MDC_IDC_SET_LEADCHNL_RA_PACING_POLARITY: NORMAL
MDC_IDC_SET_LEADCHNL_RA_PACING_PULSEWIDTH: 1 MS
MDC_IDC_SET_LEADCHNL_RA_SENSING_ADAPTATION_MODE: NORMAL
MDC_IDC_SET_LEADCHNL_RA_SENSING_POLARITY: NORMAL
MDC_IDC_SET_LEADCHNL_RA_SENSING_SENSITIVITY: 0.6 MV
MDC_IDC_SET_LEADCHNL_RV_PACING_AMPLITUDE: 1.6 V
MDC_IDC_SET_LEADCHNL_RV_PACING_CAPTURE_MODE: NORMAL
MDC_IDC_SET_LEADCHNL_RV_PACING_POLARITY: NORMAL
MDC_IDC_SET_LEADCHNL_RV_PACING_PULSEWIDTH: 0.4 MS
MDC_IDC_SET_LEADCHNL_RV_SENSING_ADAPTATION_MODE: NORMAL
MDC_IDC_SET_LEADCHNL_RV_SENSING_POLARITY: NORMAL
MDC_IDC_SET_LEADCHNL_RV_SENSING_SENSITIVITY: 1.5 MV
MDC_IDC_SET_ZONE_DETECTION_INTERVAL: 375 MS
MDC_IDC_SET_ZONE_STATUS: NORMAL
MDC_IDC_SET_ZONE_TYPE: NORMAL
MDC_IDC_SET_ZONE_VENDOR_TYPE: NORMAL
MDC_IDC_STAT_AT_BURDEN_PERCENT: 1 %
MDC_IDC_STAT_AT_DTM_END: NORMAL
MDC_IDC_STAT_AT_DTM_START: NORMAL
MDC_IDC_STAT_BRADY_DTM_END: NORMAL
MDC_IDC_STAT_BRADY_DTM_START: NORMAL
MDC_IDC_STAT_BRADY_RA_PERCENT_PACED: 90 %
MDC_IDC_STAT_BRADY_RV_PERCENT_PACED: 100 %
MDC_IDC_STAT_EPISODE_RECENT_COUNT: 0
MDC_IDC_STAT_EPISODE_RECENT_COUNT: 0
MDC_IDC_STAT_EPISODE_RECENT_COUNT_DTM_END: NORMAL
MDC_IDC_STAT_EPISODE_RECENT_COUNT_DTM_END: NORMAL
MDC_IDC_STAT_EPISODE_RECENT_COUNT_DTM_START: NORMAL
MDC_IDC_STAT_EPISODE_RECENT_COUNT_DTM_START: NORMAL
MDC_IDC_STAT_EPISODE_TYPE: NORMAL
MDC_IDC_STAT_EPISODE_TYPE: NORMAL
MDC_IDC_STAT_EPISODE_VENDOR_TYPE: NORMAL
MDC_IDC_STAT_EPISODE_VENDOR_TYPE: NORMAL

## 2024-07-26 ENCOUNTER — ANTICOAGULATION THERAPY VISIT (OUTPATIENT)
Dept: ANTICOAGULATION | Facility: OTHER | Age: 69
End: 2024-07-26
Attending: FAMILY MEDICINE
Payer: MEDICARE

## 2024-07-26 DIAGNOSIS — D68.2 FACTOR V DEFICIENCY (H): ICD-10-CM

## 2024-07-26 DIAGNOSIS — Z79.01 ANTICOAGULATION MONITORING, INR RANGE 2-3: Primary | ICD-10-CM

## 2024-07-26 DIAGNOSIS — Z95.5 HISTORY OF CORONARY ARTERY STENT PLACEMENT: ICD-10-CM

## 2024-07-26 DIAGNOSIS — I66.9 CEREBRAL THROMBOSIS: ICD-10-CM

## 2024-07-26 LAB — INR HOME MONITORING: 2.4 (ref 2–3)

## 2024-07-26 NOTE — PROGRESS NOTES
ANTICOAGULATION  MANAGEMENT-Home Monitor Managed by Exception    Lyudmila PRAJAPATI Fitzgerald 68 year old female is on warfarin with therapeutic INR result. (Goal INR 2.0-3.0)    Recent labs: (last 7 days)     07/26/24  0000   INR 2.4       Previous INR was Therapeutic  Medication, diet, health changes since last INR:chart reviewed; none identified  Contacted within the last 12 weeks by phone on 6/11/24  Last [facility] referral date: 03/05/2024      ROD     Lyudmila was NOT contacted regarding therapeutic result today per home monitoring policy manage by exception agreement.   Current warfarin dose is to be continued:     Summary  As of 7/26/2024      Full warfarin instructions:  3 mg every Mon, Wed, Fri; 6 mg all other days   Next INR check:  8/9/2024             ?   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

## 2024-08-03 DIAGNOSIS — E83.42 HYPOMAGNESEMIA: ICD-10-CM

## 2024-08-06 RX ORDER — MAGNESIUM OXIDE TAB 400 MG (241.3 MG ELEMENTAL MG) 400 (241.3 MG) MG
400 TAB ORAL DAILY
Qty: 90 TABLET | Refills: 0 | Status: SHIPPED | OUTPATIENT
Start: 2024-08-06

## 2024-08-06 NOTE — TELEPHONE ENCOUNTER
[id number] sent Rx request for the following:      Requested Prescriptions   Pending Prescriptions Disp Refills    MAGNESIUM-OXIDE 400 (240 Mg) MG tablet [Pharmacy Med Name: MAGnesium-Oxide 400 (241.3 Mg) MG Oral Tablet] 90 tablet 0     Sig: Take 1 tablet by mouth once daily       There is no refill protocol information for this order          Last Prescription Date:   2/8/24  Last Fill Qty/Refills:         90, R-1    Last Office Visit:              3/4/24   Future Office visit:           none    Routing refill request to provider for review/approval because:  Drug not on the [facility] [place] [place] refill protocol     Mague Sawyer RN on 8/6/2024 at 10:25 AM

## 2024-08-13 ENCOUNTER — ANTICOAGULATION THERAPY VISIT (OUTPATIENT)
Dept: ANTICOAGULATION | Facility: OTHER | Age: 69
End: 2024-08-13
Attending: FAMILY MEDICINE
Payer: MEDICARE

## 2024-08-13 DIAGNOSIS — D68.2 FACTOR V DEFICIENCY (H): ICD-10-CM

## 2024-08-13 DIAGNOSIS — I66.9 CEREBRAL THROMBOSIS: ICD-10-CM

## 2024-08-13 DIAGNOSIS — Z79.01 ANTICOAGULATION MONITORING, INR RANGE 2-3: Primary | ICD-10-CM

## 2024-08-13 DIAGNOSIS — Z95.5 HISTORY OF CORONARY ARTERY STENT PLACEMENT: ICD-10-CM

## 2024-08-13 LAB — INR HOME MONITORING: 1.7 (ref 2–3)

## 2024-08-13 NOTE — PROGRESS NOTES
ANTICOAGULATION MANAGEMENT     Lyudmila Fitzgerald 69 year old female is on warfarin with subtherapeutic INR result. (Goal INR 2.0-3.0)    Recent labs: (last 7 days)     08/13/24  0000   INR 1.7*       ASSESSMENT     Source(s): Patient/ Care giver call     Warfarin doses taken: Warfarin taken as instructed  Diet: Increased greens/vitamin K in diet; plans to resume previous intake  New illness, injury, or hospitalization: No  Medication/supplement changes: None noted  Signs or symptoms of bleeding or clotting: No  Previous INR: Therapeutic last 2(+) visits  Additional findings: None     PLAN     Recommended plan for temporary change(s) affecting INR     Dosing Instructions: booster dose then continue your current warfarin dose with next INR in 2 weeks       Summary  As of 8/13/2024      Full warfarin instructions:  8/13: 9 mg; Otherwise 3 mg every Mon, Wed, Fri; 6 mg all other days   Next INR check:  8/27/2024               Telephone call with Leigh who verbalizes understanding and agrees to plan    Patient to recheck with home meter    Education provided: Please call back if any changes to your diet, medications or how you've been taking warfarin    Plan made per 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

## 2024-08-20 ENCOUNTER — MYC MEDICAL ADVICE (OUTPATIENT)
Dept: CARDIOLOGY | Facility: OTHER | Age: 69
End: 2024-08-20
Payer: MEDICARE

## 2024-08-20 ENCOUNTER — ALLIED HEALTH/NURSE VISIT (OUTPATIENT)
Dept: FAMILY MEDICINE | Facility: OTHER | Age: 69
End: 2024-08-20
Attending: STUDENT IN AN ORGANIZED HEALTH CARE EDUCATION/TRAINING PROGRAM
Payer: MEDICARE

## 2024-08-20 VITALS
DIASTOLIC BLOOD PRESSURE: 92 MMHG | WEIGHT: 144.3 LBS | TEMPERATURE: 97.5 F | BODY MASS INDEX: 24.04 KG/M2 | SYSTOLIC BLOOD PRESSURE: 150 MMHG | HEART RATE: 62 BPM | HEIGHT: 65 IN | RESPIRATION RATE: 16 BRPM | OXYGEN SATURATION: 98 %

## 2024-08-20 DIAGNOSIS — R07.89 OTHER CHEST PAIN: ICD-10-CM

## 2024-08-20 DIAGNOSIS — R07.89 OTHER CHEST PAIN: Primary | ICD-10-CM

## 2024-08-20 DIAGNOSIS — R07.9 CHEST PAIN, UNSPECIFIED TYPE: ICD-10-CM

## 2024-08-20 LAB
ANION GAP SERPL CALCULATED.3IONS-SCNC: 13 MMOL/L (ref 7–15)
BUN SERPL-MCNC: 14.5 MG/DL (ref 8–23)
CALCIUM SERPL-MCNC: 9.2 MG/DL (ref 8.8–10.4)
CHLORIDE SERPL-SCNC: 104 MMOL/L (ref 98–107)
CREAT SERPL-MCNC: 0.89 MG/DL (ref 0.51–0.95)
EGFRCR SERPLBLD CKD-EPI 2021: 70 ML/MIN/1.73M2
ERYTHROCYTE [DISTWIDTH] IN BLOOD BY AUTOMATED COUNT: 12.9 % (ref 10–15)
GLUCOSE SERPL-MCNC: 82 MG/DL (ref 70–99)
HCO3 SERPL-SCNC: 20 MMOL/L (ref 22–29)
HCT VFR BLD AUTO: 45.2 % (ref 35–47)
HGB BLD-MCNC: 15.6 G/DL (ref 11.7–15.7)
MCH RBC QN AUTO: 32.8 PG (ref 26.5–33)
MCHC RBC AUTO-ENTMCNC: 34.5 G/DL (ref 31.5–36.5)
MCV RBC AUTO: 95 FL (ref 78–100)
PLATELET # BLD AUTO: 260 10E3/UL (ref 150–450)
POTASSIUM SERPL-SCNC: 4.1 MMOL/L (ref 3.4–5.3)
RBC # BLD AUTO: 4.75 10E6/UL (ref 3.8–5.2)
SODIUM SERPL-SCNC: 137 MMOL/L (ref 135–145)
TROPONIN T SERPL HS-MCNC: 6 NG/L
WBC # BLD AUTO: 7.2 10E3/UL (ref 4–11)

## 2024-08-20 PROCEDURE — 36415 COLL VENOUS BLD VENIPUNCTURE: CPT | Mod: ZL

## 2024-08-20 PROCEDURE — 85027 COMPLETE CBC AUTOMATED: CPT | Mod: ZL

## 2024-08-20 PROCEDURE — 84484 ASSAY OF TROPONIN QUANT: CPT | Mod: ZL

## 2024-08-20 PROCEDURE — 80048 BASIC METABOLIC PNL TOTAL CA: CPT | Mod: ZL

## 2024-08-20 ASSESSMENT — PAIN SCALES - GENERAL: PAINLEVEL: MILD PAIN (3)

## 2024-08-20 NOTE — NURSING NOTE
Pt presents to  for blood draw only. Dr Islas placed orders.    Mayelin Hall on 8/20/2024 at 2:33 PM

## 2024-08-27 ENCOUNTER — ANTICOAGULATION THERAPY VISIT (OUTPATIENT)
Dept: ANTICOAGULATION | Facility: OTHER | Age: 69
End: 2024-08-27
Attending: FAMILY MEDICINE
Payer: MEDICARE

## 2024-08-27 DIAGNOSIS — Z95.5 HISTORY OF CORONARY ARTERY STENT PLACEMENT: ICD-10-CM

## 2024-08-27 DIAGNOSIS — D68.2 FACTOR V DEFICIENCY (H): ICD-10-CM

## 2024-08-27 DIAGNOSIS — I66.9 CEREBRAL THROMBOSIS: ICD-10-CM

## 2024-08-27 DIAGNOSIS — Z79.01 ANTICOAGULATION MONITORING, INR RANGE 2-3: Primary | ICD-10-CM

## 2024-08-27 LAB — INR HOME MONITORING: 3.1 (ref 2–3)

## 2024-08-27 NOTE — PROGRESS NOTES
ANTICOAGULATION MANAGEMENT     Lyudmila Fitzgerald 69 year old female is on warfarin with supratherapeutic INR result. (Goal INR 2.0-3.0)    Recent labs: (last 7 days)     08/27/24  0000   INR 3.1*       ASSESSMENT     Source(s): Chart Review and Patient/Caregiver Call     Warfarin doses taken: Warfarin taken as instructed  Diet: No new diet changes identified  Medication/supplement changes: None noted  New illness, injury, or hospitalization: No  Signs or symptoms of bleeding or clotting: No  Previous result: Subtherapeutic  Additional findings: None       PLAN     Recommended plan for no diet, medication or health factor changes affecting INR     Dosing Instructions: Continue your current warfarin dose with next INR in 2 weeks       Summary  As of 8/27/2024      Full warfarin instructions:  3 mg every Mon, Wed, Fri; 6 mg all other days   Next INR check:  9/10/2024               Telephone call with Leigh who verbalizes understanding and agrees to plan    Patient to recheck with home meter    Education provided: None required    Plan made per [facility] anticoagulation protocol    Nyla Walker RN  Anticoagulation Clinic  8/27/2024    _______________________________________________________________________     Anticoagulation Episode Summary       Current INR goal:  2.0-3.0   TTR:  60.1% (1 y)   Target end date:  Indefinite   Send INR reminders to:  [facility]    Indications    Acute thromboembolism of deep veins of lower extremity (H) (Resolved) [I82.409]  Anticoagulation monitoring  INR range 2-3 [Z79.01]  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

## 2024-09-04 DIAGNOSIS — F32.A DEPRESSION, UNSPECIFIED DEPRESSION TYPE: ICD-10-CM

## 2024-09-06 NOTE — TELEPHONE ENCOUNTER
[id number]  sent Rx request for the following:      Requested Prescriptions   Pending Prescriptions Disp Refills    sertraline (ZOLOFT) 50 MG tablet [Pharmacy Med Name: Sertraline HCl 50 MG Oral Tablet] 90 tablet 0     Sig: Take 1 tablet by mouth once daily       SSRIs Protocol Failed - 9/4/2024  5:51 AM        Failed - PHQ-9 score less than 5 in past 6 months     Please review last PHQ-9 score.      Last Prescription Date:   03/13/2024  Last Fill Qty/Refills:         90, R-1  Last Office Visit:              03/04/2024   Future Office visit:           None     Amy Smith RN on 9/6/2024 at 10:09 AM

## 2024-09-11 ENCOUNTER — ANTICOAGULATION THERAPY VISIT (OUTPATIENT)
Dept: ANTICOAGULATION | Facility: OTHER | Age: 69
End: 2024-09-11
Attending: FAMILY MEDICINE
Payer: MEDICARE

## 2024-09-11 DIAGNOSIS — Z95.5 HISTORY OF CORONARY ARTERY STENT PLACEMENT: ICD-10-CM

## 2024-09-11 DIAGNOSIS — D68.2 FACTOR V DEFICIENCY (H): ICD-10-CM

## 2024-09-11 DIAGNOSIS — I66.9 CEREBRAL THROMBOSIS: ICD-10-CM

## 2024-09-11 DIAGNOSIS — Z79.01 ANTICOAGULATION MONITORING, INR RANGE 2-3: Primary | ICD-10-CM

## 2024-09-11 LAB — INR HOME MONITORING: 2.1 (ref 2–3)

## 2024-09-11 NOTE — PROGRESS NOTES
ANTICOAGULATION MANAGEMENT     Lyudmila Fitzgerald 69 year old female is on warfarin with therapeutic INR result. (Goal INR 2.0-3.0)    Recent labs: (last 7 days)     09/11/24  0000   INR 2.1       ASSESSMENT     Source(s): Chart review     Warfarin doses taken: Warfarin taken as instructed  Diet: No new diet changes identified  New illness, injury, or hospitalization: No  Medication/supplement changes: None noted  Signs or symptoms of bleeding or clotting: No  Previous INR: Supratherapeutic  Additional findings: None     PLAN     Recommended plan for no diet, medication or health factor changes affecting INR     Dosing Instructions: Continue your current warfarin dose with next INR in 2 weeks       Summary  As of 9/11/2024      Full warfarin instructions:  3 mg every Mon, Wed, Fri; 6 mg all other days   Next INR check:  9/25/2024               Detailed voice message left for Leigh with dosing instructions and follow up date.   Sent [employer] message with dosing and follow up instructions    Patient to recheck with home meter    Education provided: Please call back if any changes to your diet, medications or how you've been taking warfarin    Plan made per [facility] anticoagulation protocol    Nasrin Ying RN  Anticoagulation Clinic  9/11/2024    _______________________________________________________________________     Anticoagulation Episode Summary       Current INR goal:  2.0-3.0   TTR:  63.4% (1 y)   Target end date:  Indefinite   Send INR reminders to:  [facility]    Indications    Acute thromboembolism of deep veins of lower extremity (H) (Resolved) [I82.409]  Anticoagulation monitoring  INR range 2-3 [Z79.01]  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

## 2024-09-16 DIAGNOSIS — I95.9 HYPOTENSION, UNSPECIFIED HYPOTENSION TYPE: ICD-10-CM

## 2024-09-16 DIAGNOSIS — R60.0 LOWER EXTREMITY EDEMA: ICD-10-CM

## 2024-09-16 DIAGNOSIS — I50.9 CONGESTIVE HEART FAILURE, UNSPECIFIED HF CHRONICITY, UNSPECIFIED HEART FAILURE TYPE (H): ICD-10-CM

## 2024-09-16 DIAGNOSIS — Z86.79 HISTORY OF HEART FAILURE: ICD-10-CM

## 2024-09-16 DIAGNOSIS — I50.33 ACUTE ON CHRONIC HEART FAILURE WITH PRESERVED EJECTION FRACTION (H): ICD-10-CM

## 2024-09-16 DIAGNOSIS — R06.02 SHORTNESS OF BREATH: ICD-10-CM

## 2024-09-16 RX ORDER — SPIRONOLACTONE 25 MG/1
25 TABLET ORAL DAILY
Qty: 90 TABLET | Refills: 3 | Status: SHIPPED | OUTPATIENT
Start: 2024-09-16

## 2024-09-16 RX ORDER — FUROSEMIDE 20 MG
20 TABLET ORAL DAILY
Qty: 90 TABLET | Refills: 3 | Status: SHIPPED | OUTPATIENT
Start: 2024-09-16

## 2024-09-16 NOTE — TELEPHONE ENCOUNTER
Requested Prescriptions   Pending Prescriptions Disp Refills    furosemide (LASIX) 20 MG tablet [Pharmacy Med Name: Furosemide 20 MG Oral Tablet] 90 tablet 0     Sig: Take 1 tablet by mouth once daily       Diuretics (Including Combos) Protocol Failed - 9/16/2024  5:52 AM        Failed - Blood pressure under 140/90 in past 12 months     BP Readings from Last 3 Encounters:   08/20/24 (!) 150/92   06/04/24 122/79   03/04/24 122/82   No data recorded        Passed - Medication is active on med list        Passed - Has GFR on file in past 12 months and most recent value is normal        Passed - Medication indicated for associated diagnosis     Medication is associated with one or more of the following diagnoses:     Hypertension   Heart Failure   Hyperaldosteronism   Acne   Hirsutism   Hypokalemia   Hepatic Cirrhosis   Nephrotic Syndrome   Myocardial Infarction   Transgender Female   Cardiomyopathy  Congenital Heart Disease  Diastolic Dysfunction        Passed - Medication indicated for associated diagnosis     Medication is associated with one or more of the following diagnoses:     Edema   Hypertension   Hypercalcemia   Heart Failure   Chronic Kidney Disease (CKD)   Cardiomyopathy   Dyspnea   Chronic Thromboembolic Pulmonary Hypertension   Pulmonary Hypertension        Passed - Recent (12 mo) or future (90 days) visit within the authorizing provider's specialty     The patient must have completed an in-person or virtual visit within the past 12 months or has a future visit scheduled within the next 90 days with the authorizing provider s specialty.  Urgent care and e-visits do not quality as an office visit for this protocol.        Passed - Patient is age 18 or older        Passed - No active pregancy on record        Passed - No positive pregnancy test in past 12 months     Last Written Prescription Date:  3/20/24  Last Fill Quantity: 90,   # refills: 1                 spironolactone (ALDACTONE) 25 MG tablet  [Pharmacy Med Name: Spironolactone 25 MG Oral Tablet] 90 tablet 0     Sig: Take 1 tablet by mouth once daily       Diuretics (Including Combos) Protocol Failed - 9/16/2024  5:52 AM        Failed - Blood pressure under 140/90 in past 12 months     BP Readings from Last 3 Encounters:   08/20/24 (!) 150/92   06/04/24 122/79   03/04/24 122/82   No data recorded        Passed - Medication is active on med list        Passed - Has GFR on file in past 12 months and most recent value is normal        Passed - Medication indicated for associated diagnosis     Medication is associated with one or more of the following diagnoses:     Hypertension   Heart Failure   Hyperaldosteronism   Acne   Hirsutism   Hypokalemia   Hepatic Cirrhosis   Nephrotic Syndrome   Myocardial Infarction   Transgender Female   Cardiomyopathy  Congenital Heart Disease  Diastolic Dysfunction        Passed - Medication indicated for associated diagnosis     Medication is associated with one or more of the following diagnoses:     Edema   Hypertension   Hypercalcemia   Heart Failure   Chronic Kidney Disease (CKD)   Cardiomyopathy   Dyspnea   Chronic Thromboembolic Pulmonary Hypertension   Pulmonary Hypertension        Passed - Recent (12 mo) or future (90 days) visit within the authorizing provider's specialty     The patient must have completed an in-person or virtual visit within the past 12 months or has a future visit scheduled within the next 90 days with the authorizing provider s specialty.  Urgent care and e-visits do not quality as an office visit for this protocol.        Passed - Patient is age 18 or older        Passed - No active pregancy on record        Passed - No positive pregnancy test in past 12 months     Last Written Prescription Date:  3/20/24  Last Fill Quantity: 90,   # refills: 1    Last Office Visit: 11/30/23  Future Office visit:   none    Routing refill request to provider for review/approval because:  Blood pressure out of  range     Unable to complete prescription refill per RN Medication Refill Policy.   Kandace Morelos RN ....................  9/16/2024   11:20 AM

## 2024-09-24 ENCOUNTER — ANTICOAGULATION THERAPY VISIT (OUTPATIENT)
Dept: ANTICOAGULATION | Facility: OTHER | Age: 69
End: 2024-09-24
Attending: FAMILY MEDICINE
Payer: MEDICARE

## 2024-09-24 DIAGNOSIS — Z79.01 ANTICOAGULATION MONITORING, INR RANGE 2-3: Primary | ICD-10-CM

## 2024-09-24 DIAGNOSIS — I66.9 CEREBRAL THROMBOSIS: ICD-10-CM

## 2024-09-24 DIAGNOSIS — Z95.5 HISTORY OF CORONARY ARTERY STENT PLACEMENT: ICD-10-CM

## 2024-09-24 DIAGNOSIS — D68.2 FACTOR V DEFICIENCY (H): ICD-10-CM

## 2024-09-24 DIAGNOSIS — F51.04 CHRONIC INSOMNIA: ICD-10-CM

## 2024-09-24 LAB — INR HOME MONITORING: 2.4 (ref 2–3)

## 2024-09-24 NOTE — PROGRESS NOTES
ANTICOAGULATION  MANAGEMENT-Home Monitor Managed by Exception    Lyudmila PRAJAPATI Fitzgerald 69 year old female is on warfarin with therapeutic INR result. (Goal INR 2.0-3.0)    Recent labs: (last 7 days)     09/24/24  0000   INR 2.4       Previous INR was Therapeutic  Medication, diet, health changes since last INR:chart reviewed; none identified  Contacted within the last 12 weeks by phone on 9/11/24  Last [facility] referral date: 03/05/2024      ROD     Lyudmila was NOT contacted regarding therapeutic result today per home monitoring policy manage by exception agreement.   Current warfarin dose is to be continued:     Summary  As of 9/24/2024      Full warfarin instructions:  3 mg every Mon, Wed, Fri; 6 mg all other days   Next INR check:  10/8/2024             ?   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

## 2024-09-26 NOTE — TELEPHONE ENCOUNTER
[id number] sent Rx request for the following:      Requested Prescriptions   Pending Prescriptions Disp Refills    zolpidem (AMBIEN) 10 MG tablet [Pharmacy Med Name: Zolpidem Tartrate 10 MG Oral Tablet] 90 tablet 0     Sig: TAKE 1 TABLET BY MOUTH NIGHTLY AS NEEDED FOR SLEEP       There is no refill protocol information for this order          Last Prescription Date:   6/11/24  Last Fill Qty/Refills:         90, R-0    Last Office Visit:              3/4/24   Future Office visit:           none    Routing refill request to provider for review/approval because:  Drug not on the [facility] refill protocol     Mague Sawyer RN on 9/26/2024 at 9:35 AM

## 2024-10-01 RX ORDER — ZOLPIDEM TARTRATE 10 MG/1
10 TABLET ORAL
Qty: 90 TABLET | Refills: 0 | Status: SHIPPED | OUTPATIENT
Start: 2024-10-01

## 2024-10-08 LAB — INR HOME MONITORING: 2.1 (ref 2–3)

## 2024-10-09 ENCOUNTER — ANTICOAGULATION THERAPY VISIT (OUTPATIENT)
Dept: ANTICOAGULATION | Facility: OTHER | Age: 69
End: 2024-10-09
Attending: FAMILY MEDICINE
Payer: MEDICARE

## 2024-10-09 DIAGNOSIS — D68.2 FACTOR V DEFICIENCY (H): ICD-10-CM

## 2024-10-09 DIAGNOSIS — Z79.01 ANTICOAGULATION MONITORING, INR RANGE 2-3: Primary | ICD-10-CM

## 2024-10-09 DIAGNOSIS — I66.9 CEREBRAL THROMBOSIS: ICD-10-CM

## 2024-10-09 DIAGNOSIS — Z95.5 HISTORY OF CORONARY ARTERY STENT PLACEMENT: ICD-10-CM

## 2024-10-09 NOTE — PROGRESS NOTES
ANTICOAGULATION  MANAGEMENT-Home Monitor Managed by Exception    Lyudmila Fitzgerald 69 year old female is on warfarin with therapeutic INR result. (Goal INR 2.0-3.0)    Recent labs: (last 7 days)     10/08/24  0000   INR 2.1       Previous INR was Therapeutic  Medication, diet, health changes since last INR:chart reviewed; none identified  Contacted within the last 12 weeks by phone on 9/11/24  Last [facility] referral date: 03/05/2024      ROD Valdesce was NOT contacted regarding therapeutic result today per home monitoring policy manage by exception agreement.   Current warfarin dose is to be continued:     Summary  As of 10/9/2024      Full warfarin instructions:  3 mg every Mon, Wed, Fri; 6 mg all other days   Next INR check:  10/22/2024             ?   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

## 2024-10-22 ENCOUNTER — ANTICOAGULATION THERAPY VISIT (OUTPATIENT)
Dept: ANTICOAGULATION | Facility: OTHER | Age: 69
End: 2024-10-22
Attending: FAMILY MEDICINE
Payer: MEDICARE

## 2024-10-22 DIAGNOSIS — Z86.73 HISTORY OF STROKE: ICD-10-CM

## 2024-10-22 DIAGNOSIS — R07.9 CHEST PAIN, UNSPECIFIED TYPE: ICD-10-CM

## 2024-10-22 DIAGNOSIS — I66.9 CEREBRAL THROMBOSIS: ICD-10-CM

## 2024-10-22 DIAGNOSIS — E78.2 MIXED HYPERLIPIDEMIA: ICD-10-CM

## 2024-10-22 DIAGNOSIS — E11.9 TYPE 2 DIABETES MELLITUS WITHOUT COMPLICATION, WITHOUT LONG-TERM CURRENT USE OF INSULIN (H): ICD-10-CM

## 2024-10-22 DIAGNOSIS — Z95.5 HISTORY OF CORONARY ARTERY STENT PLACEMENT: ICD-10-CM

## 2024-10-22 DIAGNOSIS — Z79.01 ANTICOAGULATION MONITORING, INR RANGE 2-3: Primary | ICD-10-CM

## 2024-10-22 DIAGNOSIS — Z95.1 HISTORY OF CORONARY ARTERY BYPASS GRAFT X 1: ICD-10-CM

## 2024-10-22 DIAGNOSIS — Z98.890 STATUS POST CORONARY ANGIOGRAM: ICD-10-CM

## 2024-10-22 DIAGNOSIS — Q24.5 MYOCARDIAL BRIDGE: ICD-10-CM

## 2024-10-22 DIAGNOSIS — R07.89 OTHER CHEST PAIN: Primary | ICD-10-CM

## 2024-10-22 DIAGNOSIS — D68.2 FACTOR V DEFICIENCY (H): ICD-10-CM

## 2024-10-22 LAB — INR HOME MONITORING: 2.9 (ref 2–3)

## 2024-10-22 RX ORDER — ATORVASTATIN CALCIUM 80 MG/1
80 TABLET, FILM COATED ORAL DAILY
Qty: 90 TABLET | Refills: 3 | Status: SHIPPED | OUTPATIENT
Start: 2024-10-22

## 2024-10-22 RX ORDER — WARFARIN SODIUM 6 MG/1
TABLET ORAL
Qty: 70 TABLET | Refills: 1 | Status: SHIPPED | OUTPATIENT
Start: 2024-10-22

## 2024-10-22 NOTE — PROGRESS NOTES
ANTICOAGULATION  MANAGEMENT-Home Monitor Managed by Exception    Lyudmila PRAJAPATI Fitzgerald 69 year old female is on warfarin with therapeutic INR result. (Goal INR 2.0-3.0)    Recent labs: (last 7 days)     10/22/24  0000   INR 2.9       Previous INR was Therapeutic  Medication, diet, health changes since last INR:chart reviewed; none identified  Contacted within the last 12 weeks by phone on 9/11/24  Last [facility] referral date: 03/05/2024      ROD     Lyudmila was NOT contacted regarding therapeutic result today per home monitoring policy manage by exception agreement.   Current warfarin dose is to be continued:     Summary  As of 10/22/2024      Full warfarin instructions:  3 mg every Mon, Wed, Fri; 6 mg all other days   Next INR check:  11/5/2024             ?   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

## 2024-10-22 NOTE — TELEPHONE ENCOUNTER
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]  Cerebral thrombosis [I66.9]  History of coronary artery stent placement to the LAD x3 [Z95.5]                 Anticoagulation Care Providers       Provider Role Specialty Phone number    Erika Carrasco MD Referring Family Medicine [phone]            Visit with referring provider/group within last year: Yes 3/4/24    [facility] referral signed within last year: Yes    Lyudmila meets all criteria for refill (current [facility] referral, visit with referring provider/group in last 15 months unless directed to return in 2 years in last referring provider visit note, lab monitoring up to date or not exceeding 2 weeks overdue). Rx instructions and quantity match patient's current dosing plan. Warfarin 90 day supply with 1 refill granted per [facility] protocol     Nasrin Ying RN  Anticoagulation Clinic

## 2024-10-22 NOTE — TELEPHONE ENCOUNTER
Requested Prescriptions   Pending Prescriptions Disp Refills    atorvastatin (LIPITOR) 80 MG tablet [Pharmacy Med Name: Atorvastatin Calcium 80 MG Oral Tablet] 90 tablet 0     Sig: Take 1 tablet by mouth once daily       Antihyperlipidemic agents Failed - 10/22/2024  9:58 AM        Failed - LDL on file in the past 12 months        Passed - Medication is active on med list        Passed - Recent (12 mo) or future (90 days) visit within the authorizing provider's specialty     The patient must have completed an in-person or virtual visit within the past 12 months or has a future visit scheduled within the next 90 days with the authorizing provider s specialty.  Urgent care and e-visits do not quality as an office visit for this protocol.        Passed - Patient is age 18 years or older        Passed - No active pregnancy on record        Passed - No positive pregnancy test in past 12 mos     Last Written Prescription Date:  7/22/24  Last Fill Quantity: 90,   # refills: 0  Last Office Visit: 11/30/23  Future Office visit:   none    Routing refill request to provider for review/approval because:  Overdue for lab    Unable to complete prescription refill per RN Medication Refill Policy.   Kandace Morelos RN ....................  10/22/2024   12:35 PM

## 2024-10-28 DIAGNOSIS — Z95.5 HISTORY OF CORONARY ARTERY STENT PLACEMENT: ICD-10-CM

## 2024-10-28 DIAGNOSIS — Z86.79 HISTORY OF CORONARY VASOSPASM: ICD-10-CM

## 2024-10-28 DIAGNOSIS — Z95.1 HISTORY OF CORONARY ARTERY BYPASS GRAFT X 1: ICD-10-CM

## 2024-10-28 DIAGNOSIS — I20.1 CORONARY ARTERY SPASM (H): ICD-10-CM

## 2024-10-28 DIAGNOSIS — Q24.5 MYOCARDIAL BRIDGE: ICD-10-CM

## 2024-10-28 DIAGNOSIS — R07.89 OTHER CHEST PAIN: ICD-10-CM

## 2024-10-28 DIAGNOSIS — R07.9 CHEST PAIN, UNSPECIFIED TYPE: ICD-10-CM

## 2024-10-28 RX ORDER — NIFEDIPINE 90 MG/1
90 TABLET, FILM COATED, EXTENDED RELEASE ORAL DAILY
Qty: 90 TABLET | Refills: 3 | Status: SHIPPED | OUTPATIENT
Start: 2024-10-28

## 2024-10-28 NOTE — TELEPHONE ENCOUNTER
Requested Prescriptions   Pending Prescriptions Disp Refills    NIFEdipine ER (ADALAT CC) 90 MG 24 hr tablet [Pharmacy Med Name: NIFEdipine ER 90 MG Oral Tablet Extended Release 24 Hour] 90 tablet 0     Sig: Take 1 tablet by mouth once daily       Calcium Channel Blockers Protocol  Failed - 10/28/2024  8:31 AM        Failed - Most recent blood pressure under 140/90 in past 12 months     BP Readings from Last 3 Encounters:   08/20/24 (!) 150/92   06/04/24 122/79   03/04/24 122/82   No data recorded        Passed - Medication is active on med list        Passed - GFR is on file in the past 12 months and most recent GFR is normal        Passed - Recent (12 mo) or future (90 days) visit within the authorizing provider's specialty     The patient must have completed an in-person or virtual visit within the past 12 months or has a future visit scheduled within the next 90 days with the authorizing provider s specialty.  Urgent care and e-visits do not quality as an office visit for this protocol.        Passed - Patient is age 18 or older        Passed - No active pregnancy on record        Passed - No positive pregnancy test in past 12 months     Last Written Prescription Date:  11/14/23  Last Fill Quantity: 90,   # refills: 3  Last Office Visit: 11/30/23  Future Office visit:   none    Routing refill request to provider for review/approval because:  Blood pressure out of range    Unable to complete prescription refill per RN Medication Refill Policy.   Kandace Morelos RN ....................  10/28/2024   8:32 AM

## 2024-11-05 ENCOUNTER — ANTICOAGULATION THERAPY VISIT (OUTPATIENT)
Dept: ANTICOAGULATION | Facility: OTHER | Age: 69
End: 2024-11-05
Attending: FAMILY MEDICINE
Payer: MEDICARE

## 2024-11-05 DIAGNOSIS — Z95.5 HISTORY OF CORONARY ARTERY STENT PLACEMENT: ICD-10-CM

## 2024-11-05 DIAGNOSIS — I66.9 CEREBRAL THROMBOSIS: ICD-10-CM

## 2024-11-05 DIAGNOSIS — D68.2 FACTOR V DEFICIENCY (H): ICD-10-CM

## 2024-11-05 DIAGNOSIS — Z79.01 ANTICOAGULATION MONITORING, INR RANGE 2-3: Primary | ICD-10-CM

## 2024-11-05 LAB — INR HOME MONITORING: 2.4 (ref 2–3)

## 2024-11-05 NOTE — PROGRESS NOTES
ANTICOAGULATION  MANAGEMENT-Home Monitor Managed by Exception    Lyudmila PRAJAPATI Fitzgerald 69 year old female is on warfarin with therapeutic INR result. (Goal INR 2.0-3.0)    Recent labs: (last 7 days)     11/05/24  0000   INR 2.4       Previous INR was Therapeutic  Medication, diet, health changes since last INR:chart reviewed; none identified  Contacted within the last 12 weeks by phone on 9/11/24  Last [facility] referral date: 03/05/2024      ROD     Lyudmila was NOT contacted regarding therapeutic result today per home monitoring policy manage by exception agreement.   Current warfarin dose is to be continued:     Summary  As of 11/5/2024      Full warfarin instructions:  3 mg every Mon, Wed, Fri; 6 mg all other days   Next INR check:  11/19/2024             ?   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

## 2024-11-12 ENCOUNTER — ANCILLARY PROCEDURE (OUTPATIENT)
Dept: CARDIOLOGY | Facility: OTHER | Age: 69
End: 2024-11-12
Attending: INTERNAL MEDICINE
Payer: MEDICARE

## 2024-11-12 DIAGNOSIS — I47.10 SUPRAVENTRICULAR TACHYCARDIA (H): ICD-10-CM

## 2024-11-12 DIAGNOSIS — Z98.890 S/P AV NODAL ABLATION: ICD-10-CM

## 2024-11-12 LAB
MDC_IDC_LEAD_CONNECTION_STATUS: NORMAL
MDC_IDC_LEAD_CONNECTION_STATUS: NORMAL
MDC_IDC_LEAD_IMPLANT_DT: NORMAL
MDC_IDC_LEAD_IMPLANT_DT: NORMAL
MDC_IDC_LEAD_LOCATION: NORMAL
MDC_IDC_LEAD_LOCATION: NORMAL
MDC_IDC_LEAD_LOCATION_DETAIL_1: NORMAL
MDC_IDC_LEAD_LOCATION_DETAIL_1: NORMAL
MDC_IDC_LEAD_MFG: NORMAL
MDC_IDC_LEAD_MFG: NORMAL
MDC_IDC_LEAD_MODEL: NORMAL
MDC_IDC_LEAD_MODEL: NORMAL
MDC_IDC_LEAD_POLARITY_TYPE: NORMAL
MDC_IDC_LEAD_POLARITY_TYPE: NORMAL
MDC_IDC_LEAD_SERIAL: NORMAL
MDC_IDC_LEAD_SERIAL: NORMAL
MDC_IDC_LEAD_SPECIAL_FUNCTION: NORMAL
MDC_IDC_LEAD_SPECIAL_FUNCTION: NORMAL
MDC_IDC_MSMT_BATTERY_REMAINING_LONGEVITY: 91 MO
MDC_IDC_MSMT_BATTERY_REMAINING_PERCENTAGE: 87.2 %
MDC_IDC_MSMT_BATTERY_STATUS: NORMAL
MDC_IDC_MSMT_LEADCHNL_RA_IMPEDANCE_VALUE: 728 OHM
MDC_IDC_MSMT_LEADCHNL_RA_PACING_THRESHOLD_AMPLITUDE: 1.1 V
MDC_IDC_MSMT_LEADCHNL_RA_PACING_THRESHOLD_PULSEWIDTH: 1 MS
MDC_IDC_MSMT_LEADCHNL_RA_SENSING_INTR_AMPL: 2.4 MV
MDC_IDC_MSMT_LEADCHNL_RV_IMPEDANCE_VALUE: 513 OHM
MDC_IDC_MSMT_LEADCHNL_RV_PACING_THRESHOLD_AMPLITUDE: 0.9 V
MDC_IDC_MSMT_LEADCHNL_RV_PACING_THRESHOLD_PULSEWIDTH: 0.4 MS
MDC_IDC_MSMT_LEADCHNL_RV_SENSING_INTR_AMPL: 9 MV
MDC_IDC_PG_IMPLANT_DTM: NORMAL
MDC_IDC_PG_MFG: NORMAL
MDC_IDC_PG_MODEL: NORMAL
MDC_IDC_PG_SERIAL: NORMAL
MDC_IDC_PG_TYPE: NORMAL
MDC_IDC_SESS_CLINIC_NAME: NORMAL
MDC_IDC_SESS_DTM: NORMAL
MDC_IDC_SESS_TYPE: NORMAL
MDC_IDC_SET_BRADY_AT_MODE_SWITCH_MODE: NORMAL
MDC_IDC_SET_BRADY_AT_MODE_SWITCH_RATE: 140 {BEATS}/MIN
MDC_IDC_SET_BRADY_LOWRATE: 60 {BEATS}/MIN
MDC_IDC_SET_BRADY_MAX_SENSOR_RATE: 130 {BEATS}/MIN
MDC_IDC_SET_BRADY_MAX_TRACKING_RATE: 100 {BEATS}/MIN
MDC_IDC_SET_BRADY_MODE: NORMAL
MDC_IDC_SET_BRADY_PAV_DELAY_HIGH: 80 MS
MDC_IDC_SET_BRADY_PAV_DELAY_LOW: 130 MS
MDC_IDC_SET_BRADY_SAV_DELAY_HIGH: 80 MS
MDC_IDC_SET_BRADY_SAV_DELAY_LOW: 130 MS
MDC_IDC_SET_LEADCHNL_RA_PACING_AMPLITUDE: 2.4 V
MDC_IDC_SET_LEADCHNL_RA_PACING_CAPTURE_MODE: NORMAL
MDC_IDC_SET_LEADCHNL_RA_PACING_POLARITY: NORMAL
MDC_IDC_SET_LEADCHNL_RA_PACING_PULSEWIDTH: 1 MS
MDC_IDC_SET_LEADCHNL_RA_SENSING_ADAPTATION_MODE: NORMAL
MDC_IDC_SET_LEADCHNL_RA_SENSING_POLARITY: NORMAL
MDC_IDC_SET_LEADCHNL_RA_SENSING_SENSITIVITY: 0.6 MV
MDC_IDC_SET_LEADCHNL_RV_PACING_AMPLITUDE: 1.5 V
MDC_IDC_SET_LEADCHNL_RV_PACING_CAPTURE_MODE: NORMAL
MDC_IDC_SET_LEADCHNL_RV_PACING_POLARITY: NORMAL
MDC_IDC_SET_LEADCHNL_RV_PACING_PULSEWIDTH: 0.4 MS
MDC_IDC_SET_LEADCHNL_RV_SENSING_ADAPTATION_MODE: NORMAL
MDC_IDC_SET_LEADCHNL_RV_SENSING_POLARITY: NORMAL
MDC_IDC_SET_LEADCHNL_RV_SENSING_SENSITIVITY: 1.5 MV
MDC_IDC_SET_ZONE_DETECTION_INTERVAL: 375 MS
MDC_IDC_SET_ZONE_STATUS: NORMAL
MDC_IDC_SET_ZONE_TYPE: NORMAL
MDC_IDC_SET_ZONE_VENDOR_TYPE: NORMAL
MDC_IDC_STAT_AT_BURDEN_PERCENT: 1 %
MDC_IDC_STAT_BRADY_DTM_END: NORMAL
MDC_IDC_STAT_BRADY_DTM_START: NORMAL
MDC_IDC_STAT_BRADY_RA_PERCENT_PACED: 90 %
MDC_IDC_STAT_BRADY_RV_PERCENT_PACED: 100 %
MDC_IDC_STAT_EPISODE_RECENT_COUNT: 0
MDC_IDC_STAT_EPISODE_RECENT_COUNT: 4
MDC_IDC_STAT_EPISODE_RECENT_COUNT_DTM_END: NORMAL
MDC_IDC_STAT_EPISODE_RECENT_COUNT_DTM_END: NORMAL
MDC_IDC_STAT_EPISODE_RECENT_COUNT_DTM_START: NORMAL
MDC_IDC_STAT_EPISODE_RECENT_COUNT_DTM_START: NORMAL
MDC_IDC_STAT_EPISODE_TOTAL_COUNT: 0
MDC_IDC_STAT_EPISODE_TOTAL_COUNT_DTM_END: NORMAL
MDC_IDC_STAT_EPISODE_TYPE: NORMAL
MDC_IDC_STAT_EPISODE_VENDOR_TYPE: NORMAL

## 2024-11-12 PROCEDURE — 93280 PM DEVICE PROGR EVAL DUAL: CPT | Performed by: INTERNAL MEDICINE

## 2024-11-12 NOTE — PATIENT INSTRUCTIONS
It was a pleasure to see you in clinic today.  Please do not hesitate to call with any questions or concerns.  You are scheduled for remote transmissions on 2/13/25, 5/15/25 and 8/18/25.  We look forward to seeing you in clinic at your next device check in 1 year.    Amisha Hobbs, RN, MS, CCRN  Electrophysiology Nurse Clinician  [facility]    During Business Hours Please Call:  [phone]  After Hours Please Call:  [phone] - select option #4 and ask for job code [id number]

## 2024-11-16 DIAGNOSIS — E83.42 HYPOMAGNESEMIA: ICD-10-CM

## 2024-11-17 RX ORDER — MAGNESIUM OXIDE TAB 400 MG (241.3 MG ELEMENTAL MG) 400 (241.3 MG) MG
400 TAB ORAL DAILY
Qty: 90 TABLET | Refills: 0 | Status: SHIPPED | OUTPATIENT
Start: 2024-11-17

## 2024-11-19 ENCOUNTER — ANTICOAGULATION THERAPY VISIT (OUTPATIENT)
Dept: ANTICOAGULATION | Facility: OTHER | Age: 69
End: 2024-11-19
Attending: FAMILY MEDICINE
Payer: MEDICARE

## 2024-11-19 DIAGNOSIS — Z79.01 ANTICOAGULATION MONITORING, INR RANGE 2-3: Primary | ICD-10-CM

## 2024-11-19 DIAGNOSIS — Z95.5 HISTORY OF CORONARY ARTERY STENT PLACEMENT: ICD-10-CM

## 2024-11-19 DIAGNOSIS — D68.2 FACTOR V DEFICIENCY (H): ICD-10-CM

## 2024-11-19 DIAGNOSIS — I66.9 CEREBRAL THROMBOSIS: ICD-10-CM

## 2024-11-19 LAB — INR HOME MONITORING: 3.7 (ref 2–3)

## 2024-11-19 NOTE — PROGRESS NOTES
ANTICOAGULATION MANAGEMENT     Lyudmila Fitzgerald 69 year old female is on warfarin with supratherapeutic INR result. (Goal INR 2.0-3.0)    Recent labs: (last 7 days)     11/19/24  0000   INR 3.7*       ASSESSMENT     Source(s): Chart Review and Patient/Caregiver Call     Warfarin doses taken: Warfarin taken as instructed  Diet: No new diet changes identified  New illness, injury, or hospitalization: No  Medication/supplement changes: None noted  Signs or symptoms of bleeding or clotting: No  Previous INR: Therapeutic last 2(+) visits  Additional findings:  Increased stress as a loved one received a cancer diagnosis- will potentially be ongoing and might need to decreased dose.        PLAN     Recommended plan for temporary change(s) affecting INR     Dosing Instructions: partial hold then continue your current warfarin dose with next INR in 1 week       Summary  As of 11/19/2024      Full warfarin instructions:  11/19: 3 mg; Otherwise 3 mg every Mon, Wed, Fri; 6 mg all other days   Next INR check:  11/26/2024               Telephone call with Leigh who verbalizes understanding and agrees to plan    Patient to recheck with home meter    Education provided: Please call back if any changes to your diet, medications or how you've been taking warfarin    Plan made per [facility] anticoagulation protocol    Nasrin Ying, RN  Anticoagulation Clinic  11/19/2024    _______________________________________________________________________     Anticoagulation Episode Summary       Current INR goal:  2.0-3.0   TTR:  73.8% (1 y)   Target end date:  Indefinite   Send INR reminders to:  [facility]    Indications    Acute thromboembolism of deep veins of lower extremity (H) (Resolved) [I82.409]  Anticoagulation monitoring  INR range 2-3 [Z79.01]  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

## 2024-11-26 ENCOUNTER — ANTICOAGULATION THERAPY VISIT (OUTPATIENT)
Dept: ANTICOAGULATION | Facility: OTHER | Age: 69
End: 2024-11-26
Payer: MEDICARE

## 2024-11-26 DIAGNOSIS — Z95.5 HISTORY OF CORONARY ARTERY STENT PLACEMENT: ICD-10-CM

## 2024-11-26 DIAGNOSIS — D68.2 FACTOR V DEFICIENCY (H): ICD-10-CM

## 2024-11-26 DIAGNOSIS — Z79.01 ANTICOAGULATION MONITORING, INR RANGE 2-3: Primary | ICD-10-CM

## 2024-11-26 DIAGNOSIS — I66.9 CEREBRAL THROMBOSIS: ICD-10-CM

## 2024-11-26 LAB — INR HOME MONITORING: 1.8 (ref 2–3)

## 2024-11-26 NOTE — PROGRESS NOTES
ANTICOAGULATION MANAGEMENT     Lyudmila Fitzgerald 69 year old female is on warfarin with subtherapeutic INR result. (Goal INR 2.0-3.0)    Recent labs: (last 7 days)     11/26/24  0000   INR 1.8*       ASSESSMENT     Source(s): Chart Review and Patient/Caregiver Call     Warfarin doses taken: Held for high INR  recently which may be affecting INR  Diet: No new diet changes identified  New illness, injury, or hospitalization: No  Medication/supplement changes: None noted  Signs or symptoms of bleeding or clotting: No  Previous INR: Supratherapeutic  Additional findings: None       PLAN     Recommended plan for temporary change(s) affecting INR     Dosing Instructions: Continue your current warfarin dose with next INR in 2 weeks       Summary  As of 11/26/2024      Full warfarin instructions:  3 mg every Mon, Wed, Fri; 6 mg all other days   Next INR check:  12/10/2024               Telephone call with Leigh who verbalizes understanding and agrees to plan    Patient to recheck with home meter    Education provided: Please call back if any changes to your diet, medications or how you've been taking warfarin    Plan made per 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

## 2024-11-30 ENCOUNTER — HEALTH MAINTENANCE LETTER (OUTPATIENT)
Age: 69
End: 2024-11-30

## 2024-12-10 ENCOUNTER — ANTICOAGULATION THERAPY VISIT (OUTPATIENT)
Dept: ANTICOAGULATION | Facility: OTHER | Age: 69
End: 2024-12-10
Attending: FAMILY MEDICINE
Payer: MEDICARE

## 2024-12-10 DIAGNOSIS — D68.2 FACTOR V DEFICIENCY (H): ICD-10-CM

## 2024-12-10 DIAGNOSIS — Z95.5 HISTORY OF CORONARY ARTERY STENT PLACEMENT: ICD-10-CM

## 2024-12-10 DIAGNOSIS — I66.9 CEREBRAL THROMBOSIS: ICD-10-CM

## 2024-12-10 DIAGNOSIS — Z79.01 ANTICOAGULATION MONITORING, INR RANGE 2-3: Primary | ICD-10-CM

## 2024-12-10 LAB — INR HOME MONITORING: 2.2 (ref 2–3)

## 2024-12-10 NOTE — PROGRESS NOTES
ANTICOAGULATION MANAGEMENT     Lyudmila Fitzgerald 69 year old female is on warfarin with therapeutic INR result. (Goal INR 2.0-3.0)    Recent labs: (last 7 days)     12/10/24  0000   INR 2.2       ASSESSMENT     Source(s): Chart Review and Patient/Caregiver Call     Warfarin doses taken: Warfarin taken as instructed  Diet: No new diet changes identified  New illness, injury, or hospitalization: No  Medication/supplement changes: None noted  Signs or symptoms of bleeding or clotting: No  Previous INR: Subtherapeutic  Additional findings: None       PLAN     Recommended plan for no diet, medication or health factor changes affecting INR     Dosing Instructions: Continue your current warfarin dose with next INR in 2 weeks       Summary  As of 12/10/2024      Full warfarin instructions:  3 mg every Mon, Wed, Fri; 6 mg all other days   Next INR check:  12/23/2024               Telephone call with Leigh who verbalizes understanding and agrees to plan    Patient to recheck with home meter    Education provided: Please call back if any changes to your diet, medications or how you've been taking warfarin    Plan made per [facility] anticoagulation protocol    Nasrin Ying RN  Anticoagulation Clinic  12/10/2024    _______________________________________________________________________     Anticoagulation Episode Summary       Current INR goal:  2.0-3.0   TTR:  73.2% (1 y)   Target end date:  Indefinite   Send INR reminders to:  [facility]    Indications    Acute thromboembolism of deep veins of lower extremity (H) (Resolved) [I82.409]  Anticoagulation monitoring  INR range 2-3 [Z79.01]  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

## 2024-12-23 ENCOUNTER — APPOINTMENT (OUTPATIENT)
Dept: ANTICOAGULATION | Facility: OTHER | Age: 69
End: 2024-12-23
Attending: FAMILY MEDICINE
Payer: MEDICARE

## 2024-12-26 ENCOUNTER — ANTICOAGULATION THERAPY VISIT (OUTPATIENT)
Dept: ANTICOAGULATION | Facility: OTHER | Age: 69
End: 2024-12-26
Attending: FAMILY MEDICINE
Payer: MEDICARE

## 2024-12-26 DIAGNOSIS — D68.2 FACTOR V DEFICIENCY (H): ICD-10-CM

## 2024-12-26 DIAGNOSIS — Z79.01 ANTICOAGULATION MONITORING, INR RANGE 2-3: Primary | ICD-10-CM

## 2024-12-26 DIAGNOSIS — Z95.5 HISTORY OF CORONARY ARTERY STENT PLACEMENT: ICD-10-CM

## 2024-12-26 DIAGNOSIS — I66.9 CEREBRAL THROMBOSIS: ICD-10-CM

## 2024-12-26 LAB
INR HOME MONITORING: 2.5 (ref 2–3)
INR HOME MONITORING: 2.5 (ref 2–3)

## 2024-12-26 NOTE — PROGRESS NOTES
ANTICOAGULATION  MANAGEMENT-Home Monitor Managed by Exception    Lyudmila PRAJAPATI Fitzgerald 69 year old female is on warfarin with therapeutic INR result. (Goal INR 2.0-3.0)    Recent labs: (last 7 days)     12/26/24  0000   INR 2.5       Previous INR was Therapeutic  Medication, diet, health changes since last INR:chart reviewed; none identified  Contacted within the last 12 weeks by phone on 12/10/24  Last [facility] referral date: 03/05/2024      ROD     Lyudmila was NOT contacted regarding therapeutic result today per home monitoring policy manage by exception agreement.   Current warfarin dose is to be continued:     Summary  As of 12/26/2024      Full warfarin instructions:  3 mg every Mon, Wed, Fri; 6 mg all other days   Next INR check:  1/9/2025             ?   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

## 2025-01-02 DIAGNOSIS — F51.04 CHRONIC INSOMNIA: ICD-10-CM

## 2025-01-02 RX ORDER — ZOLPIDEM TARTRATE 10 MG/1
10 TABLET ORAL
Qty: 30 TABLET | Refills: 0 | OUTPATIENT
Start: 2025-01-02

## 2025-01-02 NOTE — TELEPHONE ENCOUNTER
[facility] sent Rx request for the following:      Requested Prescriptions   Pending Prescriptions Disp Refills    zolpidem (AMBIEN) 10 MG tablet [Pharmacy Med Name: Zolpidem Tartrate 10 MG Oral Tablet] 90 tablet 0     Sig: TAKE 1 TABLET BY MOUTH NIGHTLY AS NEEDED FOR SLEEP       There is no refill protocol information for this order          Last Prescription Date:   10/2/24  Last Fill Qty/Refills:         90, R-0    Last Office Visit:              11/30/23   Future Office visit:            2/13/25    Unable to complete prescription refill per RN Medication Refill Policy.     Abhinav Davalos RN on 1/2/2025 at 3:50 PM

## 2025-01-09 ENCOUNTER — ANTICOAGULATION THERAPY VISIT (OUTPATIENT)
Dept: ANTICOAGULATION | Facility: OTHER | Age: 70
End: 2025-01-09
Attending: FAMILY MEDICINE
Payer: MEDICARE

## 2025-01-09 DIAGNOSIS — D68.2 FACTOR V DEFICIENCY (H): ICD-10-CM

## 2025-01-09 DIAGNOSIS — F51.04 CHRONIC INSOMNIA: ICD-10-CM

## 2025-01-09 DIAGNOSIS — I66.9 CEREBRAL THROMBOSIS: ICD-10-CM

## 2025-01-09 DIAGNOSIS — Z79.01 ANTICOAGULATION MONITORING, INR RANGE 2-3: Primary | ICD-10-CM

## 2025-01-09 DIAGNOSIS — Z95.5 HISTORY OF CORONARY ARTERY STENT PLACEMENT: ICD-10-CM

## 2025-01-09 LAB — INR HOME MONITORING: 2 (ref 2–3)

## 2025-01-09 RX ORDER — ZOLPIDEM TARTRATE 10 MG/1
10 TABLET ORAL
Qty: 90 TABLET | OUTPATIENT
Start: 2025-01-09

## 2025-01-09 NOTE — TELEPHONE ENCOUNTER
Patient notified that Moises Islas DO will not be filling this as it is not a cardiac med and he would like her primary care provider to take this over.  Patient will call her pharmacy and request it to be sent to her PCP.  Knadace Morelos RN......January 9, 2025...12:19 PM

## 2025-01-09 NOTE — TELEPHONE ENCOUNTER
Requested Prescriptions   Pending Prescriptions Disp Refills    zolpidem (AMBIEN) 10 MG tablet 90 tablet 0     Sig: Take 1 tablet (10 mg) by mouth nightly as needed.       There is no refill protocol information for this order         Last Written Prescription Date:  10/1/24  Last Fill Quantity: 90,   # refills: 0  Last Office Visit: 11/30/23  Future Office visit:    Next 5 appointments (look out 90 days)      Feb 04, 2025 8:45 AM  (Arrive by 8:30 AM)  Return Visit with Moises Islas DO  [facility] ([facility]) [address] [facility] [address]  [phone]     Mar 18, 2025 10:20 AM  (Arrive by 10:05 AM)  Annual Wellness Visit with Erika Plascencia MD  [facility] ([facility]) [address] [facility] [address]  [phone]     Routing refill request to provider for review/approval because:  Drug not on the [facility], [facility] or [facility] refill protocol or controlled substance    Unable to complete prescription refill per RN Medication Refill Policy.   Kandace Morelos RN ....................  1/9/2025   11:16 AM

## 2025-01-09 NOTE — TELEPHONE ENCOUNTER
Good morning,     Patient called and made an appointment with Dr. Roshan downey for 2/4/2025. She stated that she has been out of her Zopedin since Jan 2nd and she would like to know if she is able to get it filled.   Thank you  Bel Lr on 1/9/2025 at 10:27 AM

## 2025-01-09 NOTE — PROGRESS NOTES
ANTICOAGULATION  MANAGEMENT-Home Monitor Managed by Exception    Lyudmila Fitzgerald 69 year old female is on warfarin with therapeutic INR result. (Goal INR 2.0-3.0)    Recent labs: (last 7 days)     01/09/25  0000   INR 2.0       Previous INR was Therapeutic  Medication, diet, health changes since last INR:chart reviewed; none identified  Contacted within the last 12 weeks by phone on 12/10/24  Last [facility] referral date: 03/05/2024      ROD Valdesce was NOT contacted regarding therapeutic result today per home monitoring policy manage by exception agreement.   Current warfarin dose is to be continued:     Summary  As of 1/9/2025      Full warfarin instructions:  3 mg every Mon, Wed, Fri; 6 mg all other days   Next INR check:  1/23/2025             ?   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

## 2025-01-18 DIAGNOSIS — F32.A DEPRESSION, UNSPECIFIED DEPRESSION TYPE: ICD-10-CM

## 2025-01-23 ENCOUNTER — ANTICOAGULATION THERAPY VISIT (OUTPATIENT)
Dept: ANTICOAGULATION | Facility: OTHER | Age: 70
End: 2025-01-23
Attending: FAMILY MEDICINE
Payer: MEDICARE

## 2025-01-23 DIAGNOSIS — D68.2 FACTOR V DEFICIENCY (H): ICD-10-CM

## 2025-01-23 DIAGNOSIS — I66.9 CEREBRAL THROMBOSIS: Primary | ICD-10-CM

## 2025-01-23 DIAGNOSIS — I20.1 CORONARY ARTERY SPASM: ICD-10-CM

## 2025-01-23 DIAGNOSIS — Z79.01 ANTICOAGULATION MONITORING, INR RANGE 2-3: ICD-10-CM

## 2025-01-23 DIAGNOSIS — Z95.5 HISTORY OF CORONARY ARTERY STENT PLACEMENT: ICD-10-CM

## 2025-01-23 DIAGNOSIS — Q24.5 MYOCARDIAL BRIDGE: ICD-10-CM

## 2025-01-23 DIAGNOSIS — I10 ESSENTIAL HYPERTENSION: ICD-10-CM

## 2025-01-23 DIAGNOSIS — Z86.79 HISTORY OF CORONARY VASOSPASM: ICD-10-CM

## 2025-01-23 DIAGNOSIS — R07.89 OTHER CHEST PAIN: ICD-10-CM

## 2025-01-23 DIAGNOSIS — R07.9 CHEST PAIN, UNSPECIFIED TYPE: ICD-10-CM

## 2025-01-23 DIAGNOSIS — Z95.1 HISTORY OF CORONARY ARTERY BYPASS GRAFT X 1: ICD-10-CM

## 2025-01-23 LAB — INR HOME MONITORING: 2.6 (ref 2–3)

## 2025-01-23 RX ORDER — ISOSORBIDE MONONITRATE 120 MG/1
120 TABLET, EXTENDED RELEASE ORAL DAILY
Qty: 90 TABLET | Refills: 3 | Status: SHIPPED | OUTPATIENT
Start: 2025-01-23

## 2025-01-23 NOTE — TELEPHONE ENCOUNTER
Requested Prescriptions   Pending Prescriptions Disp Refills    isosorbide mononitrate CR (IMDUR) 120 MG 24 HR ER tablet [Pharmacy Med Name: Isosorbide Mononitrate  MG Oral Tablet Extended Release 24 Hour] 90 tablet 0     Sig: Take 1 tablet by mouth once daily       Nitrates Failed - 1/23/2025  8:55 AM        Failed - Most recent blood pressure under 140/90 in past 12 months     BP Readings from Last 3 Encounters:   08/20/24 (!) 150/92   06/04/24 122/79   03/04/24 122/82   No data recorded        Failed - Always fail criteria - needs review     Review chart. Forward refill request to provider if patient has exceeded one bottle per 3 months.         Passed - Pt is not on erectile dysfunction medications        Passed - Medication is active on med list        Passed - Recent (12 mo) or future (90 days) visit within the authorizing provider's specialty     The patient must have completed an in-person or virtual visit within the past 12 months or has a future visit scheduled within the next 90 days with the authorizing provider s specialty.  Urgent care and e-visits do not qualify as an office visit for this protocol.        Passed - Medication indicated for associated diagnosis     Medication is associated with one or more of the following diagnoses:    Anal fissure   Angina pectoris   Cardiac syndrome X   Congestive heart failure   Hypertension   Myocardial infarction   Pulmonary edema   Pulmonary hypertension        Passed - Patient is age 18 or older     Last Written Prescription Date:  11/30/23  Last Fill Quantity: 90,   # refills: 3  Last Office Visit: 11/30/23  Future Office visit:    Next 5 appointments (look out 90 days)      Feb 04, 2025 8:45 AM  (Arrive by 8:30 AM)  Return Visit with Moises Islas,   [facility] ([facility]) [address]  [place] [address]  [phone]     Feb 18, 2025 8:40 AM  (Arrive by 8:25  AM)  Provider Visit with Erika Plascencia MD  [facility] ([facility]) [address]  [place] [address]  [phone]     Mar 18, 2025 10:20 AM  (Arrive by 10:05 AM)  Annual Wellness Visit with Erika Plascencia MD  [facility] ([facility]) [address]  [place] [address]  [phone]     Routing refill request to provider for review/approval because:  Blood pressure out of range     Unable to complete prescription refill per RN Medication Refill Policy.   Kandace Morelos RN ....................  1/23/2025   8:57 AM

## 2025-01-23 NOTE — PROGRESS NOTES
ANTICOAGULATION  MANAGEMENT-Home Monitor Managed by Exception    Lyudmila PRAJAPATI Fitzgerald 69 year old female is on warfarin with therapeutic INR result. (Goal INR 2.0-3.0)    Recent labs: (last 7 days)     01/23/25  0000   INR 2.6       Previous INR was Therapeutic  Medication, diet, health changes since last INR:chart reviewed; none identified  Contacted within the last 12 weeks by phone on 12/10/24  Last [facility] referral date: 01/23/2025      ROD     Lyudmila was NOT contacted regarding therapeutic result today per home monitoring policy manage by exception agreement.   Current warfarin dose is to be continued:     Summary  As of 1/23/2025      Full warfarin instructions:  3 mg every Mon, Wed, Fri; 6 mg all other days   Next INR check:  2/6/2025             ?   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

## 2025-02-03 NOTE — PROGRESS NOTES
[facility] HEART CARE   CARDIOLOGY PROGRESS NOTE     Chief Complaint   Patient presents with    Follow Up     annual          Diagnosis:  1.  Coronary artery spasm, LAD.   -Cardiac cath, [facility] on 4/27/2021.   -LM 0%, LAD 30% ISS, LCx 0%, RCA 0%, LIMA to LAD 0%  2.  Myocardial bridging involving LAD.  3.  H/O SVT.  4.  CABG x1 .   -LIMA to LAD on 11/15/2016, [place].  5.  CHF.    -50-55% on 9/22/2023.   -55-60% on 8/5/21.    -EF 15%. Recovered, 60-65% on 5/16/2019, [facility].   -Diastolic-concern for, 5/17/23.  6.  Coronary spasm with stent placement to LAD x3.  7.  H/O ablation for SVT.  8.  H/O stroke x7.    -Left hemiparesis.  9.  Left foot drop.  10.  AV tessie ablation.    -2/2 SVT.  11.  Pacemaker dependent.  12.  Hyperlipidemia-uncontrolled.  13.  Factor V deficiency.   -Coumadin with INR goal of 2-3.  14.  H/O DVT to bilateral lower extremities.  15.  Cardiac pacemaker.  16.  Hypertension-variable control.  17.  DM-2.  18.  Lown Ganong Brandt syndrome.      Assessment/Plan:  2  1.  We will plan to refill nitro, Ranexa, aspirin, diltiazem.  Last seen 11/30/2023. Pacemaker interrogation on 11/12/2024.  Atrial paced 90% and ventricular paced to 100%.  Intrinsic rate 32 bpm.  7.5 years left on the device.  A-fib burden less than 1%.  2.  Active Meds: Lipitor 80 mg daily, diltiazem 360 mg twice a day, Imdur 120 mg daily, nifedipine 90 mg daily, Nitropatch, magnesium, SL nitro as needed for chest discomfort, and Ranexa 1000 mg twice a day.  3.  Discontinued clonidine 0.1 mg twice a day and Entresto 24/26 mg twice a day previously, 11/30/2023.  4.  Related to myocardial bridging/spasm/chest discomfort: Previously on amlodipine, L-arginine, Coreg, cilostazol-short period of time, clonidine, ivabradine-unaffordable, metoprolol, and felodipine.  5.  Had consider starting ivabradine but it interacts with diltiazem and the medication was not initiated.  6.  Factor V Leiden deficiency/DVT: Continue Coumadin.  Checks her  Coumadin level at home.  Discussed DOAC/NOAC's but declined as she has been stable  7.  Chest discomfort: Continues to have discomfort on a daily basis.  It wakes her up at night.  Is very disruptive.  Have tried multiple medications without relief.  Will maximize diltiazem today to 360 from 240 mg daily.  She does have a history of coronary spasm had a cardiac cath at the [facility] on 7/31/2023.  Had a mid LAD 30% lesion.  LIMA to LAD patent.  No other appreciable disease.  Again, I believe her symptoms are from spasm.  Tried many medications as outlined above and not sure how to control this.  She has been to [place], [facility], and [facility].  Continue to try medications to alleviate her symptoms.  9.  Insomnia: Uses Ambien.  PCP unwilling to prescribe.  Given a prescription for the medication.  10.  Follow-up in 1 year or sooner with issues.      Interval history:  See above.      HPI:    Originally, she was diagnosed with a LAD ostial coronary artery spasm at [facility].  She had a positive methergine spasm study in 2005 at [facility] in the mid LAD, treated with drug-eluting stent to the mid-LAD.  She has a history of LAD myocardial bridging, CABG with LIMA to LAD on 11/15/2016 in [place], [place], history of heart failure with reported EF of 15% now recovered, stenting to the LAD x3, history of radiofrequency ablation for SVT x8, ultimately resulting in AV tessie ablation with pacemaker placement with pacemaker dependence, H/O stroke x7 with left hemiparesis essentially resolved with the exception of intermittent left foot drop, hyperlipidemia, on Coumadin with a history of factor V Leiden deficiency and numerous DVT's involving both the upper and lower extremities, hypertension, DM-2, on anticoagulated on Coumadin.     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  She has had care through [facility] as well as in [place], [place].  She was evaluated at [facility] and  "underwent angiography revealing separate origins of the LAD and circumflex.  She has a history of catheter induced coronary artery spasm originally diagnosed at [place].     Dr. Bill Abreu through [facility] evaluated her in 2005. In spite of pre-treatment with nitroglycerine and calcium channel blockers, she had fairly vigorous mid-LAD spasm with intracoronary methergine. The ostial LAD did not spasm. Dr. Abreu treated this area with a 2.5 x 28 mm Cypher drug-eluting stent. The following year she had instent restenosis treated with a second 8 mm x 2.5 mm Cypher drug-eluting stent.      She did fairly well until 2016, when she had evaluation at [facility] in [place]. She was treated with trans-myocardial revascularization with 25 channels in the inferior, lateral and anterior LV walls, and underwent an LIMA-LAD. Her EF was 35% pre-op, and approximately 50-55% post-operatively.      She had recurrent angina, and on 2/8/17 she underwent repeat angiography which showed mild instent restenosis of the mid-LAD stents, followed by 99% stenosis of the mid-LAD at the LIMA-LAD anastomosis, presumably due to surgical clips/manipulation. The TERRENCE was atretic. Thus, the internationalists placed a 2.25 x 8 mm Promus Premiere drug-eluting stent into the mid-LAD with a good result.      Lately, she has been having an increasingly more frequent and severe anginal chest discomfort, and feels like she is back to her severe coronary \"spasm\" again.      He has ongoing concerns for angina felt to be vasospastic in nature.  She has had a total of x4 cardiac catheterizations on 6/17/2008, 11/21/2012, 1/17/2019, and I believe one in [place] do not have this date.       She has done pretty well since. Her EF was greater than 60% on a couple occasions since.  Most recently, on 5/16/2019 her EF was 60% to 65%.  There was no significant valvular disease detected.  Her echo was essentially unchanged from 4/18/2017.  " Diastolic dysfunction was normal.      Relevant testing:  Echocardiogram in 9/22/2023:  Left ventricular function is decreased. The ejection fraction is 50-55%  (borderline). Mild diffuse hypokinesis is present. Mild concentric wall  thickening consistent with left ventricular hypertrophy is present.  The right ventricle is normal size. Global right ventricular function is normal.  No significant valvular abnormalities present.  The inferior vena cava was normal in size with preserved respiratory variability.  No pericardial effusion is present.    Cardiac cath on 7/31/2023:    Mid LAD to Dist LAD lesion is 30% stenosed.    Right sided filling pressures are normal.    Left sided filling pressures are normal.    Normal PA pressures.    Left ventricular filling pressures are normal.    Normal cardiac output level.  Medical management for coronary artery disease.   No evidence of elevated cardiac filling pressures.     Echo on 5/17/2023:  1. Normal left ventricular chamber size, no regional wall motion abnormalities, calculated 2-D linear ejection fraction 63 %.   2. Indeterminate left ventricular diastolic function and filling pressure.   3. Normal right ventricular chamber size, mildly reduced systolic function (by visual inspection,), estimated right ventricular systolic pressure 26 mmHg assuming right atrial pressure of 5 mmHg.   4. Mild-moderate tricuspid valve regurgitation.   5. No pericardial effusion.    TTE on 4/27/2023:  Interpretation Summary  Left ventricular size, wall motion and function are normal. The ejection fraction is 55-60%.  Global right ventricular function is normal.  No hemodynamically significant valve abnormalities.  The inferior vena cava is normal.    ECHO on 8/5/21:  Left ventricular size, wall motion and function are normal other than abnormal  septal motion likely due to underlying left bundle branch block or pacing. The  ejection fraction is 55-60%.  Right ventricular function, chamber  size, wall motion, and thickness are  normal.  No significant valvular abnormalities present.  No pericardial effusion is present.  Right ventricular systolic pressure is 21mmHg above the right atrial pressure  which is estimated at 3mmHg.  There is no prior study for direct comparison.    [facility]:      History of coronary spasm and myocardial bridging s/p EMILIE to LAD and CAB x1 (LIMA-LAD)  Mild instent restenosis of LAD  Patent but atretic LIMA-LAD    [place] stress test with 4/1/2021:  1. Exercise echocardiogram positive for septal myocardial ischemia.  2. The patient's exercise capacity was limited.  3. Ejection fraction response from 60% at rest to 65% at peak stress.  4. Left ventricular end-systolic volume decreased with stress.  5. OTHER ECHO FINDINGS:  6. Findings consistent with elevated left ventricular filling pressure at rest and with exercise.    Echo on 4/1/2021 at [place]:  1. Normal left ventricular chamber size, no regional wall motion abnormalities, calculated 2-D linear ejection fraction 63 %.  2. Indeterminate left ventricular diastolic function and filling pressure.  3. Normal right ventricular chamber size, mildly reduced systolic function (by visual inspection,), estimated right ventricular systolic pressure 26 mmHg assuming right atrial pressure of 5 mmHg.  4. Mild-moderate tricuspid valve regurgitation.  5. No pericardial effusion.    Review of cardiac cath from [facility] by me on 4/1/2021:  1.  Severe coronary artery atherosclerosis  2.  Coronary artery grafts  CORONARY DIAGNOSTIC SUMMARY  Coronary artery dominance is left. Normal right coronary. Normal left main coronary. Normal circumflex coronary.   Side by side ostia of LAD and LCX.  The LAD is a diffusely diseased vessel, stented in its mid portion, and with a patent but somewhat atretic LIMA graft to its distal portion.,            Past Medical History:   Diagnosis Date    Acute thromboembolism of deep  veins of lower extremity (H)     Overview:  Hx of multiple episodes of DVT: 3 lower extremity; 5 upper extremity (right arm)    Atrial fibrillation (H)     No Comments Provided    Cardiac pacemaker in situ     Dual chamber    Cerebral thrombosis     2006,'95 w/ no residual    Coronary atherosclerosis     2006    Endometrial hyperplasia     s/p endometrial ablation     Essential hypertension     No Comments Provided    History of other genital system and obstetric disorders      8, Para 3-0-5-2    Other and unspecified angina pectoris     2006    Other and unspecified hyperlipidemia     No Comments Provided    Other postprocedural states      and ,Followed by Dr. Usman uDran, [facility],.    Paroxysmal supraventricular tachycardia     2006,s/p multiple ablations w last resulting in PPM    Personal history of transient ischemic attack (TIA) and cerebral infarction without residual deficit 2009    with left hemiplegia    Primary hypercoagulable state     No Comments Provided       Past Surgical History:   Procedure Laterality Date    ARTHROSCOPY KNEE      ,Arthroscopy for chondromalacia patella    AS CABG, ARTERY-VEIN, SINGLE  11/15/2016    Single vessel; done in [place]    ATTEMPTED ARTHROSCOPY      ,Right arthroscopy    BIOPSY BREAST      ,Breast cyst aspiration    COLONOSCOPY      2007,follow up recommended in 10 years.    CV CORONARY ANGIOGRAM N/A 2021    Procedure: CV CORONARY ANGIOGRAM;  Surgeon: Nathaniel Grier MD;  Location:  HEART CARDIAC CATH LAB    CV CORONARY ANGIOGRAM N/A 2023    Procedure: Coronary Angiogram;  Surgeon: Dru Bentley MD;  Location:  HEART CARDIAC CATH LAB    CV RIGHT HEART CATH MEASUREMENTS RECORDED N/A 2023    Procedure: Right Heart Catheterization;  Surgeon: Dru Bentley MD;  Location:  HEART CARDIAC CATH LAB    ENDOSCOPIC SINUS SURGERY      ,Sinus node  "ablation.    EP ABLATION ATRIAL FLUTTER N/A 5/7/2020    Procedure: EP VENOGRAM SVC;  Surgeon: Hakeem Moore MD;  Location:  HEART CARDIAC CATH LAB    EP PACEMAKER N/A 5/20/2020    Procedure: EP PACEMAKER;  Surgeon: Tima Johnson MD;  Location:  HEART CARDIAC CATH LAB    EP STUDY  12/1994    EP STUDY /ABLATION,AV re-entry tachycardia, tessie ablation    HEART CATH, ANGIOPLASTY      01/06,Stenting placed LAD after ergonovine provocation confirmed    HEART CATH, ANGIOPLASTY      03/06,90% lesion, normal left ventricular function    IMPLANT PACEMAKER      03/03,Guidant pacemaker placement, AV tessie ablation    LAPAROSCOPIC ABLATION ENDOMETRIOSIS      06/06    OTHER SURGICAL HISTORY      3/24/06,351609,([place]) [facility] STENT ANGIO    OTHER SURGICAL HISTORY      08/02,829857,EP STUDY /ABLATION    OTHER SURGICAL HISTORY      11/04,38802.0,CT CORONARY ANGIOGRAM ([place]),Coronary angiogram, failed ablation    OTHER SURGICAL HISTORY      12/04,173511,[facility] RELOCATION OF SKIN POCKET PACEMAKER,[facility] 5/24/05 reconfiguration of pacemaker \"pocket\"    OTHER SURGICAL HISTORY      207882,IP CONSULT TO ELECTROPHYSIOLOGY,study and lead change    OTHER SURGICAL HISTORY      12/06,205897,NEUROSTIMULATOR,Nerve stimulator implanted for chest pain control    OTHER SURGICAL HISTORY      12/2008,27065.0,CT CORONARY ANGIOGRAM ([place]),with increased left-sided weakness and vertigo, negative CT angiogram.    REPLACE PACEMAKER GENERATOR      12/29/04,Replacement of left ventricular pacemaker lead.    STENT  02/2017    LAD        Allergies   Allergen Reactions    Adhesive Tape        Only Tele patches cause rash and skin irritation. Okay to use other adhesive tape.     Morphine Nausea and Vomiting     OK in small doses    Prednisone Nausea and Vomiting     Per IV    Sotalol Nausea and Vomiting       Current Outpatient Medications   Medication Sig Dispense Refill    aspirin 81 MG EC tablet Take 1 tablet (81 mg) by mouth daily. 90 tablet 3    " atorvastatin (LIPITOR) 80 MG tablet Take 1 tablet by mouth once daily 90 tablet 3    calcium carbonate-vitamin D (OSCAL W/D) 500-200 MG-UNIT tablet Take 1 tablet by mouth daily      diltiazem ER (TIAZAC) 360 MG 24 hr ER beaded capsule Take 1 capsule (360 mg) by mouth daily. 90 capsule 3    furosemide (LASIX) 20 MG tablet Take 1 tablet (20 mg) by mouth daily. 90 tablet 3    isosorbide mononitrate CR (IMDUR) 120 MG 24 HR ER tablet Take 1 tablet by mouth once daily 90 tablet 3    MAGNESIUM-OXIDE 400 (240 Mg) MG tablet Take 1 tablet by mouth once daily 90 tablet 0    NIFEdipine ER (ADALAT CC) 90 MG 24 hr tablet Take 1 tablet by mouth once daily 90 tablet 3    nitroGLYcerin (NITRODUR) 0.2 MG/HR 24 hr patch Place 1 patch onto the skin daily. - for chronic angina, coronary vasospasm 30 patch 1    nitroGLYcerin (NITROSTAT) 0.4 MG sublingual tablet For chest pain place 1 tablet under the tongue every 5 minutes for 3 doses. If symptoms persist 5 minutes after 1st dose call 911. 100 tablet 4    ranolazine (RANEXA) 1000 MG TB12 12 hr tablet Take 1 tablet (1,000 mg) by mouth 2 times daily. 180 tablet 3    sertraline (ZOLOFT) 50 MG tablet Take 1 tablet by mouth once daily 90 tablet 0    spironolactone (ALDACTONE) 25 MG tablet Take 1 tablet by mouth once daily 90 tablet 3    warfarin ANTICOAGULANT (COUMADIN) 6 MG tablet 3 mg (0.5 tablets) every Mon, Wed, Fri; 6 mg (1 tablet)  all other days OR  AS  DIRECTED  BY  THE  [facility]  CLINIC. 70 tablet 1    zolpidem (AMBIEN) 10 MG tablet Tk 5-10 mg at bedtime for sleep. 90 tablet 2       Social History     Socioeconomic History    Marital status:      Spouse name: Antonio    Number of children: 2    Years of education: 16+    Highest education level: Master's degree (e.g., MA, MS, [employer], MEd, MSW, [employer])   Occupational History    Not on file   Tobacco Use    Smoking status: Never     Passive exposure: Current    Smokeless tobacco: Never    Tobacco comments:     Quit smoking: spouse  does not smoke in the house. Exposed in cars.   Vaping Use    Vaping status: Never Used   Substance and Sexual Activity    Alcohol use: Not Currently     Comment: Alcoholic Drinks/day: Alcoholic Drinks/day: 1-2 drinks a week    Drug use: Never    Sexual activity: Not Currently     Partners: Male   Other Topics Concern    Parent/sibling w/ CABG, MI or angioplasty before 65F 55M? Not Asked   Social History Narrative     2nd Marriage x 20 years to  Anthony they live in [place], [place]       in [place]. Completed her Master's Degree 2003. Taught Special Education.  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      Total 5 children blended family-3 dgts. And 2 sons    Daughter:  Philip    Son:  Anthony - lives in [place]    6 Grand-children 5 boys and 1 girl    Leigh parents' and siblings live in [place].     Social Drivers of Health     Financial Resource Strain: Low Risk  (2/26/2024)    Financial Resource Strain     Within the past 12 months, have you or your family members you live with been unable to get utilities (heat, electricity) when it was really needed?: No   Food Insecurity: Low Risk  (2/26/2024)    Food Insecurity     Within the past 12 months, did you worry that your food would run out before you got money to buy more?: No     Within the past 12 months, did the food you bought just not last and you didn t have money to get more?: No   Transportation Needs: Low Risk  (2/26/2024)    Transportation Needs     Within the past 12 months, has lack of transportation kept you from medical appointments, getting your medicines, non-medical meetings or appointments, work, or from getting things that you need?: No   Physical Activity: Inactive (2/26/2024)    Exercise Vital Sign     Days of Exercise per Week: 0 days     Minutes of Exercise per Session: 0 min   Stress: No Stress Concern Present (2/26/2024)    [employer] - Occupational Stress Questionnaire      Feeling of Stress : Only a little   Social Connections: Unknown (2/26/2024)    Social Connection and Isolation Panel [NHANES]     Frequency of Communication with Friends and Family: Not on file     Frequency of Social Gatherings with Friends and Family: Once a week     Attends Evangelical Services: Not on file     Active Member of Clubs or Organizations: Not on file     Attends Club or Organization Meetings: Not on file     Marital Status: Not on file   Interpersonal Safety: Low Risk  (2/4/2025)    Interpersonal Safety     Do you feel physically and emotionally safe where you currently live?: Yes     Within the past 12 months, have you been hit, slapped, kicked or otherwise physically hurt by someone?: No     Within the past 12 months, have you been humiliated or emotionally abused in other ways by your partner or ex-partner?: No   Housing Stability: Low Risk  (2/26/2024)    Housing Stability     Do you have housing? : Yes     Are you worried about losing your housing?: No       LAB RESULTS:   Anticoagulation Therapy Visit on 08/24/2020   Component Date Value Ref Range Status    INR 08/24/2020 1.3* 0.90 - 1.10 Corrected   Anticoagulation Therapy Visit on 08/19/2020   Component Date Value Ref Range Status    INR 08/19/2020 2.3* 0.90 - 1.10 Final   Anticoagulation Therapy Visit on 08/05/2020   Component Date Value Ref Range Status    INR 08/05/2020 2.1* 0.90 - 1.10 Final   Allied Health/Nurse Visit on 08/03/2020   Component Date Value Ref Range Status    COVID-19 Virus PCR to [facility] - So* 08/03/2020 Nasopharyngeal   Final    COVID-19 Virus PCR to [facility] - Re* 08/03/2020 Not Detected   Final   Anticoagulation Therapy Visit on 07/30/2020   Component Date Value Ref Range Status    INR 07/30/2020 3.8* 0.90 - 1.10 Final   Anticoagulation Therapy Visit on 07/27/2020   Component Date Value Ref Range Status    INR 07/25/2020 1.1  0.90 - 1.10 Final    INR 07/27/2020 1.2* 0.90 - 1.10 Final   Anticoagulation Therapy Visit on  "07/23/2020   Component Date Value Ref Range Status    INR Protime 07/23/2020 1.0  0.86 - 1.14 Final   Anticoagulation Therapy Visit on 07/21/2020   Component Date Value Ref Range Status    INR 07/21/2020 1.4* 0.90 - 1.10 Final   Anticoagulation Therapy Visit on 07/09/2020   Component Date Value Ref Range Status    INR 07/08/2020 2.1* 0.90 - 1.10 Final        Review of systems: Negative except that which was noted in the HPI.    Physical examination:  /82 (BP Location: Right arm, Patient Position: Sitting, Cuff Size: Adult Regular)   Pulse 66   Temp 97  F (36.1  C) (Temporal)   Resp 16   Ht 1.65 m (5' 4.96\")   Wt 64.4 kg (142 lb)   LMP 03/04/2006 (Approximate)   SpO2 98%   BMI 23.66 kg/m      GENERAL APPEARANCE: healthy, alert and no distress  CHEST: lungs clear to auscultation - no rales, rhonchi or wheezes, no use of accessory muscles, no retractions, respirations are unlabored, normal respiratory rate  CARDIOVASCULAR: regular rhythm, normal S1 with physiologic split S2, no S3 or S4 and no murmur, click or rub  EXTREMITIES: no clubbing, cyanosis with mild peripheral edema.    Total time spent on day of visit, including review of tests, obtaining/reviewing separately obtained history, ordering medications/tests/procedures, communicating with PCP/consultants, and documenting in electronic medical record: 30 minutes.          Thank you for allowing me to participate in the care of your patient. Please do not hesitate to contact me if you have any questions.     Moises Islas, DO        "

## 2025-02-04 ENCOUNTER — TELEPHONE (OUTPATIENT)
Dept: CARDIOLOGY | Facility: OTHER | Age: 70
End: 2025-02-04

## 2025-02-04 ENCOUNTER — OFFICE VISIT (OUTPATIENT)
Dept: CARDIOLOGY | Facility: OTHER | Age: 70
End: 2025-02-04
Attending: INTERNAL MEDICINE
Payer: MEDICARE

## 2025-02-04 VITALS
TEMPERATURE: 97 F | SYSTOLIC BLOOD PRESSURE: 124 MMHG | WEIGHT: 142 LBS | BODY MASS INDEX: 23.66 KG/M2 | HEIGHT: 65 IN | DIASTOLIC BLOOD PRESSURE: 82 MMHG | OXYGEN SATURATION: 98 % | RESPIRATION RATE: 16 BRPM | HEART RATE: 66 BPM

## 2025-02-04 DIAGNOSIS — Z95.1 HISTORY OF CORONARY ARTERY BYPASS GRAFT X 1: ICD-10-CM

## 2025-02-04 DIAGNOSIS — Z86.79 PERSONAL HISTORY OF SUPRAVENTRICULAR TACHYCARDIA: ICD-10-CM

## 2025-02-04 DIAGNOSIS — I48.11 LONGSTANDING PERSISTENT ATRIAL FIBRILLATION (H): ICD-10-CM

## 2025-02-04 DIAGNOSIS — Z86.79 HISTORY OF CORONARY VASOSPASM: ICD-10-CM

## 2025-02-04 DIAGNOSIS — R60.0 LOWER EXTREMITY EDEMA: ICD-10-CM

## 2025-02-04 DIAGNOSIS — I50.9 CONGESTIVE HEART FAILURE, UNSPECIFIED HF CHRONICITY, UNSPECIFIED HEART FAILURE TYPE (H): ICD-10-CM

## 2025-02-04 DIAGNOSIS — Z95.1 HISTORY OF CORONARY ARTERY BYPASS GRAFT X 1: Primary | ICD-10-CM

## 2025-02-04 DIAGNOSIS — I47.10 PAROXYSMAL SUPRAVENTRICULAR TACHYCARDIA: ICD-10-CM

## 2025-02-04 DIAGNOSIS — Q24.5 MYOCARDIAL BRIDGE: ICD-10-CM

## 2025-02-04 DIAGNOSIS — Z79.01 ANTICOAGULATION MONITORING, INR RANGE 2-3: ICD-10-CM

## 2025-02-04 DIAGNOSIS — Z98.890 HISTORY OF CARDIAC RADIOFREQUENCY ABLATION: ICD-10-CM

## 2025-02-04 DIAGNOSIS — I49.8 PACEMAKER-DEPENDENT DUE TO NATIVE CARDIAC RHYTHM INSUFFICIENT TO SUPPORT LIFE: ICD-10-CM

## 2025-02-04 DIAGNOSIS — Z86.79 HISTORY OF HEART FAILURE: ICD-10-CM

## 2025-02-04 DIAGNOSIS — Z98.890 S/P AV NODAL ABLATION: ICD-10-CM

## 2025-02-04 DIAGNOSIS — Z98.890 STATUS POST CORONARY ANGIOGRAM: ICD-10-CM

## 2025-02-04 DIAGNOSIS — I10 ESSENTIAL HYPERTENSION: ICD-10-CM

## 2025-02-04 DIAGNOSIS — Z95.0 PACEMAKER-DEPENDENT DUE TO NATIVE CARDIAC RHYTHM INSUFFICIENT TO SUPPORT LIFE: ICD-10-CM

## 2025-02-04 DIAGNOSIS — Z86.73 HISTORY OF STROKE: ICD-10-CM

## 2025-02-04 DIAGNOSIS — Z95.5 HISTORY OF CORONARY ARTERY STENT PLACEMENT: ICD-10-CM

## 2025-02-04 DIAGNOSIS — F51.04 CHRONIC INSOMNIA: ICD-10-CM

## 2025-02-04 DIAGNOSIS — Z86.718 HISTORY OF DVT (DEEP VEIN THROMBOSIS): ICD-10-CM

## 2025-02-04 DIAGNOSIS — R07.89 OTHER CHEST PAIN: ICD-10-CM

## 2025-02-04 DIAGNOSIS — I20.1 CORONARY ARTERY SPASM: ICD-10-CM

## 2025-02-04 DIAGNOSIS — R07.9 CHEST PAIN, UNSPECIFIED TYPE: ICD-10-CM

## 2025-02-04 PROCEDURE — 93005 ELECTROCARDIOGRAM TRACING: CPT | Performed by: INTERNAL MEDICINE

## 2025-02-04 PROCEDURE — G0463 HOSPITAL OUTPT CLINIC VISIT: HCPCS

## 2025-02-04 RX ORDER — DILTIAZEM HYDROCHLORIDE 360 MG/1
360 CAPSULE, EXTENDED RELEASE ORAL DAILY
Qty: 90 CAPSULE | Refills: 3 | Status: SHIPPED | OUTPATIENT
Start: 2025-02-04

## 2025-02-04 RX ORDER — NITROGLYCERIN 0.4 MG/1
TABLET SUBLINGUAL
Qty: 100 TABLET | Refills: 4 | Status: SHIPPED | OUTPATIENT
Start: 2025-02-04

## 2025-02-04 RX ORDER — RANOLAZINE 500 MG/1
500 TABLET, EXTENDED RELEASE ORAL 2 TIMES DAILY
Qty: 180 TABLET | Refills: 3 | Status: SHIPPED | OUTPATIENT
Start: 2025-02-04

## 2025-02-04 RX ORDER — ZOLPIDEM TARTRATE 10 MG/1
TABLET ORAL
Qty: 90 TABLET | Refills: 2 | Status: SHIPPED | OUTPATIENT
Start: 2025-02-04

## 2025-02-04 RX ORDER — RANOLAZINE 1000 MG/1
1000 TABLET, EXTENDED RELEASE ORAL 2 TIMES DAILY
Qty: 180 TABLET | Refills: 3 | Status: SHIPPED | OUTPATIENT
Start: 2025-02-04 | End: 2025-02-04

## 2025-02-04 RX ORDER — ASPIRIN 81 MG/1
81 TABLET ORAL DAILY
Qty: 90 TABLET | Refills: 3 | Status: SHIPPED | OUTPATIENT
Start: 2025-02-04

## 2025-02-04 RX ORDER — NITROGLYCERIN 40 MG/1
1 PATCH TRANSDERMAL DAILY
Qty: 30 PATCH | Refills: 1 | Status: SHIPPED | OUTPATIENT
Start: 2025-02-04

## 2025-02-04 ASSESSMENT — PAIN SCALES - GENERAL: PAINLEVEL_OUTOF10: MODERATE PAIN (4)

## 2025-02-04 NOTE — TELEPHONE ENCOUNTER
I decreased ranolazine/Ranexa to 500 mg twice a day from 1000 mg twice a day.  I sent a new prescription to [facility].   Per Dr Islas.   After verification, [facility] notified.  Mirella Royal LPN ....................2/4/2025   1:37 PM    
Pharmacy called about a possible drug interaction. Diltiazem and   ranolazine have a reaction at these doses and it is recommended that dosage of ranolazine be reduced to limit of 500mg 2x per day. Please call    Kaylee Temple on 2/4/2025 at 9:45 AM    
no

## 2025-02-04 NOTE — NURSING NOTE
"Chief Complaint   Patient presents with    Follow Up     annual       Initial /82 (BP Location: Right arm, Patient Position: Sitting, Cuff Size: Adult Regular)   Pulse 66   Temp 97  F (36.1  C) (Temporal)   Resp 16   Ht 1.65 m (5' 4.96\")   Wt 64.4 kg (142 lb)   LMP 03/04/2006 (Approximate)   SpO2 98%   BMI 23.66 kg/m   Estimated body mass index is 23.66 kg/m  as calculated from the following:    Height as of this encounter: 1.65 m (5' 4.96\").    Weight as of this encounter: 64.4 kg (142 lb).  Meds Reconciled: complete  Pt is on Aspirin  Pt is on a Statin  PHQ and/or JUAN CARLOS reviewed. Pt referred to PCP/MH Provider as appropriate.    Mirella Royal LPN      "

## 2025-02-05 LAB
ATRIAL RATE - MUSE: 60 BPM
DIASTOLIC BLOOD PRESSURE - MUSE: NORMAL MMHG
INTERPRETATION ECG - MUSE: NORMAL
P AXIS - MUSE: NORMAL DEGREES
PR INTERVAL - MUSE: 152 MS
QRS DURATION - MUSE: 124 MS
QT - MUSE: 446 MS
QTC - MUSE: 446 MS
R AXIS - MUSE: 88 DEGREES
SYSTOLIC BLOOD PRESSURE - MUSE: NORMAL MMHG
T AXIS - MUSE: 240 DEGREES
VENTRICULAR RATE- MUSE: 60 BPM

## 2025-02-11 NOTE — PROGRESS NOTES
ANTICOAGULATION  MANAGEMENT-Home Monitor Managed by Exception    Lyudmila PRAJAPATI Fitzgerald 67 year old female is on warfarin with therapeutic INR result. (Goal INR 2.0-3.0)    Recent labs: (last 7 days)     02/17/23  0000   INR 3.0         Previous INR was Therapeutic    Medication, diet, health changes since last INR:chart reviewed; none identified    Contacted within the last 12 weeks by phone on 1/3/23      PLAN     Lyudmila was NOT contacted regarding therapeutic result today per home monitoring policy manage by exception agreement.   Current warfarin dose is to be continued:     Summary  As of 2/17/2023    Full warfarin instructions:  3 mg every Mon, Wed, Fri; 6 mg all other days   Next INR check:  3/10/2023           ?   Winter Pedraza RN  Anticoagulation Clinic  2/17/2023    _______________________________________________________________________     Anticoagulation Episode Summary     Current INR goal:  2.0-3.0   TTR:  79.3 % (1 y)   Target end date:  Indefinite   Send INR reminders to:  [facility]    Indications    Acute thromboembolism of deep veins of lower extremity (H) (Resolved) [I82.409]  Anticoagulation monitoring  INR range 2-3 [Z79.01]  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]  Cerebral thrombosis [I66.9]  History of coronary artery stent placement to the LAD x3 [Z95.5]           Comments:  home monitor Anderss check INR q 2 weeks         Anticoagulation Care Providers     Provider Role Specialty Phone number    Erika Carrasco MD Referring Family Medicine [phone]             11-Feb-2025 06:39

## 2025-02-12 ENCOUNTER — ANTICOAGULATION THERAPY VISIT (OUTPATIENT)
Dept: ANTICOAGULATION | Facility: OTHER | Age: 70
End: 2025-02-12
Payer: MEDICARE

## 2025-02-12 DIAGNOSIS — Z95.5 HISTORY OF CORONARY ARTERY STENT PLACEMENT: ICD-10-CM

## 2025-02-12 DIAGNOSIS — I66.9 CEREBRAL THROMBOSIS: ICD-10-CM

## 2025-02-12 DIAGNOSIS — Z79.01 ANTICOAGULATION MONITORING, INR RANGE 2-3: Primary | ICD-10-CM

## 2025-02-12 DIAGNOSIS — D68.2 FACTOR V DEFICIENCY (H): ICD-10-CM

## 2025-02-12 LAB — INR HOME MONITORING: 2.9 (ref 2–3)

## 2025-02-12 NOTE — PROGRESS NOTES
ANTICOAGULATION  MANAGEMENT-Home Monitor Managed by Exception    Lyudmila PRAJAPATI Fitzgerald 69 year old female is on warfarin with therapeutic INR result. (Goal INR 2.0-3.0)    Recent labs: (last 7 days)     02/12/25  0000   INR 2.9       Previous INR was Therapeutic  Medication, diet, health changes since last INR:chart reviewed; none identified  Contacted within the last 12 weeks by phone on 12/10/24  Last [facility] referral date: 01/23/2025      ROD     Lyudmila was NOT contacted regarding therapeutic result today per home monitoring policy manage by exception agreement.   Current warfarin dose is to be continued:     Summary  As of 2/12/2025      Full warfarin instructions:  3 mg every Mon, Wed, Fri; 6 mg all other days   Next INR check:  2/26/2025             ?   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

## 2025-02-13 ENCOUNTER — ANCILLARY PROCEDURE (OUTPATIENT)
Dept: CARDIOLOGY | Facility: CLINIC | Age: 70
End: 2025-02-13
Attending: INTERNAL MEDICINE
Payer: MEDICARE

## 2025-02-13 DIAGNOSIS — Z98.890 S/P AV NODAL ABLATION: ICD-10-CM

## 2025-02-13 DIAGNOSIS — I47.10 SUPRAVENTRICULAR TACHYCARDIA: ICD-10-CM

## 2025-02-13 PROCEDURE — 93294 REM INTERROG EVL PM/LDLS PM: CPT | Performed by: INTERNAL MEDICINE

## 2025-02-13 PROCEDURE — 93296 REM INTERROG EVL PM/IDS: CPT

## 2025-02-19 LAB
MDC_IDC_EPISODE_DTM: NORMAL
MDC_IDC_EPISODE_DURATION: 4 S
MDC_IDC_EPISODE_DURATION: 5 S
MDC_IDC_EPISODE_DURATION: 8 S
MDC_IDC_EPISODE_ID: NORMAL
MDC_IDC_EPISODE_TYPE: NORMAL
MDC_IDC_LEAD_CONNECTION_STATUS: NORMAL
MDC_IDC_LEAD_CONNECTION_STATUS: NORMAL
MDC_IDC_LEAD_IMPLANT_DT: NORMAL
MDC_IDC_LEAD_IMPLANT_DT: NORMAL
MDC_IDC_LEAD_LOCATION: NORMAL
MDC_IDC_LEAD_LOCATION: NORMAL
MDC_IDC_LEAD_LOCATION_DETAIL_1: NORMAL
MDC_IDC_LEAD_LOCATION_DETAIL_1: NORMAL
MDC_IDC_LEAD_MFG: NORMAL
MDC_IDC_LEAD_MFG: NORMAL
MDC_IDC_LEAD_MODEL: NORMAL
MDC_IDC_LEAD_MODEL: NORMAL
MDC_IDC_LEAD_POLARITY_TYPE: NORMAL
MDC_IDC_LEAD_POLARITY_TYPE: NORMAL
MDC_IDC_LEAD_SERIAL: NORMAL
MDC_IDC_LEAD_SERIAL: NORMAL
MDC_IDC_LEAD_SPECIAL_FUNCTION: NORMAL
MDC_IDC_LEAD_SPECIAL_FUNCTION: NORMAL
MDC_IDC_MSMT_BATTERY_DTM: NORMAL
MDC_IDC_MSMT_BATTERY_REMAINING_LONGEVITY: 84 MO
MDC_IDC_MSMT_BATTERY_REMAINING_PERCENTAGE: 84 %
MDC_IDC_MSMT_BATTERY_STATUS: NORMAL
MDC_IDC_MSMT_LEADCHNL_RA_IMPEDANCE_VALUE: 647 OHM
MDC_IDC_MSMT_LEADCHNL_RA_LEAD_CHANNEL_STATUS: NORMAL
MDC_IDC_MSMT_LEADCHNL_RV_IMPEDANCE_VALUE: 476 OHM
MDC_IDC_MSMT_LEADCHNL_RV_PACING_THRESHOLD_AMPLITUDE: 1 V
MDC_IDC_MSMT_LEADCHNL_RV_PACING_THRESHOLD_PULSEWIDTH: 0.4 MS
MDC_IDC_PG_IMPLANT_DTM: NORMAL
MDC_IDC_PG_MFG: NORMAL
MDC_IDC_PG_MODEL: NORMAL
MDC_IDC_PG_SERIAL: NORMAL
MDC_IDC_PG_TYPE: NORMAL
MDC_IDC_SESS_CLINIC_NAME: NORMAL
MDC_IDC_SESS_DTM: NORMAL
MDC_IDC_SESS_TYPE: NORMAL
MDC_IDC_SET_BRADY_AT_MODE_SWITCH_MODE: NORMAL
MDC_IDC_SET_BRADY_AT_MODE_SWITCH_RATE: 140 {BEATS}/MIN
MDC_IDC_SET_BRADY_LOWRATE: 60 {BEATS}/MIN
MDC_IDC_SET_BRADY_MAX_SENSOR_RATE: 130 {BEATS}/MIN
MDC_IDC_SET_BRADY_MAX_TRACKING_RATE: 100 {BEATS}/MIN
MDC_IDC_SET_BRADY_MODE: NORMAL
MDC_IDC_SET_BRADY_PAV_DELAY_HIGH: 80 MS
MDC_IDC_SET_BRADY_PAV_DELAY_LOW: 130 MS
MDC_IDC_SET_BRADY_SAV_DELAY_HIGH: 80 MS
MDC_IDC_SET_BRADY_SAV_DELAY_LOW: 130 MS
MDC_IDC_SET_LEADCHNL_RA_PACING_AMPLITUDE: 2.4 V
MDC_IDC_SET_LEADCHNL_RA_PACING_CAPTURE_MODE: NORMAL
MDC_IDC_SET_LEADCHNL_RA_PACING_POLARITY: NORMAL
MDC_IDC_SET_LEADCHNL_RA_PACING_PULSEWIDTH: 1 MS
MDC_IDC_SET_LEADCHNL_RA_SENSING_ADAPTATION_MODE: NORMAL
MDC_IDC_SET_LEADCHNL_RA_SENSING_POLARITY: NORMAL
MDC_IDC_SET_LEADCHNL_RA_SENSING_SENSITIVITY: 0.6 MV
MDC_IDC_SET_LEADCHNL_RV_PACING_AMPLITUDE: 1.6 V
MDC_IDC_SET_LEADCHNL_RV_PACING_CAPTURE_MODE: NORMAL
MDC_IDC_SET_LEADCHNL_RV_PACING_POLARITY: NORMAL
MDC_IDC_SET_LEADCHNL_RV_PACING_PULSEWIDTH: 0.4 MS
MDC_IDC_SET_LEADCHNL_RV_SENSING_ADAPTATION_MODE: NORMAL
MDC_IDC_SET_LEADCHNL_RV_SENSING_POLARITY: NORMAL
MDC_IDC_SET_LEADCHNL_RV_SENSING_SENSITIVITY: 1.5 MV
MDC_IDC_SET_ZONE_DETECTION_INTERVAL: 375 MS
MDC_IDC_SET_ZONE_STATUS: NORMAL
MDC_IDC_SET_ZONE_TYPE: NORMAL
MDC_IDC_SET_ZONE_VENDOR_TYPE: NORMAL
MDC_IDC_STAT_AT_BURDEN_PERCENT: 1 %
MDC_IDC_STAT_AT_DTM_END: NORMAL
MDC_IDC_STAT_AT_DTM_START: NORMAL
MDC_IDC_STAT_BRADY_DTM_END: NORMAL
MDC_IDC_STAT_BRADY_DTM_START: NORMAL
MDC_IDC_STAT_BRADY_RA_PERCENT_PACED: 95 %
MDC_IDC_STAT_BRADY_RV_PERCENT_PACED: 100 %
MDC_IDC_STAT_EPISODE_RECENT_COUNT: 0
MDC_IDC_STAT_EPISODE_RECENT_COUNT: 0
MDC_IDC_STAT_EPISODE_RECENT_COUNT_DTM_END: NORMAL
MDC_IDC_STAT_EPISODE_RECENT_COUNT_DTM_END: NORMAL
MDC_IDC_STAT_EPISODE_RECENT_COUNT_DTM_START: NORMAL
MDC_IDC_STAT_EPISODE_RECENT_COUNT_DTM_START: NORMAL
MDC_IDC_STAT_EPISODE_TYPE: NORMAL
MDC_IDC_STAT_EPISODE_TYPE: NORMAL
MDC_IDC_STAT_EPISODE_VENDOR_TYPE: NORMAL
MDC_IDC_STAT_EPISODE_VENDOR_TYPE: NORMAL

## 2025-02-25 ENCOUNTER — ANTICOAGULATION THERAPY VISIT (OUTPATIENT)
Dept: ANTICOAGULATION | Facility: OTHER | Age: 70
End: 2025-02-25
Payer: MEDICARE

## 2025-02-25 DIAGNOSIS — Z79.01 ANTICOAGULATION MONITORING, INR RANGE 2-3: Primary | ICD-10-CM

## 2025-02-25 DIAGNOSIS — E83.42 HYPOMAGNESEMIA: ICD-10-CM

## 2025-02-25 DIAGNOSIS — I66.9 CEREBRAL THROMBOSIS: ICD-10-CM

## 2025-02-25 DIAGNOSIS — Z95.5 HISTORY OF CORONARY ARTERY STENT PLACEMENT: ICD-10-CM

## 2025-02-25 DIAGNOSIS — D68.2 FACTOR V DEFICIENCY (H): ICD-10-CM

## 2025-02-25 LAB — INR HOME MONITORING: 2 (ref 2–3)

## 2025-02-25 NOTE — PROGRESS NOTES
ANTICOAGULATION  MANAGEMENT-Home Monitor Managed by Exception    Lyudmila PRAJAPATI Fitzgerald 69 year old female is on warfarin with therapeutic INR result. (Goal INR 2.0-3.0)    Recent labs: (last 7 days)     02/25/25  0000   INR 2.0       Previous INR was Therapeutic  Medication, diet, health changes since last INR:chart reviewed; none identified  Contacted within the last 12 weeks by phone on 12/10/24  Last [facility] referral date: 01/23/2025      ROD     Lyudmila was NOT contacted regarding therapeutic result today per home monitoring policy manage by exception agreement.   Current warfarin dose is to be continued:     Summary  As of 2/25/2025      Full warfarin instructions:  3 mg every Mon, Wed, Fri; 6 mg all other days   Next INR check:  3/11/2025             ?   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

## 2025-02-26 RX ORDER — MAGNESIUM OXIDE TAB 400 MG (241.3 MG ELEMENTAL MG) 400 (241.3 MG) MG
400 TAB ORAL DAILY
Qty: 90 TABLET | Refills: 0 | Status: SHIPPED | OUTPATIENT
Start: 2025-02-26

## 2025-03-09 ENCOUNTER — HEALTH MAINTENANCE LETTER (OUTPATIENT)
Age: 70
End: 2025-03-09

## 2025-03-13 ENCOUNTER — ANTICOAGULATION THERAPY VISIT (OUTPATIENT)
Dept: ANTICOAGULATION | Facility: OTHER | Age: 70
End: 2025-03-13
Payer: MEDICARE

## 2025-03-13 DIAGNOSIS — D68.2 FACTOR V DEFICIENCY (H): ICD-10-CM

## 2025-03-13 DIAGNOSIS — Z95.5 HISTORY OF CORONARY ARTERY STENT PLACEMENT: ICD-10-CM

## 2025-03-13 DIAGNOSIS — I66.9 CEREBRAL THROMBOSIS: ICD-10-CM

## 2025-03-13 DIAGNOSIS — Z79.01 ANTICOAGULATION MONITORING, INR RANGE 2-3: Primary | ICD-10-CM

## 2025-03-13 LAB — INR HOME MONITORING: 2.2 (ref 2–3)

## 2025-03-13 SDOH — HEALTH STABILITY: PHYSICAL HEALTH: ON AVERAGE, HOW MANY DAYS PER WEEK DO YOU ENGAGE IN MODERATE TO STRENUOUS EXERCISE (LIKE A BRISK WALK)?: 0 DAYS

## 2025-03-13 SDOH — HEALTH STABILITY: PHYSICAL HEALTH: ON AVERAGE, HOW MANY MINUTES DO YOU ENGAGE IN EXERCISE AT THIS LEVEL?: 0 MIN

## 2025-03-13 ASSESSMENT — SOCIAL DETERMINANTS OF HEALTH (SDOH): HOW OFTEN DO YOU GET TOGETHER WITH FRIENDS OR RELATIVES?: ONCE A WEEK

## 2025-03-13 NOTE — PROGRESS NOTES
ANTICOAGULATION MANAGEMENT     Lyudmila Fitzgerald 69 year old female is on warfarin with therapeutic INR result. (Goal INR 2.0-3.0)    Recent labs: (last 7 days)     03/13/25  0000   INR 2.2       ASSESSMENT     Source(s): Chart Review and Patient/Caregiver Call     Warfarin doses taken: Warfarin taken as instructed  Diet: No new diet changes identified  Medication/supplement changes: None noted  New illness, injury, or hospitalization: No  Signs or symptoms of bleeding or clotting: No  Previous result: Therapeutic last 2(+) visits  Additional findings: None       PLAN     Recommended plan for no diet, medication or health factor changes affecting INR     Dosing Instructions: Continue your current warfarin dose with next INR in 2 weeks       Summary  As of 3/13/2025      Full warfarin instructions:  3 mg every Mon, Wed, Fri; 6 mg all other days   Next INR check:  3/27/2025               Telephone call with Leigh who verbalizes understanding and agrees to plan    Patient to recheck with home meter    Education provided: Resume manage by exception with home monitor. Continue to submit INR results to home monitor company.You will only be called when your result is out of range and at 90 day check in. Please call and notify [facility] if new medication started, dose missed, signs or symptoms of bleeding or clotting, or a surgery/procedure is scheduled. Due for next call no later than: 6/11/25.    Plan made per [facility] anticoagulation protocol    Nyla Walker RN  3/13/2025  Anticoagulation Clinic  [facility] for routing messages: p [facility]  [facility] patient phone line: [phone]        _______________________________________________________________________     Anticoagulation Episode Summary       Current INR goal:  2.0-3.0   TTR:  83.3% (1 y)   Target end date:  Indefinite   Send INR reminders to:  [facility]    Indications    Acute thromboembolism of deep veins of lower extremity (H) (Resolved)  [I82.409]  Anticoagulation monitoring  INR range 2-3 [Z79.01]  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

## 2025-03-18 ENCOUNTER — OFFICE VISIT (OUTPATIENT)
Dept: FAMILY MEDICINE | Facility: OTHER | Age: 70
End: 2025-03-18
Attending: FAMILY MEDICINE
Payer: MEDICARE

## 2025-03-18 ENCOUNTER — MYC MEDICAL ADVICE (OUTPATIENT)
Dept: FAMILY MEDICINE | Facility: OTHER | Age: 70
End: 2025-03-18

## 2025-03-18 VITALS
OXYGEN SATURATION: 98 % | SYSTOLIC BLOOD PRESSURE: 132 MMHG | BODY MASS INDEX: 23.82 KG/M2 | HEIGHT: 65 IN | DIASTOLIC BLOOD PRESSURE: 86 MMHG | TEMPERATURE: 96.8 F | HEART RATE: 60 BPM | RESPIRATION RATE: 16 BRPM | WEIGHT: 143 LBS

## 2025-03-18 DIAGNOSIS — Z00.00 ENCOUNTER FOR MEDICARE ANNUAL WELLNESS EXAM: Primary | ICD-10-CM

## 2025-03-18 DIAGNOSIS — F51.04 CHRONIC INSOMNIA: ICD-10-CM

## 2025-03-18 DIAGNOSIS — Z12.31 BREAST CANCER SCREENING BY MAMMOGRAM: ICD-10-CM

## 2025-03-18 DIAGNOSIS — Z23 NEED FOR IMMUNIZATION AGAINST INFLUENZA: ICD-10-CM

## 2025-03-18 DIAGNOSIS — I20.1 CORONARY ARTERY SPASM: ICD-10-CM

## 2025-03-18 DIAGNOSIS — D68.2 FACTOR V DEFICIENCY (H): ICD-10-CM

## 2025-03-18 DIAGNOSIS — I48.11 LONGSTANDING PERSISTENT ATRIAL FIBRILLATION (H): ICD-10-CM

## 2025-03-18 DIAGNOSIS — E11.9 TYPE 2 DIABETES MELLITUS WITHOUT COMPLICATION, WITHOUT LONG-TERM CURRENT USE OF INSULIN (H): ICD-10-CM

## 2025-03-18 DIAGNOSIS — I10 ESSENTIAL HYPERTENSION: ICD-10-CM

## 2025-03-18 DIAGNOSIS — E83.42 HYPOMAGNESEMIA: ICD-10-CM

## 2025-03-18 DIAGNOSIS — I69.354 HEMIPLEGIA OF LEFT NONDOMINANT SIDE AS LATE EFFECT OF CEREBRAL INFARCTION, UNSPECIFIED HEMIPLEGIA TYPE (H): ICD-10-CM

## 2025-03-18 DIAGNOSIS — F32.A DEPRESSION, UNSPECIFIED DEPRESSION TYPE: ICD-10-CM

## 2025-03-18 DIAGNOSIS — I50.33 ACUTE ON CHRONIC HEART FAILURE WITH PRESERVED EJECTION FRACTION (H): ICD-10-CM

## 2025-03-18 LAB
ALBUMIN SERPL BCG-MCNC: 4.6 G/DL (ref 3.5–5.2)
ALBUMIN UR-MCNC: NEGATIVE MG/DL
ALP SERPL-CCNC: 111 U/L (ref 40–150)
ALT SERPL W P-5'-P-CCNC: 27 U/L (ref 0–50)
ANION GAP SERPL CALCULATED.3IONS-SCNC: 16 MMOL/L (ref 7–15)
APPEARANCE UR: CLEAR
AST SERPL W P-5'-P-CCNC: 28 U/L (ref 0–45)
BILIRUB SERPL-MCNC: 0.6 MG/DL
BILIRUB UR QL STRIP: NEGATIVE
BUN SERPL-MCNC: 14.3 MG/DL (ref 8–23)
CALCIUM SERPL-MCNC: 10.4 MG/DL (ref 8.8–10.4)
CHLORIDE SERPL-SCNC: 97 MMOL/L (ref 98–107)
CHOLEST SERPL-MCNC: 266 MG/DL
COLOR UR AUTO: NORMAL
CREAT SERPL-MCNC: 0.94 MG/DL (ref 0.51–0.95)
CREAT UR-MCNC: 29.5 MG/DL
EGFRCR SERPLBLD CKD-EPI 2021: 65 ML/MIN/1.73M2
ERYTHROCYTE [DISTWIDTH] IN BLOOD BY AUTOMATED COUNT: 13 % (ref 10–15)
EST. AVERAGE GLUCOSE BLD GHB EST-MCNC: 114 MG/DL
FASTING STATUS PATIENT QL REPORTED: YES
FASTING STATUS PATIENT QL REPORTED: YES
GLUCOSE SERPL-MCNC: 111 MG/DL (ref 70–99)
GLUCOSE UR STRIP-MCNC: NEGATIVE MG/DL
HBA1C MFR BLD: 5.6 %
HCO3 SERPL-SCNC: 23 MMOL/L (ref 22–29)
HCT VFR BLD AUTO: 43.7 % (ref 35–47)
HDLC SERPL-MCNC: 71 MG/DL
HGB BLD-MCNC: 15.2 G/DL (ref 11.7–15.7)
HGB UR QL STRIP: NEGATIVE
KETONES UR STRIP-MCNC: NEGATIVE MG/DL
LDLC SERPL CALC-MCNC: 152 MG/DL
LEUKOCYTE ESTERASE UR QL STRIP: NEGATIVE
MCH RBC QN AUTO: 33.5 PG (ref 26.5–33)
MCHC RBC AUTO-ENTMCNC: 34.8 G/DL (ref 31.5–36.5)
MCV RBC AUTO: 96 FL (ref 78–100)
MICROALBUMIN UR-MCNC: <12 MG/L
MICROALBUMIN/CREAT UR: NORMAL MG/G{CREAT}
NITRATE UR QL: NEGATIVE
NONHDLC SERPL-MCNC: 195 MG/DL
PH UR STRIP: 6.5 [PH] (ref 5–9)
PLATELET # BLD AUTO: 317 10E3/UL (ref 150–450)
POTASSIUM SERPL-SCNC: 4.2 MMOL/L (ref 3.4–5.3)
PROT SERPL-MCNC: 7.9 G/DL (ref 6.4–8.3)
RBC # BLD AUTO: 4.54 10E6/UL (ref 3.8–5.2)
SODIUM SERPL-SCNC: 136 MMOL/L (ref 135–145)
SP GR UR STRIP: 1.01 (ref 1–1.03)
TRIGL SERPL-MCNC: 216 MG/DL
UROBILINOGEN UR STRIP-MCNC: NORMAL MG/DL
WBC # BLD AUTO: 7.3 10E3/UL (ref 4–11)

## 2025-03-18 PROCEDURE — G0008 ADMIN INFLUENZA VIRUS VAC: HCPCS

## 2025-03-18 PROCEDURE — 82040 ASSAY OF SERUM ALBUMIN: CPT | Mod: ZL | Performed by: FAMILY MEDICINE

## 2025-03-18 PROCEDURE — 82247 BILIRUBIN TOTAL: CPT | Mod: ZL | Performed by: FAMILY MEDICINE

## 2025-03-18 PROCEDURE — 83036 HEMOGLOBIN GLYCOSYLATED A1C: CPT | Mod: ZL | Performed by: FAMILY MEDICINE

## 2025-03-18 PROCEDURE — 81003 URINALYSIS AUTO W/O SCOPE: CPT | Mod: ZL | Performed by: FAMILY MEDICINE

## 2025-03-18 PROCEDURE — 84155 ASSAY OF PROTEIN SERUM: CPT | Mod: ZL | Performed by: FAMILY MEDICINE

## 2025-03-18 PROCEDURE — 85014 HEMATOCRIT: CPT | Mod: ZL | Performed by: FAMILY MEDICINE

## 2025-03-18 PROCEDURE — 82043 UR ALBUMIN QUANTITATIVE: CPT | Mod: ZL | Performed by: FAMILY MEDICINE

## 2025-03-18 PROCEDURE — 80061 LIPID PANEL: CPT | Mod: ZL | Performed by: FAMILY MEDICINE

## 2025-03-18 PROCEDURE — 36415 COLL VENOUS BLD VENIPUNCTURE: CPT | Mod: ZL | Performed by: FAMILY MEDICINE

## 2025-03-18 PROCEDURE — 85041 AUTOMATED RBC COUNT: CPT | Mod: ZL | Performed by: FAMILY MEDICINE

## 2025-03-18 PROCEDURE — 90662 IIV NO PRSV INCREASED AG IM: CPT

## 2025-03-18 PROCEDURE — 82570 ASSAY OF URINE CREATININE: CPT | Mod: ZL | Performed by: FAMILY MEDICINE

## 2025-03-18 RX ORDER — MAGNESIUM OXIDE 400 MG/1
400 TABLET ORAL DAILY
Qty: 90 TABLET | Refills: 3 | Status: SHIPPED | OUTPATIENT
Start: 2025-03-18

## 2025-03-18 ASSESSMENT — PAIN SCALES - GENERAL: PAINLEVEL_OUTOF10: MILD PAIN (3)

## 2025-03-18 NOTE — PROGRESS NOTES
Preventive Care Visit  [facility] AND [facility]  MARYURI SANDOVAL MD, Family Medicine  Mar 18, 2025      Assessment & Plan       ICD-10-CM    1. Encounter for Medicare annual wellness exam  Z00.00       2. Coronary artery spasm  I20.1 Lipid Profile     Comprehensive Metabolic Panel     CBC W PLT No Diff     Lipid Profile     Comprehensive Metabolic Panel     CBC W PLT No Diff      3. Diabetes mellitus, type II   E11.9 Albumin Random Urine Quantitative with Creat Ratio     HEMOGLOBIN A1C     Comprehensive Metabolic Panel     UA Macroscopic with reflex to Microscopic and Culture     UA Macroscopic with reflex to Microscopic and Culture     Albumin Random Urine Quantitative with Creat Ratio     HEMOGLOBIN A1C     Comprehensive Metabolic Panel      4. Essential hypertension  I10 CBC W PLT No Diff     CBC W PLT No Diff      5. Chronic insomnia  F51.04       6. Hypomagnesemia  E83.42 magnesium oxide 400 MG tablet      7. Depression, unspecified depression type  F32.A sertraline (ZOLOFT) 50 MG tablet      8. Breast cancer screening by mammogram  Z12.31 MA Screen Bilateral w/Magdiel      9. Need for immunization against influenza  Z23 INFLUENZA HIGH DOSE, TRIVALENT, PF (FLUZONE)      10. 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 Acute on chronic heart failure with preserved ejection fraction (H)  I50.33       13. Longstanding persistent atrial fibrillation (H)  I48.11           Flu vaccine updated  Continue annual mammograms  Continue routine follow up with cardiology for angina, pacemaker management; reviewed her management of recurrent anginal symptoms.  Reviewed medication use, side effects.  Anticoagulation, currently without complication.  Hypertension - at goal  Type 2 diabetes - monitoring labs and foot exam completed today  Mental health, overall stable in spite of 's  health concerns   Discussed long term concerns with sleep, use of sleep medication/prescription and association with chronic insomnia and earlier onset cognitive decline.  She does continue to intermittently take Ambien but has cut back on this from prior daily use.        Counseling  Appropriate preventive services were addressed with this patient via screening, questionnaire, or discussion as appropriate for fall prevention, nutrition, physical activity, Tobacco-use cessation, social engagement, weight loss and cognition.  Checklist reviewing preventive services available has been given to the patient.  Reviewed patient's diet, addressing concerns and/or questions.   Updated plan of care.  Patient reported difficulty with activities of daily living were addressed today.      Erika Gupta MD     Yovani Abraham is a 69 year old, presenting for the following:  Medicare Visit (Annual well visit)        3/18/2025     9:59 AM   Additional Questions   Roomed by NAY May  Lyudmila Fitzgerald is a 69 year old female in for [facility].   Chronic insomnia - has taken Ambien intermittently  used to take daily  Type 2 diabetes - not on medications, not monitoring  Coronary artery spasm  -has symptoms near weekly for which she needs to take additional nitro but has not needed to come to the hospital.  History of TIA/CVA     5.  Recent flu exposure  -did not receive flu vaccine this year    Diabetes Follow-up    How often are you checking your blood sugar? Not at all  What concerns do you have today about your diabetes? None   Do you have any of these symptoms? (Select all that apply)  none  Hyperlipidemia Follow-Up    Are you regularly taking any medication or supplement to lower your cholesterol?   Yes- statin  Are you having muscle aches or other side effects that you think could be caused by your cholesterol lowering medication?  No    Hypertension Follow-up    Do you check your blood pressure  regularly outside of the clinic? occasional   Are you following a low salt diet? Yes  Are your blood pressures ever more than 140 on the top number (systolic) OR more   than 90 on the bottom number (diastolic), for example 140/90? No    BP Readings from Last 2 Encounters:   03/18/25 132/86   02/04/25 124/82     Hemoglobin A1C (%)   Date Value   03/18/2025 5.6   03/04/2024 5.6   03/09/2020 5.8   09/27/2019 6.1 (H)     LDL Cholesterol Calculated (mg/dL)   Date Value   03/18/2025 152 (H)   09/18/2023 123 (H)   03/09/2020 178 (H)   09/27/2019 97         Vascular Disease Follow-up    How often do you take nitroglycerin? A few times weekly  Do you take an aspirin every day? Yes    Cerebrovascular Follow-up    Patient history: ischemic cerebrovascular incident and TIA  Residual symptoms: Weakness in the leg on the left  Worsened or new symptoms since last visit: No  Daily aspirin use: Yes  Hypertension controlled: Yes    Depression and Anxiety   How are you doing with your depression since your last visit? No change  How are you doing with your anxiety since your last visit?  No change  Are you having other symptoms that might be associated with depression or anxiety? No  Have you had a significant life event? Health Concerns   Do you have any concerns with your use of alcohol or other drugs? No    Social History     Tobacco Use    Smoking status: Never     Passive exposure: Never    Smokeless tobacco: Never    Tobacco comments:     Quit smoking: spouse does not smoke in the house. Exposed in cars.   Vaping Use    Vaping status: Never Used   Substance Use Topics    Alcohol use: Not Currently     Comment: Alcoholic Drinks/day: Alcoholic Drinks/day: 1-2 drinks a week    Drug use: Never         8/17/2023     8:52 AM 9/17/2023    12:34 PM 3/4/2024    11:07 AM   PHQ   PHQ-9 Total Score 0 7 4   Q9: Thoughts of better off dead/self-harm past 2 weeks Not at all Not at all Not at all         3/9/2020    11:02 AM   JUAN CARLOS-7 SCORE    Total Score 0         Suicide Assessment Five-step Evaluation and Treatment (SAFE-T)    Advance Care Planning  Patient has a Health Care Directive on file      PDMP Review         Value Time User    State PDMP site checked  Yes 3/19/2025  8:06 AM Erika Carrasco MD               3/13/2025   General Health   How would you rate your overall physical health? (!) POOR   Feel stress (tense, anxious, or unable to sleep) Not at all         3/13/2025   Nutrition   Diet: Regular (no restrictions)         3/13/2025   Exercise   Days per week of moderate/strenous exercise 0 days   Average minutes spent exercising at this level 0 min   (!) EXERCISE CONCERN      3/13/2025   Social Factors   Frequency of gathering with friends or relatives Once a week   Worry food won't last until get money to buy more No   Food not last or not have enough money for food? No   Do you have housing? (Housing is defined as stable permanent housing and does not include staying ouside in a car, in a tent, in an abandoned building, in an overnight shelter, or couch-surfing.) Yes   Are you worried about losing your housing? No   Lack of transportation? No   Unable to get utilities (heat,electricity)? No         3/13/2025   Fall Risk   Fallen 2 or more times in the past year? No   Trouble with walking or balance? No          3/13/2025   Activities of Daily Living- Home Safety   Needs help with the following daily activites Shopping    Preparing meals    Housework    Laundry   Safety concerns in the home None of the above       Multiple values from one day are sorted in reverse-chronological order         3/13/2025   Dental   Dentist two times every year? Yes         3/13/2025   Hearing Screening   Hearing concerns? None of the above         3/13/2025   Driving Risk Screening   Patient/family members have concerns about driving No         3/13/2025   General Alertness/Fatigue Screening   Have you been more tired than usual lately? No          3/13/2025   Urinary Incontinence Screening   Bothered by leaking urine in past 6 months No           2/26/2024   TB Screening   Were you born outside of the [place]? No           Today's PHQ-2 Score:       3/17/2025    10:22 AM   PHQ-2 ( 1999 [employer])   Q1: Little interest or pleasure in doing things 0   Q2: Feeling down, depressed or hopeless 0   PHQ-2 Score 0    Q1: Little interest or pleasure in doing things Not at all   Q2: Feeling down, depressed or hopeless Not at all   PHQ-2 Score 0       Patient-reported           3/13/2025   Substance Use   Alcohol more than 3/day or more than 7/wk No   Do you have a current opioid prescription? No   How severe/bad is pain from 1 to 10? 7/10   Do you use any other substances recreationally? No     Social History     Tobacco Use    Smoking status: Never     Passive exposure: Never    Smokeless tobacco: Never    Tobacco comments:     Quit smoking: spouse does not smoke in the house. Exposed in cars.   Vaping Use    Vaping status: Never Used   Substance Use Topics    Alcohol use: Not Currently     Comment: Alcoholic Drinks/day: Alcoholic Drinks/day: 1-2 drinks a week    Drug use: Never           1/31/2023   LAST FHS-7 RESULTS   1st degree relative breast or ovarian cancer Yes   Any relative bilateral breast cancer No   Any male have breast cancer No   Any ONE woman have BOTH breast AND ovarian cancer No   Any woman with breast cancer before 50yrs No   2 or more relatives with breast AND/OR ovarian cancer Yes    No   2 or more relatives with breast AND/OR bowel cancer No       Multiple values from one day are sorted in reverse-chronological order        Mammogram Screening - Mammogram every 1-2 years updated in Health Maintenance based on mutual decision making            Latest Ref Rng & Units 9/30/2019    11:29 AM 9/27/2019     2:55 PM   PAP / HPV   PAP (Historical)   NIL    HPV 16 DNA NEG^Negative Negative     HPV 18 DNA NEG^Negative Negative     Other HR HPV NEG^Negative Negative        ASCVD Risk   The ASCVD Risk score (Oralia COYNE, et al., 2019) failed to calculate for the following reasons:    Risk score cannot be calculated because patient has a medical history suggesting prior/existing ASCVD            Reviewed and updated as needed this visit by Provider                    Past Medical History:   Diagnosis Date    Acute thromboembolism of deep veins of lower extremity (H)     Overview:  Hx of multiple episodes of DVT: 3 lower extremity; 5 upper extremity (right arm)    Atrial fibrillation (H)     No Comments Provided    Cardiac pacemaker in situ     Dual chamber    Cerebral thrombosis     2006,'95 w/ no residual    Coronary atherosclerosis     2006    Endometrial hyperplasia     s/p endometrial ablation     Essential hypertension     No Comments Provided    History of other genital system and obstetric disorders      8, Para 3-0-5-2    Other and unspecified angina pectoris     2006    Other and unspecified hyperlipidemia     No Comments Provided    Other postprocedural states      and ,Followed by Dr. Usman Duran, [facility],.    Paroxysmal supraventricular tachycardia     2006,s/p multiple ablations w last resulting in PPM    Personal history of transient ischemic attack (TIA) and cerebral infarction without residual deficit 2009    with left hemiplegia    Primary hypercoagulable state     No Comments Provided     Past Surgical History:   Procedure Laterality Date    ARTHROSCOPY KNEE      ,Arthroscopy for chondromalacia patella    AS CABG, ARTERY-VEIN, SINGLE  11/15/2016    Single vessel; done in [place]    ATTEMPTED ARTHROSCOPY      ,Right arthroscopy    BIOPSY BREAST      ,Breast cyst aspiration    COLONOSCOPY      2007,follow up recommended in 10 years.    CV CORONARY ANGIOGRAM N/A 2021    Procedure: CV CORONARY ANGIOGRAM;  Surgeon: Nathaniel Grier MD;  Location: [facility]  "CARDIAC CATH LAB    CV CORONARY ANGIOGRAM N/A 7/31/2023    Procedure: Coronary Angiogram;  Surgeon: Dru Bentley MD;  Location:  HEART CARDIAC CATH LAB    CV RIGHT HEART CATH MEASUREMENTS RECORDED N/A 7/31/2023    Procedure: Right Heart Catheterization;  Surgeon: Dru Bentley MD;  Location:  HEART CARDIAC CATH LAB    ENDOSCOPIC SINUS SURGERY      03/04,Sinus node ablation.    EP ABLATION ATRIAL FLUTTER N/A 5/7/2020    Procedure: EP VENOGRAM SVC;  Surgeon: Hakeem Moore MD;  Location:  HEART CARDIAC CATH LAB    EP PACEMAKER N/A 5/20/2020    Procedure: EP PACEMAKER;  Surgeon: Tima Johnson MD;  Location: [facility] CARDIAC CATH LAB    EP STUDY  12/1994    EP STUDY /ABLATION,AV re-entry tachycardia, tessie ablation    HEART CATH, ANGIOPLASTY      01/06,Stenting placed LAD after ergonovine provocation confirmed    HEART CATH, ANGIOPLASTY      03/06,90% lesion, normal left ventricular function    IMPLANT PACEMAKER      03/03,Guidant pacemaker placement, AV tessie ablation    LAPAROSCOPIC ABLATION ENDOMETRIOSIS      06/06    OTHER SURGICAL HISTORY      3/24/06,535031,([place]) [facility] STENT ANGIO    OTHER SURGICAL HISTORY      08/02,093613,EP STUDY /ABLATION    OTHER SURGICAL HISTORY      11/04,77219.0,CT CORONARY ANGIOGRAM ([place]),Coronary angiogram, failed ablation    OTHER SURGICAL HISTORY      12/04,062752,[facility] RELOCATION OF SKIN POCKET PACEMAKER,[facility] 5/24/05 reconfiguration of pacemaker \"pocket\"    OTHER SURGICAL HISTORY      207882,IP CONSULT TO ELECTROPHYSIOLOGY,study and lead change    OTHER SURGICAL HISTORY      12/06,205897,NEUROSTIMULATOR,Nerve stimulator implanted for chest pain control    OTHER SURGICAL HISTORY      12/2008,77618.0,CT CORONARY ANGIOGRAM ([place]),with increased left-sided weakness and vertigo, negative CT angiogram.    REPLACE PACEMAKER GENERATOR      12/29/04,Replacement of left ventricular pacemaker lead.    STENT  02/2017    LAD      Current providers sharing in care for " "this patient include:  Patient Care Team:  Erika Carrasco MD as PCP - General (Family Practice)  Erika Carrasco MD as Assigned PCP  Xiao Davis, RN as Specialty Care Coordinator (Cardiology)  Hakeem Moore MD as MD (Clinical Cardiac Electrophysiology)  Tima Johnson MD as MD (Clinical Cardiac Electrophysiology)  Moises Islas,  as Assigned Heart and Vascular Provider    The following health maintenance items are reviewed in Epic and correct as of today:  Health Maintenance   Topic Date Due    HF ACTION PLAN  Never done    ZOSTER IMMUNIZATION (1 of 2) Never done    RSV VACCINE (1 - Risk 60-74 years 1-dose series) Never done    EYE EXAM  12/09/2023    MAMMO SCREENING  01/31/2025    A1C  06/18/2025    BMP  09/18/2025    MEDICARE ANNUAL WELLNESS VISIT  03/18/2026    ALT  03/18/2026    LIPID  03/18/2026    MICROALBUMIN  03/18/2026    DIABETIC FOOT EXAM  03/18/2026    FALL RISK ASSESSMENT  03/18/2026    CBC  03/18/2026    COLORECTAL CANCER SCREENING  04/04/2026    ADVANCE CARE PLANNING  03/04/2029    DTAP/TDAP/TD IMMUNIZATION (4 - Td or Tdap) 09/27/2029    DEXA  10/01/2036    TSH W/FREE T4 REFLEX  Completed    HEPATITIS C SCREENING  Completed    PHQ-2 (once per calendar year)  Completed    INFLUENZA VACCINE  Completed    Pneumococcal Vaccine: 50+ Years  Completed    HPV IMMUNIZATION  Aged Out    MENINGITIS IMMUNIZATION  Aged Out    PAP  Discontinued    COVID-19 Vaccine  Discontinued         Review of Systems  Right eye - probable cataract  Dentist - appointment tomorrow    Musculoskeletal -  right hip     Skin cancer - at annual checks       Objective    Exam  /86 (BP Location: Right arm, Patient Position: Sitting, Cuff Size: Adult Regular)   Pulse 60   Temp 96.8  F (36  C) (Temporal)   Resp 16   Ht 1.651 m (5' 5\")   Wt 64.9 kg (143 lb)   LMP 03/04/2006 (Approximate)   SpO2 98%   Breastfeeding No   BMI 23.80 kg/m     Estimated body mass index is 23.8 kg/m  as " "calculated from the following:    Height as of this encounter: 1.651 m (5' 5\").    Weight as of this encounter: 64.9 kg (143 lb).    Physical Exam  GENERAL: alert and no distress  EYES: Eyes grossly normal to inspection, PERRL and conjunctivae and sclerae normal  HENT: ear canals and TM's normal, nose and mouth without ulcers or lesions  NECK: no adenopathy, no asymmetry, masses, or scars  RESP: lungs clear to auscultation - no rales, rhonchi or wheezes  CV: RRR, packemaker in place  ABDOMEN: soft, nontender, no hepatosplenomegaly, no masses and bowel sounds normal  MS: no gross musculoskeletal defects noted, no edema  SKIN: SKs across her back  NEURO: Left LE weakness  PSYCH: mentation appears normal, affect normal/bright  Diabetic foot exam: normal DP and PT pulses, no trophic changes or ulcerative lesions, and mild decreased sensation on the left         3/18/2025   Mini Cog   Clock Draw Score 2 Normal   3 Item Recall 3 objects recalled   Mini Cog Total Score 5             3/18/2025   Vision Screen   Reason Vision Screen Not Completed --       Signed Electronically by: MARYURI SANDOVAL MD    "

## 2025-03-18 NOTE — NURSING NOTE
"Chief Complaint   Patient presents with    Medicare Visit     Annual well visit       Initial /86 (BP Location: Right arm, Patient Position: Sitting, Cuff Size: Adult Regular)   Pulse 60   Temp 96.8  F (36  C) (Temporal)   Resp 16   Ht 1.651 m (5' 5\")   Wt 64.9 kg (143 lb)   LMP 03/04/2006 (Approximate)   SpO2 98%   Breastfeeding No   BMI 23.80 kg/m   Estimated body mass index is 23.8 kg/m  as calculated from the following:    Height as of this encounter: 1.651 m (5' 5\").    Weight as of this encounter: 64.9 kg (143 lb).    Medication Review: complete    Previous A1C is at goal of <8  Lab Results   Component Value Date    A1C 5.6 03/04/2024    A1C 6.4 09/18/2023    A1C 5.6 12/30/2022    A1C 5.8 10/05/2022    A1C 5.6 07/21/2021    A1C 5.8 03/09/2020    A1C 6.1 09/27/2019     Urine microalbumin:creatine: 3/4/24  Foot exam : 3/18/25  Eye exam : Fall 2025    Tobacco User : no  Patient is on a daily aspirin  Patient is on a Statin.  Blood pressure today of:     BP Readings from Last 1 Encounters:   03/18/25 132/86      is at the goal of <139/89 for diabetics.    Carlee Carmona LPN on 3/18/2025 at 10:11 AM        The next two questions are to help us understand your food security.  If you are feeling you need any assistance in this area, we have resources available to support you today.          3/13/2025   SDOH- Food Insecurity   Within the past 12 months, did you worry that your food would run out before you got money to buy more? N   Within the past 12 months, did the food you bought just not last and you didn t have money to get more? N         Health Care Directive:  Patient has a Health Care Directive on file      Carlee Carmona LPN      "

## 2025-03-18 NOTE — TELEPHONE ENCOUNTER
Yes, taking the Atorvastatin 80 mg.     Per lab comment from 3/18:  Here are your recent results. I'm concerned about your cholesterol results.  I have on record that you are taking atorvastatin 80mg.  Let me know if this is correct or not.      Ryanne Browne RN on 3/18/2025 at 1:38 PM

## 2025-03-19 ENCOUNTER — MYC MEDICAL ADVICE (OUTPATIENT)
Dept: CARDIOLOGY | Facility: OTHER | Age: 70
End: 2025-03-19
Payer: MEDICARE

## 2025-03-19 DIAGNOSIS — I66.9 CEREBRAL THROMBOSIS: ICD-10-CM

## 2025-03-19 DIAGNOSIS — E78.2 MIXED HYPERLIPIDEMIA: ICD-10-CM

## 2025-03-19 DIAGNOSIS — Z95.5 HISTORY OF CORONARY ARTERY STENT PLACEMENT: ICD-10-CM

## 2025-03-19 DIAGNOSIS — R07.9 CHEST PAIN, UNSPECIFIED TYPE: Primary | ICD-10-CM

## 2025-03-19 DIAGNOSIS — Z95.1 HISTORY OF CORONARY ARTERY BYPASS GRAFT X 1: ICD-10-CM

## 2025-03-19 DIAGNOSIS — Q24.5 MYOCARDIAL BRIDGE: ICD-10-CM

## 2025-03-19 DIAGNOSIS — Z86.79 HISTORY OF CORONARY VASOSPASM: ICD-10-CM

## 2025-03-19 DIAGNOSIS — I20.1 CORONARY ARTERY SPASM: ICD-10-CM

## 2025-03-19 NOTE — TELEPHONE ENCOUNTER
Call patient - I would like her to follow up with cardiology to discuss these additional lipid treatments.  Erika Gupta MD

## 2025-03-20 RX ORDER — EZETIMIBE 10 MG/1
10 TABLET ORAL DAILY
Qty: 90 TABLET | Refills: 3 | Status: SHIPPED | OUTPATIENT
Start: 2025-03-20

## 2025-03-20 NOTE — ADDENDUM NOTE
Addended by: KAVYA CUNNINGHAM on: 3/20/2025 12:22 PM     Modules accepted: Orders    
19-Dec-2023 14:37

## 2025-04-01 ENCOUNTER — ANTICOAGULATION THERAPY VISIT (OUTPATIENT)
Dept: ANTICOAGULATION | Facility: OTHER | Age: 70
End: 2025-04-01
Attending: FAMILY MEDICINE
Payer: MEDICARE

## 2025-04-01 DIAGNOSIS — Z95.5 HISTORY OF CORONARY ARTERY STENT PLACEMENT: ICD-10-CM

## 2025-04-01 DIAGNOSIS — Z79.01 ANTICOAGULATION MONITORING, INR RANGE 2-3: Primary | ICD-10-CM

## 2025-04-01 DIAGNOSIS — D68.2 FACTOR V DEFICIENCY (H): ICD-10-CM

## 2025-04-01 DIAGNOSIS — I66.9 CEREBRAL THROMBOSIS: ICD-10-CM

## 2025-04-01 LAB — INR HOME MONITORING: 3.6 (ref 2–3)

## 2025-04-01 NOTE — PROGRESS NOTES
ANTICOAGULATION MANAGEMENT     Lyudmila Fitzgerald 69 year old female is on warfarin with supratherapeutic INR result. (Goal INR 2.0-3.0)    Recent labs: (last 7 days)     04/01/25  0000   INR 3.6*       ASSESSMENT     Source(s): Chart Review and Patient/Caregiver Call     Warfarin doses taken: Warfarin taken as instructed  Diet: No new diet changes identified  Medication/supplement changes:  patient started on zetia 3/20 increase in INR expected  New illness, injury, or hospitalization: No  Signs or symptoms of bleeding or clotting: No  Previous result: Therapeutic last 2(+) visits  Additional findings: None       PLAN     Recommended plan for ongoing change(s) affecting INR     Dosing Instructions: decrease your warfarin dose (9.1% change) with next INR in 2 weeks       Summary  As of 4/1/2025      Full warfarin instructions:  4/1: 3 mg; Otherwise 6 mg every Mon, Wed, Fri; 3 mg all other days   Next INR check:  4/15/2025               Telephone call with Leigh who verbalizes understanding and agrees to plan    Patient to recheck with home meter    Education provided: None required    Plan made per [facility] anticoagulation protocol    Nyla Walker, RN  4/1/2025  Anticoagulation Clinic  [employer] for routing messages: p [facility]  [facility] patient phone line: [phone]        _______________________________________________________________________     Anticoagulation Episode Summary       Current INR goal:  2.0-3.0   TTR:  83.6% (1 y)   Target end date:  Indefinite   Send INR reminders to:  [facility]    Indications    Acute thromboembolism of deep veins of lower extremity (H) (Resolved) [I82.409]  Anticoagulation monitoring  INR range 2-3 [Z79.01]  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

## 2025-04-04 ENCOUNTER — HOSPITAL ENCOUNTER (OUTPATIENT)
Dept: MAMMOGRAPHY | Facility: OTHER | Age: 70
Discharge: HOME | End: 2025-04-04
Attending: FAMILY MEDICINE | Admitting: FAMILY MEDICINE
Payer: MEDICARE

## 2025-04-04 DIAGNOSIS — Z12.31 BREAST CANCER SCREENING BY MAMMOGRAM: ICD-10-CM

## 2025-04-04 PROCEDURE — 77063 BREAST TOMOSYNTHESIS BI: CPT

## 2025-04-04 PROCEDURE — 77067 SCR MAMMO BI INCL CAD: CPT | Mod: 26 | Performed by: STUDENT IN AN ORGANIZED HEALTH CARE EDUCATION/TRAINING PROGRAM

## 2025-04-04 PROCEDURE — 77063 BREAST TOMOSYNTHESIS BI: CPT | Mod: 26 | Performed by: STUDENT IN AN ORGANIZED HEALTH CARE EDUCATION/TRAINING PROGRAM

## 2025-04-04 PROCEDURE — 77067 SCR MAMMO BI INCL CAD: CPT

## 2025-04-13 DIAGNOSIS — I66.9 CEREBRAL THROMBOSIS: ICD-10-CM

## 2025-04-14 RX ORDER — WARFARIN SODIUM 6 MG/1
TABLET ORAL
Qty: 70 TABLET | Refills: 1 | Status: SHIPPED | OUTPATIENT
Start: 2025-04-14

## 2025-04-14 NOTE — TELEPHONE ENCOUNTER
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]  Cerebral thrombosis [I66.9]  History of coronary artery stent placement to the LAD x3 [Z95.5]                 Anticoagulation Care Providers       Provider Role Specialty Phone number    Erika Carrasco MD Referring Family Medicine [phone]            Visit with referring provider/group within last year: Yes 3/18/25    [facility] referral signed within last year: Yes    Lyudmila meets all criteria for refill (current [facility] referral, visit with referring provider/group in last 15 months unless directed to return in 2 years in last referring provider visit note, lab monitoring up to date or not exceeding 2 weeks overdue). Rx instructions and quantity supplied updated to match patient's current dosing plan. Warfarin 90 day supply with 1 refill granted per [facility] protocol     Nasrin Ying RN  Anticoagulation Clinic

## 2025-04-17 ENCOUNTER — ANTICOAGULATION THERAPY VISIT (OUTPATIENT)
Dept: ANTICOAGULATION | Facility: OTHER | Age: 70
End: 2025-04-17
Payer: MEDICARE

## 2025-04-17 DIAGNOSIS — I66.9 CEREBRAL THROMBOSIS: ICD-10-CM

## 2025-04-17 DIAGNOSIS — D68.2 FACTOR V DEFICIENCY (H): ICD-10-CM

## 2025-04-17 DIAGNOSIS — Z79.01 ANTICOAGULATION MONITORING, INR RANGE 2-3: Primary | ICD-10-CM

## 2025-04-17 DIAGNOSIS — Z95.5 HISTORY OF CORONARY ARTERY STENT PLACEMENT: ICD-10-CM

## 2025-04-17 LAB — INR HOME MONITORING: 2.8 (ref 2–3)

## 2025-04-17 NOTE — PROGRESS NOTES
ANTICOAGULATION MANAGEMENT     Lyudmila Fitzgerald 69 year old female is on warfarin with therapeutic INR result. (Goal INR 2.0-3.0)    Recent labs: (last 7 days)     04/17/25  0000   INR 2.8       ASSESSMENT     Source(s): Chart Review and Patient/Caregiver Call     Warfarin doses taken: Warfarin taken as instructed  Diet: No new diet changes identified  Medication/supplement changes: None noted  New illness, injury, or hospitalization: No  Signs or symptoms of bleeding or clotting: No  Previous result: Supratherapeutic  Additional findings: None       PLAN     Recommended plan for no diet, medication or health factor changes affecting INR     Dosing Instructions: Continue your current warfarin dose with next INR in 2 weeks       Summary  As of 4/17/2025      Full warfarin instructions:  6 mg every Mon, Wed, Fri; 3 mg all other days   Next INR check:  5/1/2025               Telephone call with Leigh who verbalizes understanding and agrees to plan    Patient to recheck with home meter    Education provided: Resume manage by exception with home monitor. Continue to submit INR results to home monitor company.You will only be called when your result is out of range and at 90 day check in. Please call and notify [facility] if new medication started, dose missed, signs or symptoms of bleeding or clotting, or a surgery/procedure is scheduled. Due for next call no later than: 7/16/25.    Plan made per [facility] anticoagulation protocol    Nyla Walker RN  4/17/2025  Anticoagulation Clinic  [facility] for routing messages: p [facility]  [facility] patient phone line: [phone]        _______________________________________________________________________     Anticoagulation Episode Summary       Current INR goal:  2.0-3.0   TTR:  82.5% (1 y)   Target end date:  Indefinite   Send INR reminders to:  [facility]    Indications    Acute thromboembolism of deep veins of lower extremity (H) (Resolved)  [I82.409]  Anticoagulation monitoring  INR range 2-3 [Z79.01]  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

## 2025-05-07 ENCOUNTER — ANTICOAGULATION THERAPY VISIT (OUTPATIENT)
Dept: ANTICOAGULATION | Facility: OTHER | Age: 70
End: 2025-05-07
Attending: FAMILY MEDICINE
Payer: MEDICARE

## 2025-05-07 DIAGNOSIS — D68.2 FACTOR V DEFICIENCY (H): ICD-10-CM

## 2025-05-07 DIAGNOSIS — Z79.01 ANTICOAGULATION MONITORING, INR RANGE 2-3: Primary | ICD-10-CM

## 2025-05-07 DIAGNOSIS — I66.9 CEREBRAL THROMBOSIS: ICD-10-CM

## 2025-05-07 DIAGNOSIS — Z95.5 HISTORY OF CORONARY ARTERY STENT PLACEMENT: ICD-10-CM

## 2025-05-07 LAB — INR HOME MONITORING: 2.6 (ref 2–3)

## 2025-05-07 NOTE — PROGRESS NOTES
ANTICOAGULATION  MANAGEMENT-Home Monitor Managed by Exception    Lyudmila Fitzgerald 69 year old female is on warfarin with therapeutic INR result. (Goal INR 2.0-3.0)    Recent labs: (last 7 days)     05/07/25  0000   INR 2.6       Previous INR was Therapeutic  Medication, diet, health changes since last INR:chart reviewed; none identified  Contacted within the last 12 weeks by phone on 4/17/25  Last [facility] referral date: 01/23/2025      ROD     Lyudmila was NOT contacted regarding therapeutic result today per home monitoring policy manage by exception agreement.   Current warfarin dose is to be continued:     Summary  As of 5/7/2025      Full warfarin instructions:  6 mg every Mon, Wed, Fri; 3 mg all other days   Next INR check:  5/21/2025             ?   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

## 2025-05-16 ENCOUNTER — ANCILLARY PROCEDURE (OUTPATIENT)
Dept: CARDIOLOGY | Facility: CLINIC | Age: 70
End: 2025-05-16
Attending: INTERNAL MEDICINE
Payer: MEDICARE

## 2025-05-16 DIAGNOSIS — I47.10 SUPRAVENTRICULAR TACHYCARDIA: ICD-10-CM

## 2025-05-16 PROCEDURE — 93294 REM INTERROG EVL PM/LDLS PM: CPT | Performed by: INTERNAL MEDICINE

## 2025-05-16 PROCEDURE — 93296 REM INTERROG EVL PM/IDS: CPT

## 2025-05-19 LAB
MDC_IDC_EPISODE_DTM: NORMAL
MDC_IDC_EPISODE_DTM: NORMAL
MDC_IDC_EPISODE_ID: NORMAL
MDC_IDC_EPISODE_ID: NORMAL
MDC_IDC_EPISODE_TYPE: NORMAL
MDC_IDC_EPISODE_TYPE: NORMAL
MDC_IDC_LEAD_CONNECTION_STATUS: NORMAL
MDC_IDC_LEAD_CONNECTION_STATUS: NORMAL
MDC_IDC_LEAD_IMPLANT_DT: NORMAL
MDC_IDC_LEAD_IMPLANT_DT: NORMAL
MDC_IDC_LEAD_LOCATION: NORMAL
MDC_IDC_LEAD_LOCATION: NORMAL
MDC_IDC_LEAD_LOCATION_DETAIL_1: NORMAL
MDC_IDC_LEAD_LOCATION_DETAIL_1: NORMAL
MDC_IDC_LEAD_MFG: NORMAL
MDC_IDC_LEAD_MFG: NORMAL
MDC_IDC_LEAD_MODEL: NORMAL
MDC_IDC_LEAD_MODEL: NORMAL
MDC_IDC_LEAD_POLARITY_TYPE: NORMAL
MDC_IDC_LEAD_POLARITY_TYPE: NORMAL
MDC_IDC_LEAD_SERIAL: NORMAL
MDC_IDC_LEAD_SERIAL: NORMAL
MDC_IDC_LEAD_SPECIAL_FUNCTION: NORMAL
MDC_IDC_LEAD_SPECIAL_FUNCTION: NORMAL
MDC_IDC_MSMT_BATTERY_DTM: NORMAL
MDC_IDC_MSMT_BATTERY_REMAINING_LONGEVITY: 78 MO
MDC_IDC_MSMT_BATTERY_REMAINING_PERCENTAGE: 80 %
MDC_IDC_MSMT_BATTERY_STATUS: NORMAL
MDC_IDC_MSMT_LEADCHNL_RA_IMPEDANCE_VALUE: 652 OHM
MDC_IDC_MSMT_LEADCHNL_RV_IMPEDANCE_VALUE: 463 OHM
MDC_IDC_MSMT_LEADCHNL_RV_PACING_THRESHOLD_AMPLITUDE: 1 V
MDC_IDC_MSMT_LEADCHNL_RV_PACING_THRESHOLD_PULSEWIDTH: 0.4 MS
MDC_IDC_PG_IMPLANT_DTM: NORMAL
MDC_IDC_PG_MFG: NORMAL
MDC_IDC_PG_MODEL: NORMAL
MDC_IDC_PG_SERIAL: NORMAL
MDC_IDC_PG_TYPE: NORMAL
MDC_IDC_SESS_CLINIC_NAME: NORMAL
MDC_IDC_SESS_DTM: NORMAL
MDC_IDC_SESS_TYPE: NORMAL
MDC_IDC_SET_BRADY_AT_MODE_SWITCH_MODE: NORMAL
MDC_IDC_SET_BRADY_AT_MODE_SWITCH_RATE: 140 {BEATS}/MIN
MDC_IDC_SET_BRADY_LOWRATE: 60 {BEATS}/MIN
MDC_IDC_SET_BRADY_MAX_SENSOR_RATE: 130 {BEATS}/MIN
MDC_IDC_SET_BRADY_MAX_TRACKING_RATE: 100 {BEATS}/MIN
MDC_IDC_SET_BRADY_MODE: NORMAL
MDC_IDC_SET_BRADY_PAV_DELAY_HIGH: 80 MS
MDC_IDC_SET_BRADY_PAV_DELAY_LOW: 130 MS
MDC_IDC_SET_BRADY_SAV_DELAY_HIGH: 80 MS
MDC_IDC_SET_BRADY_SAV_DELAY_LOW: 130 MS
MDC_IDC_SET_LEADCHNL_RA_PACING_AMPLITUDE: 2.4 V
MDC_IDC_SET_LEADCHNL_RA_PACING_CAPTURE_MODE: NORMAL
MDC_IDC_SET_LEADCHNL_RA_PACING_POLARITY: NORMAL
MDC_IDC_SET_LEADCHNL_RA_PACING_PULSEWIDTH: 1 MS
MDC_IDC_SET_LEADCHNL_RA_SENSING_ADAPTATION_MODE: NORMAL
MDC_IDC_SET_LEADCHNL_RA_SENSING_POLARITY: NORMAL
MDC_IDC_SET_LEADCHNL_RA_SENSING_SENSITIVITY: 0.6 MV
MDC_IDC_SET_LEADCHNL_RV_PACING_AMPLITUDE: 1.5 V
MDC_IDC_SET_LEADCHNL_RV_PACING_CAPTURE_MODE: NORMAL
MDC_IDC_SET_LEADCHNL_RV_PACING_POLARITY: NORMAL
MDC_IDC_SET_LEADCHNL_RV_PACING_PULSEWIDTH: 0.4 MS
MDC_IDC_SET_LEADCHNL_RV_SENSING_ADAPTATION_MODE: NORMAL
MDC_IDC_SET_LEADCHNL_RV_SENSING_POLARITY: NORMAL
MDC_IDC_SET_LEADCHNL_RV_SENSING_SENSITIVITY: 1.5 MV
MDC_IDC_SET_ZONE_DETECTION_INTERVAL: 375 MS
MDC_IDC_SET_ZONE_STATUS: NORMAL
MDC_IDC_SET_ZONE_TYPE: NORMAL
MDC_IDC_SET_ZONE_VENDOR_TYPE: NORMAL
MDC_IDC_STAT_AT_BURDEN_PERCENT: 1 %
MDC_IDC_STAT_AT_DTM_END: NORMAL
MDC_IDC_STAT_AT_DTM_START: NORMAL
MDC_IDC_STAT_BRADY_DTM_END: NORMAL
MDC_IDC_STAT_BRADY_DTM_START: NORMAL
MDC_IDC_STAT_BRADY_RA_PERCENT_PACED: 95 %
MDC_IDC_STAT_BRADY_RV_PERCENT_PACED: 100 %
MDC_IDC_STAT_EPISODE_RECENT_COUNT: 0
MDC_IDC_STAT_EPISODE_RECENT_COUNT: 0
MDC_IDC_STAT_EPISODE_RECENT_COUNT: 1
MDC_IDC_STAT_EPISODE_RECENT_COUNT_DTM_END: NORMAL
MDC_IDC_STAT_EPISODE_RECENT_COUNT_DTM_START: NORMAL
MDC_IDC_STAT_EPISODE_TYPE: NORMAL
MDC_IDC_STAT_EPISODE_VENDOR_TYPE: NORMAL

## 2025-05-21 ENCOUNTER — APPOINTMENT (OUTPATIENT)
Dept: ANTICOAGULATION | Facility: OTHER | Age: 70
End: 2025-05-21
Attending: FAMILY MEDICINE
Payer: MEDICARE

## 2025-05-22 LAB — INR HOME MONITORING: 2.1 (ref 2–3)

## 2025-06-04 ENCOUNTER — ANTICOAGULATION THERAPY VISIT (OUTPATIENT)
Dept: ANTICOAGULATION | Facility: OTHER | Age: 70
End: 2025-06-04
Attending: FAMILY MEDICINE
Payer: MEDICARE

## 2025-06-04 DIAGNOSIS — I66.9 CEREBRAL THROMBOSIS: ICD-10-CM

## 2025-06-04 DIAGNOSIS — Z79.01 ANTICOAGULATION MONITORING, INR RANGE 2-3: Primary | ICD-10-CM

## 2025-06-04 DIAGNOSIS — D68.2 FACTOR V DEFICIENCY (H): ICD-10-CM

## 2025-06-04 DIAGNOSIS — Z95.5 HISTORY OF CORONARY ARTERY STENT PLACEMENT: ICD-10-CM

## 2025-06-04 LAB — INR HOME MONITORING: 1.8 (ref 2–3)

## 2025-06-04 NOTE — PROGRESS NOTES
ANTICOAGULATION MANAGEMENT     Lyudmila Fitzgerald 69 year old female is on warfarin with subtherapeutic INR result. (Goal INR 2.0-3.0)    Recent labs: (last 7 days)     06/04/25  0000   INR 1.8*       ASSESSMENT     Source(s): Chart Review  Previous INR was Therapeutic last 2(+) visits  Medication, diet, health changes since last INR chart reviewed; none identified         PLAN     Recommended plan for no diet, medication or health factor changes affecting INR     Dosing Instructions: Increase your warfarin dose (10% change) with next INR in 2 weeks       Summary  As of 6/4/2025      Full warfarin instructions:  3 mg every Mon, Wed, Fri; 6 mg all other days   Next INR check:  6/18/2025               Detailed voice message left for Leigh with dosing instructions and follow up date.     Patient to recheck with home meter    Education provided: Please call back if any changes to your diet, medications or how you've been taking warfarin    Plan made per [facility] anticoagulation protocol    Nyla Walker RN  6/4/2025  Anticoagulation Clinic  [facility] for routing messages: garrick  ANTICOAGULATION NURSE  [facility] patient phone line: [phone]        _______________________________________________________________________     Anticoagulation Episode Summary       Current INR goal:  2.0-3.0   TTR:  83.6% (1 y)   Target end date:  Indefinite   Send INR reminders to:   ANTICOAGULATION NURSE    Indications    Acute thromboembolism of deep veins of lower extremity (H) (Resolved) [I82.409]  Anticoagulation monitoring  INR range 2-3 [Z79.01]  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

## 2025-06-18 ENCOUNTER — DOCUMENTATION ONLY (OUTPATIENT)
Dept: CARDIOLOGY | Facility: OTHER | Age: 70
End: 2025-06-18

## 2025-06-18 ENCOUNTER — ANTICOAGULATION THERAPY VISIT (OUTPATIENT)
Dept: ANTICOAGULATION | Facility: OTHER | Age: 70
End: 2025-06-18
Attending: FAMILY MEDICINE
Payer: MEDICARE

## 2025-06-18 DIAGNOSIS — Z95.5 HISTORY OF CORONARY ARTERY STENT PLACEMENT: ICD-10-CM

## 2025-06-18 DIAGNOSIS — D68.2 FACTOR V DEFICIENCY (H): ICD-10-CM

## 2025-06-18 DIAGNOSIS — E78.2 MIXED HYPERLIPIDEMIA: Primary | ICD-10-CM

## 2025-06-18 DIAGNOSIS — Z79.01 ANTICOAGULATION MONITORING, INR RANGE 2-3: Primary | ICD-10-CM

## 2025-06-18 DIAGNOSIS — I66.9 CEREBRAL THROMBOSIS: ICD-10-CM

## 2025-06-18 LAB — INR HOME MONITORING: 2.4 (ref 2–3)

## 2025-06-18 NOTE — PROGRESS NOTES
See 3/19/25 note.  Moises Islas, DO to Leigh Fitzgerald  DB      3/20/25 12:09 PM  Ok,I will start you on Zetia.  This is an inexpensive medication.  It is generic.  It is generally next medication tried in conjunction with statins to reduce your cholesterol.  Generally, your cholesterol is checked in 8 to 12 weeks after starting on a cholesterol-lowering medication.  If your cholesterol is still elevated after the repeat check in the future, I would consider 1 of these injectables/PCSK9 inhibitor of which the brand names are Repatha/Praluent.  I will send this prescription to your pharmacy.  I will put a lipid panel in to be checked in 2-3 months.  This should be fasting.  This means you can take your medications, water, and black coffee but should not eat for 8-12 hours prior to this lab.     Dr. Roshan Morelos RN......June 18, 2025...3:50 PM

## 2025-06-18 NOTE — PROGRESS NOTES
Lyudmila Fitzgerald has an upcoming lab appointment and there are no orders available. Please review and place future orders, as appropriate.    Thanks   Lab

## 2025-06-18 NOTE — PROGRESS NOTES
ANTICOAGULATION MANAGEMENT     Lyudmila Fitzgerald 69 year old female is on warfarin with therapeutic INR result. (Goal INR 2.0-3.0)    Recent labs: (last 7 days)     06/18/25  0000   INR 2.4       ASSESSMENT     Source(s): Chart Review and Patient/Caregiver Call     Warfarin doses taken: Warfarin taken as instructed  Diet: No new diet changes identified  Medication/supplement changes: None noted  New illness, injury, or hospitalization: No  Signs or symptoms of bleeding or clotting: No  Previous result: Subtherapeutic  Additional findings: None       PLAN     Recommended plan for no diet, medication or health factor changes affecting INR     Dosing Instructions: Continue your current warfarin dose with next INR in 2 weeks       Summary  As of 6/18/2025      Full warfarin instructions:  3 mg every Mon, Wed, Fri; 6 mg all other days   Next INR check:  7/2/2025               Telephone call with Leigh who verbalizes understanding and agrees to plan    Patient to recheck with home meter    Education provided: Resume manage by exception with home monitor. Continue to submit INR results to home monitor company.You will only be called when your result is out of range and at 90 day check in. Please call and notify [facility] if new medication started, dose missed, signs or symptoms of bleeding or clotting, or a surgery/procedure is scheduled. Due for next call no later than: 9/16/25.    Plan made per [facility] anticoagulation protocol    Nyla Walker RN  6/18/2025  Anticoagulation Clinic  [facility] for routing messages: garrick  ANTICOAGULATION NURSE  [facility] patient phone line: [phone]        _______________________________________________________________________     Anticoagulation Episode Summary       Current INR goal:  2.0-3.0   TTR:  82.3% (1 y)   Target end date:  Indefinite   Send INR reminders to:  [facility] ANTICOAGULATION NURSE    Indications    Acute thromboembolism of deep veins of lower extremity (H) (Resolved)  [I82.409]  Anticoagulation monitoring  INR range 2-3 [Z79.01]  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

## 2025-06-23 ENCOUNTER — LAB (OUTPATIENT)
Dept: LAB | Facility: OTHER | Age: 70
End: 2025-06-23
Attending: INTERNAL MEDICINE
Payer: MEDICARE

## 2025-06-23 ENCOUNTER — RESULTS FOLLOW-UP (OUTPATIENT)
Dept: CARDIOLOGY | Facility: OTHER | Age: 70
End: 2025-06-23

## 2025-06-23 DIAGNOSIS — Z95.5 HISTORY OF CORONARY ARTERY STENT PLACEMENT: ICD-10-CM

## 2025-06-23 DIAGNOSIS — E78.2 MIXED HYPERLIPIDEMIA: ICD-10-CM

## 2025-06-23 LAB
CHOLEST SERPL-MCNC: 206 MG/DL
FASTING STATUS PATIENT QL REPORTED: YES
HDLC SERPL-MCNC: 81 MG/DL
LDLC SERPL CALC-MCNC: 106 MG/DL
NONHDLC SERPL-MCNC: 125 MG/DL
TRIGL SERPL-MCNC: 95 MG/DL

## 2025-06-23 PROCEDURE — 82465 ASSAY BLD/SERUM CHOLESTEROL: CPT | Mod: ZL

## 2025-06-23 PROCEDURE — 36415 COLL VENOUS BLD VENIPUNCTURE: CPT | Mod: ZL

## 2025-06-28 ENCOUNTER — HEALTH MAINTENANCE LETTER (OUTPATIENT)
Age: 70
End: 2025-06-28

## 2025-07-01 ENCOUNTER — ANTICOAGULATION THERAPY VISIT (OUTPATIENT)
Dept: ANTICOAGULATION | Facility: OTHER | Age: 70
End: 2025-07-01
Attending: FAMILY MEDICINE
Payer: MEDICARE

## 2025-07-01 DIAGNOSIS — I66.9 CEREBRAL THROMBOSIS: ICD-10-CM

## 2025-07-01 DIAGNOSIS — D68.2 FACTOR V DEFICIENCY (H): ICD-10-CM

## 2025-07-01 DIAGNOSIS — Z79.01 ANTICOAGULATION MONITORING, INR RANGE 2-3: Primary | ICD-10-CM

## 2025-07-01 DIAGNOSIS — Z95.5 HISTORY OF CORONARY ARTERY STENT PLACEMENT: ICD-10-CM

## 2025-07-01 LAB
INR HOME MONITORING: 2.1 (ref 2–3)
INR HOME MONITORING: 2.1 (ref 2–3)

## 2025-07-01 NOTE — PROGRESS NOTES
ANTICOAGULATION  MANAGEMENT-Home Monitor Managed by Exception    Lyudmila Fitzgerald 69 year old female is on warfarin with therapeutic INR result. (Goal INR 2.0-3.0)    Recent labs: (last 7 days)     07/01/25  0000   INR 2.1  2.1       Previous INR was Therapeutic  Medication, diet, health changes since last INR:chart reviewed; none identified  Contacted within the last 12 weeks by phone on 6/18/25  Last [facility] referral date: 01/23/2025      ROD     Lyudmila was NOT contacted regarding therapeutic result today per home monitoring policy manage by exception agreement.   Current warfarin dose is to be continued:     Summary  As of 7/1/2025      Full warfarin instructions:  3 mg every Mon, Wed, Fri; 6 mg all other days   Next INR check:  7/15/2025             ?   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

## 2025-07-16 ENCOUNTER — ANTICOAGULATION THERAPY VISIT (OUTPATIENT)
Dept: ANTICOAGULATION | Facility: OTHER | Age: 70
End: 2025-07-16
Attending: FAMILY MEDICINE
Payer: MEDICARE

## 2025-07-16 DIAGNOSIS — Z79.01 ANTICOAGULATION MONITORING, INR RANGE 2-3: Primary | ICD-10-CM

## 2025-07-16 DIAGNOSIS — I66.9 CEREBRAL THROMBOSIS: ICD-10-CM

## 2025-07-16 DIAGNOSIS — Z95.5 HISTORY OF CORONARY ARTERY STENT PLACEMENT: ICD-10-CM

## 2025-07-16 DIAGNOSIS — D68.2 FACTOR V DEFICIENCY (H): ICD-10-CM

## 2025-07-16 LAB — INR HOME MONITORING: 2.2 (ref 2–3)

## 2025-07-16 NOTE — PROGRESS NOTES
ANTICOAGULATION  MANAGEMENT-Home Monitor Managed by Exception    Lyudmila Fitzgerald 69 year old female is on warfarin with therapeutic INR result. (Goal INR 2.0-3.0)    Recent labs: (last 7 days)     07/16/25  0000   INR 2.2       Care Everywhere updated: yes    Previous INR was Therapeutic  Medication, diet, health changes since last INR:chart reviewed; none identified  Contacted within the last 12 weeks by phone on 6/18/25  Last [facility] referral date: 01/23/2025      ROD     Lyudmila was NOT contacted regarding therapeutic result today per home monitoring policy manage by exception agreement.   Current warfarin dose is to be continued:     Summary  As of 7/16/2025      Full warfarin instructions:  3 mg every Mon, Wed, Fri; 6 mg all other days   Next INR check:  7/30/2025             ?   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

## 2025-08-04 LAB — INR HOME MONITORING: 3.3 (ref 2–3)

## 2025-08-05 ENCOUNTER — ANTICOAGULATION THERAPY VISIT (OUTPATIENT)
Dept: ANTICOAGULATION | Facility: OTHER | Age: 70
End: 2025-08-05
Payer: MEDICARE

## 2025-08-05 DIAGNOSIS — Z95.5 HISTORY OF CORONARY ARTERY STENT PLACEMENT: ICD-10-CM

## 2025-08-05 DIAGNOSIS — Z79.01 ANTICOAGULATION MONITORING, INR RANGE 2-3: Primary | ICD-10-CM

## 2025-08-05 DIAGNOSIS — D68.2 FACTOR V DEFICIENCY (H): ICD-10-CM

## 2025-08-05 DIAGNOSIS — I66.9 CEREBRAL THROMBOSIS: ICD-10-CM

## 2025-08-15 DIAGNOSIS — F51.04 CHRONIC INSOMNIA: ICD-10-CM

## 2025-08-18 ENCOUNTER — ANCILLARY PROCEDURE (OUTPATIENT)
Dept: CARDIOLOGY | Facility: CLINIC | Age: 70
End: 2025-08-18
Attending: INTERNAL MEDICINE
Payer: MEDICARE

## 2025-08-18 DIAGNOSIS — I47.10 SUPRAVENTRICULAR TACHYCARDIA: ICD-10-CM

## 2025-08-18 LAB
MDC_IDC_EPISODE_DTM: NORMAL
MDC_IDC_EPISODE_DURATION: 5 S
MDC_IDC_EPISODE_DURATION: 6 S
MDC_IDC_EPISODE_DURATION: 8 S
MDC_IDC_EPISODE_DURATION: 8 S
MDC_IDC_EPISODE_DURATION: 9 S
MDC_IDC_EPISODE_ID: NORMAL
MDC_IDC_EPISODE_TYPE: NORMAL
MDC_IDC_LEAD_CONNECTION_STATUS: NORMAL
MDC_IDC_LEAD_CONNECTION_STATUS: NORMAL
MDC_IDC_LEAD_IMPLANT_DT: NORMAL
MDC_IDC_LEAD_IMPLANT_DT: NORMAL
MDC_IDC_LEAD_LOCATION: NORMAL
MDC_IDC_LEAD_LOCATION: NORMAL
MDC_IDC_LEAD_LOCATION_DETAIL_1: NORMAL
MDC_IDC_LEAD_LOCATION_DETAIL_1: NORMAL
MDC_IDC_LEAD_MFG: NORMAL
MDC_IDC_LEAD_MFG: NORMAL
MDC_IDC_LEAD_MODEL: NORMAL
MDC_IDC_LEAD_MODEL: NORMAL
MDC_IDC_LEAD_POLARITY_TYPE: NORMAL
MDC_IDC_LEAD_POLARITY_TYPE: NORMAL
MDC_IDC_LEAD_SERIAL: NORMAL
MDC_IDC_LEAD_SERIAL: NORMAL
MDC_IDC_LEAD_SPECIAL_FUNCTION: NORMAL
MDC_IDC_LEAD_SPECIAL_FUNCTION: NORMAL
MDC_IDC_MSMT_BATTERY_DTM: NORMAL
MDC_IDC_MSMT_BATTERY_REMAINING_LONGEVITY: 78 MO
MDC_IDC_MSMT_BATTERY_REMAINING_PERCENTAGE: 78 %
MDC_IDC_MSMT_BATTERY_STATUS: NORMAL
MDC_IDC_MSMT_LEADCHNL_RA_IMPEDANCE_VALUE: 649 OHM
MDC_IDC_MSMT_LEADCHNL_RV_IMPEDANCE_VALUE: 476 OHM
MDC_IDC_MSMT_LEADCHNL_RV_PACING_THRESHOLD_AMPLITUDE: 1.1 V
MDC_IDC_MSMT_LEADCHNL_RV_PACING_THRESHOLD_PULSEWIDTH: 0.4 MS
MDC_IDC_PG_IMPLANT_DTM: NORMAL
MDC_IDC_PG_MFG: NORMAL
MDC_IDC_PG_MODEL: NORMAL
MDC_IDC_PG_SERIAL: NORMAL
MDC_IDC_PG_TYPE: NORMAL
MDC_IDC_SESS_CLINIC_NAME: NORMAL
MDC_IDC_SESS_DTM: NORMAL
MDC_IDC_SESS_TYPE: NORMAL
MDC_IDC_SET_BRADY_AT_MODE_SWITCH_MODE: NORMAL
MDC_IDC_SET_BRADY_AT_MODE_SWITCH_RATE: 140 {BEATS}/MIN
MDC_IDC_SET_BRADY_LOWRATE: 60 {BEATS}/MIN
MDC_IDC_SET_BRADY_MAX_SENSOR_RATE: 130 {BEATS}/MIN
MDC_IDC_SET_BRADY_MAX_TRACKING_RATE: 100 {BEATS}/MIN
MDC_IDC_SET_BRADY_MODE: NORMAL
MDC_IDC_SET_BRADY_PAV_DELAY_HIGH: 80 MS
MDC_IDC_SET_BRADY_PAV_DELAY_LOW: 130 MS
MDC_IDC_SET_BRADY_SAV_DELAY_HIGH: 80 MS
MDC_IDC_SET_BRADY_SAV_DELAY_LOW: 130 MS
MDC_IDC_SET_LEADCHNL_RA_PACING_AMPLITUDE: 2.4 V
MDC_IDC_SET_LEADCHNL_RA_PACING_CAPTURE_MODE: NORMAL
MDC_IDC_SET_LEADCHNL_RA_PACING_POLARITY: NORMAL
MDC_IDC_SET_LEADCHNL_RA_PACING_PULSEWIDTH: 1 MS
MDC_IDC_SET_LEADCHNL_RA_SENSING_ADAPTATION_MODE: NORMAL
MDC_IDC_SET_LEADCHNL_RA_SENSING_POLARITY: NORMAL
MDC_IDC_SET_LEADCHNL_RA_SENSING_SENSITIVITY: 0.6 MV
MDC_IDC_SET_LEADCHNL_RV_PACING_AMPLITUDE: 1.4 V
MDC_IDC_SET_LEADCHNL_RV_PACING_CAPTURE_MODE: NORMAL
MDC_IDC_SET_LEADCHNL_RV_PACING_POLARITY: NORMAL
MDC_IDC_SET_LEADCHNL_RV_PACING_PULSEWIDTH: 0.4 MS
MDC_IDC_SET_LEADCHNL_RV_SENSING_ADAPTATION_MODE: NORMAL
MDC_IDC_SET_LEADCHNL_RV_SENSING_POLARITY: NORMAL
MDC_IDC_SET_LEADCHNL_RV_SENSING_SENSITIVITY: 1.5 MV
MDC_IDC_SET_ZONE_DETECTION_INTERVAL: 375 MS
MDC_IDC_SET_ZONE_STATUS: NORMAL
MDC_IDC_SET_ZONE_TYPE: NORMAL
MDC_IDC_SET_ZONE_VENDOR_TYPE: NORMAL
MDC_IDC_STAT_AT_BURDEN_PERCENT: 1 %
MDC_IDC_STAT_AT_DTM_END: NORMAL
MDC_IDC_STAT_AT_DTM_START: NORMAL
MDC_IDC_STAT_BRADY_DTM_END: NORMAL
MDC_IDC_STAT_BRADY_DTM_START: NORMAL
MDC_IDC_STAT_BRADY_RA_PERCENT_PACED: 94 %
MDC_IDC_STAT_BRADY_RV_PERCENT_PACED: 100 %
MDC_IDC_STAT_EPISODE_RECENT_COUNT: 0
MDC_IDC_STAT_EPISODE_RECENT_COUNT: 5
MDC_IDC_STAT_EPISODE_RECENT_COUNT_DTM_END: NORMAL
MDC_IDC_STAT_EPISODE_RECENT_COUNT_DTM_START: NORMAL
MDC_IDC_STAT_EPISODE_TYPE: NORMAL
MDC_IDC_STAT_EPISODE_VENDOR_TYPE: NORMAL

## 2025-08-18 PROCEDURE — 93296 REM INTERROG EVL PM/IDS: CPT

## 2025-08-18 PROCEDURE — 93294 REM INTERROG EVL PM/LDLS PM: CPT | Performed by: INTERNAL MEDICINE

## 2025-08-19 ENCOUNTER — ANTICOAGULATION THERAPY VISIT (OUTPATIENT)
Dept: ANTICOAGULATION | Facility: OTHER | Age: 70
End: 2025-08-19
Attending: FAMILY MEDICINE
Payer: MEDICARE

## 2025-08-19 DIAGNOSIS — I66.9 CEREBRAL THROMBOSIS: ICD-10-CM

## 2025-08-19 DIAGNOSIS — F51.04 CHRONIC INSOMNIA: ICD-10-CM

## 2025-08-19 DIAGNOSIS — Z95.5 HISTORY OF CORONARY ARTERY STENT PLACEMENT: ICD-10-CM

## 2025-08-19 DIAGNOSIS — Z79.01 ANTICOAGULATION MONITORING, INR RANGE 2-3: Primary | ICD-10-CM

## 2025-08-19 DIAGNOSIS — D68.2 FACTOR V DEFICIENCY (H): ICD-10-CM

## 2025-08-19 LAB — INR HOME MONITORING: 2.4 (ref 2–3)

## 2025-08-19 RX ORDER — ZOLPIDEM TARTRATE 10 MG/1
TABLET ORAL
Qty: 90 TABLET | Refills: 0 | OUTPATIENT
Start: 2025-08-19

## 2025-08-21 ENCOUNTER — MYC MEDICAL ADVICE (OUTPATIENT)
Dept: CARDIOLOGY | Facility: OTHER | Age: 70
End: 2025-08-21
Payer: MEDICARE

## 2025-08-21 DIAGNOSIS — F51.04 CHRONIC INSOMNIA: ICD-10-CM

## 2025-08-21 RX ORDER — ZOLPIDEM TARTRATE 10 MG/1
TABLET ORAL
Qty: 90 TABLET | Refills: 1 | OUTPATIENT
Start: 2025-08-21

## 2025-08-25 RX ORDER — ZOLPIDEM TARTRATE 10 MG/1
TABLET ORAL
Qty: 90 TABLET | Refills: 1 | Status: SHIPPED | OUTPATIENT
Start: 2025-08-25

## 2025-09-04 ENCOUNTER — ANTICOAGULATION THERAPY VISIT (OUTPATIENT)
Dept: ANTICOAGULATION | Facility: OTHER | Age: 70
End: 2025-09-04
Attending: FAMILY MEDICINE
Payer: MEDICARE

## 2025-09-04 DIAGNOSIS — D68.2 FACTOR V DEFICIENCY (H): ICD-10-CM

## 2025-09-04 DIAGNOSIS — Z95.5 HISTORY OF CORONARY ARTERY STENT PLACEMENT: ICD-10-CM

## 2025-09-04 DIAGNOSIS — I66.9 CEREBRAL THROMBOSIS: ICD-10-CM

## 2025-09-04 DIAGNOSIS — Z79.01 ANTICOAGULATION MONITORING, INR RANGE 2-3: Primary | ICD-10-CM

## 2025-09-04 LAB — INR HOME MONITORING: 2.4 (ref 2–3)

## (undated) DEVICE — TUBING PRESSURE 30"

## (undated) DEVICE — CATH ANGIO INFINITI IM 4FRX100CM 538460

## (undated) DEVICE — KIT HAND CONTROL ACIST 016795

## (undated) DEVICE — PAD DEFIBRILLATOR ELECTRODE 4.25INX6IN ADULT 2001M-C

## (undated) DEVICE — CATH ANGIO INFINITI JL4 4FRX100CM 538420

## (undated) DEVICE — SLEEVE TR BAND RADIAL COMPRESSION DEVICE 24CM TRB24-REG

## (undated) DEVICE — Device

## (undated) DEVICE — FASTENER CATH BALLOON CLAMPX2 STATLOCK 0684-00-493

## (undated) DEVICE — CATH ANGIO INFINITI 3DRC 4FRX100CM 538476

## (undated) DEVICE — SHTH INTRO 0.021IN ID 6FR DIA

## (undated) DEVICE — PACK HEART LEFT CUSTOM

## (undated) DEVICE — CATH ANGIO INFINITI AR MOD 4FRX100CM 538448

## (undated) DEVICE — MANIFOLD KIT ANGIO AUTOMATED 014613

## (undated) DEVICE — KIT HAND CONTROL ACIST 014644 AR-P54

## (undated) DEVICE — CATH ANGIO INFINITI JR4 4FRX100CM 538421

## (undated) DEVICE — INTRO SHEATH AVANTI 4FRX23CM 504604T

## (undated) DEVICE — INTRO SHEATH MICRO PLATINUM TIP 4FRX40CM 7274

## (undated) DEVICE — INTRO SHEATH 7FRX25CM PINNACLE RSS706

## (undated) DEVICE — 0.035IN X 260CM, EMERALD DIAGNOSTIC GUIDEWIRE, FIXED-CORE PTFE COATED, STANDARD, 3MM EXCHANGE J-TIP (EA/1)

## (undated) DEVICE — CATH ANGIO INFINITI AL1 4FRX100CM 538445

## (undated) RX ORDER — FENTANYL CITRATE 50 UG/ML
INJECTION, SOLUTION INTRAMUSCULAR; INTRAVENOUS
Status: DISPENSED
Start: 2023-07-31

## (undated) RX ORDER — HEPARIN SODIUM 1000 [USP'U]/ML
INJECTION, SOLUTION INTRAVENOUS; SUBCUTANEOUS
Status: DISPENSED
Start: 2021-04-27

## (undated) RX ORDER — ACETAMINOPHEN 500 MG
TABLET ORAL
Status: DISPENSED
Start: 2021-09-01

## (undated) RX ORDER — LIDOCAINE HYDROCHLORIDE AND EPINEPHRINE 10; 10 MG/ML; UG/ML
INJECTION, SOLUTION INFILTRATION; PERINEURAL
Status: DISPENSED
Start: 2020-07-23

## (undated) RX ORDER — HEPARIN SODIUM 1000 [USP'U]/ML
INJECTION, SOLUTION INTRAVENOUS; SUBCUTANEOUS
Status: DISPENSED
Start: 2020-05-20

## (undated) RX ORDER — MORPHINE SULFATE 4 MG/ML
INJECTION, SOLUTION INTRAMUSCULAR; INTRAVENOUS
Status: DISPENSED
Start: 2023-07-20

## (undated) RX ORDER — MORPHINE SULFATE 2 MG/ML
INJECTION, SOLUTION INTRAMUSCULAR; INTRAVENOUS
Status: DISPENSED
Start: 2020-08-19

## (undated) RX ORDER — LIDOCAINE HCL/EPINEPHRINE/PF 2%-1:200K
VIAL (ML) INJECTION
Status: DISPENSED
Start: 2020-03-09

## (undated) RX ORDER — SODIUM CHLORIDE 9 MG/ML
INJECTION, SOLUTION INTRAVENOUS
Status: DISPENSED
Start: 2020-05-07

## (undated) RX ORDER — LIDOCAINE 40 MG/G
CREAM TOPICAL
Status: DISPENSED
Start: 2023-07-31

## (undated) RX ORDER — NITROGLYCERIN 0.4 MG/1
TABLET SUBLINGUAL
Status: DISPENSED
Start: 2021-09-01

## (undated) RX ORDER — CEFAZOLIN SODIUM 1 G/3ML
INJECTION, POWDER, FOR SOLUTION INTRAMUSCULAR; INTRAVENOUS
Status: DISPENSED
Start: 2020-05-20

## (undated) RX ORDER — SODIUM CHLORIDE 9 MG/ML
INJECTION, SOLUTION INTRAVENOUS
Status: DISPENSED
Start: 2023-07-31

## (undated) RX ORDER — FENTANYL CITRATE 50 UG/ML
INJECTION, SOLUTION INTRAMUSCULAR; INTRAVENOUS
Status: DISPENSED
Start: 2020-05-20

## (undated) RX ORDER — NITROGLYCERIN 5 MG/ML
VIAL (ML) INTRAVENOUS
Status: DISPENSED
Start: 2021-04-27

## (undated) RX ORDER — LIDOCAINE HYDROCHLORIDE 10 MG/ML
INJECTION, SOLUTION INFILTRATION; PERINEURAL
Status: DISPENSED
Start: 2020-07-23

## (undated) RX ORDER — LIDOCAINE HYDROCHLORIDE 10 MG/ML
INJECTION, SOLUTION EPIDURAL; INFILTRATION; INTRACAUDAL; PERINEURAL
Status: DISPENSED
Start: 2023-07-31

## (undated) RX ORDER — NITROGLYCERIN 20 MG/100ML
INJECTION INTRAVENOUS
Status: DISPENSED
Start: 2023-07-20

## (undated) RX ORDER — ONDANSETRON 2 MG/ML
INJECTION INTRAMUSCULAR; INTRAVENOUS
Status: DISPENSED
Start: 2021-09-10

## (undated) RX ORDER — SODIUM CHLORIDE 9 MG/ML
INJECTION, SOLUTION INTRAVENOUS
Status: DISPENSED
Start: 2021-09-01

## (undated) RX ORDER — CEFAZOLIN SODIUM 2 G/100ML
INJECTION, SOLUTION INTRAVENOUS
Status: DISPENSED
Start: 2020-05-20

## (undated) RX ORDER — SODIUM CHLORIDE 9 MG/ML
INJECTION, SOLUTION INTRAVENOUS
Status: DISPENSED
Start: 2020-05-20

## (undated) RX ORDER — HEPARIN SODIUM 1000 [USP'U]/ML
INJECTION, SOLUTION INTRAVENOUS; SUBCUTANEOUS
Status: DISPENSED
Start: 2023-07-31

## (undated) RX ORDER — NICARDIPINE HCL-0.9% SOD CHLOR 1 MG/10 ML
SYRINGE (ML) INTRAVENOUS
Status: DISPENSED
Start: 2021-04-27

## (undated) RX ORDER — NICARDIPINE HCL-0.9% SOD CHLOR 1 MG/10 ML
SYRINGE (ML) INTRAVENOUS
Status: DISPENSED
Start: 2023-07-31

## (undated) RX ORDER — FENTANYL CITRATE 50 UG/ML
INJECTION, SOLUTION INTRAMUSCULAR; INTRAVENOUS
Status: DISPENSED
Start: 2021-04-27

## (undated) RX ORDER — SODIUM CHLORIDE 9 MG/ML
INJECTION, SOLUTION INTRAVENOUS
Status: DISPENSED
Start: 2021-04-27

## (undated) RX ORDER — ASPIRIN 81 MG/1
TABLET, CHEWABLE ORAL
Status: DISPENSED
Start: 2023-07-20

## (undated) RX ORDER — LIDOCAINE HYDROCHLORIDE 10 MG/ML
INJECTION, SOLUTION INFILTRATION; PERINEURAL
Status: DISPENSED
Start: 2023-12-05

## (undated) RX ORDER — POTASSIUM CHLORIDE 750 MG/1
TABLET, EXTENDED RELEASE ORAL
Status: DISPENSED
Start: 2021-04-27

## (undated) RX ORDER — ONDANSETRON 2 MG/ML
INJECTION INTRAMUSCULAR; INTRAVENOUS
Status: DISPENSED
Start: 2020-05-20

## (undated) RX ORDER — ONDANSETRON 2 MG/ML
INJECTION INTRAMUSCULAR; INTRAVENOUS
Status: DISPENSED
Start: 2020-08-19

## (undated) RX ORDER — NITROGLYCERIN 5 MG/ML
VIAL (ML) INTRAVENOUS
Status: DISPENSED
Start: 2023-07-31

## (undated) RX ORDER — TRIAMCINOLONE ACETONIDE 40 MG/ML
INJECTION, SUSPENSION INTRA-ARTICULAR; INTRAMUSCULAR
Status: DISPENSED
Start: 2023-12-05